# Patient Record
Sex: MALE | Race: WHITE | NOT HISPANIC OR LATINO | Employment: OTHER | URBAN - METROPOLITAN AREA
[De-identification: names, ages, dates, MRNs, and addresses within clinical notes are randomized per-mention and may not be internally consistent; named-entity substitution may affect disease eponyms.]

---

## 2017-01-06 ENCOUNTER — ALLSCRIPTS OFFICE VISIT (OUTPATIENT)
Dept: OTHER | Facility: OTHER | Age: 57
End: 2017-01-06

## 2017-01-06 ENCOUNTER — HOSPITAL ENCOUNTER (EMERGENCY)
Facility: HOSPITAL | Age: 57
Discharge: HOME | End: 2017-01-06
Attending: EMERGENCY MEDICINE | Admitting: EMERGENCY MEDICINE
Payer: COMMERCIAL

## 2017-01-06 VITALS
TEMPERATURE: 98.7 F | WEIGHT: 183 LBS | SYSTOLIC BLOOD PRESSURE: 130 MMHG | RESPIRATION RATE: 18 BRPM | OXYGEN SATURATION: 97 % | DIASTOLIC BLOOD PRESSURE: 76 MMHG | HEART RATE: 105 BPM

## 2017-01-06 DIAGNOSIS — K42.9 UMBILICAL HERNIA: Primary | ICD-10-CM

## 2017-01-06 LAB — HGB BLD-MCNC: 8.8 G/DL

## 2017-01-06 PROCEDURE — 99283 EMERGENCY DEPT VISIT LOW MDM: CPT

## 2017-01-13 ENCOUNTER — APPOINTMENT (OUTPATIENT)
Dept: LAB | Facility: CLINIC | Age: 57
End: 2017-01-13
Payer: COMMERCIAL

## 2017-01-13 DIAGNOSIS — E11.65 TYPE 2 DIABETES MELLITUS WITH HYPERGLYCEMIA (HCC): ICD-10-CM

## 2017-01-13 LAB
ALBUMIN SERPL BCP-MCNC: 3.3 G/DL (ref 3.5–5)
ALP SERPL-CCNC: 63 U/L (ref 46–116)
ALT SERPL W P-5'-P-CCNC: 86 U/L (ref 12–78)
ANION GAP SERPL CALCULATED.3IONS-SCNC: 11 MMOL/L (ref 4–13)
AST SERPL W P-5'-P-CCNC: 78 U/L (ref 5–45)
BASOPHILS # BLD AUTO: 0.05 THOUSANDS/ΜL (ref 0–0.1)
BASOPHILS NFR BLD AUTO: 1 % (ref 0–1)
BILIRUB SERPL-MCNC: 0.7 MG/DL (ref 0.2–1)
BUN SERPL-MCNC: 15 MG/DL (ref 5–25)
CALCIUM SERPL-MCNC: 9.2 MG/DL (ref 8.3–10.1)
CHLORIDE SERPL-SCNC: 96 MMOL/L (ref 100–108)
CHOLEST SERPL-MCNC: 136 MG/DL (ref 50–200)
CO2 SERPL-SCNC: 28 MMOL/L (ref 21–32)
CREAT SERPL-MCNC: 0.76 MG/DL (ref 0.6–1.3)
EOSINOPHIL # BLD AUTO: 0.11 THOUSAND/ΜL (ref 0–0.61)
EOSINOPHIL NFR BLD AUTO: 2 % (ref 0–6)
ERYTHROCYTE [DISTWIDTH] IN BLOOD BY AUTOMATED COUNT: 13.4 % (ref 11.6–15.1)
EST. AVERAGE GLUCOSE BLD GHB EST-MCNC: 206 MG/DL
GFR SERPL CREATININE-BSD FRML MDRD: >60 ML/MIN/1.73SQ M
GLUCOSE SERPL-MCNC: 258 MG/DL (ref 65–140)
HBA1C MFR BLD: 8.8 % (ref 4.2–6.3)
HCT VFR BLD AUTO: 41.2 % (ref 36.5–49.3)
HDLC SERPL-MCNC: 59 MG/DL (ref 40–60)
HGB BLD-MCNC: 13.9 G/DL (ref 12–17)
LDLC SERPL CALC-MCNC: 57 MG/DL (ref 0–100)
LYMPHOCYTES # BLD AUTO: 1.57 THOUSANDS/ΜL (ref 0.6–4.47)
LYMPHOCYTES NFR BLD AUTO: 22 % (ref 14–44)
MCH RBC QN AUTO: 35.2 PG (ref 26.8–34.3)
MCHC RBC AUTO-ENTMCNC: 33.7 G/DL (ref 31.4–37.4)
MCV RBC AUTO: 104 FL (ref 82–98)
MONOCYTES # BLD AUTO: 0.89 THOUSAND/ΜL (ref 0.17–1.22)
MONOCYTES NFR BLD AUTO: 13 % (ref 4–12)
NEUTROPHILS # BLD AUTO: 4.41 THOUSANDS/ΜL (ref 1.85–7.62)
NEUTS SEG NFR BLD AUTO: 62 % (ref 43–75)
NRBC BLD AUTO-RTO: 0 /100 WBCS
PLATELET # BLD AUTO: 256 THOUSANDS/UL (ref 149–390)
PMV BLD AUTO: 10.3 FL (ref 8.9–12.7)
POTASSIUM SERPL-SCNC: 4.7 MMOL/L (ref 3.5–5.3)
PROT SERPL-MCNC: 8 G/DL (ref 6.4–8.2)
RBC # BLD AUTO: 3.95 MILLION/UL (ref 3.88–5.62)
SODIUM SERPL-SCNC: 135 MMOL/L (ref 136–145)
TRIGL SERPL-MCNC: 98 MG/DL
TSH SERPL DL<=0.05 MIU/L-ACNC: 2.67 UIU/ML (ref 0.36–3.74)
WBC # BLD AUTO: 7.05 THOUSAND/UL (ref 4.31–10.16)

## 2017-01-13 PROCEDURE — 85025 COMPLETE CBC W/AUTO DIFF WBC: CPT

## 2017-01-13 PROCEDURE — 84443 ASSAY THYROID STIM HORMONE: CPT

## 2017-01-13 PROCEDURE — 80053 COMPREHEN METABOLIC PANEL: CPT

## 2017-01-13 PROCEDURE — 36415 COLL VENOUS BLD VENIPUNCTURE: CPT

## 2017-01-13 PROCEDURE — 80061 LIPID PANEL: CPT

## 2017-01-13 PROCEDURE — 83036 HEMOGLOBIN GLYCOSYLATED A1C: CPT

## 2017-01-17 ENCOUNTER — TRANSCRIBE ORDERS (OUTPATIENT)
Dept: ADMINISTRATIVE | Facility: HOSPITAL | Age: 57
End: 2017-01-17

## 2017-01-17 DIAGNOSIS — R74.8 ELEVATED LIVER ENZYMES: ICD-10-CM

## 2017-01-17 DIAGNOSIS — R68.81 EARLY SATIETY: ICD-10-CM

## 2017-01-17 DIAGNOSIS — K43.9 VENTRAL HERNIA WITHOUT OBSTRUCTION OR GANGRENE: ICD-10-CM

## 2017-01-17 DIAGNOSIS — K80.20 CALCULUS OF GALLBLADDER WITHOUT CHOLECYSTITIS WITHOUT OBSTRUCTION: ICD-10-CM

## 2017-01-17 DIAGNOSIS — R10.9 ABDOMINAL PAIN: ICD-10-CM

## 2017-01-17 DIAGNOSIS — R10.9 ABDOMINAL PAIN, UNSPECIFIED SITE: Primary | ICD-10-CM

## 2017-01-17 DIAGNOSIS — R74.8 ABNORMAL LEVELS OF OTHER SERUM ENZYMES: ICD-10-CM

## 2017-01-18 ENCOUNTER — APPOINTMENT (EMERGENCY)
Dept: RADIOLOGY | Facility: HOSPITAL | Age: 57
End: 2017-01-18
Payer: COMMERCIAL

## 2017-01-18 ENCOUNTER — HOSPITAL ENCOUNTER (OUTPATIENT)
Facility: HOSPITAL | Age: 57
Setting detail: OBSERVATION
Discharge: HOME | End: 2017-01-19
Attending: EMERGENCY MEDICINE | Admitting: SURGERY
Payer: COMMERCIAL

## 2017-01-18 ENCOUNTER — APPOINTMENT (OUTPATIENT)
Dept: LAB | Facility: CLINIC | Age: 57
End: 2017-01-18
Payer: COMMERCIAL

## 2017-01-18 DIAGNOSIS — K85.90 PANCREATITIS: Primary | ICD-10-CM

## 2017-01-18 DIAGNOSIS — K86.3 PANCREATIC PSEUDOCYST: ICD-10-CM

## 2017-01-18 DIAGNOSIS — R10.9 ABDOMINAL PAIN: ICD-10-CM

## 2017-01-18 LAB
ALBUMIN SERPL BCP-MCNC: 3.1 G/DL (ref 3.5–5)
ALBUMIN SERPL BCP-MCNC: 3.6 G/DL (ref 3.5–5)
ALP SERPL-CCNC: 63 U/L (ref 46–116)
ALP SERPL-CCNC: 70 U/L (ref 46–116)
ALT SERPL W P-5'-P-CCNC: 127 U/L (ref 12–78)
ALT SERPL W P-5'-P-CCNC: 131 U/L (ref 12–78)
AMYLASE SERPL-CCNC: 216 IU/L (ref 25–115)
ANION GAP SERPL CALCULATED.3IONS-SCNC: 8 MMOL/L (ref 4–13)
ANION GAP SERPL CALCULATED.3IONS-SCNC: 8 MMOL/L (ref 4–13)
AST SERPL W P-5'-P-CCNC: 144 U/L (ref 5–45)
AST SERPL W P-5'-P-CCNC: 144 U/L (ref 5–45)
BASOPHILS # BLD AUTO: 0.05 THOUSANDS/ΜL (ref 0–0.1)
BASOPHILS NFR BLD AUTO: 1 % (ref 0–1)
BILIRUB SERPL-MCNC: 0.51 MG/DL (ref 0.2–1)
BILIRUB SERPL-MCNC: 0.76 MG/DL (ref 0.2–1)
BUN SERPL-MCNC: 14 MG/DL (ref 5–25)
BUN SERPL-MCNC: 16 MG/DL (ref 5–25)
CALCIUM SERPL-MCNC: 9 MG/DL (ref 8.3–10.1)
CALCIUM SERPL-MCNC: 9.3 MG/DL (ref 8.3–10.1)
CHLORIDE SERPL-SCNC: 90 MMOL/L (ref 100–108)
CHLORIDE SERPL-SCNC: 94 MMOL/L (ref 100–108)
CO2 SERPL-SCNC: 29 MMOL/L (ref 21–32)
CO2 SERPL-SCNC: 31 MMOL/L (ref 21–32)
CREAT SERPL-MCNC: 0.81 MG/DL (ref 0.6–1.3)
CREAT SERPL-MCNC: 0.88 MG/DL (ref 0.6–1.3)
EOSINOPHIL # BLD AUTO: 0.13 THOUSAND/ΜL (ref 0–0.61)
EOSINOPHIL NFR BLD AUTO: 2 % (ref 0–6)
ERYTHROCYTE [DISTWIDTH] IN BLOOD BY AUTOMATED COUNT: 13.2 % (ref 11.6–15.1)
GFR SERPL CREATININE-BSD FRML MDRD: >60 ML/MIN/1.73SQ M
GFR SERPL CREATININE-BSD FRML MDRD: >60 ML/MIN/1.73SQ M
GLUCOSE SERPL-MCNC: 214 MG/DL (ref 65–140)
GLUCOSE SERPL-MCNC: 255 MG/DL (ref 65–140)
GLUCOSE SERPL-MCNC: 435 MG/DL (ref 65–140)
HCT VFR BLD AUTO: 38.9 % (ref 36.5–49.3)
HGB BLD-MCNC: 13.4 G/DL (ref 12–17)
LACTATE SERPL-SCNC: 1.7 MMOL/L (ref 0.5–2)
LIPASE SERPL-CCNC: 367 U/L (ref 73–393)
LIPASE SERPL-CCNC: 6698 U/L (ref 73–393)
LYMPHOCYTES # BLD AUTO: 1.71 THOUSANDS/ΜL (ref 0.6–4.47)
LYMPHOCYTES NFR BLD AUTO: 25 % (ref 14–44)
MCH RBC QN AUTO: 35.3 PG (ref 26.8–34.3)
MCHC RBC AUTO-ENTMCNC: 34.4 G/DL (ref 31.4–37.4)
MCV RBC AUTO: 102 FL (ref 82–98)
MONOCYTES # BLD AUTO: 0.66 THOUSAND/ΜL (ref 0.17–1.22)
MONOCYTES NFR BLD AUTO: 10 % (ref 4–12)
NEUTROPHILS # BLD AUTO: 4.23 THOUSANDS/ΜL (ref 1.85–7.62)
NEUTS SEG NFR BLD AUTO: 62 % (ref 43–75)
NRBC BLD AUTO-RTO: 0 /100 WBCS
PLATELET # BLD AUTO: 219 THOUSANDS/UL (ref 149–390)
PMV BLD AUTO: 10.1 FL (ref 8.9–12.7)
POTASSIUM SERPL-SCNC: 4.2 MMOL/L (ref 3.5–5.3)
POTASSIUM SERPL-SCNC: 4.9 MMOL/L (ref 3.5–5.3)
PROT SERPL-MCNC: 7.6 G/DL (ref 6.4–8.2)
PROT SERPL-MCNC: 8.5 G/DL (ref 6.4–8.2)
RBC # BLD AUTO: 3.8 MILLION/UL (ref 3.88–5.62)
SODIUM SERPL-SCNC: 129 MMOL/L (ref 136–145)
SODIUM SERPL-SCNC: 131 MMOL/L (ref 136–145)
WBC # BLD AUTO: 6.8 THOUSAND/UL (ref 4.31–10.16)

## 2017-01-18 PROCEDURE — 36415 COLL VENOUS BLD VENIPUNCTURE: CPT

## 2017-01-18 PROCEDURE — 96360 HYDRATION IV INFUSION INIT: CPT

## 2017-01-18 PROCEDURE — 85025 COMPLETE CBC W/AUTO DIFF WBC: CPT | Performed by: EMERGENCY MEDICINE

## 2017-01-18 PROCEDURE — 83690 ASSAY OF LIPASE: CPT | Performed by: EMERGENCY MEDICINE

## 2017-01-18 PROCEDURE — 83690 ASSAY OF LIPASE: CPT

## 2017-01-18 PROCEDURE — 74177 CT ABD & PELVIS W/CONTRAST: CPT

## 2017-01-18 PROCEDURE — 82150 ASSAY OF AMYLASE: CPT

## 2017-01-18 PROCEDURE — 82948 REAGENT STRIP/BLOOD GLUCOSE: CPT

## 2017-01-18 PROCEDURE — 96361 HYDRATE IV INFUSION ADD-ON: CPT

## 2017-01-18 PROCEDURE — 83605 ASSAY OF LACTIC ACID: CPT | Performed by: EMERGENCY MEDICINE

## 2017-01-18 RX ORDER — NICOTINE 21 MG/24HR
14 PATCH, TRANSDERMAL 24 HOURS TRANSDERMAL ONCE
Status: DISCONTINUED | OUTPATIENT
Start: 2017-01-18 | End: 2017-01-19 | Stop reason: HOSPADM

## 2017-01-18 RX ORDER — NICOTINE 21 MG/24HR
1 PATCH, TRANSDERMAL 24 HOURS TRANSDERMAL DAILY
Status: DISCONTINUED | OUTPATIENT
Start: 2017-01-19 | End: 2017-01-19 | Stop reason: HOSPADM

## 2017-01-18 RX ORDER — SODIUM CHLORIDE 9 MG/ML
125 INJECTION, SOLUTION INTRAVENOUS CONTINUOUS
Status: DISCONTINUED | OUTPATIENT
Start: 2017-01-18 | End: 2017-01-19

## 2017-01-18 RX ORDER — AMOXICILLIN 250 MG
1 CAPSULE ORAL
Status: DISCONTINUED | OUTPATIENT
Start: 2017-01-18 | End: 2017-01-19 | Stop reason: HOSPADM

## 2017-01-18 RX ORDER — LORAZEPAM 2 MG/ML
1 INJECTION INTRAMUSCULAR EVERY 2 HOUR PRN
Status: DISCONTINUED | OUTPATIENT
Start: 2017-01-18 | End: 2017-01-19 | Stop reason: HOSPADM

## 2017-01-18 RX ORDER — HEPARIN SODIUM 5000 [USP'U]/ML
5000 INJECTION, SOLUTION INTRAVENOUS; SUBCUTANEOUS EVERY 8 HOURS SCHEDULED
Status: DISCONTINUED | OUTPATIENT
Start: 2017-01-18 | End: 2017-01-19 | Stop reason: HOSPADM

## 2017-01-18 RX ADMIN — SODIUM CHLORIDE 200 ML/HR: 0.9 INJECTION, SOLUTION INTRAVENOUS at 21:37

## 2017-01-18 RX ADMIN — INSULIN LISPRO 4 UNITS: 100 INJECTION, SOLUTION INTRAVENOUS; SUBCUTANEOUS at 23:48

## 2017-01-18 RX ADMIN — SODIUM CHLORIDE 1000 ML: 0.9 INJECTION, SOLUTION INTRAVENOUS at 18:26

## 2017-01-18 RX ADMIN — NICOTINE 14 MG: 14 PATCH TRANSDERMAL at 19:31

## 2017-01-18 RX ADMIN — IOHEXOL 100 ML: 350 INJECTION, SOLUTION INTRAVENOUS at 17:48

## 2017-01-19 VITALS
DIASTOLIC BLOOD PRESSURE: 97 MMHG | OXYGEN SATURATION: 97 % | RESPIRATION RATE: 20 BRPM | HEART RATE: 97 BPM | BODY MASS INDEX: 26.5 KG/M2 | HEIGHT: 70 IN | TEMPERATURE: 98.6 F | SYSTOLIC BLOOD PRESSURE: 165 MMHG | WEIGHT: 185.1 LBS

## 2017-01-19 PROBLEM — K86.0 ALCOHOL-INDUCED CHRONIC PANCREATITIS (HCC): Status: ACTIVE | Noted: 2017-01-19

## 2017-01-19 LAB
ALBUMIN SERPL BCP-MCNC: 3.1 G/DL (ref 3.5–5)
ALP SERPL-CCNC: 57 U/L (ref 46–116)
ALT SERPL W P-5'-P-CCNC: 175 U/L (ref 12–78)
ANION GAP SERPL CALCULATED.3IONS-SCNC: 9 MMOL/L (ref 4–13)
AST SERPL W P-5'-P-CCNC: 261 U/L (ref 5–45)
BASOPHILS # BLD AUTO: 0.03 THOUSANDS/ΜL (ref 0–0.1)
BASOPHILS NFR BLD AUTO: 1 % (ref 0–1)
BILIRUB SERPL-MCNC: 0.59 MG/DL (ref 0.2–1)
BUN SERPL-MCNC: 12 MG/DL (ref 5–25)
CALCIUM SERPL-MCNC: 8.5 MG/DL (ref 8.3–10.1)
CHLORIDE SERPL-SCNC: 101 MMOL/L (ref 100–108)
CO2 SERPL-SCNC: 27 MMOL/L (ref 21–32)
CREAT SERPL-MCNC: 0.62 MG/DL (ref 0.6–1.3)
EOSINOPHIL # BLD AUTO: 0.16 THOUSAND/ΜL (ref 0–0.61)
EOSINOPHIL NFR BLD AUTO: 3 % (ref 0–6)
ERYTHROCYTE [DISTWIDTH] IN BLOOD BY AUTOMATED COUNT: 13.5 % (ref 11.6–15.1)
GFR SERPL CREATININE-BSD FRML MDRD: >60 ML/MIN/1.73SQ M
GLUCOSE SERPL-MCNC: 143 MG/DL (ref 65–140)
GLUCOSE SERPL-MCNC: 152 MG/DL (ref 65–140)
GLUCOSE SERPL-MCNC: 162 MG/DL (ref 65–140)
HCT VFR BLD AUTO: 40.1 % (ref 36.5–49.3)
HGB BLD-MCNC: 13.4 G/DL (ref 12–17)
LIPASE SERPL-CCNC: 208 U/L (ref 73–393)
LYMPHOCYTES # BLD AUTO: 1.53 THOUSANDS/ΜL (ref 0.6–4.47)
LYMPHOCYTES NFR BLD AUTO: 25 % (ref 14–44)
MCH RBC QN AUTO: 34.8 PG (ref 26.8–34.3)
MCHC RBC AUTO-ENTMCNC: 33.4 G/DL (ref 31.4–37.4)
MCV RBC AUTO: 104 FL (ref 82–98)
MONOCYTES # BLD AUTO: 0.59 THOUSAND/ΜL (ref 0.17–1.22)
MONOCYTES NFR BLD AUTO: 10 % (ref 4–12)
NEUTROPHILS # BLD AUTO: 3.82 THOUSANDS/ΜL (ref 1.85–7.62)
NEUTS SEG NFR BLD AUTO: 61 % (ref 43–75)
NRBC BLD AUTO-RTO: 0 /100 WBCS
PLATELET # BLD AUTO: 204 THOUSANDS/UL (ref 149–390)
PMV BLD AUTO: 10.4 FL (ref 8.9–12.7)
POTASSIUM SERPL-SCNC: 3.9 MMOL/L (ref 3.5–5.3)
PROT SERPL-MCNC: 7.3 G/DL (ref 6.4–8.2)
RBC # BLD AUTO: 3.85 MILLION/UL (ref 3.88–5.62)
SODIUM SERPL-SCNC: 137 MMOL/L (ref 136–145)
WBC # BLD AUTO: 6.14 THOUSAND/UL (ref 4.31–10.16)

## 2017-01-19 PROCEDURE — 82948 REAGENT STRIP/BLOOD GLUCOSE: CPT

## 2017-01-19 PROCEDURE — 80053 COMPREHEN METABOLIC PANEL: CPT | Performed by: SURGERY

## 2017-01-19 PROCEDURE — 83690 ASSAY OF LIPASE: CPT | Performed by: SURGERY

## 2017-01-19 PROCEDURE — 85025 COMPLETE CBC W/AUTO DIFF WBC: CPT | Performed by: SURGERY

## 2017-01-19 PROCEDURE — 99285 EMERGENCY DEPT VISIT HI MDM: CPT

## 2017-01-19 RX ORDER — AMOXICILLIN 250 MG
1 CAPSULE ORAL
Qty: 30 TABLET | Refills: 0 | Status: SHIPPED | OUTPATIENT
Start: 2017-01-19 | End: 2017-02-18

## 2017-01-19 RX ORDER — POTASSIUM CHLORIDE 14.9 MG/ML
20 INJECTION INTRAVENOUS ONCE
Status: COMPLETED | OUTPATIENT
Start: 2017-01-19 | End: 2017-01-19

## 2017-01-19 RX ORDER — AMOXICILLIN 250 MG
1 CAPSULE ORAL
Qty: 30 TABLET | Refills: 0 | Status: CANCELLED | OUTPATIENT
Start: 2017-01-19 | End: 2017-02-18

## 2017-01-19 RX ADMIN — SODIUM CHLORIDE 200 ML/HR: 0.9 INJECTION, SOLUTION INTRAVENOUS at 09:26

## 2017-01-19 RX ADMIN — HEPARIN SODIUM 5000 UNITS: 5000 INJECTION, SOLUTION INTRAVENOUS; SUBCUTANEOUS at 05:03

## 2017-01-19 RX ADMIN — SODIUM CHLORIDE 200 ML/HR: 0.9 INJECTION, SOLUTION INTRAVENOUS at 02:44

## 2017-01-19 RX ADMIN — INSULIN LISPRO 2 UNITS: 100 INJECTION, SOLUTION INTRAVENOUS; SUBCUTANEOUS at 12:50

## 2017-01-19 RX ADMIN — NICOTINE 1 PATCH: 14 PATCH, EXTENDED RELEASE TRANSDERMAL at 09:25

## 2017-01-19 RX ADMIN — POTASSIUM CHLORIDE 20 MEQ: 200 INJECTION, SOLUTION INTRAVENOUS at 12:35

## 2017-01-19 RX ADMIN — INSULIN LISPRO 2 UNITS: 100 INJECTION, SOLUTION INTRAVENOUS; SUBCUTANEOUS at 05:51

## 2017-01-19 RX ADMIN — HEPARIN SODIUM 5000 UNITS: 5000 INJECTION, SOLUTION INTRAVENOUS; SUBCUTANEOUS at 15:03

## 2017-01-20 ENCOUNTER — HOSPITAL ENCOUNTER (OUTPATIENT)
Dept: RADIOLOGY | Facility: HOSPITAL | Age: 57
End: 2017-01-20
Payer: COMMERCIAL

## 2017-02-02 ENCOUNTER — GENERIC CONVERSION - ENCOUNTER (OUTPATIENT)
Dept: OTHER | Facility: OTHER | Age: 57
End: 2017-02-02

## 2017-02-16 ENCOUNTER — ALLSCRIPTS OFFICE VISIT (OUTPATIENT)
Dept: OTHER | Facility: OTHER | Age: 57
End: 2017-02-16

## 2017-02-18 ENCOUNTER — TRANSCRIBE ORDERS (OUTPATIENT)
Dept: LAB | Facility: HOSPITAL | Age: 57
End: 2017-02-18

## 2017-02-18 ENCOUNTER — APPOINTMENT (OUTPATIENT)
Dept: LAB | Facility: HOSPITAL | Age: 57
End: 2017-02-18
Attending: SURGERY
Payer: COMMERCIAL

## 2017-02-18 DIAGNOSIS — K80.20 CALCULUS OF GALLBLADDER WITHOUT CHOLECYSTITIS WITHOUT OBSTRUCTION: ICD-10-CM

## 2017-02-18 DIAGNOSIS — K43.9 VENTRAL HERNIA WITHOUT OBSTRUCTION OR GANGRENE: ICD-10-CM

## 2017-02-18 DIAGNOSIS — K80.61 CALCULUS OF GALLBLADDER AND BILE DUCT WITH CHOLECYSTITIS WITH OBSTRUCTION, UNSPECIFIED CHOLECYSTITIS ACUITY: Primary | ICD-10-CM

## 2017-02-18 DIAGNOSIS — K80.61 CALCULUS OF GALLBLADDER AND BILE DUCT WITH CHOLECYSTITIS WITH OBSTRUCTION, UNSPECIFIED CHOLECYSTITIS ACUITY: ICD-10-CM

## 2017-02-18 LAB
ABO GROUP BLD: NORMAL
ALBUMIN SERPL BCP-MCNC: 3.8 G/DL (ref 3.5–5)
ALP SERPL-CCNC: 60 U/L (ref 46–116)
ALT SERPL W P-5'-P-CCNC: 146 U/L (ref 12–78)
AST SERPL W P-5'-P-CCNC: 56 U/L (ref 5–45)
BILIRUB DIRECT SERPL-MCNC: 0.25 MG/DL (ref 0–0.2)
BILIRUB SERPL-MCNC: 0.56 MG/DL (ref 0.2–1)
BLD GP AB SCN SERPL QL: NEGATIVE
PROT SERPL-MCNC: 8.6 G/DL (ref 6.4–8.2)
RH BLD: NEGATIVE

## 2017-02-18 PROCEDURE — 36415 COLL VENOUS BLD VENIPUNCTURE: CPT

## 2017-02-18 PROCEDURE — 80076 HEPATIC FUNCTION PANEL: CPT

## 2017-02-18 PROCEDURE — 86901 BLOOD TYPING SEROLOGIC RH(D): CPT

## 2017-02-18 PROCEDURE — 86850 RBC ANTIBODY SCREEN: CPT

## 2017-02-18 PROCEDURE — 86900 BLOOD TYPING SEROLOGIC ABO: CPT

## 2017-02-20 ENCOUNTER — ANESTHESIA EVENT (OUTPATIENT)
Dept: PERIOP | Facility: HOSPITAL | Age: 57
End: 2017-02-20
Payer: COMMERCIAL

## 2017-02-20 ENCOUNTER — ALLSCRIPTS OFFICE VISIT (OUTPATIENT)
Dept: OTHER | Facility: OTHER | Age: 57
End: 2017-02-20

## 2017-02-20 DIAGNOSIS — Z12.11 ENCOUNTER FOR SCREENING FOR MALIGNANT NEOPLASM OF COLON: ICD-10-CM

## 2017-02-20 DIAGNOSIS — E11.65 TYPE 2 DIABETES MELLITUS WITH HYPERGLYCEMIA (HCC): ICD-10-CM

## 2017-02-20 LAB — HBA1C MFR BLD HPLC: 10.2 %

## 2017-02-20 RX ORDER — BACLOFEN 20 MG/1
20 TABLET ORAL 3 TIMES DAILY
COMMUNITY
End: 2020-12-11

## 2017-02-20 RX ORDER — MULTIVITAMIN
1 TABLET ORAL DAILY
COMMUNITY
End: 2018-05-08 | Stop reason: SDUPTHER

## 2017-02-20 RX ORDER — LISINOPRIL 10 MG/1
10 TABLET ORAL DAILY
Status: ON HOLD | COMMUNITY
End: 2018-04-19 | Stop reason: SDUPTHER

## 2017-02-20 RX ORDER — GLIMEPIRIDE 1 MG/1
1 TABLET ORAL
Status: ON HOLD | COMMUNITY
End: 2018-04-19 | Stop reason: SDUPTHER

## 2017-02-20 RX ORDER — CITALOPRAM 20 MG/1
20 TABLET ORAL DAILY
Status: ON HOLD | COMMUNITY
End: 2018-04-19 | Stop reason: SDUPTHER

## 2017-02-24 ENCOUNTER — ANESTHESIA (OUTPATIENT)
Dept: PERIOP | Facility: HOSPITAL | Age: 57
End: 2017-02-24
Payer: COMMERCIAL

## 2017-02-24 ENCOUNTER — HOSPITAL ENCOUNTER (OUTPATIENT)
Facility: HOSPITAL | Age: 57
Setting detail: OUTPATIENT SURGERY
Discharge: HOME | End: 2017-02-24
Attending: SURGERY | Admitting: SURGERY
Payer: COMMERCIAL

## 2017-02-24 VITALS
RESPIRATION RATE: 16 BRPM | TEMPERATURE: 99.2 F | BODY MASS INDEX: 25.05 KG/M2 | HEIGHT: 70 IN | SYSTOLIC BLOOD PRESSURE: 106 MMHG | OXYGEN SATURATION: 94 % | WEIGHT: 175 LBS | DIASTOLIC BLOOD PRESSURE: 57 MMHG | HEART RATE: 104 BPM

## 2017-02-24 DIAGNOSIS — K85.10 ACUTE BILIARY PANCREATITIS WITHOUT INFECTION OR NECROSIS: ICD-10-CM

## 2017-02-24 DIAGNOSIS — K42.9 UMBILICAL HERNIA WITHOUT OBSTRUCTION OR GANGRENE: ICD-10-CM

## 2017-02-24 LAB
BASOPHILS # BLD AUTO: 0.05 THOUSANDS/ΜL (ref 0–0.1)
BASOPHILS NFR BLD AUTO: 1 % (ref 0–1)
EOSINOPHIL # BLD AUTO: 0.11 THOUSAND/ΜL (ref 0–0.61)
EOSINOPHIL NFR BLD AUTO: 2 % (ref 0–6)
ERYTHROCYTE [DISTWIDTH] IN BLOOD BY AUTOMATED COUNT: 12.5 % (ref 11.6–15.1)
GLUCOSE SERPL-MCNC: 240 MG/DL (ref 65–140)
GLUCOSE SERPL-MCNC: 270 MG/DL (ref 65–140)
GLUCOSE SERPL-MCNC: 278 MG/DL (ref 65–140)
GLUCOSE SERPL-MCNC: 302 MG/DL (ref 65–140)
GLUCOSE SERPL-MCNC: 307 MG/DL (ref 65–140)
HCT VFR BLD AUTO: 40.7 % (ref 36.5–49.3)
HGB BLD-MCNC: 14.1 G/DL (ref 12–17)
LYMPHOCYTES # BLD AUTO: 1.99 THOUSANDS/ΜL (ref 0.6–4.47)
LYMPHOCYTES NFR BLD AUTO: 28 % (ref 14–44)
MCH RBC QN AUTO: 35.3 PG (ref 26.8–34.3)
MCHC RBC AUTO-ENTMCNC: 34.6 G/DL (ref 31.4–37.4)
MCV RBC AUTO: 102 FL (ref 82–98)
MONOCYTES # BLD AUTO: 0.71 THOUSAND/ΜL (ref 0.17–1.22)
MONOCYTES NFR BLD AUTO: 10 % (ref 4–12)
NEUTROPHILS # BLD AUTO: 4.17 THOUSANDS/ΜL (ref 1.85–7.62)
NEUTS SEG NFR BLD AUTO: 59 % (ref 43–75)
NRBC BLD AUTO-RTO: 0 /100 WBCS
PLATELET # BLD AUTO: 191 THOUSANDS/UL (ref 149–390)
PMV BLD AUTO: 10.6 FL (ref 8.9–12.7)
RBC # BLD AUTO: 3.99 MILLION/UL (ref 3.88–5.62)
WBC # BLD AUTO: 7.04 THOUSAND/UL (ref 4.31–10.16)

## 2017-02-24 PROCEDURE — 88304 TISSUE EXAM BY PATHOLOGIST: CPT | Performed by: SURGERY

## 2017-02-24 PROCEDURE — 85025 COMPLETE CBC W/AUTO DIFF WBC: CPT | Performed by: SURGERY

## 2017-02-24 PROCEDURE — 82948 REAGENT STRIP/BLOOD GLUCOSE: CPT

## 2017-02-24 PROCEDURE — 86923 COMPATIBILITY TEST ELECTRIC: CPT

## 2017-02-24 RX ORDER — BUPIVACAINE HYDROCHLORIDE 5 MG/ML
INJECTION, SOLUTION PERINEURAL AS NEEDED
Status: DISCONTINUED | OUTPATIENT
Start: 2017-02-24 | End: 2017-02-24 | Stop reason: HOSPADM

## 2017-02-24 RX ORDER — LIDOCAINE HYDROCHLORIDE 10 MG/ML
INJECTION, SOLUTION INFILTRATION; PERINEURAL AS NEEDED
Status: DISCONTINUED | OUTPATIENT
Start: 2017-02-24 | End: 2017-02-24 | Stop reason: SURG

## 2017-02-24 RX ORDER — ONDANSETRON 2 MG/ML
4 INJECTION INTRAMUSCULAR; INTRAVENOUS ONCE
Status: DISCONTINUED | OUTPATIENT
Start: 2017-02-24 | End: 2017-02-24 | Stop reason: HOSPADM

## 2017-02-24 RX ORDER — PROPOFOL 10 MG/ML
INJECTION, EMULSION INTRAVENOUS CONTINUOUS PRN
Status: DISCONTINUED | OUTPATIENT
Start: 2017-02-24 | End: 2017-02-24 | Stop reason: SURG

## 2017-02-24 RX ORDER — OXYCODONE HYDROCHLORIDE AND ACETAMINOPHEN 5; 325 MG/1; MG/1
1-2 TABLET ORAL EVERY 4 HOURS PRN
Refills: 0
Start: 2017-02-24 | End: 2017-03-06

## 2017-02-24 RX ORDER — GLYCOPYRROLATE 0.2 MG/ML
INJECTION INTRAMUSCULAR; INTRAVENOUS AS NEEDED
Status: DISCONTINUED | OUTPATIENT
Start: 2017-02-24 | End: 2017-02-24 | Stop reason: SURG

## 2017-02-24 RX ORDER — PROPOFOL 10 MG/ML
INJECTION, EMULSION INTRAVENOUS AS NEEDED
Status: DISCONTINUED | OUTPATIENT
Start: 2017-02-24 | End: 2017-02-24 | Stop reason: SURG

## 2017-02-24 RX ORDER — FENTANYL CITRATE 50 UG/ML
INJECTION, SOLUTION INTRAMUSCULAR; INTRAVENOUS AS NEEDED
Status: DISCONTINUED | OUTPATIENT
Start: 2017-02-24 | End: 2017-02-24 | Stop reason: SURG

## 2017-02-24 RX ORDER — ONDANSETRON 2 MG/ML
INJECTION INTRAMUSCULAR; INTRAVENOUS AS NEEDED
Status: DISCONTINUED | OUTPATIENT
Start: 2017-02-24 | End: 2017-02-24 | Stop reason: SURG

## 2017-02-24 RX ORDER — SODIUM CHLORIDE, SODIUM LACTATE, POTASSIUM CHLORIDE, CALCIUM CHLORIDE 600; 310; 30; 20 MG/100ML; MG/100ML; MG/100ML; MG/100ML
20 INJECTION, SOLUTION INTRAVENOUS CONTINUOUS
Status: DISCONTINUED | OUTPATIENT
Start: 2017-02-24 | End: 2017-02-24 | Stop reason: HOSPADM

## 2017-02-24 RX ORDER — ROCURONIUM BROMIDE 10 MG/ML
INJECTION, SOLUTION INTRAVENOUS AS NEEDED
Status: DISCONTINUED | OUTPATIENT
Start: 2017-02-24 | End: 2017-02-24 | Stop reason: SURG

## 2017-02-24 RX ADMIN — INSULIN HUMAN 10 UNITS: 100 INJECTION, SOLUTION PARENTERAL at 13:40

## 2017-02-24 RX ADMIN — PROPOFOL 50 MCG/KG/MIN: 10 INJECTION, EMULSION INTRAVENOUS at 10:38

## 2017-02-24 RX ADMIN — ROCURONIUM BROMIDE 10 MG: 10 INJECTION, SOLUTION INTRAVENOUS at 10:42

## 2017-02-24 RX ADMIN — SODIUM CHLORIDE, SODIUM LACTATE, POTASSIUM CHLORIDE, AND CALCIUM CHLORIDE 20 ML/HR: .6; .31; .03; .02 INJECTION, SOLUTION INTRAVENOUS at 08:41

## 2017-02-24 RX ADMIN — HYDROMORPHONE HYDROCHLORIDE 0.4 MG: 1 INJECTION, SOLUTION INTRAMUSCULAR; INTRAVENOUS; SUBCUTANEOUS at 12:33

## 2017-02-24 RX ADMIN — GLYCOPYRROLATE 0.4 MG: 0.2 INJECTION INTRAMUSCULAR; INTRAVENOUS at 11:55

## 2017-02-24 RX ADMIN — METOPROLOL TARTRATE 2 MG: 5 INJECTION, SOLUTION INTRAVENOUS at 11:00

## 2017-02-24 RX ADMIN — ONDANSETRON 4 MG: 2 INJECTION INTRAMUSCULAR; INTRAVENOUS at 11:00

## 2017-02-24 RX ADMIN — HYDROMORPHONE HYDROCHLORIDE 0.4 MG: 1 INJECTION, SOLUTION INTRAMUSCULAR; INTRAVENOUS; SUBCUTANEOUS at 13:02

## 2017-02-24 RX ADMIN — CEFAZOLIN SODIUM 1000 MG: 1 SOLUTION INTRAVENOUS at 10:40

## 2017-02-24 RX ADMIN — HYDROMORPHONE HYDROCHLORIDE 0.4 MG: 1 INJECTION, SOLUTION INTRAMUSCULAR; INTRAVENOUS; SUBCUTANEOUS at 12:43

## 2017-02-24 RX ADMIN — SODIUM CHLORIDE, SODIUM LACTATE, POTASSIUM CHLORIDE, AND CALCIUM CHLORIDE: .6; .31; .03; .02 INJECTION, SOLUTION INTRAVENOUS at 11:50

## 2017-02-24 RX ADMIN — INSULIN HUMAN 10 UNITS: 100 INJECTION, SOLUTION PARENTERAL at 12:35

## 2017-02-24 RX ADMIN — INSULIN HUMAN 6 UNITS: 100 INJECTION, SOLUTION PARENTERAL at 09:04

## 2017-02-24 RX ADMIN — ROCURONIUM BROMIDE 40 MG: 10 INJECTION, SOLUTION INTRAVENOUS at 10:25

## 2017-02-24 RX ADMIN — PROPOFOL 200 MG: 10 INJECTION, EMULSION INTRAVENOUS at 10:25

## 2017-02-24 RX ADMIN — NEOSTIGMINE METHYLSULFATE 3 MG: 1 INJECTION INTRAMUSCULAR; INTRAVENOUS; SUBCUTANEOUS at 11:55

## 2017-02-24 RX ADMIN — FENTANYL CITRATE 50 MCG: 50 INJECTION, SOLUTION INTRAMUSCULAR; INTRAVENOUS at 10:25

## 2017-02-24 RX ADMIN — LIDOCAINE HYDROCHLORIDE 50 MG: 10 INJECTION, SOLUTION INFILTRATION; PERINEURAL at 10:25

## 2017-02-26 LAB
ABO GROUP BLD BPU: NORMAL
ABO GROUP BLD BPU: NORMAL
BPU ID: NORMAL
BPU ID: NORMAL
UNIT DISPENSE STATUS: NORMAL
UNIT DISPENSE STATUS: NORMAL
UNIT PRODUCT CODE: NORMAL
UNIT PRODUCT CODE: NORMAL
UNIT RH: NORMAL
UNIT RH: NORMAL

## 2017-03-08 ENCOUNTER — ALLSCRIPTS OFFICE VISIT (OUTPATIENT)
Dept: OTHER | Facility: OTHER | Age: 57
End: 2017-03-08

## 2017-05-23 ENCOUNTER — ALLSCRIPTS OFFICE VISIT (OUTPATIENT)
Dept: OTHER | Facility: OTHER | Age: 57
End: 2017-05-23

## 2017-05-23 DIAGNOSIS — R19.7 DIARRHEA: ICD-10-CM

## 2017-05-23 DIAGNOSIS — R74.8 ABNORMAL LEVELS OF OTHER SERUM ENZYMES: ICD-10-CM

## 2017-07-24 ENCOUNTER — GENERIC CONVERSION - ENCOUNTER (OUTPATIENT)
Dept: OTHER | Facility: OTHER | Age: 57
End: 2017-07-24

## 2017-08-09 ENCOUNTER — GENERIC CONVERSION - ENCOUNTER (OUTPATIENT)
Dept: OTHER | Facility: OTHER | Age: 57
End: 2017-08-09

## 2017-08-28 ENCOUNTER — ALLSCRIPTS OFFICE VISIT (OUTPATIENT)
Dept: OTHER | Facility: OTHER | Age: 57
End: 2017-08-28

## 2017-08-28 DIAGNOSIS — S14.109A: ICD-10-CM

## 2017-08-28 DIAGNOSIS — E11.40 TYPE 2 DIABETES MELLITUS WITH DIABETIC NEUROPATHY (HCC): ICD-10-CM

## 2017-08-28 DIAGNOSIS — Z98.1 ARTHRODESIS STATUS: ICD-10-CM

## 2017-08-28 DIAGNOSIS — G89.29 OTHER CHRONIC PAIN: ICD-10-CM

## 2017-08-28 DIAGNOSIS — R26.9 ABNORMALITY OF GAIT AND MOBILITY: ICD-10-CM

## 2017-08-28 DIAGNOSIS — E11.8 TYPE 2 DIABETES MELLITUS WITH COMPLICATIONS (HCC): ICD-10-CM

## 2017-08-28 LAB — HBA1C MFR BLD HPLC: 5.4 %

## 2018-01-11 NOTE — PROGRESS NOTES
Assessment    1  Diabetic neuropathy (250 60,357 2) (E11 40)   2  Uncontrolled diabetes mellitus (250 02) (E11 65)   3  Injury of cervical spinal cord (952 00) (S14 109A)    Plan     Injury of cervical spinal cord, Status post cervical spinal fusion, Uncontrolled diabetes  mellitus    · *1 - [facility]or[facility]e [facility] Physician Referral  Consult  Status: Hold For - Scheduling   Requested for: 16PGR3123  Care Summary provided  : Yes     Injury of cervical spinal cord, Uncontrolled diabetes mellitus    · Home Health Referral Other Physician Referral  Consult  Status: Hold For - Scheduling   Requested for: 21EPW2714  are Referring to a non- Preferred Provider : Provider not listed in [employer]  Care Summary provided  : Yes     Need for prophylactic vaccination and inoculation against influenza    · Stop: Fluzone Quadrivalent Intramuscular Suspension     Uncontrolled diabetes mellitus    · (1) CBC/PLT/DIFF; Status:Active; Requested for:98Vkb0214;    · (1) COMPREHENSIVE METABOLIC PANEL; Status:Active; Requested for:92Pys4242;    · (1) HEMOGLOBIN A1C; Status:Active; Requested for:91Plz1406;    · (1) LIPID PANEL, FASTING; Status:Active; Requested for:46Phq3203;    · (1) MICROALBUMIN CREATININE RATIO, RANDOM URINE; Status:Active; Requested  for:17Wqi6748;    · (1) TSH; Status:Active; Requested for:83Gup6912;     Discussion/Summary    Type 2 DM - we will check a hemoglobin A1c, continue metformin    HTN - continue lisinopril    Cervical Injury - as the patient has limited mobility from his injury, and fatigues easily, he is requesting home health aides which I have ordered  HM - spoke extensively about colorectal cancer screening and prostate screening which the patient declined at this time  He would like to think about it, and discuss at future visits  Impression: Initial Annual Wellness Visit  Patient Discussion: plan discussed with the patient, plan discussed with the patient's family, follow-up visit needed in 3 months  History of Present Illness  HPI: 57-year-old male with past medical history of hypertension, type 2 diabetes, and cervical injury presents for [facility] of care and Medicare annual wellness visit  Patient notes that he has been in good health over the past few months  Patient is without complaints at this time  Due to his cervical injury and residual weakness, he does have ambulatory dysfunction and has had approximately 3 falls in the past year  He notes that he has been to physical therapy numerous times in the past, with little improvement  Welcome to [employer] and Wellness Visits: The patient is being seen for the initial annual wellness visit  Medicare Screening and Risk Factors   Hospitalizations: no previous hospitalizations  Once per lifetime medicare screening tests: ECG has not been done and AAA screening US has not yet been done  Medicare Screening Tests Risk Questions   Abdominal aortic aneurysm risk assessment: tobacco use  Osteoporosis risk assessment: , tobacco use and alcohol use  HIV risk assessment: none indicated  Drug and Alcohol Use: The patient currently smokes 1 PPD packs per day  He is not ready to quit using tobacco  The patient reports frequent alcohol use  He is not ready to quit drinking  He has never used illicit drugs  Diet and Physical Activity: Current diet includes well balanced meals  He exercises infrequently  Mood Disorder and Cognitive Impairment Screening: He denies feeling down, depressed, or hopeless over the past two weeks  He denies feeling little interest or pleasure in doing things over the past two weeks  Cognitive impairment screening: denies difficulty learning/retaining new information, denies difficulty handling complex tasks, denies difficulty with reasoning, denies difficulty with spatial ability and orientation, denies difficulty with language and denies difficulty with behavior     Functional Ability/Level of Safety: Hearing is normal bilaterally and a hearing aid is not used  The patient is currently able to do activities of daily living with limitations  Activities of daily living details: transportation help needed, needs help shopping, meal preparation help needed, needs help doing housework, needs help doing laundry and needs help managing medications, but does not need help using the phone and does not need help managing money  Fall risk factors: The patient fell 5 times in the past 12 months  Home safety risk factors:  uneven floors, no grab bars in the bathroom and no handrails on the stairs, but no unfamiliar surroundings, no loose rugs and no household clutter  Advance Directives: Advance directives: no living will, no durable power of  for health care directives and no advance directives  Co-Managers and Medical Equipment/Suppliers: See Patient Care Team      Patient Care Team    Care Team Member Role Specialty Office Number   [place], [address] [place], [address]        Review of Systems    Constitutional: negative  Head and Face: negative  Eyes: negative  ENT: nasal congestion, nasal discharge, sneezing and Seasonal allergies, but no earache and no hearing loss  Cardiovascular: negative  Respiratory: negative  Gastrointestinal: negative  Musculoskeletal: Chronic muscle contractures from cervical injury  Neurological: negative  Psychiatric: negative  Endocrine: negative  Hematologic and Lymphatic: negative  Over the past 2 weeks, how often have you been bothered by the following problems? 1 ) Little interest or pleasure in doing things? Not at all    2 ) Feeling down, depressed or hopeless? Not at all    3 ) Trouble falling asleep or sleeping too much? Not at all    4 ) Feeling tired or having little energy? Not at all    5 ) Poor appetite or overeating? Not at all    6 ) Feeling bad about yourself, or that you are a failure, or have let yourself or your family down?  Not at all    7 ) Trouble concentrating on things, such as reading a newspaper or watching television? Not at all    8 ) Moving or speaking so slowly that other people could have noticed, or the opposite, moving or speaking faster than usual? Not at all    9 ) Thoughts that you would be better off dead or of hurting yourself in some way? Not at all  Active Problems    1  Abnormal gait (781 2) (R26 9)   2  COPD (chronic obstructive pulmonary disease) (496) (J44 9)   3  Depression (311) (F32 9)   4  Diabetic neuropathy (250 60,357 2) (E11 40)   5  Elevated liver enzymes (790 5) (R74 8)   6  Injury of cervical spinal cord (952 00) (S14 109A)   7  Status post cervical spinal fusion (V45 4) (Z98 1)   8  Uncontrolled diabetes mellitus (250 02) (E11 65)    Surgical History    · History of Cervical Vertebral Fusion    Family History     Father    · Family history of Alzheimer's disease (V17 2) (Z82 0)     Son    · Family history of Healthy adult    Social History    · Alcohol use (V49 89) (Z78 9)   · Current every day smoker (305 1) (F17 200)   · Exercises rarely (V49 89) (Z78 9)   · Lives with significant other   · No drug use   · No living will    Allergies    1  No Known Drug Allergies    Vitals  Signs [Data Includes: Current Encounter]    Heart Rate: 446IBQEZWXIKKJ: 27ZLYUSPIB: 528GLRLZEPCK: 50QDKEMC: 5 ft 7 3 inWeight: 187 lb 2 ozBMI Calculated: 29 05BSA Calculated: 1 97O2 Saturation: 95    Physical Exam    Constitutional   General appearance: No acute distress, well appearing and well nourished  Head and Face   Palpation of the face and sinuses: No sinus tenderness  Eyes   Pupils and irises: Equal, round, reactive to light  Ears, Nose, Mouth, and Throat   External inspection of ears and nose: Normal     Oropharynx: Normal with no erythema, edema, exudate or lesions  Pulmonary   Respiratory effort: No increased work of breathing or signs of respiratory distress  Auscultation of lungs: Clear to auscultation  Cardiovascular   Auscultation of heart: Normal rate and rhythm, normal S1 and S2, no murmurs  Abdomen   Abdomen: Non-tender, no masses  Musculoskeletal   Gait and station: Normal     Range of motion: Normal     Stability: Normal     Skin Dry skin  Neurologic   Cranial nerves: Cranial nerves 2-12 intact      Psychiatric   Judgment and insight: Normal     Orientation to person, place and time: Normal        Signatures   Electronically signed by : JENNIFER José ; May 31 2016  8:41AM EST                       (Author)

## 2018-01-13 VITALS
SYSTOLIC BLOOD PRESSURE: 126 MMHG | WEIGHT: 176.5 LBS | OXYGEN SATURATION: 95 % | DIASTOLIC BLOOD PRESSURE: 81 MMHG | HEART RATE: 96 BPM | BODY MASS INDEX: 27.7 KG/M2 | TEMPERATURE: 97.8 F | HEIGHT: 67 IN

## 2018-01-13 VITALS — HEIGHT: 67 IN | BODY MASS INDEX: 27.31 KG/M2 | WEIGHT: 174 LBS

## 2018-01-13 VITALS
SYSTOLIC BLOOD PRESSURE: 108 MMHG | OXYGEN SATURATION: 97 % | WEIGHT: 174.25 LBS | RESPIRATION RATE: 16 BRPM | HEART RATE: 104 BPM | BODY MASS INDEX: 27.35 KG/M2 | TEMPERATURE: 98.4 F | DIASTOLIC BLOOD PRESSURE: 70 MMHG | HEIGHT: 67 IN

## 2018-01-14 VITALS
RESPIRATION RATE: 20 BRPM | HEART RATE: 91 BPM | WEIGHT: 182 LBS | OXYGEN SATURATION: 97 % | HEIGHT: 67 IN | BODY MASS INDEX: 28.56 KG/M2 | TEMPERATURE: 98.9 F | DIASTOLIC BLOOD PRESSURE: 70 MMHG | SYSTOLIC BLOOD PRESSURE: 132 MMHG

## 2018-01-14 VITALS
WEIGHT: 174.38 LBS | HEART RATE: 76 BPM | BODY MASS INDEX: 27.37 KG/M2 | TEMPERATURE: 96.9 F | DIASTOLIC BLOOD PRESSURE: 62 MMHG | SYSTOLIC BLOOD PRESSURE: 108 MMHG | HEIGHT: 67 IN | RESPIRATION RATE: 16 BRPM

## 2018-01-14 VITALS
WEIGHT: 183.38 LBS | RESPIRATION RATE: 18 BRPM | HEART RATE: 105 BPM | OXYGEN SATURATION: 97 % | HEIGHT: 67 IN | DIASTOLIC BLOOD PRESSURE: 60 MMHG | BODY MASS INDEX: 28.78 KG/M2 | SYSTOLIC BLOOD PRESSURE: 100 MMHG

## 2018-01-15 NOTE — MISCELLANEOUS
Message  GI Reminder Recall Radha Iba:   Date: 08/09/2017   Dear Briana Simpson:     Review of our records shows you are due for the following: Colonoscopy  Our records indicate that you are due at this time to have a follow-up examination for a colonoscopy  As you now, these tests are done to prevent colon cancer, a very common disease in the [place] and responsible for the thousands of patient deaths each year  We at [facility] are concerned for your health, and would very much appreciate you getting in touch with us at your earliest convenience, Again, this examination is vital to your proper health maintenance and for the prevention of cancer  Please call the following office to schedule your appointment:   [address] [facility], [address] [place] [address], [address] [place] [address] [phone]  We look forward to hearing from you!      Sincerely,     [facility]      Signatures   Electronically signed by : Jason Hensley, ; Aug  9 2017  1:43PM EST                       (Author)

## 2018-01-18 NOTE — MISCELLANEOUS
Message  GI Reminder Recall [facility]:   Date: 08/10/2017   Dear Rich Turner:     Review of our records shows you are due for the following: Follow Up Visit  Please call the following office to schedule your appointment:   [address] [facility], [address] [place] [address], [address] [place] [address] [phone]  We look forward to hearing from you!      Sincerely,     [facility]      Signatures   Electronically signed by : Mandeep Wiggins, ; Aug 10 2017  3:08PM EST                       (Author)

## 2018-03-14 ENCOUNTER — APPOINTMENT (OUTPATIENT)
Dept: LAB | Facility: CLINIC | Age: 58
End: 2018-03-14
Payer: COMMERCIAL

## 2018-03-14 DIAGNOSIS — S14.109A: ICD-10-CM

## 2018-03-14 DIAGNOSIS — G89.29 OTHER CHRONIC PAIN: ICD-10-CM

## 2018-03-14 DIAGNOSIS — R26.9 ABNORMALITY OF GAIT AND MOBILITY: ICD-10-CM

## 2018-03-14 DIAGNOSIS — E11.40 TYPE 2 DIABETES MELLITUS WITH DIABETIC NEUROPATHY (HCC): ICD-10-CM

## 2018-03-14 DIAGNOSIS — E11.8 TYPE 2 DIABETES MELLITUS WITH COMPLICATIONS (HCC): ICD-10-CM

## 2018-03-14 DIAGNOSIS — Z98.1 ARTHRODESIS STATUS: ICD-10-CM

## 2018-03-14 LAB
25(OH)D3 SERPL-MCNC: 39.1 NG/ML (ref 30–100)
ALBUMIN SERPL BCP-MCNC: 3.9 G/DL (ref 3.5–5)
ALP SERPL-CCNC: 46 U/L (ref 46–116)
ALT SERPL W P-5'-P-CCNC: 115 U/L (ref 12–78)
ANION GAP SERPL CALCULATED.3IONS-SCNC: 10 MMOL/L (ref 4–13)
AST SERPL W P-5'-P-CCNC: 126 U/L (ref 5–45)
BILIRUB SERPL-MCNC: 1.02 MG/DL (ref 0.2–1)
BUN SERPL-MCNC: 11 MG/DL (ref 5–25)
CALCIUM SERPL-MCNC: 9 MG/DL (ref 8.3–10.1)
CHLORIDE SERPL-SCNC: 91 MMOL/L (ref 100–108)
CHOLEST SERPL-MCNC: 138 MG/DL (ref 50–200)
CO2 SERPL-SCNC: 36 MMOL/L (ref 21–32)
CREAT SERPL-MCNC: 0.79 MG/DL (ref 0.6–1.3)
GFR SERPL CREATININE-BSD FRML MDRD: 98 ML/MIN/1.73SQ M
GLUCOSE P FAST SERPL-MCNC: 136 MG/DL (ref 65–99)
HDLC SERPL-MCNC: 63 MG/DL (ref 40–60)
LDLC SERPL CALC-MCNC: 48 MG/DL (ref 0–100)
POTASSIUM SERPL-SCNC: 3.1 MMOL/L (ref 3.5–5.3)
PROT SERPL-MCNC: 8.1 G/DL (ref 6.4–8.2)
SODIUM SERPL-SCNC: 137 MMOL/L (ref 136–145)
TRIGL SERPL-MCNC: 135 MG/DL
TSH SERPL DL<=0.05 MIU/L-ACNC: 3.14 UIU/ML (ref 0.36–3.74)

## 2018-03-14 PROCEDURE — 80061 LIPID PANEL: CPT

## 2018-03-14 PROCEDURE — 80053 COMPREHEN METABOLIC PANEL: CPT

## 2018-03-14 PROCEDURE — 84443 ASSAY THYROID STIM HORMONE: CPT

## 2018-03-14 PROCEDURE — 36415 COLL VENOUS BLD VENIPUNCTURE: CPT

## 2018-03-14 PROCEDURE — 82306 VITAMIN D 25 HYDROXY: CPT

## 2018-03-15 ENCOUNTER — OFFICE VISIT (OUTPATIENT)
Dept: FAMILY MEDICINE CLINIC | Facility: CLINIC | Age: 58
End: 2018-03-15
Payer: COMMERCIAL

## 2018-03-15 VITALS
SYSTOLIC BLOOD PRESSURE: 140 MMHG | HEIGHT: 68 IN | HEART RATE: 100 BPM | BODY MASS INDEX: 27.28 KG/M2 | RESPIRATION RATE: 16 BRPM | OXYGEN SATURATION: 96 % | WEIGHT: 180 LBS | TEMPERATURE: 98.5 F | DIASTOLIC BLOOD PRESSURE: 82 MMHG

## 2018-03-15 DIAGNOSIS — F17.200 TOBACCO USE DISORDER: ICD-10-CM

## 2018-03-15 DIAGNOSIS — D04.9 BASAL CELL CARCINOMA IN SITU OF SKIN: ICD-10-CM

## 2018-03-15 DIAGNOSIS — E11.8 CONTROLLED TYPE 2 DIABETES MELLITUS WITH COMPLICATION, WITHOUT LONG-TERM CURRENT USE OF INSULIN (HCC): ICD-10-CM

## 2018-03-15 DIAGNOSIS — I10 ESSENTIAL HYPERTENSION: ICD-10-CM

## 2018-03-15 DIAGNOSIS — G95.9 CERVICAL MYELOPATHY (HCC): ICD-10-CM

## 2018-03-15 DIAGNOSIS — H61.22 EXCESSIVE CERUMEN IN LEFT EAR CANAL: ICD-10-CM

## 2018-03-15 DIAGNOSIS — E11.49 OTHER DIABETIC NEUROLOGICAL COMPLICATION ASSOCIATED WITH TYPE 2 DIABETES MELLITUS (HCC): ICD-10-CM

## 2018-03-15 DIAGNOSIS — S14.109S INJURY OF CERVICAL SPINAL CORD, SEQUELA (HCC): ICD-10-CM

## 2018-03-15 DIAGNOSIS — Z12.11 ENCOUNTER FOR SCREENING COLONOSCOPY: Primary | ICD-10-CM

## 2018-03-15 DIAGNOSIS — E87.6 HYPOKALEMIA: ICD-10-CM

## 2018-03-15 PROBLEM — K43.9 HERNIA, EPIGASTRIC: Status: ACTIVE | Noted: 2017-02-16

## 2018-03-15 PROBLEM — G89.29 CHRONIC PAIN: Status: ACTIVE | Noted: 2017-08-28

## 2018-03-15 PROBLEM — R26.9 GAIT DISTURBANCE: Status: ACTIVE | Noted: 2017-08-28

## 2018-03-15 PROCEDURE — 99214 OFFICE O/P EST MOD 30 MIN: CPT | Performed by: NURSE PRACTITIONER

## 2018-03-15 PROCEDURE — 3725F SCREEN DEPRESSION PERFORMED: CPT | Performed by: NURSE PRACTITIONER

## 2018-03-15 PROCEDURE — 69210 REMOVE IMPACTED EAR WAX UNI: CPT | Performed by: NURSE PRACTITIONER

## 2018-03-15 RX ORDER — LANCETS
EACH MISCELLANEOUS
COMMUNITY
Start: 2016-12-10 | End: 2018-04-17

## 2018-03-15 RX ORDER — POTASSIUM CHLORIDE 20 MEQ/1
20 TABLET, EXTENDED RELEASE ORAL DAILY
Qty: 90 TABLET | Refills: 1 | Status: ON HOLD | OUTPATIENT
Start: 2018-03-15 | End: 2018-07-13 | Stop reason: HOSPADM

## 2018-03-15 NOTE — PROGRESS NOTES
Assessment/Plan:  Labs stable except low potassium -order replacement and asked patient to increase potassium rich foods  Patient needs referral to surgical on for her right forearm basal cell carcinoma in-situ  Patient his tolerating diabetic regimen well with a fasting blood sugar 136 last seen the globin A1c was stable at 5 4-denies any hypoglycemic events  Chronic diarrhea -not followed up with Dr Charlotte Kamara work/stool lab check-reprinted referrals and asked him to follow up-needs colonoscopy  RTO after labs in 6 months  Cerumen removed successfully  Diagnoses and all orders for this visit:    Encounter for screening colonoscopy  -     Ambulatory referral to Gastroenterology; Future    Controlled type 2 diabetes mellitus with complication, without long-term current use of insulin (HCC)  -     HEMOGLOBIN A1C W/ EAG ESTIMATION; Future  -     Comprehensive metabolic panel; Future  -     Lipid panel; Future    Other diabetic neurological complication associated with type 2 diabetes mellitus ([facility] [address] )    Essential hypertension  -     Lipid panel; Future    Tobacco use disorder    Basal cell carcinoma in situ of skin  -     Ambulatory referral to Surgical Oncology; Future    Hypokalemia  -     potassium chloride (K-DUR,KLOR-CON) 20 mEq tablet; Take 1 tablet (20 mEq total) by mouth daily    Cervical myelopathy (HCC)    Injury of cervical spinal cord, sequela (HCC)    Excessive cerumen in left ear canal    Other orders  -     glucose blood (ACCU-CHEK DAREN PLUS) test strip; by In Vitro route daily  -     ACCU-CHEK SOFTCLIX LANCETS lancets; by Does not apply route  -     Misc  Devices (BATH BENCH WITH BACK) MISC; by Does not apply route  -     Discontinue: Multiple Vitamins-Minerals (DAILY MULTI PO);  Take by mouth  -     Ear cerumen removal          Subjective: Patient here for a 6 month follow up   Labs done 3/14/18  Patient need a colonoscopy   Patient given the PHQ 9          Patient ID: Venu Kumar is a 59 y o  male  6 month chronic dz follow up w/ labs  Labs reviewed stable except low potassium  Pt is a drinker 4 shots a day/ + chronic diarrhea - had been following w/ DR Daniels Males - no follow through with labs and colonoscopy  Med list verified by pt - syed not need refills  12 point ROS neg except inc spasms in cold weather -[facility]s [facility]/ [facility] neurology s/p cervical neck injury and cervical myopathy - walks w/ cane/ known spasms  HM:   Has known BPH - follows w/ urology  Fluzone - declines    PHQ-9 - noted/ NO SI/HI            The following portions of the patient's history were reviewed and updated as appropriate: allergies, current medications, past surgical history and problem list     Review of Systems   Constitutional: Negative for activity change, appetite change, chills, fatigue and fever  HENT: Negative  Eyes: Negative  Respiratory: Negative  Cardiovascular: Negative  Negative for chest pain, palpitations and leg swelling  Gastrointestinal: Positive for diarrhea  Negative for abdominal distention  Known umbilical hernia   Endocrine: Negative  Negative for cold intolerance  Genitourinary: Negative  Negative for decreased urine volume, difficulty urinating and urgency  Musculoskeletal: Positive for gait problem  Skin: Negative  Basal cell lesion - excised in 2016 - [facility] - coming back -    Allergic/Immunologic: Negative  Neurological: Positive for weakness (chronic)  Hyperspasmotic s/p cervical neck injury   Hematological: Negative  Psychiatric/Behavioral: Positive for decreased concentration (stm issues)  Negative for agitation, confusion, sleep disturbance and suicidal ideas  The patient is not nervous/anxious  All other systems reviewed and are negative          Objective:      /82   Pulse 100   Temp 98 5 °F (36 9 °C)   Resp 16   Ht 5' 8 11" (1 73 m)   Wt 81 6 kg (180 lb)   SpO2 96%   BMI 27 28 kg/m²      LABS:   Vit d 39 1  LIPID: 138/135/63/48  /3 1/81/36  11/0 79 BUN creat  TSH 3 140      aic 5 4 8/28/18    Ear cerumen removal  Date/Time: 3/15/2018 9:30 AM  Performed by: James Caruso  Authorized by: James Crauso     Consent:     Risks discussed:  Bleeding, dizziness, infection, incomplete removal, pain and TM perforation    Alternatives discussed:  Observation and referral  Universal protocol:     Procedure explained and questions answered to patient or proxy's satisfaction: yes      Relevant documents present and verified: no      Test results available and properly labeled: no      Imaging studies available: no      Required blood products, implants, devices and special equipment available: no      Site/side marked: yes      Immediately prior to procedure a time out was called: yes      Patient identity confirmed:  Verbally with patient  Procedure details:     Location:  L ear    Procedure type: curette      Approach:  Natural orifice  Post-procedure details:     Complication:  None    Hearing quality:  Improved    Patient tolerance of procedure: Tolerated well, no immediate complications       Physical Exam   Constitutional: He is oriented to person, place, and time  He appears well-developed and well-nourished  HENT:   Head: Normocephalic  Right Ear: Tympanic membrane and external ear normal  No decreased hearing is noted  Left Ear: Tympanic membrane and external ear normal  No decreased hearing is noted  Mouth/Throat: Oropharynx is clear and moist    Eyes: Conjunctivae are normal  Pupils are equal, round, and reactive to light  Neck: No thyromegaly present  Cardiovascular: Normal rate, regular rhythm, normal heart sounds and intact distal pulses  No murmur heard  Pulmonary/Chest: Effort normal and breath sounds normal  No respiratory distress  He has no wheezes  He has no rales  Abdominal: Soft  Bowel sounds are normal  Mass: small reducible umbilical hernia     Musculoskeletal: Normal range of motion  Lymphadenopathy:     He has no cervical adenopathy  Neurological: He is alert and oriented to person, place, and time  He has normal reflexes  No cranial nerve deficit  He exhibits abnormal muscle tone (spastic extemities)  Coordination abnormal    Skin: Skin is warm and dry  10 mm irregular red-bluish lesion on RFA   Psychiatric: He has a normal mood and affect   His behavior is normal  Judgment and thought content normal

## 2018-04-17 ENCOUNTER — HOSPITAL ENCOUNTER (INPATIENT)
Facility: HOSPITAL | Age: 58
LOS: 3 days | Discharge: NURSING, REHAB OR LONG-TERM CARE | DRG: 552 | End: 2018-04-20
Attending: EMERGENCY MEDICINE | Admitting: INTERNAL MEDICINE
Payer: COMMERCIAL

## 2018-04-17 ENCOUNTER — APPOINTMENT (EMERGENCY)
Dept: RADIOLOGY | Facility: HOSPITAL | Age: 58
DRG: 552 | End: 2018-04-17
Payer: COMMERCIAL

## 2018-04-17 DIAGNOSIS — M79.605 BILATERAL LEG PAIN: ICD-10-CM

## 2018-04-17 DIAGNOSIS — M79.604 BILATERAL LEG PAIN: ICD-10-CM

## 2018-04-17 DIAGNOSIS — E87.6 HYPOKALEMIA: ICD-10-CM

## 2018-04-17 DIAGNOSIS — F32.A DEPRESSION, UNSPECIFIED DEPRESSION TYPE: Primary | ICD-10-CM

## 2018-04-17 DIAGNOSIS — M54.16 LUMBAR RADICULOPATHY: ICD-10-CM

## 2018-04-17 DIAGNOSIS — N17.9 AKI (ACUTE KIDNEY INJURY) (HCC): Primary | ICD-10-CM

## 2018-04-17 PROBLEM — R19.7 DIARRHEA: Status: ACTIVE | Noted: 2018-04-17

## 2018-04-17 PROBLEM — R26.2 AMBULATORY DYSFUNCTION: Status: ACTIVE | Noted: 2018-04-17

## 2018-04-17 PROBLEM — E11.49 TYPE 2 DIABETES MELLITUS WITH NEUROLOGIC COMPLICATION, WITHOUT LONG-TERM CURRENT USE OF INSULIN (HCC): Status: ACTIVE | Noted: 2018-04-17

## 2018-04-17 LAB
ANION GAP SERPL CALCULATED.3IONS-SCNC: 17 MMOL/L (ref 4–13)
BASOPHILS # BLD AUTO: 0.05 THOUSANDS/ΜL (ref 0–0.1)
BASOPHILS NFR BLD AUTO: 1 % (ref 0–1)
BUN SERPL-MCNC: 23 MG/DL (ref 5–25)
CALCIUM SERPL-MCNC: 10.3 MG/DL (ref 8.3–10.1)
CHLORIDE SERPL-SCNC: 92 MMOL/L (ref 100–108)
CK MB SERPL-MCNC: 3.8 % (ref 0–2.5)
CK MB SERPL-MCNC: 7.3 NG/ML (ref 0–5)
CK SERPL-CCNC: 194 U/L (ref 39–308)
CO2 SERPL-SCNC: 23 MMOL/L (ref 21–32)
CREAT SERPL-MCNC: 1.89 MG/DL (ref 0.6–1.3)
EOSINOPHIL # BLD AUTO: 0.08 THOUSAND/ΜL (ref 0–0.61)
EOSINOPHIL NFR BLD AUTO: 1 % (ref 0–6)
ERYTHROCYTE [DISTWIDTH] IN BLOOD BY AUTOMATED COUNT: 12.6 % (ref 11.6–15.1)
GFR SERPL CREATININE-BSD FRML MDRD: 38 ML/MIN/1.73SQ M
GLUCOSE SERPL-MCNC: 116 MG/DL (ref 65–140)
GLUCOSE SERPL-MCNC: 92 MG/DL (ref 65–140)
HCT VFR BLD AUTO: 38.7 % (ref 36.5–49.3)
HGB BLD-MCNC: 13.5 G/DL (ref 12–17)
LYMPHOCYTES # BLD AUTO: 2.67 THOUSANDS/ΜL (ref 0.6–4.47)
LYMPHOCYTES NFR BLD AUTO: 33 % (ref 14–44)
MCH RBC QN AUTO: 35.7 PG (ref 26.8–34.3)
MCHC RBC AUTO-ENTMCNC: 34.9 G/DL (ref 31.4–37.4)
MCV RBC AUTO: 102 FL (ref 82–98)
MONOCYTES # BLD AUTO: 0.58 THOUSAND/ΜL (ref 0.17–1.22)
MONOCYTES NFR BLD AUTO: 7 % (ref 4–12)
NEUTROPHILS # BLD AUTO: 4.66 THOUSANDS/ΜL (ref 1.85–7.62)
NEUTS SEG NFR BLD AUTO: 58 % (ref 43–75)
NRBC BLD AUTO-RTO: 0 /100 WBCS
PLATELET # BLD AUTO: 237 THOUSANDS/UL (ref 149–390)
PMV BLD AUTO: 10.2 FL (ref 8.9–12.7)
POTASSIUM SERPL-SCNC: 3.9 MMOL/L (ref 3.5–5.3)
RBC # BLD AUTO: 3.78 MILLION/UL (ref 3.88–5.62)
SODIUM SERPL-SCNC: 132 MMOL/L (ref 136–145)
WBC # BLD AUTO: 8.07 THOUSAND/UL (ref 4.31–10.16)

## 2018-04-17 PROCEDURE — 96361 HYDRATE IV INFUSION ADD-ON: CPT

## 2018-04-17 PROCEDURE — 72131 CT LUMBAR SPINE W/O DYE: CPT

## 2018-04-17 PROCEDURE — 99223 1ST HOSP IP/OBS HIGH 75: CPT | Performed by: INTERNAL MEDICINE

## 2018-04-17 PROCEDURE — 36415 COLL VENOUS BLD VENIPUNCTURE: CPT | Performed by: EMERGENCY MEDICINE

## 2018-04-17 PROCEDURE — 99285 EMERGENCY DEPT VISIT HI MDM: CPT

## 2018-04-17 PROCEDURE — 96376 TX/PRO/DX INJ SAME DRUG ADON: CPT

## 2018-04-17 PROCEDURE — 82550 ASSAY OF CK (CPK): CPT | Performed by: EMERGENCY MEDICINE

## 2018-04-17 PROCEDURE — 82948 REAGENT STRIP/BLOOD GLUCOSE: CPT

## 2018-04-17 PROCEDURE — 85025 COMPLETE CBC W/AUTO DIFF WBC: CPT | Performed by: EMERGENCY MEDICINE

## 2018-04-17 PROCEDURE — 96374 THER/PROPH/DIAG INJ IV PUSH: CPT

## 2018-04-17 PROCEDURE — 82553 CREATINE MB FRACTION: CPT | Performed by: EMERGENCY MEDICINE

## 2018-04-17 PROCEDURE — 80048 BASIC METABOLIC PNL TOTAL CA: CPT | Performed by: EMERGENCY MEDICINE

## 2018-04-17 RX ORDER — DIAZEPAM 5 MG/ML
5 INJECTION, SOLUTION INTRAMUSCULAR; INTRAVENOUS ONCE
Status: COMPLETED | OUTPATIENT
Start: 2018-04-17 | End: 2018-04-17

## 2018-04-17 RX ORDER — CALCIUM CARBONATE 200(500)MG
1000 TABLET,CHEWABLE ORAL DAILY PRN
Status: DISCONTINUED | OUTPATIENT
Start: 2018-04-17 | End: 2018-04-20 | Stop reason: HOSPADM

## 2018-04-17 RX ORDER — OXYCODONE HYDROCHLORIDE 10 MG/1
10 TABLET ORAL EVERY 4 HOURS PRN
Status: DISCONTINUED | OUTPATIENT
Start: 2018-04-17 | End: 2018-04-20 | Stop reason: HOSPADM

## 2018-04-17 RX ORDER — SODIUM CHLORIDE 9 MG/ML
100 INJECTION, SOLUTION INTRAVENOUS CONTINUOUS
Status: DISCONTINUED | OUTPATIENT
Start: 2018-04-17 | End: 2018-04-20 | Stop reason: HOSPADM

## 2018-04-17 RX ORDER — ONDANSETRON 2 MG/ML
4 INJECTION INTRAMUSCULAR; INTRAVENOUS EVERY 6 HOURS PRN
Status: DISCONTINUED | OUTPATIENT
Start: 2018-04-17 | End: 2018-04-20 | Stop reason: HOSPADM

## 2018-04-17 RX ORDER — HYDRALAZINE HYDROCHLORIDE 20 MG/ML
5 INJECTION INTRAMUSCULAR; INTRAVENOUS EVERY 6 HOURS PRN
Status: DISCONTINUED | OUTPATIENT
Start: 2018-04-17 | End: 2018-04-20 | Stop reason: HOSPADM

## 2018-04-17 RX ORDER — OXYCODONE HYDROCHLORIDE 5 MG/1
5 TABLET ORAL EVERY 4 HOURS PRN
Status: DISCONTINUED | OUTPATIENT
Start: 2018-04-17 | End: 2018-04-20 | Stop reason: HOSPADM

## 2018-04-17 RX ORDER — ACETAMINOPHEN 325 MG/1
975 TABLET ORAL EVERY 8 HOURS SCHEDULED
Status: DISCONTINUED | OUTPATIENT
Start: 2018-04-17 | End: 2018-04-20 | Stop reason: HOSPADM

## 2018-04-17 RX ORDER — NICOTINE 21 MG/24HR
1 PATCH, TRANSDERMAL 24 HOURS TRANSDERMAL DAILY
Status: DISCONTINUED | OUTPATIENT
Start: 2018-04-18 | End: 2018-04-20 | Stop reason: HOSPADM

## 2018-04-17 RX ORDER — AMOXICILLIN 250 MG
1 CAPSULE ORAL
Status: DISCONTINUED | OUTPATIENT
Start: 2018-04-17 | End: 2018-04-20 | Stop reason: HOSPADM

## 2018-04-17 RX ORDER — BACLOFEN 20 MG/1
20 TABLET ORAL 3 TIMES DAILY
Status: DISCONTINUED | OUTPATIENT
Start: 2018-04-17 | End: 2018-04-20 | Stop reason: HOSPADM

## 2018-04-17 RX ORDER — POTASSIUM CHLORIDE 20 MEQ/1
20 TABLET, EXTENDED RELEASE ORAL DAILY
Status: DISCONTINUED | OUTPATIENT
Start: 2018-04-18 | End: 2018-04-20 | Stop reason: HOSPADM

## 2018-04-17 RX ORDER — HEPARIN SODIUM 5000 [USP'U]/ML
5000 INJECTION, SOLUTION INTRAVENOUS; SUBCUTANEOUS EVERY 8 HOURS SCHEDULED
Status: DISCONTINUED | OUTPATIENT
Start: 2018-04-17 | End: 2018-04-20 | Stop reason: HOSPADM

## 2018-04-17 RX ORDER — CITALOPRAM 20 MG/1
20 TABLET ORAL DAILY
Status: DISCONTINUED | OUTPATIENT
Start: 2018-04-18 | End: 2018-04-20 | Stop reason: HOSPADM

## 2018-04-17 RX ADMIN — BACLOFEN 20 MG: 20 TABLET ORAL at 22:55

## 2018-04-17 RX ADMIN — HYDROMORPHONE HYDROCHLORIDE 1 MG: 1 INJECTION, SOLUTION INTRAMUSCULAR; INTRAVENOUS; SUBCUTANEOUS at 19:44

## 2018-04-17 RX ADMIN — DIAZEPAM 5 MG: 5 INJECTION, SOLUTION INTRAMUSCULAR; INTRAVENOUS at 15:58

## 2018-04-17 RX ADMIN — OXYCODONE HYDROCHLORIDE 10 MG: 10 TABLET ORAL at 22:55

## 2018-04-17 RX ADMIN — Medication 1 TABLET: at 22:55

## 2018-04-17 RX ADMIN — SODIUM CHLORIDE 1000 ML: 0.9 INJECTION, SOLUTION INTRAVENOUS at 18:15

## 2018-04-17 RX ADMIN — HEPARIN SODIUM 5000 UNITS: 5000 INJECTION, SOLUTION INTRAVENOUS; SUBCUTANEOUS at 22:56

## 2018-04-17 RX ADMIN — SODIUM CHLORIDE 100 ML/HR: 0.9 INJECTION, SOLUTION INTRAVENOUS at 22:56

## 2018-04-17 RX ADMIN — ACETAMINOPHEN 975 MG: 325 TABLET, FILM COATED ORAL at 22:55

## 2018-04-17 RX ADMIN — DIAZEPAM 5 MG: 5 INJECTION, SOLUTION INTRAMUSCULAR; INTRAVENOUS at 18:15

## 2018-04-17 RX ADMIN — SODIUM CHLORIDE 1000 ML: 0.9 INJECTION, SOLUTION INTRAVENOUS at 16:50

## 2018-04-17 NOTE — ED NOTES
Unable to verify his medications or allergies  A friend will be coming up later       Bran Valderrama RN  04/17/18 5845

## 2018-04-17 NOTE — ASSESSMENT & PLAN NOTE
- creatinine 1 89 on admission  - given hx of BPH will check PVR and renal ultrasound   - IVF hydration

## 2018-04-17 NOTE — ASSESSMENT & PLAN NOTE
- hx of cervical spine fusion at C3-C4 2012 after falling down steps, chronic contracture of b/l hands   - continue baclofen

## 2018-04-17 NOTE — H&P
H&P- Tammy Ramirez 1960, 59 y o  male MRN: [id number]  Unit/Bed#: ED 29 Encounter: [id number] DOS: 4/17/18  Primary Care Provider: CHERELLE Tristan   Date and time admitted to hospital: 4/17/2018  2:37 PM    Lumbar radiculopathy   Assessment & Plan    - POA, b/l anterior and posterior thigh pain x 3 days, LE weakness   - CT lumbar spine- at L3-4, posterior disc bulge and facet arthropathy result in mild central canal stenosis  Narrowing of the subarticular zones and probable impingement of the traversing L4 nerves, not directly visualized and MRI recommended  - obtain MRI  c/s neurosurgery- may benefit from addition of steroids  - pain control         SUNDEEP (acute kidney injury) ([facility] )   Assessment & Plan    - creatinine 1 89 on admission  - given hx of BPH will check PVR and renal ultrasound   - IVF hydration         Ambulatory dysfunction   Assessment & Plan    - PT and OT consults         Essential hypertension   Assessment & Plan    - hold lisinopril given SUNDEEP   - PRN antihypertensives for SBP > 180        Cervical myelopathy (HCC)   Assessment & Plan    - hx of cervical spine fusion at C3-C4 2012 after falling down steps, chronic contracture of b/l hands   - continue baclofen         Depression   Assessment & Plan    - continue SSRI        * Tobacco use disorder   Assessment & Plan    - NRT, recommend cessation        Diarrhea   Assessment & Plan    - per PCP, patient has had chronic loose stools 1-2x a day since cholecystectomy in 2017         Type 2 diabetes mellitus with neurologic complication, without long-term current use of insulin ([facility] )   Assessment & Plan    - last HA1C 5 4% aug 2017 (was 10 2 in feb 2017)   - recheck a1c, hold Amaryl and Metformin   - add SSI and accuchecks for now           VTE Prophylaxis: Heparin  / sequential compression device   Code Status: FULL CODe   POLST: POLST form is not discussed and not completed at this time    Discussion with family: called wife Daniella to update regarding plan of care     Anticipated Length of Stay:  Patient will be admitted on an Inpatient basis with an anticipated length of stay of  Greater than 2 midnights  Justification for Hospital Stay: severe anterior thigh pain, neurosx eval, MRI lumbar spine     Total Time for Visit, including Counseling / Coordination of Care: 30 minutes  Greater than 50% of this total time spent on direct patient counseling and coordination of care  Chief Complaint:   B/l thigh pain x 3 days     History of Present Illness:    Angela Robles is a 59 y o  male past medical history significant for hypertension, tobacco abuse, type 2 diabetes mellitus, cervical myelopathy status post cervical spine fusion with chronic bile bilateral contractures of hands who presents with 3 day history of bilateral thigh pain  Patient states pain was tolerable over the last few days but today pain was more severe associated with bilateral lower extremity weakness  He denies any numbness or tingling bowel or bladder incontinence  States that his legs were so weak today upon standing that he did not make it to the bathroom in time  He states that the back does not bother him but he does have anterior and posterior thigh pain that is intermittent and stabbing in nature  Denies any injury to his back  He states that he fell today but no head injury or loss of consciousness  He does not remember who did his cervical spine fusion in 2012  He is on muscle relaxers for neck pain and upper extremity spasticity  He notes good urine output  Denies prostate problems  No fevers or chills  Had diarrhea today but review of chart patient does have chronic diarrhea  Review of Systems:    Review of Systems   Constitutional: Negative for chills and fever  HENT: Negative for congestion  Cardiovascular: Negative for chest pain, palpitations and leg swelling  Gastrointestinal: Positive for diarrhea   Negative for abdominal pain, nausea and vomiting  Genitourinary: Negative for difficulty urinating  Musculoskeletal: Positive for arthralgias, back pain and gait problem  Neurological: Positive for weakness (b/l LE )  Negative for dizziness, light-headedness and numbness  Psychiatric/Behavioral: Negative for confusion  Past Medical and Surgical History:     Past Medical History:   Diagnosis Date    Chronic obstructive lung disease ([facility] )     RESOLVED: 8/17/17    Concussion     LAST ASSESSED: 8/17/17    COPD (chronic obstructive pulmonary disease) ([place] [address] )     Depression     Diabetes mellitus ([place] [address] )     Diabetic neuropathy ([place] [address] )     Difficulty attaining erection     LAST ASSESSEDl: 8/17/17    Elevated liver enzymes     Gall stone pancreatitis     RESOLVED: 3/8/17    Gall stones     RESOLVED: 3/8/17    History of MRSA infection     Hypertension     LAST ASSESSED: 8/17/17    Spinal cord injury, C1-C7 ([place] [address] )     Traumatic injury to musculoskeletal system     1-5 FLIGHT FALL DOWN THE STEPS - CERVICAL NECK INJURY - JAN 2, 2012; LAST ASSESSED: 4/89/74    Umbilical hernia without obstruction and without gangrene     RESOLVED: 3/8/17    Varicella     LAST ASSESSED: 8/17/17       Past Surgical History:   Procedure Laterality Date    BACK SURGERY      CERVICAL FUSION      VERTEBRAL; C3-C4 FUSION    CHOLECYSTECTOMY LAPAROSCOPIC N/A 2/24/2017    Procedure: CHOLECYSTECTOMY LAPAROSCOPIC;  Surgeon: Shiv Unger MD;  Location: [facility];  Service:     CHOLECYSTECTOMY LAPAROSCOPIC  03/2017    EPIGASTRIC HERNIA REPAIR      UMBILICAL HERNIA REPAIR N/A 2/24/2017    Procedure: REPAIR HERNIA UMBILICAL;  Surgeon: Shiv Unger MD;  Location: [facility];  Service:      Meds/Allergies:    Prior to Admission medications    Medication Sig Start Date End Date Taking?  Authorizing Provider   baclofen 20 mg tablet Take 20 mg by mouth 3 (three) times a day   Yes Historical Provider, MD   citalopram (CeleXA) 20 mg tablet Take 20 mg by mouth daily   Yes Historical Provider, MD   glimepiride (AMARYL) 1 mg tablet Take 1 mg by mouth every morning before breakfast   Yes Historical Provider, MD   lisinopril (ZESTRIL) 10 mg tablet Take 10 mg by mouth daily   Yes Historical Provider, MD   metFORMIN (GLUCOPHAGE) 500 mg tablet Take 1,000 mg by mouth 2 (two) times a day with meals     Yes Historical Provider, MD   potassium chloride (K-DUR,KLOR-CON) 20 mEq tablet Take 1 tablet (20 mEq total) by mouth daily 3/15/18  Yes CHERELLE Shaw   TIZANIDINE HCL PO Take by mouth as needed Wife unsure of dose   Yes Historical Provider, MD   Multiple Vitamin (MULTIVITAMIN) tablet Take 1 tablet by mouth daily    Historical Provider, MD   senna-docusate sodium (SENOKOT S) 8 6-50 mg per tablet Take 1 tablet by mouth daily at bedtime for 30 days 1/19/17 2/18/17  Demetris Lepe,    ACCU-CHEK SOFTCLIX LANCETS lancets by Does not apply route 12/10/16 4/17/18  Historical Provider, MD   glucose blood (ACCU-CHEK DAREN PLUS) test strip by In Vitro route daily 12/10/16 4/17/18  Historical Provider, MD   Misc  Devices ([address]) MISC by Does not apply route 12/13/16 4/17/18  Historical Provider, MD     I have reviewed home medications with patient personally  Allergies:    Allergies   Allergen Reactions    Seasonal Ic  [Cholestatin]        Social History:     Marital Status: /Civil Union   Occupation: unknown   Patient Pre-hospital Living Situation: with wife   Patient Pre-hospital Level of Mobility: limited   Patient Pre-hospital Diet Restrictions: none   Substance Use History:   History   Alcohol Use    Yes     Comment: 3-4 mixed vodka drinks/daily      History   Smoking Status    Current Every Day Smoker   Smokeless Tobacco    Never Used     Comment: 1 ppd      History   Drug Use No     Family History:    Family History   Problem Relation Age of Onset    Hypertension Mother      BENIGN    Alzheimer's disease Father     Heart disease Father      CARDIAC DISORDER Physical Exam:     Vitals:   Blood Pressure: (!) 91/48 (pt sleeping) (04/17/18 1900)  Pulse: (!) 106 (04/17/18 1900)  Temperature: 98 1 °F (36 7 °C) (04/17/18 1442)  Temp Source: Oral (04/17/18 1442)  Respirations: 20 (04/17/18 1900)  SpO2: 91 % (pt sleeping) (04/17/18 1900)    Physical Exam   Constitutional: He is oriented to person, place, and time  He appears well-nourished  He appears distressed (in distress 2/2 leg pain )  HENT:   Head: Normocephalic and atraumatic  Poor dentition, MM dry    Eyes: EOM are normal  No scleral icterus  Neck: Normal range of motion  Neck supple  Cardiovascular: Normal rate, regular rhythm and normal heart sounds  No murmur heard  Pulmonary/Chest: Effort normal and breath sounds normal    Abdominal: Soft  Bowel sounds are normal    Musculoskeletal: Normal range of motion  He exhibits no edema  Moves all extremities x 4    Neurological: He is alert and oriented to person, place, and time  Sensation grossly intact b/l LE, b/l UE contractures, B/L LE weakness 4/5    Skin: Skin is warm and dry  Nursing note and vitals reviewed  Additional Data:     Lab Results: I have personally reviewed pertinent reports  Results from last 7 days  Lab Units 04/17/18  1558   WBC Thousand/uL 8 07   HEMOGLOBIN g/dL 13 5   HEMATOCRIT % 38 7   PLATELETS Thousands/uL 237   NEUTROS PCT % 58   LYMPHS PCT % 33   MONOS PCT % 7   EOS PCT % 1       Results from last 7 days  Lab Units 04/17/18  1558   SODIUM mmol/L 132*   POTASSIUM mmol/L 3 9   CHLORIDE mmol/L 92*   CO2 mmol/L 23   BUN mg/dL 23   CREATININE mg/dL 1 89*   CALCIUM mg/dL 10 3*   GLUCOSE RANDOM mg/dL 116           Imaging: I have personally reviewed pertinent reports  CT lumbar spine without contrast   Final Result by Gladis Sanches MD (04/17 1740)         1  Disc degenerative change in the lower lumbar spine likely resulting in encroachment of the traversing L4 nerves at the L3-4 level    MRI may be helpful for further evaluation  2   No evidence of fracture, osseous lesion or paraspinal mass  Workstation performed: [id number]             EKG, Pathology, and Other Studies Reviewed on Admission:   · EKG: none to review     Allscripts / Epic Records Reviewed: Yes     ** Please Note: This note has been constructed using a voice recognition system   **

## 2018-04-17 NOTE — ASSESSMENT & PLAN NOTE
- POA, b/l anterior and posterior thigh pain x 3 days, LE weakness   - CT lumbar spine- at L3-4, posterior disc bulge and facet arthropathy result in mild central canal stenosis  Narrowing of the subarticular zones and probable impingement of the traversing L4 nerves, not directly visualized and MRI recommended  - obtain MRI   c/s neurosurgery- may benefit from addition of steroids  - pain control

## 2018-04-17 NOTE — ED PROVIDER NOTES
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 who presents to the emergency department with a 3 day history of upper thigh pain  Patient states the pain started on Saturday has been unremitting since that time pain does not radiate down his thighs he denies any numbness or tingling his wife states he has been falling more frequently the past few weeks patient believes this is due to increasing weakness in his lower extremities  Earlier today patient had an episode after using the restroom in which he became very weak in his bilateral lower extremities moved to sit down in a chair and he fell to his knees striking a table on his way down clipping and over he did not hit his head he did not lose consciousness he is not on any blood thinners  Patient can complains of an inability to extend his legs at the knee due to pain in his upper anterior thighs in his quadriceps  Patient denies any injury to the area  Patient denies any use of IV drugs in the past   Patient's remaining ROS is negative          History provided by:  Patient   used: No    Leg Pain   Location:  Leg  Injury: no    Leg location:  L upper leg and R upper leg  Pain details:     Quality:  Sharp    Radiates to:  Does not radiate    Severity:  Moderate    Onset quality:  Sudden    Duration:  3 days    Timing:  Constant    Progression:  Unchanged  Chronicity:  New  Dislocation: no    Foreign body present:  No foreign bodies  Prior injury to area:  No  Relieved by:  None tried  Worsened by:  Extension  Ineffective treatments:  None tried  Associated symptoms: decreased ROM and muscle weakness Associated symptoms: no back pain, no fatigue, no fever and no neck pain        Prior to Admission Medications   Prescriptions Last Dose Informant Patient Reported? Taking?    Multiple Vitamin (MULTIVITAMIN) tablet  Self Yes No   Sig: Take 1 tablet by mouth daily   TIZANIDINE HCL PO 4/17/2018 at Unknown time Self Yes Yes   Sig: Take by mouth as needed Wife unsure of dose   baclofen 20 mg tablet 4/17/2018 at Unknown time Self Yes Yes   Sig: Take 20 mg by mouth 3 (three) times a day   citalopram (CeleXA) 20 mg tablet 4/17/2018 at Unknown time Self Yes Yes   Sig: Take 20 mg by mouth daily   glimepiride (AMARYL) 1 mg tablet 4/17/2018 at Unknown time Self Yes Yes   Sig: Take 1 mg by mouth every morning before breakfast   lisinopril (ZESTRIL) 10 mg tablet 4/17/2018 at Unknown time Self Yes Yes   Sig: Take 10 mg by mouth daily   metFORMIN (GLUCOPHAGE) 500 mg tablet 4/17/2018 at Unknown time Self Yes Yes   Sig: Take 1,000 mg by mouth 2 (two) times a day with meals     potassium chloride (K-DUR,KLOR-CON) 20 mEq tablet 4/17/2018 at Unknown time  No Yes   Sig: Take 1 tablet (20 mEq total) by mouth daily   senna-docusate sodium (SENOKOT S) 8 6-50 mg per tablet   No No   Sig: Take 1 tablet by mouth daily at bedtime for 30 days      Facility-Administered Medications: None       Past Medical History:   Diagnosis Date    Chronic obstructive lung disease ([facility] )     RESOLVED: 8/17/17    Concussion     LAST ASSESSED: 8/17/17    COPD (chronic obstructive pulmonary disease) ([facility]     Depression     Diabetes mellitus ([facility] )     Diabetic neuropathy ([facility] )     Difficulty attaining erection     LAST ASSESSEDl: 8/17/17    Elevated liver enzymes     Gall stone pancreatitis     RESOLVED: 3/8/17    Gall stones     RESOLVED: 3/8/17    History of MRSA infection     Hypertension     LAST ASSESSED: 8/17/17    Spinal cord injury, C1-C7 ([facility] )     Traumatic injury to musculoskeletal system     1-5 FLIGHT FALL DOWN THE STEPS - CERVICAL NECK INJURY - JAN 2, 2012; LAST ASSESSED: 9/38/88    Umbilical hernia without obstruction and without gangrene     RESOLVED: 3/8/17    Varicella     LAST ASSESSED: 8/17/17       Past Surgical History:   Procedure Laterality Date    BACK SURGERY      CERVICAL FUSION      VERTEBRAL; C3-C4 FUSION    CHOLECYSTECTOMY LAPAROSCOPIC N/A 2/24/2017    Procedure: CHOLECYSTECTOMY LAPAROSCOPIC;  Surgeon: Ivonne Wells MD;  [facility]: [facility];  Service:     CHOLECYSTECTOMY LAPAROSCOPIC  03/2017    EPIGASTRIC HERNIA REPAIR      UMBILICAL HERNIA REPAIR N/A 2/24/2017    Procedure: REPAIR HERNIA UMBILICAL;  Surgeon: Ivonne Wells MD;  Location[facility];  Service:        Family History   Problem Relation Age of Onset    Hypertension Mother      BENIGN    Alzheimer's disease Father     Heart disease Father      CARDIAC DISORDER     I have reviewed and agree with the history as documented  Social History   Substance Use Topics    Smoking status: Current Every Day Smoker    Smokeless tobacco: Never Used      Comment: 1 ppd     Alcohol use Yes      Comment: 3-4 mixed vodka drinks/daily         Review of Systems   Constitutional: Negative for chills, fatigue and fever  HENT: Negative for sore throat  Eyes: Negative for visual disturbance  Respiratory: Negative for shortness of breath  Cardiovascular: Negative for chest pain  Gastrointestinal: Negative for abdominal pain, blood in stool, constipation, diarrhea, nausea and vomiting  Genitourinary: Negative for difficulty urinating, dysuria and hematuria  Musculoskeletal: Positive for myalgias  Negative for arthralgias, back pain, joint swelling and neck pain  Skin: Negative for rash  Neurological: Negative for syncope, weakness and headaches  Hematological: Negative for adenopathy  Psychiatric/Behavioral: Negative for agitation and behavioral problems  All other systems reviewed and are negative        Physical Exam  ED Triage Vitals [04/17/18 1442]   Temperature Pulse Respirations Blood Pressure SpO2   98 1 °F (36 7 °C) 99 20 101/51 94 %      Temp Source Heart Rate Source Patient Position - Orthostatic VS BP Location FiO2 (%)   Oral Monitor Lying Left arm --      Pain Score       Worst Possible Pain           Orthostatic Vital Signs  Vitals:    04/17/18 1900 04/17/18 2000 04/17/18 2052 04/17/18 2344   BP: (!) 91/48 155/72 145/73 120/63   Pulse: (!) 106 (!) 106 104 102   Patient Position - Orthostatic VS:   Lying Lying       Physical Exam   Constitutional: He is oriented to person, place, and time  He appears well-developed and well-nourished  HENT:   Head: Normocephalic and atraumatic  Eyes: Conjunctivae and EOM are normal  No scleral icterus  Neck: Normal range of motion  Neck supple  Cardiovascular: Normal rate and regular rhythm  No murmur heard  Pulmonary/Chest: Effort normal and breath sounds normal    Abdominal: Soft  Bowel sounds are normal  There is no tenderness  Musculoskeletal: Normal range of motion  He exhibits tenderness  Left upper leg: He exhibits tenderness  He exhibits no bony tenderness, no edema and no deformity  Legs:  Abrasions to bilateral knees  Neurological: He is alert and oriented to person, place, and time  Skin: Skin is warm and dry  Psychiatric: He has a normal mood and affect  His behavior is normal    Nursing note and vitals reviewed        ED Medications  Medications   baclofen tablet 20 mg (20 mg Oral Given 4/17/18 2255)   citalopram (CeleXA) tablet 20 mg (not administered)   potassium chloride (K-DUR,KLOR-CON) CR tablet 20 mEq (not administered)   senna-docusate sodium (SENOKOT S) 8 6-50 mg per tablet 1 tablet (1 tablet Oral Given 4/17/18 2255)   oxyCODONE (ROXICODONE) IR tablet 5 mg (not administered)   oxyCODONE (ROXICODONE) immediate release tablet 10 mg (10 mg Oral Given 4/18/18 6242)   HYDROmorphone (DILAUDID) injection 0 5 mg (not administered)   sodium chloride 0 9 % infusion (100 mL/hr Intravenous New Bag 4/17/18 2256)   ondansetron (ZOFRAN) injection 4 mg (not administered)   calcium carbonate (TUMS) chewable tablet 1,000 mg (not administered)   nicotine (NICODERM CQ) 21 mg/24 hr TD 24 hr patch 1 patch (not administered)   heparin (porcine) subcutaneous injection 5,000 Units (5,000 Units Subcutaneous Given 4/18/18 0542)   insulin lispro (HumaLOG) 100 units/mL subcutaneous injection 1-5 Units (not administered)   insulin lispro (HumaLOG) 100 units/mL subcutaneous injection 1-5 Units (1 Units Subcutaneous Not Given 4/17/18 2244)   acetaminophen (TYLENOL) tablet 975 mg (975 mg Oral Given 4/18/18 0542)   hydrALAZINE (APRESOLINE) injection 5 mg (not administered)   diazepam (VALIUM) injection 5 mg (5 mg Intravenous Given 4/17/18 1558)   sodium chloride 0 9 % bolus 1,000 mL (0 mL Intravenous Stopped 4/17/18 1745)   sodium chloride 0 9 % bolus 1,000 mL (0 mL Intravenous Stopped 4/17/18 1944)   diazepam (VALIUM) injection 5 mg (5 mg Intravenous Given 4/17/18 1815)   HYDROmorphone (DILAUDID) injection 1 mg (1 mg Intravenous Given 4/17/18 1944)       Diagnostic Studies  Results Reviewed     Procedure Component Value Units Date/Time    CKMB [[id number]]  (Abnormal) Collected:  04/17/18 1558    Lab Status:  Final result Specimen:  Blood from Hand, Right Updated:  04/17/18 1647     CK-MB Index 3 8 (H) %      CK-MB FRACTION 7 3 (H) ng/mL     Basic metabolic panel [[id number]]  (Abnormal) Collected:  04/17/18 1558    Lab Status:  Final result Specimen:  Blood from Hand, Right Updated:  04/17/18 1630     Sodium 132 (L) mmol/L      Potassium 3 9 mmol/L      Chloride 92 (L) mmol/L      CO2 23 mmol/L      Anion Gap 17 (H) mmol/L      BUN 23 mg/dL      Creatinine 1 89 (H) mg/dL      Glucose 116 mg/dL      Calcium 10 3 (H) mg/dL      eGFR 38 ml/min/1 73sq m     Narrative:         National Kidney Disease Education Program recommendations are as follows:  GFR calculation is accurate only with a steady state creatinine  Chronic Kidney disease less than 60 ml/min/1 73 sq  meters  Kidney failure less than 15 ml/min/1 73 sq  meters  CK Total with Reflex CKMB [[id number]]  (Normal) Collected:  04/17/18 1558    Lab Status:  Final result Specimen:  Blood from Hand, Right Updated:  04/17/18 1630     Total  U/L     CBC and differential [[id number]]  (Abnormal) Collected:  04/17/18 1558    Lab Status:  Final result Specimen:  Blood from Hand, Right Updated:  04/17/18 1614     WBC 8 07 Thousand/uL      RBC 3 78 (L) Million/uL      Hemoglobin 13 5 g/dL      Hematocrit 38 7 %       (H) fL      MCH 35 7 (H) pg      MCHC 34 9 g/dL      RDW 12 6 %      MPV 10 2 fL      Platelets 298 Thousands/uL      nRBC 0 /100 WBCs      Neutrophils Relative 58 %      Lymphocytes Relative 33 %      Monocytes Relative 7 %      Eosinophils Relative 1 %      Basophils Relative 1 %      Neutrophils Absolute 4 66 Thousands/µL      Lymphocytes Absolute 2 67 Thousands/µL      Monocytes Absolute 0 58 Thousand/µL      Eosinophils Absolute 0 08 Thousand/µL      Basophils Absolute 0 05 Thousands/µL                  CT lumbar spine without contrast   Final Result by Rupal Cantu MD (04/17 1740)         1  Disc degenerative change in the lower lumbar spine likely resulting in encroachment of the traversing L4 nerves at the L3-4 level  MRI may be helpful for further evaluation  2   No evidence of fracture, osseous lesion or paraspinal mass           Workstation performed: [id number]         US retroperitoneal complete    (Results Pending)   MRI lumbar spine wo contrast    (Results Pending)         Procedures  Procedures      Phone Consults  ED Phone Contact    ED Course  ED Course as of Apr 18 0655 Tue Apr 17, 2018   1632 Total CK: 194   1632 Creatinine: (!) 1 89   1633 Calcium: (!) 10 3   1633 Anion Gap: (!) 17   1633 Sodium: (!) 132   1648 CREATINE KINASE-MB INDEX: (!) 3 8   1648 CREATINE KINASE-MB FRACTION: (!) 7 3 MDM  Number of Diagnoses or Management Options  SUNDEEP (acute kidney injury) [facility]): new and requires workup  Bilateral leg pain: new and requires workup  Diagnosis management comments: 52-year-old male presenting with 3 day history of severe upper thigh pain, will order basic labs, CK, CT lumbar spine to further evaluate  CK was unremarkable, laboratories showed an elevated creatinine of 1 8, baseline 0 7  Will admit for SUNDEEP and CT findings       Amount and/or Complexity of Data Reviewed  Clinical lab tests: ordered and reviewed  Tests in the radiology section of CPT®: ordered and reviewed  Tests in the medicine section of CPT®: ordered and reviewed  Review and summarize past medical records: yes  Independent visualization of images, tracings, or specimens: yes      CritCare Time    Disposition  Final diagnoses:   SUNDEEP (acute kidney injury) ([facility] )   Bilateral leg pain     Time reflects when diagnosis was documented in both MDM as applicable and the Disposition within this note     Time User Action Codes Description Comment    4/17/2018  6:25 PM Alirio COONEY Add [N17 9] SUNDEEP (acute [facility]     4/17/2018  6:25 PM Sharan Mojica Add [C08 906,  M79 605] Bilateral leg pain     4/17/2018  7:52 PM Kevin Saleh Add [M54 16] Lumbar radiculopathy     4/17/2018  7:52 PM Marshall Saleh Modify [M54 16] Lumbar radiculopathy       ED Disposition     ED Disposition Condition Comment    Admit  Case was discussed with Dr Lloyd Yu and the patient's admission status was agreed to be Admission Status: inpatient status to the service of [facility]          Follow-up Information    None       Current Discharge Medication List      CONTINUE these medications which have NOT CHANGED    Details   baclofen 20 mg tablet Take 20 mg by mouth 3 (three) times a day      citalopram (CeleXA) 20 mg tablet Take 20 mg by mouth daily      glimepiride (AMARYL) 1 mg tablet Take 1 mg by mouth every morning before breakfast      lisinopril (ZESTRIL) 10 mg tablet Take 10 mg by mouth daily      metFORMIN (GLUCOPHAGE) 500 mg tablet Take 1,000 mg by mouth 2 (two) times a day with meals        potassium chloride (K-DUR,KLOR-CON) 20 mEq tablet Take 1 tablet (20 mEq total) by mouth daily  Qty: 90 tablet, Refills: 1    Associated Diagnoses: Hypokalemia      TIZANIDINE HCL PO Take by mouth as needed Wife unsure of dose      Multiple Vitamin (MULTIVITAMIN) tablet Take 1 tablet by mouth daily      senna-docusate sodium (SENOKOT S) 8 6-50 mg per tablet Take 1 tablet by mouth daily at bedtime for 30 days  Qty: 30 tablet, Refills: 0           No discharge procedures on file  ED Provider  Attending physically available and evaluated Vilma Andrea KEVIN managed the patient along with the ED Attending      Electronically Signed by         Juan Acevedo MD  04/18/18 6805

## 2018-04-18 ENCOUNTER — APPOINTMENT (INPATIENT)
Dept: RADIOLOGY | Facility: HOSPITAL | Age: 58
DRG: 552 | End: 2018-04-18
Payer: COMMERCIAL

## 2018-04-18 LAB
ANION GAP SERPL CALCULATED.3IONS-SCNC: 9 MMOL/L (ref 4–13)
BUN SERPL-MCNC: 23 MG/DL (ref 5–25)
CALCIUM SERPL-MCNC: 9.6 MG/DL (ref 8.3–10.1)
CHLORIDE SERPL-SCNC: 97 MMOL/L (ref 100–108)
CO2 SERPL-SCNC: 29 MMOL/L (ref 21–32)
CREAT SERPL-MCNC: 1.11 MG/DL (ref 0.6–1.3)
ERYTHROCYTE [DISTWIDTH] IN BLOOD BY AUTOMATED COUNT: 12.3 % (ref 11.6–15.1)
EST. AVERAGE GLUCOSE BLD GHB EST-MCNC: 163 MG/DL
GFR SERPL CREATININE-BSD FRML MDRD: 72 ML/MIN/1.73SQ M
GLUCOSE SERPL-MCNC: 138 MG/DL (ref 65–140)
GLUCOSE SERPL-MCNC: 167 MG/DL (ref 65–140)
GLUCOSE SERPL-MCNC: 233 MG/DL (ref 65–140)
GLUCOSE SERPL-MCNC: 240 MG/DL (ref 65–140)
GLUCOSE SERPL-MCNC: 242 MG/DL (ref 65–140)
GLUCOSE SERPL-MCNC: 249 MG/DL (ref 65–140)
HBA1C MFR BLD: 7.3 % (ref 4.2–6.3)
HCT VFR BLD AUTO: 40.7 % (ref 36.5–49.3)
HGB BLD-MCNC: 13.8 G/DL (ref 12–17)
MCH RBC QN AUTO: 35.5 PG (ref 26.8–34.3)
MCHC RBC AUTO-ENTMCNC: 33.9 G/DL (ref 31.4–37.4)
MCV RBC AUTO: 105 FL (ref 82–98)
PLATELET # BLD AUTO: 213 THOUSANDS/UL (ref 149–390)
PMV BLD AUTO: 10.3 FL (ref 8.9–12.7)
POTASSIUM SERPL-SCNC: 4 MMOL/L (ref 3.5–5.3)
RBC # BLD AUTO: 3.89 MILLION/UL (ref 3.88–5.62)
SODIUM SERPL-SCNC: 135 MMOL/L (ref 136–145)
WBC # BLD AUTO: 7.85 THOUSAND/UL (ref 4.31–10.16)

## 2018-04-18 PROCEDURE — 99223 1ST HOSP IP/OBS HIGH 75: CPT | Performed by: NEUROLOGICAL SURGERY

## 2018-04-18 PROCEDURE — 82948 REAGENT STRIP/BLOOD GLUCOSE: CPT

## 2018-04-18 PROCEDURE — G8987 SELF CARE CURRENT STATUS: HCPCS

## 2018-04-18 PROCEDURE — 85027 COMPLETE CBC AUTOMATED: CPT | Performed by: PHYSICIAN ASSISTANT

## 2018-04-18 PROCEDURE — 97167 OT EVAL HIGH COMPLEX 60 MIN: CPT

## 2018-04-18 PROCEDURE — 76770 US EXAM ABDO BACK WALL COMP: CPT

## 2018-04-18 PROCEDURE — 83036 HEMOGLOBIN GLYCOSYLATED A1C: CPT | Performed by: PHYSICIAN ASSISTANT

## 2018-04-18 PROCEDURE — G8979 MOBILITY GOAL STATUS: HCPCS

## 2018-04-18 PROCEDURE — 99232 SBSQ HOSP IP/OBS MODERATE 35: CPT | Performed by: INTERNAL MEDICINE

## 2018-04-18 PROCEDURE — 97163 PT EVAL HIGH COMPLEX 45 MIN: CPT

## 2018-04-18 PROCEDURE — 72148 MRI LUMBAR SPINE W/O DYE: CPT

## 2018-04-18 PROCEDURE — G8978 MOBILITY CURRENT STATUS: HCPCS

## 2018-04-18 PROCEDURE — G8988 SELF CARE GOAL STATUS: HCPCS

## 2018-04-18 PROCEDURE — 80048 BASIC METABOLIC PNL TOTAL CA: CPT | Performed by: PHYSICIAN ASSISTANT

## 2018-04-18 RX ADMIN — SODIUM CHLORIDE 100 ML/HR: 0.9 INJECTION, SOLUTION INTRAVENOUS at 16:41

## 2018-04-18 RX ADMIN — SODIUM CHLORIDE 100 ML/HR: 0.9 INJECTION, SOLUTION INTRAVENOUS at 08:03

## 2018-04-18 RX ADMIN — INSULIN LISPRO 2 UNITS: 100 INJECTION, SOLUTION INTRAVENOUS; SUBCUTANEOUS at 10:53

## 2018-04-18 RX ADMIN — POTASSIUM CHLORIDE 20 MEQ: 1500 TABLET, EXTENDED RELEASE ORAL at 08:04

## 2018-04-18 RX ADMIN — BACLOFEN 20 MG: 20 TABLET ORAL at 08:04

## 2018-04-18 RX ADMIN — HEPARIN SODIUM 5000 UNITS: 5000 INJECTION, SOLUTION INTRAVENOUS; SUBCUTANEOUS at 14:14

## 2018-04-18 RX ADMIN — ACETAMINOPHEN 975 MG: 325 TABLET, FILM COATED ORAL at 21:52

## 2018-04-18 RX ADMIN — CITALOPRAM HYDROBROMIDE 20 MG: 20 TABLET ORAL at 08:04

## 2018-04-18 RX ADMIN — BACLOFEN 20 MG: 20 TABLET ORAL at 16:41

## 2018-04-18 RX ADMIN — ACETAMINOPHEN 975 MG: 325 TABLET, FILM COATED ORAL at 05:42

## 2018-04-18 RX ADMIN — OXYCODONE HYDROCHLORIDE 10 MG: 10 TABLET ORAL at 18:40

## 2018-04-18 RX ADMIN — HEPARIN SODIUM 5000 UNITS: 5000 INJECTION, SOLUTION INTRAVENOUS; SUBCUTANEOUS at 21:53

## 2018-04-18 RX ADMIN — Medication 1 TABLET: at 21:53

## 2018-04-18 RX ADMIN — ACETAMINOPHEN 975 MG: 325 TABLET, FILM COATED ORAL at 14:14

## 2018-04-18 RX ADMIN — HEPARIN SODIUM 5000 UNITS: 5000 INJECTION, SOLUTION INTRAVENOUS; SUBCUTANEOUS at 05:42

## 2018-04-18 RX ADMIN — BACLOFEN 20 MG: 20 TABLET ORAL at 21:52

## 2018-04-18 RX ADMIN — INSULIN LISPRO 2 UNITS: 100 INJECTION, SOLUTION INTRAVENOUS; SUBCUTANEOUS at 21:54

## 2018-04-18 RX ADMIN — OXYCODONE HYDROCHLORIDE 10 MG: 10 TABLET ORAL at 05:42

## 2018-04-18 RX ADMIN — OXYCODONE HYDROCHLORIDE 5 MG: 5 TABLET ORAL at 08:04

## 2018-04-18 RX ADMIN — INSULIN LISPRO 2 UNITS: 100 INJECTION, SOLUTION INTRAVENOUS; SUBCUTANEOUS at 16:41

## 2018-04-18 RX ADMIN — NICOTINE 1 PATCH: 21 PATCH, EXTENDED RELEASE TRANSDERMAL at 08:04

## 2018-04-18 NOTE — PLAN OF CARE
DISCHARGE PLANNING     Discharge to home or other facility with appropriate resources Progressing        MUSCULOSKELETAL - ADULT     Maintain or return mobility to safest level of function Progressing        PAIN - ADULT     Verbalizes/displays adequate comfort level or baseline comfort level Progressing        Potential for Falls     Patient will remain free of falls Progressing        SKIN/TISSUE INTEGRITY - ADULT     Incision(s), wounds(s) or drain site(s) healing without S/S of infection Progressing

## 2018-04-18 NOTE — SOCIAL WORK
Initial interview and DC Dash:     CM met with the patient to review CM role and discuss possible dc needs  Pt lives with his wife in a 1st floor [address] ANA  Pt ambulates with a RW or Rollator; he has a SPC also  Pt requires assist with bathing/dressing, meals and transportation d/t bilateral hand contractures  Hx of VNA  Hx of IP rehab at [facility] in [place] and [facility]  Pt reported history of ETOH  Pt denied drug or mental health history  Pt stated that he does not have a POA or LW  Prescriptions are filled at [facility] in [facility] and thru [address] [facility] [address] order  Main contact: Wife Ling Jernigan [phone], Dtr Verner Daksha [phone]  Admit Dx Bilateral Thigh Pain, Fall  Hx COPD, DM2 with peripheral neuropathy, Spinal injury to C1-7  MRSA, Depression  Pt expressed some understanding of his diagnoses but stated that his wife helps him and knows more  Pt reported that his wife works part-time  Therapy recommending IP rehab; CM gave patient a SNF list and asked for 2-3 choices  Pt stated he will review the list with his wife and advise of choice by tomorrow am, 4/19  CM reviewed d/c planning process including the following: identifying help at home, patient preference for d/c planning needs, Discharge Lounge, [facility] Meds to Bed program, availability of treatment team to discuss questions or concerns patient and/or family may have regarding understanding medications and recognizing signs and symptoms once discharged  CM also encouraged patient to follow up with all recommended appointments after discharge  Patient advised of importance for patient and family to participate in managing patients medical well being

## 2018-04-18 NOTE — PHYSICAL THERAPY NOTE
PHYSICAL THERAPY EVALUATION        Patient Name: Araseli FRASER Date: 4/18/2018       Patient Active Problem List   Diagnosis    Alcohol-induced chronic pancreatitis ([address] )    Chronic pain    Depression    Diabetic neuropathy (HCC)    Enlarged prostate with lower urinary tract symptoms (LUTS)    Excessive cerumen in left ear canal    Gait disturbance    Hernia, epigastric    Essential hypertension    Injury of cervical spinal cord ([address] )    Tobacco use disorder    Umbilical hernia    Cervical myelopathy (HCC)    Basal cell carcinoma in situ of skin    Hypokalemia    Ambulatory dysfunction    SUNDEEP (acute kidney injury) ([address] )    Lumbar radiculopathy    Diarrhea    Type 2 diabetes mellitus with neurologic complication, without long-term current use of insulin (HCC)       Past Medical History:   Diagnosis Date    Chronic obstructive lung disease ([address] )     RESOLVED: 8/17/17    Concussion     LAST ASSESSED: 8/17/17    COPD (chronic obstructive pulmonary disease) ([address] )     Depression     Diabetes mellitus ([address] )     Diabetic neuropathy ([address] )     Difficulty attaining erection     LAST ASSESSEDl: 8/17/17    Elevated liver enzymes     Gall stone pancreatitis     RESOLVED: 3/8/17    Gall stones     RESOLVED: 3/8/17    History of MRSA infection     Hypertension     LAST ASSESSED: 8/17/17    Spinal cord injury, C1-C7 ([address] )     Traumatic injury to musculoskeletal system     1-5 FLIGHT FALL DOWN THE STEPS - CERVICAL NECK INJURY - JAN 2, 2012; LAST ASSESSED: 6/96/23    Umbilical hernia without obstruction and without gangrene     RESOLVED: 3/8/17    Varicella     LAST ASSESSED: 8/17/17       Past Surgical History:   Procedure Laterality Date    BACK SURGERY      CERVICAL FUSION      VERTEBRAL; C3-C4 FUSION    CHOLECYSTECTOMY LAPAROSCOPIC N/A 2/24/2017    Procedure: CHOLECYSTECTOMY LAPAROSCOPIC;  Surgeon: Jose Carpenter MD;  [facility];  Service:    [facility] CHOLECYSTECTOMY LAPAROSCOPIC  03/2017    EPIGASTRIC HERNIA REPAIR      UMBILICAL HERNIA REPAIR N/A 2/24/2017    Procedure: REPAIR HERNIA UMBILICAL;  Surgeon: Channing Ganser, MD;  [facility];  Service:       04/18/18 7764   Note Type   Note type Eval only   Pain Assessment   Pain Assessment 0-10   Pain Score 6   Pain Type Acute pain   Pain Location Leg  (upper thigh pain)   Pain Orientation Bilateral   Home Living   [facility] to live on main level with bedroom/bathroom;Stairs to enter with rails  (2+1 ANA)   [address] [facility],[address]; [place]   Prior Function   Level of [place] Needs assistance with IADLs  (mod I functional mobility, A for ADLs)   Lives With Spouse   Receives Help From Family   ADL Assistance Needs assistance   IADLs Needs assistance   Falls in the last 6 months 1 to 4   Comments Pt reports he can perform most ADLs independently, but it takes extended amount of time; spouse assists to get ADLs done in more timely manner   Restrictions/Precautions   Weight Bearing Precautions Per Order No   Other Precautions Fall Risk;Pain;Telemetry;Multiple lines   General   Additional Pertinent History h/o C1-7 SCI in 2012 s/p C3-4 fusion  Rehab stay also in 2012     Family/Caregiver Present No   Cognition   Overall Cognitive Status WFL   Arousal/Participation Cooperative   Attention Within functional limits   Orientation Level Oriented X4   Memory Within functional limits   Following Commands Follows multistep commands without difficulty   RUE Assessment   RUE Assessment X   RUE Strength   RUE Overall Strength Deficits  (chronic ROM limitation, finger flexion contractures)   LUE Assessment   LUE Assessment X   LUE Strength   LUE Overall Strength Deficits  (chronic ROM limitation, finger flexion contractures)   RLE Assessment   RLE Assessment X  ([employer] by pain/weakness)   Strength RLE   RLE Overall Strength 3-/5   LLE Assessment   LLE Assessment X  ([employer] by pain/weakness)   Strength LLE   LLE Overall Strength 3-/5   Coordination   Movements are Fluid and Coordinated 0   Coordination and Movement Description chronic coordination deficits since SCI   Light Touch   RLE Light Touch Grossly intact  (pt denies deficits; per EMR pt has diabetic neuropathy)   LLE Light Touch Grossly intact  (pt denies deficits; per EMR pt has diabetic neuropathy)   Bed Mobility   Supine to Sit 3  Moderate assistance   Additional items Assist x 1; Increased time required;Verbal cues;LE management;HOB elevated   Transfers   Sit to Stand 4  Minimal assistance   Additional items Assist x 1; Increased time required;Verbal cues   Stand to Sit 4  Minimal assistance   Additional items Assist x 1; Increased time required;Verbal cues   Ambulation/Elevation   Gait pattern Short stride; Excessively slow; Foward flexed;Decreased foot clearance   Gait Assistance 4  Minimal assist   Additional items Assist x 1; Tactile cues; Verbal cues   Assistive Device Rolling walker   Distance 16 ft x2   Stair Management Assistance Not tested   Balance   Static Sitting Fair -   Dynamic Sitting Poor +   Static Standing Fair -   Dynamic Standing Poor +   Ambulatory Poor +   Endurance Deficit   Endurance Deficit Yes   Activity Tolerance   Activity Tolerance Patient limited by fatigue;Patient limited by pain   Nurse Made Aware RN confirmed pt appropriate for PT eval   Assessment   Prognosis Good   Problem List Decreased strength;Decreased range of motion;Decreased endurance; Impaired balance;Decreased mobility; Decreased coordination;Pain; Impaired tone   Assessment Pt is 59 y o  male seen for PT evaluation s/p admit to [facility] on 4/17/2018 with c/o upper thigh pain, increasing weakness, and recent falls  Pt has history of cervical SCI s/p C3-4 fusion in 2012  PT consulted to assess pt's functional mobility and d/c needs  Order placed for PT eval and tx, w/ up as tolerated order   Comorbidities affecting pt's physical performance at time of assessment include: COPD, h/o SCI, depression, Dm, diabetic neuropathy, HTN, chronic B hand contractures  PTA, pt was independent w/ all functional mobility w/ cane/RW  Pt reports his spouse assisted with ADLs    Personal factors affecting pt at time of IE include: inaccessible home environment, ambulating w/ assistive device, stairs to enter home, inability to ambulate household distances, inability to navigate community distances, inability to navigate level surfaces w/o external assistance, unable to perform dynamic tasks in community and positive fall history  Please find objective findings from PT assessment regarding body systems outlined above with impairments and limitations including weakness, decreased ROM, impaired balance, decreased endurance, impaired coordination, gait deviations, pain, decreased activity tolerance, decreased functional mobility tolerance, fall risk and impaired tone  The following objective measures performed on IE also reveal limitations: Barthel Index: 35/100  Pt's clinical presentation is currently unstable/unpredictable seen in pt's presentation of acute pain, decline from functional baseline, fall risk, pending consults to determine medial management of back pain  Pt currently modA bed mobility, Polo transfers and ambulation of 16 ft with RW  Pt not currently at his functional baseline  Pt to benefit from continued PT tx to address deficits as defined above and maximize level of functional independent mobility and consistency  From PT/mobility standpoint, recommendation at time of d/c would be IP rehab pending progress in order to facilitate return to PLOF  Will continue to assess and update PT recs as appropriate  Barriers to Discharge Inaccessible home environment   Goals   Patient Goals to amb, to have less pain   STG Expiration Date 04/28/18   Short Term Goal #1 In 10 days: Pt will perform sup<>sit mod I  Pt will perform sit<>stand and bed<>chair transfers mod I   Pt will amb 150 ft mod I with RW vs cane  Pt will ascend/descend 2 stairs mod I  Pt will ascend/descend curb step with LRAD mod I  Treatment Day 0   Plan   Treatment/Interventions Functional transfer training;LE strengthening/ROM; Elevations; Therapeutic exercise; Endurance training;Patient/family training;Equipment eval/education; Bed mobility;Gait training   PT Frequency 5x/wk   Recommendation   Recommendation Post acute IP rehab  (PENDING PROGRESS WITH MOBILITY)   Equipment Recommended Walker   Barthel Index   Feeding 5   Bathing 0   Grooming Score 0   Dressing Score 5   Bladder Score 5   Bowels Score 5   Toilet Use Score 5   Transfers (Bed/Chair) Score 10   Mobility (Level Surface) Score 0   Stairs Score 0   Barthel Index Score 35         Drake Seo PT

## 2018-04-18 NOTE — OCCUPATIONAL THERAPY NOTE
[address]  Evaluation     Patient Name: Kaylie Stoner  RTDAA'H Date: 4/18/2018  Problem List  Patient Active Problem List   Diagnosis    Alcohol-induced chronic pancreatitis (HCC)    Chronic pain    Depression    Diabetic neuropathy (HCC)    Enlarged prostate with lower urinary tract symptoms (LUTS)    Excessive cerumen in left ear canal    Gait disturbance    Hernia, epigastric    Essential hypertension    Injury of cervical spinal cord ([address] )    Tobacco use disorder    Umbilical hernia    Cervical myelopathy (HCC)    Basal cell carcinoma in situ of skin    Hypokalemia    Ambulatory dysfunction    SUNDEEP (acute kidney injury) ([address] )    Lumbar radiculopathy    Diarrhea    Type 2 diabetes mellitus with neurologic complication, without long-term current use of insulin (HCC)     Past Medical History  Past Medical History:   Diagnosis Date    Chronic obstructive lung disease ([address] )     RESOLVED: 8/17/17    Concussion     LAST ASSESSED: 8/17/17    COPD (chronic obstructive pulmonary disease) ([address] )     Depression     Diabetes mellitus ([address] )     Diabetic neuropathy ([address] )     Difficulty attaining erection     LAST ASSESSEDl: 8/17/17    Elevated liver enzymes     Gall stone pancreatitis     RESOLVED: 3/8/17    Gall stones     RESOLVED: 3/8/17    History of MRSA infection     Hypertension     LAST ASSESSED: 8/17/17    Spinal cord injury, C1-C7 ([address] )     Traumatic injury to musculoskeletal system     1-5 FLIGHT FALL DOWN THE STEPS - CERVICAL NECK INJURY - JAN 2, 2012; LAST ASSESSED: 2/05/11    Umbilical hernia without obstruction and without gangrene     RESOLVED: 3/8/17    Varicella     LAST ASSESSED: 8/17/17     Past Surgical History  Past Surgical History:   Procedure Laterality Date    BACK SURGERY      CERVICAL FUSION      VERTEBRAL; C3-C4 FUSION    CHOLECYSTECTOMY LAPAROSCOPIC N/A 2/24/2017    Procedure: CHOLECYSTECTOMY LAPAROSCOPIC;  Surgeon: Ludwig De La Rosa MD;  [facility];  Service:     CHOLECYSTECTOMY LAPAROSCOPIC  03/2017    EPIGASTRIC HERNIA REPAIR      UMBILICAL HERNIA REPAIR N/A 2/24/2017    Procedure: REPAIR HERNIA UMBILICAL;  Surgeon: Munira Mora MD;  [facility];  Service:         04/18/18 1233   Note Type   Note type Eval/Treat   Restrictions/Precautions   Weight Bearing Precautions Per Order No   Other Precautions Fall Risk;Telemetry;Multiple lines   Pain Assessment   Pain Assessment 0-10   Pain Score 6   Pain Type Acute pain   Pain Location (leg)   Pain Orientation Bilateral   Pain Onset Ongoing   Clinical Progression Rapidly improving   Hospital Pain Intervention(s) Distraction; Ambulation/increased activity;Repositioned   Diversional Activities (conversation)   Response to Interventions reported great improvement with ambulation   Home Living   Type of [address] [place] [address] Two level; Able to live on main level with bedroom/bathroom;Stairs to enter with rails  (2+1STE)   Bathroom Shower/Tub Tub/shower unit   Bathroom Toilet Standard   Bathroom Equipment Shower chair   Bathroom Accessibility Accessible   Home Equipment Walker;Cane   Prior Function   Level of [place] Needs assistance with IADLs; Needs assistance with ADLs and functional mobility   Lives With Spouse   Receives Help From Family   ADL Assistance Needs assistance   IADLs Needs assistance   Falls in the last 6 months 1 to 4   Vocational Retired   Lifestyle   Autonomy A with ADL/IADL   Reciprocal Relationships supportive wife who A as needed   Service to Others retired   Intrinsic Gratification being with family   Psychosocial   Psychosocial (WDL) WDL   Subjective   Subjective "I have a very supportive/fantastic wife"   ADL   Eating Assistance 5  Supervision/Setup   Eating Deficit Setup; Increased time to complete  (finger feeds fries and chicken tenders, reportRN A as needed)   Grooming Assistance 4  Minimal Assistance   Grooming Deficit Setup; Increased time to complete   UB Dressing Assistance 3  Moderate Assistance   UB Dressing Deficit Setup; Increased time to complete   LB Dressing Assistance 2  Maximal Assistance   LB Dressing Deficit Setup;Don/doff R sock; Don/doff L sock; Increased time to complete   Toileting Assistance  3  Moderate Assistance   Toileting Deficit Use of bedpan/urinal setup  (urinal set-up)   Bed Mobility   Supine to Sit 3  Moderate assistance   Additional items Assist x 1; Increased time required;Verbal cues   Transfers   Sit to Stand 4  Minimal assistance   Additional items Assist x 1; Increased time required;Verbal cues   Stand to Sit 4  Minimal assistance   Additional items Assist x 1; Increased time required;Verbal cues   Functional Mobility   Functional Mobility 4  Minimal assistance   Additional Comments x1 with SBA x1 for safety and line management   Additional items Rolling walker   Balance   Static Sitting Fair   Dynamic Sitting Poor   Static Standing Fair -   Ambulatory Poor +   Activity Tolerance   Activity Tolerance Patient limited by fatigue;Patient limited by pain   Medical Staff Made Aware yes PT Laura Almanza present, s/w Dr Jacky Sanderson, cleared for OOB s/p MRI   Nurse Made Aware yes, RN cleared for session   RUE Assessment   RUE Assessment X  (decreased ROM at all joints, reports since injury)   LUE Assessment   LUE Assessment X  (decreased ROM at all joints, reports since injury)   Hand Function   Gross Motor Coordination Impaired   Fine Motor Coordination Impaired   Cognition   Overall Cognitive Status WFL   Attention Within functional limits   Orientation Level Oriented X4   Memory Within functional limits   Following Commands Follows multistep commands without difficulty   Assessment   Limitation Decreased ADL status; Decreased UE ROM; Decreased endurance;Decreased fine motor control;Decreased self-care trans;Decreased high-level ADLs   Prognosis Good   Assessment Pt  is a 59 y o  male who was [facility] on 4/17/2018 with lumbar radiculopathy   Pt  w/a significant PMH of SUNDEEP, ambulatory dysfunction, HTN, cervical myelopathy, depression, tobacco use disorder, DM, SCI w/ hx  Of cervical fusion  Pt  currently lives in a 2 SH with FF s/u with wife  PTA, pt  Received A in ADLs/IADLs (excluding toileting)  Pt  Ambulated at a mod I level with RW  Currently, pt  requires mod A for bed mobility, min A for functional mobility and transfers, mod A-s/u for UB ADLs and max A-mod A for LB ADLs  A is needed d/t the following impairments: Decreased BUE ROM,  decreased FMC, decreased GMC, decreased functional activity tolerance, generalized weakness, deconditioning, decreased balance  Therefore, pt  will benefit from skilled OT services to maximize functional gains, monitor status and prevent decline  Will continue to follow pt  3-5x/week to meet the goals described below in 10-14 days  Recommend STR once medically stable  Goals   Patient Goals to get OOB   LTG Time Frame 10-14   Plan   Treatment Interventions ADL retraining;Functional transfer training; Endurance training;Patient/family training;Equipment evaluation/education; Fine motor coordination activities; Compensatory technique education;Continued evaluation; Energy conservation;UE strengthening/ROM   Goal Expiration Date 05/02/18   OT Frequency 3-5x/wk   Recommendation   OT Discharge Recommendation Short Term Rehab   OT - OK to Discharge Yes   Barthel Index   Feeding 5   Bathing 5   Grooming Score 0   Dressing Score 5   Bladder Score 5   Bowels Score 5   Toilet Use Score 5   Transfers (Bed/Chair) Score 10   Mobility (Level Surface) Score 0   Stairs Score 0   Barthel Index Score 40   Modified [place] Scale   Modified Reza Scale 4      GOALS    Patient will perform all functional mobility and transfers at a mod I level with use of the least restrictive device  Pt  will perform bed mobility at a mod I level with G balance and activity tolerance  Pt  will complete LB dressing at a mod A level with AE as needed       Pt  will complete UB dressing at a min A level with AE as needed  Pt  will complete all grooming activities at a mod I level  Pt  will complete a toileting tasks at a mod I level  Pt will complete LB/UB bathing at a mod  A level with the use of AE as needed  Patient will participate in 30 minutes of functional activity without any overt signs of fatigue or abnormal vitals to demonstrate G endurance as it is required for ADLs/functional activity  Pt  will utilize ECT without v c  T/o session to demonstrate increased recall and understanding and improve safety during functional activity        DONTRELL Dempsey, OTR/L

## 2018-04-18 NOTE — ASSESSMENT & PLAN NOTE
- last HA1C 5 4% aug 2017 (was 10 2 in feb 2017)   - recheck a1c, hold Amaryl and Metformin   - add SSI and accuchecks for now

## 2018-04-18 NOTE — PLAN OF CARE
Problem: PHYSICAL THERAPY ADULT  Goal: Performs mobility at highest level of function for planned discharge setting  See evaluation for individualized goals  Treatment/Interventions: Functional transfer training, LE strengthening/ROM, Elevations, Therapeutic exercise, Endurance training, Patient/family training, Equipment eval/education, Bed mobility, Gait training  Equipment Recommended: Ascencion Pappas       See flowsheet documentation for full assessment, interventions and recommendations  Prognosis: Good  Problem List: Decreased strength, Decreased range of motion, Decreased endurance, Impaired balance, Decreased mobility, Decreased coordination, Pain, Impaired tone  Assessment: Pt is 59 y o  male seen for PT evaluation s/p admit to [facility] on 4/17/2018 with c/o upper thigh pain, increasing weakness, and recent falls  Pt has history of cervical SCI s/p C3-4 fusion in 2012  PT consulted to assess pt's functional mobility and d/c needs  Order placed for PT eval and tx, w/ up as tolerated order  Comorbidities affecting pt's physical performance at time of assessment include: COPD, h/o SCI, depression, Dm, diabetic neuropathy, HTN, chronic B hand contractures  PTA, pt was independent w/ all functional mobility w/ cane/RW  Pt reports his spouse assisted with ADLs    Personal factors affecting pt at time of IE include: inaccessible home environment, ambulating w/ assistive device, stairs to enter home, inability to ambulate household distances, inability to navigate community distances, inability to navigate level surfaces w/o external assistance, unable to perform dynamic tasks in community and positive fall history   Please find objective findings from PT assessment regarding body systems outlined above with impairments and limitations including weakness, decreased ROM, impaired balance, decreased endurance, impaired coordination, gait deviations, pain, decreased activity tolerance, decreased functional mobility tolerance, fall risk and impaired tone  The following objective measures performed on IE also reveal limitations: Barthel Index: 35/100  Pt's clinical presentation is currently unstable/unpredictable seen in pt's presentation of acute pain, decline from functional baseline, fall risk, pending consults to determine medial management of back pain  Pt currently modA bed mobility, Polo transfers and ambulation of 16 ft with RW  Pt not currently at his functional baseline  Pt to benefit from continued PT tx to address deficits as defined above and maximize level of functional independent mobility and consistency  From PT/mobility standpoint, recommendation at time of d/c would be IP rehab pending progress in order to facilitate return to PLOF  Will continue to assess and update PT recs as appropriate  Barriers to Discharge: Inaccessible home environment     Recommendation: Post acute IP rehab (PENDING PROGRESS WITH MOBILITY)          See flowsheet documentation for full assessment

## 2018-04-18 NOTE — PLAN OF CARE
Problem: OCCUPATIONAL THERAPY ADULT  Goal: Performs self-care activities at highest level of function for planned discharge setting  See evaluation for individualized goals  Treatment Interventions: ADL retraining, Functional transfer training, Endurance training, Patient/family training, Equipment evaluation/education, Fine motor coordination activities, Compensatory technique education, Continued evaluation, Energy conservation, UE strengthening/ROM          See flowsheet documentation for full assessment, interventions and recommendations  Limitation: Decreased ADL status, Decreased UE ROM, Decreased endurance, Decreased fine motor control, Decreased self-care trans, Decreased high-level ADLs  Prognosis: Good  Assessment: Pt  is a 59 y o  male who was admitted to [facility] on 4/17/2018 with lumbar radiculopathy  Pt  w/a significant PMH of SUNDEEP, ambulatory dysfunction, HTN, cervical myelopathy, depression, tobacco use disorder, DM, SCI w/ hx  Of cervical fusion  Pt  currently lives in a 2  with FF s/u with wife  PTA, pt  Received A in ADLs/IADLs (excluding toileting)  Pt  Ambulated at a mod I level with RW  Currently, pt  requires mod A for bed mobility, min A for functional mobility and transfers, mod A-s/u for UB ADLs and max A-mod A for LB ADLs  A is needed d/t the following impairments: Decreased BUE ROM,  decreased FMC, decreased GMC, decreased functional activity tolerance, generalized weakness, deconditioning, decreased balance  Therefore, pt  will benefit from skilled OT services to maximize functional gains, monitor status and prevent decline  Will continue to follow pt  3-5x/week to meet the goals described below in 10-14 days  Recommend STR once medically stable  OT Discharge Recommendation: Short Term Rehab  OT - OK to Discharge:  Yes

## 2018-04-18 NOTE — CASE MANAGEMENT
Initial Clinical Review    Admission: Date/Time/Statement: 4/17/18 @ [address] [facility] This Encounter   Procedures    Inpatient Admission (expected length of stay for this patient is greater than two midnights)     Standing Status:   Standing     Number of Occurrences:   1     Order Specific Question:   Admitting Physician     Answer:   Felix Ascencio     Order Specific Question:   Level of Care     Answer:   Med Surg [16]     Order Specific Question:   Estimated length of stay     Answer:   More than 2 Midnights     Order Specific Question:   Certification     Answer:   I certify that inpatient services are medically necessary for this patient for a duration of greater than two midnights  See H&P and MD Progress Notes for additional information about the patient's course of treatment  ED: Date/Time/Mode of Arrival:   ED Arrival Information     Expected Arrival Acuity Means of Arrival Escorted By Service Admission Type    - 4/17/2018 14:36 Urgent Ambulance [facility] General Medicine Urgent    Arrival Complaint    leg pain          Chief Complaint:   Chief Complaint   Patient presents with    Leg Pain     c/o lower back and leg pain  Has chronic issues and over the past few days increased pain  States he had so much pain in his legs that he fell to the ground  History of Illness  Araseli Conner is a 59 y o  male past medical history significant for hypertension, tobacco abuse, type 2 diabetes mellitus, cervical myelopathy status post cervical spine fusion with chronic bile bilateral contractures of hands who presents with 3 day history of bilateral thigh pain  Patient states pain was tolerable over the last few days but today pain was more severe associated with bilateral lower extremity weakness  States that his legs were so weak today upon standing that he did not make it to the bathroom in time    He does have anterior and posterior thigh pain that is intermittent and stabbing in nature  He states that he fell today but no head injury or loss of consciousness  Cervical spine fusion in 2012  He is on muscle relaxers for neck pain and upper extremity spasticity  Had diarrhea today but review of chart patient does have chronic diarrhea  ED Vital Signs:   ED Triage Vitals [04/17/18 1442]   Temperature Pulse Respirations Blood Pressure SpO2   98 1 °F (36 7 °C) 99 20 101/51 94 %         Pain Score       Worst Possible Pain        Wt Readings from Last 1 Encounters:   04/17/18 81 6 kg (179 lb 14 3 oz)       Vital Signs (abnormal):   04/17/18 2052  98 1 °F (36 7 °C)  104  22  145/73  97 %  None (Room air)  Lying   04/17/18 2000  --   106  20  155/72  93 %  None (Room air)  --   04/17/18 1900  --   106  20   91/48  91 %  --  SLEEPING    04/17/18 1754  --   108  20  152/67  93 %  None (Room air)  Lying       4-17- TO 4-18 0814  BILATERAL LEG PAIN   Pain Score  Worst   Worst   Worst   Worst   Worst   7 7 6 6     HYDROmorphone IJ (mg)    1     OxyCODONE (mg)       10 10 5           Abnormal Labs/Diagnostic Test Results:   He appears distressed (in distress 2/2 leg pain )  Poor dentition, MM dry   b/l UE contractures, B/L LE weakness 4/5      Lab Units 04/17/18  1558   SODIUM mmol/L 132*   CHLORIDE mmol/L 92*   CREATININE mg/dL 1 89*   CALCIUM mg/dL 10 3*     CT lumbar spine without contrast   Final  (04/17 1740)       1  Disc degenerative change in the lower lumbar spine likely resulting in encroachment of the traversing L4 nerves at the L3-4 level  MRI may be helpful for further evaluation  2   No evidence of fracture, osseous lesion or paraspinal mass             ED Treatment:   Medication Administration from 04/17/2018 1436 to 04/17/2018 2048       Date/Time Order Dose Route     04/17/2018 1558 diazepam (VALIUM) injection 5 mg 5 mg Intravenous     04/17/2018 1650 sodium chloride 0 9 % bolus 1,000 mL 1,000 mL Intravenous     04/17/2018 1815 sodium chloride 0 9 % bolus 1,000 mL 1,000 mL Intravenous     04/17/2018 1815 diazepam (VALIUM) injection 5 mg 5 mg Intravenous     04/17/2018 1944 HYDROmorphone (DILAUDID) injection 1 mg 1 mg Intravenous       Past Medical/Surgical History: Active Ambulatory Problems     Diagnosis Date Noted    Alcohol-induced chronic pancreatitis ([facility] ) 01/19/2017    Chronic pain 08/28/2017    Depression 05/25/2016    Diabetic neuropathy ([facility] ) 05/25/2016    Enlarged prostate with lower urinary tract symptoms (LUTS) 07/03/2012    Excessive cerumen in left ear canal 08/28/2017    Gait disturbance 08/28/2017    Hernia, epigastric 02/16/2017    Essential hypertension 05/31/2016    Injury of cervical spinal cord ([facility] ) 05/25/2016    Tobacco use disorder 46/25/1472    Umbilical hernia 56/82/0644    Cervical myelopathy (HCC) 08/24/2016    Basal cell carcinoma in situ of skin 03/15/2018    Hypokalemia 03/15/2018       Past Medical History:    Chronic obstructive lung disease (HCC)    Concussion    COPD (chronic obstructive pulmonary disease) (HCC)    Depression    Diabetes mellitus (HCC)    Diabetic neuropathy (HCC)    Difficulty attaining erection    Elevated liver enzymes    Gall stone pancreatitis    Gall stones    History of MRSA infection    Hypertension    Spinal cord injury, C1-C7 ([facility] )    Traumatic injury to musculoskeletal system    Umbilical hernia without obstruction and without gangrene    Varicella       Admitting Diagnosis: Lumbar radiculopathy [M54 16]  Leg pain [M79 606]  Bilateral leg pain [M79 604, M79 605]  SUNDEEP (acute kidney injury) ([place] [address] ) [N17 9]    Age/Sex: 59 y o  male    Assessment/Plan    Lumbar radiculopathy   Assessment & Plan     - POA, b/l anterior and posterior thigh pain x 3 days, LE weakness   - CT lumbar spine- at L3-4, posterior disc bulge and facet arthropathy result in mild central canal stenosis   Narrowing of the subarticular zones and probable impingement of the traversing L4 nerves, not directly visualized and MRI recommended  - obtain MRI   c/s neurosurgery- may benefit from addition of steroids  - pain control           SUNDEEP (acute kidney injury) ([facility] )   Assessment & Plan     - creatinine 1 89 on admission  - given hx of BPH will check PVR and renal ultrasound   - IVF hydration           Ambulatory dysfunction   Assessment & Plan     - PT and OT consults           Essential hypertension   Assessment & Plan     - hold lisinopril given SUNDEEP   - PRN antihypertensives for SBP > 180          Cervical myelopathy (HCC)   Assessment & Plan     - hx of cervical spine fusion at C3-C4 2012 after falling down steps, chronic contracture of b/l hands   - continue baclofen           Depression   Assessment & Plan     - continue SSRI          * Tobacco use disorder   Assessment & Plan     - NRT, recommend cessation          Diarrhea   Assessment & Plan     - per PCP, patient has had chronic loose stools 1-2x a day since cholecystectomy in 2017           Type 2 diabetes mellitus with neurologic complication, without long-term current use of insulin (HCC)   Assessment & Plan     - last HA1C 5 4% aug 2017 (was 10 2 in feb 2017)   - recheck a1c, hold Amaryl and Metformin   - add SSI and accuchecks for now             Admission Orders:    MRI LUMBAR SPINE  US RETROPERITONEAL   AQUA K PAD  PT AND OT EVAL AND TREAT   CONSULT NEURO SURGERY  MEASURE POST VOID RESIDUAL  CONSISTENT CARBOHYDRATE DIET   SEQUENTIAL COMPRESSION DEVICE    Scheduled Meds:   Current Facility-Administered Medications:  acetaminophen 975 mg Oral Q8H [facility]   baclofen 20 mg Oral TID   calcium carbonate 1,000 mg Oral Daily PRN   citalopram 20 mg Oral Daily   heparin (porcine) 5,000 Units Subcutaneous Q8H [facility]   hydrALAZINE 5 mg Intravenous Q6H PRN   HYDROmorphone 0 5 mg Intravenous Q4H PRN   insulin lispro 1-5 Units Subcutaneous TID AC   insulin lispro 1-5 Units Subcutaneous HS   nicotine 1 patch Transdermal Daily   ondansetron 4 mg Intravenous Q6H PRN   oxyCODONE 10 mg Oral Q4H PRN   oxyCODONE 5 mg Oral Q4H PRN   potassium chloride 20 mEq Oral Daily   senna-docusate sodium 1 tablet Oral HS   sodium chloride 100 mL/hr Intravenous Continuous     Continuous Infusions:   sodium chloride 100 mL/hr     PRN Meds: calcium carbonate    hydrALAZINE    HYDROmorphone    ondansetron    oxyCODONE  10 MG X 1 4-17 2255  10 MG X1 4-18  0542     oxyCODONE IR 5 MG  X1 4-18  0804

## 2018-04-18 NOTE — PROGRESS NOTES
Progress Note - Jorge Pires 1960, 59 y o  male MRN: [id number]    Unit/Bed#: [facility] 735-01 Encounter: [id number]    Primary Care Provider: CHERELLE March   Date and time admitted to hospital: 4/17/2018  2:37 PM        Lumbar radiculopathy   Assessment & Plan    - POA, b/l anterior and posterior thigh pain x 3 days, LE weakness   - CT lumbar spine- at L3-4, posterior disc bulge and facet arthropathy result in mild central canal stenosis  Narrowing of the subarticular zones and probable impingement of the traversing L4 nerves, not directly visualized and MRI recommended  - pain appears to be improved  MRI reviewed  Case discussed with neurosurgery resident  Plan for conservative care  Continue physical therapy and evaluate in a m  regarding potential for discharge  Cervical myelopathy (HCC)   Assessment & Plan    - hx of cervical spine fusion at C3-C4 2012 after falling down steps, chronic contracture of b/l hands   - continue baclofen         Essential hypertension   Assessment & Plan    - hold lisinopril given SUNDEEP   - PRN antihypertensives for SBP > 180        Depression   Assessment & Plan    - continue SSRI        SUNDEEP (acute kidney injury) ([facility] )   Assessment & Plan    - creatinine 1 89 on admission  - given hx of BPH will check PVR and renal ultrasound   - IVF hydration         * Tobacco use disorder   Assessment & Plan    - NRT, recommend cessation        Ambulatory dysfunction   Assessment & Plan    - PT and OT consults             VTE Pharmacologic Prophylaxis:   Pharmacologic: Heparin  Mechanical VTE Prophylaxis in Place: No    Patient Centered Rounds: I have performed bedside rounds with nursing staff today      Discussions with Specialists or Other Care Team Provider:  Neurosurgery    Current Length of Stay: 1 day(s)    Current Patient Status: Inpatient   Certification Statement: The patient will continue to require additional inpatient hospital stay due to Persistent weakness    Code Status: Level 1 - Full Code      Subjective:   Patient feels slightly better today  Continues to have discomfort but much improved  Still weak  Objective:     Vitals:   Temp (24hrs), Av 2 °F (36 8 °C), Min:98 °F (36 7 °C), Max:98 4 °F (36 9 °C)    HR:  [] 97  Resp:  [18-22] 20  BP: ()/(48-75) 156/71  SpO2:  [91 %-97 %] 94 %  Body mass index is 25 81 kg/m²  Input and Output Summary (last 24 hours): Intake/Output Summary (Last 24 hours) at 18 1757  Last data filed at 18 1648   Gross per 24 hour   Intake          3091 67 ml   Output              521 ml   Net          2570 67 ml       Physical Exam:  General Appearance:    Alert, cooperative, no distress, appropriately responsive    Head:    Normocephalic, without obvious abnormality, atraumatic, mucous membranes moist    Eyes:    Conjunctiva/corneas clear, EOM's intact   Neck:   Supple, no JVD or bruits noted   Lungs:     Clear to auscultation bilaterally, respirations unlabored, no crackles or wheeze     Heart:    Regular rate and rhythm, S1 and S2    Abdomen:     Soft, non-tender, bowel sounds active all four quadrants,     no masses, no organomegaly   Extremities:   Extremities normal, atraumatic, no cyanosis or edema  Chronic contractures  Neurologic:  nonfocal, A/O x 3           Additional Data:     Labs:      Results from last 7 days  Lab Units 18  0559 18  1558   WBC Thousand/uL 7 85 8 07   HEMOGLOBIN g/dL 13 8 13 5   HEMATOCRIT % 40 7 38 7   PLATELETS Thousands/uL 213 237   NEUTROS PCT %  --  58   LYMPHS PCT %  --  33   MONOS PCT %  --  7   EOS PCT %  --  1       Results from last 7 days  Lab Units 18  0559   SODIUM mmol/L 135*   POTASSIUM mmol/L 4 0   CHLORIDE mmol/L 97*   CO2 mmol/L 29   BUN mg/dL 23   CREATININE mg/dL 1 11   CALCIUM mg/dL 9 6   GLUCOSE RANDOM mg/dL 167*           * I Have Reviewed All Lab Data Listed Above  * Additional Pertinent Lab Tests Reviewed:  All Labs Within Last 24 Hours Reviewed    Cultures:   Blood Culture: No results found for: BLOODCX  Urine Culture: No results found for: URINECX  Sputum Culture: No components found for: SPUTUMCX  Wound Culture: No results found for: WOUNDCULT    Last 24 Hours Medication List:     Current Facility-Administered Medications:  acetaminophen 975 mg Oral Q8H [address] Gaurav Dahlon, PA-C    baclofen 20 mg Oral TID Gaurav Saleh, PA-C    calcium carbonate 1,000 mg Oral Daily PRN Gaurav Dahlon, PA-C    citalopram 20 mg Oral Daily Gaurav Dahlon, PA-C    heparin (porcine) 5,000 Units Subcutaneous Q8H [address] Gaurav Saleh, PA-C    hydrALAZINE 5 mg Intravenous Q6H PRN Gaurav Saleh, PA-C    HYDROmorphone 0 5 mg Intravenous Q4H PRN Gaurav Saleh, PA-C    insulin lispro 1-5 Units Subcutaneous TID AC Gaurav Saleh, PA-C    insulin lispro 1-5 Units Subcutaneous HS Gaurav Saleh, PA-C    nicotine 1 patch Transdermal Daily Gaurav Saleh, PA-C    ondansetron 4 mg Intravenous Q6H PRN Gaurav Saleh, PA-C    oxyCODONE 10 mg Oral Q4H PRN Gaurav Dahlon, PA-C    oxyCODONE 5 mg Oral Q4H PRN Gaurav Dahlon, PA-C    potassium chloride 20 mEq Oral Daily Gaurav Saleh, PA-C    senna-docusate sodium 1 tablet Oral HS Gaurav aSleh, PA-C    sodium chloride 100 mL/hr Intravenous Continuous Gaurav Saleh, PA-C Last Rate: 100 mL/hr (04/18/18 1641)        Today, Patient Was Seen By: Brittany Feliciano DO    ** Please Note: Dragon 360 Dictation voice to text software may have been used in the creation of this document   **

## 2018-04-18 NOTE — ASSESSMENT & PLAN NOTE
- POA, b/l anterior and posterior thigh pain x 3 days, LE weakness   - CT lumbar spine- at L3-4, posterior disc bulge and facet arthropathy result in mild central canal stenosis  Narrowing of the subarticular zones and probable impingement of the traversing L4 nerves, not directly visualized and MRI recommended  - pain appears to be improved  MRI reviewed  Case discussed with neurosurgery resident  Plan for conservative care  Continue physical therapy and evaluate in a m  regarding potential for discharge

## 2018-04-18 NOTE — CONSULTS
Consultation - Neurosurgery   Tammy Ramirez 59 y o  male MRN: [id number]  Unit/Bed#: [facility] 735-01 Encounter: [id number]      Inpatient consult to Neurosurgery  Consult performed by: Alisa Tate ordered by: Safia Kirk          Assessment/Plan     Assessment:  1  Bilateral lumbar radiculopathy  2  Status post fall  3  Lumbar degenerative disc disease  4  Multilevel lumbar foraminal stenosis  5  Multilevel lumbar facet arthropathy  6  History cervical spinal cord injury 1/2/16 s/p fall downstairs  7  Status post C3/C4 decompression fusion  8  Diabetes with neuropathy    Plan:  · Exam reveals chronic upper extremity weakness especially finger extension contractures of hands secondary to prior spinal cord injury  Mild EHL weakness bilateral lower extremities 4/5  Diminished left L4 pinprick  Increased pinprick sensation around T7 level  · Images reviewed personally by attending  Final results below  · CT lumbar spine without 4/17/18:  Multilevel degenerative changes with disc bulge and facet arthropathy  No evidence of fracture or traumatic malalignment  · MRI lumbar spine without contrast April 17, 2018:  Multilevel degenerative changes along with facet arthropathy  Most significant at L3-4 with left para central and foraminal protrusion  No significant central stenosis  · MRI finding likely consistent with lumbar radiculopathy  · Given no severe stenosis or weakness on exam, recommend conservative management  Patient agree and in stated multiple times he would not like surgery  · Recommend inpatient physical therapy evaluation given falls  · Conservative management discussed with patient to include:  · Physical therapy for back and core muscle strengthening along with possible traction  · Oral medication with consideration of Medrol Dosepak and and addition of gabapentin  Consider decrease of oxycodone to 2 5 mg and 5 mg for moderate severe pain, respectively    · Consider outpatient pain management referral for consideration of epidural steroid injection  · No neurosurgical intervention anticipated at this juncture  Patient may follow up with neurosurgical team with ER is to fail conservative management or has worsening of symptoms and desires surgical intervention  · Will see as needed during remainder of hospitalization  Please call if any questions or concerns  History of Present Illness     HPI: Jorge Pires is a 59 y o  male with PMH including mid cervical spinal cord injury January 2, 2016 status post fall down steps requiring C3-4 decompression fixation and fusion, hypertension, diabetes with neuropathy, COPD and depression who presents with complaint of bilateral leg pain x3 days  Patient with history of high cervical spine injury requiring temporary PEG and trach  He completed prolonged spinal rehab at may be followed by rehab bed rest to care  The patient is ambulatory at baseline with the use of a cane  Admits to voiding without difficulties  Patient states three days ago he developed progressive bilateral lower extremity sharp radiating pain along the posterior lateral thigh and calf  Pain is described as sharp 10/10 exacerbated with any movement  It has since been deviated with current analgesic regimen  Pain is currently rated 2/10  Denies associated back pain  Admits to chronic spasticity of his upper extremities for she was treated with baclofen  Patient states that his legs gave out and he sustained a fall from standing  He has since started using his roller walker over the last 24 hours  Denies any bowel bladder dysfunction  Voiding well  Given severity of pain along fall, patient presented emergency room for evaluation  Lumbar imaging revealed degenerative changes neurosurgical evaluation was requested  Review of Systems   Constitutional: Positive for activity change  Negative for fatigue and fever     HENT: Negative for hearing loss, trouble swallowing and voice change  Eyes: Negative for photophobia and visual disturbance  Respiratory: Negative for cough and shortness of breath  Cardiovascular: Negative for chest pain and leg swelling  Gastrointestinal: Negative for abdominal pain, nausea and vomiting  Genitourinary: Negative for decreased urine volume and difficulty urinating  Musculoskeletal: Positive for back pain and gait problem  Negative for neck pain  Falls   Skin: Positive for wound (Abrasion bilateral knees)  Negative for pallor and rash  Neurological: Positive for weakness  Negative for dizziness, tremors, seizures, speech difficulty, light-headedness, numbness and headaches  Psychiatric/Behavioral: Negative for agitation and confusion         Historical Information   Past Medical History:   Diagnosis Date    Chronic obstructive lung disease ([facility] )     RESOLVED: 8/17/17    Concussion     LAST ASSESSED: 8/17/17    [facility]     Depression     Diabetes mellitus [facility] )     Diabetic neuropathy ([facility] )     Difficulty attaining erection     LAST ASSESSEDl: 8/17/17    Elevated liver enzymes     Gall stone pancreatitis     RESOLVED: 3/8/17    Gall stones     RESOLVED: 3/8/17    History of MRSA infection     Hypertension     LAST ASSESSED: 8/17/17    Spinal cord injury, C1-C7 ([facility] )     Traumatic injury to musculoskeletal system     1-5 FLIGHT FALL DOWN THE STEPS - CERVICAL NECK INJURY - JAN 2, 2012; LAST ASSESSED: 8/54/63    Umbilical hernia without obstruction and without gangrene     RESOLVED: 3/8/17    Varicella     LAST ASSESSED: 8/17/17     Past Surgical History:   Procedure Laterality Date    BACK SURGERY      CERVICAL FUSION      VERTEBRAL; C3-C4 FUSION    CHOLECYSTECTOMY LAPAROSCOPIC N/A 2/24/2017    Procedure: CHOLECYSTECTOMY LAPAROSCOPIC;  Surgeon: Carla Baig MD;  Location: [facility];  Service:    [address] [place] [address]  03/2017    EPIGASTRIC HERNIA REPAIR      UMBILICAL HERNIA REPAIR N/A 2/24/2017    Procedure: REPAIR HERNIA UMBILICAL;  Surgeon: Munira Mora MD;  Location: [facility];  Service:      History   Alcohol Use    Yes     Comment: 3-4 mixed vodka drinks/daily      History   Drug Use No     History   Smoking Status    Current Every Day Smoker    Packs/day: 0 50    Years: 40 00   Smokeless Tobacco    Never Used     Comment: 1 ppd      Family History   Problem Relation Age of Onset    Hypertension Mother      BENIGN    Alzheimer's disease Father     Heart disease Father      CARDIAC DISORDER    Stroke Father     Heart attack Father     No Known Problems Brother        Meds/Allergies   all current active meds have been reviewed, current meds:   Current Facility-Administered Medications   Medication Dose Route Frequency    acetaminophen (TYLENOL) tablet 975 mg  975 mg Oral Q8H [facility]    baclofen tablet 20 mg  20 mg Oral TID    calcium carbonate (TUMS) chewable tablet 1,000 mg  1,000 mg Oral Daily PRN    citalopram (CeleXA) tablet 20 mg  20 mg Oral Daily    heparin (porcine) subcutaneous injection 5,000 Units  5,000 Units Subcutaneous Q8H [facility]    hydrALAZINE (APRESOLINE) injection 5 mg  5 mg Intravenous Q6H PRN    HYDROmorphone (DILAUDID) injection 0 5 mg  0 5 mg Intravenous Q4H PRN    insulin lispro (HumaLOG) 100 units/mL subcutaneous injection 1-5 Units  1-5 Units Subcutaneous TID AC    insulin lispro (HumaLOG) 100 units/mL subcutaneous injection 1-5 Units  1-5 Units Subcutaneous HS    nicotine (NICODERM CQ) 21 mg/24 hr TD 24 hr patch 1 patch  1 patch Transdermal Daily    ondansetron (ZOFRAN) injection 4 mg  4 mg Intravenous Q6H PRN    oxyCODONE (ROXICODONE) immediate release tablet 10 mg  10 mg Oral Q4H PRN    oxyCODONE (ROXICODONE) IR tablet 5 mg  5 mg Oral Q4H PRN    potassium chloride (K-DUR,KLOR-CON) CR tablet 20 mEq  20 mEq Oral Daily    senna-docusate sodium (SENOKOT S) 8 6-50 mg per tablet 1 tablet  1 tablet Oral HS    sodium chloride 0 9 % infusion  100 mL/hr Intravenous Continuous    and PTA meds:   Prior to Admission Medications   Prescriptions Last Dose Informant Patient Reported? Taking? Multiple Vitamin (MULTIVITAMIN) tablet  Self Yes No   Sig: Take 1 tablet by mouth daily   TIZANIDINE HCL PO 4/17/2018 at Unknown time Self Yes Yes   Sig: Take by mouth as needed Wife unsure of dose   baclofen 20 mg tablet 4/17/2018 at Unknown time Self Yes Yes   Sig: Take 20 mg by mouth 3 (three) times a day   citalopram (CeleXA) 20 mg tablet 4/17/2018 at Unknown time Self Yes Yes   Sig: Take 20 mg by mouth daily   glimepiride (AMARYL) 1 mg tablet 4/17/2018 at Unknown time Self Yes Yes   Sig: Take 1 mg by mouth every morning before breakfast   lisinopril (ZESTRIL) 10 mg tablet 4/17/2018 at Unknown time Self Yes Yes   Sig: Take 10 mg by mouth daily   metFORMIN (GLUCOPHAGE) 500 mg tablet 4/17/2018 at Unknown time Self Yes Yes   Sig: Take 1,000 mg by mouth 2 (two) times a day with meals     potassium chloride (K-DUR,KLOR-CON) 20 mEq tablet 4/17/2018 at Unknown time  No Yes   Sig: Take 1 tablet (20 mEq total) by mouth daily   senna-docusate sodium (SENOKOT S) 8 6-50 mg per tablet   No No   Sig: Take 1 tablet by mouth daily at bedtime for 30 days      Facility-Administered Medications: None     Allergies   Allergen Reactions    Seasonal Ic  [Cholestatin]        Objective   I/O       04/16 0701 - 04/17 0700 04/17 0701 - 04/18 0700 04/18 0701 - 04/19 0700    P  O    600    I V  (mL/kg)  291 7 (3 6)     IV Piggyback  2000     Total Intake(mL/kg)  2291 7 (28 1) 600 (7 4)    Urine (mL/kg/hr)  121 400 (0 5)    Total Output   121 400    Net   +2170 7 +200           Unmeasured Urine Occurrence  1 x           Physical Exam   Constitutional: He is oriented to person, place, and time  He appears well-developed and well-nourished  No distress  HENT:   Head: Normocephalic and atraumatic     Eyes: Conjunctivae and EOM are normal  No scleral icterus  Neck: Normal range of motion  Neck supple  Cardiovascular: Normal rate  Pulmonary/Chest: Effort normal  No respiratory distress  Abdominal: Soft  He exhibits no distension  There is no tenderness  Musculoskeletal: He exhibits no tenderness  Neurological: He is oriented to person, place, and time  Reflex Scores:       Patellar reflexes are 1+ on the right side and 0 on the left side  Achilles reflexes are 1+ on the right side and 1+ on the left side  Skin: Skin is warm and dry  Abrasions bilateral knees   Psychiatric: He has a normal mood and affect  His speech is normal and behavior is normal  Judgment and thought content normal      Neurologic Exam     Mental Status   Oriented to person, place, and time  Follows 2 step commands  Attention: normal  Concentration: normal    Speech: speech is normal   Level of consciousness: alert  Knowledge: good  Normal comprehension  Cranial Nerves     CN III, IV, VI   Extraocular motions are normal      CN V   Facial sensation intact  CN VII   Facial expression full, symmetric  CN VIII   Hearing: intact    CN XI   Right trapezius strength: normal  Left trapezius strength: normal    CN XII   Tongue: not atrophic    Motor Exam   Right arm tone: increased  Left arm tone: increased  Right leg tone: normal  Left leg tone: normal    Strength   Strength 5/5 except as noted   Bilateral finger extension 1-2, chronic spasticity bilateral hands/contractures  Bilateral upper extremities 4/5  Bilateral EHL 4/5     Sensory Exam   Right arm light touch: normal  Left arm light touch: normal  Pinprick sensation increased at T7 level and lower extremities  Poor pinprick bilateral feet secondary to diabetic peripheral neuropathy  Diminished left L4 PP     Gait, Coordination, and Reflexes     Tremor   Resting tremor: absent  Intention tremor: absent  Action tremor: absent    Reflexes   Right patellar: 1+  Left patellar: 0  Right achilles: 1+  Left achilles: 1+  Right ankle clonus: absent  Left ankle clonus: absent      Vitals:Blood pressure 156/71, pulse 97, temperature 98 4 °F (36 9 °C), temperature source Oral, resp  rate 20, height 5' 10" (1 778 m), weight 81 6 kg (179 lb 14 3 oz), SpO2 94 %  ,Body mass index is 25 81 kg/m²  Lab Results:     Results from last 7 days  Lab Units 04/18/18  0559 04/17/18  1558   WBC Thousand/uL 7 85 8 07   HEMOGLOBIN g/dL 13 8 13 5   HEMATOCRIT % 40 7 38 7   PLATELETS Thousands/uL 213 237   NEUTROS PCT %  --  58   MONOS PCT %  --  7       Results from last 7 days  Lab Units 04/18/18  0559 04/17/18  1558   SODIUM mmol/L 135* 132*   POTASSIUM mmol/L 4 0 3 9   CHLORIDE mmol/L 97* 92*   CO2 mmol/L 29 23   BUN mg/dL 23 23   CREATININE mg/dL 1 11 1 89*   CALCIUM mg/dL 9 6 10 3*   GLUCOSE RANDOM mg/dL 167* 116                 No results found for: TROPONINT  ABG:No results found for: PHART, INC4HJK, PO2ART, PRX9GKN, T5XHNBEA, BEART, SOURCE    Imaging Studies: I have personally reviewed pertinent reports  and I have personally reviewed pertinent films in PACS  Ct Lumbar Spine Without Contrast    Result Date: 4/17/2018  Narrative: CT LUMBAR SPINE INDICATION:   thigh pain  COMPARISON: 1/18/2017  TECHNIQUE:  Contiguous axial images through the lumbar spine were obtained  Sagittal and coronal reconstructions were performed  Radiation dose length product (DLP) for this visit:  856 89 mGy-cm   This examination, like all CT scans performed in the [facility], was performed utilizing techniques to minimize radiation dose exposure, including the use of iterative  reconstruction and automated exposure control  IMAGE QUALITY:  Diagnostic  FINDINGS: ALIGNMENT:  Normal alignment of the lumbar spine  VERTEBRAL BODIES:  No fracture  No lytic or blastic lesion  DEGENERATIVE CHANGES: Mild posterior disc osteophytes and disc bulge throughout the lumbar spine   At L3-4, posterior disc bulge and facet arthropathy result in mild central canal stenosis  Narrowing of the subarticular zones and probable impingement of the traversing L4 nerves, not directly visualized  PARASPINAL SOFT TISSUES:  No paraspinal mass or collection  Impression: 1  Disc degenerative change in the lower lumbar spine likely resulting in encroachment of the traversing L4 nerves at the L3-4 level  MRI may be helpful for further evaluation  2   No evidence of fracture, osseous lesion or paraspinal mass  Workstation performed: [id number]     Mri Lumbar Spine Wo Contrast    Result Date: 4/18/2018  Narrative: MRI LUMBAR SPINE WITHOUT CONTRAST INDICATION:  Mild central canal stenosis, bilateral thigh pain COMPARISON:  None  TECHNIQUE:  Sagittal T1, sagittal T2, sagittal inversion recovery, axial T1 and axial T2, coronal T2   IMAGE QUALITY:  Diagnostic FINDINGS: ALIGNMENT:  Normal alignment of the lumbar spine  No compression fracture  Mild retrolisthesis of L3 over L4, L2 over L3 seen  MARROW SIGNAL:  Normal marrow signal is identified within the visualized bony structures  No discrete marrow lesion  DISTAL CORD AND CONUS:  Normal size and signal within the distal cord and conus  The conus ends at the T12 level  PARASPINAL SOFT TISSUES:  Paraspinal soft tissues are unremarkable  SACRUM:  Normal signal within the sacrum  No evidence of insufficiency or stress fracture  LOWER THORACIC DISC SPACES:  Normal disc height and signal   No disc herniation, canal stenosis or foraminal narrowing  LUMBAR DISC SPACES: L1-L2:  There is a right paracentral protrusion with annular fissure with mild effacement of the epidural fat and mild indentation thecal sac with no significant central canal narrowing  There is no significant foraminal narrowing  L2-L3:  Demonstrates disc desiccation with the small central protrusion which mildly dissects superiorly in relation to the lower body of the L2 vertebra with mild central canal narrowing    There is mild right and mild left foraminal narrowing L3-L4:  There is mild retrolisthesis of L3 over L4 there is prominent epidural fat, facet proliferative changes with the moderate to severe central canal narrowing  There is severe right foraminal narrowing with impingement of the exiting right L3 nerve  root  There is Central left paracentral, subarticular recess and foraminal and extraforaminal protrusion with the impingement of the traversing left L4 nerve root, narrowing of the left subarticular recess and severe narrowing of the left foraminal with the impingement of the exiting left L3 nerve root  L4-L5:  There is disc desiccation  There are facet proliferative changes  There is no significant central canal narrowing  There is small right foraminal protrusion with the moderate right foraminal narrowing  There is mild abutment of the exiting right L4 nerve root  There is no significant left foraminal narrowing  There are facet proliferative changes L5-S1:  There is disc desiccation with small midline annular fissure with minimal central protrusion with no significant central canal narrowing or foraminal narrowing  There is mild facet joint disease  Impression: Diffuse disc bulge at L3-4 with superimposed left paracentral, subarticular recess and foraminal protrusion with the resultant impingement of the traversing left L4 nerve root and narrowing of the left subarticular recess  Severe left foraminal narrowing with  mild impingement of the left L3 nerve root  Severe right foraminal narrowing due to combination of facet joint disease, disc bulge and osteophytes with mild impingement of the exiting right L3 nerve root Right foraminal protrusion at L4-5 with moderate right foraminal narrowing with mild impingement of the exiting right L4 nerve root   Correlated with the bilateral L3, right L4 radiculopathy Multilevel facet joint disease Small central protrusion at L2-3 level with effacement of the epidural fat and mild central canal narrowing  Small right paracentral protrusion at L2-3 level with annular fissure and minimal indentation thecal sac Annular fissure at L5-S1 level with no significant central canal narrowing of foraminal narrowing Workstation performed: [id number]     EKG, Pathology, and Other Studies: I have personally reviewed pertinent reports  VTE Prophylaxis: Heparin    Code Status: Level 1 - Full Code  Advance Directive and Living Will:      Power of :    POLST:      Counseling / Coordination of Care  I spent 1 hour with the patient

## 2018-04-19 DIAGNOSIS — I10 HYPERTENSION, UNSPECIFIED TYPE: ICD-10-CM

## 2018-04-19 DIAGNOSIS — F32.A DEPRESSION, UNSPECIFIED DEPRESSION TYPE: ICD-10-CM

## 2018-04-19 DIAGNOSIS — G89.29 OTHER CHRONIC PAIN: Primary | ICD-10-CM

## 2018-04-19 DIAGNOSIS — E11.65 UNCONTROLLED TYPE 2 DIABETES MELLITUS WITH COMPLICATION, UNSPECIFIED WHETHER LONG TERM INSULIN USE: ICD-10-CM

## 2018-04-19 DIAGNOSIS — E11.8 UNCONTROLLED TYPE 2 DIABETES MELLITUS WITH COMPLICATION, UNSPECIFIED WHETHER LONG TERM INSULIN USE: ICD-10-CM

## 2018-04-19 LAB
GLUCOSE SERPL-MCNC: 164 MG/DL (ref 65–140)
GLUCOSE SERPL-MCNC: 184 MG/DL (ref 65–140)
GLUCOSE SERPL-MCNC: 185 MG/DL (ref 65–140)
GLUCOSE SERPL-MCNC: 267 MG/DL (ref 65–140)
GLUCOSE SERPL-MCNC: 269 MG/DL (ref 65–140)

## 2018-04-19 PROCEDURE — 99232 SBSQ HOSP IP/OBS MODERATE 35: CPT | Performed by: INTERNAL MEDICINE

## 2018-04-19 PROCEDURE — 82948 REAGENT STRIP/BLOOD GLUCOSE: CPT

## 2018-04-19 PROCEDURE — 4010F ACE/ARB THERAPY RXD/TAKEN: CPT | Performed by: NURSE PRACTITIONER

## 2018-04-19 RX ORDER — GABAPENTIN 100 MG/1
100 CAPSULE ORAL
Status: DISCONTINUED | OUTPATIENT
Start: 2018-04-19 | End: 2018-04-20 | Stop reason: HOSPADM

## 2018-04-19 RX ORDER — METHYLPREDNISOLONE 4 MG/1
8 TABLET ORAL DAILY
Status: DISCONTINUED | OUTPATIENT
Start: 2018-04-23 | End: 2018-04-20 | Stop reason: HOSPADM

## 2018-04-19 RX ORDER — METHYLPREDNISOLONE 4 MG/1
4 TABLET ORAL DAILY
Status: DISCONTINUED | OUTPATIENT
Start: 2018-04-24 | End: 2018-04-20 | Stop reason: HOSPADM

## 2018-04-19 RX ORDER — METHYLPREDNISOLONE 16 MG/1
16 TABLET ORAL DAILY
Status: DISCONTINUED | OUTPATIENT
Start: 2018-04-21 | End: 2018-04-20 | Stop reason: HOSPADM

## 2018-04-19 RX ORDER — LISINOPRIL 10 MG/1
10 TABLET ORAL DAILY
Qty: 90 TABLET | Refills: 0 | Status: SHIPPED | OUTPATIENT
Start: 2018-04-19 | End: 2019-02-14 | Stop reason: HOSPADM

## 2018-04-19 RX ORDER — TIZANIDINE HYDROCHLORIDE 2 MG/1
CAPSULE, GELATIN COATED ORAL
Qty: 90 CAPSULE | Refills: 0 | Status: SHIPPED | OUTPATIENT
Start: 2018-04-19 | End: 2018-05-08 | Stop reason: SDUPTHER

## 2018-04-19 RX ORDER — METHYLPREDNISOLONE 4 MG/1
12 TABLET ORAL DAILY
Status: DISCONTINUED | OUTPATIENT
Start: 2018-04-22 | End: 2018-04-20 | Stop reason: HOSPADM

## 2018-04-19 RX ORDER — GLIMEPIRIDE 1 MG/1
1 TABLET ORAL DAILY
Qty: 90 TABLET | Refills: 0 | Status: SHIPPED | OUTPATIENT
Start: 2018-04-19 | End: 2019-02-14 | Stop reason: HOSPADM

## 2018-04-19 RX ORDER — CITALOPRAM 20 MG/1
20 TABLET ORAL DAILY
Qty: 90 TABLET | Refills: 0 | Status: SHIPPED | OUTPATIENT
Start: 2018-04-19 | End: 2018-08-27 | Stop reason: ALTCHOICE

## 2018-04-19 RX ADMIN — SODIUM CHLORIDE 100 ML/HR: 0.9 INJECTION, SOLUTION INTRAVENOUS at 01:36

## 2018-04-19 RX ADMIN — ACETAMINOPHEN 975 MG: 325 TABLET, FILM COATED ORAL at 22:20

## 2018-04-19 RX ADMIN — POTASSIUM CHLORIDE 20 MEQ: 1500 TABLET, EXTENDED RELEASE ORAL at 09:36

## 2018-04-19 RX ADMIN — OXYCODONE HYDROCHLORIDE 10 MG: 10 TABLET ORAL at 22:19

## 2018-04-19 RX ADMIN — OXYCODONE HYDROCHLORIDE 5 MG: 5 TABLET ORAL at 16:04

## 2018-04-19 RX ADMIN — INSULIN LISPRO 2 UNITS: 100 INJECTION, SOLUTION INTRAVENOUS; SUBCUTANEOUS at 22:21

## 2018-04-19 RX ADMIN — OXYCODONE HYDROCHLORIDE 10 MG: 10 TABLET ORAL at 09:37

## 2018-04-19 RX ADMIN — HYDROMORPHONE HYDROCHLORIDE 0.5 MG: 1 INJECTION, SOLUTION INTRAMUSCULAR; INTRAVENOUS; SUBCUTANEOUS at 02:20

## 2018-04-19 RX ADMIN — INSULIN LISPRO 2 UNITS: 100 INJECTION, SOLUTION INTRAVENOUS; SUBCUTANEOUS at 11:36

## 2018-04-19 RX ADMIN — HEPARIN SODIUM 5000 UNITS: 5000 INJECTION, SOLUTION INTRAVENOUS; SUBCUTANEOUS at 22:20

## 2018-04-19 RX ADMIN — INSULIN LISPRO 1 UNITS: 100 INJECTION, SOLUTION INTRAVENOUS; SUBCUTANEOUS at 09:00

## 2018-04-19 RX ADMIN — METHYLPREDNISOLONE 24 MG: 16 TABLET ORAL at 16:39

## 2018-04-19 RX ADMIN — BACLOFEN 20 MG: 20 TABLET ORAL at 22:19

## 2018-04-19 RX ADMIN — SODIUM CHLORIDE 100 ML/HR: 0.9 INJECTION, SOLUTION INTRAVENOUS at 11:39

## 2018-04-19 RX ADMIN — INSULIN LISPRO 1 UNITS: 100 INJECTION, SOLUTION INTRAVENOUS; SUBCUTANEOUS at 16:11

## 2018-04-19 RX ADMIN — HEPARIN SODIUM 5000 UNITS: 5000 INJECTION, SOLUTION INTRAVENOUS; SUBCUTANEOUS at 04:54

## 2018-04-19 RX ADMIN — CITALOPRAM HYDROBROMIDE 20 MG: 20 TABLET ORAL at 09:36

## 2018-04-19 RX ADMIN — OXYCODONE HYDROCHLORIDE 10 MG: 10 TABLET ORAL at 01:32

## 2018-04-19 RX ADMIN — NICOTINE 1 PATCH: 21 PATCH, EXTENDED RELEASE TRANSDERMAL at 09:39

## 2018-04-19 RX ADMIN — ACETAMINOPHEN 975 MG: 325 TABLET, FILM COATED ORAL at 04:53

## 2018-04-19 RX ADMIN — BACLOFEN 20 MG: 20 TABLET ORAL at 16:04

## 2018-04-19 RX ADMIN — Medication 1 TABLET: at 22:18

## 2018-04-19 RX ADMIN — GABAPENTIN 100 MG: 100 CAPSULE ORAL at 22:19

## 2018-04-19 RX ADMIN — HEPARIN SODIUM 5000 UNITS: 5000 INJECTION, SOLUTION INTRAVENOUS; SUBCUTANEOUS at 16:04

## 2018-04-19 RX ADMIN — BACLOFEN 20 MG: 20 TABLET ORAL at 09:36

## 2018-04-19 NOTE — PLAN OF CARE
DISCHARGE PLANNING     Discharge to home or other facility with appropriate resources Progressing        DISCHARGE PLANNING - CARE MANAGEMENT     Discharge to post-acute care or home with appropriate resources Progressing        MUSCULOSKELETAL - ADULT     Maintain or return mobility to safest level of function Progressing        PAIN - ADULT     Verbalizes/displays adequate comfort level or baseline comfort level Progressing        Potential for Falls     Patient will remain free of falls Progressing        Prexisting or High Potential for Compromised Skin Integrity     Skin integrity is maintained or improved Progressing        SKIN/TISSUE INTEGRITY - ADULT     Incision(s), wounds(s) or drain site(s) healing without S/S of infection Progressing

## 2018-04-19 NOTE — SOCIAL WORK
Met with pt to discuss his discharge plan  Informed pt that [facility], [facility], and [facility] are out of network and he would have to pay 30% a day for the out of network facilities  In Network facility list provided to pt to review  He stated that the would prefer a referral to [facility] referral sent for same

## 2018-04-19 NOTE — SOCIAL WORK
Met with pt to discuss pt's discharge plan  He stated that he discussed inpt rehab and they prefer a referral to [facility], [facility], and [facility]  [facility] referral sent to same

## 2018-04-19 NOTE — RESTORATIVE TECHNICIAN NOTE
Restorative Specialist Mobility Note       Activity: Ambulate in room, Chair, Dangle, Stand at bedside (Educated/encouraged pt to ambulate with assistance 3-4 x's/day  Chair alarm on   Pt callbell, phone/tray within reach )     Assistive Device: Other (Comment) (HHA x2 oob to the chair)       Chris MENJIVAR, Restorative Technician, [employer] [facility]

## 2018-04-19 NOTE — ASSESSMENT & PLAN NOTE
- POA, b/l anterior and posterior thigh pain x 3 days, LE weakness   - CT lumbar spine- at L3-4, posterior disc bulge and facet arthropathy result in mild central canal stenosis  Narrowing of the subarticular zones and probable impingement of the traversing L4 nerves, not directly visualized and MRI recommended  - pain unchanged  MRI reviewed  Case discussed with neurosurgery resident  Plan for conservative care   Add medrol dose hollis and hs neurontin due to persistent pain

## 2018-04-19 NOTE — PROGRESS NOTES
Progress Note - Nano Figures 1960, 59 y o  male MRN: [id number]    Unit/Bed#: [facility] 735-01 Encounter: [id number]    Primary Care Provider: Lugenia Libman, CRNP   Date and time admitted to hospital: 2018  2:37 PM        Lumbar radiculopathy   Assessment & Plan    - POA, b/l anterior and posterior thigh pain x 3 days, LE weakness   - CT lumbar spine- at L3-4, posterior disc bulge and facet arthropathy result in mild central canal stenosis  Narrowing of the subarticular zones and probable impingement of the traversing L4 nerves, not directly visualized and MRI recommended  - pain unchanged  MRI reviewed  Case discussed with neurosurgery resident  Plan for conservative care  Add medrol dose hollis and hs neurontin due to persistent pain  -expect increase in BSs with use of steroids        Cervical myelopathy (HCC)   Assessment & Plan    - hx of cervical spine fusion at C3-C4  after falling down steps, chronic contracture of b/l hands   - continue baclofen         Essential hypertension   Assessment & Plan    - hold lisinopril given SUNDEEP   - PRN antihypertensives for SBP > 180        Depression   Assessment & Plan    - continue SSRI        SUNDEEP (acute kidney injury) ([facility] [address] )   Assessment & Plan    - creatinine 1 89 on admission  - given hx of BPH will check PVR and renal ultrasound   - IVF hydration             VTE Pharmacologic Prophylaxis:   Pharmacologic: Heparin  Mechanical VTE Prophylaxis in Place: Yes    Patient Centered Rounds: I have performed bedside rounds with nursing staff today      Current Length of Stay: 2 day(s)    Current Patient Status: Inpatient   Certification Statement: The patient will continue to require additional inpatient hospital stay due to continued pain    Code Status: Level 1 - Full Code      Subjective:   Pain last night    Objective:     Vitals:   Temp (24hrs), Av 6 °F (37 °C), Min:98 4 °F (36 9 °C), Max:98 8 °F (37 1 °C)    HR:  [] 101  Resp:  [18-20] 18  BP: (147-158)/(71-92) 147/92  SpO2:  [90 %-94 %] 90 %  Body mass index is 25 81 kg/m²  Input and Output Summary (last 24 hours): Intake/Output Summary (Last 24 hours) at 04/19/18 1455  Last data filed at 04/19/18 1350   Gross per 24 hour   Intake             1620 ml   Output             2228 ml   Net             -608 ml       Physical Exam:  General Appearance:    Alert, cooperative, no distress, appropriately responsive    Head:    Normocephalic, without obvious abnormality, atraumatic, mucous membranes moist    Eyes:    Conjunctiva/corneas clear, EOM's intact   Neck:   Supple, no JVD or bruits noted   Lungs:     Clear to auscultation bilaterally, respirations unlabored, no crackles or wheeze     Heart:    Regular rate and rhythm, S1 and S2    Abdomen:     Soft, non-tender, bowel sounds active all four quadrants,     no masses, no organomegaly   Extremities:   Extremities normal, atraumatic, no cyanosis or edema   Neurologic:  nonfocal, A/O x 3           Additional Data:     Labs:      Results from last 7 days  Lab Units 04/18/18  0559 04/17/18  1558   WBC Thousand/uL 7 85 8 07   HEMOGLOBIN g/dL 13 8 13 5   HEMATOCRIT % 40 7 38 7   PLATELETS Thousands/uL 213 237   NEUTROS PCT %  --  58   LYMPHS PCT %  --  33   MONOS PCT %  --  7   EOS PCT %  --  1       Results from last 7 days  Lab Units 04/18/18  0559   SODIUM mmol/L 135*   POTASSIUM mmol/L 4 0   CHLORIDE mmol/L 97*   CO2 mmol/L 29   BUN mg/dL 23   CREATININE mg/dL 1 11   CALCIUM mg/dL 9 6   GLUCOSE RANDOM mg/dL 167*           * I Have Reviewed All Lab Data Listed Above  * Additional Pertinent Lab Tests Reviewed:  All Labs Within Last 24 Hours Reviewed    Cultures:   Blood Culture: No results found for: BLOODCX  Urine Culture: No results found for: URINECX  Sputum Culture: No components found for: SPUTUMCX  Wound Culture: No results found for: WOUNDCULT    Last 24 Hours Medication List:     Current Facility-Administered Medications:  acetaminophen 975 mg Oral Q8H [facility] Gaurav Saleh PA-C    baclofen 20 mg Oral TID Marcela Ck Saleh PA-C    calcium carbonate 1,000 mg Oral Daily PRN Gaurav Saleh PA-C    citalopram 20 mg Oral Daily Gaurav Saleh PA-C    gabapentin 100 mg Oral HS Nirmal Acuña DO    heparin (porcine) 5,000 Units Subcutaneous Q8H [facility] Gaurav Saleh PA-C    hydrALAZINE 5 mg Intravenous Q6H PRN Gaurav Saleh PA-C    HYDROmorphone 0 5 mg Intravenous Q4H PRN Gaurav Saleh PA-C    insulin lispro 1-5 Units Subcutaneous TID AC Gaurav Saleh PA-C    insulin lispro 1-5 Units Subcutaneous HS Guarav Saleh PA-C    methylPREDNISolone 24 mg Oral Daily Rubio Castillo DO    Followed by        Barbara Delaney ON 4/20/2018] methylPREDNISolone 20 mg Oral Daily Nirmal Acuña DO    Followed by        Barbara Delaney ON 4/21/2018] methylPREDNISolone 16 mg Oral Daily Nirmal Acuña DO    Followed by        Barbara Delaney ON 4/22/2018] methylPREDNISolone 12 mg Oral Daily Nirmal Acuña DO    Followed by        Barbara Delaney ON 4/23/2018] methylPREDNISolone 8 mg Oral Daily Nirmal Acuña DO    Followed by        Barbara Delaney ON 4/24/2018] methylPREDNISolone 4 mg Oral Daily Nirmal Acuña DO    nicotine 1 patch Transdermal Daily Gaurav Saleh PA-C    ondansetron 4 mg Intravenous Q6H PRN Dewey Saleh PA-C    oxyCODONE 10 mg Oral Q4H PRN Dewey Saleh PA-C    oxyCODONE 5 mg Oral Q4H PRN Gaurav Saleh PA-C    potassium chloride 20 mEq Oral Daily Gaurav Saleh PA-C    senna-docusate sodium 1 tablet Oral HS Gaurav Salhe PA-C    sodium chloride 100 mL/hr Intravenous Continuous Gaurav Saleh PA-C Last Rate: 100 mL/hr (04/19/18 1139)        Today, Patient Was Seen By: Nirmal Acuña DO    ** Please Note: Dragon 360 Dictation voice to text software may have been used in the creation of this document   **

## 2018-04-20 VITALS
OXYGEN SATURATION: 93 % | SYSTOLIC BLOOD PRESSURE: 150 MMHG | TEMPERATURE: 98.9 F | HEART RATE: 66 BPM | BODY MASS INDEX: 25.75 KG/M2 | DIASTOLIC BLOOD PRESSURE: 95 MMHG | HEIGHT: 70 IN | RESPIRATION RATE: 20 BRPM | WEIGHT: 179.9 LBS

## 2018-04-20 LAB
GLUCOSE SERPL-MCNC: 272 MG/DL (ref 65–140)
GLUCOSE SERPL-MCNC: 294 MG/DL (ref 65–140)
GLUCOSE SERPL-MCNC: 340 MG/DL (ref 65–140)

## 2018-04-20 PROCEDURE — 82948 REAGENT STRIP/BLOOD GLUCOSE: CPT

## 2018-04-20 PROCEDURE — 97530 THERAPEUTIC ACTIVITIES: CPT

## 2018-04-20 PROCEDURE — 97116 GAIT TRAINING THERAPY: CPT

## 2018-04-20 PROCEDURE — 97110 THERAPEUTIC EXERCISES: CPT

## 2018-04-20 PROCEDURE — 97535 SELF CARE MNGMENT TRAINING: CPT

## 2018-04-20 PROCEDURE — 99238 HOSP IP/OBS DSCHRG MGMT 30/<: CPT | Performed by: INTERNAL MEDICINE

## 2018-04-20 RX ORDER — CITALOPRAM 20 MG/1
20 TABLET ORAL DAILY
Qty: 30 TABLET | Refills: 0 | Status: SHIPPED | OUTPATIENT
Start: 2018-04-21 | End: 2018-05-08 | Stop reason: SDUPTHER

## 2018-04-20 RX ORDER — NICOTINE 21 MG/24HR
1 PATCH, TRANSDERMAL 24 HOURS TRANSDERMAL DAILY
Qty: 28 PATCH | Refills: 0 | Status: SHIPPED | OUTPATIENT
Start: 2018-04-21 | End: 2018-05-08 | Stop reason: ALTCHOICE

## 2018-04-20 RX ORDER — GABAPENTIN 100 MG/1
100 CAPSULE ORAL
Qty: 30 CAPSULE | Refills: 0 | Status: SHIPPED | OUTPATIENT
Start: 2018-04-20 | End: 2018-05-08 | Stop reason: SDUPTHER

## 2018-04-20 RX ORDER — METHYLPREDNISOLONE 8 MG/1
8 TABLET ORAL DAILY
Qty: 1 TABLET | Refills: 0 | Status: SHIPPED | OUTPATIENT
Start: 2018-04-23 | End: 2018-04-24

## 2018-04-20 RX ORDER — METHYLPREDNISOLONE 4 MG/1
4 TABLET ORAL DAILY
Qty: 1 TABLET | Refills: 0 | Status: SHIPPED | OUTPATIENT
Start: 2018-04-24 | End: 2018-04-25

## 2018-04-20 RX ORDER — METHYLPREDNISOLONE 16 MG/1
16 TABLET ORAL DAILY
Qty: 1 TABLET | Refills: 0 | Status: SHIPPED | OUTPATIENT
Start: 2018-04-21 | End: 2018-04-22

## 2018-04-20 RX ORDER — METHYLPREDNISOLONE 4 MG/1
12 TABLET ORAL DAILY
Qty: 3 TABLET | Refills: 0 | Status: SHIPPED | OUTPATIENT
Start: 2018-04-22 | End: 2018-04-23

## 2018-04-20 RX ADMIN — INSULIN LISPRO 2 UNITS: 100 INJECTION, SOLUTION INTRAVENOUS; SUBCUTANEOUS at 08:25

## 2018-04-20 RX ADMIN — ACETAMINOPHEN 975 MG: 325 TABLET, FILM COATED ORAL at 05:18

## 2018-04-20 RX ADMIN — BACLOFEN 20 MG: 20 TABLET ORAL at 16:10

## 2018-04-20 RX ADMIN — INSULIN LISPRO 3 UNITS: 100 INJECTION, SOLUTION INTRAVENOUS; SUBCUTANEOUS at 16:10

## 2018-04-20 RX ADMIN — INSULIN LISPRO 2 UNITS: 100 INJECTION, SOLUTION INTRAVENOUS; SUBCUTANEOUS at 12:31

## 2018-04-20 RX ADMIN — OXYCODONE HYDROCHLORIDE 5 MG: 5 TABLET ORAL at 10:54

## 2018-04-20 RX ADMIN — OXYCODONE HYDROCHLORIDE 10 MG: 10 TABLET ORAL at 16:10

## 2018-04-20 RX ADMIN — HEPARIN SODIUM 5000 UNITS: 5000 INJECTION, SOLUTION INTRAVENOUS; SUBCUTANEOUS at 05:18

## 2018-04-20 RX ADMIN — ACETAMINOPHEN 975 MG: 325 TABLET, FILM COATED ORAL at 13:35

## 2018-04-20 RX ADMIN — METHYLPREDNISOLONE 20 MG: 16 TABLET ORAL at 08:18

## 2018-04-20 RX ADMIN — BACLOFEN 20 MG: 20 TABLET ORAL at 08:18

## 2018-04-20 RX ADMIN — OXYCODONE HYDROCHLORIDE 5 MG: 5 TABLET ORAL at 05:18

## 2018-04-20 RX ADMIN — POTASSIUM CHLORIDE 20 MEQ: 1500 TABLET, EXTENDED RELEASE ORAL at 08:18

## 2018-04-20 RX ADMIN — HEPARIN SODIUM 5000 UNITS: 5000 INJECTION, SOLUTION INTRAVENOUS; SUBCUTANEOUS at 13:34

## 2018-04-20 RX ADMIN — NICOTINE 1 PATCH: 21 PATCH, EXTENDED RELEASE TRANSDERMAL at 08:26

## 2018-04-20 RX ADMIN — CITALOPRAM HYDROBROMIDE 20 MG: 20 TABLET ORAL at 08:18

## 2018-04-20 NOTE — PLAN OF CARE
Problem: OCCUPATIONAL THERAPY ADULT  Goal: Performs self-care activities at highest level of function for planned discharge setting  See evaluation for individualized goals  Treatment Interventions: ADL retraining, Functional transfer training, Endurance training, Patient/family training, Equipment evaluation/education, Fine motor coordination activities, Compensatory technique education, Continued evaluation, Energy conservation, UE strengthening/ROM          See flowsheet documentation for full assessment, interventions and recommendations  Outcome: Progressing  Limitation: Decreased ADL status, Decreased UE ROM, Decreased endurance, Decreased fine motor control, Decreased self-care trans, Decreased high-level ADLs  Prognosis: Good  Assessment: Pt participated in occupational therapy with focus on activity tolerance, functional transfers/mob, standing tolerance and balance for LB self-care and UB and LB selfcare  Pt cleared by RN/Mele for pt participation in therapy  Pt received sitting out of bed to bedside chair and agreeable to therapy following pt Identifiers confirmed  Pt reported pt bathroom set up with tub chair for bathing and pt wife assist with tub-shower transfers  Pt reported feeling well and that this was the first day he did not have any pain  Pt requires assist with LB bathing and dressing 2* pt balance deficits  Pt requires UB bathing assist 2* pt decreased b/l UE AROM  Pt requires min A for functional transfers 2* pt decreased overall strength  Pt will benefit from in-pt rehab to continue to address pt above noted deficits which currently impair pt ADL and functional mob  OT Discharge Recommendation: Short Term Rehab  OT - OK to Discharge:  Yes

## 2018-04-20 NOTE — PROGRESS NOTES
Report called to admitting nurse from [facility]  AVS faxed  IV site taken out of the patient  meds given as scheduled   Will monitor

## 2018-04-20 NOTE — OCCUPATIONAL THERAPY NOTE
Occupational Therapy Treatment Note     04/20/18 1038   Restrictions/Precautions   Weight Bearing Precautions Per Order No   Other Precautions Bed Alarm; Fall Risk;Pain;Multiple lines   Lifestyle   Autonomy A with ADL/IADL   Reciprocal Relationships supportive wife who A as needed   Service to Others retired   [address] being with family   Pain Assessment   Pain Assessment 0-10   Pain Type Acute pain   Pain Location Other (Comment)  (R calf)   ADL   Where Assessed Chair   Grooming Assistance 5  Supervision/Setup   UB Bathing Assistance 3  Moderate Assistance   LB Bathing Assistance 3  Moderate Assistance   [address] 3  Moderate Assistance   UB Dressing Deficit Thread RUE; Thread LUE   UB Dressing Comments hospital gown    LB Dressing Assistance 3  Moderate Assistance   LB Dressing Deficit Thread RLE into pants; Thread LLE into pants   Bed Mobility   Sit to Supine 3  Moderate assistance   Transfers   Sit to Stand 4  Minimal assistance   Stand to Sit 4  Minimal assistance   Functional Mobility   Functional Mobility 4  Minimal assistance   Additional items Rolling walker   Cognition   Overall Cognitive Status WFL   Arousal/Participation Alert; Cooperative   Attention Within functional limits   Orientation Level Oriented X4   Memory Within functional limits   Following Commands Follows multistep commands without difficulty   Assessment   Assessment Pt participated in occupational therapy with focus on activity tolerance, functional transfers/mob, standing tolerance and balance for LB self-care and UB and LB selfcare  Pt cleared by RN/Mele for pt participation in therapy  Pt received sitting out of bed to bedside chair and agreeable to therapy following pt Identifiers confirmed  Pt reported pt bathroom set up with tub chair for bathing and pt wife assist with tub-shower transfers  Pt reported feeling well and that this was the first day he did not have any pain    Pt requires assist with LB bathing and dressing 2* pt balance deficits  Pt requires UB bathing assist 2* pt decreased b/l UE AROM  Pt requires min A for functional transfers 2* pt decreased overall strength  Pt will benefit from in-pt rehab to continue to address pt above noted deficits which currently impair pt ADL and functional mob      Plan   Treatment Interventions ADL retraining   Goal Expiration Date 05/02/18   Treatment Day 11   OT Frequency 3-5x/wk   Recommendation   OT Discharge Recommendation Short Term Rehab   Barthel Index   Feeding 5   Bathing 0   Grooming Score 0   Dressing Score 5   Bladder Score 5   Bowels Score 5   Toilet Use Score 5   Transfers (Bed/Chair) Score 10   Mobility (Level Surface) Score 0   Stairs Score 0   Barthel Index Score 35   Modified Woodward Scale   Modified Woodward Scale 4       Ciaran Cos  DURAN/L

## 2018-04-20 NOTE — PLAN OF CARE
Problem: PHYSICAL THERAPY ADULT  Goal: Performs mobility at highest level of function for planned discharge setting  See evaluation for individualized goals  Treatment/Interventions: Functional transfer training, LE strengthening/ROM, Elevations, Therapeutic exercise, Endurance training, Patient/family training, Equipment eval/education, Bed mobility, Gait training  Equipment Recommended: Charmaine Maldonado       See flowsheet documentation for full assessment, interventions and recommendations  Outcome: Progressing  Prognosis: Good  Problem List: Decreased strength, Decreased range of motion, Decreased endurance, Impaired balance, Pain, Impaired tone  Assessment: Pt is making steady progress with use of rolling walker  Improved tolerance to activity today completing two ambulation trials  Cues required for safety with transfers  Pt reports pain improves upon ambulation  Pt would benefit from continued physical therapy to maximize functional mobility and safety  Barriers to Discharge: Inaccessible home environment     Recommendation: Post acute IP rehab          See flowsheet documentation for full assessment

## 2018-04-20 NOTE — SOCIAL WORK
CM was informed by [facility] liaison that per direct auth office pt can be admitted with pending authorization  PASRR completed and sent to [facility] via [facility]  Met with pt to discuss [facility] Mal[facility] transport--pt is agreeable to cost  CM arranged with [facility] BLS for a 5:45pm dc to [facility]  CM notified pt, pts wife Daniella, pts bedside RN Rena Gomez, and [facility] liaison of dc time  Facility transfer form completed  Chart copy requested

## 2018-04-20 NOTE — RESTORATIVE TECHNICIAN NOTE
Restorative Specialist Mobility Note       Activity: Ambulate in room, Ambulate in farr, Chair, Dangle, Stand at bedside (Educated/encouraged pt to ambulate with assistance 3-4 x's/day  Chair alarm on   Pt callbell, phone/tray within reach )     Assistive Device: Front wheel walker       Tricia MENJIVAR, Restorative Technician, [employer]

## 2018-04-20 NOTE — PHYSICAL THERAPY NOTE
Physical Therapy Progress Note     04/20/18 0924   Pain Assessment   Pain Assessment 0-10   Pain Score 6   Pain Type Acute pain   Pain Location Leg   Pain Orientation Right   Restrictions/Precautions   Weight Bearing Precautions Per Order No   Other Precautions Chair Alarm; Fall Risk;Multiple lines;Pain   General   Chart Reviewed Yes   Family/Caregiver Present No   Cognition   Overall Cognitive Status WFL   Arousal/Participation Alert; Cooperative   Subjective   Subjective Pt agrees to participate   Bed Mobility   Supine to Sit 3  Moderate assistance   Additional items Assist x 1;LE management;Verbal cues   Transfers   Sit to Stand 4  Minimal assistance   Additional items Assist x 1;Verbal cues   Stand to Sit 4  Minimal assistance   Additional items Assist x 1;Verbal cues   Stand pivot 4  Minimal assistance   Additional items Assist x 1;Verbal cues   Ambulation/Elevation   Gait pattern Excessively slow; Short stride;Decreased foot clearance; Foward flexed   Gait Assistance 4  Minimal assist   Additional items Assist x 1  (contact guard)   Assistive Device Rolling walker   Distance 100 feet and 150 feet   Stair Management Assistance Not tested   Balance   Static Sitting Fair   Dynamic Sitting Poor +   Static Standing Fair -   Dynamic Standing Poor +   Ambulatory Poor +   Endurance Deficit   Endurance Deficit Yes   Endurance Deficit Description weakness and fatigue   Activity Tolerance   Activity Tolerance Patient limited by fatigue   Nurse [facility] to see per RN Mele   Exercises   TKR Sitting;20 reps;AROM; Bilateral   Assessment   Prognosis Good   Problem List Decreased strength;Decreased range of motion;Decreased endurance; Impaired balance;Pain; Impaired tone   Assessment Pt is making steady progress with use of rolling walker  Improved tolerance to activity today completing two ambulation trials  Cues required for safety with transfers  Pt reports pain improves upon ambulation    Pt would benefit from continued physical therapy to maximize functional mobility and safety  Barriers to Discharge Inaccessible home environment   Goals   Patient Goals None stated   STG Expiration Date 04/28/18   Treatment Day 1   Plan   Treatment/Interventions Functional transfer training;LE strengthening/ROM; Therapeutic exercise; Endurance training;Patient/family training;Bed mobility;Gait training   Progress Progressing toward goals   PT Frequency 5x/wk   Recommendation   Recommendation Post acute IP rehab   Equipment Recommended Ira Albright, PTA

## 2018-04-20 NOTE — CASE MANAGEMENT
Ada Busch RN Registered Nurse Addendum   Case Management Date of Service: 4/18/2018 11:22 AM         []Hide copied text        Initial Clinical Review     Admission: Date/Time/Statement: 4/17/18 @ 1826      Orders Placed This Encounter   Procedures    Inpatient Admission (expected length of stay for this patient is greater than two midnights)       Standing Status:   Standing       Number of Occurrences:   1       Order Specific Question:   Admitting Physician       Answer:   Yamileth GONZALESTLMOBETINA [[id number]]       Order Specific Question:   Level of Care       Answer:   Med Surg [16]       Order Specific Question:   Estimated length of stay       Answer:   More than 2 Midnights       Order Specific Question:   Certification       Answer:   I certify that inpatient services are medically necessary for this patient for a duration of greater than two midnights  See H&P and MD Progress Notes for additional information about the patient's course of treatment             ED: Date/Time/Mode of Arrival:   ED Arrival Information      Expected Arrival Acuity Means of Arrival Escorted By Service Admission Type     - 4/17/2018 14:36 Urgent Ambulance [facility] General Medicine Urgent     Arrival Complaint     leg pain             Chief Complaint:        Chief Complaint   Patient presents with    Leg Pain       c/o lower back and leg pain  Has chronic issues and over the past few days increased pain  States he had so much pain in his legs that he fell to the ground          History of Illness  Joanne Ochoa a 59 y  o  male past medical history significant for hypertension, tobacco abuse, type 2 diabetes mellitus, cervical myelopathy status post cervical spine fusion with chronic bile bilateral contractures of hands who presents with 3 day history of bilateral thigh pain   Patient states pain was tolerable over the last few days but today pain was more severe associated with bilateral lower extremity weakness     States that his legs were so weak today upon standing that he did not make it to the bathroom in time  [facility] does have anterior and posterior thigh pain that is intermittent and stabbing in nature    He states that he fell today but no head injury or loss of consciousness  Cervical spine fusion in 2012  [facility] is on muscle relaxers for neck pain and upper extremity spasticity     Had diarrhea today but review of chart patient does have chronic diarrhea      ED Vital Signs:            ED Triage Vitals [04/17/18 1442]   Temperature Pulse Respirations Blood Pressure SpO2   98 1 °F (36 7 °C) 99 20 101/51 94 %           Pain Score           Worst Possible Pain                Wt Readings from Last 1 Encounters:   04/17/18 81 6 kg (179 lb 14 3 oz)         Vital Signs (abnormal):   04/17/18 2052   98 1 °F (36 7 °C)   104   22   145/73   97 %   None (Room air)   Lying   04/17/18 2000   --    106   20   155/72   93 %   None (Room air)   --   04/17/18 1900   --    106   20    91/48   91 %   --   SLEEPING    04/17/18 1754   --    108   20   152/67   93 %   None (Room air)   Lying         4-17- TO 4-18 0814  BILATERAL LEG PAIN   Pain Score   Worst   Worst   Worst   Worst   Worst   7 7 6 6      HYDROmorphone IJ (mg)       1      OxyCODONE (mg)             10 10 5               Abnormal Labs/Diagnostic Test Results:   He appears distressed (in distress 2/2 leg pain )  Poor dentition, MM dry   b/l UE contractures, B/L LE weakness 4/5       Lab Units 04/17/18  1558   SODIUM mmol/L 132*   CHLORIDE mmol/L 92*   CREATININE mg/dL 1 89*   CALCIUM mg/dL 10 3*      CT lumbar spine without contrast   Final  (04/17 1740)       1   Disc degenerative change in the lower lumbar spine likely resulting in encroachment of the traversing L4 nerves at the L3-4 level   MRI may be helpful for further evaluation     2   No evidence of fracture, osseous lesion or paraspinal mass                ED Treatment:            Medication Administration from 04/17/2018 1436 to 04/17/2018 2048         Date/Time Order Dose Route       04/17/2018 1558 diazepam (VALIUM) injection 5 mg 5 mg Intravenous       04/17/2018 1650 sodium chloride 0 9 % bolus 1,000 mL 1,000 mL Intravenous       04/17/2018 1815 sodium chloride 0 9 % bolus 1,000 mL 1,000 mL Intravenous       04/17/2018 1815 diazepam (VALIUM) injection 5 mg 5 mg Intravenous       04/17/2018 1944 HYDROmorphone (DILAUDID) injection 1 mg 1 mg Intravenous         Past Medical/Surgical History:         Active Ambulatory Problems     Diagnosis Date Noted    Alcohol-induced chronic pancreatitis ([facility] [address] ) 01/19/2017    Chronic pain 08/28/2017    Depression 05/25/2016    Diabetic neuropathy ([facility] [address] ) 05/25/2016    Enlarged prostate with lower urinary tract symptoms (LUTS) 07/03/2012    Excessive cerumen in left ear canal 08/28/2017    Gait disturbance 08/28/2017    Hernia, epigastric 02/16/2017    Essential hypertension 05/31/2016    Injury of cervical spinal cord ([facility] [address] ) 05/25/2016    Tobacco use disorder 65/77/7025    Umbilical hernia 74/86/8982    Cervical myelopathy (HCC) 08/24/2016    Basal cell carcinoma in situ of skin 03/15/2018    Hypokalemia 03/15/2018              Past Medical History:     Chronic obstructive lung disease (HCC)    Concussion    COPD (chronic obstructive pulmonary disease) (HCC)    Depression    Diabetes mellitus (HCC)    Diabetic neuropathy (HCC)    Difficulty attaining erection    Elevated liver enzymes    Gall stone pancreatitis    Gall stones    History of MRSA infection    Hypertension    Spinal cord injury, C1-C7 ([facility] [address] )    Traumatic injury to musculoskeletal system    Umbilical hernia without obstruction and without gangrene    Varicella         Admitting Diagnosis: Lumbar radiculopathy [M54 16]  Leg pain [M79 606]  Bilateral leg pain [M79 604, M79 605]  SUNDEEP (acute kidney injury) ([facility] [address] ) [N17 9]     Age/Sex: 59 y o  male     Assessment/Plan         Lumbar radiculopathy Assessment & Plan     - POA, b/l anterior and posterior thigh pain x 3 days, LE weakness   - CT lumbar spine- at L3-4, posterior disc bulge and facet arthropathy result in mild central canal stenosis  Narrowing of the subarticular zones and probable impingement of the traversing L4 nerves, not directly visualized and MRI recommended  - obtain MRI   c/s neurosurgery- may benefit from addition of steroids  - pain control           SUNDEEP (acute kidney injury) ([facility] [address] )   Assessment & Plan     - creatinine 1 89 on admission  - given hx of BPH will check PVR and renal ultrasound   - IVF hydration           Ambulatory dysfunction   Assessment & Plan     - PT and OT consults           Essential hypertension   Assessment & Plan     - hold lisinopril given SUNDEEP   - PRN antihypertensives for SBP > 180          Cervical myelopathy (HCC)   Assessment & Plan     - hx of cervical spine fusion at C3-C4 2012 after falling down steps, chronic contracture of b/l hands   - continue baclofen           Depression   Assessment & Plan     - continue SSRI          * Tobacco use disorder   Assessment & Plan     - NRT, recommend cessation          Diarrhea   Assessment & Plan     - per PCP, patient has had chronic loose stools 1-2x a day since cholecystectomy in 2017           Type 2 diabetes mellitus with neurologic complication, without long-term current use of insulin (HCC)   Assessment & Plan     - last HA1C 5 4% aug 2017 (was 10 2 in feb 2017)   - recheck a1c, hold Amaryl and Metformin   - add SSI and accuchecks for now                Admission Orders:     MRI LUMBAR SPINE  US RETROPERITONEAL   AQUA K PAD  PT AND OT EVAL AND TREAT   CONSULT NEURO SURGERY  MEASURE POST VOID RESIDUAL  CONSISTENT CARBOHYDRATE DIET   SEQUENTIAL COMPRESSION DEVICE     Scheduled Meds:   Current Facility-Administered Medications:  acetaminophen 975 mg Oral Q8H [address]   baclofen 20 mg Oral TID   calcium carbonate 1,000 mg Oral Daily PRN   citalopram 20 mg Oral Daily heparin (porcine) 5,000 Units Subcutaneous Q8H [address]   hydrALAZINE 5 mg Intravenous Q6H PRN   HYDROmorphone 0 5 mg Intravenous Q4H PRN   insulin lispro 1-5 Units Subcutaneous TID AC   insulin lispro 1-5 Units Subcutaneous HS   nicotine 1 patch Transdermal Daily   ondansetron 4 mg Intravenous Q6H PRN   oxyCODONE 10 mg Oral Q4H PRN   oxyCODONE 5 mg Oral Q4H PRN   potassium chloride 20 mEq Oral Daily   senna-docusate sodium 1 tablet Oral HS   sodium chloride 100 mL/hr Intravenous Continuous      Continuous Infusions:   sodium chloride 100 mL/hr      PRN Meds: calcium carbonate    hydrALAZINE    HYDROmorphone    ondansetron    oxyCODONE  10 MG X 1 4-17 2255  10 MG X1 4-18  0542     oxyCODONE IR 5 MG  X1 4-18  0804          Revision History      Notification of Discharge  This is a Notification of Discharge from our facility [address]  Please be advised that this patient has been discharge from our facility  Below you will find the admission and discharge date and time including the patients disposition  PRESENTATION DATE: 4/17/2018  2:37 PM  IP ADMISSION DATE: 4/17/18 1826  DISCHARGE DATE: No discharge date for patient encounter  DISPOSITION: Home/Self Care    [address] [place] [address] in the [facility] by Yasir Leslie for 2017  Network Utilization Review Department  Phone: [phone]; Fax [phone]  ATTENTION: The Network Utilization Review Department is now centralized for our 7 Facilities  Make a note that we have a new phone and fax numbers for our Department  Please call with any questions or concerns to [phone] and carefully follow the prompts so that you are directed to the right person  All voicemails are confidential  Fax any determinations, approvals, denials, and requests for initial or continue stay review clinical to [phone]   Due to HIGH CALL volume, it would be easier if you could please send faxed requests to expedite your requests and in part, help us provide discharge notifications faster

## 2018-04-21 NOTE — DISCHARGE SUMMARY
Resolved Problems  Date Reviewed: 4/17/2018    None          Consultations During Hospital Stay:  · Neurosurgery      Reason for Admission:  Back and leg pain  Hospital Course:     Ruby Pillai is a 59 y o  male past medical history significant for hypertension, tobacco abuse, type 2 diabetes mellitus, cervical myelopathy status post cervical spine fusion with chronic bile bilateral contractures of hands who presents with 3 day history of bilateral thigh pain  Patient states pain was tolerable over the last few days but today pain was more severe associated with bilateral lower extremity weakness  He denies any numbness or tingling bowel or bladder incontinence  States that his legs were so weak today upon standing that he did not make it to the bathroom in time  He states that the back does not bother him but he does have anterior and posterior thigh pain that is intermittent and stabbing in nature  Denies any injury to his back  He states that he fell today but no head injury or loss of consciousness  He does not remember who did his cervical spine fusion in 2012  He is on muscle relaxers for neck pain and upper extremity spasticity  He notes good urine output  Denies prostate problems  No fevers or chills  Had diarrhea today but review of chart patient does have chronic diarrhea  MRI revealed chronic issues without any new acute changes  Patient continued to have discomfort and leg pain  Plan was for initial physical therapy but patient could not tolerate  Patient was started on Medrol Dosepak as well as Neurontin 100 milligrams q h s  with significant improvement in discomfort  His ambulatory status remained compromised and plan was for discharge to rehab unit with ongoing care  Please see above list of diagnoses and related plan for additional information       Condition at Discharge: fair     Discharge Day Visit / Exam:     Subjective:  Pain improved  Vitals: Blood Pressure: 150/95 (04/20/18 1547)  Pulse: 66 (04/20/18 1547)  Temperature: 98 9 °F (37 2 °C) (04/20/18 1547)  Temp Source: Oral (04/20/18 1547)  Respirations: 20 (04/20/18 1547)  Height: 5' 10" (177 8 cm) (Stated) (04/17/18 2052)  Weight - Scale: 81 6 kg (179 lb 14 3 oz) (04/17/18 2052)  SpO2: 93 % (04/20/18 1547)  Exam:   Physical Exam   Constitutional: He is oriented to person, place, and time  He appears well-developed and well-nourished  HENT:   Head: Normocephalic  Eyes: Pupils are equal, round, and reactive to light  Neck: Normal range of motion  Neck supple  Cardiovascular: Normal rate and regular rhythm  Pulmonary/Chest: Effort normal    Abdominal: Soft  Musculoskeletal:   Chronic bilateral upper extremity contractures  Neurological: He is alert and oriented to person, place, and time  Skin: Skin is warm and dry  Discussion with Family: wife    Discharge instructions/Information to patient and family:   See after visit summary for information provided to patient and family  Provisions for Follow-Up Care:  See after visit summary for information related to follow-up care and any pertinent home health orders  Disposition:     Acute Rehab at outside institution     Discharge Statement:  I spent 25 minutes discharging the patient  This time was spent on the day of discharge  I had direct contact with the patient on the day of discharge  Greater than 50% of the total time was spent examining patient, answering all patient questions, arranging and discussing plan of care with patient as well as directly providing post-discharge instructions  Additional time then spent on discharge activities  Discharge Medications:  See after visit summary for reconciled discharge medications provided to patient and family        ** Please Note: This note has been constructed using a voice recognition system **

## 2018-04-23 ENCOUNTER — TELEPHONE (OUTPATIENT)
Dept: FAMILY MEDICINE CLINIC | Facility: CLINIC | Age: 58
End: 2018-04-23

## 2018-04-23 ENCOUNTER — TRANSITIONAL CARE MANAGEMENT (OUTPATIENT)
Dept: FAMILY MEDICINE CLINIC | Facility: CLINIC | Age: 58
End: 2018-04-23

## 2018-04-26 NOTE — ED ATTENDING ATTESTATION
Patricia Schwartz MD, saw and evaluated the patient  I have discussed the patient with the resident/non-physician practitioner and agree with the resident's/non-physician practitioner's findings, Plan of Care, and MDM as documented in the resident's/non-physician practitioner's note, except where noted  All available labs and Radiology studies were reviewed  At this point I agree with the current assessment done in the Emergency Department  I have conducted an independent evaluation of this patient a history and physical is as follows:    Patient presents with complaints of lower extremity weakness and pain  He states that he has had some difficulty ambulating and fell today striking his knees but not his head  Patient has a history of a cervical spine fusion due to injury and since that time has had chronic contracture of his upper extremities  Patient states that he has difficulty straightening his leg due to pain and spasm  No additional complaints  Exam: AAOx3, moderate distress due to pain, RRR, CTA, S/NT/ND, no motor/sensory deficits, source spasm/contracture of lower extremities most prominent in quad and hamstring, chronic contractures of upper extremities  A/P:  Lower extremity pain and spasm  Will check labs to evaluate for any electrolyte abnormalities and will treat with Valium      Critical Care Time  CritCare Time    Procedures

## 2018-04-27 RX ORDER — BACLOFEN 20 MG/1
TABLET ORAL
Qty: 270 TABLET | Refills: 3 | OUTPATIENT
Start: 2018-04-27

## 2018-04-30 NOTE — TELEPHONE ENCOUNTER
Called and spoke with patient he says he is taking both medications Baclofen and Tizanidine looks like Dr Apodaca Route refilled the Tizanidine to [address] [place] [address] Dr sun,he is asking for the baclofen and another medication  He said his wife will call us back tomorrow with the name of the other medication

## 2018-05-04 ENCOUNTER — APPOINTMENT (OUTPATIENT)
Dept: LAB | Facility: CLINIC | Age: 58
End: 2018-05-04
Payer: COMMERCIAL

## 2018-05-04 ENCOUNTER — TRANSCRIBE ORDERS (OUTPATIENT)
Dept: LAB | Facility: CLINIC | Age: 58
End: 2018-05-04

## 2018-05-04 DIAGNOSIS — N40.1 ENLARGED PROSTATE WITH URINARY OBSTRUCTION: ICD-10-CM

## 2018-05-04 DIAGNOSIS — E11.49 DIABETIC NEUROPATHY WITH NEUROLOGIC COMPLICATION (HCC): ICD-10-CM

## 2018-05-04 DIAGNOSIS — N13.8 ENLARGED PROSTATE WITH URINARY OBSTRUCTION: ICD-10-CM

## 2018-05-04 DIAGNOSIS — I10 HYPERTENSION, ESSENTIAL: Primary | ICD-10-CM

## 2018-05-04 DIAGNOSIS — E11.40 DIABETIC NEUROPATHY WITH NEUROLOGIC COMPLICATION (HCC): ICD-10-CM

## 2018-05-04 DIAGNOSIS — I10 HYPERTENSION, ESSENTIAL: ICD-10-CM

## 2018-05-04 LAB
ALBUMIN SERPL BCP-MCNC: 3.8 G/DL (ref 3.5–5)
ANION GAP SERPL CALCULATED.3IONS-SCNC: 8 MMOL/L (ref 4–13)
BUN SERPL-MCNC: 20 MG/DL (ref 5–25)
CALCIUM ALBUM COR SERPL-MCNC: 10.5 MG/DL (ref 8.3–10.1)
CALCIUM SERPL-MCNC: 10.3 MG/DL (ref 8.3–10.1)
CALCIUM SERPL-MCNC: 10.3 MG/DL (ref 8.3–10.1)
CHLORIDE SERPL-SCNC: 99 MMOL/L (ref 100–108)
CO2 SERPL-SCNC: 26 MMOL/L (ref 21–32)
CREAT SERPL-MCNC: 1.01 MG/DL (ref 0.6–1.3)
FOLATE SERPL-MCNC: >20 NG/ML (ref 3.1–17.5)
GFR SERPL CREATININE-BSD FRML MDRD: 81 ML/MIN/1.73SQ M
GLUCOSE P FAST SERPL-MCNC: 264 MG/DL (ref 65–99)
POTASSIUM SERPL-SCNC: 5.2 MMOL/L (ref 3.5–5.3)
SODIUM SERPL-SCNC: 133 MMOL/L (ref 136–145)
TSH SERPL DL<=0.05 MIU/L-ACNC: 2.51 UIU/ML (ref 0.36–3.74)
VIT B12 SERPL-MCNC: 637 PG/ML (ref 100–900)

## 2018-05-04 PROCEDURE — 84443 ASSAY THYROID STIM HORMONE: CPT

## 2018-05-04 PROCEDURE — 82607 VITAMIN B-12: CPT

## 2018-05-04 PROCEDURE — 82746 ASSAY OF FOLIC ACID SERUM: CPT

## 2018-05-04 PROCEDURE — 80048 BASIC METABOLIC PNL TOTAL CA: CPT

## 2018-05-04 PROCEDURE — 36415 COLL VENOUS BLD VENIPUNCTURE: CPT

## 2018-05-04 PROCEDURE — 82040 ASSAY OF SERUM ALBUMIN: CPT

## 2018-05-07 ENCOUNTER — TRANSITIONAL CARE MANAGEMENT (OUTPATIENT)
Dept: FAMILY MEDICINE CLINIC | Facility: CLINIC | Age: 58
End: 2018-05-07

## 2018-05-08 ENCOUNTER — OFFICE VISIT (OUTPATIENT)
Dept: FAMILY MEDICINE CLINIC | Facility: CLINIC | Age: 58
End: 2018-05-08
Payer: COMMERCIAL

## 2018-05-08 VITALS
SYSTOLIC BLOOD PRESSURE: 100 MMHG | WEIGHT: 171.6 LBS | DIASTOLIC BLOOD PRESSURE: 62 MMHG | HEIGHT: 70 IN | TEMPERATURE: 98.4 F | RESPIRATION RATE: 20 BRPM | OXYGEN SATURATION: 96 % | BODY MASS INDEX: 24.57 KG/M2 | HEART RATE: 95 BPM

## 2018-05-08 DIAGNOSIS — E11.49 OTHER DIABETIC NEUROLOGICAL COMPLICATION ASSOCIATED WITH TYPE 2 DIABETES MELLITUS (HCC): ICD-10-CM

## 2018-05-08 DIAGNOSIS — G89.29 OTHER CHRONIC PAIN: ICD-10-CM

## 2018-05-08 DIAGNOSIS — N17.9 AKI (ACUTE KIDNEY INJURY) (HCC): ICD-10-CM

## 2018-05-08 DIAGNOSIS — Z09 HOSPITAL DISCHARGE FOLLOW-UP: Primary | ICD-10-CM

## 2018-05-08 DIAGNOSIS — E83.52 HYPERCALCEMIA: ICD-10-CM

## 2018-05-08 DIAGNOSIS — D04.9 BASAL CELL CARCINOMA IN SITU OF SKIN: ICD-10-CM

## 2018-05-08 DIAGNOSIS — R93.89 ABNORMAL MRI: ICD-10-CM

## 2018-05-08 DIAGNOSIS — R29.898 WEAKNESS OF RIGHT LEG: ICD-10-CM

## 2018-05-08 DIAGNOSIS — E11.42 TYPE 2 DIABETES MELLITUS WITH DIABETIC POLYNEUROPATHY, WITHOUT LONG-TERM CURRENT USE OF INSULIN (HCC): ICD-10-CM

## 2018-05-08 DIAGNOSIS — R26.2 AMBULATORY DYSFUNCTION: ICD-10-CM

## 2018-05-08 PROBLEM — E87.6 HYPOKALEMIA: Status: RESOLVED | Noted: 2018-03-15 | Resolved: 2018-05-08

## 2018-05-08 PROBLEM — K86.0 ALCOHOL-INDUCED CHRONIC PANCREATITIS (HCC): Status: RESOLVED | Noted: 2017-01-19 | Resolved: 2018-05-08

## 2018-05-08 PROBLEM — R19.7 DIARRHEA: Status: RESOLVED | Noted: 2018-04-17 | Resolved: 2018-05-08

## 2018-05-08 PROBLEM — F10.10 ALCOHOL ABUSE: Status: ACTIVE | Noted: 2018-05-08

## 2018-05-08 PROCEDURE — 99496 TRANSJ CARE MGMT HIGH F2F 7D: CPT | Performed by: NURSE PRACTITIONER

## 2018-05-08 RX ORDER — GABAPENTIN 100 MG/1
100 CAPSULE ORAL
Qty: 90 CAPSULE | Refills: 0 | Status: SHIPPED | OUTPATIENT
Start: 2018-05-08 | End: 2018-10-13 | Stop reason: HOSPADM

## 2018-05-08 RX ORDER — TIZANIDINE HYDROCHLORIDE 2 MG/1
CAPSULE, GELATIN COATED ORAL
Qty: 90 CAPSULE | Refills: 0 | Status: SHIPPED | OUTPATIENT
Start: 2018-05-08 | End: 2019-02-07

## 2018-05-08 RX ORDER — METHYLPREDNISOLONE 4 MG/1
TABLET ORAL
COMMUNITY
Start: 2018-04-20 | End: 2018-05-08

## 2018-05-08 RX ORDER — TIZANIDINE 2 MG/1
TABLET ORAL
COMMUNITY
Start: 2018-03-05 | End: 2018-05-08 | Stop reason: SDUPTHER

## 2018-05-08 NOTE — PROGRESS NOTES
Assessment/Plan:   Hypercalcemia   > Dc calcium in diet   >See Dermatology to remove basal cell cancer from his for arm   >Will check PTH -however likely calcium is elevated due to recurrence of basal cell cancer  Type 2 diabetes:  on long-term metformin and glipizide,last A1c was stable on this regimen however blood sugars were elevated on prednisone course  Asked wife to monitor sugar fasting BS in morning and 2 hours postprandial in the evening  Bring in blood sugar logs in 2 weeks  New weakness greater on the right lower extremity with abnormal MRI/ gait dysfunction; underlying facet joint disease and degenerative disc disease; severe foraminal narrowing L3; may benefit from orthopedic consult and or pain Management for VIDYA  See him in 2 weeks  Asked pt to cont to decrease ALCOHOL  Diagnoses and all orders for this visit:    Hospital discharge follow-up    Hypercalcemia  -     PTH, intact; Future    Type 2 diabetes mellitus with diabetic polyneuropathy, without long-term current use of insulin ([facility] [address] )    Other diabetic neurological complication associated with type 2 diabetes mellitus ([facility] [address] )    Basal cell carcinoma in situ of skin  -     Ambulatory referral to Dermatology; Future    Ambulatory dysfunction    Abnormal MRI    Weakness of right leg    SUNDEEP (acute kidney injury) (HCC)  -     gabapentin (NEURONTIN) 100 mg capsule; Take 1 capsule (100 mg total) by mouth daily at bedtime    Other chronic pain  -     TiZANidine (ZANAFLEX) 2 MG capsule; TAKE 1 TABLET BEFORE BED AS NEEDED ( tablets)         Subjective:     Patient ID: Bere Rowley is a 59 y o  male  HPI   Patient admitted to ER 4/17 with bilateral leg increased weakness and repetitive falls/3 day history of unrelenting right upper thigh pain  Patient has a past medical history significant for C3-C4 injury status post fall down the steps with subsequent cervical fusion    Patient has been on baclofen 3 times a day with tizanidine at night for spasticity status post cervical fusion/ cervical injury  He routinely walks with a cane  CT of his lumbar spine showed L3-L4 posterior disc bulge and facet arthropathy with mild central canal stenosis and impingement on his L4 nerve roots  MRI of his lumbar spine showed diffuse disc bulge L3-L4 with foraminal protrusion with and resultant impingement of the L4 nerve root and narrowing with mild impingement of L3 nerve root  Severe right foraminal narrowing due to combination facet joint disease at the L3 nerve root -multi level facet joint disease and small protrusion noted L2-L3 level  Patient was seen by neuro surgery and put on a prednisone course with improvement of his symptoms and then [place] [facility] [place]  Patient currently completed prednisone course and is receiving PT and OT services in the home  Added at night gabapentin  Patient was diagnosed with acute kidney injury during hospital -creatinine 1 89 and kidney function has resolved and is at baseline -with a creatinine of 1 01 and BUN of 20 on 05/04/2018 labs  Calcium is elevated at 10 5  Normal albumin  TSH 2 51  Folate elevated at greater than 20 and B12 is 637  Patient is a daily alcohol drinker and denies alcohol problem however wife states liquor shots are down to 2-3 a day      Patient has a history of skin cancer right  Forearm with new lesion surfacing  Date and time hospital follow up call was made:  5/7/2018  9:11 AM  Patient was hopsitalized at:  [facility]  Date of admission:  4/17/18  Date of discharge:  4/20/18  Diagnosis:  Bilateral leg pain   Were the patients medicaitons reviewed and updated:  Yes  Current symptoms:  Weakness, Leg pain - right side  Right side leg pain severity:  Mild  Leg pain, right side, onset:  Awakened from sleep (Comment: Leg stiffness)  Should patient be enrolled in anticoag monitoring?:  No  Scheduled for follow up?:  Yes (Comment: Called Left message )  Referrals needed:  Physical Therapy   Did you obtain your prescribed medications:  Yes  Do you need help managing your perscriptions or medications:  No  Is transportation to your appointments needed:  No  I have advised the patient to call PCP with any new or worsening symptoms (please type in name along with any credentials):  Aileen Tamez MA  Living Arrangements:  Spouse or Significiant other  Support System:  Partner  Are you recieving outpatient services:  Yes  What type(s) of services:  Physical therapy   Are you recieving home care services:  No  Are you using any community resources:  No  Current waiver service:  No  Have you fallen in the last 12 months:  Yes  How many times:  4  Interperter language line required?:  No  Counseling:  Family         Review of Systems   Constitutional: Negative for activity change, appetite change, chills, fatigue and fever  HENT: Negative  Negative for congestion  Eyes: Negative  Respiratory: Negative  Cardiovascular: Negative  Negative for chest pain, palpitations and leg swelling  Gastrointestinal: Negative for abdominal distention and diarrhea  Known umbilical hernia   Endocrine: Negative  Negative for cold intolerance  Genitourinary: Negative  Negative for decreased urine volume, difficulty urinating and urgency  Musculoskeletal: Positive for gait problem  Skin: Negative  Basal cell lesion - excised in 2016 - [facility] - coming back -    Allergic/Immunologic: Negative  Neurological: Positive for weakness (chronic)  Negative for dizziness and numbness  Hyperspasmotic s/p cervical neck injury   Hematological: Negative  Psychiatric/Behavioral: Negative for agitation, confusion, decreased concentration (stm issues), sleep disturbance and suicidal ideas  The patient is not nervous/anxious  All other systems reviewed and are negative          Objective:     Physical Exam   Constitutional: He is oriented to person, place, and time  He appears well-developed and well-nourished  No distress  HENT:   Head: Normocephalic  Right Ear: Tympanic membrane and external ear normal  No decreased hearing is noted  Left Ear: Tympanic membrane and external ear normal  No decreased hearing is noted  Mouth/Throat: Oropharynx is clear and moist    Eyes: Conjunctivae are normal  Pupils are equal, round, and reactive to light  Neck: Normal range of motion  Neck supple  No thyromegaly present  Cardiovascular: Normal rate, regular rhythm, normal heart sounds and intact distal pulses  No murmur heard  Pulmonary/Chest: Effort normal and breath sounds normal  No respiratory distress  Abdominal: Soft  Bowel sounds are normal  He exhibits distension  Umbilical hernia   Musculoskeletal: Normal range of motion  Lymphadenopathy:     He has no cervical adenopathy  Neurological: He is alert and oriented to person, place, and time  He has normal reflexes  No cranial nerve deficit  Coordination normal    Skin: Skin is warm and dry  Psychiatric: He has a normal mood and affect  His behavior is normal  Judgment and thought content normal          Vitals:    05/08/18 0805   BP: 100/62   BP Location: Left arm   Patient Position: Sitting   Cuff Size: Adult   Pulse: 95   Resp: 20   Temp: 98 4 °F (36 9 °C)   SpO2: 96%   Weight: 77 8 kg (171 lb 9 6 oz)   Height: 5' 10" (1 778 m)       Transitional Care Management Review:  Kiley Zapata is a 59 y o  male here for TCM follow up       During the TCM phone call patient stated:    Date and time hospital follow up call was made:  5/7/2018  9:11 AM  Patient was hopsitalized at:  [facility]  Date of admission:  4/17/18  Date of discharge:  4/20/18  Diagnosis:  Bilateral leg pain   Were the patients medicaitons reviewed and updated:  Yes  Current symptoms:  Weakness, Leg pain - right side  Right side leg pain severity:  Mild  Leg pain, right side, onset:  Awakened from sleep (Comment: Leg stiffness)  Should patient be enrolled in anticoag monitoring?:  No  Scheduled for follow up?:  Yes (Comment: Called Left message )  Referrals needed:  Physical Therapy   Did you obtain your prescribed medications:  Yes  Do you need help managing your perscriptions or medications:  No  Is transportation to your appointments needed:  No  I have advised the patient to call PCP with any new or worsening symptoms (please type in name along with any credentials):  Lisbet Vernon MA  Living Arrangements:  Spouse or Significiant other  Support System:  Partner  Are you recieving outpatient services:  Yes  What type(s) of services:  Physical therapy   Are you recieving home care services:  No  Are you using any community resources:  No  Current waiver service:  No  Have you fallen in the last 12 months:  Yes  How many times:  4  Interperter language line required?:  No  Counseling:  CHERELLE Lacey

## 2018-06-11 ENCOUNTER — APPOINTMENT (EMERGENCY)
Dept: NON INVASIVE DIAGNOSTICS | Facility: HOSPITAL | Age: 58
End: 2018-06-11
Payer: COMMERCIAL

## 2018-06-11 ENCOUNTER — APPOINTMENT (EMERGENCY)
Dept: RADIOLOGY | Facility: HOSPITAL | Age: 58
End: 2018-06-11
Payer: COMMERCIAL

## 2018-06-11 ENCOUNTER — HOSPITAL ENCOUNTER (OUTPATIENT)
Facility: HOSPITAL | Age: 58
Setting detail: OBSERVATION
Discharge: HOME | End: 2018-06-12
Attending: EMERGENCY MEDICINE | Admitting: INTERNAL MEDICINE
Payer: COMMERCIAL

## 2018-06-11 DIAGNOSIS — L03.115 CELLULITIS OF LEG, RIGHT: ICD-10-CM

## 2018-06-11 DIAGNOSIS — I73.9 PERIPHERAL VASCULAR DISEASE (HCC): Primary | ICD-10-CM

## 2018-06-11 PROBLEM — E13.51 PERIPHERAL VASCULAR DISEASE DUE TO SECONDARY DIABETES MELLITUS (HCC): Status: ACTIVE | Noted: 2018-06-11

## 2018-06-11 LAB
ANION GAP SERPL CALCULATED.3IONS-SCNC: 8 MMOL/L (ref 4–13)
APTT PPP: 33 SECONDS (ref 24–36)
BASOPHILS # BLD AUTO: 0.06 THOUSANDS/ΜL (ref 0–0.1)
BASOPHILS NFR BLD AUTO: 1 % (ref 0–1)
BUN SERPL-MCNC: 20 MG/DL (ref 5–25)
CALCIUM SERPL-MCNC: 8.9 MG/DL (ref 8.3–10.1)
CHLORIDE SERPL-SCNC: 102 MMOL/L (ref 100–108)
CO2 SERPL-SCNC: 27 MMOL/L (ref 21–32)
CREAT SERPL-MCNC: 0.83 MG/DL (ref 0.6–1.3)
EOSINOPHIL # BLD AUTO: 0.13 THOUSAND/ΜL (ref 0–0.61)
EOSINOPHIL NFR BLD AUTO: 2 % (ref 0–6)
ERYTHROCYTE [DISTWIDTH] IN BLOOD BY AUTOMATED COUNT: 12.5 % (ref 11.6–15.1)
GFR SERPL CREATININE-BSD FRML MDRD: 96 ML/MIN/1.73SQ M
GLUCOSE SERPL-MCNC: 169 MG/DL (ref 65–140)
GLUCOSE SERPL-MCNC: 188 MG/DL (ref 65–140)
HCT VFR BLD AUTO: 39.4 % (ref 36.5–49.3)
HGB BLD-MCNC: 13.5 G/DL (ref 12–17)
IMM GRANULOCYTES # BLD AUTO: 0.03 THOUSAND/UL (ref 0–0.2)
IMM GRANULOCYTES NFR BLD AUTO: 0 % (ref 0–2)
INR PPP: 0.97 (ref 0.86–1.17)
LYMPHOCYTES # BLD AUTO: 2.05 THOUSANDS/ΜL (ref 0.6–4.47)
LYMPHOCYTES NFR BLD AUTO: 25 % (ref 14–44)
MCH RBC QN AUTO: 34.7 PG (ref 26.8–34.3)
MCHC RBC AUTO-ENTMCNC: 34.3 G/DL (ref 31.4–37.4)
MCV RBC AUTO: 101 FL (ref 82–98)
MONOCYTES # BLD AUTO: 1 THOUSAND/ΜL (ref 0.17–1.22)
MONOCYTES NFR BLD AUTO: 12 % (ref 4–12)
NEUTROPHILS # BLD AUTO: 4.92 THOUSANDS/ΜL (ref 1.85–7.62)
NEUTS SEG NFR BLD AUTO: 60 % (ref 43–75)
NRBC BLD AUTO-RTO: 0 /100 WBCS
PLATELET # BLD AUTO: 212 THOUSANDS/UL (ref 149–390)
PMV BLD AUTO: 10.5 FL (ref 8.9–12.7)
POTASSIUM SERPL-SCNC: 4.6 MMOL/L (ref 3.5–5.3)
PROTHROMBIN TIME: 13 SECONDS (ref 11.8–14.2)
RBC # BLD AUTO: 3.89 MILLION/UL (ref 3.88–5.62)
SODIUM SERPL-SCNC: 137 MMOL/L (ref 136–145)
WBC # BLD AUTO: 8.19 THOUSAND/UL (ref 4.31–10.16)

## 2018-06-11 PROCEDURE — 99285 EMERGENCY DEPT VISIT HI MDM: CPT

## 2018-06-11 PROCEDURE — 36415 COLL VENOUS BLD VENIPUNCTURE: CPT | Performed by: EMERGENCY MEDICINE

## 2018-06-11 PROCEDURE — 85730 THROMBOPLASTIN TIME PARTIAL: CPT | Performed by: EMERGENCY MEDICINE

## 2018-06-11 PROCEDURE — 85025 COMPLETE CBC W/AUTO DIFF WBC: CPT | Performed by: EMERGENCY MEDICINE

## 2018-06-11 PROCEDURE — 93971 EXTREMITY STUDY: CPT

## 2018-06-11 PROCEDURE — 80048 BASIC METABOLIC PNL TOTAL CA: CPT | Performed by: EMERGENCY MEDICINE

## 2018-06-11 PROCEDURE — 73630 X-RAY EXAM OF FOOT: CPT

## 2018-06-11 PROCEDURE — 85610 PROTHROMBIN TIME: CPT | Performed by: EMERGENCY MEDICINE

## 2018-06-11 PROCEDURE — 93971 EXTREMITY STUDY: CPT | Performed by: SURGERY

## 2018-06-11 PROCEDURE — 82948 REAGENT STRIP/BLOOD GLUCOSE: CPT

## 2018-06-11 RX ORDER — MORPHINE SULFATE 2 MG/ML
2 INJECTION, SOLUTION INTRAMUSCULAR; INTRAVENOUS
Status: DISCONTINUED | OUTPATIENT
Start: 2018-06-11 | End: 2018-06-12 | Stop reason: HOSPADM

## 2018-06-11 RX ORDER — TRAMADOL HYDROCHLORIDE 50 MG/1
50 TABLET ORAL EVERY 6 HOURS PRN
Status: DISCONTINUED | OUTPATIENT
Start: 2018-06-11 | End: 2018-06-12 | Stop reason: HOSPADM

## 2018-06-11 RX ORDER — DOCUSATE SODIUM 100 MG/1
100 CAPSULE, LIQUID FILLED ORAL 2 TIMES DAILY PRN
Status: DISCONTINUED | OUTPATIENT
Start: 2018-06-11 | End: 2018-06-12 | Stop reason: HOSPADM

## 2018-06-11 RX ORDER — ONDANSETRON 2 MG/ML
4 INJECTION INTRAMUSCULAR; INTRAVENOUS EVERY 6 HOURS PRN
Status: DISCONTINUED | OUTPATIENT
Start: 2018-06-11 | End: 2018-06-12 | Stop reason: HOSPADM

## 2018-06-11 RX ORDER — NICOTINE 21 MG/24HR
1 PATCH, TRANSDERMAL 24 HOURS TRANSDERMAL DAILY
Status: DISCONTINUED | OUTPATIENT
Start: 2018-06-12 | End: 2018-06-12 | Stop reason: HOSPADM

## 2018-06-11 RX ORDER — BACLOFEN 10 MG/1
20 TABLET ORAL 3 TIMES DAILY
Status: DISCONTINUED | OUTPATIENT
Start: 2018-06-11 | End: 2018-06-12 | Stop reason: HOSPADM

## 2018-06-11 RX ORDER — GABAPENTIN 100 MG/1
100 CAPSULE ORAL
Status: DISCONTINUED | OUTPATIENT
Start: 2018-06-11 | End: 2018-06-12 | Stop reason: HOSPADM

## 2018-06-11 RX ORDER — GLIMEPIRIDE 1 MG/1
1 TABLET ORAL DAILY
Status: DISCONTINUED | OUTPATIENT
Start: 2018-06-12 | End: 2018-06-12 | Stop reason: HOSPADM

## 2018-06-11 RX ORDER — TIZANIDINE 2 MG/1
2 TABLET ORAL 3 TIMES DAILY PRN
Status: DISCONTINUED | OUTPATIENT
Start: 2018-06-11 | End: 2018-06-12 | Stop reason: HOSPADM

## 2018-06-11 RX ORDER — LISINOPRIL 10 MG/1
10 TABLET ORAL DAILY
Status: DISCONTINUED | OUTPATIENT
Start: 2018-06-12 | End: 2018-06-12 | Stop reason: HOSPADM

## 2018-06-11 RX ORDER — IBUPROFEN 400 MG/1
400 TABLET ORAL ONCE
Status: COMPLETED | OUTPATIENT
Start: 2018-06-11 | End: 2018-06-11

## 2018-06-11 RX ORDER — POTASSIUM CHLORIDE 20 MEQ/1
20 TABLET, EXTENDED RELEASE ORAL DAILY
Status: DISCONTINUED | OUTPATIENT
Start: 2018-06-12 | End: 2018-06-12 | Stop reason: HOSPADM

## 2018-06-11 RX ORDER — CITALOPRAM 20 MG/1
20 TABLET ORAL DAILY
Status: DISCONTINUED | OUTPATIENT
Start: 2018-06-12 | End: 2018-06-12 | Stop reason: HOSPADM

## 2018-06-11 RX ORDER — ACETAMINOPHEN 325 MG/1
650 TABLET ORAL EVERY 6 HOURS PRN
Status: DISCONTINUED | OUTPATIENT
Start: 2018-06-11 | End: 2018-06-12 | Stop reason: HOSPADM

## 2018-06-11 RX ORDER — NICOTINE 21 MG/24HR
14 PATCH, TRANSDERMAL 24 HOURS TRANSDERMAL ONCE
Status: DISCONTINUED | OUTPATIENT
Start: 2018-06-11 | End: 2018-06-12 | Stop reason: HOSPADM

## 2018-06-11 RX ADMIN — BACLOFEN 20 MG: 10 TABLET ORAL at 22:51

## 2018-06-11 RX ADMIN — NICOTINE 14 MG: 14 PATCH, EXTENDED RELEASE TRANSDERMAL at 19:11

## 2018-06-11 RX ADMIN — IBUPROFEN 400 MG: 400 TABLET, FILM COATED ORAL at 18:53

## 2018-06-11 RX ADMIN — GABAPENTIN 100 MG: 100 CAPSULE ORAL at 22:51

## 2018-06-11 RX ADMIN — VANCOMYCIN HYDROCHLORIDE 1250 MG: 10 INJECTION, POWDER, LYOPHILIZED, FOR SOLUTION INTRAVENOUS at 22:53

## 2018-06-11 RX ADMIN — MORPHINE SULFATE 2 MG: 2 INJECTION, SOLUTION INTRAMUSCULAR; INTRAVENOUS at 22:34

## 2018-06-11 NOTE — ED ATTENDING ATTESTATION
Petrona Richardson MD, saw and evaluated the patient  I have discussed the patient with the resident/non-physician practitioner and agree with the resident's/non-physician practitioner's findings, Plan of Care, and MDM as documented in the resident's/non-physician practitioner's note, except where noted  All available labs and Radiology studies were reviewed  At this point I agree with the current assessment done in the Emergency Department  I have conducted an independent evaluation of this patient a history and physical is as follows:    OA: 58 y/o m c/o one week of RLE erythema and swelling  PT states that sxms began ~ 1 week ago, seen at urgent care and started on dual abx therapy  Initially had improvement of sxms however over the past few days, sxms again intensified  + increased pain to the foot  No fevers/chills  No n/v  No falls/trauma  Pt is a diabetic and has baseline neuropathy  + tobacco use  On exam patient is currently in no acute distress  Vital signs are currently stable  HEENT is normocephalic and atraumatic with clear sclera and conjunctiva and moist mucous membranes  Neck is supple  Heart is regular rate  Lungs are clear auscultation bilaterally  Abdomen is soft with positive bowel sounds nontender to palpation no palpable abdominal masses  Patient does have +1 pitting edema to the right lower extremity and trace edema to the left lower extremity  Patient has significant erythema that is worse when legs are held down improves when up  Pulses with Doppler  Patient does have pain with ambulation  Decreased sensation at baseline  Patient does have healing wounds to bilateral lower extremities/feet  Is able to move toes and ankles  Awake alert oriented  Assessment and plan wounds to bilateral lower extremities with failed outpatient antibiotics  Likely secondary to a combination of diabetic neuropathy as well as vascular disease  Will consult to vascular    Will obtain imaging and basic blood work  Will likely require IV antibiotics given failed outpatient treatment  Will likely require admission  Portions of the record may have been created with voice recognition software  Occasional wrong word or sound-a-like" substitutions may have occurred due to the inherent limitations of voice recognition software  Review chart carefully and recognize, using context, where substitutions have occurred      Critical Care Time  CritCare Time    Procedures

## 2018-06-11 NOTE — ED PROVIDER NOTES
History  Chief Complaint   Patient presents with    Cellulitis     Patient reports he was diagnosed with cellultis of right leg and right foot approx 1 week ago at Urgent Care and is currently almost finished with abx  Patient states it seemed to flare up in the past couple of days  Patient denies fevers  HPI   58 yo male presenting to ER for evaluation of right foot swelling/redness  He was diagnosed with cellulitis of right foot and was given Keflex to take, BID for 7 days, has one day left, has been taking it as prescribed  Was getting better until yesterday  Patient states the pain is worse, especially at night when he lays down  Therapist marked the redness of his leg, and it has increased since earlier today  Patient does have neuropathy of right leg, which has been chronic since his prior C4 injury  Patient denies fevers at home, no chest pain, no SOB, no abd pain, no nausea, no vomiting  No dysuria or diarrhea  Patient is tolerating PO, normal appetite  First time having cellulitis  Denies any injury to the leg  Denies chest pain and SOB,  PMH: DM, HTN, COPD  PSH: no prior surgeries to RLE  SH: smokes 1/2-1 ppd, no drug use, EtOH everyday about 3-4 drinks a day        Prior to Admission Medications   Prescriptions Last Dose Informant Patient Reported? Taking?    Incontinence Supply Disposable (ATTENDS UNDERWEAR 7 LARGE) MISC  Self Yes No   TiZANidine (ZANAFLEX) 2 MG capsule 6/10/2018 at Unknown time  No Yes   Sig: TAKE 1 TABLET BEFORE BED AS NEEDED ( tablets)   baclofen 20 mg tablet 6/11/2018 at Unknown time Self Yes Yes   Sig: Take 20 mg by mouth 3 (three) times a day   citalopram (CeleXA) 20 mg tablet 6/11/2018 at Unknown time Self No Yes   Sig: Take 1 tablet (20 mg total) by mouth daily   gabapentin (NEURONTIN) 100 mg capsule 6/10/2018 at Unknown time  No Yes   Sig: Take 1 capsule (100 mg total) by mouth daily at bedtime   glimepiride (AMARYL) 1 mg tablet 6/11/2018 at Unknown time Self No Yes   Sig: Take 1 tablet (1 mg total) by mouth daily   lisinopril (ZESTRIL) 10 mg tablet 6/11/2018 at Unknown time Self No Yes   Sig: Take 1 tablet (10 mg total) by mouth daily   metFORMIN (GLUCOPHAGE) 1000 MG tablet 6/11/2018 at Unknown time Self No Yes   Sig: Take 1 tablet (1,000 mg total) by mouth 2 (two) times a day with meals for 90 days   potassium chloride (K-DUR,KLOR-CON) 20 mEq tablet 6/11/2018 at Unknown time Self No Yes   Sig: Take 1 tablet (20 mEq total) by mouth daily   senna-docusate sodium (SENOKOT S) 8 6-50 mg per tablet   No No   Sig: Take 1 tablet by mouth daily at bedtime for 30 days      Facility-Administered Medications: None       Past Medical History:   Diagnosis Date    Chronic obstructive lung disease ([facility] )     RESOLVED: 8/17/17    Concussion     LAST ASSESSED: 8/17/17    COPD (chronic obstructive pulmonary disease) ([facility]     Depression     Diabetes mellitus ([facility] )     Diabetic neuropathy ([facility] )     Difficulty attaining erection     LAST ASSESSEDl: 8/17/17    Elevated liver enzymes     Gall stone pancreatitis     RESOLVED: 3/8/17    Gall stones     RESOLVED: 3/8/17    History of MRSA infection     Hypertension     LAST ASSESSED: 8/17/17    Spinal cord injury, C1-C7 ([facility] )     Traumatic injury to musculoskeletal system     1-5 FLIGHT FALL DOWN THE STEPS - CERVICAL NECK INJURY - JAN 2, 2012; LAST ASSESSED: 3/72/21    Umbilical hernia without obstruction and without gangrene     RESOLVED: 3/8/17    Varicella     LAST ASSESSED: 8/17/17       Past Surgical History:   Procedure Laterality Date    BACK SURGERY      CERVICAL FUSION      VERTEBRAL; C3-C4 FUSION    CHOLECYSTECTOMY LAPAROSCOPIC N/A 2/24/2017    Procedure: CHOLECYSTECTOMY LAPAROSCOPIC;  Surgeon: Kirsty Johns MD;  [facility];  Service:     CHOLECYSTECTOMY LAPAROSCOPIC  03/2017    EPIGASTRIC HERNIA REPAIR      UMBILICAL HERNIA REPAIR N/A 2/24/2017    Procedure: REPAIR HERNIA UMBILICAL;  Surgeon: Ronald Fox Evan Rodriguez MD;  [facility];  Service:        Family History   Problem Relation Age of Onset    Hypertension Mother      BENIGN    Alzheimer's disease Father     Heart disease Father      CARDIAC DISORDER    Stroke Father     Heart attack Father     No Known Problems Brother      I have reviewed and agree with the history as documented  Social History   Substance Use Topics    Smoking status: Current Every Day Smoker     Packs/day: 0 50     Years: 40 00    Smokeless tobacco: Never Used      Comment: 1 ppd     Alcohol use Yes      Comment: 3-4 mixed vodka drinks/daily         Review of Systems    Constitutional: Negative for appetite change, chills and fever  HENT: Negative for congestion, rhinorrhea and sore throat  Eyes: Negative for photophobia, pain and visual disturbance  Respiratory: Negative for cough, chest tightness and shortness of breath  Cardiovascular: Negative for chest pain, palpitations  Positive for leg swelling  Gastrointestinal: Negative for abdominal pain, diarrhea, nausea and vomiting  Genitourinary: Negative for dysuria, flank pain and hematuria  Musculoskeletal: Negative for back pain, neck pain and neck stiffness  Skin: Negative for color change, rash and wound  Neurological: Negative for dizziness, numbness and headaches  All other systems reviewed and are negative        Physical Exam  ED Triage Vitals [06/11/18 1518]   Temperature Pulse Respirations Blood Pressure SpO2   98 6 °F (37 °C) 95 18 90/54 97 %      Temp Source Heart Rate Source Patient Position - Orthostatic VS BP Location FiO2 (%)   Oral Monitor Sitting Left arm --      Pain Score       5           Orthostatic Vital Signs  Vitals:    06/11/18 2010 06/11/18 2100 06/11/18 2300 06/12/18 0737   BP: 119/66 108/69 112/58 114/68   Pulse: 102 81 95 78   Patient Position - Orthostatic VS: Lying Lying Lying Lying       Physical Exam  /68 (BP Location: Right arm)   Pulse 78   Temp (!) 97 °F (36 1 °C) (Tympanic)   Resp 16   Ht 5' 10" (1 778 m)   Wt 77 9 kg (171 lb 12 8 oz)   SpO2 94%   BMI 24 65 kg/m²     General Appearance:  Alert, cooperative, no distress   Head:  Normocephalic, without obvious abnormality, atraumatic   Eyes:  PERRL, conjunctiva/corneas clear, EOM's intact       Nose: Nares normal, septum midline, mucosa normal, no drainage or sinus tenderness   Throat: Lips, mucosa, and tongue normal; teeth and gums normal   Neck: Supple, symmetrical, trachea midline, no adenopathy   Back:   Symmetric, no curvature, ROM normal, no CVA tenderness   Lungs:   Clear to auscultation bilaterally, respirations unlabored   Chest Wall:  No tenderness or deformity   Heart:  Regular rate and rhythm, S1, S2 normal, no murmur, rub or gallop   Abdomen:   Soft, non-tender, bowel sounds active all four quadrants           Extremities: Right lower extremity is swollen and edematous compared to left lower extremity  Patient does have +1 to +2 pitting edema in the right leg  There is some erythema around the ankle, however it is that spread up leg or the knee  Pulses: Unable to palpate pulses in feet bilaterally  Patient with dopplerable signals for the right and left pedal artery  However, signal is less on the right eye compared to left     Skin: Skin color, texture, turgor normal, no rashes or lesions       Neurologic:      Psychiatric:  Moves all extremities, sensation and strength in tact in all extremities    Normal mood and affect                 ED Medications  Medications   ibuprofen (MOTRIN) tablet 400 mg (400 mg Oral Given 6/11/18 1853)       Diagnostic Studies  Results Reviewed     Procedure Component Value Units Date/Time    Protime-INR [[id number]]  (Normal) Collected:  06/11/18 1809    Lab Status:  Final result Specimen:  Blood from Arm, Right Updated:  06/11/18 1835     Protime 13 0 seconds      INR 0 97    APTT [[id number]]  (Normal) Collected:  06/11/18 1809    Lab Status:  Final result Specimen:  Blood from Arm, Right Updated:  06/11/18 1835     PTT 33 seconds     Basic metabolic panel [[id number]]  (Abnormal) Collected:  06/11/18 1809    Lab Status:  Final result Specimen:  Blood from Arm, Right Updated:  06/11/18 1832     Sodium 137 mmol/L      Potassium 4 6 mmol/L      Chloride 102 mmol/L      CO2 27 mmol/L      Anion Gap 8 mmol/L      BUN 20 mg/dL      Creatinine 0 83 mg/dL      Glucose 188 (H) mg/dL      Calcium 8 9 mg/dL      eGFR 96 ml/min/1 73sq m     Narrative:         National Kidney Disease Education Program recommendations are as follows:  GFR calculation is accurate only with a steady state creatinine  Chronic Kidney disease less than 60 ml/min/1 73 sq  meters  Kidney failure less than 15 ml/min/1 73 sq  meters  CBC and differential [[id number]]  (Abnormal) Collected:  06/11/18 1809    Lab Status:  Final result Specimen:  Blood from Arm, Right Updated:  06/11/18 1818     WBC 8 19 Thousand/uL      RBC 3 89 Million/uL      Hemoglobin 13 5 g/dL      Hematocrit 39 4 %       (H) fL      MCH 34 7 (H) pg      MCHC 34 3 g/dL      RDW 12 5 %      MPV 10 5 fL      Platelets 063 Thousands/uL      nRBC 0 /100 WBCs      Neutrophils Relative 60 %      Immat GRANS % 0 %      Lymphocytes Relative 25 %      Monocytes Relative 12 %      Eosinophils Relative 2 %      Basophils Relative 1 %      Neutrophils Absolute 4 92 Thousands/µL      Immature Grans Absolute 0 03 Thousand/uL      Lymphocytes Absolute 2 05 Thousands/µL      Monocytes Absolute 1 00 Thousand/µL      Eosinophils Absolute 0 13 Thousand/µL      Basophils Absolute 0 06 Thousands/µL                  VAS lower limb venous duplex study, unilateral/limited   Final Result by Miguel A Law MD (06/11 2352)      XR foot 3+ views RIGHT   ED Interpretation by Belle Simth MD (06/11 1844)   No acute osseous abnormalities      Final Result by Natalia Tabares MD (06/11 2048)      No acute osseous abnormality              Workstation performed: [id number]         VAS lower limb arterial duplex, complete bilateral    (Results Pending)         Procedures  Procedures      Phone Consults  ED Phone Contact    ED Course  ED Course as of Jun 12 1800 Mon Jun 11, 2018 2008 Duplex was negative for DVT  [facility]   Patient's right lower extremity does not appear very erythematous  Also, when patient was pain in his legs off of the bed to the ground, there was more redness as opposed to when he kept it up on the bed  Patient says the pain is worse whenever he keeps his legs raised or on the bed  I am more concerned about a DVT in this right lower extremity  Less concerned about cellulitis  Will get x-rays of the foot as well as duplex of the right lower extremity  Will get CBC for  leukocytosis  Reassess  CritCare Time    Disposition  Final diagnoses:   Peripheral vascular disease ([facility] )     Time reflects when diagnosis was documented in both MDM as applicable and the Disposition within this note     Time User Action Codes Description Comment    6/11/2018  7:27 PM Nando Gonzalez Add [I73 9] Peripheral vascular disease ([facility] )     6/12/2018  1:35 PM Diann Saleh Arm ASHLEE Add [L03 115] Cellulitis of leg, right       ED Disposition     ED Disposition Condition Comment    Admit  Case was discussed with JONATHAN and the patient's admission status was agreed to be Admission Status: inpatient status to the service of Dr Chito Gonsalez          Follow-up Information     Follow up With Specialties Details Why [address], [address] [facility], Nurse Practitioner Follow up  [address]  [address]  [phone]      [facility]  Follow up Appt w/ Dr Sabrina Carr: 7/3/18 at 2:30pm [address] [place] [address]  [phone]            Discharge Medication List as of 6/12/2018  2:17 PM      START taking these medications    Details   oxyCODONE-acetaminophen (PERCOCET) 5-325 mg per tablet Take 1 tablet by mouth every 6 (six) hours as needed for moderate pain or severe pain for up to 12 days Earliest Fill Date: 6/12/18 Max Daily Amount: 4 tablets, Starting Tue 6/12/2018, Until Sun 6/24/2018, Print         CONTINUE these medications which have CHANGED    Details   cephalexin (KEFLEX) 500 mg capsule Take 1 capsule (500 mg total) by mouth every 6 (six) hours for 22 doses, Starting Tue 6/12/2018, Until Mon 6/18/2018, Normal         CONTINUE these medications which have NOT CHANGED    Details   baclofen 20 mg tablet Take 20 mg by mouth 3 (three) times a day, Historical Med      citalopram (CeleXA) 20 mg tablet Take 1 tablet (20 mg total) by mouth daily, Starting Thu 4/19/2018, Normal      gabapentin (NEURONTIN) 100 mg capsule Take 1 capsule (100 mg total) by mouth daily at bedtime, Starting Tue 5/8/2018, Normal      glimepiride (AMARYL) 1 mg tablet Take 1 tablet (1 mg total) by mouth daily, Starting Thu 4/19/2018, Normal      Incontinence Supply Disposable (ATTENDS UNDERWEAR 7 LARGE) MISC Starting Wed 4/4/2018, Historical Med      lisinopril (ZESTRIL) 10 mg tablet Take 1 tablet (10 mg total) by mouth daily, Starting Thu 4/19/2018, Normal      metFORMIN (GLUCOPHAGE) 1000 MG tablet Take 1 tablet (1,000 mg total) by mouth 2 (two) times a day with meals for 90 days, Starting Thu 4/19/2018, Until Wed 7/18/2018, Normal      potassium chloride (K-DUR,KLOR-CON) 20 mEq tablet Take 1 tablet (20 mEq total) by mouth daily, Starting Thu 3/15/2018, Normal      senna-docusate sodium (SENOKOT S) 8 6-50 mg per tablet Take 1 tablet by mouth daily at bedtime for 30 days, Starting 1/19/2017, Until Sat 2/18/17, Print      TiZANidine (ZANAFLEX) 2 MG capsule TAKE 1 TABLET BEFORE BED AS NEEDED ( tablets), Normal             Outpatient Discharge Orders  Discharge Diet     Activity as tolerated         ED Provider  Attending physically available and evaluated Nghia Tello  DORITA managed the patient along with the ED Attending      Electronically Signed by         Miguel Stout Marilin Colvin MD  06/12/18 8394

## 2018-06-11 NOTE — CONSULTS
Consultation - Vascular Surgery   Timmy Jones 59 y o  male MRN: [id number]  Unit/Bed#: ED 08 Encounter: [id number]    Assessment/Plan     Assessment:  62yM w/ PAD and cellulitis    Plan:  Antibiotic plan per medicine/ED  Follow up in vascular office for further workup and management of PAD    History of Present Illness     HPI:  Timmy Jones is a 59 y o  male who presents with right foot pain and cellulitis  MHx DM, HTN, COPD, and ambulation dysfunction 2/2 C4 injury (6 years ago)  Started over a week ago and was given keflex but pain and redness increased  We were consulted due to inability to palpate pulses and claudication history  He states he develops shin and calf pain during ambulation that resolves with rest  He has started developing right foot pain at night that improves with dangling the foot  He has abrasions to the top of his foot from hitting it on stairs due to ambulation problems  No toe wounds  Denies fever or chills  Inpatient consult to Vascular Surgery  Consult performed by: Simon Alcantar ordered by: Papi Chapin          Review of Systems   Constitutional: Negative for chills and fever  Respiratory: Negative for chest tightness and shortness of breath  Cardiovascular: Negative for chest pain and palpitations  Gastrointestinal: Negative for nausea and vomiting  Musculoskeletal: Positive for gait problem  Negative for neck pain  Skin: Positive for color change, rash and wound  Neurological: Negative for dizziness and light-headedness         Historical Information   Past Medical History:   Diagnosis Date    Chronic obstructive lung disease ([address] )     RESOLVED: 8/17/17    Concussion     LAST ASSESSED: 8/17/17    COPD (chronic obstructive pulmonary disease) ([address] )     Depression     Diabetes mellitus ([address] )     Diabetic neuropathy ([address] )     Difficulty attaining erection     LAST ASSESSEDl: 8/17/17    Elevated liver enzymes     Gall stone pancreatitis RESOLVED: 3/8/17    Gall stones     RESOLVED: 3/8/17    History of MRSA infection     Hypertension     LAST ASSESSED: 8/17/17    Spinal cord injury, C1-C7 ([address] )     Traumatic injury to musculoskeletal system     1-5 FLIGHT FALL DOWN THE STEPS - CERVICAL NECK INJURY - JAN 2, 2012; LAST ASSESSED: 8/13/71    Umbilical hernia without obstruction and without gangrene     RESOLVED: 3/8/17    Varicella     LAST ASSESSED: 8/17/17     Past Surgical History:   Procedure Laterality Date    BACK SURGERY      CERVICAL FUSION      VERTEBRAL; C3-C4 FUSION    CHOLECYSTECTOMY LAPAROSCOPIC N/A 2/24/2017    Procedure: CHOLECYSTECTOMY LAPAROSCOPIC;  Surgeon: Martina Gill MD;  [facility];  Service:     CHOLECYSTECTOMY LAPAROSCOPIC  03/2017    EPIGASTRIC HERNIA REPAIR      UMBILICAL HERNIA REPAIR N/A 2/24/2017    Procedure: REPAIR HERNIA UMBILICAL;  Surgeon: Martina Gill MD;  [facility];  Service:      Social History   History   Alcohol Use    Yes     Comment: 3-4 mixed vodka drinks/daily      History   Drug Use No     History   Smoking Status    Current Every Day Smoker    Packs/day: 0 50    Years: 40 00   Smokeless Tobacco    Never Used     Comment: 1 ppd      Family History: non-contributory    Meds/Allergies   all current active meds have been reviewed, current meds:   Current Facility-Administered Medications   Medication Dose Route Frequency    nicotine (NICODERM CQ) 14 mg/24hr TD 24 hr patch 14 mg  14 mg Transdermal Once    and PTA meds:   Prior to Admission Medications   Prescriptions Last Dose Informant Patient Reported? Taking?    Incontinence Supply Disposable (ATTENDS UNDERWEAR 7 LARGE) MISC  Self Yes No   TiZANidine (ZANAFLEX) 2 MG capsule 6/10/2018 at Unknown time  No Yes   Sig: TAKE 1 TABLET BEFORE BED AS NEEDED ( tablets)   baclofen 20 mg tablet 6/11/2018 at Unknown time Self Yes Yes   Sig: Take 20 mg by mouth 3 (three) times a day   citalopram (CeleXA) 20 mg tablet 6/11/2018 at Unknown time Self No Yes   Sig: Take 1 tablet (20 mg total) by mouth daily   gabapentin (NEURONTIN) 100 mg capsule 6/10/2018 at Unknown time  No Yes   Sig: Take 1 capsule (100 mg total) by mouth daily at bedtime   glimepiride (AMARYL) 1 mg tablet 6/11/2018 at Unknown time Self No Yes   Sig: Take 1 tablet (1 mg total) by mouth daily   lisinopril (ZESTRIL) 10 mg tablet 6/11/2018 at Unknown time Self No Yes   Sig: Take 1 tablet (10 mg total) by mouth daily   metFORMIN (GLUCOPHAGE) 1000 MG tablet 6/11/2018 at Unknown time Self No Yes   Sig: Take 1 tablet (1,000 mg total) by mouth 2 (two) times a day with meals for 90 days   potassium chloride (K-DUR,KLOR-CON) 20 mEq tablet 6/11/2018 at Unknown time Self No Yes   Sig: Take 1 tablet (20 mEq total) by mouth daily   senna-docusate sodium (SENOKOT S) 8 6-50 mg per tablet   No No   Sig: Take 1 tablet by mouth daily at bedtime for 30 days      Facility-Administered Medications: None     Allergies   Allergen Reactions    Seasonal Ic  [Cholestatin]        Objective   First Vitals:   Blood Pressure: 90/54 (06/11/18 1518)  Pulse: 95 (06/11/18 1518)  Temperature: 98 6 °F (37 °C) (06/11/18 1518)  Temp Source: Oral (06/11/18 1518)  Respirations: 18 (06/11/18 1518)  Weight - Scale: 81 6 kg (180 lb) (06/11/18 1518)  SpO2: 97 % (06/11/18 1518)    Current Vitals:   Blood Pressure: 122/62 (06/11/18 1900)  Pulse: 89 (06/11/18 1900)  Temperature: 98 6 °F (37 °C) (06/11/18 1518)  Temp Source: Oral (06/11/18 1518)  Respirations: 16 (06/11/18 1900)  Weight - Scale: 81 6 kg (180 lb) (06/11/18 1518)  SpO2: 94 % (06/11/18 1900)    No intake or output data in the 24 hours ending 06/11/18 1939    Invasive Devices     Peripheral Intravenous Line            Peripheral IV 06/11/18 Right Wrist less than 1 day                Physical Exam   Constitutional: He is oriented to person, place, and time  No distress  HENT:   Head: Normocephalic and atraumatic     Cardiovascular: Normal rate and regular rhythm  Pulmonary/Chest: Effort normal  No respiratory distress  Abdominal: Soft  There is no tenderness  Musculoskeletal: He exhibits no edema or deformity  Neurological: He is alert and oriented to person, place, and time  Skin: Rash noted  He is not diaphoretic  There is erythema  Psychiatric: He has a normal mood and affect  His behavior is normal  Judgment and thought content normal    Dopplerable DP and PT  Intact motor  Chronically diminished sensation bilaterally  Dependant rubor of right foot  Abrasions to top of right foot  Lab Results:   I have personally reviewed pertinent lab results  , CBC:   Lab Results   Component Value Date    WBC 8 19 06/11/2018    HGB 13 5 06/11/2018    HCT 39 4 06/11/2018     (H) 06/11/2018     06/11/2018    MCH 34 7 (H) 06/11/2018    MCHC 34 3 06/11/2018    RDW 12 5 06/11/2018    MPV 10 5 06/11/2018    NRBC 0 06/11/2018   , CMP:   Lab Results   Component Value Date     06/11/2018    K 4 6 06/11/2018     06/11/2018    CO2 27 06/11/2018    ANIONGAP 8 06/11/2018    BUN 20 06/11/2018    CREATININE 0 83 06/11/2018    GLUCOSE 188 (H) 06/11/2018    CALCIUM 8 9 06/11/2018    EGFR 96 06/11/2018     Imaging: I have personally reviewed pertinent reports   , I have personally reviewed pertinent films in PACS and I have personally reviewed pertinent films in PACS with a Radiologist   EKG, Pathology, and Other Studies: I have personally reviewed pertinent reports  Counseling / Coordination of Care  Total floor / unit time spent today 25 minutes  Greater than 50% of total time was spent with the patient and / or family counseling and / or coordination of care  A description of the counseling / coordination of care: 25

## 2018-06-12 ENCOUNTER — APPOINTMENT (OUTPATIENT)
Dept: NON INVASIVE DIAGNOSTICS | Facility: HOSPITAL | Age: 58
End: 2018-06-12
Payer: COMMERCIAL

## 2018-06-12 ENCOUNTER — TELEPHONE (OUTPATIENT)
Dept: FAMILY MEDICINE CLINIC | Facility: CLINIC | Age: 58
End: 2018-06-12

## 2018-06-12 VITALS
OXYGEN SATURATION: 94 % | SYSTOLIC BLOOD PRESSURE: 114 MMHG | TEMPERATURE: 97 F | RESPIRATION RATE: 16 BRPM | HEART RATE: 78 BPM | DIASTOLIC BLOOD PRESSURE: 68 MMHG | BODY MASS INDEX: 24.6 KG/M2 | HEIGHT: 70 IN | WEIGHT: 171.8 LBS

## 2018-06-12 LAB
ALBUMIN SERPL BCP-MCNC: 3.1 G/DL (ref 3.5–5)
ALP SERPL-CCNC: 48 U/L (ref 46–116)
ALT SERPL W P-5'-P-CCNC: 43 U/L (ref 12–78)
ANION GAP SERPL CALCULATED.3IONS-SCNC: 6 MMOL/L (ref 4–13)
AST SERPL W P-5'-P-CCNC: 21 U/L (ref 5–45)
BILIRUB SERPL-MCNC: 0.45 MG/DL (ref 0.2–1)
BILIRUB UR QL STRIP: NEGATIVE
BUN SERPL-MCNC: 17 MG/DL (ref 5–25)
CALCIUM SERPL-MCNC: 8.5 MG/DL (ref 8.3–10.1)
CHLORIDE SERPL-SCNC: 103 MMOL/L (ref 100–108)
CLARITY UR: CLEAR
CO2 SERPL-SCNC: 27 MMOL/L (ref 21–32)
COLOR UR: YELLOW
CREAT SERPL-MCNC: 0.73 MG/DL (ref 0.6–1.3)
ERYTHROCYTE [DISTWIDTH] IN BLOOD BY AUTOMATED COUNT: 12.2 % (ref 11.6–15.1)
GFR SERPL CREATININE-BSD FRML MDRD: 101 ML/MIN/1.73SQ M
GLUCOSE SERPL-MCNC: 202 MG/DL (ref 65–140)
GLUCOSE SERPL-MCNC: 249 MG/DL (ref 65–140)
GLUCOSE SERPL-MCNC: 273 MG/DL (ref 65–140)
GLUCOSE UR STRIP-MCNC: ABNORMAL MG/DL
HCT VFR BLD AUTO: 36.8 % (ref 36.5–49.3)
HGB BLD-MCNC: 12.3 G/DL (ref 12–17)
HGB UR QL STRIP.AUTO: NEGATIVE
KETONES UR STRIP-MCNC: NEGATIVE MG/DL
LEUKOCYTE ESTERASE UR QL STRIP: NEGATIVE
MAGNESIUM SERPL-MCNC: 1.7 MG/DL (ref 1.6–2.6)
MCH RBC QN AUTO: 34.4 PG (ref 26.8–34.3)
MCHC RBC AUTO-ENTMCNC: 33.4 G/DL (ref 31.4–37.4)
MCV RBC AUTO: 103 FL (ref 82–98)
NITRITE UR QL STRIP: NEGATIVE
PH UR STRIP.AUTO: 5 [PH] (ref 4.5–8)
PHOSPHATE SERPL-MCNC: 3.4 MG/DL (ref 2.3–4.1)
PLATELET # BLD AUTO: 177 THOUSANDS/UL (ref 149–390)
PMV BLD AUTO: 10.8 FL (ref 8.9–12.7)
POTASSIUM SERPL-SCNC: 4 MMOL/L (ref 3.5–5.3)
PROT SERPL-MCNC: 6.8 G/DL (ref 6.4–8.2)
PROT UR STRIP-MCNC: NEGATIVE MG/DL
RBC # BLD AUTO: 3.58 MILLION/UL (ref 3.88–5.62)
SODIUM SERPL-SCNC: 136 MMOL/L (ref 136–145)
SP GR UR STRIP.AUTO: 1.02 (ref 1–1.03)
UROBILINOGEN UR QL STRIP.AUTO: 0.2 E.U./DL
WBC # BLD AUTO: 6.41 THOUSAND/UL (ref 4.31–10.16)

## 2018-06-12 PROCEDURE — 82948 REAGENT STRIP/BLOOD GLUCOSE: CPT

## 2018-06-12 PROCEDURE — 93925 LOWER EXTREMITY STUDY: CPT | Performed by: SURGERY

## 2018-06-12 PROCEDURE — 84100 ASSAY OF PHOSPHORUS: CPT | Performed by: INTERNAL MEDICINE

## 2018-06-12 PROCEDURE — 81003 URINALYSIS AUTO W/O SCOPE: CPT | Performed by: INTERNAL MEDICINE

## 2018-06-12 PROCEDURE — 93925 LOWER EXTREMITY STUDY: CPT

## 2018-06-12 PROCEDURE — 83735 ASSAY OF MAGNESIUM: CPT | Performed by: INTERNAL MEDICINE

## 2018-06-12 PROCEDURE — 99217 PR OBSERVATION CARE DISCHARGE MANAGEMENT: CPT | Performed by: PHYSICIAN ASSISTANT

## 2018-06-12 PROCEDURE — 80053 COMPREHEN METABOLIC PANEL: CPT | Performed by: INTERNAL MEDICINE

## 2018-06-12 PROCEDURE — 99220 PR INITIAL OBSERVATION CARE/DAY 70 MINUTES: CPT | Performed by: INTERNAL MEDICINE

## 2018-06-12 PROCEDURE — 93922 UPR/L XTREMITY ART 2 LEVELS: CPT | Performed by: SURGERY

## 2018-06-12 PROCEDURE — 93923 UPR/LXTR ART STDY 3+ LVLS: CPT

## 2018-06-12 PROCEDURE — 99214 OFFICE O/P EST MOD 30 MIN: CPT | Performed by: SURGERY

## 2018-06-12 PROCEDURE — 85027 COMPLETE CBC AUTOMATED: CPT | Performed by: INTERNAL MEDICINE

## 2018-06-12 RX ORDER — CEPHALEXIN 500 MG/1
500 CAPSULE ORAL EVERY 6 HOURS SCHEDULED
Qty: 22 CAPSULE | Refills: 0 | Status: SHIPPED | OUTPATIENT
Start: 2018-06-12 | End: 2018-06-18

## 2018-06-12 RX ORDER — OXYCODONE HYDROCHLORIDE AND ACETAMINOPHEN 5; 325 MG/1; MG/1
1 TABLET ORAL EVERY 6 HOURS PRN
Qty: 12 TABLET | Refills: 0 | Status: SHIPPED | OUTPATIENT
Start: 2018-06-12 | End: 2018-06-24

## 2018-06-12 RX ORDER — CEPHALEXIN 500 MG/1
500 CAPSULE ORAL EVERY 6 HOURS SCHEDULED
Qty: 22 CAPSULE | Refills: 0 | Status: SHIPPED | OUTPATIENT
Start: 2018-06-12 | End: 2018-06-12

## 2018-06-12 RX ADMIN — CITALOPRAM HYDROBROMIDE 20 MG: 20 TABLET ORAL at 10:48

## 2018-06-12 RX ADMIN — GLIMEPIRIDE 1 MG: 1 TABLET ORAL at 10:47

## 2018-06-12 RX ADMIN — POTASSIUM CHLORIDE 20 MEQ: 1500 TABLET, EXTENDED RELEASE ORAL at 10:48

## 2018-06-12 RX ADMIN — ENOXAPARIN SODIUM 40 MG: 100 INJECTION SUBCUTANEOUS at 10:49

## 2018-06-12 RX ADMIN — NICOTINE 1 PATCH: 14 PATCH, EXTENDED RELEASE TRANSDERMAL at 10:06

## 2018-06-12 RX ADMIN — LISINOPRIL 10 MG: 10 TABLET ORAL at 10:48

## 2018-06-12 RX ADMIN — CEFAZOLIN SODIUM 2000 MG: 10 INJECTION, POWDER, FOR SOLUTION INTRAVENOUS at 00:17

## 2018-06-12 RX ADMIN — VANCOMYCIN HYDROCHLORIDE 1250 MG: 10 INJECTION, POWDER, LYOPHILIZED, FOR SOLUTION INTRAVENOUS at 10:56

## 2018-06-12 RX ADMIN — INSULIN LISPRO 4 UNITS: 100 INJECTION, SOLUTION INTRAVENOUS; SUBCUTANEOUS at 11:02

## 2018-06-12 RX ADMIN — INSULIN LISPRO 3 UNITS: 100 INJECTION, SOLUTION INTRAVENOUS; SUBCUTANEOUS at 06:18

## 2018-06-12 RX ADMIN — CEFAZOLIN SODIUM 2000 MG: 10 INJECTION, POWDER, FOR SOLUTION INTRAVENOUS at 10:01

## 2018-06-12 RX ADMIN — BACLOFEN 20 MG: 10 TABLET ORAL at 10:47

## 2018-06-12 RX ADMIN — METFORMIN HYDROCHLORIDE 1000 MG: 500 TABLET, FILM COATED ORAL at 10:48

## 2018-06-12 RX ADMIN — INSULIN LISPRO 1 UNITS: 100 INJECTION, SOLUTION INTRAVENOUS; SUBCUTANEOUS at 00:15

## 2018-06-12 NOTE — ASSESSMENT & PLAN NOTE
A1c for tomorrow  Will also place patient on insulin sliding scale  Will continue Amaryl and metformin

## 2018-06-12 NOTE — ASSESSMENT & PLAN NOTE
Patient treated with keflex 500mg BID and likely subtherapeutic dosing for SSTI  Patient was given vancomycin in ER and currently it seems that the redness is much improved  Currently on cefazolin  Patient does not have any fever and white count is not significantly elevated  No areas of drainage  Has underlying PAD and reports rest pain  Can continue keflex but increased to QID dosing for 5 more days

## 2018-06-12 NOTE — PLAN OF CARE
Problem: Potential for Falls  Goal: Patient will remain free of falls  INTERVENTIONS:  - Assess patient frequently for physical needs  -  Identify cognitive and physical deficits and behaviors that affect risk of falls    -  [place] fall precautions as indicated by assessment   - Educate patient/family on patient safety including physical limitations  - Instruct patient to call for assistance with activity based on assessment  - Modify environment to reduce risk of injury  - Consider OT/PT consult to assist with strengthening/mobility   Outcome: Progressing

## 2018-06-12 NOTE — PROGRESS NOTES
Progress Note - Vascular Surgery   Tomy Nielsen 59 y o  male MRN: [id number]  Unit/Bed#: CW2 217-01 Encounter: [id number]    Assessment:  62yM w/ PAD and R foot cellulitis    Plan:  Diet as tolerated  IV ancef/vanco  Will obtain ABIs, primary workup as outpt  Primary care per SLIM    Subjective/Objective   Chief Complaint:     Subjective: NAD  Per patient no changes in his feet  Right foot/ankle with mild blanching erythema and old scabbed wound  Objective:     Blood pressure 112/58, pulse 95, temperature 98 2 °F (36 8 °C), temperature source Oral, resp  rate 18, height 5' 10" (1 778 m), weight 77 9 kg (171 lb 12 8 oz), SpO2 93 %  ,Body mass index is 24 65 kg/m²  No intake or output data in the 24 hours ending 06/12/18 0530    Invasive Devices     Peripheral Intravenous Line            Peripheral IV 06/11/18 Right Wrist less than 1 day                Physical Exam: NAD  AAOX3  Normal respiratory effort  Soft, NT, ND  b/l feet with old wound, scabbed over  Right foot with mild blanching erythema  b/l feet motor-sensation intact  Decreased motor on right foot, cannot feel toes b/l    Lab, Imaging and other studies:  I have personally reviewed pertinent lab results    , CBC:   Lab Results   Component Value Date    WBC 8 19 06/11/2018    HGB 13 5 06/11/2018    HCT 39 4 06/11/2018     (H) 06/11/2018     06/11/2018    MCH 34 7 (H) 06/11/2018    MCHC 34 3 06/11/2018    RDW 12 5 06/11/2018    MPV 10 5 06/11/2018    NRBC 0 06/11/2018   , CMP:   Lab Results   Component Value Date     06/11/2018    K 4 6 06/11/2018     06/11/2018    CO2 27 06/11/2018    ANIONGAP 8 06/11/2018    BUN 20 06/11/2018    CREATININE 0 83 06/11/2018    GLUCOSE 188 (H) 06/11/2018    CALCIUM 8 9 06/11/2018    EGFR 96 06/11/2018     VTE Pharmacologic Prophylaxis: Enoxaparin (Lovenox)  VTE Mechanical Prophylaxis: sequential compression device

## 2018-06-12 NOTE — DISCHARGE SUMMARY
Discharge- Shaheed Steinberg 1960, 59 y o  male MRN: [id number]  Unit/Bed#: CW2 217-01 Encounter: [id number]  Primary Care Provider: CHERELLE Sy   Date and time admitted to hospital: 6/11/2018  5:13 PM    * Cellulitis of leg, right   Assessment & Plan    Patient treated with keflex 500mg BID and likely subtherapeutic dosing for SSTI  Patient was given vancomycin in ER and currently it seems that the redness is much improved  Currently on cefazolin  Patient does not have any fever and white count is not significantly elevated  No areas of drainage  Has underlying PAD and reports rest pain  Can continue keflex but increased to QID dosing for 5 more days  Tobacco use disorder   Assessment & Plan    Nicotine replacement therapy, recommend cessation         Type 2 diabetes mellitus with neurologic complication, without long-term current use of insulin (HCC)   Assessment & Plan    continue Amaryl and metformin  Chronic pain   Assessment & Plan    Patient will continue tizanidine  Prn percocet ordered  No Rx on PDMP noted  Peripheral vascular disease due to secondary diabetes mellitus [facility])   Assessment & Plan    Appreciate vascular surgery input, outpatient follow up  Bilateral LEADs today, results pending can f/u in office            Discharging Physician / Practitioner: Altaf Woodruff PA-C  PCP: Carri Gutierrez, [address] [place] [address]  Admission Date:   Admission Orders     Ordered        06/11/18 2022  Place in Observation (expected length of stay for this patient is less than two midnights)  Once             Discharge Date: 06/12/18    Resolved Problems  Date Reviewed: 6/12/2018    None          Consultations During Hospital Stay:  · Vascular surgery     Procedures Performed:   · LEADS    Significant Findings / Test Results:   · Right LE cellulitis   · PAD     Incidental Findings:   · none    Test Results Pending at Discharge (will require follow up):   · LEADs pending      Outpatient Tests Requested:  · Follow up with PCP  · Follow up with vascular surgery     Complications:  none    Reason for Admission: RLE pain and erythema     Hospital Course:     Timmy Jones is a 59 y o  male patient with PMH significant for DM2, spinal cord injury, tobacco abuse, PAD who originally presented to the hospital on 6/11/2018 due to RLE pain and erythema  He was treated with PO keflex 500mg BID x 6 days and noted no improvement  Patient imrpoved in house with IV vanco and cefazolin and can be discharge on PO keflex but increased dose for SSTI  He was eval by vascular surgery and LEADs were done to assess for PAD given only dopplerable pulses to LE  He was recommended to follow up with vascular outpatient for further workup  He is medically stable for d/c home  Patient and wife express understanding and agree with plan  Please see above list of diagnoses and related plan for additional information  Condition at Discharge: stable     Discharge Day Visit / Exam:     Subjective:  Feels better  Leg improved  Notes rest pain especially at night  Vitals: Blood Pressure: 114/68 (06/12/18 0737)  Pulse: 78 (06/12/18 0737)  Temperature: (!) 97 °F (36 1 °C) (06/12/18 0737)  Temp Source: Tympanic (06/12/18 0737)  Respirations: 16 (06/12/18 0737)  Height: 5' 10" (177 8 cm) (06/11/18 2100)  Weight - Scale: 77 9 kg (171 lb 12 8 oz) (06/11/18 2100)  SpO2: 94 % (06/12/18 0737)    Exam:   Physical Exam   Constitutional: He is oriented to person, place, and time  He appears well-developed  Chronically ill appearing    HENT:   Head: Normocephalic and atraumatic  Mouth/Throat: Oropharynx is clear and moist    Eyes: EOM are normal  No scleral icterus  Neck: Normal range of motion  Neck supple  Cardiovascular: Normal rate, regular rhythm and normal heart sounds  No murmur heard  Pulmonary/Chest: Effort normal and breath sounds normal    Abdominal: Soft   Bowel sounds are normal    Musculoskeletal: He exhibits no edema  Contracted    Neurological: He is alert and oriented to person, place, and time  Skin: Skin is warm and dry  There is erythema  Dependant rubor of right foot, Abrasions to top of right foot  Psychiatric: He has a normal mood and affect  Discussion with Family: spoke with wife     Discharge instructions/Information to patient and family:   See after visit summary for information provided to patient and family  Provisions for Follow-Up Care:  See after visit summary for information related to follow-up care and any pertinent home health orders  Disposition:     Home    For Discharges to [facility] SNF:   · Not Applicable to this Patient - Not Applicable to this Patient    Planned Readmission: non     Discharge Statement:  I spent 35 minutes discharging the patient  This time was spent on the day of discharge  I had direct contact with the patient on the day of discharge  Greater than 50% of the total time was spent examining patient, answering all patient questions, arranging and discussing plan of care with patient as well as directly providing post-discharge instructions  Additional time then spent on discharge activities  Discharge Medications:  See after visit summary for reconciled discharge medications provided to patient and family        ** Please Note: This note has been constructed using a voice recognition system **

## 2018-06-12 NOTE — ASSESSMENT & PLAN NOTE
Patient was given vancomycin here in the emergency room and currently it seems that the redness is much improved  Patient does not have any fever and white count is not significantly elevated  For this reason, as the patient's ulcer seem to be dry with no note of any weeping; will change to cephazolin

## 2018-06-12 NOTE — DISCHARGE INSTRUCTIONS
Take keflex 4x a day for 5 more days starting tomorrow (2 more doses today)  This was sent to the [facility] pharmacy in [place]  Follow up with PCP and vascular surgery       Cellulitis, Ambulatory Care   GENERAL INFORMATION:   Cellulitis  is a skin infection caused by bacteria  Common symptoms include the following:   · Fever    · A red, warm, swollen area on your skin    · Pain when the area is touched    · Bumps or blisters (abscess) that may drain pus    · Bumpy, raised skin that feels like an orange peel  Seek immediate care for the following symptoms:   · An increase in pain, redness, warmth, and size    · Red streaks coming from the infected area    · A thin, gray-brown discharge coming from your infected skin area    · A crackling under your skin when you touch it    · Purple dots or bumps on your skin, or bleeding under your skin    · New swelling and pain in your legs    · Sudden trouble breathing or chest pain  Treatment for cellulitis  may include medicines to treat the bacterial infection or decrease pain  The infection may need to be cleaned out  Damaged, dead, or infected tissue may need to be cut away to help your wound heal   Manage your symptoms:   · Elevate your wound above the level of your heart  as often as you can  This will help decrease swelling and pain  Prop your wound on pillows or blankets to keep it elevated comfortably  · Clean your wound as directed  You may need to wash the wound with soap and water  Look for signs of infection  · Wear pressure stockings as directed  The stockings are tight and put pressure on your legs  This improves blood flow and decreases swelling  Prevent cellulitis:   · Wash your hands often  Use soap and water  Wash your hands after you use the bathroom, change a child's diapers, or sneeze  Wash your hands before you prepare or eat food  Use lotion to prevent dry, cracked skin       · Do not share personal items, such as towels, clothing, and razors  · Clean exercise equipment  with germ-killing  before and after you use it  Follow up with your healthcare provider as directed:  Write down your questions so you remember to ask them during your visits  CARE AGREEMENT:   You have the right to help plan your care  Learn about your health condition and how it may be treated  Discuss treatment options with your caregivers to decide what care you want to receive  You always have the right to refuse treatment  The above information is an  only  It is not intended as medical advice for individual conditions or treatments  Talk to your doctor, nurse or pharmacist before following any medical regimen to see if it is safe and effective for you  © 2014 [address] is for End User's use only and may not be sold, redistributed or otherwise used for commercial purposes  All illustrations and images included in CareNotes® are the copyrighted property of [employer]  or Yasir Leslie  Peripheral Artery Disease   WHAT YOU NEED TO KNOW:   Peripheral artery disease (PAD) is narrow, weak, or blocked arteries  It may affect any arteries outside of your heart and brain  PAD is usually the result of a buildup of fat and cholesterol, also called plaque, along your artery walls  Inflammation, a blood clot, or abnormal cell growth could also block your arteries  PAD prevents normal blood flow to your legs and arms  You are at risk of an amputation if poor blood flow keeps wounds from healing or causes gangrene (tissue death)  Without treatment, PAD can also cause a heart attack or stroke  DISCHARGE INSTRUCTIONS:   Call 911 for the following:   · You have any of the following signs of a heart attack:      ? Squeezing, pressure, or pain in your chest that lasts longer than 5 minutes or returns     ? Discomfort or pain in your back, neck, jaw, stomach, or arm      ?  Trouble breathing     ? Nausea or vomiting     ? Lightheadedness or a sudden cold sweat, especially with chest pain or trouble breathing     · You have any of the following signs of a stroke:      ? Numbness or drooping on one side of your face      ? Weakness in an arm or leg     ? Confusion or difficulty speaking     ? Dizziness, a severe headache, or vision loss  Seek care immediately if:   · You have sores or wounds that will not heal       · You notice black or discolored skin on your arm or leg       · Your skin is cool to the touch  Contact your healthcare provider if:   · You have leg pain when you walk 1/8 mile (200 meters) or less, even with treatment       · Your legs are red, dry, or pale, even with treatment       · You have questions or concerns about your condition or care  Manage PAD:   · Walk for 30 to 60 minutes at least 4 times a week  Your healthcare provider may also refer you to an supervised exercise program  The program helps increase how far you can walk without pain  It also helps you stay active in normal daily activities and may prevent disability caused by PAD       · Do not smoke  Nicotine and other chemicals in cigarettes and cigars can worsen PAD  They can also increase your risk for a heart attack or stroke  Ask your healthcare provider for information if you currently smoke and need help to quit  E-cigarettes or smokeless tobacco still contain nicotine  Talk to your healthcare provider before you use these products       · Manage other health conditions  Take your medicines as directed and follow your healthcare provider's instructions if you have high blood pressure or high cholesterol  Perform foot care and check your blood sugar levels as directed if you have diabetes       · Eat heart healthy foods  Eat whole grains, fruits, and vegetables every day  Limit salt and high-fat foods  Ask your healthcare provider for more information on a heart healthy diet  Ask if you need to lose weight   Your healthcare provider can help you create a healthy weight-loss plan  Medicines:   · Antiplatelet medicine, such as aspirin, helps prevent blood clots and reduces the risk of a heart attack or stroke       · Statin medicine helps lower your cholesterol and prevents your PAD from getting worse       · Take your medicine as directed  Contact your healthcare provider if you think your medicine is not helping or if you have side effects  Tell him or her if you are allergic to any medicine  Keep a list of the medicines, vitamins, and herbs you take  Include the amounts, and when and why you take them  Bring the list or the pill bottles to follow-up visits  Carry your medicine list with you in case of an emergency  Follow up with your healthcare provider as directed: Write down your questions so you remember to ask them during your visits  © 2017 [address] Information is for End User's use only and may not be sold, redistributed or otherwise used for commercial purposes  All illustrations and images included in CareNotes® are the copyrighted property of [employer]  or Yasir Leslie  The above information is an  only  It is not intended as medical advice for individual conditions or treatments   Talk to your doctor, nurse or pharmacist before following any medical regimen to see if it is safe and effective for you

## 2018-06-12 NOTE — CASE MANAGEMENT
[facility] in the [place] by Yasir Leslie for 2017  Network Utilization Review Department  Phone: [phone]; Fax [phone]  ATTENTION: The Network Utilization Review Department is now centralized for our 7 Facilities  Please call with any questions or concerns to [phone] and carefully follow the prompts so that you are directed to the right person  All voicemails are confidential  Fax any determinations, approvals, denials, and requests for initial or continue stay review clinical to [phone]  Due to HIGH CALL volume, it would be easier if you could please send faxed requests to expedite your requests and in part, help us provide discharge notifications faster   /////////////////////////////////////////////////////////////////////////////////////////////////////////////////      Initial Clinical Review    ADMIT OBS on 6/11 @ 2023    Orders Placed This Encounter   Procedures    Place in Observation (expected length of stay for this patient is less than two midnights)     Standing Status:   Standing     Number of Occurrences:   1     Order Specific Question:   Admitting Physician     Answer:   Mich Booker [[id number]]     Order Specific Question:   Level of Care     Answer:   Med Surg [16]     Order Specific Question:   Bed Type     Answer:   [facility] [4]         ED: Date/Time/Mode of Arrival:   ED Arrival Information     Expected Arrival Acuity Means of Arrival Escorted By Service Admission Type    - 6/11/2018 15:03 Urgent Wheelchair Family Member General Medicine Urgent    Arrival Complaint    Cellulitis          Chief Complaint:   Chief Complaint   Patient presents with    Cellulitis     Patient reports he was diagnosed with cellultis of right leg and right foot approx 1 week ago at Urgent Care and is currently almost finished with abx  Patient states it seemed to flare up in the past couple of days  Patient denies fevers          History of Illness:   59 y o  male who has a past medical history significant for diabetes mellitus currently on Amaryl and insulin  Also he has a current tobacco use habit as well as peripheral vascular disease  History of hypertension and COPD  Essentially, the patient for the past week has been experiencing increased redness of the lower extremity from the area of the knee below  However it has progressed over the past 2 days especially within the past 24 hours despite Keflex therapy  Keflex was prescribed for approximately 7 days about a week ago with 1 more dose left  That said, the patient went to the emergency room because he was concerned that there was increased redness and pain    ED Vital Signs:   ED Triage Vitals [06/11/18 1518]   Temperature Pulse Respirations Blood Pressure SpO2   98 6 °F (37 °C) 95 18 90/54 97 %      Temp Source Heart Rate Source Patient Position - Orthostatic VS BP Location FiO2 (%)   Oral Monitor Sitting Left arm --      Pain Score       5        Wt Readings from Last 1 Encounters:   06/11/18 77 9 kg (171 lb 12 8 oz)     Abnormal Labs/Diagnostic Test Results:   Wbc  8 19    ED Treatment:   Medication Administration from 06/11/2018 1503 to 06/11/2018 2114       Date/Time Order Dose Route Action Action by Comments     06/11/2018 1853 ibuprofen (MOTRIN) tablet 400 mg 400 mg Oral Given Do Calvert RN      06/11/2018 1911 nicotine (NICODERM CQ) 14 mg/24hr TD 24 hr patch 14 mg 14 mg Transdermal Medication Applied Joyce Gan RN           Past Medical/Surgical History:    Active Ambulatory Problems     Diagnosis Date Noted    Chronic pain 08/28/2017    Depression 05/25/2016    Diabetic neuropathy ([facility] ) 05/25/2016    Enlarged prostate with lower urinary tract symptoms (LUTS) 07/03/2012    Excessive cerumen in left ear canal 08/28/2017    Gait disturbance 08/28/2017    Hernia, epigastric 02/16/2017    Essential hypertension 05/31/2016    Injury of cervical spinal cord ([facility] ) 05/25/2016    Tobacco use disorder 34/33/3195    Umbilical hernia 18/18/1593    Cervical myelopathy (HCC) 08/24/2016    Basal cell carcinoma in situ of skin 03/15/2018    Ambulatory dysfunction 04/17/2018    Lumbar radiculopathy 04/17/2018    Type 2 diabetes mellitus with neurologic complication, without long-term current use of insulin ([facility] ) 04/17/2018    Hypercalcemia 05/08/2018    Quadriplegia and quadriparesis ([place] [address] ) 08/23/2013    Alcohol abuse 05/08/2018     Resolved Ambulatory Problems     Diagnosis Date Noted    Alcohol-induced chronic pancreatitis ([place] [address] ) 01/19/2017    Hypokalemia 03/15/2018    SUNDEEP (acute kidney injury) ([place] [address] ) 04/17/2018    Diarrhea 04/17/2018     Past Medical History:   Diagnosis Date    Chronic obstructive lung disease ([place] [address] )     Concussion     COPD (chronic obstructive pulmonary disease) ([place] [address] )     Depression     Diabetes mellitus (HCC)     Diabetic neuropathy (HCC)     Difficulty attaining erection     Elevated liver enzymes     Gall stone pancreatitis     Gall stones     History of MRSA infection     Hypertension     Spinal cord injury, C1-C7 ([facility] )     Traumatic injury to musculoskeletal system     Umbilical hernia without obstruction and without gangrene     Varicella        Admitting Diagnosis: Cellulitis [L03 90]  Peripheral vascular disease ([place] [address] ) [I73 9]    Assessment/Plan:  * Cellulitis of leg, right   Assessment & Plan     Patient was given vancomycin here in the emergency room and currently it seems that the redness is much improved  Patient does not have any fever and white count is not significantly elevated    For this reason, as the patient's ulcer seem to be dry with no note of any weeping; will change to cephazolin           Peripheral vascular disease due to secondary diabetes mellitus [facility])   Assessment & Plan     Patient to continue seeing vascular surgery           Tobacco use disorder   Assessment & Plan     Nicotine replacement therapy         Chronic pain   Assessment & Plan     Patient will continue tizanidine  However, because of the acute pain; will give very conservative doses of morphine intravenously 2 mg every 3 hours as needed for breakthrough pain  Will also place patient on tramadol           Type 2 diabetes mellitus with neurologic complication, without long-term current use of insulin ([facility])   Assessment & Plan     A1c for tomorrow  Will also place patient on insulin sliding scale  Will continue Amaryl and metformin           Diabetic neuropathy ([address] )   Assessment & Plan     Patient to continue Amaryl and insulin  Will also get hemoglobin A1c                 VTE Prophylaxis: Enoxaparin (Lovenox)  / sequential compression device      Anticipated Length of Stay:  Patient will be admitted on an Observation basis with an anticipated length of stay of  less than 2 midnights     Justification for Hospital Stay: Please see detailed plans noted above      Admission Orders:  Admit med surg  Consult vascular surgery  VAS b/l LE  Arterial duplex  I/O  accucks w/sliding scale insulin  Out of bed as tolerated  Lo carb diet    Scheduled Meds:   Current Facility-Administered Medications:  acetaminophen 650 mg Oral Q6H PRN Ugo Harding MD    baclofen 20 mg Oral TID Ugo Harding MD    cefazolin 2,000 mg Intravenous Q8H Ugo Harding MD Last Rate: 2,000 mg (06/12/18 1001)   citalopram 20 mg Oral Daily Ugo Harding MD    docusate sodium 100 mg Oral BID PRN Ugo Harding MD    enoxaparin 40 mg Subcutaneous Daily Ugo Harding MD    gabapentin 100 mg Oral HS Ugo Harding MD    glimepiride 1 mg Oral Daily Ugo Harding MD    insulin lispro 1-5 Units Subcutaneous HS Ugo Harding MD    insulin lispro 1-6 Units Subcutaneous TID AC Ugo Harding MD    lisinopril 10 mg Oral Daily gUo Harding MD    metFORMIN 1,000 mg Oral BID With Meals Ugo Harding MD    morphine injection 2 mg Intravenous Q3H PRN Ugo Hardign MD nicotine 1 patch Transdermal Daily Sincere Holman MD    nicotine 14 mg Transdermal Once Roman Alvarez MD    ondansetron 4 mg Intravenous Q6H PRN Sincere Holman MD    potassium chloride 20 mEq Oral Daily Sincere Holman MD    tiZANidine 2 mg Oral TID PRN Sincere Holman MD    traMADol 50 mg Oral Q6H PRN Sincere Holman MD    vancomycin 15 mg/kg Intravenous Q12H Roman Alvarez MD Last Rate: 1,250 mg (06/11/18 2253)       6/11/2018  98 2   78   16    114/68           6/11  VASCULAR SURGERY      We were consulted due to inability to palpate pulses and claudication history  He states he develops shin and calf pain during ambulation that resolves with rest  He has started developing right foot pain at night that improves with dangling the foot  Assessment:  58yM w/ PAD and cellulitis   Plan:  Antibiotic plan per medicine/ED  Follow up in vascular office for further workup and management of PAD      6/12  VASCULAR SURGERY  Per patient no changes in his feet   Right foot/ankle with mild blanching erythema and old scabbed wound  Diet as tolerated  IV ancef/vanco  Will obtain ABIs, primary workup as outpt    DC WRITTEN

## 2018-06-12 NOTE — TELEPHONE ENCOUNTER
Leida Deal called to inform us that pt had cellulitis last week and was given antibiotics on Tuesday June 5th and was directed to take twice a day  She is calling now to inform us that cellulitis on right leg has increased/red/hot/painful pain 9/10    As of today pt was admitted yesterday

## 2018-06-12 NOTE — H&P
H&P- Guerrero Oro 1960, 59 y o  male MRN: [id number]    Unit/Bed#: CW2 217-01 Encounter: [id number]    Primary Care Provider: Jeannene Holter, CRNP   Date and time admitted to hospital: 6/11/2018  5:13 PM        * Cellulitis of leg, right   Assessment & Plan    Patient was given vancomycin here in the emergency room and currently it seems that the redness is much improved  Patient does not have any fever and white count is not significantly elevated  For this reason, as the patient's ulcer seem to be dry with no note of any weeping; will change to cephazolin  Peripheral vascular disease due to secondary diabetes mellitus [facility])   Assessment & Plan    Patient to continue seeing vascular surgery  Tobacco use disorder   Assessment & Plan    Nicotine replacement therapy  Chronic pain   Assessment & Plan    Patient will continue tizanidine  However, because of the acute pain; will give very conservative doses of morphine intravenously 2 mg every 3 hours as needed for breakthrough pain  Will also place patient on tramadol  Type 2 diabetes mellitus with neurologic complication, without long-term current use of insulin ([facility])   Assessment & Plan    A1c for tomorrow  Will also place patient on insulin sliding scale  Will continue Amaryl and metformin  Diabetic neuropathy [facility])   Assessment & Plan    Patient to continue Amaryl and insulin  Will also get hemoglobin A1c  VTE Prophylaxis: Enoxaparin (Lovenox)  / sequential compression device   Code Status: Level 1 - Full Code as discussed with patient in presence of wife in the room  POLST: There is no POLST form on file for this patient (pre-hospital)    Anticipated Length of Stay:  Patient will be admitted on an Observation basis with an anticipated length of stay of  less than 2 midnights  Justification for Hospital Stay: Please see detailed plans noted above      Chief Complaint:     Increasing right lower extremity redness and increased pain  History of Present Illness:  Jorge Matias is a 59 y o  male who has a past medical history significant for diabetes mellitus currently on Amaryl and insulin  Also he has a current tobacco use habit as well as peripheral vascular disease  History of hypertension and COPD  Essentially, the patient for the past week has been experiencing increased redness of the lower extremity from the area of the knee below  However it has progressed over the past 2 days especially within the past 24 hours despite Keflex therapy  Keflex was prescribed for approximately 7 days about a week ago with 1 more dose left  That said, the patient went to the emergency room because he was concerned that there was increased redness and pain  But the patient denies any fever  No one sick in the house  He also denies any cough or cold or dysuria  Review of Systems:    Constitutional:  Denies fever or chills   Eyes:  Denies change in visual acuity   HENT:  Denies nasal congestion or sore throat   Respiratory:  Denies cough or shortness of breath   Cardiovascular:  Denies chest pain or edema   GI:  Denies abdominal pain, nausea, vomiting, bloody stools or diarrhea   :  Denies dysuria   Musculoskeletal:  Right lower extremity pain especially from the area below knee going on word  Patient has chronic ulcers of that foot however they are currently dry  Claims to have erythema that is increasing      Integument:  Denies rash   Neurologic:  Denies headache, focal weakness or sensory changes   Endocrine:  Denies polyuria or polydipsia   Lymphatic:  Denies swollen glands   Psychiatric:  Denies depression or anxiety     Past Medical and Surgical History:   Past Medical History:   Diagnosis Date    Chronic obstructive lung disease ([facility] )     RESOLVED: 8/17/17    Concussion     LAST ASSESSED: 8/17/17    COPD (chronic obstructive pulmonary disease) ([facility] )     Depression     Diabetes mellitus ([facility] )  [facility]     Difficulty attaining erection     LAST ASSESSEDl: 8/17/17    Elevated liver enzymes     Gall stone pancreatitis     RESOLVED: 3/8/17    Gall stones     RESOLVED: 3/8/17    History of MRSA infection     Hypertension     LAST ASSESSED: 8/17/17    Spinal cord injury, C1-C7 ([facility] )     Traumatic injury to musculoskeletal system     1-5 FLIGHT FALL DOWN THE STEPS - CERVICAL NECK INJURY - JAN 2, 2012; LAST ASSESSED: 8/53/84    Umbilical hernia without obstruction and without gangrene     RESOLVED: 3/8/17    Varicella     LAST ASSESSED: 8/17/17     Past Surgical History:   Procedure Laterality Date    BACK SURGERY      CERVICAL FUSION      VERTEBRAL; C3-C4 FUSION    CHOLECYSTECTOMY LAPAROSCOPIC N/A 2/24/2017    Procedure: CHOLECYSTECTOMY LAPAROSCOPIC;  Surgeon: Ever Haas MD;  [facility]: [facility];  Service:     CHOLECYSTECTOMY LAPAROSCOPIC  03/2017    EPIGASTRIC HERNIA REPAIR      UMBILICAL HERNIA REPAIR N/A 2/24/2017    Procedure: REPAIR HERNIA UMBILICAL;  Surgeon: Ever Haas MD;  [facility];  Service:        Meds/Allergies:  Prescriptions Prior to Admission   Medication    baclofen 20 mg tablet    citalopram (CeleXA) 20 mg tablet    gabapentin (NEURONTIN) 100 mg capsule    glimepiride (AMARYL) 1 mg tablet    lisinopril (ZESTRIL) 10 mg tablet    metFORMIN (GLUCOPHAGE) 1000 MG tablet    potassium chloride (K-DUR,KLOR-CON) 20 mEq tablet    TiZANidine (ZANAFLEX) 2 MG capsule    Incontinence Supply Disposable (ATTENDS UNDERWEAR 7 LARGE) MISC    senna-docusate sodium (SENOKOT S) 8 6-50 mg per tablet       Allergies: Allergies   Allergen Reactions    Seasonal Ic  [Cholestatin]      History:  Marital Status: /Civil Union   Occupation:  He is currently disabled but used to work in multiple jobs    Patient Pre-hospital Living Situation:  Lives at home  Patient Pre-hospital Level of Mobility:  Ambulatory  Patient Pre-hospital Diet Restrictions: Cardiac and diabetic  Substance Use History:   History   Alcohol Use    Yes     Comment: 3-4 mixed vodka drinks/daily      History   Smoking Status    Current Every Day Smoker    Packs/day: 0 50    Years: 40 00   Smokeless Tobacco    Never Used     Comment: 1 ppd      History   Drug Use No       Family History:  Family History   Problem Relation Age of Onset    Hypertension Mother      BENIGN    Alzheimer's disease Father     Heart disease Father      CARDIAC DISORDER    Stroke Father     Heart attack Father     No Known Problems Brother        Physical Exam:     Vitals:   Blood Pressure: 112/58 (06/11/18 2300)  Pulse: 95 (06/11/18 2300)  Temperature: 98 2 °F (36 8 °C) (06/11/18 2300)  Temp Source: Oral (06/11/18 2300)  Respirations: 18 (06/11/18 2300)  Height: 5' 10" (177 8 cm) (06/11/18 2100)  Weight - Scale: 77 9 kg (171 lb 12 8 oz) (06/11/18 2100)  SpO2: 93 % (06/11/18 2300)    Constitutional:  Well developed, well nourished, no acute distress, non-toxic appearance but overly anxious and currently complaining of right lower extremity pain  Eyes:  PERRL, conjunctiva normal   HENT:  Atraumatic, external ears normal, nose normal, oropharynx moist, no pharyngeal exudates  Neck- normal range of motion, no tenderness, supple   Respiratory:  No respiratory distress, normal breath sounds, no rales, no wheezing   Cardiovascular:  Normal rate, normal rhythm, no murmurs, no gallops, no rubs   GI:  Soft, nondistended, normal bowel sounds, nontender, no organomegaly, no mass, no rebound, no guarding   :  No costovertebral angle tenderness   Musculoskeletal:  No edema, no deformities noted with noted redness of the right lower extremity especially approximately below midline of the leg going forward  Patient also has ulcerations however they are dry  Non foul smelling   Back- no tenderness  Integument:  Well hydrated, no rash   Lymphatic:  No lymphadenopathy noted   Neurologic:  Alert &awake, communicative, CN 2-12 normal, normal motor function, normal sensory function, no focal deficits noted   Psychiatric:  Speech and behavior appropriate but with anxiety      Lab Results: I have personally reviewed pertinent reports  Results from last 7 days  Lab Units 06/11/18  1809   WBC Thousand/uL 8 19   HEMOGLOBIN g/dL 13 5   HEMATOCRIT % 39 4   PLATELETS Thousands/uL 212   NEUTROS PCT % 60   LYMPHS PCT % 25   MONOS PCT % 12   EOS PCT % 2       Results from last 7 days  Lab Units 06/11/18  1809   SODIUM mmol/L 137   POTASSIUM mmol/L 4 6   CHLORIDE mmol/L 102   CO2 mmol/L 27   BUN mg/dL 20   CREATININE mg/dL 0 83   CALCIUM mg/dL 8 9   GLUCOSE RANDOM mg/dL 188*       Results from last 7 days  Lab Units 06/11/18  1809   INR  0 97         Imaging: I have personally reviewed pertinent reports  Xr Foot 3+ Views Right    Result Date: 6/11/2018  Narrative: RIGHT FOOT INDICATION:   pain, redness, swelling  COMPARISON:  None VIEWS:  XR FOOT 3+ VW RIGHT FINDINGS: There is no acute fracture or dislocation  Mild degenerative changes  No lytic or blastic lesions seen  Soft tissues are unremarkable  Vascular calcifications  Impression: No acute osseous abnormality  Workstation performed: [id number]     Vas Lower Limb Venous Duplex Study, Unilateral/limited    Result Date: 6/11/2018  Narrative:  [facility] REPORT CLINICAL: Indications: Patient presents with right lower extremity edema and pain x 2 weeks  Operative History: back surgery for spinal cord injury The patient has history of smoking (current) 1 ppd  Clinical: Right Lower Limb There is complaint of pain, edema and pigmentation  FINDINGS:  Segment  Right            Left              Impression       Impression       CFV      Normal (Patent)  Normal (Patent)     CONCLUSION: Impression: RIGHT LOWER LIMB No evidence of acute or chronic deep vein thrombosis   No evidence of superficial thrombophlebitis noted   Doppler evaluation shows a normal response to augmentation maneuvers  Popliteal, posterior tibial and anterior tibial arterial Doppler waveforms are monophasic  LEFT LOWER LIMB LIMITED Evaluation shows no evidence of thrombus in the common femoral vein  Doppler evaluation shows a normal response to augmentation maneuvers  Note: Difficult exam due to patient's inability to stop moving leg due to pain and half of the exam was performed with his legs dangling off the bed to relieve some pain  Technical findings were given to Dr Damaris Bridges at 8:07 PM during the time of the exam   SIGNATURE: Electronically Signed by: Luis Felipe Castrejon MD, [address] on 2018-06-11 11:52:14 PM        ** Please Note: Dragon 360 Dictation voice to text software was used in the creation of this document   **

## 2018-06-12 NOTE — ASSESSMENT & PLAN NOTE
Patient will continue tizanidine  However, because of the acute pain; will give very conservative doses of morphine intravenously 2 mg every 3 hours as needed for breakthrough pain  Will also place patient on tramadol

## 2018-06-19 ENCOUNTER — TELEPHONE (OUTPATIENT)
Dept: ADMINISTRATIVE | Facility: HOSPITAL | Age: 58
End: 2018-06-19

## 2018-06-21 ENCOUNTER — TELEPHONE (OUTPATIENT)
Dept: FAMILY MEDICINE CLINIC | Facility: CLINIC | Age: 58
End: 2018-06-21

## 2018-06-21 NOTE — TELEPHONE ENCOUNTER
Call pt - ask him if he has any fever/ chills/ redness/ warmth in his leg - if not  - okay to wait until he is seen by vascular next week

## 2018-06-25 RX ORDER — BLOOD SUGAR DIAGNOSTIC
STRIP MISCELLANEOUS
Refills: 11 | OUTPATIENT
Start: 2018-06-25

## 2018-06-26 ENCOUNTER — TELEPHONE (OUTPATIENT)
Dept: FAMILY MEDICINE CLINIC | Facility: CLINIC | Age: 58
End: 2018-06-26

## 2018-06-26 DIAGNOSIS — M62.838 MUSCLE SPASTICITY: Primary | ICD-10-CM

## 2018-06-26 RX ORDER — TIZANIDINE 2 MG/1
TABLET ORAL
Qty: 90 TABLET | Refills: 0 | Status: SHIPPED | OUTPATIENT
Start: 2018-06-26 | End: 2018-08-16 | Stop reason: SDUPTHER

## 2018-06-26 NOTE — TELEPHONE ENCOUNTER
Received a message from [address] from [facility] they received a order for a nurse and she went out yesterday and to see pt about concern of some area on his right lower leg that were scabbed over, some redness, pt just finished antibiotics on Tuesday due to cellulitis of same leg  Pt has 3 areas in his right knee has some redness to surrounding skin, not sure if you wanted to see him, pt has an appt with vascular surgeon this Thursday, pt is cleansing area with soap and water, using neosporin  Areas are not draining mostly dry and scabbed over but does not have very good circulation and concerned about cellulitis    A nurse will be out again on Monday to assess leg  Please let her if you would want to see him or order something else done      [phone] Contact number for Moises Gutiérrez

## 2018-06-28 ENCOUNTER — DOCUMENTATION (OUTPATIENT)
Dept: VASCULAR SURGERY | Facility: CLINIC | Age: 58
End: 2018-06-28

## 2018-06-28 ENCOUNTER — OFFICE VISIT (OUTPATIENT)
Dept: VASCULAR SURGERY | Facility: CLINIC | Age: 58
End: 2018-06-28
Payer: COMMERCIAL

## 2018-06-28 VITALS
RESPIRATION RATE: 20 BRPM | TEMPERATURE: 97.6 F | HEIGHT: 70 IN | SYSTOLIC BLOOD PRESSURE: 116 MMHG | HEART RATE: 74 BPM | DIASTOLIC BLOOD PRESSURE: 68 MMHG

## 2018-06-28 DIAGNOSIS — E13.51 PERIPHERAL VASCULAR DISEASE DUE TO SECONDARY DIABETES MELLITUS (HCC): Primary | ICD-10-CM

## 2018-06-28 PROCEDURE — 99205 OFFICE O/P NEW HI 60 MIN: CPT | Performed by: PHYSICIAN ASSISTANT

## 2018-06-28 PROCEDURE — 1111F DSCHRG MED/CURRENT MED MERGE: CPT | Performed by: PHYSICIAN ASSISTANT

## 2018-06-28 RX ORDER — PRAVASTATIN SODIUM 20 MG
20 TABLET ORAL DAILY
Qty: 30 TABLET | Refills: 2 | Status: SHIPPED | OUTPATIENT
Start: 2018-06-28 | End: 2018-10-13 | Stop reason: HOSPADM

## 2018-06-28 RX ORDER — ASPIRIN 81 MG/1
81 TABLET ORAL DAILY
Qty: 30 TABLET | Refills: 2 | Status: SHIPPED | OUTPATIENT
Start: 2018-06-28 | End: 2018-08-27 | Stop reason: ALTCHOICE

## 2018-06-28 NOTE — ASSESSMENT & PLAN NOTE
Peripheral arterial disease with wounds    Diabetic, PAD with RIGHT leg rest pain in patient with non-healing wounds  There is bilateral SFA and tibioperoneal disease  NATHAN below healing on the right side  Exam R/L 1+ Fem; no distal pulses; 3+  Edema; multiple excoriations R> L (R knee, R leg; , redness ; L 2+ edema  LEADS 06/12/18:    R > 75% distal CFA  High grade v occlusion v SFA occlusion with evidence of tibioperoneal disease  NATHAN 0 38/116/38   L High grade stenosis v occlusion of the SFA  Evidence of tibioperoneal disease  NATHAN 0 65/103/81     We had a detailed discussion regarding his complicated history, PAD and wounds  I am not sure how much of his symptoms are related to PAD and how much might be related to neuropathy and other problems  However, given wounds, worsened/rest pain and low NATHAN, I discussed A-gram of the RIGHT Leg with eye toward intervention with them  We discussed the indications, risks and benefits and he agrees to proceed  We discussed smoking cessation, as well as medical therapy for PAD  He will need anesthesia due to chronic pain and spasms  Renal function is ok, but as a diabetic, I have asked him to hydrate  1 day before and 2 days after procedure  Must stop smoking; He should be on ASA and statin  Recommendations:     -     A-gram: Will set up for lower extremity angiogram with eye toward RIGHT leg intervention as soon as it can be scheduled  We discussed the indications, risks and benefits of angiogram and patient agrees to proceed  Keep well-hydrated for day prior and 2 days after procedure  -      Recommend anesthesia for procedure  - 06/12/18 BUN 17/0 73; eGFR 101  - > 1 ppd; Must stop smoking; going to PCP for Chantex  - He is not on aspirin or statin therapy  Add asa 81 and pravastatin 20 (LFTS ok;  Tg 138 H63 L 48)  - Talk to prescriber of pain Rx to optimize pain meds; AVOID ibuprofen       -     Decrease ETOH

## 2018-06-28 NOTE — LETTER
June 28, 2018     Mira Christy, [address]   [address] [facility]  [place] [address]    Patient: Bree Rowley   YOB: 1960   Date of Visit: 6/28/2018     Dear Dr Rosa Lopez      Thank you for referring Nikolay Jennings to me for evaluation  Below are the relevant portions of my assessment and plan of care  If you have questions, please do not hesitate to call me  I look forward to following [facility] along with you  Sincerely,        Doc Stark PA-C        CC: No Recipients    Progress Notes:    Diabetic, PAD with RIGHT leg rest pain in patient with non-healing wounds  There is bilateral SFA and tibioperoneal disease  NATHAN below healing on the right side                   Exam R/L 1+ Fem; no distal pulses; 3+  Edema; multiple excoriations R> L (R knee, R leg; , redness ; L 2+ edema  LEADS 06/12/18:               R > 75% distal CFA  High grade v occlusion v SFA occlusion with evidence of tibioperoneal disease  NATHAN 0 38/116/38              L High grade stenosis v occlusion of the SFA  Evidence of tibioperoneal disease  NATHAN 0 65/103/81                 We had a detailed discussion regarding his complicated history, PAD and wounds  I am not sure how much of his symptoms are related to PAD and how much might be related to neuropathy and other problems  However, given wounds, worsened/rest pain and low NATHAN, I discussed A-gram of the RIGHT Leg with eye toward intervention with them  We discussed the indications, risks and benefits and he agrees to proceed  We discussed smoking cessation, as well as medical therapy for PAD  He will need anesthesia due to chronic pain and spasms  Renal function is ok, but as a diabetic, I have asked him to hydrate  1 day before and 2 days after procedure                   Must stop smoking;                He should be on ASA and statin       Recommendations:      -     A-gram: Will set up for lower extremity angiogram with eye toward RIGHT leg intervention as soon as it can be scheduled  We discussed the indications, risks and benefits of angiogram and patient agrees to proceed  Keep well-hydrated for day prior and 2 days after procedure       -      Recommend anesthesia for procedure  · 06/12/18 BUN 17/0 73; eGFR 101  · > 1 ppd; Must stop smoking; going to PCP for Chantex  · He is not on aspirin or statin therapy  Add asa 81 and pravastatin 20 (LFTS ok;  Tg 138 H63 L 48)  · Talk to prescriber of pain Rx to optimize pain meds; AVOID ibuprofen                 -     Decrease ETOH

## 2018-06-28 NOTE — PATIENT INSTRUCTIONS
Peripheral arterial disease with wounds  Diabetic, PAD with R LEG rest pain, bilateral SFA and tibioperoneal disease with NATHAN below healing on the right side  No popliteal or distal pulses  LE  Wounds    -     Will set up for lower extremity angiogram with eye toward RIGHT leg intervention as soon as it can be scheduled  We discussed the indications, risks and benefits of angiogram and patient agrees to proceed  Keep well-hydrated for day prior and 2 days after procedure  -     Recommend anesthesia for procedure  - On 06/12/18 BUN 17/0 73; eGFR 101  - > 1 ppd; Must stop smoking  Plans to get Chantex or aid    - Medical therapy includes Lisinopril 10    - Total cholesterol 138 Triglycerides 138 HDL 63 LDL 48  - He is not on aspirin or statin therapy  Add asa 81 and pravastatin 20   - Try Tylenol for pain over pain  Peripheral Artery Disease   AMBULATORY CARE:   Peripheral artery disease (PAD)  is narrow, weak, or blocked arteries  It may affect any arteries outside of your heart and brain  PAD is usually the result of a buildup of fat and cholesterol, also called plaque, along your artery walls  Inflammation, a blood clot, or abnormal cell growth could also block your arteries  PAD prevents normal blood flow to your legs and arms  You are at risk of an amputation if poor blood flow keeps wounds from healing or causes gangrene (tissue death)  Without treatment, PAD can also cause a heart attack or stroke  Common symptoms include:  Mild PAD usually does not cause symptoms   As the disease worsens over time, you may have the following:  · Pain or cramps in your leg or hip while you walk     · A numb, weak, or heavy feeling in your legs     · Dry, scaly, red, or pale skin on your legs     · Thick or brittle nails, or hair loss on your arms and legs     · Foot sores that will not heal     · Burning or aching in your feet and toes while resting (this may be worse when you lie down)  Call 911 for the following:   · You have any of the following signs of a heart attack:      ¨ Squeezing, pressure, or pain in your chest that lasts longer than 5 minutes or returns    ¨ Discomfort or pain in your back, neck, jaw, stomach, or arm     ¨ Trouble breathing    ¨ Nausea or vomiting    ¨ Lightheadedness or a sudden cold sweat, especially with chest pain or trouble breathing    · You have any of the following signs of a stroke:      ¨ Numbness or drooping on one side of your face     ¨ Weakness in an arm or leg    ¨ Confusion or difficulty speaking    ¨ Dizziness, a severe headache, or vision loss  Seek care immediately if:   · You have sores or wounds that will not heal      · You notice black or discolored skin on your arm or leg  · Your skin is cool to the touch  Contact your healthcare provider if:   · You have leg pain when you walk 1/8 mile (200 meters) or less, even with treatment  · Your legs are red, dry, or pale, even with treatment  · You have questions or concerns about your condition or care  Treatment for PAD  can help reduce your risk of a heart attack, stroke, or amputation  You may need more than one of the following:  · Medicines  may be given to prevent blood clots and reduce the risk of a heart attack or stroke  You may be given medicine to help prevent your PAD from getting worse  · A supervised exercise program  helps you stay active in normal daily activities and may prevent disability  Healthcare providers will help you safely walk or do strength training exercises 3 times a week for 30 to 60 minutes  You will do this for several months, then transition to walking on your own  · Angioplasty  is a procedure to open your artery so blood can flow through normally  A thin tube called a catheter is used to insert a small balloon into your artery  The balloon is inflated to open your blocked artery, and then removed  A tube called a stent may be placed in your artery to hold it open  · Bypass surgery  is used to make a new connection to your artery with a vein from another part of your body, or an artificial graft  The vein or graft is attached to your artery above and below your blockage  This allows blood to flow around the blocked portion of your artery  Manage and prevent PAD:   · Walk for 30 to 60 minutes at least 4 times a week  Your healthcare provider may also refer you to an supervised exercise program  The program helps increase how far you can walk without pain  It also helps you stay active in normal daily activities and may prevent disability caused by PAD  · Do not smoke  Nicotine and other chemicals in cigarettes and cigars can worsen PAD  They can also increase your risk for a heart attack or stroke  Ask your healthcare provider for information if you currently smoke and need help to quit  E-cigarettes or smokeless tobacco still contain nicotine  Talk to your healthcare provider before you use these products  · Manage other health conditions  Take your medicines as directed and follow your healthcare provider's instructions if you have high blood pressure or high cholesterol  Perform foot care and check your blood sugar levels as directed if you have diabetes  · Eat heart healthy foods  Eat whole grains, fruits, and vegetables every day  Limit salt and high-fat foods  Ask your healthcare provider for more information on a heart healthy diet  Ask if you need to lose weight  Your healthcare provider can help you create a healthy weight-loss plan  Follow up with your healthcare provider as directed:  Write down your questions so you remember to ask them during your visits  © 2017 [address] Information is for End User's use only and may not be sold, redistributed or otherwise used for commercial purposes  All illustrations and images included in CareNotes® are the copyrighted property of [employer]  or Yasir Leslie    The above information is an  only  It is not intended as medical advice for individual conditions or treatments  Talk to your doctor, nurse or pharmacist before following any medical regimen to see if it is safe and effective for you

## 2018-06-28 NOTE — PROGRESS NOTES
Assessment/Plan:    Peripheral vascular disease due to secondary diabetes mellitus ([address] )   Peripheral arterial disease with wounds    Diabetic, PAD with RIGHT leg rest pain in patient with non-healing wounds  There is bilateral SFA and tibioperoneal disease  NATHAN below healing on the right side  Exam R/L 1+ Fem; no distal pulses; 3+  Edema; multiple excoriations R> L (R knee, R leg; , redness ; L 2+ edema  LEADS 06/12/18:    R > 75% distal CFA  High grade v occlusion v SFA occlusion with evidence of tibioperoneal disease  NATHAN 0 38/116/38   L High grade stenosis v occlusion of the SFA  Evidence of tibioperoneal disease  NATHAN 0 65/103/81     We had a detailed discussion regarding his complicated history, PAD and wounds  I am not sure how much of his symptoms are related to PAD and how much might be related to neuropathy and other problems  However, given wounds, worsened/rest pain and low NATHAN, I discussed A-gram of the RIGHT Leg with eye toward intervention with them  We discussed the indications, risks and benefits and he agrees to proceed  We discussed smoking cessation, as well as medical therapy for PAD  He will need anesthesia due to chronic pain and spasms  Renal function is ok, but as a diabetic, I have asked him to hydrate  1 day before and 2 days after procedure  Must stop smoking; He should be on ASA and statin  Recommendations:     -     A-gram: Will set up for lower extremity angiogram with eye toward RIGHT leg intervention as soon as it can be scheduled  We discussed the indications, risks and benefits of angiogram and patient agrees to proceed  Keep well-hydrated for day prior and 2 days after procedure  -      Recommend anesthesia for procedure  - 06/12/18 BUN 17/0 73; eGFR 101  - > 1 ppd; Must stop smoking; going to PCP for Chantex  - He is not on aspirin or statin therapy   Add asa 81 and pravastatin 20 (LFTS ok;  Tg 138 H63 L 48)  - Talk to prescriber of pain Rx to optimize pain meds; AVOID ibuprofen  -     Decrease ETOH         RE:  Non-healing wounds; peripheral arterial disease  "I want to walk "     Patient ID: Ruby Pillai is a 59 y o  male  HPI     Mr Alexus Marcum PMH s/f DM requiring insulin, neuropathy, HTN, COPD/ tobacco addiction and chronic pain (tizanidine, tramadol, morphine prn) with Hx spine surgery and limited mobiity who presents as new patient for evaluation of peripheral arterial disease  He was in the ED twice this month for R Leg cellulitis with dry ulcers sustained after multiple falls  WBC 6 41  He received vancomycin in ED and changed to cephazolin  He under went vascular studies which reveal bilateral SFA and tibioperoneal disease with NATHAN below healing on the right side  Odin Chavez is accompanied by his wife today  He has significant baseline ambulatory dysfx and falls frequently  He was ambulating with a walking describing claudication (RIGHT calf pain) up to a couple of months ago  In the past 2 months, he developed rest pain with c/o R heel and back of calf pain  During the night he has "thumping and sharp pain" in the R leg  He hangs legs over bed with minimal improvement  In the past month, he sustained multiple wounds bilaterally R> L, developed R> L edema and cellulitis with wounds/exoriations that are healing  The wounds were open and weeping but no longer after treatment with ATB, though his legs remain red and edematous with dry wounds  He has been taking IB 4 x per day for this pain, in addition to his other meds  No fevers, chills  Baseline diabetic neuropathy  Pain is all in R leg  There is no L leg pain and that leg looks like it is healing better       The following portions of the patient's history were reviewed and updated as appropriate: allergies, current medications, past family history, past medical history, past social history, past surgical history and problem list     PMH:  C4 injury 6 years ago with ambulatory dyfx  Worsening ambulatory dyfx - weakness  Skin CA - lesion on R arm to be removed  No CP, SOB  No problems with bleeding  No blood cts, transfusion  He does drink a fair amount of alcohol  Review of Systems   HENT: Negative  Eyes: Negative  Respiratory: Negative  Cardiovascular: Positive for leg swelling  Gastrointestinal: Negative  Endocrine: Negative  Genitourinary: Negative  Musculoskeletal: Positive for gait problem  Leg pain, leg cramping with walking  Skin: Positive for wound  Allergic/Immunologic: Negative  Neurological: Positive for weakness and numbness  Hematological: Negative  Psychiatric/Behavioral:        Depression        Objective:    /68 (BP Location: Left arm, Patient Position: Sitting, Cuff Size: Standard)   Pulse 74   Temp 97 6 °F (36 4 °C)   Resp 20   Ht 5' 10" (1 778 m)      Physical Exam   Constitutional: He is oriented to person, place, and time  He appears well-developed and well-nourished  He is cooperative  HENT:   Head: Normocephalic and atraumatic  Eyes: EOM are normal  Pupils are equal, round, and reactive to light  Neck: Trachea normal  Neck supple  No JVD present  No thyromegaly present  Cardiovascular: Normal rate, regular rhythm, S1 normal, S2 normal and normal heart sounds  Exam reveals no gallop and no friction rub  No murmur heard  Pulses:       Carotid pulses are 2+ on the right side, and 2+ on the left side  Radial pulses are 2+ on the right side, and 2+ on the left side  Femoral pulses are 1+ on the right side, and 1+ on the left side  Popliteal pulses are 0 on the right side, and 0 on the left side  Dorsalis pedis pulses are 0 on the right side, and 0 on the left side  Posterior tibial pulses are 0 on the right side, and 0 on the left side  R/L 1+ Fem pulses (difficult to assess in chair); there are no distal pulses  R 3+  Edema  L 2+ Edema       Multiple excoriations over upper and lower extremities  R> L legs, excoriations and dry wounds  The RIGHT leg is erythematous to the knee  Pulmonary/Chest: Effort normal  No accessory muscle usage  No respiratory distress  He has no wheezes  He has no rales  Decreased BS; no active w/r/r   Abdominal: Soft  Bowel sounds are normal  He exhibits no distension  There is no hepatosplenomegaly  There is no tenderness  Musculoskeletal: Normal range of motion  He exhibits edema  He exhibits no deformity  Examined in wheel chair  Hands are deformed with limited ROM due to spine disease   Neurological: He is alert and oriented to person, place, and time  Grossly normal    Skin: Skin is warm and dry  No lesion noted  There is erythema  No cyanosis  Nails show no clubbing  Psychiatric: He has a normal mood and affect  Nursing note and vitals reviewed          Vitals:    06/28/18 1012   BP: 116/68   BP Location: Left arm   Patient Position: Sitting   Cuff Size: Standard   Pulse: 74   Resp: 20   Temp: 97 6 °F (36 4 °C)   Height: 5' 10" (1 778 m)       Patient Active Problem List   Diagnosis    Chronic pain    Depression    Diabetic neuropathy (HCC)    Enlarged prostate with lower urinary tract symptoms (LUTS)    Excessive cerumen in left ear canal    Gait disturbance    Hernia, epigastric    Essential hypertension    Injury of cervical spinal cord (HCC)    Tobacco use disorder    Umbilical hernia    Cervical myelopathy (HCC)    Basal cell carcinoma in situ of skin    Ambulatory dysfunction    Lumbar radiculopathy    Type 2 diabetes mellitus with neurologic complication, without long-term current use of insulin (HCC)    Hypercalcemia    Quadriplegia and quadriparesis (HCC)    Alcohol abuse    Cellulitis of leg, right    Peripheral vascular disease due to secondary diabetes mellitus ([address] )       Past Surgical History:   Procedure Laterality Date    BACK SURGERY      CERVICAL FUSION VERTEBRAL; C3-C4 FUSION    CHOLECYSTECTOMY LAPAROSCOPIC N/A 2/24/2017    Procedure: Vernida Blare;  Surgeon: Westley Garsia MD;  Location: [facility];  Service:     CHOLECYSTECTOMY LAPAROSCOPIC  03/2017    EPIGASTRIC HERNIA REPAIR      UMBILICAL HERNIA REPAIR N/A 2/24/2017    Procedure: REPAIR HERNIA UMBILICAL;  Surgeon: Westley Garsia MD;  Location: [facility];  Service:        Family History   Problem Relation Age of Onset    Hypertension Mother         BENIGN    Alzheimer's disease Father     Heart disease Father         CARDIAC DISORDER    Stroke Father     Heart attack Father     No Known Problems Brother        Social History     Social History    Marital status: /Civil Union     Spouse name: N/A    Number of children: N/A    Years of education: N/A     Occupational History    RETIRED      Social History Main Topics    Smoking status: Current Every Day Smoker     Packs/day: 0 50     Years: 40 00    Smokeless tobacco: Never Used      Comment: 1 ppd     Alcohol use Yes      Comment: 3-4 mixed vodka drinks/daily     Drug use: No    Sexual activity: Not on file     Other Topics Concern    Not on file     Social History Narrative    DAILY  CAFFEINE CONSUMPTION    EXERCISES RARELY    LIVES WITH SIGNIFICANT OTHER    LIVES WITH WIFE    NO LIVING WILL           Allergies   Allergen Reactions    Seasonal Ic  [Cholestatin]          Current Outpatient Prescriptions:     baclofen 20 mg tablet, Take 20 mg by mouth 3 (three) times a day, Disp: , Rfl:     citalopram (CeleXA) 20 mg tablet, Take 1 tablet (20 mg total) by mouth daily, Disp: 90 tablet, Rfl: 0    gabapentin (NEURONTIN) 100 mg capsule, Take 1 capsule (100 mg total) by mouth daily at bedtime, Disp: 90 capsule, Rfl: 0    glimepiride (AMARYL) 1 mg tablet, Take 1 tablet (1 mg total) by mouth daily, Disp: 90 tablet, Rfl: 0    Incontinence Supply Disposable (ATTENDS UNDERWEAR 7 LARGE) MISC, , Disp: , Rfl:     lisinopril (ZESTRIL) 10 mg tablet, Take 1 tablet (10 mg total) by mouth daily, Disp: 90 tablet, Rfl: 0    metFORMIN (GLUCOPHAGE) 1000 MG tablet, Take 1 tablet (1,000 mg total) by mouth 2 (two) times a day with meals for 90 days, Disp: 180 tablet, Rfl: 0    potassium chloride (K-DUR,KLOR-CON) 20 mEq tablet, Take 1 tablet (20 mEq total) by mouth daily, Disp: 90 tablet, Rfl: 1    TiZANidine (ZANAFLEX) 2 MG capsule, TAKE 1 TABLET BEFORE BED AS NEEDED ( tablets), Disp: 90 capsule, Rfl: 0    tiZANidine (ZANAFLEX) 2 mg tablet, TAKE 1 TABLET AT BEDTIME AS NEEDED FOR SPASMS, Disp: 90 tablet, Rfl: 0    aspirin (ECOTRIN LOW STRENGTH) 81 mg EC tablet, Take 1 tablet (81 mg total) by mouth daily, Disp: 30 tablet, Rfl: 2    pravastatin (PRAVACHOL) 20 mg tablet, Take 1 tablet (20 mg total) by mouth daily, Disp: 30 tablet, Rfl: 2    senna-docusate sodium (SENOKOT S) 8 6-50 mg per tablet, Take 1 tablet by mouth daily at bedtime for 30 days, Disp: 30 tablet, Rfl: 0

## 2018-06-28 NOTE — PROGRESS NOTES
Jyotsna Vieyra wants the patient to be scheduled with IR for a RLE angiogram + intervention  I spoke with Violeta Adorno to schedule the patient in the [place] location  Due to Bharti wanting anesthesia present for the patient Violeta Adorno informed me she has to call anesthesia and then call the patient to get him scheduled

## 2018-07-03 ENCOUNTER — TELEPHONE (OUTPATIENT)
Dept: FAMILY MEDICINE CLINIC | Facility: CLINIC | Age: 58
End: 2018-07-03

## 2018-07-05 ENCOUNTER — TELEPHONE (OUTPATIENT)
Dept: VASCULAR SURGERY | Facility: CLINIC | Age: 58
End: 2018-07-05

## 2018-07-05 NOTE — TELEPHONE ENCOUNTER
Received call from pt's wife because she is concerned about pt's upcoming a-gram scheduled for 7/13  He is increasingly weak/tired and has had falls  Per last OV note from Dmitri Love this has been an on-going problem  Pt's wife is questioning whether he can go through with the procedure  Emailed Bharti regarding this to see if we need to bring pt back for OV to discuss this

## 2018-07-06 ENCOUNTER — TELEPHONE (OUTPATIENT)
Dept: VASCULAR SURGERY | Facility: CLINIC | Age: 58
End: 2018-07-06

## 2018-07-06 NOTE — TELEPHONE ENCOUNTER
Patient called for pain med, I explained Roby VICTORIA, is off and I could send a message to her but she will not get until 7/9  I asked if he could call PCP , but he said they dont return call  He will stay on Tylenol, until he hears from us

## 2018-07-09 ENCOUNTER — TELEPHONE (OUTPATIENT)
Dept: RADIOLOGY | Facility: HOSPITAL | Age: 58
End: 2018-07-09

## 2018-07-09 RX ORDER — SODIUM CHLORIDE 9 MG/ML
75 INJECTION, SOLUTION INTRAVENOUS CONTINUOUS
Status: CANCELLED | OUTPATIENT
Start: 2018-07-09 | End: 2019-07-09

## 2018-07-09 RX ORDER — TIZANIDINE 2 MG/1
TABLET ORAL
Qty: 90 TABLET | Refills: 1 | OUTPATIENT
Start: 2018-07-09

## 2018-07-10 DIAGNOSIS — E11.8 CONTROLLED DIABETES MELLITUS TYPE 2 WITH COMPLICATIONS, UNSPECIFIED WHETHER LONG TERM INSULIN USE (HCC): Primary | ICD-10-CM

## 2018-07-13 ENCOUNTER — ANESTHESIA (OUTPATIENT)
Dept: SURGERY | Facility: HOSPITAL | Age: 58
End: 2018-07-13
Payer: COMMERCIAL

## 2018-07-13 ENCOUNTER — HOSPITAL ENCOUNTER (OUTPATIENT)
Dept: RADIOLOGY | Facility: HOSPITAL | Age: 58
Discharge: HOME | End: 2018-07-13
Attending: RADIOLOGY | Admitting: RADIOLOGY
Payer: COMMERCIAL

## 2018-07-13 ENCOUNTER — ANESTHESIA EVENT (OUTPATIENT)
Dept: SURGERY | Facility: HOSPITAL | Age: 58
End: 2018-07-13
Payer: COMMERCIAL

## 2018-07-13 VITALS
DIASTOLIC BLOOD PRESSURE: 65 MMHG | HEIGHT: 70 IN | WEIGHT: 175 LBS | OXYGEN SATURATION: 92 % | HEART RATE: 107 BPM | TEMPERATURE: 98.7 F | BODY MASS INDEX: 25.05 KG/M2 | RESPIRATION RATE: 18 BRPM | SYSTOLIC BLOOD PRESSURE: 143 MMHG

## 2018-07-13 DIAGNOSIS — E13.51 PERIPHERAL VASCULAR DISEASE DUE TO SECONDARY DIABETES MELLITUS (HCC): ICD-10-CM

## 2018-07-13 LAB — GLUCOSE SERPL-MCNC: 254 MG/DL (ref 65–140)

## 2018-07-13 PROCEDURE — 36246 INS CATH ABD/L-EXT ART 2ND: CPT

## 2018-07-13 PROCEDURE — C1760 CLOSURE DEV, VASC: HCPCS

## 2018-07-13 PROCEDURE — 75710 ARTERY X-RAYS ARM/LEG: CPT | Performed by: RADIOLOGY

## 2018-07-13 PROCEDURE — 75630 X-RAY AORTA LEG ARTERIES: CPT

## 2018-07-13 PROCEDURE — C1894 INTRO/SHEATH, NON-LASER: HCPCS

## 2018-07-13 PROCEDURE — 76937 US GUIDE VASCULAR ACCESS: CPT

## 2018-07-13 PROCEDURE — C1769 GUIDE WIRE: HCPCS

## 2018-07-13 PROCEDURE — 75625 CONTRAST EXAM ABDOMINL AORTA: CPT | Performed by: RADIOLOGY

## 2018-07-13 PROCEDURE — 36246 INS CATH ABD/L-EXT ART 2ND: CPT | Performed by: RADIOLOGY

## 2018-07-13 PROCEDURE — 82948 REAGENT STRIP/BLOOD GLUCOSE: CPT

## 2018-07-13 RX ORDER — SODIUM CHLORIDE, SODIUM LACTATE, POTASSIUM CHLORIDE, CALCIUM CHLORIDE 600; 310; 30; 20 MG/100ML; MG/100ML; MG/100ML; MG/100ML
50 INJECTION, SOLUTION INTRAVENOUS CONTINUOUS
Status: DISCONTINUED | OUTPATIENT
Start: 2018-07-13 | End: 2018-07-13 | Stop reason: HOSPADM

## 2018-07-13 RX ORDER — EPHEDRINE SULFATE 50 MG/ML
INJECTION, SOLUTION INTRAVENOUS AS NEEDED
Status: DISCONTINUED | OUTPATIENT
Start: 2018-07-13 | End: 2018-07-13 | Stop reason: SURG

## 2018-07-13 RX ORDER — SODIUM CHLORIDE 9 MG/ML
75 INJECTION, SOLUTION INTRAVENOUS CONTINUOUS
Status: DISCONTINUED | OUTPATIENT
Start: 2018-07-13 | End: 2018-07-13 | Stop reason: HOSPADM

## 2018-07-13 RX ORDER — FENTANYL CITRATE/PF 50 MCG/ML
50 SYRINGE (ML) INJECTION
Status: DISCONTINUED | OUTPATIENT
Start: 2018-07-13 | End: 2018-07-13 | Stop reason: HOSPADM

## 2018-07-13 RX ORDER — ONDANSETRON 2 MG/ML
INJECTION INTRAMUSCULAR; INTRAVENOUS AS NEEDED
Status: DISCONTINUED | OUTPATIENT
Start: 2018-07-13 | End: 2018-07-13 | Stop reason: SURG

## 2018-07-13 RX ORDER — METOCLOPRAMIDE HYDROCHLORIDE 5 MG/ML
10 INJECTION INTRAMUSCULAR; INTRAVENOUS ONCE AS NEEDED
Status: DISCONTINUED | OUTPATIENT
Start: 2018-07-13 | End: 2018-07-13 | Stop reason: HOSPADM

## 2018-07-13 RX ORDER — ONDANSETRON 2 MG/ML
4 INJECTION INTRAMUSCULAR; INTRAVENOUS ONCE AS NEEDED
Status: DISCONTINUED | OUTPATIENT
Start: 2018-07-13 | End: 2018-07-13 | Stop reason: HOSPADM

## 2018-07-13 RX ORDER — FENTANYL CITRATE 50 UG/ML
INJECTION, SOLUTION INTRAMUSCULAR; INTRAVENOUS AS NEEDED
Status: DISCONTINUED | OUTPATIENT
Start: 2018-07-13 | End: 2018-07-13 | Stop reason: SURG

## 2018-07-13 RX ORDER — MEPERIDINE HYDROCHLORIDE 25 MG/ML
12.5 INJECTION INTRAMUSCULAR; INTRAVENOUS; SUBCUTANEOUS ONCE AS NEEDED
Status: DISCONTINUED | OUTPATIENT
Start: 2018-07-13 | End: 2018-07-13 | Stop reason: HOSPADM

## 2018-07-13 RX ORDER — LIDOCAINE HYDROCHLORIDE 10 MG/ML
INJECTION, SOLUTION INFILTRATION; PERINEURAL AS NEEDED
Status: DISCONTINUED | OUTPATIENT
Start: 2018-07-13 | End: 2018-07-13 | Stop reason: SURG

## 2018-07-13 RX ORDER — METOCLOPRAMIDE HYDROCHLORIDE 5 MG/ML
INJECTION INTRAMUSCULAR; INTRAVENOUS AS NEEDED
Status: DISCONTINUED | OUTPATIENT
Start: 2018-07-13 | End: 2018-07-13 | Stop reason: SURG

## 2018-07-13 RX ORDER — KETAMINE HYDROCHLORIDE 50 MG/ML
INJECTION, SOLUTION, CONCENTRATE INTRAMUSCULAR; INTRAVENOUS AS NEEDED
Status: DISCONTINUED | OUTPATIENT
Start: 2018-07-13 | End: 2018-07-13 | Stop reason: SURG

## 2018-07-13 RX ORDER — PROPOFOL 10 MG/ML
INJECTION, EMULSION INTRAVENOUS AS NEEDED
Status: DISCONTINUED | OUTPATIENT
Start: 2018-07-13 | End: 2018-07-13 | Stop reason: SURG

## 2018-07-13 RX ADMIN — EPHEDRINE SULFATE 10 MG: 50 INJECTION, SOLUTION INTRAMUSCULAR; INTRAVENOUS; SUBCUTANEOUS at 10:26

## 2018-07-13 RX ADMIN — FENTANYL CITRATE 100 MCG: 50 INJECTION, SOLUTION INTRAMUSCULAR; INTRAVENOUS at 10:09

## 2018-07-13 RX ADMIN — SODIUM CHLORIDE: 0.9 INJECTION, SOLUTION INTRAVENOUS at 10:09

## 2018-07-13 RX ADMIN — KETAMINE HYDROCHLORIDE 50 MG: 50 INJECTION, SOLUTION INTRAMUSCULAR; INTRAVENOUS at 10:22

## 2018-07-13 RX ADMIN — INSULIN HUMAN 5 UNITS: 100 INJECTION, SOLUTION PARENTERAL at 10:32

## 2018-07-13 RX ADMIN — IODIXANOL 87 ML: 320 INJECTION, SOLUTION INTRAVASCULAR at 11:20

## 2018-07-13 RX ADMIN — METOCLOPRAMIDE 10 MG: 5 INJECTION, SOLUTION INTRAMUSCULAR; INTRAVENOUS at 11:15

## 2018-07-13 RX ADMIN — SODIUM CHLORIDE 75 ML/HR: 0.9 INJECTION, SOLUTION INTRAVENOUS at 09:24

## 2018-07-13 RX ADMIN — HYDROMORPHONE HYDROCHLORIDE 0.2 MG: 1 INJECTION, SOLUTION INTRAMUSCULAR; INTRAVENOUS; SUBCUTANEOUS at 12:00

## 2018-07-13 RX ADMIN — INSULIN HUMAN 10 UNITS: 100 INJECTION, SOLUTION PARENTERAL at 12:17

## 2018-07-13 RX ADMIN — LIDOCAINE HYDROCHLORIDE 50 MG: 10 INJECTION, SOLUTION INFILTRATION; PERINEURAL at 10:15

## 2018-07-13 RX ADMIN — ONDANSETRON 4 MG: 2 INJECTION INTRAMUSCULAR; INTRAVENOUS at 11:15

## 2018-07-13 RX ADMIN — HYDROMORPHONE HYDROCHLORIDE 0.2 MG: 1 INJECTION, SOLUTION INTRAMUSCULAR; INTRAVENOUS; SUBCUTANEOUS at 12:18

## 2018-07-13 RX ADMIN — EPHEDRINE SULFATE 10 MG: 50 INJECTION, SOLUTION INTRAMUSCULAR; INTRAVENOUS; SUBCUTANEOUS at 10:35

## 2018-07-13 RX ADMIN — FENTANYL CITRATE 50 MCG: 50 INJECTION, SOLUTION INTRAMUSCULAR; INTRAVENOUS at 11:54

## 2018-07-13 RX ADMIN — FENTANYL CITRATE 50 MCG: 50 INJECTION, SOLUTION INTRAMUSCULAR; INTRAVENOUS at 11:47

## 2018-07-13 RX ADMIN — PROPOFOL 200 MG: 10 INJECTION, EMULSION INTRAVENOUS at 10:15

## 2018-07-13 NOTE — PROGRESS NOTES
59 M, B/L severe PAD, right worse than left  H&P reviewed, labs checked   Stable for abdominal aortogram and right lower extremity arteriogram

## 2018-07-13 NOTE — BRIEF OP NOTE (RAD/CATH)
IR ABDOMINAL ANGIOGRAPHY  Procedure Note    PATIENT NAME: Bere Rowley  : 1960  MRN: [id number]     Pre-op Diagnosis:   1  Peripheral vascular disease due to secondary diabetes mellitus (HCC)      Post-op Diagnosis:   1  Peripheral vascular disease due to secondary diabetes mellitus [facility])        Surgeon:   Lisa Merrill MD  Assistants:     No qualified resident was available  Estimated Blood Loss: None  Findings: High grade right distal CFA stenosis  Chronic occlusion right SFA  Extensive bulky calcifications  Unable to recanalize  3 vessel runoff  Specimens: None  Complications:  None      Anesthesia: Local and Karyn Young MD     Date: 2018  Time: 6:34 PM

## 2018-07-13 NOTE — PERIOPERATIVE NURSING NOTE
VSS, pt denies nausea, reports improvement in pain, assessment unchanged, report called, no questions, pt transferred to [facility]

## 2018-07-13 NOTE — ANESTHESIA PREPROCEDURE EVALUATION
Review of Systems/Medical History  Patient summary reviewed  Chart reviewed  No history of anesthetic complications     Cardiovascular  Hypertension ,   Comment: PAD/PVD--Currently with right leg rest pain, Nonhealing wounds, Bilat SFA and Tibioperoneal disease,  Pulmonary  Smoker (1 ppd, Smoked this am) cigarette smoker  , COPD ,        GI/Hepatic      Comment: Hx gallstone pancreatitis  ETOH--3-4 drinks daily    Occasional heartburn--Rolaids prn     Negative  ROS        Endo/Other  Diabetes (with neuropathy) type 2 Oral agent,   Comment: Hx MRSA    GYN  Negative gynecology ROS          Hematology  Negative hematology ROS      Musculoskeletal    Comment: Hx C4 injury due to fall--S/P C3-4 fusion 2012      Neurology      Comment: Frequent falls/Ambulatory dysfunction Psychology   Depression ,   Chronic pain,            Physical Exam    Airway    Mallampati score: II  TM Distance: >3 FB       Dental   Comment: Very poor dentition, missing and broken teeth,     Cardiovascular  Rhythm: regular,     Pulmonary  Breath sounds clear to auscultation,     Other Findings        Anesthesia Plan  ASA Score- 4     Anesthesia Type- general with ASA Monitors  Additional Monitors:   Airway Plan: LMA  Plan Factors-  Patient smoked on day of surgery  Induction- intravenous  Postoperative Plan-     Informed Consent- Anesthetic plan and risks discussed with patient  I personally reviewed this patient with the CRNA  Discussed and agreed on the Anesthesia Plan with the CRNA  Rocky Braun

## 2018-07-13 NOTE — DISCHARGE INSTRUCTIONS
ARTERIOGRAM    WHAT YOU SHOULD KNOW:   An angiogram is a procedure to look at arteries in your body  Arteries are the blood vessels that carry blood from your heart to your body  AFTER YOU LEAVE:     Self-care:   · Limit activity: Rest for the remainder of the day of your procedure  Have some one with you until the next morning  Keep your arm or leg straight as much as possible  Rest as much as possible, sitting lying or reclining  Walk only to go to the bathroom, to bed or to eat  If the angiogram catheter was put in your leg, use the stairs as little as possible  No driving  · Keep your wound clean and dry  You may shower 24 hours after your procedure  The bandage you have on should fall off in 2-3 days  If there is any drainage from the puncture site, you should put on a clean bandage  · Watch for bleeding and bruising: It is normal to have a bruise and soreness where the angiogram catheter went in  · Diet:   · You may resume your regular diet, Sips of flat soda will help with mild nausea  · Drink more liquids than usual for the next 24 hours      · IMMEDIATELY Contact Interventional Radiology at [phone] Tom PATIENTS: Contact Interventional Radiology at [phone]) Seema Brandt PATIENTS: Contact Interventional Radiology at [phone]) if any of the following occur:  · If your bruise gets larger or if you notice any active bleeding  APPLY DIRECT PRESSURE TO THE BLEEDING SITE  · If you notice increased swelling or have increased pain at the puncture site   · If you have any numbness or pain in the extremity of the puncture site   · If that extremity seems cold or pale      · You have fever greater than 101  · Persistent nausea or vomitting    Follow up with your primary healthcare provider  as directed: Write down your questions so you remember to ask them during your visits

## 2018-07-13 NOTE — SEDATION DOCUMENTATION
Diagnostic arteriogram completed as per Dr Christine Mathis  Mynx closure device to Left CFA puncture

## 2018-07-13 NOTE — ANESTHESIA POSTPROCEDURE EVALUATION
Post-Op Assessment Note      CV Status:  Stable    Mental Status:  Alert and awake    Hydration Status:  Euvolemic    PONV Controlled:  Controlled    Airway Patency:  Patent    Post Op Vitals Reviewed: Yes          Staff: CRNA       Comments: chronic pain patient, difficult to control  report to RN    VSS          BP      Temp      Pulse     Resp      SpO2

## 2018-07-17 ENCOUNTER — OFFICE VISIT (OUTPATIENT)
Dept: VASCULAR SURGERY | Facility: CLINIC | Age: 58
End: 2018-07-17
Payer: COMMERCIAL

## 2018-07-17 ENCOUNTER — TELEPHONE (OUTPATIENT)
Dept: ADMINISTRATIVE | Facility: HOSPITAL | Age: 58
End: 2018-07-17

## 2018-07-17 VITALS
DIASTOLIC BLOOD PRESSURE: 70 MMHG | BODY MASS INDEX: 25.05 KG/M2 | RESPIRATION RATE: 14 BRPM | HEIGHT: 70 IN | WEIGHT: 175 LBS | SYSTOLIC BLOOD PRESSURE: 114 MMHG | TEMPERATURE: 98.4 F | HEART RATE: 92 BPM

## 2018-07-17 DIAGNOSIS — M79.673 ISCHEMIC PAIN OF FOOT AT REST: Primary | ICD-10-CM

## 2018-07-17 DIAGNOSIS — I99.8 ISCHEMIC FOOT PAIN AT REST: ICD-10-CM

## 2018-07-17 DIAGNOSIS — I99.8 ISCHEMIC PAIN OF FOOT AT REST: Primary | ICD-10-CM

## 2018-07-17 DIAGNOSIS — M79.673 ISCHEMIC FOOT PAIN AT REST: ICD-10-CM

## 2018-07-17 PROCEDURE — 99213 OFFICE O/P EST LOW 20 MIN: CPT | Performed by: SURGERY

## 2018-07-17 RX ORDER — OXYCODONE HYDROCHLORIDE AND ACETAMINOPHEN 5; 325 MG/1; MG/1
1 TABLET ORAL EVERY 4 HOURS PRN
Qty: 28 TABLET | Refills: 0 | Status: SHIPPED | OUTPATIENT
Start: 2018-07-17 | End: 2018-08-05 | Stop reason: HOSPADM

## 2018-07-17 NOTE — TELEPHONE ENCOUNTER
Patient was seen today in Vascular office by Dr Mag Eckert as per MD Re: Joselin Tinajero 1 right with bovine patch angioplasty, possible right femoral to popliteal artery bypass (Right Leg Lower)  Dr Mag Eckert recommended patient to obtain Cardiac CLR patient does not have a Cardiologist Dr Mag Eckert recommend he sees Nell Samano Cardiologist as soon as possible  Called Cardiologist office spoke with Henrietta/ Marvin as per [facility] appt  First available appt she had was with Dr Do Hand on 08/09/2018 @ 9:40 AM over at the [facility] location patient confirmed appt date and time bossman Shrestha provided me with a fax # [phone] to [facility] office Cardiac CLR form faxed today to office

## 2018-07-17 NOTE — PROGRESS NOTES
Assessment/Plan:    Ischemic foot pain at rest [facility])  Ischemic rest pain right foot history of fall and multi level neuro injuries  Arteriogram shows distal common femoral artery occlusion with SFA occlusion reconstitution 3 vessel runoff  Planning left femoral endarterectomy with patch angioplasty sometime in the near future Iesha [address] 78 after cardiac risk assessment has been obtained  Diagnoses and all orders for this visit:    Ischemic pain of foot at rest [facility])  -     Case request operating room: ENDARTERECTOMY ARTERIAL FEMORAL right with bovine patch angioplasty, possible right femoral to popliteal artery bypass; Standing  -     Basic metabolic panel; Future  -     CBC and differential; Future  -     Ambulatory referral to Cardiology; Future  -     Case request operating room: ENDARTERECTOMY ARTERIAL FEMORAL right with bovine patch angioplasty, possible right femoral to popliteal artery bypass    Ischemic foot pain at rest [facility])    Other orders  -     Diet NPO; Sips with meds; Standing  -     Void on call to OR; Standing  -     Insert peripheral IV; Standing  -     Place sequential compression device; Standing  -     ceFAZolin (ANCEF) IVPB (premix) 1,000 mg; Infuse 50 mL (1,000 mg total) into a venous catheter once         Subjective:      Patient ID: Matteo Bernardo is a 59 y o  male  HPI bilateral leg weakness, severe pain right foot especially while reclining  Multiple neuropathic ulcerations throughout bilateral lower extremities    The following portions of the patient's history were reviewed and updated as appropriate: allergies, current medications, past family history, past medical history, past social history, past surgical history and problem list     Review of Systems  bilateral leg weakness and pain with multiple neurogenic ulcerations, all other systems negative    Objective:      /70 (BP Location: Left arm, Patient Position: Sitting, Cuff Size: Adult)   Pulse 92 Temp 98 4 °F (36 9 °C) (Tympanic)   Resp 14   Ht 5' 10" (1 778 m)   Wt 79 4 kg (175 lb)   BMI 25 11 kg/m²          Physical Exam     Physical Exam      Oriented x3 no evidence of clinical depression  Neck is supple carotid pulses equal bilaterally no bruits heard    Chest lungs clear, heart regular rhythm  Abdomen soft nontender no evidence of pulsatile masses  Pulses are palpable bilateral femoral no pulses below the inguinal ligaments  Bilateral lower neurogenic ulcerations heavy rubor right foot consistent with ischemic rest pain         Vitals:    07/17/18 1003   BP: 114/70   BP Location: Left arm   Patient Position: Sitting   Cuff Size: Adult   Pulse: 92   Resp: 14   Temp: 98 4 °F (36 9 °C)   TempSrc: Tympanic   Weight: 79 4 kg (175 lb)   Height: 5' 10" (1 778 m)       Patient Active Problem List   Diagnosis    Chronic pain    Depression    Diabetic neuropathy (HCC)    Enlarged prostate with lower urinary tract symptoms (LUTS)    Excessive cerumen in left ear canal    Gait disturbance    Hernia, epigastric    Essential hypertension    Injury of cervical spinal cord (HCC)    Tobacco use disorder    Umbilical hernia    Cervical myelopathy (HCC)    Basal cell carcinoma in situ of skin    Ambulatory dysfunction    Lumbar radiculopathy    Type 2 diabetes mellitus with neurologic complication, without long-term current use of insulin (HCC)    Hypercalcemia    Quadriplegia and quadriparesis (HCC)    Alcohol abuse    Cellulitis of leg, right    Peripheral vascular disease due to secondary diabetes mellitus (HCC)    Ischemic foot pain at rest [facility])       Past Surgical History:   Procedure Laterality Date    BACK SURGERY      CERVICAL FUSION      VERTEBRAL; C3-C4 FUSION    CHOLECYSTECTOMY LAPAROSCOPIC N/A 2/24/2017    Procedure: CHOLECYSTECTOMY LAPAROSCOPIC;  Surgeon: Trisha Gutierrez MD;  [facility];  Service:     CHOLECYSTECTOMY LAPAROSCOPIC  03/2017    EPIGASTRIC HERNIA REPAIR      UMBILICAL HERNIA REPAIR N/A 2/24/2017    Procedure: REPAIR HERNIA UMBILICAL;  Surgeon: Kirsty Johns MD;  [facility];  Service:        Family History   Problem Relation Age of Onset    Hypertension Mother         BENIGN    Alzheimer's disease Father     Heart disease Father         CARDIAC DISORDER    Stroke Father     Heart attack Father     No Known Problems Brother        Social History     Social History    Marital status: /Civil Union     Spouse name: N/A    Number of children: N/A    Years of education: N/A     Occupational History    RETIRED      Social History Main Topics    Smoking status: Current Every Day Smoker     Packs/day: 0 50     Years: 40 00    Smokeless tobacco: Never Used      Comment: 1 ppd     Alcohol use Yes      Comment: 3-4 mixed vodka drinks/daily     Drug use: No    Sexual activity: No     Other Topics Concern    Not on file     Social History Narrative    DAILY  CAFFEINE CONSUMPTION    EXERCISES RARELY    LIVES WITH SIGNIFICANT OTHER    LIVES WITH WIFE    NO LIVING WILL           Allergies   Allergen Reactions    Seasonal Ic  [Cholestatin]          Current Outpatient Prescriptions:     aspirin (ECOTRIN LOW STRENGTH) 81 mg EC tablet, Take 1 tablet (81 mg total) by mouth daily, Disp: 30 tablet, Rfl: 2    baclofen 20 mg tablet, Take 20 mg by mouth 3 (three) times a day, Disp: , Rfl:     citalopram (CeleXA) 20 mg tablet, Take 1 tablet (20 mg total) by mouth daily, Disp: 90 tablet, Rfl: 0    gabapentin (NEURONTIN) 100 mg capsule, Take 1 capsule (100 mg total) by mouth daily at bedtime, Disp: 90 capsule, Rfl: 0    glimepiride (AMARYL) 1 mg tablet, Take 1 tablet (1 mg total) by mouth daily, Disp: 90 tablet, Rfl: 0    glucose blood (ACCU-CHEK DAREN PLUS) test strip, Check 2-3 times a day, Disp: 100 each, Rfl: 3    Incontinence Supply Disposable (ATTENDS UNDERWEAR 7 LARGE) MISC, , Disp: , Rfl:     lisinopril (ZESTRIL) 10 mg tablet, Take 1 tablet (10 mg total) by mouth daily, Disp: 90 tablet, Rfl: 0    metFORMIN (GLUCOPHAGE) 1000 MG tablet, Take 1 tablet (1,000 mg total) by mouth 2 (two) times a day with meals for 90 days, Disp: 180 tablet, Rfl: 0    pravastatin (PRAVACHOL) 20 mg tablet, Take 1 tablet (20 mg total) by mouth daily, Disp: 30 tablet, Rfl: 2    TiZANidine (ZANAFLEX) 2 MG capsule, TAKE 1 TABLET BEFORE BED AS NEEDED ( tablets), Disp: 90 capsule, Rfl: 0    tiZANidine (ZANAFLEX) 2 mg tablet, TAKE 1 TABLET AT BEDTIME AS NEEDED FOR SPASMS, Disp: 90 tablet, Rfl: 0    senna-docusate sodium (SENOKOT S) 8 6-50 mg per tablet, Take 1 tablet by mouth daily at bedtime for 30 days, Disp: 30 tablet, Rfl: 0

## 2018-07-17 NOTE — PATIENT INSTRUCTIONS
Ischemic rest pain right foot history of fall and multi level neuro injuries  Arteriogram shows distal common femoral artery occlusion with SFA occlusion reconstitution 3 vessel runoff  Planning left femoral endarterectomy with patch angioplasty sometime in the near future [address]  after cardiac risk assessment has been obtained

## 2018-07-25 ENCOUNTER — HOSPITAL ENCOUNTER (INPATIENT)
Facility: HOSPITAL | Age: 58
LOS: 11 days | Discharge: NURSING, REHAB OR LONG-TERM CARE | DRG: 254 | End: 2018-08-05
Attending: EMERGENCY MEDICINE | Admitting: SURGERY
Payer: COMMERCIAL

## 2018-07-25 DIAGNOSIS — F17.200 TOBACCO USE DISORDER: ICD-10-CM

## 2018-07-25 DIAGNOSIS — M79.673 ISCHEMIC FOOT PAIN AT REST: ICD-10-CM

## 2018-07-25 DIAGNOSIS — I99.8 ISCHEMIC FOOT PAIN AT REST: ICD-10-CM

## 2018-07-25 DIAGNOSIS — R33.9 URINARY RETENTION: ICD-10-CM

## 2018-07-25 DIAGNOSIS — I73.9 PERIPHERAL ARTERIAL DISEASE (HCC): ICD-10-CM

## 2018-07-25 DIAGNOSIS — E13.51 PERIPHERAL VASCULAR DISEASE DUE TO SECONDARY DIABETES MELLITUS (HCC): Primary | ICD-10-CM

## 2018-07-25 DIAGNOSIS — I10 ESSENTIAL HYPERTENSION: ICD-10-CM

## 2018-07-25 DIAGNOSIS — E11.42 TYPE 2 DIABETES MELLITUS WITH DIABETIC POLYNEUROPATHY, WITHOUT LONG-TERM CURRENT USE OF INSULIN (HCC): ICD-10-CM

## 2018-07-25 LAB
ANION GAP SERPL CALCULATED.3IONS-SCNC: 12 MMOL/L (ref 4–13)
APTT PPP: 32 SECONDS (ref 24–36)
BASOPHILS # BLD AUTO: 0.05 THOUSANDS/ΜL (ref 0–0.1)
BASOPHILS NFR BLD AUTO: 1 % (ref 0–1)
BUN SERPL-MCNC: 18 MG/DL (ref 5–25)
CALCIUM SERPL-MCNC: 9.8 MG/DL (ref 8.3–10.1)
CHLORIDE SERPL-SCNC: 100 MMOL/L (ref 100–108)
CO2 SERPL-SCNC: 25 MMOL/L (ref 21–32)
CREAT SERPL-MCNC: 1.13 MG/DL (ref 0.6–1.3)
EOSINOPHIL # BLD AUTO: 0.14 THOUSAND/ΜL (ref 0–0.61)
EOSINOPHIL NFR BLD AUTO: 1 % (ref 0–6)
ERYTHROCYTE [DISTWIDTH] IN BLOOD BY AUTOMATED COUNT: 13.1 % (ref 11.6–15.1)
GFR SERPL CREATININE-BSD FRML MDRD: 70 ML/MIN/1.73SQ M
GLUCOSE SERPL-MCNC: 248 MG/DL (ref 65–140)
GLUCOSE SERPL-MCNC: 263 MG/DL (ref 65–140)
HCT VFR BLD AUTO: 38.5 % (ref 36.5–49.3)
HGB BLD-MCNC: 12.7 G/DL (ref 12–17)
IMM GRANULOCYTES # BLD AUTO: 0.06 THOUSAND/UL (ref 0–0.2)
IMM GRANULOCYTES NFR BLD AUTO: 1 % (ref 0–2)
INR PPP: 0.94 (ref 0.86–1.17)
LYMPHOCYTES # BLD AUTO: 2.11 THOUSANDS/ΜL (ref 0.6–4.47)
LYMPHOCYTES NFR BLD AUTO: 19 % (ref 14–44)
MCH RBC QN AUTO: 34 PG (ref 26.8–34.3)
MCHC RBC AUTO-ENTMCNC: 33 G/DL (ref 31.4–37.4)
MCV RBC AUTO: 103 FL (ref 82–98)
MONOCYTES # BLD AUTO: 1.02 THOUSAND/ΜL (ref 0.17–1.22)
MONOCYTES NFR BLD AUTO: 9 % (ref 4–12)
NEUTROPHILS # BLD AUTO: 7.47 THOUSANDS/ΜL (ref 1.85–7.62)
NEUTS SEG NFR BLD AUTO: 69 % (ref 43–75)
NRBC BLD AUTO-RTO: 0 /100 WBCS
PLATELET # BLD AUTO: 272 THOUSANDS/UL (ref 149–390)
PMV BLD AUTO: 10.5 FL (ref 8.9–12.7)
POTASSIUM SERPL-SCNC: 4.8 MMOL/L (ref 3.5–5.3)
PROTHROMBIN TIME: 12.7 SECONDS (ref 11.8–14.2)
RBC # BLD AUTO: 3.74 MILLION/UL (ref 3.88–5.62)
SODIUM SERPL-SCNC: 137 MMOL/L (ref 136–145)
WBC # BLD AUTO: 10.85 THOUSAND/UL (ref 4.31–10.16)

## 2018-07-25 PROCEDURE — 82948 REAGENT STRIP/BLOOD GLUCOSE: CPT

## 2018-07-25 PROCEDURE — 85025 COMPLETE CBC W/AUTO DIFF WBC: CPT | Performed by: EMERGENCY MEDICINE

## 2018-07-25 PROCEDURE — 85610 PROTHROMBIN TIME: CPT | Performed by: EMERGENCY MEDICINE

## 2018-07-25 PROCEDURE — 99284 EMERGENCY DEPT VISIT MOD MDM: CPT

## 2018-07-25 PROCEDURE — 36415 COLL VENOUS BLD VENIPUNCTURE: CPT | Performed by: EMERGENCY MEDICINE

## 2018-07-25 PROCEDURE — 99223 1ST HOSP IP/OBS HIGH 75: CPT | Performed by: SURGERY

## 2018-07-25 PROCEDURE — 96374 THER/PROPH/DIAG INJ IV PUSH: CPT

## 2018-07-25 PROCEDURE — 80048 BASIC METABOLIC PNL TOTAL CA: CPT | Performed by: EMERGENCY MEDICINE

## 2018-07-25 PROCEDURE — 85730 THROMBOPLASTIN TIME PARTIAL: CPT | Performed by: EMERGENCY MEDICINE

## 2018-07-25 RX ORDER — CITALOPRAM 20 MG/1
20 TABLET ORAL DAILY
Status: DISCONTINUED | OUTPATIENT
Start: 2018-07-26 | End: 2018-08-05 | Stop reason: HOSPADM

## 2018-07-25 RX ORDER — ONDANSETRON 2 MG/ML
4 INJECTION INTRAMUSCULAR; INTRAVENOUS EVERY 4 HOURS PRN
Status: DISCONTINUED | OUTPATIENT
Start: 2018-07-25 | End: 2018-08-05 | Stop reason: HOSPADM

## 2018-07-25 RX ORDER — HEPARIN SODIUM 10000 [USP'U]/100ML
3-30 INJECTION, SOLUTION INTRAVENOUS
Status: DISCONTINUED | OUTPATIENT
Start: 2018-07-25 | End: 2018-07-27

## 2018-07-25 RX ORDER — GABAPENTIN 100 MG/1
100 CAPSULE ORAL
Status: DISCONTINUED | OUTPATIENT
Start: 2018-07-25 | End: 2018-07-26

## 2018-07-25 RX ORDER — LISINOPRIL 10 MG/1
10 TABLET ORAL DAILY
Status: DISCONTINUED | OUTPATIENT
Start: 2018-07-26 | End: 2018-07-26

## 2018-07-25 RX ORDER — PRAVASTATIN SODIUM 20 MG
20 TABLET ORAL DAILY
Status: DISCONTINUED | OUTPATIENT
Start: 2018-07-26 | End: 2018-08-05 | Stop reason: HOSPADM

## 2018-07-25 RX ORDER — MORPHINE SULFATE 10 MG/ML
6 INJECTION, SOLUTION INTRAMUSCULAR; INTRAVENOUS ONCE
Status: COMPLETED | OUTPATIENT
Start: 2018-07-25 | End: 2018-07-25

## 2018-07-25 RX ORDER — ASPIRIN 81 MG/1
81 TABLET ORAL DAILY
Status: DISCONTINUED | OUTPATIENT
Start: 2018-07-26 | End: 2018-08-05 | Stop reason: HOSPADM

## 2018-07-25 RX ORDER — NICOTINE 21 MG/24HR
21 PATCH, TRANSDERMAL 24 HOURS TRANSDERMAL DAILY
Status: DISCONTINUED | OUTPATIENT
Start: 2018-07-25 | End: 2018-08-05 | Stop reason: HOSPADM

## 2018-07-25 RX ORDER — DOCUSATE SODIUM 100 MG/1
100 CAPSULE, LIQUID FILLED ORAL 2 TIMES DAILY PRN
Status: DISCONTINUED | OUTPATIENT
Start: 2018-07-25 | End: 2018-07-31 | Stop reason: SDUPTHER

## 2018-07-25 RX ORDER — AMOXICILLIN 250 MG
1 CAPSULE ORAL
Status: DISCONTINUED | OUTPATIENT
Start: 2018-07-25 | End: 2018-08-05 | Stop reason: HOSPADM

## 2018-07-25 RX ORDER — SODIUM CHLORIDE 9 MG/ML
125 INJECTION, SOLUTION INTRAVENOUS CONTINUOUS
Status: DISCONTINUED | OUTPATIENT
Start: 2018-07-25 | End: 2018-07-27

## 2018-07-25 RX ORDER — ACETAMINOPHEN 325 MG/1
650 TABLET ORAL EVERY 6 HOURS PRN
Status: DISCONTINUED | OUTPATIENT
Start: 2018-07-25 | End: 2018-07-26

## 2018-07-25 RX ORDER — BACLOFEN 10 MG/1
20 TABLET ORAL 3 TIMES DAILY
Status: DISCONTINUED | OUTPATIENT
Start: 2018-07-25 | End: 2018-07-26

## 2018-07-25 RX ORDER — INSULIN GLARGINE 100 [IU]/ML
20 INJECTION, SOLUTION SUBCUTANEOUS
Status: DISCONTINUED | OUTPATIENT
Start: 2018-07-25 | End: 2018-08-04

## 2018-07-25 RX ORDER — OXYCODONE HYDROCHLORIDE AND ACETAMINOPHEN 5; 325 MG/1; MG/1
1 TABLET ORAL EVERY 4 HOURS PRN
Status: DISCONTINUED | OUTPATIENT
Start: 2018-07-25 | End: 2018-07-26

## 2018-07-25 RX ADMIN — VANCOMYCIN HYDROCHLORIDE 1250 MG: 10 INJECTION, POWDER, LYOPHILIZED, FOR SOLUTION INTRAVENOUS at 22:29

## 2018-07-25 RX ADMIN — INSULIN GLARGINE 10 UNITS: 100 INJECTION, SOLUTION SUBCUTANEOUS at 22:37

## 2018-07-25 RX ADMIN — HEPARIN SODIUM 18 UNITS/KG/HR: 10000 INJECTION, SOLUTION INTRAVENOUS at 21:24

## 2018-07-25 RX ADMIN — SODIUM CHLORIDE 125 ML/HR: 0.9 INJECTION, SOLUTION INTRAVENOUS at 21:17

## 2018-07-25 RX ADMIN — MORPHINE SULFATE 6 MG: 10 INJECTION INTRAVENOUS at 19:18

## 2018-07-25 RX ADMIN — OXYCODONE HYDROCHLORIDE AND ACETAMINOPHEN 1 TABLET: 5; 325 TABLET ORAL at 22:37

## 2018-07-25 RX ADMIN — NICOTINE 21 MG: 21 PATCH, EXTENDED RELEASE TRANSDERMAL at 22:37

## 2018-07-25 RX ADMIN — GABAPENTIN 100 MG: 100 CAPSULE ORAL at 21:27

## 2018-07-25 RX ADMIN — BACLOFEN 20 MG: 10 TABLET ORAL at 21:27

## 2018-07-26 ENCOUNTER — APPOINTMENT (INPATIENT)
Dept: NON INVASIVE DIAGNOSTICS | Facility: HOSPITAL | Age: 58
DRG: 254 | End: 2018-07-26
Payer: COMMERCIAL

## 2018-07-26 PROBLEM — R26.9 GAIT DISTURBANCE: Status: RESOLVED | Noted: 2017-08-28 | Resolved: 2018-07-26

## 2018-07-26 PROBLEM — H61.22 EXCESSIVE CERUMEN IN LEFT EAR CANAL: Status: RESOLVED | Noted: 2017-08-28 | Resolved: 2018-07-26

## 2018-07-26 PROBLEM — I10 ESSENTIAL HYPERTENSION: Status: ACTIVE | Noted: 2018-07-26

## 2018-07-26 PROBLEM — Z79.4 TYPE 2 DIABETES MELLITUS WITH DIABETIC POLYNEUROPATHY, WITH LONG-TERM CURRENT USE OF INSULIN (HCC): Status: ACTIVE | Noted: 2018-04-17

## 2018-07-26 PROBLEM — E11.42 TYPE 2 DIABETES MELLITUS WITH DIABETIC POLYNEUROPATHY, WITHOUT LONG-TERM CURRENT USE OF INSULIN (HCC): Status: ACTIVE | Noted: 2018-04-17

## 2018-07-26 LAB
ANION GAP SERPL CALCULATED.3IONS-SCNC: 5 MMOL/L (ref 4–13)
APTT PPP: 138 SECONDS (ref 24–36)
APTT PPP: 80 SECONDS (ref 24–36)
APTT PPP: 85 SECONDS (ref 24–36)
ATRIAL RATE: 80 BPM
ATRIAL RATE: 81 BPM
ATRIAL RATE: 81 BPM
ATRIAL RATE: 85 BPM
BASOPHILS # BLD AUTO: 0.06 THOUSANDS/ΜL (ref 0–0.1)
BASOPHILS NFR BLD AUTO: 1 % (ref 0–1)
BUN SERPL-MCNC: 16 MG/DL (ref 5–25)
CALCIUM SERPL-MCNC: 8.4 MG/DL (ref 8.3–10.1)
CHLORIDE SERPL-SCNC: 104 MMOL/L (ref 100–108)
CO2 SERPL-SCNC: 27 MMOL/L (ref 21–32)
CREAT SERPL-MCNC: 0.72 MG/DL (ref 0.6–1.3)
EOSINOPHIL # BLD AUTO: 0.18 THOUSAND/ΜL (ref 0–0.61)
EOSINOPHIL NFR BLD AUTO: 2 % (ref 0–6)
ERYTHROCYTE [DISTWIDTH] IN BLOOD BY AUTOMATED COUNT: 13.1 % (ref 11.6–15.1)
GFR SERPL CREATININE-BSD FRML MDRD: 101 ML/MIN/1.73SQ M
GLUCOSE SERPL-MCNC: 120 MG/DL (ref 65–140)
GLUCOSE SERPL-MCNC: 129 MG/DL (ref 65–140)
GLUCOSE SERPL-MCNC: 138 MG/DL (ref 65–140)
GLUCOSE SERPL-MCNC: 149 MG/DL (ref 65–140)
GLUCOSE SERPL-MCNC: 193 MG/DL (ref 65–140)
HCT VFR BLD AUTO: 35.8 % (ref 36.5–49.3)
HGB BLD-MCNC: 11.9 G/DL (ref 12–17)
IMM GRANULOCYTES # BLD AUTO: 0.05 THOUSAND/UL (ref 0–0.2)
IMM GRANULOCYTES NFR BLD AUTO: 1 % (ref 0–2)
LYMPHOCYTES # BLD AUTO: 2.58 THOUSANDS/ΜL (ref 0.6–4.47)
LYMPHOCYTES NFR BLD AUTO: 31 % (ref 14–44)
MAGNESIUM SERPL-MCNC: 1.5 MG/DL (ref 1.6–2.6)
MCH RBC QN AUTO: 35 PG (ref 26.8–34.3)
MCHC RBC AUTO-ENTMCNC: 33.2 G/DL (ref 31.4–37.4)
MCV RBC AUTO: 105 FL (ref 82–98)
MONOCYTES # BLD AUTO: 0.92 THOUSAND/ΜL (ref 0.17–1.22)
MONOCYTES NFR BLD AUTO: 11 % (ref 4–12)
NEUTROPHILS # BLD AUTO: 4.61 THOUSANDS/ΜL (ref 1.85–7.62)
NEUTS SEG NFR BLD AUTO: 54 % (ref 43–75)
NRBC BLD AUTO-RTO: 0 /100 WBCS
P AXIS: -6 DEGREES
P AXIS: 36 DEGREES
PLATELET # BLD AUTO: 225 THOUSANDS/UL (ref 149–390)
PMV BLD AUTO: 10.3 FL (ref 8.9–12.7)
POTASSIUM SERPL-SCNC: 4.2 MMOL/L (ref 3.5–5.3)
PR INTERVAL: 128 MS
PR INTERVAL: 140 MS
PR INTERVAL: 144 MS
PR INTERVAL: 150 MS
QRS AXIS: -47 DEGREES
QRS AXIS: -56 DEGREES
QRS AXIS: -58 DEGREES
QRS AXIS: -63 DEGREES
QRSD INTERVAL: 106 MS
QRSD INTERVAL: 110 MS
QRSD INTERVAL: 114 MS
QRSD INTERVAL: 116 MS
QT INTERVAL: 416 MS
QT INTERVAL: 416 MS
QT INTERVAL: 426 MS
QT INTERVAL: 432 MS
QTC INTERVAL: 483 MS
QTC INTERVAL: 491 MS
QTC INTERVAL: 495 MS
QTC INTERVAL: 501 MS
RBC # BLD AUTO: 3.4 MILLION/UL (ref 3.88–5.62)
SODIUM SERPL-SCNC: 136 MMOL/L (ref 136–145)
T WAVE AXIS: -9 DEGREES
T WAVE AXIS: 24 DEGREES
T WAVE AXIS: 31 DEGREES
T WAVE AXIS: 6 DEGREES
VANCOMYCIN TROUGH SERPL-MCNC: 9.6 UG/ML (ref 10–20)
VENTRICULAR RATE: 80 BPM
VENTRICULAR RATE: 81 BPM
VENTRICULAR RATE: 81 BPM
VENTRICULAR RATE: 85 BPM
WBC # BLD AUTO: 8.4 THOUSAND/UL (ref 4.31–10.16)

## 2018-07-26 PROCEDURE — 85025 COMPLETE CBC W/AUTO DIFF WBC: CPT | Performed by: SURGERY

## 2018-07-26 PROCEDURE — 80202 ASSAY OF VANCOMYCIN: CPT | Performed by: INTERNAL MEDICINE

## 2018-07-26 PROCEDURE — 93005 ELECTROCARDIOGRAM TRACING: CPT

## 2018-07-26 PROCEDURE — G8978 MOBILITY CURRENT STATUS: HCPCS | Performed by: PHYSICAL THERAPIST

## 2018-07-26 PROCEDURE — 85730 THROMBOPLASTIN TIME PARTIAL: CPT | Performed by: SURGERY

## 2018-07-26 PROCEDURE — 93306 TTE W/DOPPLER COMPLETE: CPT

## 2018-07-26 PROCEDURE — 97163 PT EVAL HIGH COMPLEX 45 MIN: CPT | Performed by: PHYSICAL THERAPIST

## 2018-07-26 PROCEDURE — 93306 TTE W/DOPPLER COMPLETE: CPT | Performed by: INTERNAL MEDICINE

## 2018-07-26 PROCEDURE — G8979 MOBILITY GOAL STATUS: HCPCS | Performed by: PHYSICAL THERAPIST

## 2018-07-26 PROCEDURE — 82948 REAGENT STRIP/BLOOD GLUCOSE: CPT

## 2018-07-26 PROCEDURE — 93971 EXTREMITY STUDY: CPT | Performed by: SURGERY

## 2018-07-26 PROCEDURE — 99232 SBSQ HOSP IP/OBS MODERATE 35: CPT | Performed by: PHYSICIAN ASSISTANT

## 2018-07-26 PROCEDURE — 80048 BASIC METABOLIC PNL TOTAL CA: CPT | Performed by: SURGERY

## 2018-07-26 PROCEDURE — 99222 1ST HOSP IP/OBS MODERATE 55: CPT | Performed by: INTERNAL MEDICINE

## 2018-07-26 PROCEDURE — 93971 EXTREMITY STUDY: CPT

## 2018-07-26 PROCEDURE — 93010 ELECTROCARDIOGRAM REPORT: CPT | Performed by: INTERNAL MEDICINE

## 2018-07-26 PROCEDURE — 83735 ASSAY OF MAGNESIUM: CPT | Performed by: SURGERY

## 2018-07-26 PROCEDURE — 99221 1ST HOSP IP/OBS SF/LOW 40: CPT | Performed by: INTERNAL MEDICINE

## 2018-07-26 RX ORDER — ACETAMINOPHEN 325 MG/1
975 TABLET ORAL EVERY 8 HOURS SCHEDULED
Status: DISCONTINUED | OUTPATIENT
Start: 2018-07-26 | End: 2018-08-02

## 2018-07-26 RX ORDER — OXYCODONE HYDROCHLORIDE 10 MG/1
10 TABLET ORAL EVERY 4 HOURS PRN
Status: DISCONTINUED | OUTPATIENT
Start: 2018-07-26 | End: 2018-07-31

## 2018-07-26 RX ORDER — BACLOFEN 10 MG/1
20 TABLET ORAL 3 TIMES DAILY
Status: DISCONTINUED | OUTPATIENT
Start: 2018-07-26 | End: 2018-08-05 | Stop reason: HOSPADM

## 2018-07-26 RX ORDER — MAGNESIUM SULFATE HEPTAHYDRATE 40 MG/ML
2 INJECTION, SOLUTION INTRAVENOUS ONCE
Status: COMPLETED | OUTPATIENT
Start: 2018-07-26 | End: 2018-07-26

## 2018-07-26 RX ORDER — OXYCODONE HYDROCHLORIDE 5 MG/1
5 TABLET ORAL EVERY 4 HOURS PRN
Status: DISCONTINUED | OUTPATIENT
Start: 2018-07-26 | End: 2018-07-31

## 2018-07-26 RX ORDER — GABAPENTIN 300 MG/1
300 CAPSULE ORAL
Status: DISCONTINUED | OUTPATIENT
Start: 2018-07-26 | End: 2018-07-30

## 2018-07-26 RX ADMIN — PRAVASTATIN SODIUM 20 MG: 20 TABLET ORAL at 08:36

## 2018-07-26 RX ADMIN — INSULIN LISPRO 7 UNITS: 100 INJECTION, SOLUTION INTRAVENOUS; SUBCUTANEOUS at 11:52

## 2018-07-26 RX ADMIN — NICOTINE 21 MG: 21 PATCH, EXTENDED RELEASE TRANSDERMAL at 08:37

## 2018-07-26 RX ADMIN — BACLOFEN 20 MG: 10 TABLET ORAL at 15:31

## 2018-07-26 RX ADMIN — CITALOPRAM HYDROBROMIDE 20 MG: 20 TABLET ORAL at 08:36

## 2018-07-26 RX ADMIN — HEPARIN SODIUM 15 UNITS/KG/HR: 10000 INJECTION, SOLUTION INTRAVENOUS at 12:50

## 2018-07-26 RX ADMIN — VANCOMYCIN HYDROCHLORIDE 1250 MG: 10 INJECTION, POWDER, LYOPHILIZED, FOR SOLUTION INTRAVENOUS at 09:50

## 2018-07-26 RX ADMIN — OXYCODONE HYDROCHLORIDE 10 MG: 10 TABLET ORAL at 11:14

## 2018-07-26 RX ADMIN — MAGNESIUM SULFATE HEPTAHYDRATE 2 G: 40 INJECTION, SOLUTION INTRAVENOUS at 15:31

## 2018-07-26 RX ADMIN — BACLOFEN 20 MG: 10 TABLET ORAL at 21:45

## 2018-07-26 RX ADMIN — ACETAMINOPHEN 975 MG: 325 TABLET, FILM COATED ORAL at 21:45

## 2018-07-26 RX ADMIN — OXYCODONE HYDROCHLORIDE AND ACETAMINOPHEN 1 TABLET: 5; 325 TABLET ORAL at 10:06

## 2018-07-26 RX ADMIN — GABAPENTIN 300 MG: 300 CAPSULE ORAL at 21:45

## 2018-07-26 RX ADMIN — INSULIN GLARGINE 20 UNITS: 100 INJECTION, SOLUTION SUBCUTANEOUS at 21:44

## 2018-07-26 RX ADMIN — SODIUM CHLORIDE 125 ML/HR: 0.9 INJECTION, SOLUTION INTRAVENOUS at 04:46

## 2018-07-26 RX ADMIN — ACETAMINOPHEN 975 MG: 325 TABLET, FILM COATED ORAL at 15:30

## 2018-07-26 RX ADMIN — BACLOFEN 20 MG: 10 TABLET ORAL at 08:36

## 2018-07-26 RX ADMIN — OXYCODONE HYDROCHLORIDE AND ACETAMINOPHEN 1 TABLET: 5; 325 TABLET ORAL at 05:19

## 2018-07-26 RX ADMIN — ASPIRIN 81 MG: 81 TABLET, COATED ORAL at 08:36

## 2018-07-26 RX ADMIN — SENNOSIDES AND DOCUSATE SODIUM 1 TABLET: 8.6; 5 TABLET ORAL at 21:45

## 2018-07-26 RX ADMIN — VANCOMYCIN HYDROCHLORIDE 1500 MG: 10 INJECTION, POWDER, LYOPHILIZED, FOR SOLUTION INTRAVENOUS at 23:33

## 2018-07-26 RX ADMIN — LISINOPRIL 10 MG: 10 TABLET ORAL at 08:36

## 2018-07-26 RX ADMIN — OXYCODONE HYDROCHLORIDE 10 MG: 10 TABLET ORAL at 21:45

## 2018-07-26 RX ADMIN — HYDROMORPHONE HYDROCHLORIDE 0.2 MG: 1 INJECTION, SOLUTION INTRAMUSCULAR; INTRAVENOUS; SUBCUTANEOUS at 11:14

## 2018-07-26 RX ADMIN — SODIUM CHLORIDE 125 ML/HR: 0.9 INJECTION, SOLUTION INTRAVENOUS at 19:39

## 2018-07-26 RX ADMIN — OXYCODONE HYDROCHLORIDE 10 MG: 10 TABLET ORAL at 16:49

## 2018-07-26 RX ADMIN — INSULIN LISPRO 7 UNITS: 100 INJECTION, SOLUTION INTRAVENOUS; SUBCUTANEOUS at 17:28

## 2018-07-26 NOTE — TELEPHONE ENCOUNTER
Pt currently admitted to b, cardiology saw this am and ordered echo, may need stress, for pre op clearance  Surgery may be done during this admission

## 2018-07-27 LAB
ANION GAP SERPL CALCULATED.3IONS-SCNC: 5 MMOL/L (ref 4–13)
ANION GAP SERPL CALCULATED.3IONS-SCNC: 7 MMOL/L (ref 4–13)
APTT PPP: 78 SECONDS (ref 24–36)
BASOPHILS # BLD AUTO: 0.05 THOUSANDS/ΜL (ref 0–0.1)
BASOPHILS NFR BLD AUTO: 1 % (ref 0–1)
BUN SERPL-MCNC: 10 MG/DL (ref 5–25)
BUN SERPL-MCNC: 8 MG/DL (ref 5–25)
CALCIUM SERPL-MCNC: 8.2 MG/DL (ref 8.3–10.1)
CALCIUM SERPL-MCNC: 8.5 MG/DL (ref 8.3–10.1)
CHLORIDE SERPL-SCNC: 104 MMOL/L (ref 100–108)
CHLORIDE SERPL-SCNC: 106 MMOL/L (ref 100–108)
CO2 SERPL-SCNC: 26 MMOL/L (ref 21–32)
CO2 SERPL-SCNC: 26 MMOL/L (ref 21–32)
CREAT SERPL-MCNC: 0.68 MG/DL (ref 0.6–1.3)
CREAT SERPL-MCNC: 0.79 MG/DL (ref 0.6–1.3)
EOSINOPHIL # BLD AUTO: 0.12 THOUSAND/ΜL (ref 0–0.61)
EOSINOPHIL NFR BLD AUTO: 2 % (ref 0–6)
ERYTHROCYTE [DISTWIDTH] IN BLOOD BY AUTOMATED COUNT: 12.8 % (ref 11.6–15.1)
GFR SERPL CREATININE-BSD FRML MDRD: 104 ML/MIN/1.73SQ M
GFR SERPL CREATININE-BSD FRML MDRD: 98 ML/MIN/1.73SQ M
GLUCOSE SERPL-MCNC: 123 MG/DL (ref 65–140)
GLUCOSE SERPL-MCNC: 124 MG/DL (ref 65–140)
GLUCOSE SERPL-MCNC: 214 MG/DL (ref 65–140)
GLUCOSE SERPL-MCNC: 217 MG/DL (ref 65–140)
GLUCOSE SERPL-MCNC: 230 MG/DL (ref 65–140)
GLUCOSE SERPL-MCNC: 87 MG/DL (ref 65–140)
HCT VFR BLD AUTO: 35.1 % (ref 36.5–49.3)
HGB BLD-MCNC: 11.7 G/DL (ref 12–17)
IMM GRANULOCYTES # BLD AUTO: 0.02 THOUSAND/UL (ref 0–0.2)
IMM GRANULOCYTES NFR BLD AUTO: 0 % (ref 0–2)
LYMPHOCYTES # BLD AUTO: 1.9 THOUSANDS/ΜL (ref 0.6–4.47)
LYMPHOCYTES NFR BLD AUTO: 28 % (ref 14–44)
MAGNESIUM SERPL-MCNC: 1.6 MG/DL (ref 1.6–2.6)
MCH RBC QN AUTO: 34 PG (ref 26.8–34.3)
MCHC RBC AUTO-ENTMCNC: 33.3 G/DL (ref 31.4–37.4)
MCV RBC AUTO: 102 FL (ref 82–98)
MONOCYTES # BLD AUTO: 0.63 THOUSAND/ΜL (ref 0.17–1.22)
MONOCYTES NFR BLD AUTO: 9 % (ref 4–12)
NEUTROPHILS # BLD AUTO: 4 THOUSANDS/ΜL (ref 1.85–7.62)
NEUTS SEG NFR BLD AUTO: 60 % (ref 43–75)
NRBC BLD AUTO-RTO: 0 /100 WBCS
PHOSPHATE SERPL-MCNC: 3.8 MG/DL (ref 2.3–4.1)
PLATELET # BLD AUTO: 204 THOUSANDS/UL (ref 149–390)
PMV BLD AUTO: 10.7 FL (ref 8.9–12.7)
POTASSIUM SERPL-SCNC: 4 MMOL/L (ref 3.5–5.3)
POTASSIUM SERPL-SCNC: 4.3 MMOL/L (ref 3.5–5.3)
RBC # BLD AUTO: 3.44 MILLION/UL (ref 3.88–5.62)
SODIUM SERPL-SCNC: 137 MMOL/L (ref 136–145)
SODIUM SERPL-SCNC: 137 MMOL/L (ref 136–145)
WBC # BLD AUTO: 6.72 THOUSAND/UL (ref 4.31–10.16)

## 2018-07-27 PROCEDURE — 85730 THROMBOPLASTIN TIME PARTIAL: CPT | Performed by: SURGERY

## 2018-07-27 PROCEDURE — 80048 BASIC METABOLIC PNL TOTAL CA: CPT | Performed by: INTERNAL MEDICINE

## 2018-07-27 PROCEDURE — 84100 ASSAY OF PHOSPHORUS: CPT | Performed by: INTERNAL MEDICINE

## 2018-07-27 PROCEDURE — 82948 REAGENT STRIP/BLOOD GLUCOSE: CPT

## 2018-07-27 PROCEDURE — 97112 NEUROMUSCULAR REEDUCATION: CPT

## 2018-07-27 PROCEDURE — 99232 SBSQ HOSP IP/OBS MODERATE 35: CPT | Performed by: INTERNAL MEDICINE

## 2018-07-27 PROCEDURE — 85025 COMPLETE CBC W/AUTO DIFF WBC: CPT | Performed by: INTERNAL MEDICINE

## 2018-07-27 PROCEDURE — 97116 GAIT TRAINING THERAPY: CPT

## 2018-07-27 PROCEDURE — 83735 ASSAY OF MAGNESIUM: CPT | Performed by: INTERNAL MEDICINE

## 2018-07-27 PROCEDURE — 99232 SBSQ HOSP IP/OBS MODERATE 35: CPT | Performed by: PHYSICIAN ASSISTANT

## 2018-07-27 RX ADMIN — OXYCODONE HYDROCHLORIDE 10 MG: 10 TABLET ORAL at 05:17

## 2018-07-27 RX ADMIN — BACLOFEN 20 MG: 10 TABLET ORAL at 16:37

## 2018-07-27 RX ADMIN — GABAPENTIN 300 MG: 300 CAPSULE ORAL at 21:02

## 2018-07-27 RX ADMIN — HEPARIN SODIUM 15 UNITS/KG/HR: 10000 INJECTION, SOLUTION INTRAVENOUS at 07:19

## 2018-07-27 RX ADMIN — NICOTINE 21 MG: 21 PATCH, EXTENDED RELEASE TRANSDERMAL at 08:36

## 2018-07-27 RX ADMIN — INSULIN LISPRO 7 UNITS: 100 INJECTION, SOLUTION INTRAVENOUS; SUBCUTANEOUS at 16:57

## 2018-07-27 RX ADMIN — ACETAMINOPHEN 975 MG: 325 TABLET, FILM COATED ORAL at 13:54

## 2018-07-27 RX ADMIN — PRAVASTATIN SODIUM 20 MG: 20 TABLET ORAL at 08:36

## 2018-07-27 RX ADMIN — CITALOPRAM HYDROBROMIDE 20 MG: 20 TABLET ORAL at 08:36

## 2018-07-27 RX ADMIN — INSULIN LISPRO 7 UNITS: 100 INJECTION, SOLUTION INTRAVENOUS; SUBCUTANEOUS at 07:31

## 2018-07-27 RX ADMIN — OXYCODONE HYDROCHLORIDE 5 MG: 5 TABLET ORAL at 13:54

## 2018-07-27 RX ADMIN — VANCOMYCIN HYDROCHLORIDE 1500 MG: 10 INJECTION, POWDER, LYOPHILIZED, FOR SOLUTION INTRAVENOUS at 11:51

## 2018-07-27 RX ADMIN — HYDROMORPHONE HYDROCHLORIDE 0.2 MG: 1 INJECTION, SOLUTION INTRAMUSCULAR; INTRAVENOUS; SUBCUTANEOUS at 22:46

## 2018-07-27 RX ADMIN — INSULIN LISPRO 2 UNITS: 100 INJECTION, SOLUTION INTRAVENOUS; SUBCUTANEOUS at 16:57

## 2018-07-27 RX ADMIN — BACLOFEN 20 MG: 10 TABLET ORAL at 08:36

## 2018-07-27 RX ADMIN — ASPIRIN 81 MG: 81 TABLET, COATED ORAL at 08:36

## 2018-07-27 RX ADMIN — SODIUM CHLORIDE 125 ML/HR: 0.9 INJECTION, SOLUTION INTRAVENOUS at 17:59

## 2018-07-27 RX ADMIN — SODIUM CHLORIDE 125 ML/HR: 0.9 INJECTION, SOLUTION INTRAVENOUS at 01:36

## 2018-07-27 RX ADMIN — ACETAMINOPHEN 975 MG: 325 TABLET, FILM COATED ORAL at 21:02

## 2018-07-27 RX ADMIN — INSULIN GLARGINE 20 UNITS: 100 INJECTION, SOLUTION SUBCUTANEOUS at 21:02

## 2018-07-27 RX ADMIN — OXYCODONE HYDROCHLORIDE 10 MG: 10 TABLET ORAL at 16:56

## 2018-07-27 RX ADMIN — SENNOSIDES AND DOCUSATE SODIUM 1 TABLET: 8.6; 5 TABLET ORAL at 21:02

## 2018-07-27 RX ADMIN — ACETAMINOPHEN 975 MG: 325 TABLET, FILM COATED ORAL at 05:17

## 2018-07-27 RX ADMIN — OXYCODONE HYDROCHLORIDE 10 MG: 10 TABLET ORAL at 10:15

## 2018-07-27 RX ADMIN — OXYCODONE HYDROCHLORIDE 10 MG: 10 TABLET ORAL at 20:50

## 2018-07-27 RX ADMIN — BACLOFEN 20 MG: 10 TABLET ORAL at 20:51

## 2018-07-27 RX ADMIN — HYDROMORPHONE HYDROCHLORIDE 0.2 MG: 1 INJECTION, SOLUTION INTRAMUSCULAR; INTRAVENOUS; SUBCUTANEOUS at 01:21

## 2018-07-27 RX ADMIN — SODIUM CHLORIDE 125 ML/HR: 0.9 INJECTION, SOLUTION INTRAVENOUS at 09:59

## 2018-07-27 NOTE — TELEPHONE ENCOUNTER
Per note from Dr Cassie Wells from today, "Moderate risk for surgery; there is no cardiac contraindication to proceeding with surgery    I do not think a stress test will be of much benefit prior to the surgery, therefore would not recommend performing this at this time "

## 2018-07-28 ENCOUNTER — APPOINTMENT (INPATIENT)
Dept: RADIOLOGY | Facility: HOSPITAL | Age: 58
DRG: 254 | End: 2018-07-28
Payer: COMMERCIAL

## 2018-07-28 LAB
APTT PPP: 35 SECONDS (ref 24–36)
BASOPHILS # BLD AUTO: 0.05 THOUSANDS/ΜL (ref 0–0.1)
BASOPHILS NFR BLD AUTO: 1 % (ref 0–1)
EOSINOPHIL # BLD AUTO: 0.14 THOUSAND/ΜL (ref 0–0.61)
EOSINOPHIL NFR BLD AUTO: 2 % (ref 0–6)
ERYTHROCYTE [DISTWIDTH] IN BLOOD BY AUTOMATED COUNT: 12.7 % (ref 11.6–15.1)
GLUCOSE SERPL-MCNC: 146 MG/DL (ref 65–140)
GLUCOSE SERPL-MCNC: 165 MG/DL (ref 65–140)
GLUCOSE SERPL-MCNC: 172 MG/DL (ref 65–140)
GLUCOSE SERPL-MCNC: 94 MG/DL (ref 65–140)
HCT VFR BLD AUTO: 34.1 % (ref 36.5–49.3)
HGB BLD-MCNC: 11.3 G/DL (ref 12–17)
IMM GRANULOCYTES # BLD AUTO: 0.04 THOUSAND/UL (ref 0–0.2)
IMM GRANULOCYTES NFR BLD AUTO: 1 % (ref 0–2)
LYMPHOCYTES # BLD AUTO: 1.53 THOUSANDS/ΜL (ref 0.6–4.47)
LYMPHOCYTES NFR BLD AUTO: 23 % (ref 14–44)
MAGNESIUM SERPL-MCNC: 1.6 MG/DL (ref 1.6–2.6)
MCH RBC QN AUTO: 33.8 PG (ref 26.8–34.3)
MCHC RBC AUTO-ENTMCNC: 33.1 G/DL (ref 31.4–37.4)
MCV RBC AUTO: 102 FL (ref 82–98)
MONOCYTES # BLD AUTO: 0.75 THOUSAND/ΜL (ref 0.17–1.22)
MONOCYTES NFR BLD AUTO: 11 % (ref 4–12)
NEUTROPHILS # BLD AUTO: 4.1 THOUSANDS/ΜL (ref 1.85–7.62)
NEUTS SEG NFR BLD AUTO: 62 % (ref 43–75)
NRBC BLD AUTO-RTO: 0 /100 WBCS
PHOSPHATE SERPL-MCNC: 3.7 MG/DL (ref 2.3–4.1)
PLATELET # BLD AUTO: 194 THOUSANDS/UL (ref 149–390)
PMV BLD AUTO: 10.6 FL (ref 8.9–12.7)
RBC # BLD AUTO: 3.34 MILLION/UL (ref 3.88–5.62)
WBC # BLD AUTO: 6.61 THOUSAND/UL (ref 4.31–10.16)

## 2018-07-28 PROCEDURE — 82948 REAGENT STRIP/BLOOD GLUCOSE: CPT

## 2018-07-28 PROCEDURE — 99232 SBSQ HOSP IP/OBS MODERATE 35: CPT | Performed by: SURGERY

## 2018-07-28 PROCEDURE — 84100 ASSAY OF PHOSPHORUS: CPT | Performed by: INTERNAL MEDICINE

## 2018-07-28 PROCEDURE — 73630 X-RAY EXAM OF FOOT: CPT

## 2018-07-28 PROCEDURE — 85730 THROMBOPLASTIN TIME PARTIAL: CPT | Performed by: SURGERY

## 2018-07-28 PROCEDURE — 85025 COMPLETE CBC W/AUTO DIFF WBC: CPT | Performed by: INTERNAL MEDICINE

## 2018-07-28 PROCEDURE — 99231 SBSQ HOSP IP/OBS SF/LOW 25: CPT | Performed by: INTERNAL MEDICINE

## 2018-07-28 PROCEDURE — 83735 ASSAY OF MAGNESIUM: CPT | Performed by: INTERNAL MEDICINE

## 2018-07-28 RX ORDER — MAGNESIUM SULFATE HEPTAHYDRATE 40 MG/ML
2 INJECTION, SOLUTION INTRAVENOUS ONCE
Status: COMPLETED | OUTPATIENT
Start: 2018-07-28 | End: 2018-07-28

## 2018-07-28 RX ORDER — HEPARIN SODIUM 5000 [USP'U]/ML
5000 INJECTION, SOLUTION INTRAVENOUS; SUBCUTANEOUS EVERY 8 HOURS SCHEDULED
Status: DISCONTINUED | OUTPATIENT
Start: 2018-07-28 | End: 2018-08-05 | Stop reason: HOSPADM

## 2018-07-28 RX ADMIN — ACETAMINOPHEN 975 MG: 325 TABLET, FILM COATED ORAL at 13:13

## 2018-07-28 RX ADMIN — MAGNESIUM SULFATE HEPTAHYDRATE 2 G: 40 INJECTION, SOLUTION INTRAVENOUS at 09:32

## 2018-07-28 RX ADMIN — HEPARIN SODIUM 5000 UNITS: 5000 INJECTION, SOLUTION INTRAVENOUS; SUBCUTANEOUS at 13:13

## 2018-07-28 RX ADMIN — OXYCODONE HYDROCHLORIDE 10 MG: 10 TABLET ORAL at 13:13

## 2018-07-28 RX ADMIN — ACETAMINOPHEN 975 MG: 325 TABLET, FILM COATED ORAL at 21:59

## 2018-07-28 RX ADMIN — HEPARIN SODIUM 5000 UNITS: 5000 INJECTION, SOLUTION INTRAVENOUS; SUBCUTANEOUS at 21:59

## 2018-07-28 RX ADMIN — INSULIN LISPRO 1 UNITS: 100 INJECTION, SOLUTION INTRAVENOUS; SUBCUTANEOUS at 16:51

## 2018-07-28 RX ADMIN — INSULIN LISPRO 7 UNITS: 100 INJECTION, SOLUTION INTRAVENOUS; SUBCUTANEOUS at 07:17

## 2018-07-28 RX ADMIN — OXYCODONE HYDROCHLORIDE 10 MG: 10 TABLET ORAL at 21:59

## 2018-07-28 RX ADMIN — ACETAMINOPHEN 975 MG: 325 TABLET, FILM COATED ORAL at 06:19

## 2018-07-28 RX ADMIN — ASPIRIN 81 MG: 81 TABLET, COATED ORAL at 09:33

## 2018-07-28 RX ADMIN — BACLOFEN 20 MG: 10 TABLET ORAL at 21:59

## 2018-07-28 RX ADMIN — OXYCODONE HYDROCHLORIDE 10 MG: 10 TABLET ORAL at 17:13

## 2018-07-28 RX ADMIN — OXYCODONE HYDROCHLORIDE 10 MG: 10 TABLET ORAL at 06:19

## 2018-07-28 RX ADMIN — HYDROMORPHONE HYDROCHLORIDE 0.2 MG: 1 INJECTION, SOLUTION INTRAMUSCULAR; INTRAVENOUS; SUBCUTANEOUS at 20:13

## 2018-07-28 RX ADMIN — CITALOPRAM HYDROBROMIDE 20 MG: 20 TABLET ORAL at 09:33

## 2018-07-28 RX ADMIN — PRAVASTATIN SODIUM 20 MG: 20 TABLET ORAL at 09:33

## 2018-07-28 RX ADMIN — HEPARIN SODIUM 5000 UNITS: 5000 INJECTION, SOLUTION INTRAVENOUS; SUBCUTANEOUS at 06:19

## 2018-07-28 RX ADMIN — HYDROMORPHONE HYDROCHLORIDE 0.2 MG: 1 INJECTION, SOLUTION INTRAMUSCULAR; INTRAVENOUS; SUBCUTANEOUS at 04:40

## 2018-07-28 RX ADMIN — BACLOFEN 20 MG: 10 TABLET ORAL at 16:50

## 2018-07-28 RX ADMIN — HYDROMORPHONE HYDROCHLORIDE 0.2 MG: 1 INJECTION, SOLUTION INTRAMUSCULAR; INTRAVENOUS; SUBCUTANEOUS at 09:42

## 2018-07-28 RX ADMIN — BACLOFEN 20 MG: 10 TABLET ORAL at 09:33

## 2018-07-28 RX ADMIN — SENNOSIDES AND DOCUSATE SODIUM 1 TABLET: 8.6; 5 TABLET ORAL at 21:59

## 2018-07-28 RX ADMIN — GABAPENTIN 300 MG: 300 CAPSULE ORAL at 21:59

## 2018-07-28 RX ADMIN — NICOTINE 21 MG: 21 PATCH, EXTENDED RELEASE TRANSDERMAL at 09:34

## 2018-07-28 RX ADMIN — INSULIN LISPRO 7 UNITS: 100 INJECTION, SOLUTION INTRAVENOUS; SUBCUTANEOUS at 16:51

## 2018-07-28 RX ADMIN — INSULIN GLARGINE 20 UNITS: 100 INJECTION, SOLUTION SUBCUTANEOUS at 21:59

## 2018-07-29 LAB
ANION GAP SERPL CALCULATED.3IONS-SCNC: 6 MMOL/L (ref 4–13)
BASOPHILS # BLD AUTO: 0.04 THOUSANDS/ΜL (ref 0–0.1)
BASOPHILS NFR BLD AUTO: 1 % (ref 0–1)
BUN SERPL-MCNC: 8 MG/DL (ref 5–25)
CALCIUM SERPL-MCNC: 9 MG/DL (ref 8.3–10.1)
CHLORIDE SERPL-SCNC: 103 MMOL/L (ref 100–108)
CO2 SERPL-SCNC: 32 MMOL/L (ref 21–32)
CREAT SERPL-MCNC: 0.74 MG/DL (ref 0.6–1.3)
EOSINOPHIL # BLD AUTO: 0.14 THOUSAND/ΜL (ref 0–0.61)
EOSINOPHIL NFR BLD AUTO: 2 % (ref 0–6)
ERYTHROCYTE [DISTWIDTH] IN BLOOD BY AUTOMATED COUNT: 12.9 % (ref 11.6–15.1)
GFR SERPL CREATININE-BSD FRML MDRD: 100 ML/MIN/1.73SQ M
GLUCOSE SERPL-MCNC: 116 MG/DL (ref 65–140)
GLUCOSE SERPL-MCNC: 128 MG/DL (ref 65–140)
GLUCOSE SERPL-MCNC: 212 MG/DL (ref 65–140)
GLUCOSE SERPL-MCNC: 218 MG/DL (ref 65–140)
GLUCOSE SERPL-MCNC: 94 MG/DL (ref 65–140)
HCT VFR BLD AUTO: 38 % (ref 36.5–49.3)
HGB BLD-MCNC: 12.8 G/DL (ref 12–17)
IMM GRANULOCYTES # BLD AUTO: 0.03 THOUSAND/UL (ref 0–0.2)
IMM GRANULOCYTES NFR BLD AUTO: 0 % (ref 0–2)
LYMPHOCYTES # BLD AUTO: 1.78 THOUSANDS/ΜL (ref 0.6–4.47)
LYMPHOCYTES NFR BLD AUTO: 26 % (ref 14–44)
MCH RBC QN AUTO: 34.2 PG (ref 26.8–34.3)
MCHC RBC AUTO-ENTMCNC: 33.7 G/DL (ref 31.4–37.4)
MCV RBC AUTO: 102 FL (ref 82–98)
MONOCYTES # BLD AUTO: 0.82 THOUSAND/ΜL (ref 0.17–1.22)
MONOCYTES NFR BLD AUTO: 12 % (ref 4–12)
NEUTROPHILS # BLD AUTO: 3.98 THOUSANDS/ΜL (ref 1.85–7.62)
NEUTS SEG NFR BLD AUTO: 59 % (ref 43–75)
NRBC BLD AUTO-RTO: 0 /100 WBCS
PLATELET # BLD AUTO: 201 THOUSANDS/UL (ref 149–390)
PMV BLD AUTO: 10.4 FL (ref 8.9–12.7)
POTASSIUM SERPL-SCNC: 4.6 MMOL/L (ref 3.5–5.3)
RBC # BLD AUTO: 3.74 MILLION/UL (ref 3.88–5.62)
SODIUM SERPL-SCNC: 141 MMOL/L (ref 136–145)
WBC # BLD AUTO: 6.79 THOUSAND/UL (ref 4.31–10.16)

## 2018-07-29 PROCEDURE — 99232 SBSQ HOSP IP/OBS MODERATE 35: CPT | Performed by: INTERNAL MEDICINE

## 2018-07-29 PROCEDURE — 99232 SBSQ HOSP IP/OBS MODERATE 35: CPT | Performed by: SURGERY

## 2018-07-29 PROCEDURE — 85025 COMPLETE CBC W/AUTO DIFF WBC: CPT | Performed by: STUDENT IN AN ORGANIZED HEALTH CARE EDUCATION/TRAINING PROGRAM

## 2018-07-29 PROCEDURE — 80048 BASIC METABOLIC PNL TOTAL CA: CPT | Performed by: STUDENT IN AN ORGANIZED HEALTH CARE EDUCATION/TRAINING PROGRAM

## 2018-07-29 PROCEDURE — 82948 REAGENT STRIP/BLOOD GLUCOSE: CPT

## 2018-07-29 RX ORDER — AMLODIPINE BESYLATE 5 MG/1
5 TABLET ORAL DAILY
Status: DISCONTINUED | OUTPATIENT
Start: 2018-07-29 | End: 2018-08-01

## 2018-07-29 RX ADMIN — INSULIN LISPRO 2 UNITS: 100 INJECTION, SOLUTION INTRAVENOUS; SUBCUTANEOUS at 11:34

## 2018-07-29 RX ADMIN — BACLOFEN 20 MG: 10 TABLET ORAL at 08:21

## 2018-07-29 RX ADMIN — HEPARIN SODIUM 5000 UNITS: 5000 INJECTION, SOLUTION INTRAVENOUS; SUBCUTANEOUS at 06:03

## 2018-07-29 RX ADMIN — INSULIN GLARGINE 20 UNITS: 100 INJECTION, SOLUTION SUBCUTANEOUS at 21:33

## 2018-07-29 RX ADMIN — BACLOFEN 20 MG: 10 TABLET ORAL at 21:31

## 2018-07-29 RX ADMIN — ASPIRIN 81 MG: 81 TABLET, COATED ORAL at 08:21

## 2018-07-29 RX ADMIN — SENNOSIDES AND DOCUSATE SODIUM 1 TABLET: 8.6; 5 TABLET ORAL at 21:32

## 2018-07-29 RX ADMIN — INSULIN LISPRO 7 UNITS: 100 INJECTION, SOLUTION INTRAVENOUS; SUBCUTANEOUS at 11:34

## 2018-07-29 RX ADMIN — HYDROMORPHONE HYDROCHLORIDE 0.2 MG: 1 INJECTION, SOLUTION INTRAMUSCULAR; INTRAVENOUS; SUBCUTANEOUS at 14:10

## 2018-07-29 RX ADMIN — ACETAMINOPHEN 975 MG: 325 TABLET, FILM COATED ORAL at 21:31

## 2018-07-29 RX ADMIN — ACETAMINOPHEN 975 MG: 325 TABLET, FILM COATED ORAL at 06:03

## 2018-07-29 RX ADMIN — HYDROMORPHONE HYDROCHLORIDE 0.2 MG: 1 INJECTION, SOLUTION INTRAMUSCULAR; INTRAVENOUS; SUBCUTANEOUS at 06:02

## 2018-07-29 RX ADMIN — AMLODIPINE BESYLATE 5 MG: 5 TABLET ORAL at 16:34

## 2018-07-29 RX ADMIN — HYDROMORPHONE HYDROCHLORIDE 0.2 MG: 1 INJECTION, SOLUTION INTRAMUSCULAR; INTRAVENOUS; SUBCUTANEOUS at 10:23

## 2018-07-29 RX ADMIN — OXYCODONE HYDROCHLORIDE 10 MG: 10 TABLET ORAL at 02:48

## 2018-07-29 RX ADMIN — HYDROMORPHONE HYDROCHLORIDE 0.2 MG: 1 INJECTION, SOLUTION INTRAMUSCULAR; INTRAVENOUS; SUBCUTANEOUS at 00:08

## 2018-07-29 RX ADMIN — GABAPENTIN 300 MG: 300 CAPSULE ORAL at 21:32

## 2018-07-29 RX ADMIN — HYDROMORPHONE HYDROCHLORIDE 0.2 MG: 1 INJECTION, SOLUTION INTRAMUSCULAR; INTRAVENOUS; SUBCUTANEOUS at 23:57

## 2018-07-29 RX ADMIN — PRAVASTATIN SODIUM 20 MG: 20 TABLET ORAL at 08:21

## 2018-07-29 RX ADMIN — CITALOPRAM HYDROBROMIDE 20 MG: 20 TABLET ORAL at 08:21

## 2018-07-29 RX ADMIN — INSULIN LISPRO 7 UNITS: 100 INJECTION, SOLUTION INTRAVENOUS; SUBCUTANEOUS at 16:35

## 2018-07-29 RX ADMIN — INSULIN LISPRO 7 UNITS: 100 INJECTION, SOLUTION INTRAVENOUS; SUBCUTANEOUS at 08:23

## 2018-07-29 RX ADMIN — OXYCODONE HYDROCHLORIDE 10 MG: 10 TABLET ORAL at 17:30

## 2018-07-29 RX ADMIN — HEPARIN SODIUM 5000 UNITS: 5000 INJECTION, SOLUTION INTRAVENOUS; SUBCUTANEOUS at 21:32

## 2018-07-29 RX ADMIN — OXYCODONE HYDROCHLORIDE 10 MG: 10 TABLET ORAL at 08:21

## 2018-07-29 RX ADMIN — ACETAMINOPHEN 975 MG: 325 TABLET, FILM COATED ORAL at 14:08

## 2018-07-29 RX ADMIN — BACLOFEN 20 MG: 10 TABLET ORAL at 16:34

## 2018-07-29 RX ADMIN — OXYCODONE HYDROCHLORIDE 10 MG: 10 TABLET ORAL at 12:45

## 2018-07-29 RX ADMIN — NICOTINE 21 MG: 21 PATCH, EXTENDED RELEASE TRANSDERMAL at 08:22

## 2018-07-29 RX ADMIN — OXYCODONE HYDROCHLORIDE 10 MG: 10 TABLET ORAL at 21:32

## 2018-07-29 RX ADMIN — HEPARIN SODIUM 5000 UNITS: 5000 INJECTION, SOLUTION INTRAVENOUS; SUBCUTANEOUS at 14:08

## 2018-07-30 ENCOUNTER — ANESTHESIA EVENT (INPATIENT)
Dept: PERIOP | Facility: HOSPITAL | Age: 58
DRG: 254 | End: 2018-07-30
Payer: COMMERCIAL

## 2018-07-30 LAB
ABO GROUP BLD: NORMAL
ANION GAP SERPL CALCULATED.3IONS-SCNC: 5 MMOL/L (ref 4–13)
BASOPHILS # BLD AUTO: 0.04 THOUSANDS/ΜL (ref 0–0.1)
BASOPHILS NFR BLD AUTO: 1 % (ref 0–1)
BLD GP AB SCN SERPL QL: NEGATIVE
BUN SERPL-MCNC: 9 MG/DL (ref 5–25)
CALCIUM SERPL-MCNC: 9.2 MG/DL (ref 8.3–10.1)
CHLORIDE SERPL-SCNC: 101 MMOL/L (ref 100–108)
CO2 SERPL-SCNC: 31 MMOL/L (ref 21–32)
CREAT SERPL-MCNC: 0.72 MG/DL (ref 0.6–1.3)
EOSINOPHIL # BLD AUTO: 0.16 THOUSAND/ΜL (ref 0–0.61)
EOSINOPHIL NFR BLD AUTO: 2 % (ref 0–6)
ERYTHROCYTE [DISTWIDTH] IN BLOOD BY AUTOMATED COUNT: 12.7 % (ref 11.6–15.1)
GFR SERPL CREATININE-BSD FRML MDRD: 101 ML/MIN/1.73SQ M
GLUCOSE SERPL-MCNC: 127 MG/DL (ref 65–140)
GLUCOSE SERPL-MCNC: 130 MG/DL (ref 65–140)
GLUCOSE SERPL-MCNC: 144 MG/DL (ref 65–140)
GLUCOSE SERPL-MCNC: 230 MG/DL (ref 65–140)
GLUCOSE SERPL-MCNC: 241 MG/DL (ref 65–140)
HCT VFR BLD AUTO: 39 % (ref 36.5–49.3)
HGB BLD-MCNC: 12.8 G/DL (ref 12–17)
IMM GRANULOCYTES # BLD AUTO: 0.03 THOUSAND/UL (ref 0–0.2)
IMM GRANULOCYTES NFR BLD AUTO: 0 % (ref 0–2)
LYMPHOCYTES # BLD AUTO: 1.85 THOUSANDS/ΜL (ref 0.6–4.47)
LYMPHOCYTES NFR BLD AUTO: 26 % (ref 14–44)
MCH RBC QN AUTO: 33.5 PG (ref 26.8–34.3)
MCHC RBC AUTO-ENTMCNC: 32.8 G/DL (ref 31.4–37.4)
MCV RBC AUTO: 102 FL (ref 82–98)
MONOCYTES # BLD AUTO: 0.97 THOUSAND/ΜL (ref 0.17–1.22)
MONOCYTES NFR BLD AUTO: 14 % (ref 4–12)
NEUTROPHILS # BLD AUTO: 4.07 THOUSANDS/ΜL (ref 1.85–7.62)
NEUTS SEG NFR BLD AUTO: 57 % (ref 43–75)
NRBC BLD AUTO-RTO: 0 /100 WBCS
PLATELET # BLD AUTO: 218 THOUSANDS/UL (ref 149–390)
PMV BLD AUTO: 10.8 FL (ref 8.9–12.7)
POTASSIUM SERPL-SCNC: 4.2 MMOL/L (ref 3.5–5.3)
RBC # BLD AUTO: 3.82 MILLION/UL (ref 3.88–5.62)
RH BLD: NEGATIVE
SODIUM SERPL-SCNC: 137 MMOL/L (ref 136–145)
SPECIMEN EXPIRATION DATE: NORMAL
WBC # BLD AUTO: 7.12 THOUSAND/UL (ref 4.31–10.16)

## 2018-07-30 PROCEDURE — 86901 BLOOD TYPING SEROLOGIC RH(D): CPT | Performed by: PHYSICIAN ASSISTANT

## 2018-07-30 PROCEDURE — 99232 SBSQ HOSP IP/OBS MODERATE 35: CPT | Performed by: PHYSICIAN ASSISTANT

## 2018-07-30 PROCEDURE — 86900 BLOOD TYPING SEROLOGIC ABO: CPT | Performed by: PHYSICIAN ASSISTANT

## 2018-07-30 PROCEDURE — 86923 COMPATIBILITY TEST ELECTRIC: CPT

## 2018-07-30 PROCEDURE — 97530 THERAPEUTIC ACTIVITIES: CPT

## 2018-07-30 PROCEDURE — G8987 SELF CARE CURRENT STATUS: HCPCS

## 2018-07-30 PROCEDURE — G8988 SELF CARE GOAL STATUS: HCPCS

## 2018-07-30 PROCEDURE — 80048 BASIC METABOLIC PNL TOTAL CA: CPT | Performed by: STUDENT IN AN ORGANIZED HEALTH CARE EDUCATION/TRAINING PROGRAM

## 2018-07-30 PROCEDURE — 97110 THERAPEUTIC EXERCISES: CPT

## 2018-07-30 PROCEDURE — 85025 COMPLETE CBC W/AUTO DIFF WBC: CPT | Performed by: STUDENT IN AN ORGANIZED HEALTH CARE EDUCATION/TRAINING PROGRAM

## 2018-07-30 PROCEDURE — 99231 SBSQ HOSP IP/OBS SF/LOW 25: CPT | Performed by: INTERNAL MEDICINE

## 2018-07-30 PROCEDURE — 86850 RBC ANTIBODY SCREEN: CPT | Performed by: PHYSICIAN ASSISTANT

## 2018-07-30 PROCEDURE — 82948 REAGENT STRIP/BLOOD GLUCOSE: CPT

## 2018-07-30 PROCEDURE — 97167 OT EVAL HIGH COMPLEX 60 MIN: CPT

## 2018-07-30 RX ORDER — CHLORHEXIDINE GLUCONATE 0.12 MG/ML
15 RINSE ORAL EVERY 12 HOURS SCHEDULED
Status: DISCONTINUED | OUTPATIENT
Start: 2018-07-30 | End: 2018-08-02

## 2018-07-30 RX ORDER — GABAPENTIN 300 MG/1
300 CAPSULE ORAL 2 TIMES DAILY
Status: DISCONTINUED | OUTPATIENT
Start: 2018-07-30 | End: 2018-08-03

## 2018-07-30 RX ORDER — MUSCLE RUB CREAM 100; 150 MG/G; MG/G
CREAM TOPICAL 4 TIMES DAILY PRN
Status: DISCONTINUED | OUTPATIENT
Start: 2018-07-30 | End: 2018-08-05 | Stop reason: HOSPADM

## 2018-07-30 RX ORDER — SODIUM CHLORIDE 9 MG/ML
100 INJECTION, SOLUTION INTRAVENOUS CONTINUOUS
Status: DISCONTINUED | OUTPATIENT
Start: 2018-07-31 | End: 2018-07-31

## 2018-07-30 RX ADMIN — HEPARIN SODIUM 5000 UNITS: 5000 INJECTION, SOLUTION INTRAVENOUS; SUBCUTANEOUS at 21:49

## 2018-07-30 RX ADMIN — SENNOSIDES AND DOCUSATE SODIUM 1 TABLET: 8.6; 5 TABLET ORAL at 21:48

## 2018-07-30 RX ADMIN — HYDROMORPHONE HYDROCHLORIDE 0.2 MG: 1 INJECTION, SOLUTION INTRAMUSCULAR; INTRAVENOUS; SUBCUTANEOUS at 23:35

## 2018-07-30 RX ADMIN — HYDROMORPHONE HYDROCHLORIDE 0.2 MG: 1 INJECTION, SOLUTION INTRAMUSCULAR; INTRAVENOUS; SUBCUTANEOUS at 13:59

## 2018-07-30 RX ADMIN — PRAVASTATIN SODIUM 20 MG: 20 TABLET ORAL at 08:33

## 2018-07-30 RX ADMIN — OXYCODONE HYDROCHLORIDE 10 MG: 10 TABLET ORAL at 20:51

## 2018-07-30 RX ADMIN — MENTHOL, METHYL SALICYLATE: 10; 15 CREAM TOPICAL at 05:57

## 2018-07-30 RX ADMIN — AMLODIPINE BESYLATE 5 MG: 5 TABLET ORAL at 08:33

## 2018-07-30 RX ADMIN — HYDROMORPHONE HYDROCHLORIDE 0.2 MG: 1 INJECTION, SOLUTION INTRAMUSCULAR; INTRAVENOUS; SUBCUTANEOUS at 18:41

## 2018-07-30 RX ADMIN — OXYCODONE HYDROCHLORIDE 10 MG: 10 TABLET ORAL at 02:25

## 2018-07-30 RX ADMIN — GABAPENTIN 300 MG: 300 CAPSULE ORAL at 17:10

## 2018-07-30 RX ADMIN — CHLORHEXIDINE GLUCONATE 15 ML: 1.2 RINSE ORAL at 21:49

## 2018-07-30 RX ADMIN — HEPARIN SODIUM 5000 UNITS: 5000 INJECTION, SOLUTION INTRAVENOUS; SUBCUTANEOUS at 13:53

## 2018-07-30 RX ADMIN — BACLOFEN 20 MG: 10 TABLET ORAL at 21:48

## 2018-07-30 RX ADMIN — OXYCODONE HYDROCHLORIDE 10 MG: 10 TABLET ORAL at 12:36

## 2018-07-30 RX ADMIN — OXYCODONE HYDROCHLORIDE 10 MG: 10 TABLET ORAL at 08:34

## 2018-07-30 RX ADMIN — ACETAMINOPHEN 975 MG: 325 TABLET, FILM COATED ORAL at 13:53

## 2018-07-30 RX ADMIN — BACLOFEN 20 MG: 10 TABLET ORAL at 08:33

## 2018-07-30 RX ADMIN — INSULIN GLARGINE 20 UNITS: 100 INJECTION, SOLUTION SUBCUTANEOUS at 21:49

## 2018-07-30 RX ADMIN — ACETAMINOPHEN 975 MG: 325 TABLET, FILM COATED ORAL at 05:52

## 2018-07-30 RX ADMIN — MENTHOL, METHYL SALICYLATE: 10; 15 CREAM TOPICAL at 16:34

## 2018-07-30 RX ADMIN — INSULIN LISPRO 7 UNITS: 100 INJECTION, SOLUTION INTRAVENOUS; SUBCUTANEOUS at 11:28

## 2018-07-30 RX ADMIN — ACETAMINOPHEN 975 MG: 325 TABLET, FILM COATED ORAL at 21:48

## 2018-07-30 RX ADMIN — SODIUM CHLORIDE 100 ML/HR: 0.9 INJECTION, SOLUTION INTRAVENOUS at 23:35

## 2018-07-30 RX ADMIN — HYDROMORPHONE HYDROCHLORIDE 0.2 MG: 1 INJECTION, SOLUTION INTRAMUSCULAR; INTRAVENOUS; SUBCUTANEOUS at 05:52

## 2018-07-30 RX ADMIN — ASPIRIN 81 MG: 81 TABLET, COATED ORAL at 08:33

## 2018-07-30 RX ADMIN — INSULIN LISPRO 7 UNITS: 100 INJECTION, SOLUTION INTRAVENOUS; SUBCUTANEOUS at 16:29

## 2018-07-30 RX ADMIN — INSULIN LISPRO 2 UNITS: 100 INJECTION, SOLUTION INTRAVENOUS; SUBCUTANEOUS at 11:28

## 2018-07-30 RX ADMIN — CITALOPRAM HYDROBROMIDE 20 MG: 20 TABLET ORAL at 08:33

## 2018-07-30 RX ADMIN — BACLOFEN 20 MG: 10 TABLET ORAL at 16:29

## 2018-07-30 RX ADMIN — HEPARIN SODIUM 5000 UNITS: 5000 INJECTION, SOLUTION INTRAVENOUS; SUBCUTANEOUS at 05:52

## 2018-07-30 RX ADMIN — NICOTINE 21 MG: 21 PATCH, EXTENDED RELEASE TRANSDERMAL at 08:37

## 2018-07-30 RX ADMIN — INSULIN LISPRO 7 UNITS: 100 INJECTION, SOLUTION INTRAVENOUS; SUBCUTANEOUS at 08:33

## 2018-07-30 RX ADMIN — OXYCODONE HYDROCHLORIDE 10 MG: 10 TABLET ORAL at 16:28

## 2018-07-31 ENCOUNTER — APPOINTMENT (INPATIENT)
Dept: RADIOLOGY | Facility: HOSPITAL | Age: 58
DRG: 254 | End: 2018-07-31
Payer: COMMERCIAL

## 2018-07-31 ENCOUNTER — ANESTHESIA (INPATIENT)
Dept: PERIOP | Facility: HOSPITAL | Age: 58
DRG: 254 | End: 2018-07-31
Payer: COMMERCIAL

## 2018-07-31 LAB
ALBUMIN SERPL BCP-MCNC: 2.9 G/DL (ref 3.5–5)
ALP SERPL-CCNC: 46 U/L (ref 46–116)
ALT SERPL W P-5'-P-CCNC: 48 U/L (ref 12–78)
ANION GAP SERPL CALCULATED.3IONS-SCNC: 6 MMOL/L (ref 4–13)
ANION GAP SERPL CALCULATED.3IONS-SCNC: 9 MMOL/L (ref 4–13)
AST SERPL W P-5'-P-CCNC: 33 U/L (ref 5–45)
BASE EXCESS BLDA CALC-SCNC: -1.7 MMOL/L
BILIRUB SERPL-MCNC: 0.86 MG/DL (ref 0.2–1)
BUN SERPL-MCNC: 10 MG/DL (ref 5–25)
BUN SERPL-MCNC: 9 MG/DL (ref 5–25)
CA-I BLD-SCNC: 1.03 MMOL/L (ref 1.12–1.32)
CALCIUM SERPL-MCNC: 7.8 MG/DL (ref 8.3–10.1)
CALCIUM SERPL-MCNC: 8 MG/DL (ref 8.3–10.1)
CHLORIDE SERPL-SCNC: 107 MMOL/L (ref 100–108)
CHLORIDE SERPL-SCNC: 108 MMOL/L (ref 100–108)
CO2 SERPL-SCNC: 24 MMOL/L (ref 21–32)
CO2 SERPL-SCNC: 25 MMOL/L (ref 21–32)
CREAT SERPL-MCNC: 0.67 MG/DL (ref 0.6–1.3)
CREAT SERPL-MCNC: 0.74 MG/DL (ref 0.6–1.3)
ERYTHROCYTE [DISTWIDTH] IN BLOOD BY AUTOMATED COUNT: 15.3 % (ref 11.6–15.1)
GFR SERPL CREATININE-BSD FRML MDRD: 100 ML/MIN/1.73SQ M
GFR SERPL CREATININE-BSD FRML MDRD: 104 ML/MIN/1.73SQ M
GLUCOSE SERPL-MCNC: 175 MG/DL (ref 65–140)
GLUCOSE SERPL-MCNC: 184 MG/DL (ref 65–140)
GLUCOSE SERPL-MCNC: 185 MG/DL (ref 65–140)
GLUCOSE SERPL-MCNC: 194 MG/DL (ref 65–140)
GLUCOSE SERPL-MCNC: 205 MG/DL (ref 65–140)
HCO3 BLDA-SCNC: 23.4 MMOL/L (ref 22–28)
HCT VFR BLD AUTO: 33 % (ref 36.5–49.3)
HCT VFR BLD AUTO: 35.7 % (ref 36.5–49.3)
HGB BLD-MCNC: 10.9 G/DL (ref 12–17)
HGB BLD-MCNC: 11.8 G/DL (ref 12–17)
MCH RBC QN AUTO: 32.3 PG (ref 26.8–34.3)
MCHC RBC AUTO-ENTMCNC: 33.1 G/DL (ref 31.4–37.4)
MCV RBC AUTO: 98 FL (ref 82–98)
NASAL CANNULA: 3
O2 CT BLDA-SCNC: 16.7 ML/DL (ref 16–23)
OXYHGB MFR BLDA: 96.4 % (ref 94–97)
PCO2 BLDA: 41 MM HG (ref 36–44)
PH BLDA: 7.37 [PH] (ref 7.35–7.45)
PLATELET # BLD AUTO: 177 THOUSANDS/UL (ref 149–390)
PLATELET # BLD AUTO: 188 THOUSANDS/UL (ref 149–390)
PMV BLD AUTO: 10.1 FL (ref 8.9–12.7)
PMV BLD AUTO: 10.3 FL (ref 8.9–12.7)
PO2 BLDA: 97.3 MM HG (ref 75–129)
POTASSIUM SERPL-SCNC: 4 MMOL/L (ref 3.5–5.3)
POTASSIUM SERPL-SCNC: 4.4 MMOL/L (ref 3.5–5.3)
PROT SERPL-MCNC: 5.8 G/DL (ref 6.4–8.2)
RBC # BLD AUTO: 3.65 MILLION/UL (ref 3.88–5.62)
SODIUM SERPL-SCNC: 139 MMOL/L (ref 136–145)
SODIUM SERPL-SCNC: 140 MMOL/L (ref 136–145)
SPECIMEN SOURCE: NORMAL
WBC # BLD AUTO: 11.91 THOUSAND/UL (ref 4.31–10.16)

## 2018-07-31 PROCEDURE — 04CK0ZZ EXTIRPATION OF MATTER FROM RIGHT FEMORAL ARTERY, OPEN APPROACH: ICD-10-PCS | Performed by: SURGERY

## 2018-07-31 PROCEDURE — 84132 ASSAY OF SERUM POTASSIUM: CPT

## 2018-07-31 PROCEDURE — 82330 ASSAY OF CALCIUM: CPT

## 2018-07-31 PROCEDURE — 35583 VEIN BYP GRFT FEM-POPLITEAL: CPT | Performed by: SURGERY

## 2018-07-31 PROCEDURE — B41FZZZ FLUOROSCOPY OF RIGHT LOWER EXTREMITY ARTERIES: ICD-10-PCS | Performed by: SURGERY

## 2018-07-31 PROCEDURE — 85018 HEMOGLOBIN: CPT | Performed by: SURGERY

## 2018-07-31 PROCEDURE — 041K09L BYPASS RIGHT FEMORAL ARTERY TO POPLITEAL ARTERY WITH AUTOLOGOUS VENOUS TISSUE, OPEN APPROACH: ICD-10-PCS | Performed by: SURGERY

## 2018-07-31 PROCEDURE — 80053 COMPREHEN METABOLIC PANEL: CPT | Performed by: SURGERY

## 2018-07-31 PROCEDURE — 75710 ARTERY X-RAYS ARM/LEG: CPT

## 2018-07-31 PROCEDURE — 30233N1 TRANSFUSION OF NONAUTOLOGOUS RED BLOOD CELLS INTO PERIPHERAL VEIN, PERCUTANEOUS APPROACH: ICD-10-PCS | Performed by: ANESTHESIOLOGY

## 2018-07-31 PROCEDURE — 85014 HEMATOCRIT: CPT | Performed by: SURGERY

## 2018-07-31 PROCEDURE — 04JY0ZZ INSPECTION OF LOWER ARTERY, OPEN APPROACH: ICD-10-PCS | Performed by: SURGERY

## 2018-07-31 PROCEDURE — C1781 MESH (IMPLANTABLE): HCPCS | Performed by: SURGERY

## 2018-07-31 PROCEDURE — P9021 RED BLOOD CELLS UNIT: HCPCS

## 2018-07-31 PROCEDURE — 82948 REAGENT STRIP/BLOOD GLUCOSE: CPT

## 2018-07-31 PROCEDURE — 82803 BLOOD GASES ANY COMBINATION: CPT

## 2018-07-31 PROCEDURE — 06BP0ZZ EXCISION OF RIGHT SAPHENOUS VEIN, OPEN APPROACH: ICD-10-PCS | Performed by: SURGERY

## 2018-07-31 PROCEDURE — C1894 INTRO/SHEATH, NON-LASER: HCPCS | Performed by: SURGERY

## 2018-07-31 PROCEDURE — 84295 ASSAY OF SERUM SODIUM: CPT

## 2018-07-31 PROCEDURE — 82330 ASSAY OF CALCIUM: CPT | Performed by: SURGERY

## 2018-07-31 PROCEDURE — 80048 BASIC METABOLIC PNL TOTAL CA: CPT | Performed by: SURGERY

## 2018-07-31 PROCEDURE — 0L8N0ZZ DIVISION OF RIGHT LOWER LEG TENDON, OPEN APPROACH: ICD-10-PCS | Performed by: SURGERY

## 2018-07-31 PROCEDURE — 04UK0KZ SUPPLEMENT RIGHT FEMORAL ARTERY WITH NONAUTOLOGOUS TISSUE SUBSTITUTE, OPEN APPROACH: ICD-10-PCS | Performed by: SURGERY

## 2018-07-31 PROCEDURE — 85014 HEMATOCRIT: CPT

## 2018-07-31 PROCEDURE — 85049 AUTOMATED PLATELET COUNT: CPT | Performed by: SURGERY

## 2018-07-31 PROCEDURE — P9016 RBC LEUKOCYTES REDUCED: HCPCS

## 2018-07-31 PROCEDURE — 85347 COAGULATION TIME ACTIVATED: CPT

## 2018-07-31 PROCEDURE — 82805 BLOOD GASES W/O2 SATURATION: CPT | Performed by: SURGERY

## 2018-07-31 PROCEDURE — 82947 ASSAY GLUCOSE BLOOD QUANT: CPT

## 2018-07-31 PROCEDURE — 85027 COMPLETE CBC AUTOMATED: CPT | Performed by: SURGERY

## 2018-07-31 DEVICE — XENOSURE BIOLOGIC PATCH, 0.8CM X 8CM, EIFU
Type: IMPLANTABLE DEVICE | Site: ARTERIAL | Status: FUNCTIONAL
Brand: XENOSURE BIOLOGIC PATCH

## 2018-07-31 RX ORDER — FENTANYL CITRATE/PF 50 MCG/ML
25 SYRINGE (ML) INJECTION
Status: DISCONTINUED | OUTPATIENT
Start: 2018-07-31 | End: 2018-07-31 | Stop reason: HOSPADM

## 2018-07-31 RX ORDER — GLYCOPYRROLATE 0.2 MG/ML
INJECTION INTRAMUSCULAR; INTRAVENOUS AS NEEDED
Status: DISCONTINUED | OUTPATIENT
Start: 2018-07-31 | End: 2018-07-31 | Stop reason: SURG

## 2018-07-31 RX ORDER — OXYCODONE HYDROCHLORIDE 5 MG/1
5 TABLET ORAL EVERY 4 HOURS PRN
Status: DISCONTINUED | OUTPATIENT
Start: 2018-07-31 | End: 2018-08-02

## 2018-07-31 RX ORDER — FENTANYL CITRATE 50 UG/ML
INJECTION, SOLUTION INTRAMUSCULAR; INTRAVENOUS AS NEEDED
Status: DISCONTINUED | OUTPATIENT
Start: 2018-07-31 | End: 2018-07-31 | Stop reason: SURG

## 2018-07-31 RX ORDER — ONDANSETRON 2 MG/ML
4 INJECTION INTRAMUSCULAR; INTRAVENOUS EVERY 6 HOURS PRN
Status: DISCONTINUED | OUTPATIENT
Start: 2018-07-31 | End: 2018-07-31 | Stop reason: SDUPTHER

## 2018-07-31 RX ORDER — SODIUM CHLORIDE 9 MG/ML
INJECTION, SOLUTION INTRAVENOUS CONTINUOUS PRN
Status: DISCONTINUED | OUTPATIENT
Start: 2018-07-31 | End: 2018-07-31 | Stop reason: SURG

## 2018-07-31 RX ORDER — PROTAMINE SULFATE 10 MG/ML
INJECTION, SOLUTION INTRAVENOUS AS NEEDED
Status: DISCONTINUED | OUTPATIENT
Start: 2018-07-31 | End: 2018-07-31 | Stop reason: SURG

## 2018-07-31 RX ORDER — HYDROMORPHONE HYDROCHLORIDE 2 MG/ML
INJECTION, SOLUTION INTRAMUSCULAR; INTRAVENOUS; SUBCUTANEOUS AS NEEDED
Status: DISCONTINUED | OUTPATIENT
Start: 2018-07-31 | End: 2018-07-31 | Stop reason: SURG

## 2018-07-31 RX ORDER — SODIUM CHLORIDE, SODIUM LACTATE, POTASSIUM CHLORIDE, CALCIUM CHLORIDE 600; 310; 30; 20 MG/100ML; MG/100ML; MG/100ML; MG/100ML
INJECTION, SOLUTION INTRAVENOUS CONTINUOUS PRN
Status: DISCONTINUED | OUTPATIENT
Start: 2018-07-31 | End: 2018-07-31 | Stop reason: SURG

## 2018-07-31 RX ORDER — PROPOFOL 10 MG/ML
INJECTION, EMULSION INTRAVENOUS AS NEEDED
Status: DISCONTINUED | OUTPATIENT
Start: 2018-07-31 | End: 2018-07-31 | Stop reason: SURG

## 2018-07-31 RX ORDER — HEPARIN SODIUM 1000 [USP'U]/ML
INJECTION, SOLUTION INTRAVENOUS; SUBCUTANEOUS AS NEEDED
Status: DISCONTINUED | OUTPATIENT
Start: 2018-07-31 | End: 2018-07-31 | Stop reason: SURG

## 2018-07-31 RX ORDER — CLOPIDOGREL BISULFATE 75 MG/1
75 TABLET ORAL DAILY
Status: DISCONTINUED | OUTPATIENT
Start: 2018-08-01 | End: 2018-08-05 | Stop reason: HOSPADM

## 2018-07-31 RX ORDER — ALBUMIN, HUMAN INJ 5% 5 %
SOLUTION INTRAVENOUS CONTINUOUS PRN
Status: DISCONTINUED | OUTPATIENT
Start: 2018-07-31 | End: 2018-07-31 | Stop reason: SURG

## 2018-07-31 RX ORDER — EPHEDRINE SULFATE 50 MG/ML
INJECTION, SOLUTION INTRAVENOUS AS NEEDED
Status: DISCONTINUED | OUTPATIENT
Start: 2018-07-31 | End: 2018-07-31 | Stop reason: SURG

## 2018-07-31 RX ORDER — LIDOCAINE HYDROCHLORIDE 10 MG/ML
INJECTION, SOLUTION INFILTRATION; PERINEURAL AS NEEDED
Status: DISCONTINUED | OUTPATIENT
Start: 2018-07-31 | End: 2018-07-31 | Stop reason: SURG

## 2018-07-31 RX ORDER — ROCURONIUM BROMIDE 10 MG/ML
INJECTION, SOLUTION INTRAVENOUS AS NEEDED
Status: DISCONTINUED | OUTPATIENT
Start: 2018-07-31 | End: 2018-07-31 | Stop reason: SURG

## 2018-07-31 RX ORDER — HEPARIN SODIUM 5000 [USP'U]/ML
5000 INJECTION, SOLUTION INTRAVENOUS; SUBCUTANEOUS EVERY 8 HOURS SCHEDULED
Status: DISCONTINUED | OUTPATIENT
Start: 2018-07-31 | End: 2018-07-31 | Stop reason: SDUPTHER

## 2018-07-31 RX ORDER — SODIUM CHLORIDE, SODIUM LACTATE, POTASSIUM CHLORIDE, CALCIUM CHLORIDE 600; 310; 30; 20 MG/100ML; MG/100ML; MG/100ML; MG/100ML
125 INJECTION, SOLUTION INTRAVENOUS CONTINUOUS
Status: DISCONTINUED | OUTPATIENT
Start: 2018-07-31 | End: 2018-08-01

## 2018-07-31 RX ORDER — MIDAZOLAM HYDROCHLORIDE 1 MG/ML
INJECTION INTRAMUSCULAR; INTRAVENOUS AS NEEDED
Status: DISCONTINUED | OUTPATIENT
Start: 2018-07-31 | End: 2018-07-31 | Stop reason: SURG

## 2018-07-31 RX ORDER — ONDANSETRON 2 MG/ML
INJECTION INTRAMUSCULAR; INTRAVENOUS AS NEEDED
Status: DISCONTINUED | OUTPATIENT
Start: 2018-07-31 | End: 2018-07-31 | Stop reason: SURG

## 2018-07-31 RX ORDER — DOCUSATE SODIUM 100 MG/1
100 CAPSULE, LIQUID FILLED ORAL 2 TIMES DAILY
Status: DISCONTINUED | OUTPATIENT
Start: 2018-07-31 | End: 2018-08-05 | Stop reason: HOSPADM

## 2018-07-31 RX ORDER — OXYCODONE HYDROCHLORIDE 10 MG/1
10 TABLET ORAL EVERY 4 HOURS PRN
Status: DISCONTINUED | OUTPATIENT
Start: 2018-07-31 | End: 2018-08-02

## 2018-07-31 RX ORDER — PAPAVERINE HYDROCHLORIDE 30 MG/ML
INJECTION INTRAMUSCULAR; INTRAVENOUS AS NEEDED
Status: DISCONTINUED | OUTPATIENT
Start: 2018-07-31 | End: 2018-07-31 | Stop reason: HOSPADM

## 2018-07-31 RX ADMIN — ONDANSETRON 4 MG: 2 INJECTION INTRAMUSCULAR; INTRAVENOUS at 15:55

## 2018-07-31 RX ADMIN — FENTANYL CITRATE 25 MCG: 50 INJECTION, SOLUTION INTRAMUSCULAR; INTRAVENOUS at 17:06

## 2018-07-31 RX ADMIN — EPHEDRINE SULFATE 10 MG: 50 INJECTION, SOLUTION INTRAMUSCULAR; INTRAVENOUS; SUBCUTANEOUS at 08:55

## 2018-07-31 RX ADMIN — HYDROMORPHONE HYDROCHLORIDE 1 MG: 1 INJECTION, SOLUTION INTRAMUSCULAR; INTRAVENOUS; SUBCUTANEOUS at 20:03

## 2018-07-31 RX ADMIN — ACETAMINOPHEN 975 MG: 325 TABLET, FILM COATED ORAL at 21:09

## 2018-07-31 RX ADMIN — FENTANYL CITRATE 25 MCG: 50 INJECTION, SOLUTION INTRAMUSCULAR; INTRAVENOUS at 17:32

## 2018-07-31 RX ADMIN — FENTANYL CITRATE 50 MCG: 50 INJECTION, SOLUTION INTRAMUSCULAR; INTRAVENOUS at 08:33

## 2018-07-31 RX ADMIN — HYDROMORPHONE HYDROCHLORIDE 0.2 MG: 2 INJECTION, SOLUTION INTRAMUSCULAR; INTRAVENOUS; SUBCUTANEOUS at 10:05

## 2018-07-31 RX ADMIN — INSULIN HUMAN 2 UNITS: 100 INJECTION, SOLUTION PARENTERAL at 10:44

## 2018-07-31 RX ADMIN — GLYCOPYRROLATE 0.4 MG: 0.2 INJECTION, SOLUTION INTRAMUSCULAR; INTRAVENOUS at 15:48

## 2018-07-31 RX ADMIN — PHENYLEPHRINE HYDROCHLORIDE 100 MCG/MIN: 10 INJECTION INTRAVENOUS at 08:38

## 2018-07-31 RX ADMIN — EPHEDRINE SULFATE 5 MG: 50 INJECTION, SOLUTION INTRAMUSCULAR; INTRAVENOUS; SUBCUTANEOUS at 08:59

## 2018-07-31 RX ADMIN — HYDROMORPHONE HYDROCHLORIDE 0.2 MG: 2 INJECTION, SOLUTION INTRAMUSCULAR; INTRAVENOUS; SUBCUTANEOUS at 15:20

## 2018-07-31 RX ADMIN — ROCURONIUM BROMIDE 20 MG: 10 INJECTION INTRAVENOUS at 09:26

## 2018-07-31 RX ADMIN — SODIUM CHLORIDE, SODIUM LACTATE, POTASSIUM CHLORIDE, AND CALCIUM CHLORIDE 125 ML/HR: .6; .31; .03; .02 INJECTION, SOLUTION INTRAVENOUS at 17:38

## 2018-07-31 RX ADMIN — GLYCOPYRROLATE 0.2 MG: 0.2 INJECTION, SOLUTION INTRAMUSCULAR; INTRAVENOUS at 09:04

## 2018-07-31 RX ADMIN — HEPARIN SODIUM 1000 UNITS: 1000 INJECTION INTRAVENOUS; SUBCUTANEOUS at 12:00

## 2018-07-31 RX ADMIN — FENTANYL CITRATE 50 MCG: 50 INJECTION, SOLUTION INTRAMUSCULAR; INTRAVENOUS at 09:23

## 2018-07-31 RX ADMIN — HYDROMORPHONE HYDROCHLORIDE 0.5 MG: 1 INJECTION, SOLUTION INTRAMUSCULAR; INTRAVENOUS; SUBCUTANEOUS at 17:41

## 2018-07-31 RX ADMIN — CHLORHEXIDINE GLUCONATE 15 ML: 1.2 RINSE ORAL at 07:03

## 2018-07-31 RX ADMIN — HEPARIN SODIUM 1000 UNITS: 1000 INJECTION INTRAVENOUS; SUBCUTANEOUS at 11:36

## 2018-07-31 RX ADMIN — HEPARIN SODIUM 1000 UNITS: 1000 INJECTION INTRAVENOUS; SUBCUTANEOUS at 12:43

## 2018-07-31 RX ADMIN — PROPOFOL 150 MG: 10 INJECTION, EMULSION INTRAVENOUS at 08:33

## 2018-07-31 RX ADMIN — HYDROMORPHONE HYDROCHLORIDE 0.2 MG: 1 INJECTION, SOLUTION INTRAMUSCULAR; INTRAVENOUS; SUBCUTANEOUS at 04:38

## 2018-07-31 RX ADMIN — ROCURONIUM BROMIDE 50 MG: 10 INJECTION INTRAVENOUS at 08:33

## 2018-07-31 RX ADMIN — HEPARIN SODIUM 2000 UNITS: 1000 INJECTION INTRAVENOUS; SUBCUTANEOUS at 14:30

## 2018-07-31 RX ADMIN — HEPARIN SODIUM 1000 UNITS: 1000 INJECTION INTRAVENOUS; SUBCUTANEOUS at 12:30

## 2018-07-31 RX ADMIN — HYDROMORPHONE HYDROCHLORIDE 1 MG: 1 INJECTION, SOLUTION INTRAMUSCULAR; INTRAVENOUS; SUBCUTANEOUS at 23:05

## 2018-07-31 RX ADMIN — ONDANSETRON 4 MG: 2 INJECTION, SOLUTION INTRAMUSCULAR; INTRAVENOUS at 23:08

## 2018-07-31 RX ADMIN — EPHEDRINE SULFATE 5 MG: 50 INJECTION, SOLUTION INTRAMUSCULAR; INTRAVENOUS; SUBCUTANEOUS at 09:04

## 2018-07-31 RX ADMIN — HEPARIN SODIUM 7000 UNITS: 1000 INJECTION INTRAVENOUS; SUBCUTANEOUS at 10:42

## 2018-07-31 RX ADMIN — HYDROMORPHONE HYDROCHLORIDE 0.4 MG: 2 INJECTION, SOLUTION INTRAMUSCULAR; INTRAVENOUS; SUBCUTANEOUS at 09:35

## 2018-07-31 RX ADMIN — HEPARIN SODIUM 2000 UNITS: 1000 INJECTION INTRAVENOUS; SUBCUTANEOUS at 13:12

## 2018-07-31 RX ADMIN — OXYCODONE HYDROCHLORIDE 10 MG: 10 TABLET ORAL at 01:46

## 2018-07-31 RX ADMIN — SODIUM CHLORIDE, SODIUM LACTATE, POTASSIUM CHLORIDE, AND CALCIUM CHLORIDE: .6; .31; .03; .02 INJECTION, SOLUTION INTRAVENOUS at 08:37

## 2018-07-31 RX ADMIN — INSULIN GLARGINE 20 UNITS: 100 INJECTION, SOLUTION SUBCUTANEOUS at 21:10

## 2018-07-31 RX ADMIN — SENNOSIDES AND DOCUSATE SODIUM 1 TABLET: 8.6; 5 TABLET ORAL at 21:09

## 2018-07-31 RX ADMIN — OXYCODONE HYDROCHLORIDE 10 MG: 10 TABLET ORAL at 23:50

## 2018-07-31 RX ADMIN — INSULIN HUMAN 4 UNITS: 100 INJECTION, SOLUTION PARENTERAL at 12:07

## 2018-07-31 RX ADMIN — OXYCODONE HYDROCHLORIDE 10 MG: 10 TABLET ORAL at 06:52

## 2018-07-31 RX ADMIN — ROCURONIUM BROMIDE 10 MG: 10 INJECTION INTRAVENOUS at 12:57

## 2018-07-31 RX ADMIN — VANCOMYCIN HYDROCHLORIDE 1250 MG: 10 INJECTION, POWDER, LYOPHILIZED, FOR SOLUTION INTRAVENOUS at 08:20

## 2018-07-31 RX ADMIN — MIDAZOLAM 2 MG: 1 INJECTION INTRAMUSCULAR; INTRAVENOUS at 08:26

## 2018-07-31 RX ADMIN — DEXAMETHASONE SODIUM PHOSPHATE 5 MG: 10 INJECTION INTRAMUSCULAR; INTRAVENOUS at 09:29

## 2018-07-31 RX ADMIN — FENTANYL CITRATE 25 MCG: 50 INJECTION, SOLUTION INTRAMUSCULAR; INTRAVENOUS at 16:38

## 2018-07-31 RX ADMIN — SODIUM CHLORIDE 1000 ML: 0.9 INJECTION, SOLUTION INTRAVENOUS at 20:14

## 2018-07-31 RX ADMIN — LIDOCAINE HYDROCHLORIDE 50 MG: 10 INJECTION, SOLUTION INFILTRATION; PERINEURAL at 08:33

## 2018-07-31 RX ADMIN — DOCUSATE SODIUM 100 MG: 100 CAPSULE, LIQUID FILLED ORAL at 21:09

## 2018-07-31 RX ADMIN — HYDROMORPHONE HYDROCHLORIDE: 10 INJECTION, SOLUTION INTRAMUSCULAR; INTRAVENOUS; SUBCUTANEOUS at 17:00

## 2018-07-31 RX ADMIN — PROTAMINE SULFATE 20 MG: 10 INJECTION, SOLUTION INTRAVENOUS at 15:17

## 2018-07-31 RX ADMIN — CHLORHEXIDINE GLUCONATE 15 ML: 1.2 RINSE ORAL at 21:09

## 2018-07-31 RX ADMIN — HYDROMORPHONE HYDROCHLORIDE 0.5 MG: 2 INJECTION, SOLUTION INTRAMUSCULAR; INTRAVENOUS; SUBCUTANEOUS at 16:10

## 2018-07-31 RX ADMIN — SODIUM CHLORIDE 1000 ML: 0.9 INJECTION, SOLUTION INTRAVENOUS at 19:28

## 2018-07-31 RX ADMIN — BACLOFEN 20 MG: 10 TABLET ORAL at 21:09

## 2018-07-31 RX ADMIN — ALBUMIN HUMAN: 0.05 INJECTION, SOLUTION INTRAVENOUS at 14:38

## 2018-07-31 RX ADMIN — NEOSTIGMINE METHYLSULFATE 3 MG: 1 INJECTION, SOLUTION INTRAMUSCULAR; INTRAVENOUS; SUBCUTANEOUS at 15:48

## 2018-07-31 RX ADMIN — HYDROMORPHONE HYDROCHLORIDE 0.2 MG: 2 INJECTION, SOLUTION INTRAMUSCULAR; INTRAVENOUS; SUBCUTANEOUS at 16:05

## 2018-07-31 RX ADMIN — ROCURONIUM BROMIDE 10 MG: 10 INJECTION INTRAVENOUS at 10:55

## 2018-07-31 RX ADMIN — ALBUMIN HUMAN: 0.05 INJECTION, SOLUTION INTRAVENOUS at 15:10

## 2018-07-31 RX ADMIN — SODIUM CHLORIDE: 0.9 INJECTION, SOLUTION INTRAVENOUS at 08:34

## 2018-07-31 RX ADMIN — FENTANYL CITRATE 25 MCG: 50 INJECTION, SOLUTION INTRAMUSCULAR; INTRAVENOUS at 16:30

## 2018-07-31 RX ADMIN — HEPARIN SODIUM 5000 UNITS: 5000 INJECTION, SOLUTION INTRAVENOUS; SUBCUTANEOUS at 21:09

## 2018-07-31 NOTE — ANESTHESIA PREPROCEDURE EVALUATION
Review of Systems/Medical History  Patient summary reviewed  Chart reviewed  No history of anesthetic complications     Cardiovascular  EKG reviewed, Hypertension controlled,   Comment: PAD/PVD--Currently with right leg rest pain, Nonhealing wounds, Bilat SFA and Tibioperoneal disease    ECHO 7/2018: LEFT VENTRICLE:  Size was at the upper limits of normal   Systolic function was normal  Ejection fraction was estimated to be 55 %  There were no regional wall motion abnormalities  Wall thickness was mildly increased  There was mild concentric hypertrophy      MITRAL VALVE:  There was moderate annular calcification  There was trace regurgitation      TRICUSPID VALVE:  There was trace regurgitation      AORTA:  The root exhibited upper limit of normal size ,  Pulmonary  Smoker (1 ppd, Smoked this am) cigarette smoker  , COPD ,        GI/Hepatic      Comment: Hx gallstone pancreatitis  ETOH--3-4 drinks daily    Occasional heartburn--Rolaids prn     Negative  ROS        Endo/Other  Diabetes (with neuropathy ,HBA1C > 7) poorly controlled type 2 Oral agent,   Comment: Hx MRSA    GYN  Negative gynecology ROS          Hematology  Negative hematology ROS      Musculoskeletal    Comment: Hx C4 injury due to fall--S/P C3-4 fusion 2012      Neurology      Comment: Frequent falls/Ambulatory dysfunction Psychology   Depression ,   Chronic pain,            Physical Exam    Airway    Mallampati score: III  TM Distance: >3 FB  Neck ROM: full     Dental   No notable dental hx     Cardiovascular      Pulmonary      Other Findings        Anesthesia Plan  ASA Score- 4     Anesthesia Type- general with ASA Monitors  Additional Monitors: arterial line  Airway Plan: ETT  Comment: GA with ETT, IV, andrae, antiemetics, T/C blood  Plan Factors-    Induction- intravenous  Postoperative Plan- Plan for postoperative opioid use   Planned trial extubation    Informed Consent- Anesthetic plan and risks discussed with patient  I personally reviewed this patient with the CRNA  Discussed and agreed on the Anesthesia Plan with the CRNA  Dariana Frias

## 2018-07-31 NOTE — ANESTHESIA POSTPROCEDURE EVALUATION
Post-Op Assessment Note      CV Status:  Stable    Mental Status:  Alert and awake    Hydration Status:  Stable    PONV Controlled:  None    Airway Patency:  Patent and adequate  Airway: intubated    Post Op Vitals Reviewed: Yes          Staff: CRNA           BP (P) 140/68 (07/31/18 1612)    Temp (P) 99 5 °F (37 5 °C) (07/31/18 1612)    Pulse (P) 102 (07/31/18 1612)   Resp (P) 20 (07/31/18 1612)    SpO2 (P) 98 % (07/31/18 1612)

## 2018-07-31 NOTE — ANESTHESIA PROCEDURE NOTES
Arterial Line Insertion  Date/Time: 7/31/2018 8:33 AM  Performed by: Angy Torres by: JONNA Maier   Consent: Verbal consent obtained  Written consent obtained  Risks and benefits: risks, benefits and alternatives were discussed  Consent given by: patient and spouse  Patient understanding: patient states understanding of the procedure being performed  Patient consent: the patient's understanding of the procedure matches consent given  Procedure consent: procedure consent matches procedure scheduled  Relevant documents: relevant documents present and verified  Test results: test results available and properly labeled  Site marked: the operative site was marked  Required items: required blood products, implants, devices, and special equipment available  Patient identity confirmed: arm band  Time out: Immediately prior to procedure a "time out" was called to verify the correct patient, procedure, equipment, support staff and site/side marked as required  Preparation: Patient was prepped and draped in the usual sterile fashion    Indications: multiple ABGs and hemodynamic monitoring  Orientation:  Left  Location: radial artery  Sedation:  Patient sedated: yes (andrae place after anesthesia induction and pt intubated)    Procedure Details:  Chester's test normal: yes  Needle gauge: 20  Number of attempts: 1    Post-procedure:  Post-procedure: dressing applied  Waveform: good waveform and waveform confirmed  Post-procedure CNS: normal  Patient tolerance: Patient tolerated the procedure well with no immediate complications

## 2018-07-31 NOTE — TELEPHONE ENCOUNTER
Pt scheduled for Right femoral endarterectomy, possible right femoral to popliteal artery bypass surgery today with Dr Edmond Leavitt

## 2018-08-01 ENCOUNTER — APPOINTMENT (INPATIENT)
Dept: RADIOLOGY | Facility: HOSPITAL | Age: 58
DRG: 254 | End: 2018-08-01
Payer: COMMERCIAL

## 2018-08-01 PROBLEM — I95.9 HYPOTENSION: Status: ACTIVE | Noted: 2018-08-01

## 2018-08-01 LAB
ABO GROUP BLD BPU: NORMAL
ABO GROUP BLD BPU: NORMAL
ANION GAP SERPL CALCULATED.3IONS-SCNC: 7 MMOL/L (ref 4–13)
BASE EXCESS BLDA CALC-SCNC: 0 MMOL/L (ref -2–3)
BASE EXCESS BLDA CALC-SCNC: 1 MMOL/L (ref -2–3)
BASE EXCESS BLDA CALC-SCNC: 2 MMOL/L (ref -2–3)
BASE EXCESS BLDA CALC-SCNC: 2 MMOL/L (ref -2–3)
BPU ID: NORMAL
BPU ID: NORMAL
BUN SERPL-MCNC: 9 MG/DL (ref 5–25)
CA-I BLD-SCNC: 1.13 MMOL/L (ref 1.12–1.32)
CA-I BLD-SCNC: 1.14 MMOL/L (ref 1.12–1.32)
CA-I BLD-SCNC: 1.16 MMOL/L (ref 1.12–1.32)
CA-I BLD-SCNC: 1.17 MMOL/L (ref 1.12–1.32)
CALCIUM SERPL-MCNC: 7.8 MG/DL (ref 8.3–10.1)
CHLORIDE SERPL-SCNC: 107 MMOL/L (ref 100–108)
CO2 SERPL-SCNC: 26 MMOL/L (ref 21–32)
CREAT SERPL-MCNC: 0.6 MG/DL (ref 0.6–1.3)
ERYTHROCYTE [DISTWIDTH] IN BLOOD BY AUTOMATED COUNT: 16.3 % (ref 11.6–15.1)
GFR SERPL CREATININE-BSD FRML MDRD: 109 ML/MIN/1.73SQ M
GLUCOSE SERPL-MCNC: 101 MG/DL (ref 65–140)
GLUCOSE SERPL-MCNC: 149 MG/DL (ref 65–140)
GLUCOSE SERPL-MCNC: 154 MG/DL (ref 65–140)
GLUCOSE SERPL-MCNC: 170 MG/DL (ref 65–140)
GLUCOSE SERPL-MCNC: 179 MG/DL (ref 65–140)
GLUCOSE SERPL-MCNC: 188 MG/DL (ref 65–140)
GLUCOSE SERPL-MCNC: 201 MG/DL (ref 65–140)
GLUCOSE SERPL-MCNC: 208 MG/DL (ref 65–140)
GLUCOSE SERPL-MCNC: 95 MG/DL (ref 65–140)
HCO3 BLDA-SCNC: 25.8 MMOL/L (ref 22–28)
HCO3 BLDA-SCNC: 26.2 MMOL/L (ref 22–28)
HCO3 BLDA-SCNC: 26.7 MMOL/L (ref 22–28)
HCO3 BLDA-SCNC: 27 MMOL/L (ref 22–28)
HCT VFR BLD AUTO: 31.3 % (ref 36.5–49.3)
HCT VFR BLD CALC: 34 % (ref 36.5–49.3)
HCT VFR BLD CALC: 35 % (ref 36.5–49.3)
HCT VFR BLD CALC: 36 % (ref 36.5–49.3)
HCT VFR BLD CALC: 37 % (ref 36.5–49.3)
HGB BLD-MCNC: 10.2 G/DL (ref 12–17)
HGB BLDA-MCNC: 11.6 G/DL (ref 12–17)
HGB BLDA-MCNC: 11.9 G/DL (ref 12–17)
HGB BLDA-MCNC: 12.2 G/DL (ref 12–17)
HGB BLDA-MCNC: 12.6 G/DL (ref 12–17)
KCT BLD-ACNC: 138 SEC (ref 89–137)
KCT BLD-ACNC: 195 SEC (ref 89–137)
KCT BLD-ACNC: 195 SEC (ref 89–137)
KCT BLD-ACNC: 200 SEC (ref 89–137)
KCT BLD-ACNC: 206 SEC (ref 89–137)
KCT BLD-ACNC: 218 SEC (ref 89–137)
KCT BLD-ACNC: 266 SEC (ref 89–137)
MCH RBC QN AUTO: 32.1 PG (ref 26.8–34.3)
MCHC RBC AUTO-ENTMCNC: 32.6 G/DL (ref 31.4–37.4)
MCV RBC AUTO: 98 FL (ref 82–98)
PCO2 BLD: 27 MMOL/L (ref 21–32)
PCO2 BLD: 28 MMOL/L (ref 21–32)
PCO2 BLD: 42.2 MM HG (ref 36–44)
PCO2 BLD: 42.3 MM HG (ref 36–44)
PCO2 BLD: 43.2 MM HG (ref 36–44)
PCO2 BLD: 44.4 MM HG (ref 36–44)
PH BLD: 7.38 [PH] (ref 7.35–7.45)
PH BLD: 7.38 [PH] (ref 7.35–7.45)
PH BLD: 7.41 [PH] (ref 7.35–7.45)
PH BLD: 7.41 [PH] (ref 7.35–7.45)
PLATELET # BLD AUTO: 164 THOUSANDS/UL (ref 149–390)
PMV BLD AUTO: 10.6 FL (ref 8.9–12.7)
PO2 BLD: 106 MM HG (ref 75–129)
PO2 BLD: 107 MM HG (ref 75–129)
PO2 BLD: 84 MM HG (ref 75–129)
PO2 BLD: 99 MM HG (ref 75–129)
POTASSIUM BLD-SCNC: 3.9 MMOL/L (ref 3.5–5.3)
POTASSIUM BLD-SCNC: 4.2 MMOL/L (ref 3.5–5.3)
POTASSIUM BLD-SCNC: 4.3 MMOL/L (ref 3.5–5.3)
POTASSIUM BLD-SCNC: 4.4 MMOL/L (ref 3.5–5.3)
POTASSIUM SERPL-SCNC: 3.9 MMOL/L (ref 3.5–5.3)
RBC # BLD AUTO: 3.18 MILLION/UL (ref 3.88–5.62)
SAO2 % BLD FROM PO2: 96 % (ref 95–98)
SAO2 % BLD FROM PO2: 98 % (ref 95–98)
SODIUM BLD-SCNC: 140 MMOL/L (ref 136–145)
SODIUM SERPL-SCNC: 140 MMOL/L (ref 136–145)
SPECIMEN SOURCE: ABNORMAL
UNIT DISPENSE STATUS: NORMAL
UNIT DISPENSE STATUS: NORMAL
UNIT PRODUCT CODE: NORMAL
UNIT PRODUCT CODE: NORMAL
UNIT RH: NORMAL
UNIT RH: NORMAL
WBC # BLD AUTO: 11.69 THOUSAND/UL (ref 4.31–10.16)

## 2018-08-01 PROCEDURE — 87040 BLOOD CULTURE FOR BACTERIA: CPT | Performed by: INTERNAL MEDICINE

## 2018-08-01 PROCEDURE — 80048 BASIC METABOLIC PNL TOTAL CA: CPT | Performed by: PHYSICIAN ASSISTANT

## 2018-08-01 PROCEDURE — 97530 THERAPEUTIC ACTIVITIES: CPT

## 2018-08-01 PROCEDURE — G8988 SELF CARE GOAL STATUS: HCPCS

## 2018-08-01 PROCEDURE — G8987 SELF CARE CURRENT STATUS: HCPCS

## 2018-08-01 PROCEDURE — 99024 POSTOP FOLLOW-UP VISIT: CPT | Performed by: PHYSICIAN ASSISTANT

## 2018-08-01 PROCEDURE — 85027 COMPLETE CBC AUTOMATED: CPT | Performed by: PHYSICIAN ASSISTANT

## 2018-08-01 PROCEDURE — 99232 SBSQ HOSP IP/OBS MODERATE 35: CPT | Performed by: INTERNAL MEDICINE

## 2018-08-01 PROCEDURE — 82948 REAGENT STRIP/BLOOD GLUCOSE: CPT

## 2018-08-01 PROCEDURE — 97168 OT RE-EVAL EST PLAN CARE: CPT

## 2018-08-01 PROCEDURE — 71045 X-RAY EXAM CHEST 1 VIEW: CPT

## 2018-08-01 RX ORDER — AMLODIPINE BESYLATE 5 MG/1
5 TABLET ORAL DAILY
Status: DISCONTINUED | OUTPATIENT
Start: 2018-08-02 | End: 2018-08-05 | Stop reason: HOSPADM

## 2018-08-01 RX ORDER — SODIUM CHLORIDE 9 MG/ML
100 INJECTION, SOLUTION INTRAVENOUS CONTINUOUS
Status: DISCONTINUED | OUTPATIENT
Start: 2018-08-01 | End: 2018-08-01

## 2018-08-01 RX ORDER — HALOPERIDOL 5 MG/ML
2 INJECTION INTRAMUSCULAR
Status: DISCONTINUED | OUTPATIENT
Start: 2018-08-01 | End: 2018-08-02

## 2018-08-01 RX ORDER — LORAZEPAM 2 MG/ML
0.5 INJECTION INTRAMUSCULAR
Status: DISCONTINUED | OUTPATIENT
Start: 2018-08-01 | End: 2018-08-02

## 2018-08-01 RX ORDER — GLYCOPYRROLATE 0.2 MG/ML
0.2 INJECTION INTRAMUSCULAR; INTRAVENOUS EVERY 4 HOURS PRN
Status: DISCONTINUED | OUTPATIENT
Start: 2018-08-01 | End: 2018-08-02

## 2018-08-01 RX ADMIN — KETAMINE HYDROCHLORIDE 0.1 MG/KG/HR: 50 INJECTION, SOLUTION INTRAMUSCULAR; INTRAVENOUS at 12:05

## 2018-08-01 RX ADMIN — OXYCODONE HYDROCHLORIDE 10 MG: 10 TABLET ORAL at 05:05

## 2018-08-01 RX ADMIN — CITALOPRAM HYDROBROMIDE 20 MG: 20 TABLET ORAL at 09:06

## 2018-08-01 RX ADMIN — SENNOSIDES AND DOCUSATE SODIUM 1 TABLET: 8.6; 5 TABLET ORAL at 21:20

## 2018-08-01 RX ADMIN — PRAVASTATIN SODIUM 20 MG: 20 TABLET ORAL at 09:05

## 2018-08-01 RX ADMIN — ONDANSETRON 4 MG: 2 INJECTION, SOLUTION INTRAMUSCULAR; INTRAVENOUS at 04:16

## 2018-08-01 RX ADMIN — GABAPENTIN 300 MG: 300 CAPSULE ORAL at 17:45

## 2018-08-01 RX ADMIN — OXYCODONE HYDROCHLORIDE 10 MG: 10 TABLET ORAL at 23:07

## 2018-08-01 RX ADMIN — HEPARIN SODIUM 5000 UNITS: 5000 INJECTION, SOLUTION INTRAVENOUS; SUBCUTANEOUS at 14:07

## 2018-08-01 RX ADMIN — OXYCODONE HYDROCHLORIDE 10 MG: 10 TABLET ORAL at 09:16

## 2018-08-01 RX ADMIN — HYDROMORPHONE HYDROCHLORIDE 1 MG: 1 INJECTION, SOLUTION INTRAMUSCULAR; INTRAVENOUS; SUBCUTANEOUS at 11:22

## 2018-08-01 RX ADMIN — DOCUSATE SODIUM 100 MG: 100 CAPSULE, LIQUID FILLED ORAL at 09:06

## 2018-08-01 RX ADMIN — HYDROMORPHONE HYDROCHLORIDE 0.5 MG: 1 INJECTION, SOLUTION INTRAMUSCULAR; INTRAVENOUS; SUBCUTANEOUS at 21:27

## 2018-08-01 RX ADMIN — INSULIN LISPRO 1 UNITS: 100 INJECTION, SOLUTION INTRAVENOUS; SUBCUTANEOUS at 11:30

## 2018-08-01 RX ADMIN — INSULIN GLARGINE 20 UNITS: 100 INJECTION, SOLUTION SUBCUTANEOUS at 21:19

## 2018-08-01 RX ADMIN — INSULIN LISPRO 7 UNITS: 100 INJECTION, SOLUTION INTRAVENOUS; SUBCUTANEOUS at 11:29

## 2018-08-01 RX ADMIN — ASPIRIN 81 MG: 81 TABLET, COATED ORAL at 09:06

## 2018-08-01 RX ADMIN — OXYCODONE HYDROCHLORIDE 10 MG: 10 TABLET ORAL at 17:45

## 2018-08-01 RX ADMIN — CHLORHEXIDINE GLUCONATE 15 ML: 1.2 RINSE ORAL at 21:19

## 2018-08-01 RX ADMIN — NICOTINE 21 MG: 21 PATCH, EXTENDED RELEASE TRANSDERMAL at 09:06

## 2018-08-01 RX ADMIN — SODIUM CHLORIDE, SODIUM LACTATE, POTASSIUM CHLORIDE, AND CALCIUM CHLORIDE 125 ML/HR: .6; .31; .03; .02 INJECTION, SOLUTION INTRAVENOUS at 01:48

## 2018-08-01 RX ADMIN — OXYCODONE HYDROCHLORIDE 10 MG: 10 TABLET ORAL at 14:07

## 2018-08-01 RX ADMIN — CHLORHEXIDINE GLUCONATE 15 ML: 1.2 RINSE ORAL at 09:06

## 2018-08-01 RX ADMIN — HYDROMORPHONE HYDROCHLORIDE 0.5 MG: 1 INJECTION, SOLUTION INTRAMUSCULAR; INTRAVENOUS; SUBCUTANEOUS at 16:15

## 2018-08-01 RX ADMIN — DOCUSATE SODIUM 100 MG: 100 CAPSULE, LIQUID FILLED ORAL at 17:45

## 2018-08-01 RX ADMIN — BACLOFEN 20 MG: 10 TABLET ORAL at 09:05

## 2018-08-01 RX ADMIN — ACETAMINOPHEN 975 MG: 325 TABLET, FILM COATED ORAL at 14:07

## 2018-08-01 RX ADMIN — INSULIN LISPRO 7 UNITS: 100 INJECTION, SOLUTION INTRAVENOUS; SUBCUTANEOUS at 16:28

## 2018-08-01 RX ADMIN — BACLOFEN 20 MG: 10 TABLET ORAL at 16:21

## 2018-08-01 RX ADMIN — HEPARIN SODIUM 5000 UNITS: 5000 INJECTION, SOLUTION INTRAVENOUS; SUBCUTANEOUS at 21:18

## 2018-08-01 RX ADMIN — HYDROMORPHONE HYDROCHLORIDE 1 MG: 1 INJECTION, SOLUTION INTRAMUSCULAR; INTRAVENOUS; SUBCUTANEOUS at 02:17

## 2018-08-01 RX ADMIN — BACLOFEN 20 MG: 10 TABLET ORAL at 21:18

## 2018-08-01 RX ADMIN — PHENYLEPHRINE HYDROCHLORIDE 25 MCG/MIN: 10 INJECTION INTRAVENOUS at 01:50

## 2018-08-01 RX ADMIN — ACETAMINOPHEN 975 MG: 325 TABLET, FILM COATED ORAL at 21:18

## 2018-08-01 RX ADMIN — HEPARIN SODIUM 5000 UNITS: 5000 INJECTION, SOLUTION INTRAVENOUS; SUBCUTANEOUS at 05:05

## 2018-08-01 RX ADMIN — ACETAMINOPHEN 975 MG: 325 TABLET, FILM COATED ORAL at 05:05

## 2018-08-01 RX ADMIN — INSULIN LISPRO 7 UNITS: 100 INJECTION, SOLUTION INTRAVENOUS; SUBCUTANEOUS at 09:11

## 2018-08-01 RX ADMIN — GABAPENTIN 300 MG: 300 CAPSULE ORAL at 09:06

## 2018-08-01 RX ADMIN — CLOPIDOGREL 75 MG: 75 TABLET, FILM COATED ORAL at 09:05

## 2018-08-01 RX ADMIN — HYDROMORPHONE HYDROCHLORIDE 1 MG: 1 INJECTION, SOLUTION INTRAMUSCULAR; INTRAVENOUS; SUBCUTANEOUS at 07:57

## 2018-08-02 PROBLEM — D62 ACUTE BLOOD LOSS ANEMIA: Status: ACTIVE | Noted: 2018-08-02

## 2018-08-02 PROBLEM — I95.9 HYPOTENSION: Status: RESOLVED | Noted: 2018-08-01 | Resolved: 2018-08-02

## 2018-08-02 PROBLEM — R33.9 URINARY RETENTION: Status: ACTIVE | Noted: 2018-08-02

## 2018-08-02 PROBLEM — K59.00 CONSTIPATION: Status: ACTIVE | Noted: 2018-08-02

## 2018-08-02 LAB
ANION GAP SERPL CALCULATED.3IONS-SCNC: 5 MMOL/L (ref 4–13)
BACTERIA UR QL AUTO: ABNORMAL /HPF
BILIRUB UR QL STRIP: NEGATIVE
BUN SERPL-MCNC: 8 MG/DL (ref 5–25)
CALCIUM SERPL-MCNC: 8.1 MG/DL (ref 8.3–10.1)
CHLORIDE SERPL-SCNC: 104 MMOL/L (ref 100–108)
CLARITY UR: CLEAR
CO2 SERPL-SCNC: 30 MMOL/L (ref 21–32)
COLOR UR: YELLOW
CREAT SERPL-MCNC: 0.71 MG/DL (ref 0.6–1.3)
ERYTHROCYTE [DISTWIDTH] IN BLOOD BY AUTOMATED COUNT: 15.7 % (ref 11.6–15.1)
GFR SERPL CREATININE-BSD FRML MDRD: 102 ML/MIN/1.73SQ M
GLUCOSE SERPL-MCNC: 157 MG/DL (ref 65–140)
GLUCOSE SERPL-MCNC: 164 MG/DL (ref 65–140)
GLUCOSE SERPL-MCNC: 322 MG/DL (ref 65–140)
GLUCOSE SERPL-MCNC: 78 MG/DL (ref 65–140)
GLUCOSE SERPL-MCNC: 80 MG/DL (ref 65–140)
GLUCOSE UR STRIP-MCNC: NEGATIVE MG/DL
HCT VFR BLD AUTO: 31.6 % (ref 36.5–49.3)
HGB BLD-MCNC: 10.1 G/DL (ref 12–17)
HGB UR QL STRIP.AUTO: ABNORMAL
HYALINE CASTS #/AREA URNS LPF: ABNORMAL /LPF
KETONES UR STRIP-MCNC: NEGATIVE MG/DL
LEUKOCYTE ESTERASE UR QL STRIP: NEGATIVE
MCH RBC QN AUTO: 32.5 PG (ref 26.8–34.3)
MCHC RBC AUTO-ENTMCNC: 32 G/DL (ref 31.4–37.4)
MCV RBC AUTO: 102 FL (ref 82–98)
NITRITE UR QL STRIP: NEGATIVE
NON-SQ EPI CELLS URNS QL MICRO: ABNORMAL /HPF
PH UR STRIP.AUTO: 5.5 [PH] (ref 4.5–8)
PLATELET # BLD AUTO: 152 THOUSANDS/UL (ref 149–390)
PMV BLD AUTO: 10.8 FL (ref 8.9–12.7)
POTASSIUM SERPL-SCNC: 3.7 MMOL/L (ref 3.5–5.3)
PROT UR STRIP-MCNC: NEGATIVE MG/DL
RBC # BLD AUTO: 3.11 MILLION/UL (ref 3.88–5.62)
RBC #/AREA URNS AUTO: ABNORMAL /HPF
SODIUM SERPL-SCNC: 139 MMOL/L (ref 136–145)
SP GR UR STRIP.AUTO: 1.01 (ref 1–1.03)
UROBILINOGEN UR QL STRIP.AUTO: 1 E.U./DL
WBC # BLD AUTO: 11.18 THOUSAND/UL (ref 4.31–10.16)
WBC #/AREA URNS AUTO: ABNORMAL /HPF

## 2018-08-02 PROCEDURE — G8979 MOBILITY GOAL STATUS: HCPCS | Performed by: PHYSICAL THERAPIST

## 2018-08-02 PROCEDURE — 97535 SELF CARE MNGMENT TRAINING: CPT | Performed by: STUDENT IN AN ORGANIZED HEALTH CARE EDUCATION/TRAINING PROGRAM

## 2018-08-02 PROCEDURE — G8978 MOBILITY CURRENT STATUS: HCPCS | Performed by: PHYSICAL THERAPIST

## 2018-08-02 PROCEDURE — 82948 REAGENT STRIP/BLOOD GLUCOSE: CPT

## 2018-08-02 PROCEDURE — 97530 THERAPEUTIC ACTIVITIES: CPT | Performed by: STUDENT IN AN ORGANIZED HEALTH CARE EDUCATION/TRAINING PROGRAM

## 2018-08-02 PROCEDURE — 99024 POSTOP FOLLOW-UP VISIT: CPT | Performed by: SURGERY

## 2018-08-02 PROCEDURE — 85027 COMPLETE CBC AUTOMATED: CPT | Performed by: PHYSICIAN ASSISTANT

## 2018-08-02 PROCEDURE — 99232 SBSQ HOSP IP/OBS MODERATE 35: CPT | Performed by: INTERNAL MEDICINE

## 2018-08-02 PROCEDURE — 81001 URINALYSIS AUTO W/SCOPE: CPT | Performed by: INTERNAL MEDICINE

## 2018-08-02 PROCEDURE — 97110 THERAPEUTIC EXERCISES: CPT | Performed by: PHYSICAL THERAPIST

## 2018-08-02 PROCEDURE — 0T9B70Z DRAINAGE OF BLADDER WITH DRAINAGE DEVICE, VIA NATURAL OR ARTIFICIAL OPENING: ICD-10-PCS | Performed by: SURGERY

## 2018-08-02 PROCEDURE — 97164 PT RE-EVAL EST PLAN CARE: CPT | Performed by: PHYSICAL THERAPIST

## 2018-08-02 PROCEDURE — 80048 BASIC METABOLIC PNL TOTAL CA: CPT | Performed by: PHYSICIAN ASSISTANT

## 2018-08-02 RX ORDER — TAMSULOSIN HYDROCHLORIDE 0.4 MG/1
0.4 CAPSULE ORAL
Status: DISCONTINUED | OUTPATIENT
Start: 2018-08-02 | End: 2018-08-05 | Stop reason: HOSPADM

## 2018-08-02 RX ORDER — HYDROMORPHONE HYDROCHLORIDE 2 MG/1
4 TABLET ORAL EVERY 4 HOURS PRN
Status: DISCONTINUED | OUTPATIENT
Start: 2018-08-02 | End: 2018-08-03

## 2018-08-02 RX ORDER — POLYETHYLENE GLYCOL 3350 17 G/17G
17 POWDER, FOR SOLUTION ORAL 2 TIMES DAILY
Status: DISCONTINUED | OUTPATIENT
Start: 2018-08-02 | End: 2018-08-05 | Stop reason: HOSPADM

## 2018-08-02 RX ORDER — ACETAMINOPHEN 325 MG/1
975 TABLET ORAL EVERY 8 HOURS PRN
Status: DISCONTINUED | OUTPATIENT
Start: 2018-08-02 | End: 2018-08-05 | Stop reason: HOSPADM

## 2018-08-02 RX ORDER — BISACODYL 10 MG
10 SUPPOSITORY, RECTAL RECTAL DAILY
Status: DISCONTINUED | OUTPATIENT
Start: 2018-08-03 | End: 2018-08-05 | Stop reason: HOSPADM

## 2018-08-02 RX ORDER — HYDROMORPHONE HYDROCHLORIDE 2 MG/1
2 TABLET ORAL EVERY 4 HOURS PRN
Status: DISCONTINUED | OUTPATIENT
Start: 2018-08-02 | End: 2018-08-03

## 2018-08-02 RX ADMIN — GABAPENTIN 300 MG: 300 CAPSULE ORAL at 17:06

## 2018-08-02 RX ADMIN — INSULIN LISPRO 7 UNITS: 100 INJECTION, SOLUTION INTRAVENOUS; SUBCUTANEOUS at 17:06

## 2018-08-02 RX ADMIN — BACLOFEN 20 MG: 10 TABLET ORAL at 17:06

## 2018-08-02 RX ADMIN — INSULIN LISPRO 7 UNITS: 100 INJECTION, SOLUTION INTRAVENOUS; SUBCUTANEOUS at 07:05

## 2018-08-02 RX ADMIN — ACETAMINOPHEN 975 MG: 325 TABLET, FILM COATED ORAL at 19:18

## 2018-08-02 RX ADMIN — PRAVASTATIN SODIUM 20 MG: 20 TABLET ORAL at 08:39

## 2018-08-02 RX ADMIN — DOCUSATE SODIUM 100 MG: 100 CAPSULE, LIQUID FILLED ORAL at 08:39

## 2018-08-02 RX ADMIN — HYDROMORPHONE HYDROCHLORIDE 4 MG: 2 TABLET ORAL at 14:40

## 2018-08-02 RX ADMIN — HEPARIN SODIUM 5000 UNITS: 5000 INJECTION, SOLUTION INTRAVENOUS; SUBCUTANEOUS at 14:39

## 2018-08-02 RX ADMIN — HEPARIN SODIUM 5000 UNITS: 5000 INJECTION, SOLUTION INTRAVENOUS; SUBCUTANEOUS at 21:20

## 2018-08-02 RX ADMIN — CLOPIDOGREL 75 MG: 75 TABLET, FILM COATED ORAL at 08:39

## 2018-08-02 RX ADMIN — HYDROMORPHONE HYDROCHLORIDE 0.5 MG: 1 INJECTION, SOLUTION INTRAMUSCULAR; INTRAVENOUS; SUBCUTANEOUS at 02:19

## 2018-08-02 RX ADMIN — CITALOPRAM HYDROBROMIDE 20 MG: 20 TABLET ORAL at 08:39

## 2018-08-02 RX ADMIN — GABAPENTIN 300 MG: 300 CAPSULE ORAL at 08:39

## 2018-08-02 RX ADMIN — INSULIN LISPRO 7 UNITS: 100 INJECTION, SOLUTION INTRAVENOUS; SUBCUTANEOUS at 11:56

## 2018-08-02 RX ADMIN — DOCUSATE SODIUM 100 MG: 100 CAPSULE, LIQUID FILLED ORAL at 17:06

## 2018-08-02 RX ADMIN — HYDROMORPHONE HYDROCHLORIDE 4 MG: 2 TABLET ORAL at 18:56

## 2018-08-02 RX ADMIN — CHLORHEXIDINE GLUCONATE 15 ML: 1.2 RINSE ORAL at 08:39

## 2018-08-02 RX ADMIN — BACLOFEN 20 MG: 10 TABLET ORAL at 21:20

## 2018-08-02 RX ADMIN — BACLOFEN 20 MG: 10 TABLET ORAL at 08:39

## 2018-08-02 RX ADMIN — HEPARIN SODIUM 5000 UNITS: 5000 INJECTION, SOLUTION INTRAVENOUS; SUBCUTANEOUS at 06:20

## 2018-08-02 RX ADMIN — ASPIRIN 81 MG: 81 TABLET, COATED ORAL at 08:39

## 2018-08-02 RX ADMIN — INSULIN GLARGINE 20 UNITS: 100 INJECTION, SOLUTION SUBCUTANEOUS at 21:20

## 2018-08-02 RX ADMIN — ACETAMINOPHEN 975 MG: 325 TABLET, FILM COATED ORAL at 06:20

## 2018-08-02 RX ADMIN — TAMSULOSIN HYDROCHLORIDE 0.4 MG: 0.4 CAPSULE ORAL at 17:06

## 2018-08-02 RX ADMIN — SENNOSIDES AND DOCUSATE SODIUM 1 TABLET: 8.6; 5 TABLET ORAL at 21:20

## 2018-08-02 RX ADMIN — HYDROMORPHONE HYDROCHLORIDE 4 MG: 2 TABLET ORAL at 23:05

## 2018-08-02 RX ADMIN — AMLODIPINE BESYLATE 5 MG: 5 TABLET ORAL at 08:39

## 2018-08-02 RX ADMIN — NICOTINE 21 MG: 21 PATCH, EXTENDED RELEASE TRANSDERMAL at 08:41

## 2018-08-02 RX ADMIN — POLYETHYLENE GLYCOL 3350 17 G: 17 POWDER, FOR SOLUTION ORAL at 19:01

## 2018-08-02 RX ADMIN — INSULIN LISPRO 1 UNITS: 100 INJECTION, SOLUTION INTRAVENOUS; SUBCUTANEOUS at 17:06

## 2018-08-02 RX ADMIN — OXYCODONE HYDROCHLORIDE 5 MG: 5 TABLET ORAL at 07:04

## 2018-08-02 RX ADMIN — INSULIN LISPRO 1 UNITS: 100 INJECTION, SOLUTION INTRAVENOUS; SUBCUTANEOUS at 11:57

## 2018-08-03 LAB
ABO GROUP BLD BPU: NORMAL
ABO GROUP BLD BPU: NORMAL
ANION GAP SERPL CALCULATED.3IONS-SCNC: 5 MMOL/L (ref 4–13)
BASOPHILS # BLD AUTO: 0.04 THOUSANDS/ΜL (ref 0–0.1)
BASOPHILS NFR BLD AUTO: 1 % (ref 0–1)
BPU ID: NORMAL
BPU ID: NORMAL
BUN SERPL-MCNC: 6 MG/DL (ref 5–25)
CALCIUM SERPL-MCNC: 8.6 MG/DL (ref 8.3–10.1)
CHLORIDE SERPL-SCNC: 100 MMOL/L (ref 100–108)
CO2 SERPL-SCNC: 30 MMOL/L (ref 21–32)
CREAT SERPL-MCNC: 0.69 MG/DL (ref 0.6–1.3)
EOSINOPHIL # BLD AUTO: 0.14 THOUSAND/ΜL (ref 0–0.61)
EOSINOPHIL NFR BLD AUTO: 2 % (ref 0–6)
ERYTHROCYTE [DISTWIDTH] IN BLOOD BY AUTOMATED COUNT: 14.6 % (ref 11.6–15.1)
GFR SERPL CREATININE-BSD FRML MDRD: 103 ML/MIN/1.73SQ M
GLUCOSE SERPL-MCNC: 210 MG/DL (ref 65–140)
GLUCOSE SERPL-MCNC: 220 MG/DL (ref 65–140)
GLUCOSE SERPL-MCNC: 239 MG/DL (ref 65–140)
GLUCOSE SERPL-MCNC: 294 MG/DL (ref 65–140)
GLUCOSE SERPL-MCNC: 343 MG/DL (ref 65–140)
HCT VFR BLD AUTO: 33.5 % (ref 36.5–49.3)
HGB BLD-MCNC: 11 G/DL (ref 12–17)
IMM GRANULOCYTES # BLD AUTO: 0.04 THOUSAND/UL (ref 0–0.2)
IMM GRANULOCYTES NFR BLD AUTO: 1 % (ref 0–2)
LYMPHOCYTES # BLD AUTO: 1.01 THOUSANDS/ΜL (ref 0.6–4.47)
LYMPHOCYTES NFR BLD AUTO: 12 % (ref 14–44)
MCH RBC QN AUTO: 32.8 PG (ref 26.8–34.3)
MCHC RBC AUTO-ENTMCNC: 32.8 G/DL (ref 31.4–37.4)
MCV RBC AUTO: 100 FL (ref 82–98)
MONOCYTES # BLD AUTO: 1.5 THOUSAND/ΜL (ref 0.17–1.22)
MONOCYTES NFR BLD AUTO: 18 % (ref 4–12)
NEUTROPHILS # BLD AUTO: 5.8 THOUSANDS/ΜL (ref 1.85–7.62)
NEUTS SEG NFR BLD AUTO: 66 % (ref 43–75)
NRBC BLD AUTO-RTO: 0 /100 WBCS
PLATELET # BLD AUTO: 171 THOUSANDS/UL (ref 149–390)
PMV BLD AUTO: 10.9 FL (ref 8.9–12.7)
POTASSIUM SERPL-SCNC: 3.7 MMOL/L (ref 3.5–5.3)
RBC # BLD AUTO: 3.35 MILLION/UL (ref 3.88–5.62)
SODIUM SERPL-SCNC: 135 MMOL/L (ref 136–145)
UNIT DISPENSE STATUS: NORMAL
UNIT DISPENSE STATUS: NORMAL
UNIT PRODUCT CODE: NORMAL
UNIT PRODUCT CODE: NORMAL
UNIT RH: NORMAL
UNIT RH: NORMAL
WBC # BLD AUTO: 8.53 THOUSAND/UL (ref 4.31–10.16)

## 2018-08-03 PROCEDURE — 97110 THERAPEUTIC EXERCISES: CPT

## 2018-08-03 PROCEDURE — 85025 COMPLETE CBC W/AUTO DIFF WBC: CPT | Performed by: SURGERY

## 2018-08-03 PROCEDURE — 99024 POSTOP FOLLOW-UP VISIT: CPT | Performed by: PHYSICIAN ASSISTANT

## 2018-08-03 PROCEDURE — 97535 SELF CARE MNGMENT TRAINING: CPT | Performed by: STUDENT IN AN ORGANIZED HEALTH CARE EDUCATION/TRAINING PROGRAM

## 2018-08-03 PROCEDURE — 99232 SBSQ HOSP IP/OBS MODERATE 35: CPT | Performed by: INTERNAL MEDICINE

## 2018-08-03 PROCEDURE — 80048 BASIC METABOLIC PNL TOTAL CA: CPT | Performed by: SURGERY

## 2018-08-03 PROCEDURE — 97530 THERAPEUTIC ACTIVITIES: CPT

## 2018-08-03 PROCEDURE — 82948 REAGENT STRIP/BLOOD GLUCOSE: CPT

## 2018-08-03 RX ORDER — GABAPENTIN 400 MG/1
400 CAPSULE ORAL 2 TIMES DAILY
Status: DISCONTINUED | OUTPATIENT
Start: 2018-08-03 | End: 2018-08-05 | Stop reason: HOSPADM

## 2018-08-03 RX ORDER — OXYCODONE HYDROCHLORIDE 5 MG/1
5 TABLET ORAL EVERY 4 HOURS PRN
Status: DISCONTINUED | OUTPATIENT
Start: 2018-08-03 | End: 2018-08-05 | Stop reason: HOSPADM

## 2018-08-03 RX ORDER — OXYCODONE HYDROCHLORIDE 10 MG/1
10 TABLET ORAL EVERY 4 HOURS PRN
Status: DISCONTINUED | OUTPATIENT
Start: 2018-08-03 | End: 2018-08-05 | Stop reason: HOSPADM

## 2018-08-03 RX ADMIN — HYDROMORPHONE HYDROCHLORIDE 4 MG: 2 TABLET ORAL at 08:31

## 2018-08-03 RX ADMIN — INSULIN LISPRO 7 UNITS: 100 INJECTION, SOLUTION INTRAVENOUS; SUBCUTANEOUS at 07:45

## 2018-08-03 RX ADMIN — DOCUSATE SODIUM 100 MG: 100 CAPSULE, LIQUID FILLED ORAL at 08:27

## 2018-08-03 RX ADMIN — HYDROMORPHONE HYDROCHLORIDE 1 MG: 1 INJECTION, SOLUTION INTRAMUSCULAR; INTRAVENOUS; SUBCUTANEOUS at 10:50

## 2018-08-03 RX ADMIN — CITALOPRAM HYDROBROMIDE 20 MG: 20 TABLET ORAL at 08:27

## 2018-08-03 RX ADMIN — INSULIN LISPRO 2 UNITS: 100 INJECTION, SOLUTION INTRAVENOUS; SUBCUTANEOUS at 17:34

## 2018-08-03 RX ADMIN — GABAPENTIN 300 MG: 300 CAPSULE ORAL at 08:27

## 2018-08-03 RX ADMIN — INSULIN LISPRO 7 UNITS: 100 INJECTION, SOLUTION INTRAVENOUS; SUBCUTANEOUS at 17:35

## 2018-08-03 RX ADMIN — SENNOSIDES AND DOCUSATE SODIUM 1 TABLET: 8.6; 5 TABLET ORAL at 21:26

## 2018-08-03 RX ADMIN — HEPARIN SODIUM 5000 UNITS: 5000 INJECTION, SOLUTION INTRAVENOUS; SUBCUTANEOUS at 05:41

## 2018-08-03 RX ADMIN — OXYCODONE HYDROCHLORIDE 5 MG: 5 TABLET ORAL at 21:27

## 2018-08-03 RX ADMIN — BACLOFEN 20 MG: 10 TABLET ORAL at 21:26

## 2018-08-03 RX ADMIN — BACLOFEN 20 MG: 10 TABLET ORAL at 07:45

## 2018-08-03 RX ADMIN — POLYETHYLENE GLYCOL 3350 17 G: 17 POWDER, FOR SOLUTION ORAL at 08:27

## 2018-08-03 RX ADMIN — TAMSULOSIN HYDROCHLORIDE 0.4 MG: 0.4 CAPSULE ORAL at 17:20

## 2018-08-03 RX ADMIN — HYDROMORPHONE HYDROCHLORIDE 4 MG: 2 TABLET ORAL at 04:15

## 2018-08-03 RX ADMIN — POLYETHYLENE GLYCOL 3350 17 G: 17 POWDER, FOR SOLUTION ORAL at 17:53

## 2018-08-03 RX ADMIN — OXYCODONE HYDROCHLORIDE 10 MG: 10 TABLET ORAL at 12:59

## 2018-08-03 RX ADMIN — DOCUSATE SODIUM 100 MG: 100 CAPSULE, LIQUID FILLED ORAL at 17:20

## 2018-08-03 RX ADMIN — ASPIRIN 81 MG: 81 TABLET, COATED ORAL at 08:27

## 2018-08-03 RX ADMIN — INSULIN GLARGINE 20 UNITS: 100 INJECTION, SOLUTION SUBCUTANEOUS at 21:26

## 2018-08-03 RX ADMIN — INSULIN LISPRO 7 UNITS: 100 INJECTION, SOLUTION INTRAVENOUS; SUBCUTANEOUS at 11:47

## 2018-08-03 RX ADMIN — NICOTINE 21 MG: 21 PATCH, EXTENDED RELEASE TRANSDERMAL at 08:27

## 2018-08-03 RX ADMIN — PRAVASTATIN SODIUM 20 MG: 20 TABLET ORAL at 08:27

## 2018-08-03 RX ADMIN — OXYCODONE HYDROCHLORIDE 10 MG: 10 TABLET ORAL at 17:19

## 2018-08-03 RX ADMIN — BACLOFEN 20 MG: 10 TABLET ORAL at 17:20

## 2018-08-03 RX ADMIN — HEPARIN SODIUM 5000 UNITS: 5000 INJECTION, SOLUTION INTRAVENOUS; SUBCUTANEOUS at 21:26

## 2018-08-03 RX ADMIN — HEPARIN SODIUM 5000 UNITS: 5000 INJECTION, SOLUTION INTRAVENOUS; SUBCUTANEOUS at 14:11

## 2018-08-03 RX ADMIN — CLOPIDOGREL 75 MG: 75 TABLET, FILM COATED ORAL at 08:27

## 2018-08-03 RX ADMIN — INSULIN LISPRO 3 UNITS: 100 INJECTION, SOLUTION INTRAVENOUS; SUBCUTANEOUS at 07:45

## 2018-08-03 RX ADMIN — AMLODIPINE BESYLATE 5 MG: 5 TABLET ORAL at 08:27

## 2018-08-03 RX ADMIN — GABAPENTIN 400 MG: 400 CAPSULE ORAL at 17:20

## 2018-08-03 RX ADMIN — INSULIN LISPRO 4 UNITS: 100 INJECTION, SOLUTION INTRAVENOUS; SUBCUTANEOUS at 11:47

## 2018-08-04 LAB
GLUCOSE SERPL-MCNC: 255 MG/DL (ref 65–140)
GLUCOSE SERPL-MCNC: 257 MG/DL (ref 65–140)
GLUCOSE SERPL-MCNC: 274 MG/DL (ref 65–140)
GLUCOSE SERPL-MCNC: 341 MG/DL (ref 65–140)

## 2018-08-04 PROCEDURE — 99024 POSTOP FOLLOW-UP VISIT: CPT | Performed by: SURGERY

## 2018-08-04 PROCEDURE — 82948 REAGENT STRIP/BLOOD GLUCOSE: CPT

## 2018-08-04 RX ORDER — NYSTATIN 100000 [USP'U]/G
POWDER TOPICAL 2 TIMES DAILY
Status: DISCONTINUED | OUTPATIENT
Start: 2018-08-04 | End: 2018-08-05 | Stop reason: HOSPADM

## 2018-08-04 RX ORDER — INSULIN GLARGINE 100 [IU]/ML
25 INJECTION, SOLUTION SUBCUTANEOUS
Status: DISCONTINUED | OUTPATIENT
Start: 2018-08-04 | End: 2018-08-05 | Stop reason: HOSPADM

## 2018-08-04 RX ADMIN — NYSTATIN: 100000 POWDER TOPICAL at 17:04

## 2018-08-04 RX ADMIN — ASPIRIN 81 MG: 81 TABLET, COATED ORAL at 08:39

## 2018-08-04 RX ADMIN — BACLOFEN 20 MG: 10 TABLET ORAL at 17:02

## 2018-08-04 RX ADMIN — CITALOPRAM HYDROBROMIDE 20 MG: 20 TABLET ORAL at 08:39

## 2018-08-04 RX ADMIN — OXYCODONE HYDROCHLORIDE 5 MG: 5 TABLET ORAL at 05:27

## 2018-08-04 RX ADMIN — TAMSULOSIN HYDROCHLORIDE 0.4 MG: 0.4 CAPSULE ORAL at 17:02

## 2018-08-04 RX ADMIN — INSULIN LISPRO 3 UNITS: 100 INJECTION, SOLUTION INTRAVENOUS; SUBCUTANEOUS at 11:41

## 2018-08-04 RX ADMIN — INSULIN GLARGINE 25 UNITS: 100 INJECTION, SOLUTION SUBCUTANEOUS at 21:27

## 2018-08-04 RX ADMIN — INSULIN LISPRO 7 UNITS: 100 INJECTION, SOLUTION INTRAVENOUS; SUBCUTANEOUS at 07:39

## 2018-08-04 RX ADMIN — OXYCODONE HYDROCHLORIDE 10 MG: 10 TABLET ORAL at 14:03

## 2018-08-04 RX ADMIN — BACLOFEN 20 MG: 10 TABLET ORAL at 21:27

## 2018-08-04 RX ADMIN — GABAPENTIN 400 MG: 400 CAPSULE ORAL at 17:02

## 2018-08-04 RX ADMIN — HEPARIN SODIUM 5000 UNITS: 5000 INJECTION, SOLUTION INTRAVENOUS; SUBCUTANEOUS at 05:27

## 2018-08-04 RX ADMIN — CLOPIDOGREL 75 MG: 75 TABLET, FILM COATED ORAL at 08:37

## 2018-08-04 RX ADMIN — DOCUSATE SODIUM 100 MG: 100 CAPSULE, LIQUID FILLED ORAL at 08:37

## 2018-08-04 RX ADMIN — OXYCODONE HYDROCHLORIDE 5 MG: 5 TABLET ORAL at 22:55

## 2018-08-04 RX ADMIN — BACLOFEN 20 MG: 10 TABLET ORAL at 08:39

## 2018-08-04 RX ADMIN — INSULIN LISPRO 3 UNITS: 100 INJECTION, SOLUTION INTRAVENOUS; SUBCUTANEOUS at 07:39

## 2018-08-04 RX ADMIN — NYSTATIN: 100000 POWDER TOPICAL at 11:07

## 2018-08-04 RX ADMIN — HEPARIN SODIUM 5000 UNITS: 5000 INJECTION, SOLUTION INTRAVENOUS; SUBCUTANEOUS at 21:27

## 2018-08-04 RX ADMIN — AMLODIPINE BESYLATE 5 MG: 5 TABLET ORAL at 08:37

## 2018-08-04 RX ADMIN — SENNOSIDES AND DOCUSATE SODIUM 1 TABLET: 8.6; 5 TABLET ORAL at 21:27

## 2018-08-04 RX ADMIN — OXYCODONE HYDROCHLORIDE 10 MG: 10 TABLET ORAL at 01:00

## 2018-08-04 RX ADMIN — NICOTINE 21 MG: 21 PATCH, EXTENDED RELEASE TRANSDERMAL at 08:39

## 2018-08-04 RX ADMIN — HEPARIN SODIUM 5000 UNITS: 5000 INJECTION, SOLUTION INTRAVENOUS; SUBCUTANEOUS at 14:01

## 2018-08-04 RX ADMIN — PRAVASTATIN SODIUM 20 MG: 20 TABLET ORAL at 08:37

## 2018-08-04 RX ADMIN — INSULIN LISPRO 4 UNITS: 100 INJECTION, SOLUTION INTRAVENOUS; SUBCUTANEOUS at 17:02

## 2018-08-04 RX ADMIN — INSULIN LISPRO 7 UNITS: 100 INJECTION, SOLUTION INTRAVENOUS; SUBCUTANEOUS at 17:02

## 2018-08-04 RX ADMIN — GABAPENTIN 400 MG: 400 CAPSULE ORAL at 08:37

## 2018-08-04 RX ADMIN — OXYCODONE HYDROCHLORIDE 10 MG: 10 TABLET ORAL at 10:07

## 2018-08-04 RX ADMIN — OXYCODONE HYDROCHLORIDE 10 MG: 10 TABLET ORAL at 18:04

## 2018-08-05 VITALS
OXYGEN SATURATION: 97 % | DIASTOLIC BLOOD PRESSURE: 63 MMHG | HEIGHT: 70 IN | WEIGHT: 171.96 LBS | HEART RATE: 90 BPM | TEMPERATURE: 98.5 F | SYSTOLIC BLOOD PRESSURE: 125 MMHG | BODY MASS INDEX: 24.62 KG/M2 | RESPIRATION RATE: 20 BRPM

## 2018-08-05 LAB
ANION GAP SERPL CALCULATED.3IONS-SCNC: 5 MMOL/L (ref 4–13)
BASOPHILS # BLD AUTO: 0.04 THOUSANDS/ΜL (ref 0–0.1)
BASOPHILS NFR BLD AUTO: 1 % (ref 0–1)
BUN SERPL-MCNC: 11 MG/DL (ref 5–25)
CALCIUM SERPL-MCNC: 8.4 MG/DL (ref 8.3–10.1)
CHLORIDE SERPL-SCNC: 98 MMOL/L (ref 100–108)
CO2 SERPL-SCNC: 30 MMOL/L (ref 21–32)
CREAT SERPL-MCNC: 0.68 MG/DL (ref 0.6–1.3)
EOSINOPHIL # BLD AUTO: 0.23 THOUSAND/ΜL (ref 0–0.61)
EOSINOPHIL NFR BLD AUTO: 4 % (ref 0–6)
ERYTHROCYTE [DISTWIDTH] IN BLOOD BY AUTOMATED COUNT: 13.6 % (ref 11.6–15.1)
GFR SERPL CREATININE-BSD FRML MDRD: 104 ML/MIN/1.73SQ M
GLUCOSE SERPL-MCNC: 226 MG/DL (ref 65–140)
GLUCOSE SERPL-MCNC: 252 MG/DL (ref 65–140)
GLUCOSE SERPL-MCNC: 261 MG/DL (ref 65–140)
GLUCOSE SERPL-MCNC: 389 MG/DL (ref 65–140)
HCT VFR BLD AUTO: 31.9 % (ref 36.5–49.3)
HGB BLD-MCNC: 10.6 G/DL (ref 12–17)
IMM GRANULOCYTES # BLD AUTO: 0.03 THOUSAND/UL (ref 0–0.2)
IMM GRANULOCYTES NFR BLD AUTO: 1 % (ref 0–2)
LYMPHOCYTES # BLD AUTO: 1.39 THOUSANDS/ΜL (ref 0.6–4.47)
LYMPHOCYTES NFR BLD AUTO: 23 % (ref 14–44)
MCH RBC QN AUTO: 32.2 PG (ref 26.8–34.3)
MCHC RBC AUTO-ENTMCNC: 33.2 G/DL (ref 31.4–37.4)
MCV RBC AUTO: 97 FL (ref 82–98)
MONOCYTES # BLD AUTO: 0.95 THOUSAND/ΜL (ref 0.17–1.22)
MONOCYTES NFR BLD AUTO: 16 % (ref 4–12)
NEUTROPHILS # BLD AUTO: 3.43 THOUSANDS/ΜL (ref 1.85–7.62)
NEUTS SEG NFR BLD AUTO: 55 % (ref 43–75)
NRBC BLD AUTO-RTO: 0 /100 WBCS
PLATELET # BLD AUTO: 203 THOUSANDS/UL (ref 149–390)
PMV BLD AUTO: 10.6 FL (ref 8.9–12.7)
POTASSIUM SERPL-SCNC: 3.8 MMOL/L (ref 3.5–5.3)
RBC # BLD AUTO: 3.29 MILLION/UL (ref 3.88–5.62)
SODIUM SERPL-SCNC: 133 MMOL/L (ref 136–145)
WBC # BLD AUTO: 6.07 THOUSAND/UL (ref 4.31–10.16)

## 2018-08-05 PROCEDURE — 99232 SBSQ HOSP IP/OBS MODERATE 35: CPT | Performed by: INTERNAL MEDICINE

## 2018-08-05 PROCEDURE — 82948 REAGENT STRIP/BLOOD GLUCOSE: CPT

## 2018-08-05 PROCEDURE — 80048 BASIC METABOLIC PNL TOTAL CA: CPT | Performed by: INTERNAL MEDICINE

## 2018-08-05 PROCEDURE — 99024 POSTOP FOLLOW-UP VISIT: CPT | Performed by: SURGERY

## 2018-08-05 PROCEDURE — 85025 COMPLETE CBC W/AUTO DIFF WBC: CPT | Performed by: INTERNAL MEDICINE

## 2018-08-05 RX ORDER — AMLODIPINE BESYLATE 5 MG/1
5 TABLET ORAL DAILY
Qty: 30 TABLET | Refills: 1 | Status: SHIPPED | OUTPATIENT
Start: 2018-08-06 | End: 2018-08-27 | Stop reason: ALTCHOICE

## 2018-08-05 RX ORDER — NICOTINE 21 MG/24HR
1 PATCH, TRANSDERMAL 24 HOURS TRANSDERMAL DAILY
Qty: 28 PATCH | Refills: 0 | Status: SHIPPED | OUTPATIENT
Start: 2018-08-06 | End: 2019-02-07

## 2018-08-05 RX ORDER — TAMSULOSIN HYDROCHLORIDE 0.4 MG/1
0.4 CAPSULE ORAL
Qty: 30 CAPSULE | Refills: 0 | Status: SHIPPED | OUTPATIENT
Start: 2018-08-05 | End: 2020-07-29 | Stop reason: SDUPTHER

## 2018-08-05 RX ORDER — OXYCODONE HYDROCHLORIDE 5 MG/1
5 TABLET ORAL EVERY 6 HOURS PRN
Qty: 30 TABLET | Refills: 0
Start: 2018-08-05 | End: 2018-08-15

## 2018-08-05 RX ORDER — CLOPIDOGREL BISULFATE 75 MG/1
75 TABLET ORAL DAILY
Qty: 30 TABLET | Refills: 2 | Status: SHIPPED | OUTPATIENT
Start: 2018-08-06 | End: 2018-08-27 | Stop reason: ALTCHOICE

## 2018-08-05 RX ORDER — AMOXICILLIN 250 MG
1 CAPSULE ORAL
Qty: 30 TABLET | Refills: 0 | Status: SHIPPED | OUTPATIENT
Start: 2018-08-05 | End: 2019-02-07

## 2018-08-05 RX ADMIN — HEPARIN SODIUM 5000 UNITS: 5000 INJECTION, SOLUTION INTRAVENOUS; SUBCUTANEOUS at 05:35

## 2018-08-05 RX ADMIN — PRAVASTATIN SODIUM 20 MG: 20 TABLET ORAL at 09:00

## 2018-08-05 RX ADMIN — GABAPENTIN 400 MG: 400 CAPSULE ORAL at 17:25

## 2018-08-05 RX ADMIN — INSULIN LISPRO 6 UNITS: 100 INJECTION, SOLUTION INTRAVENOUS; SUBCUTANEOUS at 07:46

## 2018-08-05 RX ADMIN — ASPIRIN 81 MG: 81 TABLET, COATED ORAL at 08:57

## 2018-08-05 RX ADMIN — INSULIN LISPRO 7 UNITS: 100 INJECTION, SOLUTION INTRAVENOUS; SUBCUTANEOUS at 17:27

## 2018-08-05 RX ADMIN — OXYCODONE HYDROCHLORIDE 10 MG: 10 TABLET ORAL at 16:33

## 2018-08-05 RX ADMIN — NYSTATIN: 100000 POWDER TOPICAL at 09:05

## 2018-08-05 RX ADMIN — BACLOFEN 20 MG: 10 TABLET ORAL at 08:57

## 2018-08-05 RX ADMIN — DOCUSATE SODIUM 100 MG: 100 CAPSULE, LIQUID FILLED ORAL at 09:00

## 2018-08-05 RX ADMIN — CITALOPRAM HYDROBROMIDE 20 MG: 20 TABLET ORAL at 09:00

## 2018-08-05 RX ADMIN — OXYCODONE HYDROCHLORIDE 10 MG: 10 TABLET ORAL at 02:46

## 2018-08-05 RX ADMIN — GABAPENTIN 400 MG: 400 CAPSULE ORAL at 08:57

## 2018-08-05 RX ADMIN — HEPARIN SODIUM 5000 UNITS: 5000 INJECTION, SOLUTION INTRAVENOUS; SUBCUTANEOUS at 14:48

## 2018-08-05 RX ADMIN — OXYCODONE HYDROCHLORIDE 10 MG: 10 TABLET ORAL at 07:46

## 2018-08-05 RX ADMIN — BACLOFEN 20 MG: 10 TABLET ORAL at 16:33

## 2018-08-05 RX ADMIN — INSULIN LISPRO 2 UNITS: 100 INJECTION, SOLUTION INTRAVENOUS; SUBCUTANEOUS at 12:05

## 2018-08-05 RX ADMIN — DOCUSATE SODIUM 100 MG: 100 CAPSULE, LIQUID FILLED ORAL at 17:25

## 2018-08-05 RX ADMIN — NICOTINE 21 MG: 21 PATCH, EXTENDED RELEASE TRANSDERMAL at 09:00

## 2018-08-05 RX ADMIN — NYSTATIN: 100000 POWDER TOPICAL at 17:25

## 2018-08-05 RX ADMIN — CLOPIDOGREL 75 MG: 75 TABLET, FILM COATED ORAL at 08:58

## 2018-08-05 RX ADMIN — INSULIN LISPRO 3 UNITS: 100 INJECTION, SOLUTION INTRAVENOUS; SUBCUTANEOUS at 17:27

## 2018-08-05 RX ADMIN — TAMSULOSIN HYDROCHLORIDE 0.4 MG: 0.4 CAPSULE ORAL at 16:33

## 2018-08-05 RX ADMIN — OXYCODONE HYDROCHLORIDE 10 MG: 10 TABLET ORAL at 12:06

## 2018-08-06 ENCOUNTER — TRANSITIONAL CARE MANAGEMENT (OUTPATIENT)
Dept: FAMILY MEDICINE CLINIC | Facility: CLINIC | Age: 58
End: 2018-08-06

## 2018-08-06 LAB
BACTERIA BLD CULT: NORMAL
BACTERIA BLD CULT: NORMAL

## 2018-08-07 ENCOUNTER — TELEPHONE (OUTPATIENT)
Dept: VASCULAR SURGERY | Facility: CLINIC | Age: 58
End: 2018-08-07

## 2018-08-07 NOTE — TELEPHONE ENCOUNTER
Procedure: ENDARTERECTOMY ARTERIAL FEMORAL R femoral endarterectomy w/ patch angioplasty  (Right Leg) BYPASS FEMORAL-POPLITEAL R femoral- BK popliteal bypass (Right Leg)     ARTERIOGRAM Completion Agram (Right Leg)    Date of Procedure: 7/31/2018                               Surgeon: Dr Jaz Mascorro    Discharge Date: 8/5/2018    Leg weakness?: no  Leg swelling?: no    Leg numbness?: no    Chest pain?: no    Shortness of breath?:no    Orthopnea?:no    Bowel/Bladder function stable?:yes    ASA or Plavix?: yes    Bleeding?: slight drainage     Pain Controlled?: doctor at [facility] has patient on long acting morphine and anti spasmodic medication    Incision stable?:yes    Fever/chills?: no      NEXT OFFICE VISIT SCHEDULED: 8/16/18       Any further questions/concerns?  Spoke with [facility]

## 2018-08-10 ENCOUNTER — TELEPHONE (OUTPATIENT)
Dept: FAMILY MEDICINE CLINIC | Facility: CLINIC | Age: 58
End: 2018-08-10

## 2018-08-16 ENCOUNTER — OFFICE VISIT (OUTPATIENT)
Dept: VASCULAR SURGERY | Facility: CLINIC | Age: 58
End: 2018-08-16

## 2018-08-16 VITALS — TEMPERATURE: 96.2 F | DIASTOLIC BLOOD PRESSURE: 66 MMHG | SYSTOLIC BLOOD PRESSURE: 122 MMHG

## 2018-08-16 DIAGNOSIS — T81.31XA WOUND DEHISCENCE, SURGICAL, INITIAL ENCOUNTER: ICD-10-CM

## 2018-08-16 DIAGNOSIS — I99.8 ISCHEMIC FOOT PAIN AT REST: ICD-10-CM

## 2018-08-16 DIAGNOSIS — I73.9 PERIPHERAL ARTERIAL DISEASE (HCC): Primary | ICD-10-CM

## 2018-08-16 DIAGNOSIS — M79.673 ISCHEMIC FOOT PAIN AT REST: ICD-10-CM

## 2018-08-16 PROCEDURE — 99024 POSTOP FOLLOW-UP VISIT: CPT | Performed by: SURGERY

## 2018-08-16 NOTE — PROGRESS NOTES
Assessment/Plan:    Wound dehiscence, surgical, initial encounter   Superficial wound dehiscence of the right groin incision  Debridement performed in the office today of some fibrinous tissue  Would recommend local wound care twice daily with dry gauze  Do not use xeroform  Apply Betadine paint on the right thigh incision daily  I will see him back in 1 week for wound check  If wound is worsening he might need debridement and VAC placement in the operating room  Ischemic foot pain at rest [facility])    Status post right femoral endarterectomy and right femoral to below-knee popliteal artery bypass using greater saphenous vein  Excellent pulse over the bypass and triphasic posterior tibial signals on the right leg  His main pain is now related to severe spasms and diabetic neuropathy  Rest pain has resolved  Will need duplex to evaluate the bypass in 3 months  Continue daily aspirin and Plavix  Diagnoses and all orders for this visit:    Peripheral arterial disease ([address] )    Wound dehiscence, surgical, initial encounter    Ischemic foot pain at rest [facility])               Patient ID: Keagan Hart is a 59 y o  male  Chief complaint: 2W S/P R Femoral Endarterectomy W/ Patch Angioplasty YESIKA SLB 7/31  Pt states he does not do much walking  Pt states he has had some drainage to right leg and they are apply a dressing over top area daily  Pt os pm asa and plavix        The following portions of the patient's history were reviewed and updated as appropriate: allergies, current medications, past family history, past medical history, past social history, past surgical history and problem list     Review of Systems   Constitutional: Negative  HENT: Negative  Eyes: Negative  Respiratory: Negative  Cardiovascular: Negative  Gastrointestinal: Negative  Endocrine: Negative  Genitourinary: Negative  Musculoskeletal: Negative  Skin: Negative  Allergic/Immunologic: Negative  Neurological: Positive for numbness  Hematological: Negative  Psychiatric/Behavioral: Negative  Objective:      /66 (BP Location: Left arm, Patient Position: Sitting, Cuff Size: Adult)   Temp (!) 96 2 °F (35 7 °C) (Tympanic)          Physical Exam      Superficial wound dehiscence with fibrinous exudates on the right groin incision  Limited debridement performed in the office today  All staples removed  The incision along the medial thigh and upper knee is healed well  All staples removed today    Palpable bypass plus and triphasic right posterior tibial artery signal

## 2018-08-16 NOTE — ASSESSMENT & PLAN NOTE
Status post right femoral endarterectomy and right femoral to below-knee popliteal artery bypass using greater saphenous vein  Excellent pulse over the bypass and triphasic posterior tibial signals on the right leg  His main pain is now related to severe spasms and diabetic neuropathy  Rest pain has resolved  Will need duplex to evaluate the bypass in 3 months  Continue daily aspirin and Plavix

## 2018-08-16 NOTE — ASSESSMENT & PLAN NOTE
Superficial wound dehiscence of the right groin incision  Debridement performed in the office today of some fibrinous tissue  Would recommend local wound care twice daily with dry gauze  Do not use xeroform  Apply Betadine paint on the right thigh incision daily  I will see him back in 1 week for wound check  If wound is worsening he might need debridement and VAC placement in the operating room

## 2018-08-16 NOTE — LETTER
August 16, 2018     Jhonny Macdonald, [address] [place] [address] [address]   [id number] [facility] [address]  [address] [address]    Patient: Yuriy Montgomery   YOB: 1960   Date of Visit: 8/16/2018       Dear Dr Desi Coulter: Thank you for referring Sundar Harley to me for evaluation  Below are my notes for this consultation  If you have questions, please do not hesitate to call me  I look forward to following your patient along with you  Sincerely,        Tanya Bowen MD        CC: No Recipients  Tanya Bowen MD  8/16/2018 10:58 AM  Sign at close encounter  Assessment/Plan:    Wound dehiscence, surgical, initial encounter   Superficial wound dehiscence of the right groin incision  Debridement performed in the office today of some fibrinous tissue  Would recommend local wound care twice daily with dry gauze  Do not use xeroform  Apply Betadine paint on the right thigh incision daily  I will see him back in 1 week for wound check  If wound is worsening he might need debridement and VAC placement in the operating room  Ischemic foot pain at rest [facility])    Status post right femoral endarterectomy and right femoral to below-knee popliteal artery bypass using greater saphenous vein  Excellent pulse over the bypass and triphasic posterior tibial signals on the right leg  His main pain is now related to severe spasms and diabetic neuropathy  Rest pain has resolved  Will need duplex to evaluate the bypass in 3 months  Continue daily aspirin and Plavix  Diagnoses and all orders for this visit:    Peripheral arterial disease ([address] )    Wound dehiscence, surgical, initial encounter    Ischemic foot pain at rest [facility])               Patient ID: Yuriy Montgomery is a 59 y o  male  Chief complaint: 2W S/P R Femoral Endarterectomy W/ Patch Angioplasty YESIKA SLB 7/31  Pt states he does not do much walking  Pt states he has had some drainage to right leg and they are apply a dressing over top area daily   Pt os pm asa and plavix        The following portions of the patient's history were reviewed and updated as appropriate: allergies, current medications, past family history, past medical history, past social history, past surgical history and problem list     Review of Systems   Constitutional: Negative  HENT: Negative  Eyes: Negative  Respiratory: Negative  Cardiovascular: Negative  Gastrointestinal: Negative  Endocrine: Negative  Genitourinary: Negative  Musculoskeletal: Negative  Skin: Negative  Allergic/Immunologic: Negative  Neurological: Positive for numbness  Hematological: Negative  Psychiatric/Behavioral: Negative  Objective:      /66 (BP Location: Left arm, Patient Position: Sitting, Cuff Size: Adult)   Temp (!) 96 2 °F (35 7 °C) (Tympanic)          Physical Exam      Superficial wound dehiscence with fibrinous exudates on the right groin incision  Limited debridement performed in the office today  All staples removed  The incision along the medial thigh and upper knee is healed well  All staples removed today    Palpable bypass plus and triphasic right posterior tibial artery signal

## 2018-08-22 ENCOUNTER — TELEPHONE (OUTPATIENT)
Dept: VASCULAR SURGERY | Facility: CLINIC | Age: 58
End: 2018-08-22

## 2018-08-22 ENCOUNTER — OFFICE VISIT (OUTPATIENT)
Dept: VASCULAR SURGERY | Facility: CLINIC | Age: 58
End: 2018-08-22

## 2018-08-22 VITALS — SYSTOLIC BLOOD PRESSURE: 110 MMHG | DIASTOLIC BLOOD PRESSURE: 62 MMHG

## 2018-08-22 DIAGNOSIS — E13.51 PERIPHERAL VASCULAR DISEASE DUE TO SECONDARY DIABETES MELLITUS (HCC): ICD-10-CM

## 2018-08-22 DIAGNOSIS — I99.8 ISCHEMIC FOOT PAIN AT REST: Primary | ICD-10-CM

## 2018-08-22 DIAGNOSIS — T81.89XD NON-HEALING SURGICAL WOUND OF RIGHT GROIN, SUBSEQUENT ENCOUNTER: ICD-10-CM

## 2018-08-22 DIAGNOSIS — M79.673 ISCHEMIC FOOT PAIN AT REST: Primary | ICD-10-CM

## 2018-08-22 PROBLEM — T81.89XA NON-HEALING SURGICAL WOUND OF RIGHT GROIN: Status: ACTIVE | Noted: 2018-08-22

## 2018-08-22 PROCEDURE — 99024 POSTOP FOLLOW-UP VISIT: CPT | Performed by: SURGERY

## 2018-08-22 NOTE — PROGRESS NOTES
Assessment/Plan:    Non-healing surgical wound of right groin  S/p right femoral endartectomy and right fem pop bypass  He has a non healing wound, superficial wound dehiscence  He will benefit from debridment of the right groin wound and right thigh wound and VAC placement  There is no evidence of any infection so I do not see the need to admit him post procedure unless the wound tracks very deep on intraoperative exam   Procedure consent obtained  Diagnoses and all orders for this visit:    Ischemic foot pain at rest [facility])    Peripheral vascular disease due to secondary diabetes mellitus ([facility] )               Patient ID: Tammy Ramirez is a 59 y o  male  Here for 1 week wound check  Some old blood draining from thigh incision  No fevers, chills  Progressing with therapy  The following portions of the patient's history were reviewed and updated as appropriate: allergies, current medications, past family history, past medical history, past social history, past surgical history and problem list     Review of Systems      Objective:      /62 (BP Location: Right arm, Patient Position: Sitting, Cuff Size: Adult)          Physical Exam   Constitutional: He is oriented to person, place, and time  He appears well-developed and well-nourished  Cardiovascular: Normal rate, regular rhythm and normal heart sounds  Pulmonary/Chest: Effort normal and breath sounds normal    Neurological: He is alert and oriented to person, place, and time  Upper extremity contractures   Skin: Skin is warm and dry  Nursing note and vitals reviewed  right groin non healing wound

## 2018-08-22 NOTE — TELEPHONE ENCOUNTER
I spoke with Adrien Dent at [facility], where pt currently resides, and confirmed that we are scheduling pt for the OR on 8/24/18 with Dr Roxana Morgan at [facility]  No hold on medications other than AM of surgery to hold Diabetes meds  NPO after MN  I am faxing over instructions to Adrien Dent at [phone], and they will arrange transportation

## 2018-08-22 NOTE — ASSESSMENT & PLAN NOTE
S/p right femoral endartectomy and right fem pop bypass  He has a non healing wound, superficial wound dehiscence  He will benefit from debridment of the right groin wound and right thigh wound and VAC placement  There is no evidence of any infection so I do not see the need to admit him post procedure unless the wound tracks very deep on intraoperative exam   Procedure consent obtained

## 2018-08-23 DIAGNOSIS — T81.89XD NON-HEALING SURGICAL WOUND OF RIGHT GROIN, SUBSEQUENT ENCOUNTER: Primary | ICD-10-CM

## 2018-08-23 NOTE — PROGRESS NOTES
Spoke to Dr Cony Manuel and pt to be addmited through ED tonight for surgery scheduled for 8/24  Spoke with Anders at [facility] and she asked that we check we Dr Cony Manuel and find out if surgery can be pushed out a few days due to insurance not pre-auth for 72 hours as well as transportation and patient not wanting to stay over night

## 2018-08-23 NOTE — PROGRESS NOTES
Per Dr Maggie Tabares this procedure can be done next week after receiving approval from the insurance company

## 2018-08-23 NOTE — TELEPHONE ENCOUNTER
Pts surgery date has been changed to 8/28/18 for Dr Nimisha Zaldivar to do [place] has notified Daija Smith and they will be sending the [facility] with pt to hospital on day of surgery  OR has also been notified of changes

## 2018-08-27 RX ORDER — OXYCODONE HYDROCHLORIDE 5 MG/1
5 TABLET ORAL EVERY 4 HOURS PRN
COMMUNITY
End: 2019-02-07

## 2018-08-27 RX ORDER — IBUPROFEN 200 MG
TABLET ORAL 4 TIMES DAILY
COMMUNITY
End: 2019-02-07

## 2018-08-27 RX ORDER — INSULIN GLARGINE 100 [IU]/ML
INJECTION, SOLUTION SUBCUTANEOUS
COMMUNITY
End: 2019-02-07

## 2018-08-27 NOTE — PRE-PROCEDURE INSTRUCTIONS
Pre-Surgery Instructions:   Medication Instructions    gabapentin (NEURONTIN) 100 mg capsule Instructed patient per Anesthesia Guidelines   glimepiride (AMARYL) 1 mg tablet Instructed patient per Anesthesia Guidelines   glucose blood (ACCU-CHEK DAREN PLUS) test strip Instructed patient per Anesthesia Guidelines   Incontinence Supply Disposable (ATTENDS UNDERWEAR 7 LARGE) MISC Instructed patient per Anesthesia Guidelines   insulin glargine (LANTUS) 100 units/mL subcutaneous injection Instructed patient per Anesthesia Guidelines   insulin lispro (HumaLOG) 100 units/mL injection Instructed patient per Anesthesia Guidelines   lisinopril (ZESTRIL) 10 mg tablet Instructed patient per Anesthesia Guidelines   metFORMIN (GLUCOPHAGE) 1000 MG tablet Instructed patient per Anesthesia Guidelines   neomycin-bacitracin-polymyxin (NEOSPORIN) 5-400-5,000 ointment Instructed patient per Anesthesia Guidelines   nicotine (NICODERM CQ) 21 mg/24 hr TD 24 hr patch Instructed patient per Anesthesia Guidelines   oxyCODONE (ROXICODONE) 5 mg immediate release tablet Instructed patient per Anesthesia Guidelines   pravastatin (PRAVACHOL) 20 mg tablet Instructed patient per Anesthesia Guidelines   senna-docusate sodium (SENOKOT S) 8 6-50 mg per tablet Instructed patient per Anesthesia Guidelines   tamsulosin (FLOMAX) 0 4 mg Instructed patient per Anesthesia Guidelines   TiZANidine (ZANAFLEX) 2 MG capsule Instructed patient per Anesthesia Guidelines   Wound Dressings (MAXORB DRESSING EX) Instructed patient per Anesthesia Guidelines  ACE/ARB Med Class     Do not take this medication the day before and the morning of the day of surgery/procedure  Alpha-1 adrenergic blocker Med Class     Continue to take this medication on your normal schedule  If this is an oral medication and you take it in the morning, then you may take this medicine with a sip of water    Alpha-2 adrenergic agonist Med Class Continue to take this medication on your normal schedule  If this is an oral medication and you take it in the morning, then you may take this medicine with a sip of water  Antiepileptic Med Class     Continue to take this medication on your normal schedule  If this is an oral medication and you take it in the morning, then you may take this medicine with a sip of water  Insulin Med Class     Pre-Surgery/Procedure Instructions for Adult Patients who Take Medicine for Diabetes or to Control their Blood Sugar     Day Before Surgery/Procedure  Use the directions based on the type of medicine you take for your diabetes  1  If you are having a procedure that does not require a bowel prep:  ? Pre-Mixed Insulin (Intermediate Acting: Humalog 75/25, Humulin 70/30  Novolog 70/30, Regular Insulin)  § Take ½ your regular dose the evening before your procedure  ? Rapid/Fast Acting Insulin/Long Acting Insulin (Humalog U200, NovoLog, Apidra, Lantus, Levemir, Tamiko Parsley, [place])  § Take your FULL regular dose the day before procedure  ? Oral Diabetic Medicines including Glipizide/Glimepiride/Glucotrol (sulfonylurea)  § Take your regular dose with dinner the evening before your procedure  2  If you are having a procedure (e g  Colonoscopy) that requires a bowel prep and you are allowed to have at least a clear liquid diet:  ? Pre-Mixed Insulin (Intermediate Acting: Humalog 75/25, Humulin 70/30, Novolog 70/30, Regular Insulin)  § Take ½ your regular dose the evening before your procedure  ? Rapid/Fast Acting Insulin (Humalog U200, NovoLog, Apidra, Fiasp)  § Take ½ your regular dose the evening before your procedure  ? Long Acting Insulin (Lantus, Levemir, Tamiko Parsley)  § Take your FULL regular dose the day before procedure  ? Oral Glipizide/Glimepiride/Glucotrol (sulfonylurea)  § Take ½ your regular dose the evening before your procedure    ? Oral Diabetic Medicines that are NOT Glipizide/Glimepiride/Glucotrol  § Take your regular dose with dinner in the evening before your procedure      Day of Surgery/Procedure  · Long Acting Insulin (Lantus, Levemir, Ofeliasafia Hollandegel)  ? If you usually take your Long-Acting Insulin in the morning, take the full dose as scheduled  · With the exception of the morning Long-Acting Insulin noted above, DO NOT take ANY diabetic medicine on the day of your procedure unless you were instructed by the doctor who manages your diabetic medicines  · Continue to check your blood sugars  · If you have an insulin pump then consult with your Endocrinologist for instructions  · If you cannot see your Endocrinologist, on the day of the procedure set your insulin pump to your basal rate only  Please bring your insulin pump supplies to the hospital      This Educational material has been approved by the Patient Education Advisory Committee  Date prepared: 1/17/2018          Expiration date: 1/17/2019        Approval Number:                     Opioid Med Class     Continue to take this medication on your normal schedule  If this is an oral medication and you take it in the morning, then you may take this medicine with a sip of water  Statin Med Class     Continue to take this medication on your normal schedule  If this is an oral medication and you take it in the morning, then you may take this medicine with a sip of water  MELANY RN spoke to ASHLEE Ordonez nurse at [facility]  Reviewed medication & showering instructions  Call then transferred to [facility] to find time of surgery tomorrow  [facility] Nurse verbalized understanding

## 2018-08-28 ENCOUNTER — HOSPITAL ENCOUNTER (OUTPATIENT)
Facility: HOSPITAL | Age: 58
Setting detail: OUTPATIENT SURGERY
Discharge: NURSING, REHAB OR LONG-TERM CARE | End: 2018-08-28
Attending: SURGERY | Admitting: SURGERY
Payer: COMMERCIAL

## 2018-08-28 ENCOUNTER — ANESTHESIA (OUTPATIENT)
Dept: PERIOP | Facility: HOSPITAL | Age: 58
End: 2018-08-28
Payer: COMMERCIAL

## 2018-08-28 ENCOUNTER — ANESTHESIA EVENT (OUTPATIENT)
Dept: PERIOP | Facility: HOSPITAL | Age: 58
End: 2018-08-28
Payer: COMMERCIAL

## 2018-08-28 VITALS
TEMPERATURE: 99.4 F | RESPIRATION RATE: 18 BRPM | WEIGHT: 178 LBS | SYSTOLIC BLOOD PRESSURE: 112 MMHG | HEART RATE: 100 BPM | HEIGHT: 70 IN | DIASTOLIC BLOOD PRESSURE: 59 MMHG | OXYGEN SATURATION: 99 % | BODY MASS INDEX: 25.48 KG/M2

## 2018-08-28 DIAGNOSIS — T81.89XD NON-HEALING SURGICAL WOUND OF RIGHT GROIN, SUBSEQUENT ENCOUNTER: ICD-10-CM

## 2018-08-28 DIAGNOSIS — E13.51 PERIPHERAL VASCULAR DISEASE DUE TO SECONDARY DIABETES MELLITUS (HCC): ICD-10-CM

## 2018-08-28 LAB
GLUCOSE SERPL-MCNC: 85 MG/DL (ref 65–140)
GLUCOSE SERPL-MCNC: 94 MG/DL (ref 65–140)

## 2018-08-28 PROCEDURE — 87075 CULTR BACTERIA EXCEPT BLOOD: CPT | Performed by: SURGERY

## 2018-08-28 PROCEDURE — 82948 REAGENT STRIP/BLOOD GLUCOSE: CPT

## 2018-08-28 PROCEDURE — 87070 CULTURE OTHR SPECIMN AEROBIC: CPT | Performed by: SURGERY

## 2018-08-28 PROCEDURE — 87205 SMEAR GRAM STAIN: CPT | Performed by: SURGERY

## 2018-08-28 PROCEDURE — 11042 DBRDMT SUBQ TIS 1ST 20SQCM/<: CPT | Performed by: SURGERY

## 2018-08-28 PROCEDURE — 97605 NEG PRS WND THER DME<=50SQCM: CPT | Performed by: SURGERY

## 2018-08-28 PROCEDURE — 87176 TISSUE HOMOGENIZATION CULTR: CPT | Performed by: SURGERY

## 2018-08-28 PROCEDURE — 11045 DBRDMT SUBQ TISS EACH ADDL: CPT | Performed by: SURGERY

## 2018-08-28 RX ORDER — GABAPENTIN 400 MG/1
300 CAPSULE ORAL 2 TIMES DAILY
COMMUNITY
End: 2019-08-15 | Stop reason: SDUPTHER

## 2018-08-28 RX ORDER — PROMETHAZINE HYDROCHLORIDE 25 MG/ML
6.25 INJECTION, SOLUTION INTRAMUSCULAR; INTRAVENOUS ONCE AS NEEDED
Status: DISCONTINUED | OUTPATIENT
Start: 2018-08-28 | End: 2018-08-28 | Stop reason: HOSPADM

## 2018-08-28 RX ORDER — MORPHINE SULFATE 15 MG/1
15 TABLET, FILM COATED, EXTENDED RELEASE ORAL 2 TIMES DAILY
COMMUNITY
End: 2019-02-07

## 2018-08-28 RX ORDER — EPHEDRINE SULFATE 50 MG/ML
INJECTION, SOLUTION INTRAVENOUS AS NEEDED
Status: DISCONTINUED | OUTPATIENT
Start: 2018-08-28 | End: 2018-08-28 | Stop reason: SURG

## 2018-08-28 RX ORDER — CITALOPRAM 20 MG/1
10 TABLET ORAL DAILY
COMMUNITY
End: 2019-02-14 | Stop reason: HOSPADM

## 2018-08-28 RX ORDER — CEFAZOLIN SODIUM 1 G/3ML
INJECTION, POWDER, FOR SOLUTION INTRAMUSCULAR; INTRAVENOUS AS NEEDED
Status: DISCONTINUED | OUTPATIENT
Start: 2018-08-28 | End: 2018-08-28 | Stop reason: SURG

## 2018-08-28 RX ORDER — ONDANSETRON 2 MG/ML
4 INJECTION INTRAMUSCULAR; INTRAVENOUS ONCE AS NEEDED
Status: DISCONTINUED | OUTPATIENT
Start: 2018-08-28 | End: 2018-08-28 | Stop reason: HOSPADM

## 2018-08-28 RX ORDER — ONDANSETRON 2 MG/ML
INJECTION INTRAMUSCULAR; INTRAVENOUS AS NEEDED
Status: DISCONTINUED | OUTPATIENT
Start: 2018-08-28 | End: 2018-08-28 | Stop reason: SURG

## 2018-08-28 RX ORDER — FENTANYL CITRATE 50 UG/ML
INJECTION, SOLUTION INTRAMUSCULAR; INTRAVENOUS AS NEEDED
Status: DISCONTINUED | OUTPATIENT
Start: 2018-08-28 | End: 2018-08-28 | Stop reason: SURG

## 2018-08-28 RX ORDER — AMLODIPINE BESYLATE 5 MG/1
5 TABLET ORAL DAILY
COMMUNITY
End: 2019-02-07

## 2018-08-28 RX ORDER — ASPIRIN 81 MG/1
81 TABLET ORAL DAILY
COMMUNITY
End: 2019-02-14 | Stop reason: HOSPADM

## 2018-08-28 RX ORDER — LIDOCAINE HYDROCHLORIDE 10 MG/ML
INJECTION, SOLUTION INFILTRATION; PERINEURAL AS NEEDED
Status: DISCONTINUED | OUTPATIENT
Start: 2018-08-28 | End: 2018-08-28 | Stop reason: SURG

## 2018-08-28 RX ORDER — CLOPIDOGREL BISULFATE 75 MG/1
75 TABLET ORAL DAILY
COMMUNITY
End: 2018-10-08 | Stop reason: SDUPTHER

## 2018-08-28 RX ORDER — PROPOFOL 10 MG/ML
INJECTION, EMULSION INTRAVENOUS AS NEEDED
Status: DISCONTINUED | OUTPATIENT
Start: 2018-08-28 | End: 2018-08-28 | Stop reason: SURG

## 2018-08-28 RX ORDER — FENTANYL CITRATE/PF 50 MCG/ML
25 SYRINGE (ML) INJECTION
Status: DISCONTINUED | OUTPATIENT
Start: 2018-08-28 | End: 2018-08-28 | Stop reason: HOSPADM

## 2018-08-28 RX ORDER — SODIUM CHLORIDE, SODIUM LACTATE, POTASSIUM CHLORIDE, CALCIUM CHLORIDE 600; 310; 30; 20 MG/100ML; MG/100ML; MG/100ML; MG/100ML
20 INJECTION, SOLUTION INTRAVENOUS CONTINUOUS
Status: DISCONTINUED | OUTPATIENT
Start: 2018-08-28 | End: 2018-08-28 | Stop reason: HOSPADM

## 2018-08-28 RX ADMIN — ONDANSETRON 4 MG: 2 INJECTION INTRAMUSCULAR; INTRAVENOUS at 16:04

## 2018-08-28 RX ADMIN — EPHEDRINE SULFATE 10 MG: 50 INJECTION, SOLUTION INTRAMUSCULAR; INTRAVENOUS; SUBCUTANEOUS at 15:40

## 2018-08-28 RX ADMIN — PROPOFOL 100 MG: 10 INJECTION, EMULSION INTRAVENOUS at 15:27

## 2018-08-28 RX ADMIN — LIDOCAINE HYDROCHLORIDE 50 MG: 10 INJECTION, SOLUTION INFILTRATION; PERINEURAL at 15:27

## 2018-08-28 RX ADMIN — CEFAZOLIN 1000 MG: 1 INJECTION, POWDER, FOR SOLUTION INTRAVENOUS at 15:40

## 2018-08-28 RX ADMIN — FENTANYL CITRATE 100 MCG: 50 INJECTION, SOLUTION INTRAMUSCULAR; INTRAVENOUS at 15:26

## 2018-08-28 RX ADMIN — SODIUM CHLORIDE, SODIUM LACTATE, POTASSIUM CHLORIDE, AND CALCIUM CHLORIDE 20 ML/HR: .6; .31; .03; .02 INJECTION, SOLUTION INTRAVENOUS at 13:52

## 2018-08-28 RX ADMIN — EPHEDRINE SULFATE 10 MG: 50 INJECTION, SOLUTION INTRAMUSCULAR; INTRAVENOUS; SUBCUTANEOUS at 15:35

## 2018-08-28 RX ADMIN — CEFAZOLIN SODIUM 1000 MG: 1 SOLUTION INTRAVENOUS at 15:30

## 2018-08-28 RX ADMIN — DEXAMETHASONE SODIUM PHOSPHATE 10 MG: 10 INJECTION INTRAMUSCULAR; INTRAVENOUS at 15:49

## 2018-08-28 NOTE — PERIOPERATIVE NURSING NOTE
VSS, pt denies pain or nausea, assessment unchanged, report called, no questions, pt transferred to [facility]

## 2018-08-28 NOTE — ANESTHESIA PREPROCEDURE EVALUATION
Review of Systems/Medical History  Patient summary reviewed  Chart reviewed  No history of anesthetic complications     Cardiovascular  Hypertension ,    Pulmonary  Smoker cigarette smoker  , COPD ,        GI/Hepatic    GERD ,   Comment: H/o gallstone pancreatitis, h/o EtOH abuse (drinks 3-4 drinks daily)          Endo/Other  Diabetes type 2 Insulin,      GYN       Hematology  Anemia ,     Musculoskeletal       Neurology    Motor deficit , bilateral hand weakness and bilateral lower extremity weakness, Diabetic neuropathy,   Comment: Hx C4 injury due to fall--S/P C3-4 fusion 2012  Psychology           Physical Exam    Airway    Mallampati score: IV  TM Distance: >3 FB  Neck ROM: limited     Dental   Comment: Multiple chipped teeth, multiple missiing teeth, none loose,     Cardiovascular  Rhythm: regular, Rate: normal, Cardiovascular exam normal    Pulmonary  Pulmonary exam normal Breath sounds clear to auscultation,     Other Findings  Contractures of b/l upper extremities      Anesthesia Plan  ASA Score- 4     Anesthesia Type- general and IV sedation with anesthesia with ASA Monitors  Additional Monitors:   Airway Plan: LMA  Plan Factors-    Induction- intravenous  Postoperative Plan- Plan for postoperative opioid use  Planned trial extubation    Informed Consent- Anesthetic plan and risks discussed with patient

## 2018-08-28 NOTE — OP NOTE
OPERATIVE REPORT  PATIENT NAME: Nghia Tello    :  1960  MRN: [id number]  Pt Location: BE OR ROOM 07    SURGERY DATE: 2018    Surgeon(s) and Role:     Wanda Hudson MD - Primary    Preop Diagnosis:  Peripheral vascular disease due to secondary diabetes mellitus ([address] ) [E13 51]  Non-healing surgical wound of right groin, subsequent encounter [T81 89XD]    Post-Op Diagnosis Codes:     * Peripheral vascular disease due to secondary diabetes mellitus ([address] ) [E13 51]     * Non-healing surgical wound of right groin, subsequent encounter [T81 89XD]    Procedure(s) (LRB):  RIGHT GROIN AND THIGH DEBRIDEMENT ([address]) AND VAC PLACEMENT (Right)    Specimen(s):  ID Type Source Tests Collected by Time Destination   A : right groin wound Tissue Other ANAEROBIC CULTURE AND GRAM STAIN, CULTURE, TISSUE AND Franko German Hudson MD 2018 1528        Estimated Blood Loss:   40cc    Drains:  Negative Pressure Wound Therapy (V A C ) Groin Right (Active)   Unit Type freedom vac 2018  3:56 PM   Black foam- # applied 1 2018  4:06 PM   Cycle Continuous 2018  4:06 PM   Target Pressure (mmHg) 125 2018  4:06 PM   Canister Changed Yes 2018  4:06 PM   Dressing Status Clean;Dry; Intact 2018  3:56 PM   Number of days: 0       Anesthesia Type:   General LMA    Operative Indications:  Pt is a 60 yo M w/ PAD and rest pain with R foot tissue loss, now s/p R femoral endarterectomy and fem-BK pop bypass w/ GSV  R groin wound with superficial dehiscence  Presents for washout and debridement from [facility]    Operative Findings:  Mixed granular and fibrinous tissue at base of right groin wound without evidence of purulence or gross infection  Right lower leg wound with small amount of serous drainage and small amount of old hematoma expressed  Several areas of dry scab were removed      Complications:   None    Procedure and Technique:  After informed consent was obtained, the patient was brought to the operating room and placed in the supine position  Cultures were taken of the right groin prior to prepping  Perioperative IV antibiotics were given  He was given anesthesia and an LMA was placed  He was then prepped and draped in the usual sterile fashion exposing the right groin  A timeout was performed  Excisional debridement of all unhealthy tissue was performed sharply with knife and scissors until healthy bleeding tissue was visualized at the base of the wound  The artery and patch were not visualized  The wound was then irrigated with 1L pulsevac irrigation with ancef  Cautery was used to9 aid in hemostasis  A single black VAC sponge was placed in the wound  The wound dimensions were 7cm L x 4cm W x 3cm D  The VAC was placed to 125mmHg, low continuous suction  [facility] was brought [facility] from [facility] with the patient and will return with him to [facility]  The patient was allowed to awaken and was extubated  He was then transferred to the PACU for postoperative care       I was present for the entire procedure    Patient Disposition:  PACU     SIGNATURE: Valentine Hudson MD  DATE: August 28, 2018  TIME: 4:22 PM

## 2018-08-28 NOTE — H&P (VIEW-ONLY)
Assessment/Plan:    Non-healing surgical wound of right groin  S/p right femoral endartectomy and right fem pop bypass  He has a non healing wound, superficial wound dehiscence  He will benefit from debridment of the right groin wound and right thigh wound and VAC placement  There is no evidence of any infection so I do not see the need to admit him post procedure unless the wound tracks very deep on intraoperative exam   Procedure consent obtained  Diagnoses and all orders for this visit:    Ischemic foot pain at rest [facility])    Peripheral vascular disease due to secondary diabetes mellitus ([facility] )               Patient ID: Kaley Penaloza is a 59 y o  male  Here for 1 week wound check  Some old blood draining from thigh incision  No fevers, chills  Progressing with therapy  The following portions of the patient's history were reviewed and updated as appropriate: allergies, current medications, past family history, past medical history, past social history, past surgical history and problem list     Review of Systems      Objective:      /62 (BP Location: Right arm, Patient Position: Sitting, Cuff Size: Adult)          Physical Exam   Constitutional: He is oriented to person, place, and time  He appears well-developed and well-nourished  Cardiovascular: Normal rate, regular rhythm and normal heart sounds  Pulmonary/Chest: Effort normal and breath sounds normal    Neurological: He is alert and oriented to person, place, and time  Upper extremity contractures   Skin: Skin is warm and dry  Nursing note and vitals reviewed  right groin non healing wound

## 2018-08-28 NOTE — ANESTHESIA POSTPROCEDURE EVALUATION
Post-Op Assessment Note      CV Status:  Stable    Mental Status:  Alert and awake    Hydration Status:  Euvolemic    PONV Controlled:  Controlled    Airway Patency:  Patent    Post Op Vitals Reviewed: Yes          Staff: Anesthesiologist           BP 92/62 (08/28/18 1621)    Temp 97 6 °F (36 4 °C) (08/28/18 1621)    Pulse 105 (08/28/18 1621)   Resp (!) 10 (08/28/18 1621)    SpO2 100 % (08/28/18 1621)

## 2018-08-28 NOTE — DISCHARGE INSTRUCTIONS
1) You had a VAC placed in the right groin for your wound  -this should be changed 3x/wk and be kept at 125mmHg, low, continuous suction  -Please followup with Dr Chelsea Garnica as scheduled    Negative Pressure Wound Therapy   WHAT YOU NEED TO KNOW:   NPWT uses a machine called a wound vac, wound vacuum, or pump to help with wound healing  Suction from the machine removes excess drainage from your wound and pulls wound edges closer together  NPWT promotes healthy tissue growth by increasing blood flow to your wound  NPWT also reduces bacteria that causes infections  You and your healthcare providers will be taught about your specific NPWT machine, alarms, and dressing changes  DISCHARGE INSTRUCTIONS:   Seek care immediately if:   · You have a sudden increase or large amount of blood from your wound  · You have a fever of 102°F or more  Contact your healthcare provider if:   · You have a fever or nausea, or you vomit  · Your wound or skin around your wound is red, swollen, and feels warm  · You see pus coming from your wound or it smells bad  · You are dizzy or confused  · You have questions or concerns about the machine, your condition, or care  Follow up with your healthcare provider as directed: Your healthcare provider will need to monitor your wound  Write down your questions so you remember to ask them during your visits  How NPWT works:  Moist foam packing or gauze is placed in your wound  Suction tubing may already be implanted within the foam  If not, tubing will be placed in the middle of the foam or gauze  Then your wound and part of the tubing will be covered completely by a clear dressing  The tubing is attached to a collection canister on the machine  Your healthcare provider may set the machine for continuous or periodic suction  Shower with the dressing in place:  Do not remove your dressing unless directed  Ask your healthcare provider for more information   Do not take the machine into the bathtub or shower with you  NPWT is used 24 hours a day:  If the NPWT machine is turned off for 2 hours or more, the dressing needs to be replaced  Ask your healthcare provider what type of dressing to use  © 2017 [address] Information is for End User's use only and may not be sold, redistributed or otherwise used for commercial purposes  All illustrations and images included in CareNotes® are the copyrighted property of [employer] , [employer]  or Yasir Leslie  The above information is an  only  It is not intended as medical advice for individual conditions or treatments  Talk to your doctor, nurse or pharmacist before following any medical regimen to see if it is safe and effective for you

## 2018-08-30 ENCOUNTER — TELEPHONE (OUTPATIENT)
Dept: VASCULAR SURGERY | Facility: CLINIC | Age: 58
End: 2018-08-30

## 2018-08-30 LAB
BACTERIA SPEC ANAEROBE CULT: NORMAL
BACTERIA TISS AEROBE CULT: NORMAL
GRAM STN SPEC: NORMAL
GRAM STN SPEC: NORMAL

## 2018-08-30 NOTE — TELEPHONE ENCOUNTER
Procedure:RIGHT GROIN AND THIGH DEBRIDEMENT ([address]) AND VAC PLACEMENT (Right Leg Lower)    Date of Procedure:   08/28/2018                             Surgeon:Dr Tyree Tatum    Discharge Date:8/28/18      Leg weakness?: No    Leg swelling?: No  Leg numbness?: No    Chest pain?: No    Shortness of breath?: No    Orthopnea?: No  Bowel/Bladder function stable?:No    ASA or Plavix?: ASA 81mg/ Plavix 75mg    Bleeding?: No    Pain Controlled?: yes with Morphine 15mg bid and oxycodone 5mg q 4 hrs prn    Incision stable?: Yes    Fever/chills?: No      NEXT OFFICE VISIT [id number]:04/66/28 @ 11am      Any further questions/concerns?  No

## 2018-09-04 NOTE — TELEPHONE ENCOUNTER
Post op call completed   He is in [facility] and I s/w his nurse Luis Carlos Astudillo [phone]

## 2018-09-07 ENCOUNTER — OFFICE VISIT (OUTPATIENT)
Dept: VASCULAR SURGERY | Facility: CLINIC | Age: 58
End: 2018-09-07

## 2018-09-07 VITALS — SYSTOLIC BLOOD PRESSURE: 126 MMHG | DIASTOLIC BLOOD PRESSURE: 70 MMHG | TEMPERATURE: 96.4 F

## 2018-09-07 DIAGNOSIS — Z79.4 TYPE 2 DIABETES MELLITUS WITH DIABETIC POLYNEUROPATHY, WITH LONG-TERM CURRENT USE OF INSULIN (HCC): ICD-10-CM

## 2018-09-07 DIAGNOSIS — E13.51 PERIPHERAL VASCULAR DISEASE DUE TO SECONDARY DIABETES MELLITUS (HCC): ICD-10-CM

## 2018-09-07 DIAGNOSIS — E11.42 TYPE 2 DIABETES MELLITUS WITH DIABETIC POLYNEUROPATHY, WITH LONG-TERM CURRENT USE OF INSULIN (HCC): ICD-10-CM

## 2018-09-07 DIAGNOSIS — T81.89XD NON-HEALING SURGICAL WOUND OF RIGHT GROIN, SUBSEQUENT ENCOUNTER: Primary | ICD-10-CM

## 2018-09-07 PROCEDURE — 99024 POSTOP FOLLOW-UP VISIT: CPT | Performed by: NURSE PRACTITIONER

## 2018-09-07 NOTE — PROGRESS NOTES
Assessment/Plan:  25-year-old male with DM, PAD w/ ischemic rest pain ad tissue loss s/p R CFA endarterectomy and R fem- BK pop bypass w/ SVG (YESKIA) complicated by non healing right groin incision s/p debridement and VAC placement 8/28 (LMD)  He presents from [facility] for routine postop follow-up  VAC sponge dressing intact without VAC attached  -Right groin dressing removed  Wound visualized(photographed)  There is healthy granular and hyper granular tissues without necrosis or signs of infection  Continues with moderate depth  Will reapply wound VAC once returns to facility  He needs skin prep prior to applying wound VAC as ana wound is breaking down  -Excellent Doppler signal at bypass graft and AT  -Follow up in the office in 1 week for reassessment of right groin   -Lower extremity arterial duplex in 3 months for postoperative surveillance       Problem List Items Addressed This Visit        Endocrine    Type 2 diabetes mellitus with diabetic polyneuropathy, with long-term current use of insulin (HCC)    Peripheral vascular disease due to secondary diabetes mellitus (HCC)    Relevant Orders    VAS lower limb arterial duplex, complete bilateral       Other    Non-healing surgical wound of right groin - Primary                 Patient ID: Guerrero Oro is a 59 y o  male  Chief complaint: Pt is PO R groin and thigh wound debridement and VAC placement 8/28  Pt is on ASA and plavix  HPI  25-year-old male with DM, PAD w/ ischemic rest pain ad tissue loss s/p R CFA endarterectomy and R fem- BK pop bypass w/ SVG (YESIKA) complicated by non healing right groin incision s/p debridement and VAC placement 8/28 (LMD)  He presents from [facility] for routine postop follow-up  VAC sponge dressing intact without VAC attached  There is minimal sanguinous who is from right groin  No known tissue necrosis  No cellulitis  Ana wound is irritated secondary to adhesive  He denies fevers or chills  He reports right foot wound is healing and is only a scabbed   He denies ischemic rest pain  The following portions of the patient's history were reviewed and updated as appropriate: allergies, current medications, past family history, past medical history, past social history, past surgical history and problem list     Review of Systems   Constitutional: Negative  HENT: Negative  Eyes: Negative  Respiratory: Negative  Cardiovascular: Negative  Gastrointestinal: Negative  Endocrine: Negative  Genitourinary: Negative  Musculoskeletal: Negative  Leg pain     Skin: Negative  Allergic/Immunologic: Negative  Neurological: Negative  Hematological: Negative  Psychiatric/Behavioral: Negative            Objective:    Vitals:    09/07/18 1049   BP: 126/70   BP Location: Right arm   Patient Position: Sitting   Cuff Size: Adult   Temp: (!) 96 4 °F (35 8 °C)   TempSrc: Tympanic       Patient Active Problem List   Diagnosis    Chronic pain    Depression    Diabetic neuropathy (HCC)    Enlarged prostate with lower urinary tract symptoms (LUTS)    Hernia, epigastric    Injury of cervical spinal cord (HCC)    Tobacco use disorder    Umbilical hernia    Cervical myelopathy (HCC)    Basal cell carcinoma in situ of skin    Ambulatory dysfunction    Lumbar radiculopathy    Type 2 diabetes mellitus with diabetic polyneuropathy, with long-term current use of insulin (HCC)    Hypercalcemia    Quadriplegia and quadriparesis (HCC)    Alcohol abuse    Cellulitis of leg, right    Peripheral vascular disease due to secondary diabetes mellitus ([facility])    Ischemic foot pain at rest [facility])    Peripheral arterial disease ([facility] [address] )    Essential hypertension    Urinary retention    Acute blood loss anemia    Constipation    Wound dehiscence, surgical, initial encounter    Non-healing surgical wound of right groin       Past Surgical History:   Procedure Laterality Date    ARTERIOGRAM Right 7/31/2018    Procedure: ARTERIOGRAM Completion Agram;  Surgeon: Ronak Haney MD;  [facility];  Service: Vascular    BACK SURGERY      BYPASS FEMORAL-POPLITEAL Right 7/31/2018    Procedure: BYPASS FEMORAL-POPLITEAL R femoral- BK popliteal bypass;  Surgeon: Ronak Haney MD;  [facility];  Service: Vascular    CERVICAL FUSION      VERTEBRAL; C3-C4 FUSION    CHOLECYSTECTOMY LAPAROSCOPIC N/A 2/24/2017    Procedure: CHOLECYSTECTOMY LAPAROSCOPIC;  Surgeon: Claude Flores MD;  [facility];  Service:     CHOLECYSTECTOMY LAPAROSCOPIC  03/2017    EPIGASTRIC HERNIA REPAIR      [place] DEBRIDEMENT, SKIN, SUB-Q TISSUE,=<20 SQ CM Right 8/28/2018    Procedure: RIGHT GROIN AND THIGH DEBRIDEMENT ([address]) AND VAC PLACEMENT;  Surgeon: Chasity Hudson MD;  [facility];  Service: Vascular    NC THROMBOENDARTECTMY FEMORAL COMMON Right 7/31/2018    Procedure: ENDARTERECTOMY ARTERIAL FEMORAL R femoral endarterectomy w/ patch angioplasty ;  Surgeon: Ronak Haney MD;  [facility];  Service: Vascular    UMBILICAL HERNIA REPAIR N/A 2/24/2017    Procedure: REPAIR HERNIA UMBILICAL;  Surgeon: Claude Flores MD;  [facility];  Service:        Family History   Problem Relation Age of Onset    Hypertension Mother         BENIGN    Alzheimer's disease Father     Heart disease Father         CARDIAC DISORDER    Stroke Father     Heart attack Father     No Known Problems Brother        Social History     Social History    Marital status: /Civil Union     Spouse name: N/A    Number of children: N/A    Years of education: N/A     Occupational History    RETIRED      Social History Main Topics    Smoking status: Current Every Day Smoker     Packs/day: 1 00     Years: 40 00    Smokeless tobacco: Never Used      Comment: 1 ppd     Alcohol use Yes      Comment: 3-4 mixed vodka drinks/daily     Drug use: No    Sexual activity: No     Other Topics Concern  Not on file     Social History Narrative    DAILY  CAFFEINE CONSUMPTION    EXERCISES RARELY    LIVES WITH SIGNIFICANT OTHER    LIVES WITH WIFE    NO LIVING WILL           Allergies   Allergen Reactions    Seasonal Ic  [Cholestatin]          Current Outpatient Prescriptions:     amLODIPine (NORVASC) 5 mg tablet, Take 5 mg by mouth daily, Disp: , Rfl:     aspirin (ECOTRIN LOW STRENGTH) 81 mg EC tablet, Take 81 mg by mouth daily, Disp: , Rfl:     baclofen 20 mg tablet, Take 20 mg by mouth 3 (three) times a day, Disp: , Rfl:     citalopram (CeleXA) 20 mg tablet, Take 10 mg by mouth daily, Disp: , Rfl:     clopidogrel (PLAVIX) 75 mg tablet, Take 75 mg by mouth daily, Disp: , Rfl:     gabapentin (NEURONTIN) 100 mg capsule, Take 1 capsule (100 mg total) by mouth daily at bedtime, Disp: 90 capsule, Rfl: 0    gabapentin (NEURONTIN) 400 mg capsule, Take 400 mg by mouth daily at bedtime, Disp: , Rfl:     glimepiride (AMARYL) 1 mg tablet, Take 1 tablet (1 mg total) by mouth daily, Disp: 90 tablet, Rfl: 0    glucose blood (ACCU-CHEK DAREN PLUS) test strip, Check 2-3 times a day, Disp: 100 each, Rfl: 3    Incontinence Supply Disposable (ATTENDS UNDERWEAR 7 LARGE) MISC, , Disp: , Rfl:     insulin glargine (LANTUS) 100 units/mL subcutaneous injection, Inject under the skin daily at bedtime, Disp: , Rfl:     insulin lispro (HumaLOG) 100 units/mL injection, Inject under the skin 3 (three) times a day before meals, Disp: , Rfl:     lisinopril (ZESTRIL) 10 mg tablet, Take 1 tablet (10 mg total) by mouth daily, Disp: 90 tablet, Rfl: 0    morphine (MS CONTIN) 15 mg 12 hr tablet, Take 15 mg by mouth 2 (two) times a day, Disp: , Rfl:     neomycin-bacitracin-polymyxin (NEOSPORIN) 5-400-5,000 ointment, Apply topically 4 (four) times a day, Disp: , Rfl:     nicotine (NICODERM CQ) 21 mg/24 hr TD 24 hr patch, Place 1 patch on the skin daily, Disp: 28 patch, Rfl: 0    oxyCODONE (ROXICODONE) 5 mg immediate release tablet, Take 5 mg by mouth every 4 (four) hours as needed for moderate pain, Disp: , Rfl:     pravastatin (PRAVACHOL) 20 mg tablet, Take 1 tablet (20 mg total) by mouth daily, Disp: 30 tablet, Rfl: 2    senna-docusate sodium (SENOKOT S) 8 6-50 mg per tablet, Take 1 tablet by mouth daily at bedtime Please take while taking narcotic pain medication  , Disp: 30 tablet, Rfl: 0    tamsulosin (FLOMAX) 0 4 mg, Take 1 capsule (0 4 mg total) by mouth daily with dinner, Disp: 30 capsule, Rfl: 0    TiZANidine (ZANAFLEX) 2 MG capsule, TAKE 1 TABLET BEFORE BED AS NEEDED ( tablets), Disp: 90 capsule, Rfl: 0    Wound Dressings (MAXORB DRESSING EX), Apply topically, Disp: , Rfl:     metFORMIN (GLUCOPHAGE) 1000 MG tablet, Take 1 tablet (1,000 mg total) by mouth 2 (two) times a day with meals for 90 days, Disp: 180 tablet, Rfl: 0           Physical Exam

## 2018-09-14 ENCOUNTER — OFFICE VISIT (OUTPATIENT)
Dept: VASCULAR SURGERY | Facility: CLINIC | Age: 58
End: 2018-09-14

## 2018-09-14 VITALS
DIASTOLIC BLOOD PRESSURE: 70 MMHG | RESPIRATION RATE: 18 BRPM | SYSTOLIC BLOOD PRESSURE: 120 MMHG | HEART RATE: 70 BPM | TEMPERATURE: 97 F

## 2018-09-14 DIAGNOSIS — Z98.890 POSTOPERATIVE STATE: Primary | ICD-10-CM

## 2018-09-14 PROCEDURE — 99024 POSTOP FOLLOW-UP VISIT: CPT | Performed by: NURSE PRACTITIONER

## 2018-09-14 RX ORDER — CALCIUM CARBONATE 200(500)MG
1 TABLET,CHEWABLE ORAL DAILY
COMMUNITY
End: 2019-02-07

## 2018-09-14 RX ORDER — NYSTATIN 100000 [USP'U]/G
POWDER TOPICAL 2 TIMES DAILY
Qty: 15 G | Refills: 0 | Status: SHIPPED | OUTPATIENT
Start: 2018-09-14 | End: 2019-02-07

## 2018-09-14 RX ORDER — AMMONIUM LACTATE 12 G/100G
CREAM TOPICAL AS NEEDED
COMMUNITY
End: 2019-02-07

## 2018-09-14 NOTE — PATIENT INSTRUCTIONS
26-year-old male with DM, quadraplegia, PAD w/ ischemic rest pain ad tissue loss s/p R CFA endarterectomy and R fem- BK pop bypass w/ SVG (YESIKA) complicated by non healing right groin incision s/p debridement and VAC placement 8/28 (LMD)  He presents from [facility] for 1 week incision recheck  - Doppler signal bypass and PT  -  He has severe fungal dermatitis of bilateral groin from lack of daily cleansing  -  Right groin wound is superficial and it will be difficult to reapply VAC secondary to surrounding dermatitis  I have discontinued wound VAC   Instructions given to manage care for proper cleansing     - Right groin dressing:   Cleanse right groin daily with normal saline  Apply silver alginate dressing covered by gauze and Mepilex  Change dressing daily  - Cleanse bilateral groin daily with soap and water  Pat dry  Apply nystatin powder to bilateral groins  Avoid outer to right groin wound  Take special care of right groin to avoid contamination   - Betadine paint daily to right lateral foot wound  - Follow-up in 1 week for recheck

## 2018-09-14 NOTE — PROGRESS NOTES
Assessment/Plan:  20-year-old male quadriplegic with DM, PAD w/ ischemic rest pain ad tissue loss s/p R CFA endarterectomy and R fem- BK pop bypass w/ SVG (YESIKA) complicated by non healing right groin incision s/p debridement and VAC placement 8/28 (LMD)  He presents from [facility] for 1 week incision recheck  - Doppler signal bypass and PT  -  He has severe fungal dermatitis of bilateral groin from lack of daily cleansing  -  Right groin wound is superficial and it will be difficult to reapply VAC secondary to surrounding dermatitis  I have discontinued wound VAC   Instructions given to manage care for proper cleansing     - Right groin dressing:   Cleanse right groin daily with normal saline  Apply silver alginate dressing covered by gauze and Mepilex  Change dressing daily  - Cleanse bilateral groin daily with soap and water  Pat dry  Apply nystatin powder to bilateral groins  Avoid outer to right groin wound  Take special care of right groin to avoid contamination   - Betadine paint daily to right lateral foot wound  - Follow-up in 1 week for recheck  Problem List Items Addressed This Visit        Other    Postoperative state - Primary    Relevant Medications    nystatin (MYCOSTATIN) powder            Subjective:      Patient ID: Tammy Ramirez is a 59 y o  male  Patient presents today for post-op visit for R CFA endarterectomy and R fem-BK Pop bypass with SVG by Dr Mauricio Najjar on 7/31/18 and s/p debridement and VAC placement to right groin 8/28/18 by Dr Antwan Venturaire  Patient denies fever and chills  Nurses are applying dressings to right heal wound  He denies right leg pain  HPI  20-year-old male quadriplegic with DM, PAD w/ ischemic rest pain ad tissue loss s/p R CFA endarterectomy and R fem- BK pop bypass w/ SVG (YESIKA) complicated by non healing right groin incision s/p debridement and VAC placement 8/28 (LMD)  He presents from [facility] for 1 week incision recheck     he presents to the office with wound VAC in place to right groin  Wound VAC removed and right groin wound is   Shallow  He has a significant fungal dermatitis of bilateral groin and scrotal area  His right groin is worse than the left  This was photographed  Right groin wound VAC is being applied though surrounding area of the right groin is not being cleansed regularly at [address] [place] [address]  He is eager for discharge  He admits to long standing issues with dermatitis of the groin  He has complete relief of his ischemic rest pain  Right lateral heel dry stable eschar  The following portions of the patient's history were reviewed and updated as appropriate: allergies, current medications, past family history, past medical history, past social history, past surgical history and problem list     Review of Systems   Constitutional: Positive for activity change  Negative for chills and fever  HENT: Negative  Eyes: Negative  Respiratory: Negative  Cardiovascular: Negative  Gastrointestinal: Negative  Endocrine: Negative  Genitourinary: Negative  Musculoskeletal: Positive for gait problem  Skin: Positive for wound  Allergic/Immunologic: Negative  Neurological: Positive for weakness  Negative for dizziness and headaches  Hematological: Negative  Psychiatric/Behavioral: Negative            Objective:    Vitals:    09/14/18 1122   BP: 120/70   BP Location: Left arm   Patient Position: Sitting   Cuff Size: Adult   Pulse: 70   Resp: 18   Temp: (!) 97 °F (36 1 °C)   TempSrc: Tympanic Core       Patient Active Problem List   Diagnosis    Chronic pain    Depression    Diabetic neuropathy (HCC)    Enlarged prostate with lower urinary tract symptoms (LUTS)    Hernia, epigastric    Injury of cervical spinal cord (HCC)    Tobacco use disorder    Umbilical hernia    Cervical myelopathy (HCC)    Basal cell carcinoma in situ of skin    Ambulatory dysfunction    Lumbar radiculopathy    Type 2 diabetes mellitus with diabetic polyneuropathy, with long-term current use of insulin (HCC)    Hypercalcemia    Quadriplegia and quadriparesis (HCC)    Alcohol abuse    Cellulitis of leg, right    Peripheral vascular disease due to secondary diabetes mellitus ([facility] [address] )    Ischemic foot pain at rest [facility])    Peripheral arterial disease ([facility] [address] )    Essential hypertension    Urinary retention    Acute blood loss anemia    Constipation    Wound dehiscence, surgical, initial encounter    Non-healing surgical wound of right groin       Past Surgical History:   Procedure Laterality Date    ARTERIOGRAM Right 7/31/2018    Procedure: ARTERIOGRAM Completion Agram;  Surgeon: Azeb Bridges MD;  [facility];  Service: Vascular    BACK SURGERY      BYPASS FEMORAL-POPLITEAL Right 7/31/2018    Procedure: BYPASS FEMORAL-POPLITEAL R femoral- BK popliteal bypass;  Surgeon: Azeb Bridges MD;  [facility];  Service: Vascular    CERVICAL FUSION      VERTEBRAL; C3-C4 FUSION    CHOLECYSTECTOMY LAPAROSCOPIC N/A 2/24/2017    Procedure: CHOLECYSTECTOMY LAPAROSCOPIC;  Surgeon: Carla Baig MD;  [facility];  Service:     CHOLECYSTECTOMY LAPAROSCOPIC  03/2017    EPIGASTRIC HERNIA REPAIR      [place] DEBRIDEMENT, SKIN, SUB-Q TISSUE,=<20 SQ CM Right 8/28/2018    Procedure: RIGHT GROIN AND THIGH DEBRIDEMENT ([address]) [address];  Surgeon: Irene Hudson MD;  [facility];  Service: Vascular    [place] THROMBOENDARTECTMY FEMORAL COMMON Right 7/31/2018    Procedure: ENDARTERECTOMY ARTERIAL FEMORAL R femoral endarterectomy w/ patch angioplasty ;  Surgeon: zAeb Bridges MD;  [facility];  Service: Vascular    UMBILICAL HERNIA REPAIR N/A 2/24/2017    Procedure: REPAIR HERNIA UMBILICAL;  Surgeon: Carla Baig MD;  [facility];  Service:        Family History   Problem Relation Age of Onset    Hypertension Mother         BENIGN    Alzheimer's disease Father     Heart disease Father CARDIAC DISORDER    Stroke Father     Heart attack Father     No Known Problems Brother        Social History     Social History    Marital status: /Civil Union     Spouse name: N/A    Number of children: N/A    Years of education: N/A     Occupational History    RETIRED      Social History Main Topics    Smoking status: Current Every Day Smoker     Packs/day: 1 00     Years: 40 00    Smokeless tobacco: Never Used      Comment: 1 ppd     Alcohol use Yes      Comment: 3-4 mixed vodka drinks/daily     Drug use: No    Sexual activity: No     Other Topics Concern    Not on file     Social History Narrative    DAILY  CAFFEINE CONSUMPTION    EXERCISES RARELY    LIVES WITH SIGNIFICANT OTHER    LIVES WITH WIFE    NO LIVING WILL           Allergies   Allergen Reactions    Seasonal Ic  [Cholestatin]          Current Outpatient Prescriptions:     amLODIPine (NORVASC) 5 mg tablet, Take 5 mg by mouth daily, Disp: , Rfl:     ammonium lactate (LAC-HYDRIN) 12 % cream, Apply topically as needed for dry skin, Disp: , Rfl:     aspirin (ECOTRIN LOW STRENGTH) 81 mg EC tablet, Take 81 mg by mouth daily, Disp: , Rfl:     baclofen 20 mg tablet, Take 20 mg by mouth 3 (three) times a day, Disp: , Rfl:     calcium carbonate (TUMS) 500 mg chewable tablet, Chew 1 tablet daily, Disp: , Rfl:     citalopram (CeleXA) 20 mg tablet, Take 10 mg by mouth daily, Disp: , Rfl:     clopidogrel (PLAVIX) 75 mg tablet, Take 75 mg by mouth daily, Disp: , Rfl:     gabapentin (NEURONTIN) 100 mg capsule, Take 1 capsule (100 mg total) by mouth daily at bedtime, Disp: 90 capsule, Rfl: 0    gabapentin (NEURONTIN) 400 mg capsule, Take 400 mg by mouth daily at bedtime, Disp: , Rfl:     glimepiride (AMARYL) 1 mg tablet, Take 1 tablet (1 mg total) by mouth daily, Disp: 90 tablet, Rfl: 0    glucose blood (ACCU-CHEK DAREN PLUS) test strip, Check 2-3 times a day, Disp: 100 each, Rfl: 3    Incontinence Supply Disposable (ATTENDS UNDERWEAR 7 LARGE) MISC, , Disp: , Rfl:     insulin glargine (LANTUS) 100 units/mL subcutaneous injection, Inject under the skin daily at bedtime, Disp: , Rfl:     insulin lispro (HumaLOG) 100 units/mL injection, Inject under the skin 3 (three) times a day before meals, Disp: , Rfl:     lisinopril (ZESTRIL) 10 mg tablet, Take 1 tablet (10 mg total) by mouth daily, Disp: 90 tablet, Rfl: 0    morphine (MS CONTIN) 15 mg 12 hr tablet, Take 15 mg by mouth 2 (two) times a day, Disp: , Rfl:     neomycin-bacitracin-polymyxin (NEOSPORIN) 5-400-5,000 ointment, Apply topically 4 (four) times a day, Disp: , Rfl:     nicotine (NICODERM CQ) 21 mg/24 hr TD 24 hr patch, Place 1 patch on the skin daily, Disp: 28 patch, Rfl: 0    oxyCODONE (ROXICODONE) 5 mg immediate release tablet, Take 5 mg by mouth every 4 (four) hours as needed for moderate pain, Disp: , Rfl:     pravastatin (PRAVACHOL) 20 mg tablet, Take 1 tablet (20 mg total) by mouth daily, Disp: 30 tablet, Rfl: 2    senna-docusate sodium (SENOKOT S) 8 6-50 mg per tablet, Take 1 tablet by mouth daily at bedtime Please take while taking narcotic pain medication  , Disp: 30 tablet, Rfl: 0    tamsulosin (FLOMAX) 0 4 mg, Take 1 capsule (0 4 mg total) by mouth daily with dinner, Disp: 30 capsule, Rfl: 0    TiZANidine (ZANAFLEX) 2 MG capsule, TAKE 1 TABLET BEFORE BED AS NEEDED ( tablets), Disp: 90 capsule, Rfl: 0    Wound Dressings (MAXORB DRESSING EX), Apply topically, Disp: , Rfl:     metFORMIN (GLUCOPHAGE) 1000 MG tablet, Take 1 tablet (1,000 mg total) by mouth 2 (two) times a day with meals for 90 days, Disp: 180 tablet, Rfl: 0           Physical Exam      Right groin wound approximately  5 cm in length and approximately 0 5 cm in depth  Wound with 100% granular tissue  No necrosis, purulence or cellulitis  There is a significant ana wound fungal dermatitis  Bilateral inner thigh and scrotal area with fungal dermatitis  +odor   Doppler signal at medial calf bypass graft  Doppler PT     right lateral heel dry eschar

## 2018-10-08 DIAGNOSIS — Z79.01 LONG TERM (CURRENT) USE OF ANTICOAGULANTS: Primary | ICD-10-CM

## 2018-10-08 RX ORDER — CLOPIDOGREL BISULFATE 75 MG/1
75 TABLET ORAL DAILY
Qty: 30 TABLET | Refills: 0 | Status: SHIPPED | OUTPATIENT
Start: 2018-10-08 | End: 2020-09-02 | Stop reason: SDUPTHER

## 2018-10-09 ENCOUNTER — APPOINTMENT (EMERGENCY)
Dept: RADIOLOGY | Facility: HOSPITAL | Age: 58
DRG: 871 | End: 2018-10-09
Payer: COMMERCIAL

## 2018-10-09 ENCOUNTER — HOSPITAL ENCOUNTER (INPATIENT)
Facility: HOSPITAL | Age: 58
LOS: 3 days | Discharge: HOME HEALTH | DRG: 871 | End: 2018-10-13
Attending: EMERGENCY MEDICINE | Admitting: HOSPITALIST
Payer: COMMERCIAL

## 2018-10-09 DIAGNOSIS — E13.51 PERIPHERAL VASCULAR DISEASE DUE TO SECONDARY DIABETES MELLITUS (HCC): ICD-10-CM

## 2018-10-09 DIAGNOSIS — J69.0 ASPIRATION PNEUMONITIS (HCC): ICD-10-CM

## 2018-10-09 DIAGNOSIS — T81.89XD NON-HEALING SURGICAL WOUND OF RIGHT GROIN, SUBSEQUENT ENCOUNTER: ICD-10-CM

## 2018-10-09 DIAGNOSIS — R13.10 DYSPHAGIA: ICD-10-CM

## 2018-10-09 DIAGNOSIS — E83.42 HYPOMAGNESEMIA: ICD-10-CM

## 2018-10-09 DIAGNOSIS — L89.152 SACRAL DECUBITUS ULCER, STAGE II (HCC): ICD-10-CM

## 2018-10-09 DIAGNOSIS — K14.8 TONGUE MASS: ICD-10-CM

## 2018-10-09 DIAGNOSIS — K22.89 ESOPHAGEAL THICKENING: ICD-10-CM

## 2018-10-09 DIAGNOSIS — A41.9 SEPTIC SHOCK (HCC): Primary | ICD-10-CM

## 2018-10-09 DIAGNOSIS — R65.21 SEPTIC SHOCK (HCC): Primary | ICD-10-CM

## 2018-10-09 DIAGNOSIS — S31.000A SACRAL WOUND, INITIAL ENCOUNTER: ICD-10-CM

## 2018-10-09 LAB
ALBUMIN SERPL BCP-MCNC: 4.1 G/DL (ref 3.5–5)
ALP SERPL-CCNC: 76 U/L (ref 46–116)
ALT SERPL W P-5'-P-CCNC: 28 U/L (ref 12–78)
ANION GAP SERPL CALCULATED.3IONS-SCNC: 16 MMOL/L (ref 4–13)
AST SERPL W P-5'-P-CCNC: 22 U/L (ref 5–45)
BASOPHILS # BLD AUTO: 0.06 THOUSANDS/ΜL (ref 0–0.1)
BASOPHILS NFR BLD AUTO: 1 % (ref 0–1)
BILIRUB SERPL-MCNC: 0.21 MG/DL (ref 0.2–1)
BUN SERPL-MCNC: 21 MG/DL (ref 5–25)
CALCIUM SERPL-MCNC: 9.4 MG/DL (ref 8.3–10.1)
CHLORIDE SERPL-SCNC: 101 MMOL/L (ref 100–108)
CO2 SERPL-SCNC: 18 MMOL/L (ref 21–32)
CREAT SERPL-MCNC: 2.34 MG/DL (ref 0.6–1.3)
EOSINOPHIL # BLD AUTO: 0.05 THOUSAND/ΜL (ref 0–0.61)
EOSINOPHIL NFR BLD AUTO: 0 % (ref 0–6)
ERYTHROCYTE [DISTWIDTH] IN BLOOD BY AUTOMATED COUNT: 15.7 % (ref 11.6–15.1)
GFR SERPL CREATININE-BSD FRML MDRD: 29 ML/MIN/1.73SQ M
GLUCOSE SERPL-MCNC: 149 MG/DL (ref 65–140)
HCT VFR BLD AUTO: 34.7 % (ref 36.5–49.3)
HGB BLD-MCNC: 11.1 G/DL (ref 12–17)
IMM GRANULOCYTES # BLD AUTO: 0.03 THOUSAND/UL (ref 0–0.2)
IMM GRANULOCYTES NFR BLD AUTO: 0 % (ref 0–2)
LACTATE SERPL-SCNC: 11.8 MMOL/L (ref 0.5–2)
LYMPHOCYTES # BLD AUTO: 1.5 THOUSANDS/ΜL (ref 0.6–4.47)
LYMPHOCYTES NFR BLD AUTO: 13 % (ref 14–44)
MCH RBC QN AUTO: 32.6 PG (ref 26.8–34.3)
MCHC RBC AUTO-ENTMCNC: 32 G/DL (ref 31.4–37.4)
MCV RBC AUTO: 102 FL (ref 82–98)
MONOCYTES # BLD AUTO: 1.01 THOUSAND/ΜL (ref 0.17–1.22)
MONOCYTES NFR BLD AUTO: 9 % (ref 4–12)
NEUTROPHILS # BLD AUTO: 8.84 THOUSANDS/ΜL (ref 1.85–7.62)
NEUTS SEG NFR BLD AUTO: 77 % (ref 43–75)
NRBC BLD AUTO-RTO: 0 /100 WBCS
PLATELET # BLD AUTO: 275 THOUSANDS/UL (ref 149–390)
PMV BLD AUTO: 9.8 FL (ref 8.9–12.7)
POTASSIUM SERPL-SCNC: 5.3 MMOL/L (ref 3.5–5.3)
PROCALCITONIN SERPL-MCNC: 0.11 NG/ML
PROT SERPL-MCNC: 7.9 G/DL (ref 6.4–8.2)
RBC # BLD AUTO: 3.41 MILLION/UL (ref 3.88–5.62)
SODIUM SERPL-SCNC: 135 MMOL/L (ref 136–145)
WBC # BLD AUTO: 11.49 THOUSAND/UL (ref 4.31–10.16)

## 2018-10-09 PROCEDURE — 93005 ELECTROCARDIOGRAM TRACING: CPT

## 2018-10-09 PROCEDURE — 96360 HYDRATION IV INFUSION INIT: CPT

## 2018-10-09 PROCEDURE — 83605 ASSAY OF LACTIC ACID: CPT | Performed by: EMERGENCY MEDICINE

## 2018-10-09 PROCEDURE — 96375 TX/PRO/DX INJ NEW DRUG ADDON: CPT

## 2018-10-09 PROCEDURE — 71045 X-RAY EXAM CHEST 1 VIEW: CPT

## 2018-10-09 PROCEDURE — 80053 COMPREHEN METABOLIC PANEL: CPT | Performed by: EMERGENCY MEDICINE

## 2018-10-09 PROCEDURE — 36415 COLL VENOUS BLD VENIPUNCTURE: CPT | Performed by: EMERGENCY MEDICINE

## 2018-10-09 PROCEDURE — 73630 X-RAY EXAM OF FOOT: CPT

## 2018-10-09 PROCEDURE — 87040 BLOOD CULTURE FOR BACTERIA: CPT | Performed by: EMERGENCY MEDICINE

## 2018-10-09 PROCEDURE — 96365 THER/PROPH/DIAG IV INF INIT: CPT

## 2018-10-09 PROCEDURE — 84145 PROCALCITONIN (PCT): CPT | Performed by: EMERGENCY MEDICINE

## 2018-10-09 PROCEDURE — 83690 ASSAY OF LIPASE: CPT | Performed by: EMERGENCY MEDICINE

## 2018-10-09 PROCEDURE — 85025 COMPLETE CBC W/AUTO DIFF WBC: CPT | Performed by: EMERGENCY MEDICINE

## 2018-10-09 PROCEDURE — 99285 EMERGENCY DEPT VISIT HI MDM: CPT

## 2018-10-09 RX ORDER — HYDROMORPHONE HCL/PF 1 MG/ML
1 SYRINGE (ML) INJECTION ONCE
Status: DISCONTINUED | OUTPATIENT
Start: 2018-10-09 | End: 2018-10-09

## 2018-10-09 RX ORDER — 0.9 % SODIUM CHLORIDE 0.9 %
3 VIAL (ML) INJECTION AS NEEDED
Status: DISCONTINUED | OUTPATIENT
Start: 2018-10-09 | End: 2018-10-09

## 2018-10-09 RX ORDER — HYDROMORPHONE HCL/PF 1 MG/ML
0.5 SYRINGE (ML) INJECTION ONCE
Status: COMPLETED | OUTPATIENT
Start: 2018-10-09 | End: 2018-10-09

## 2018-10-09 RX ORDER — ONDANSETRON 2 MG/ML
4 INJECTION INTRAMUSCULAR; INTRAVENOUS ONCE
Status: COMPLETED | OUTPATIENT
Start: 2018-10-09 | End: 2018-10-09

## 2018-10-09 RX ORDER — ONDANSETRON 2 MG/ML
INJECTION INTRAMUSCULAR; INTRAVENOUS
Status: COMPLETED
Start: 2018-10-09 | End: 2018-10-09

## 2018-10-09 RX ADMIN — HYDROMORPHONE HYDROCHLORIDE 0.5 MG: 1 INJECTION, SOLUTION INTRAMUSCULAR; INTRAVENOUS; SUBCUTANEOUS at 23:11

## 2018-10-09 RX ADMIN — SODIUM CHLORIDE 1000 ML: 0.9 INJECTION, SOLUTION INTRAVENOUS at 22:32

## 2018-10-09 RX ADMIN — CEFEPIME HYDROCHLORIDE 2000 MG: 2 INJECTION, POWDER, FOR SOLUTION INTRAVENOUS at 23:09

## 2018-10-09 RX ADMIN — ONDANSETRON 4 MG: 2 INJECTION INTRAMUSCULAR; INTRAVENOUS at 23:13

## 2018-10-09 RX ADMIN — SODIUM CHLORIDE 1000 ML: 0.9 INJECTION, SOLUTION INTRAVENOUS at 23:00

## 2018-10-09 RX ADMIN — AZITHROMYCIN 500 MG: 500 INJECTION, POWDER, LYOPHILIZED, FOR SOLUTION INTRAVENOUS at 23:35

## 2018-10-10 ENCOUNTER — APPOINTMENT (EMERGENCY)
Dept: RADIOLOGY | Facility: HOSPITAL | Age: 58
DRG: 871 | End: 2018-10-10
Payer: COMMERCIAL

## 2018-10-10 ENCOUNTER — APPOINTMENT (INPATIENT)
Dept: RADIOLOGY | Facility: HOSPITAL | Age: 58
DRG: 871 | End: 2018-10-10
Attending: GENERAL PRACTICE
Payer: COMMERCIAL

## 2018-10-10 PROBLEM — A41.9 SEPTIC SHOCK (HCC): Status: ACTIVE | Noted: 2018-10-10

## 2018-10-10 PROBLEM — S31.000A SACRAL WOUND, INITIAL ENCOUNTER: Status: ACTIVE | Noted: 2018-10-10

## 2018-10-10 PROBLEM — R65.21 SEPTIC SHOCK (HCC): Status: ACTIVE | Noted: 2018-10-10

## 2018-10-10 LAB
ANION GAP SERPL CALCULATED.3IONS-SCNC: 9 MMOL/L (ref 4–13)
ATRIAL RATE: 109 BPM
ATRIAL RATE: 220 BPM
BACTERIA UR QL AUTO: ABNORMAL /HPF
BASE EXCESS BLDA CALC-SCNC: -6 MMOL/L (ref -2–3)
BASOPHILS # BLD AUTO: 0.04 THOUSANDS/ΜL (ref 0–0.1)
BASOPHILS NFR BLD AUTO: 0 % (ref 0–1)
BILIRUB UR QL STRIP: NEGATIVE
BUN SERPL-MCNC: 26 MG/DL (ref 5–25)
CA-I BLD-SCNC: 1.14 MMOL/L (ref 1.12–1.32)
CALCIUM SERPL-MCNC: 8.2 MG/DL (ref 8.3–10.1)
CHLORIDE SERPL-SCNC: 108 MMOL/L (ref 100–108)
CK MB SERPL-MCNC: 1.5 % (ref 0–2.5)
CK MB SERPL-MCNC: 8.2 NG/ML (ref 0–5)
CK SERPL-CCNC: 543 U/L (ref 39–308)
CLARITY UR: CLEAR
CO2 SERPL-SCNC: 22 MMOL/L (ref 21–32)
COARSE GRAN CASTS URNS QL MICRO: ABNORMAL /LPF
COLOR UR: YELLOW
CREAT SERPL-MCNC: 1.97 MG/DL (ref 0.6–1.3)
EOSINOPHIL # BLD AUTO: 0.05 THOUSAND/ΜL (ref 0–0.61)
EOSINOPHIL NFR BLD AUTO: 0 % (ref 0–6)
ERYTHROCYTE [DISTWIDTH] IN BLOOD BY AUTOMATED COUNT: 15.7 % (ref 11.6–15.1)
EST. AVERAGE GLUCOSE BLD GHB EST-MCNC: 143 MG/DL
FINE GRAN CASTS URNS QL MICRO: ABNORMAL /LPF
GFR SERPL CREATININE-BSD FRML MDRD: 36 ML/MIN/1.73SQ M
GLUCOSE SERPL-MCNC: 106 MG/DL (ref 65–140)
GLUCOSE SERPL-MCNC: 117 MG/DL (ref 65–140)
GLUCOSE SERPL-MCNC: 136 MG/DL (ref 65–140)
GLUCOSE SERPL-MCNC: 143 MG/DL (ref 65–140)
GLUCOSE SERPL-MCNC: 145 MG/DL (ref 65–140)
GLUCOSE SERPL-MCNC: 154 MG/DL (ref 65–140)
GLUCOSE UR STRIP-MCNC: NEGATIVE MG/DL
HBA1C MFR BLD: 6.6 % (ref 4.2–6.3)
HCO3 BLDA-SCNC: 20 MMOL/L (ref 24–30)
HCT VFR BLD AUTO: 31.7 % (ref 36.5–49.3)
HCT VFR BLD CALC: 31 % (ref 36.5–49.3)
HGB BLD-MCNC: 10.1 G/DL (ref 12–17)
HGB BLDA-MCNC: 10.5 G/DL (ref 12–17)
HGB UR QL STRIP.AUTO: ABNORMAL
HOLD SPECIMEN: NORMAL
HYALINE CASTS #/AREA URNS LPF: ABNORMAL /LPF
IMM GRANULOCYTES # BLD AUTO: 0.09 THOUSAND/UL (ref 0–0.2)
IMM GRANULOCYTES NFR BLD AUTO: 1 % (ref 0–2)
KETONES UR STRIP-MCNC: NEGATIVE MG/DL
L PNEUMO1 AG UR QL IA.RAPID: NEGATIVE
LACTATE SERPL-SCNC: 1.4 MMOL/L (ref 0.5–2)
LACTATE SERPL-SCNC: 2.6 MMOL/L (ref 0.5–2)
LACTATE SERPL-SCNC: 7 MMOL/L (ref 0.5–2)
LEUKOCYTE ESTERASE UR QL STRIP: NEGATIVE
LIPASE SERPL-CCNC: 93 U/L (ref 73–393)
LYMPHOCYTES # BLD AUTO: 1.57 THOUSANDS/ΜL (ref 0.6–4.47)
LYMPHOCYTES NFR BLD AUTO: 13 % (ref 14–44)
MAGNESIUM SERPL-MCNC: 1.6 MG/DL (ref 1.6–2.6)
MCH RBC QN AUTO: 32.4 PG (ref 26.8–34.3)
MCHC RBC AUTO-ENTMCNC: 31.9 G/DL (ref 31.4–37.4)
MCV RBC AUTO: 102 FL (ref 82–98)
MONOCYTES # BLD AUTO: 0.82 THOUSAND/ΜL (ref 0.17–1.22)
MONOCYTES NFR BLD AUTO: 7 % (ref 4–12)
NEUTROPHILS # BLD AUTO: 9.62 THOUSANDS/ΜL (ref 1.85–7.62)
NEUTS SEG NFR BLD AUTO: 79 % (ref 43–75)
NITRITE UR QL STRIP: NEGATIVE
NON-SQ EPI CELLS URNS QL MICRO: ABNORMAL /HPF
NRBC BLD AUTO-RTO: 0 /100 WBCS
P AXIS: 74 DEGREES
PCO2 BLD: 21 MMOL/L (ref 21–32)
PCO2 BLD: 38.7 MM HG (ref 42–50)
PH BLD: 7.32 [PH] (ref 7.3–7.4)
PH UR STRIP.AUTO: 5.5 [PH] (ref 4.5–8)
PLATELET # BLD AUTO: 228 THOUSANDS/UL (ref 149–390)
PMV BLD AUTO: 10 FL (ref 8.9–12.7)
PO2 BLD: 106 MM HG (ref 35–45)
POTASSIUM BLD-SCNC: 4.4 MMOL/L (ref 3.5–5.3)
POTASSIUM SERPL-SCNC: 4.9 MMOL/L (ref 3.5–5.3)
PR INTERVAL: 152 MS
PROT UR STRIP-MCNC: ABNORMAL MG/DL
QRS AXIS: -50 DEGREES
QRS AXIS: -74 DEGREES
QRSD INTERVAL: 118 MS
QRSD INTERVAL: 138 MS
QT INTERVAL: 362 MS
QT INTERVAL: 400 MS
QTC INTERVAL: 487 MS
QTC INTERVAL: 516 MS
RBC # BLD AUTO: 3.12 MILLION/UL (ref 3.88–5.62)
RBC #/AREA URNS AUTO: ABNORMAL /HPF
S PNEUM AG UR QL: NEGATIVE
SAO2 % BLD FROM PO2: 98 % (ref 95–98)
SODIUM BLD-SCNC: 136 MMOL/L (ref 136–145)
SODIUM SERPL-SCNC: 139 MMOL/L (ref 136–145)
SP GR UR STRIP.AUTO: 1.02 (ref 1–1.03)
SPECIMEN SOURCE: ABNORMAL
T WAVE AXIS: 43 DEGREES
T WAVE AXIS: 45 DEGREES
TROPONIN I SERPL-MCNC: 0.03 NG/ML
UROBILINOGEN UR QL STRIP.AUTO: 0.2 E.U./DL
VENTRICULAR RATE: 100 BPM
VENTRICULAR RATE: 109 BPM
WBC # BLD AUTO: 12.19 THOUSAND/UL (ref 4.31–10.16)
WBC #/AREA URNS AUTO: ABNORMAL /HPF

## 2018-10-10 PROCEDURE — 82550 ASSAY OF CK (CPK): CPT | Performed by: PHYSICIAN ASSISTANT

## 2018-10-10 PROCEDURE — 99024 POSTOP FOLLOW-UP VISIT: CPT | Performed by: SURGERY

## 2018-10-10 PROCEDURE — 93005 ELECTROCARDIOGRAM TRACING: CPT

## 2018-10-10 PROCEDURE — 94760 N-INVAS EAR/PLS OXIMETRY 1: CPT

## 2018-10-10 PROCEDURE — 82947 ASSAY GLUCOSE BLOOD QUANT: CPT

## 2018-10-10 PROCEDURE — 87449 NOS EACH ORGANISM AG IA: CPT | Performed by: GENERAL PRACTICE

## 2018-10-10 PROCEDURE — 82948 REAGENT STRIP/BLOOD GLUCOSE: CPT

## 2018-10-10 PROCEDURE — 93010 ELECTROCARDIOGRAM REPORT: CPT | Performed by: INTERNAL MEDICINE

## 2018-10-10 PROCEDURE — 84132 ASSAY OF SERUM POTASSIUM: CPT

## 2018-10-10 PROCEDURE — 83605 ASSAY OF LACTIC ACID: CPT | Performed by: EMERGENCY MEDICINE

## 2018-10-10 PROCEDURE — 84484 ASSAY OF TROPONIN QUANT: CPT | Performed by: GENERAL PRACTICE

## 2018-10-10 PROCEDURE — 82553 CREATINE MB FRACTION: CPT | Performed by: PHYSICIAN ASSISTANT

## 2018-10-10 PROCEDURE — 85025 COMPLETE CBC W/AUTO DIFF WBC: CPT | Performed by: HOSPITALIST

## 2018-10-10 PROCEDURE — 99223 1ST HOSP IP/OBS HIGH 75: CPT | Performed by: EMERGENCY MEDICINE

## 2018-10-10 PROCEDURE — 85014 HEMATOCRIT: CPT

## 2018-10-10 PROCEDURE — 96366 THER/PROPH/DIAG IV INF ADDON: CPT

## 2018-10-10 PROCEDURE — G8997 SWALLOW GOAL STATUS: HCPCS

## 2018-10-10 PROCEDURE — 74176 CT ABD & PELVIS W/O CONTRAST: CPT

## 2018-10-10 PROCEDURE — 92610 EVALUATE SWALLOWING FUNCTION: CPT

## 2018-10-10 PROCEDURE — 36415 COLL VENOUS BLD VENIPUNCTURE: CPT | Performed by: EMERGENCY MEDICINE

## 2018-10-10 PROCEDURE — 84295 ASSAY OF SERUM SODIUM: CPT

## 2018-10-10 PROCEDURE — 82803 BLOOD GASES ANY COMBINATION: CPT

## 2018-10-10 PROCEDURE — G8996 SWALLOW CURRENT STATUS: HCPCS

## 2018-10-10 PROCEDURE — 87081 CULTURE SCREEN ONLY: CPT | Performed by: GENERAL PRACTICE

## 2018-10-10 PROCEDURE — 87631 RESP VIRUS 3-5 TARGETS: CPT | Performed by: INTERNAL MEDICINE

## 2018-10-10 PROCEDURE — 83605 ASSAY OF LACTIC ACID: CPT | Performed by: GENERAL PRACTICE

## 2018-10-10 PROCEDURE — 80048 BASIC METABOLIC PNL TOTAL CA: CPT | Performed by: HOSPITALIST

## 2018-10-10 PROCEDURE — 83036 HEMOGLOBIN GLYCOSYLATED A1C: CPT | Performed by: HOSPITALIST

## 2018-10-10 PROCEDURE — 71045 X-RAY EXAM CHEST 1 VIEW: CPT

## 2018-10-10 PROCEDURE — 83735 ASSAY OF MAGNESIUM: CPT | Performed by: HOSPITALIST

## 2018-10-10 PROCEDURE — 94660 CPAP INITIATION&MGMT: CPT

## 2018-10-10 PROCEDURE — 82330 ASSAY OF CALCIUM: CPT

## 2018-10-10 PROCEDURE — 96367 TX/PROPH/DG ADDL SEQ IV INF: CPT

## 2018-10-10 PROCEDURE — 71250 CT THORAX DX C-: CPT

## 2018-10-10 PROCEDURE — 99222 1ST HOSP IP/OBS MODERATE 55: CPT | Performed by: INTERNAL MEDICINE

## 2018-10-10 PROCEDURE — 81001 URINALYSIS AUTO W/SCOPE: CPT

## 2018-10-10 PROCEDURE — 99223 1ST HOSP IP/OBS HIGH 75: CPT | Performed by: HOSPITALIST

## 2018-10-10 RX ORDER — CITALOPRAM 10 MG/1
10 TABLET ORAL DAILY
Status: DISCONTINUED | OUTPATIENT
Start: 2018-10-10 | End: 2018-10-13 | Stop reason: HOSPADM

## 2018-10-10 RX ORDER — AMMONIUM LACTATE 12 G/100G
CREAM TOPICAL 2 TIMES DAILY
Status: DISCONTINUED | OUTPATIENT
Start: 2018-10-10 | End: 2018-10-13 | Stop reason: HOSPADM

## 2018-10-10 RX ORDER — PRAVASTATIN SODIUM 20 MG
20 TABLET ORAL DAILY
Status: DISCONTINUED | OUTPATIENT
Start: 2018-10-10 | End: 2018-10-11

## 2018-10-10 RX ORDER — BACLOFEN 20 MG/1
20 TABLET ORAL 3 TIMES DAILY
Status: DISCONTINUED | OUTPATIENT
Start: 2018-10-10 | End: 2018-10-13 | Stop reason: HOSPADM

## 2018-10-10 RX ORDER — SODIUM CHLORIDE, SODIUM GLUCONATE, SODIUM ACETATE, POTASSIUM CHLORIDE, MAGNESIUM CHLORIDE, SODIUM PHOSPHATE, DIBASIC, AND POTASSIUM PHOSPHATE .53; .5; .37; .037; .03; .012; .00082 G/100ML; G/100ML; G/100ML; G/100ML; G/100ML; G/100ML; G/100ML
125 INJECTION, SOLUTION INTRAVENOUS CONTINUOUS
Status: DISCONTINUED | OUTPATIENT
Start: 2018-10-10 | End: 2018-10-10

## 2018-10-10 RX ORDER — HEPARIN SODIUM 5000 [USP'U]/ML
5000 INJECTION, SOLUTION INTRAVENOUS; SUBCUTANEOUS EVERY 8 HOURS SCHEDULED
Status: DISCONTINUED | OUTPATIENT
Start: 2018-10-10 | End: 2018-10-13 | Stop reason: HOSPADM

## 2018-10-10 RX ORDER — TAMSULOSIN HYDROCHLORIDE 0.4 MG/1
0.4 CAPSULE ORAL
Status: DISCONTINUED | OUTPATIENT
Start: 2018-10-10 | End: 2018-10-13 | Stop reason: HOSPADM

## 2018-10-10 RX ORDER — CLOPIDOGREL BISULFATE 75 MG/1
75 TABLET ORAL DAILY
Status: DISCONTINUED | OUTPATIENT
Start: 2018-10-10 | End: 2018-10-13 | Stop reason: HOSPADM

## 2018-10-10 RX ORDER — SODIUM CHLORIDE, SODIUM GLUCONATE, SODIUM ACETATE, POTASSIUM CHLORIDE, MAGNESIUM CHLORIDE, SODIUM PHOSPHATE, DIBASIC, AND POTASSIUM PHOSPHATE .53; .5; .37; .037; .03; .012; .00082 G/100ML; G/100ML; G/100ML; G/100ML; G/100ML; G/100ML; G/100ML
1000 INJECTION, SOLUTION INTRAVENOUS ONCE
Status: COMPLETED | OUTPATIENT
Start: 2018-10-10 | End: 2018-10-10

## 2018-10-10 RX ORDER — NICOTINE 21 MG/24HR
1 PATCH, TRANSDERMAL 24 HOURS TRANSDERMAL DAILY
Status: DISCONTINUED | OUTPATIENT
Start: 2018-10-10 | End: 2018-10-13 | Stop reason: HOSPADM

## 2018-10-10 RX ORDER — NYSTATIN 100000 [USP'U]/G
POWDER TOPICAL 2 TIMES DAILY
Status: DISCONTINUED | OUTPATIENT
Start: 2018-10-10 | End: 2018-10-13 | Stop reason: HOSPADM

## 2018-10-10 RX ORDER — SODIUM CHLORIDE 9 MG/ML
125 INJECTION, SOLUTION INTRAVENOUS CONTINUOUS
Status: DISCONTINUED | OUTPATIENT
Start: 2018-10-10 | End: 2018-10-10

## 2018-10-10 RX ORDER — ASPIRIN 81 MG/1
81 TABLET ORAL DAILY
Status: DISCONTINUED | OUTPATIENT
Start: 2018-10-10 | End: 2018-10-13 | Stop reason: HOSPADM

## 2018-10-10 RX ADMIN — NYSTATIN: 100000 POWDER TOPICAL at 10:33

## 2018-10-10 RX ADMIN — Medication 1000 MG: at 21:35

## 2018-10-10 RX ADMIN — BACLOFEN 20 MG: 20 TABLET ORAL at 20:35

## 2018-10-10 RX ADMIN — SODIUM CHLORIDE, SODIUM GLUCONATE, SODIUM ACETATE, POTASSIUM CHLORIDE, MAGNESIUM CHLORIDE, SODIUM PHOSPHATE, DIBASIC, AND POTASSIUM PHOSPHATE 1000 ML: .53; .5; .37; .037; .03; .012; .00082 INJECTION, SOLUTION INTRAVENOUS at 03:03

## 2018-10-10 RX ADMIN — Medication 1000 MG: at 13:36

## 2018-10-10 RX ADMIN — NICOTINE 1 PATCH: 14 PATCH, EXTENDED RELEASE TRANSDERMAL at 10:16

## 2018-10-10 RX ADMIN — VANCOMYCIN HYDROCHLORIDE 1250 MG: 5 INJECTION, POWDER, LYOPHILIZED, FOR SOLUTION INTRAVENOUS at 01:26

## 2018-10-10 RX ADMIN — HEPARIN SODIUM 5000 UNITS: 5000 INJECTION, SOLUTION INTRAVENOUS; SUBCUTANEOUS at 10:33

## 2018-10-10 RX ADMIN — INSULIN LISPRO 1 UNITS: 100 INJECTION, SOLUTION INTRAVENOUS; SUBCUTANEOUS at 17:47

## 2018-10-10 RX ADMIN — Medication: at 10:33

## 2018-10-10 RX ADMIN — VANCOMYCIN HYDROCHLORIDE 750 MG: 750 INJECTION, SOLUTION INTRAVENOUS at 15:27

## 2018-10-10 RX ADMIN — SODIUM CHLORIDE 310 ML: 9 INJECTION, SOLUTION INTRAVENOUS at 00:56

## 2018-10-10 RX ADMIN — SODIUM CHLORIDE, SODIUM GLUCONATE, SODIUM ACETATE, POTASSIUM CHLORIDE, MAGNESIUM CHLORIDE, SODIUM PHOSPHATE, DIBASIC, AND POTASSIUM PHOSPHATE 125 ML/HR: .53; .5; .37; .037; .03; .012; .00082 INJECTION, SOLUTION INTRAVENOUS at 04:56

## 2018-10-10 RX ADMIN — HEPARIN SODIUM 5000 UNITS: 5000 INJECTION, SOLUTION INTRAVENOUS; SUBCUTANEOUS at 21:35

## 2018-10-10 RX ADMIN — Medication: at 17:18

## 2018-10-10 RX ADMIN — NYSTATIN: 100000 POWDER TOPICAL at 17:17

## 2018-10-10 NOTE — DISCHARGE INSTR - OTHER ORDERS
Wound and skin care Plan:   2  Sacral foam dressing on sacrum for prevention  3  Elevate heels  4  Turn and reposition in bed q 2 hours and prn  5  Moisturize skin q day  6  Hydraguard to heels BID  7  Soft chair cushion when out of bed in chair  Frequent repositioning when in chair  Currently no wounds   Filemon Felton RN, CWON

## 2018-10-10 NOTE — PROGRESS NOTES
Post-admit check  Rapid response called as pt lethargic and hypoxic  Also diaphoretic  Lungs: rales  Neuro: lethargic  Alert to voice  A/P:   Shock:  Likely septic (possibly related to groin infection), but CT CAP unremarkable  Also, WBC only minimally elevated  However, will check EKG and trop to check for cardiac cause  EKG in ER poor quality  Also hypovolemia in Ddx  Check repeat LA  Acute hypoxic resp fail:  ABG shows no hypercapnia  Bipap  Stop IVF  IF LA WNL, would strongly consider Lasix  Encephalopathy: likely related to shock  Bipap  Monitor VS   Called wife    Pt Level 1 SD

## 2018-10-10 NOTE — RAPID RESPONSE
Progress Note - Rapid Response   Abel García 59 y o  male MRN: [id number]    Time Called ( Time): 3065  Date Called: 10/10/18  Level of Care: SD2  Room#: 770  [id number] Time ( Time): 2081  Event End Time ( Time): 8414  Primary reason for call: Acute change in SPO2  Interventions:  Airway/Breathing:  NPPV  Circulation: EKG  Other Treatments: N/A       Assessment:   1  Acute respiratory insufficiency secondary to volume overload  2  Acute encephalopathy     Plan:   · BiPap  · D/C IVF  · 12-lead EKG  · ABG  · Concern for intraabdominal process given lactic acidosis on admission of 11 with normal procal, no fever  Now with base deficit -6  CT demonstrated thickening of bowel wall  Abdominal exam unreliable secondary to quadriplegia  Discussed with primary team possibility of CTA vs discussing with surgery  Upgraded to SD1       HPI/Chief Complaint (Background/Situation):   Abel García is a 59y o  year old male who presented to B with muscle spasms and decreased urination  He was found to be hypotensive, tachycardic and elevated lactic  He had concern for sepsis with unclear source and was given nearly 4L IVF and placed on SD2  Today a rapid response was called secondary to hypoxia and lethargy  He was found to have the NC in his mouth and was SpO2 85%  He rapidly responded to NRB @ 15LPM and was 100%  He was transitioned to BiPAP secondary to rales        Historical Information   Past Medical History:   Diagnosis Date    Chronic obstructive lung disease ([facility] )     RESOLVED: 8/17/17    Concussion     LAST ASSESSED: 8/17/17    COPD (chronic obstructive pulmonary disease) ([facility] [address] [facility]     Depression     Diabetes mellitus ([facility] [address] [facility]     Diabetic neuropathy ([facility] [address] )     Difficulty attaining erection     LAST ASSESSEDl: 8/17/17    Elevated liver enzymes     Gall stone pancreatitis     RESOLVED: 3/8/17    Gall stones     RESOLVED: 3/8/17    History of MRSA infection     Hypertension     LAST ASSESSED: 8/17/17    Spinal cord injury, C1-C7 ([facility] [address] [facility]     Traumatic injury to musculoskeletal system     1-5 FLIGHT FALL DOWN THE STEPS - CERVICAL NECK INJURY - JAN 2, 2012; LAST ASSESSED: 3/07/01    Umbilical hernia without obstruction and without gangrene     RESOLVED: 3/8/17    Varicella     LAST ASSESSED: 8/17/17     Past Surgical History:   Procedure Laterality Date    ARTERIOGRAM Right 7/31/2018    Procedure: ARTERIOGRAM Completion Agram;  Surgeon: Rola Hendrix MD;  [facility];  Service: Vascular    BACK SURGERY      BYPASS FEMORAL-POPLITEAL Right 7/31/2018    Procedure: BYPASS FEMORAL-POPLITEAL R femoral- BK popliteal bypass;  Surgeon: Rola Hendrix MD;  [facility];  Service: Vascular    CERVICAL FUSION      VERTEBRAL; C3-C4 FUSION    CHOLECYSTECTOMY LAPAROSCOPIC N/A 2/24/2017    Procedure: CHOLECYSTECTOMY LAPAROSCOPIC;  Surgeon: Griffin Robles MD;  [facility];  Service:     CHOLECYSTECTOMY LAPAROSCOPIC  03/2017    EPIGASTRIC HERNIA REPAIR      [place] DEBRIDEMENT, SKIN, SUB-Q TISSUE,=<20 SQ CM Right 8/28/2018    Procedure: RIGHT GROIN AND THIGH DEBRIDEMENT ([address]) [place];  Surgeon: Westley Aldana MD;  [facility];  Service: Vascular    NH THROMBOENDARTECTMY FEMORAL COMMON Right 7/31/2018    Procedure: ENDARTERECTOMY ARTERIAL FEMORAL R femoral endarterectomy w/ patch angioplasty ;  Surgeon: Rola Hednrix MD;  [facility];  Service: Vascular    UMBILICAL HERNIA REPAIR N/A 2/24/2017    Procedure: REPAIR HERNIA UMBILICAL;  Surgeon: Griffin Robles MD;  [facility];  Service:      Social History   History   Alcohol Use    Yes     Comment: 3-4 mixed vodka drinks/daily      History   Drug Use No     History   Smoking Status    Current Every Day Smoker    Packs/day: 0 25    Years: 40 00   Smokeless Tobacco    Never Used     Comment: 1 ppd      Family History: non-contributory    Meds/Allergies     Current Facility-Administered Medications:  acetaminophen 325 mg Rectal Once Brenden Cheng MD    ammonium lactate  Topical BID Severino Oro MD    aspirin 81 mg Oral Daily Severino Oro MD    baclofen 20 mg Oral TID Severino Oro MD    cefepime 1,000 mg Intravenous Q12H Severino Oro MD Last Rate: Stopped (10/10/18 0254)   citalopram 10 mg Oral Daily Severino Oro MD    clopidogrel 75 mg Oral Daily Severino Oro MD    heparin (porcine) 5,000 Units Subcutaneous [facility] Severino Oro MD    insulin lispro 1-5 Units Subcutaneous Q6H [address] Severino Oro MD    nicotine 1 patch Transdermal Daily Severino Oro MD    nystatin  Topical BID Severino Oro MD    pravastatin 20 mg Oral Daily Severino Oro MD    tamsulosin 0 4 mg Oral Daily With Payal Sharp MD    vancomycin 10 mg/kg Intravenous Q12H Severino Oro MD             Allergies   Allergen Reactions    Seasonal Ic  [Cholestatin]        ROS: Unable to complete secondary to encephalopathy    Vitals:   Vitals:    10/10/18 0945   BP: 120/59   Pulse: 110   Resp: 28   Temp: 98 5   SpO2: 97%         Physical Exam:  Gen: NAD  HEENT:Normocephalic, atraumatic  Neck:+ JVD, trachea midline  Chest:Tachycardic rate, regular rhythm, rales bilaterally throughout   Abd:Soft non-distended, nontender to palpation though as patient is a quadriparetic likely poorly reliable exam  Neuro:Alert and oriented to person and place  He refuses to answer the year/president  He will not answer further questions    He follows 1 step commands  Skin:warm and dry      Intake/Output Summary (Last 24 hours) at 10/10/18 0953  Last data filed at 10/10/18 0466   Gross per 24 hour   Intake             2310 ml   Output             1800 ml   Net              510 ml       Respiratory    Lab Data (Last 4 hours)    None         O2/Vent Data (Last 4 hours)    None              Invasive Devices     Peripheral Intravenous Line            Peripheral IV 10/09/18 Left Arm less than 1 day    Peripheral IV 10/09/18 Left Forearm less than 1 day          Drain            Negative Pressure Wound Therapy (V A C ) Groin Right 42 days    Urethral Catheter Latex 16 Fr  less than 1 day                DIAGNOSTIC DATA:    Lab: I have personally reviewed pertinent lab results  CBC:     Results from last 7 days  Lab Units 10/10/18  0603   WBC Thousand/uL 12 19*   HEMOGLOBIN g/dL 10 1*   HEMATOCRIT % 31 7*   PLATELETS Thousands/uL 228     CMP:     Results from last 7 days  Lab Units 10/10/18  0603 10/09/18  2207   SODIUM mmol/L 139 135*   POTASSIUM mmol/L 4 9 5 3   CHLORIDE mmol/L 108 101   CO2 mmol/L 22 18*   BUN mg/dL 26* 21   CREATININE mg/dL 1 97* 2 34*   CALCIUM mg/dL 8 2* 9 4   ALK PHOS U/L  --  76   ALT U/L  --  28   AST U/L  --  22     PT/INR:   No results found for: PT, INR,   Magnesium: No results found for: MAG,   Phosphorous: No results found for: PHOS    Microbiology:  Lab Results   Component Value Date    BLOODCX No Growth After 5 Days  08/01/2018    BLOODCX No Growth After 5 Days  08/01/2018         OUTCOME:   Stayed in room   Family notified of transfer: yes  Family member contacted: Wife by [place]  Code Status: Level 1 - Full Code  Critical Care Time: Total Critical Care time spent 15 minutes excluding procedures, teaching and family updates

## 2018-10-10 NOTE — PROGRESS NOTES
Vancomycin Assessment    Genny Ennis is a 59 y o  male who is currently receiving vancomycin 750mg iv q12h for other sepsis of unknown origin   Relevant clinical data and objective history reviewed:  Creatinine   Date Value Ref Range Status   10/10/2018 1 97 (H) 0 60 - 1 30 mg/dL Final     Comment:     Standardized to IDMS reference method   10/09/2018 2 34 (H) 0 60 - 1 30 mg/dL Final     Comment:     Standardized to IDMS reference method   08/05/2018 0 68 0 60 - 1 30 mg/dL Final     Comment:     Standardized to IDMS reference method     BP 90/52 (BP Location: Right arm) Comment: Map 68  Pulse (!) 106   Temp 99 5 °F (37 5 °C) (Axillary)   Resp 20   Ht 5' 10" (1 778 m)   Wt 83 1 kg (183 lb 3 2 oz)   SpO2 97%   BMI 26 29 kg/m²   I/O last 3 completed shifts: In: 2310 [IV Piggyback:2310]  Out: 1100 [Urine:1100]  Lab Results   Component Value Date/Time    BUN 26 (H) 10/10/2018 06:03 AM    WBC 12 19 (H) 10/10/2018 06:03 AM    HGB 10 5 (L) 10/10/2018 09:40 AM    HGB 10 1 (L) 10/10/2018 06:03 AM    HGB 8 8 01/06/2017 08:16 AM    HCT 31 (L) 10/10/2018 09:40 AM    HCT 31 7 (L) 10/10/2018 06:03 AM     (H) 10/10/2018 06:03 AM     10/10/2018 06:03 AM     Temp Readings from Last 3 Encounters:   10/10/18 99 5 °F (37 5 °C) (Axillary)   09/14/18 (!) 97 °F (36 1 °C) (Tympanic Core)   09/07/18 (!) 96 4 °F (35 8 °C) (Tympanic)     Vancomycin Days of Therapy: 1    Assessment/Plan  The patient is currently on vancomycin utilizing scheduled dosing based on adjusted body weight (due to obesity)  Baseline risks associated with therapy include: pre-existing renal impairment  The patient is currently receiving 750mg iv q12h and is clinically appropriate and dose will be continued  Pharmacy will also follow closely for s/sx of nephrotoxicity, infusion reactions and appropriateness of therapy  BMP and CBC will be ordered per protocol    Plan for trough as patient approaches steady state, prior to the 4th  dose at approximately 1230 on 10/11/18  Due to infection severity, will target a trough of 15-20 (appropriate for most indications)   Pharmacy will continue to follow the patients culture results and clinical progress daily      Myah Root, Pharmacist

## 2018-10-10 NOTE — RESPIRATORY THERAPY NOTE
RT Protocol Note  Diamond Reece 59 y o  male MRN: [id number]  Unit/Bed#: [facility] 425-01 Encounter: [id number]    Assessment    Principal Problem:    Septic shock ([facility] [address] )  Active Problems:    Chronic pain    Tobacco use disorder    Type 2 diabetes mellitus with diabetic polyneuropathy, with long-term current use of insulin (HCC)    Quadriplegia and quadriparesis (HCC)    Urinary retention    Non-healing surgical wound of right groin    Sacral wound, initial encounter      Home Pulmonary Medications:  none       Past Medical History:   Diagnosis Date    Chronic obstructive lung disease ([facility] [address] )     RESOLVED: 8/17/17    Concussion     LAST ASSESSED: 8/17/17    COPD (chronic obstructive pulmonary disease) ([facility] [address] )     Depression     Diabetes mellitus ([facility] [address] [facility]     Diabetic neuropathy ([facility] [address] )     Difficulty attaining erection     LAST ASSESSEDl: 8/17/17    Elevated liver enzymes     Gall stone pancreatitis     RESOLVED: 3/8/17    Gall stones     RESOLVED: 3/8/17    History of MRSA infection     Hypertension     LAST ASSESSED: 8/17/17    Spinal cord injury, C1-C7 ([facility] [address] )     Traumatic injury to musculoskeletal system     1-5 FLIGHT FALL DOWN THE STEPS - CERVICAL NECK INJURY - JAN 2, 2012; LAST ASSESSED: 8/39/54    Umbilical hernia without obstruction and without gangrene     RESOLVED: 3/8/17    Varicella     LAST ASSESSED: 8/17/17     Social History     Social History    Marital status: /Civil Union     Spouse name: N/A    Number of children: N/A    Years of education: N/A     Occupational History    RETIRED      Social History Main Topics    Smoking status: Current Every Day Smoker     Packs/day: 0 25     Years: 40 00    Smokeless tobacco: Never Used      Comment: 1 ppd     Alcohol use Yes      Comment: 3-4 mixed vodka drinks/daily     Drug use: No    Sexual activity: No     Other Topics Concern    None     Social History Narrative    DAILY  [employer]    EXERCISES RARELY    LIVES WITH SIGNIFICANT OTHER    LIVES WITH WIFE    NO LIVING WILL           Subjective         Objective    Physical Exam:   Assessment Type: (P) Assess only  General Appearance: (P) Lethargic  Respiratory Pattern: (P) Labored, Accessory muscle use  Chest Assessment: (P) Chest expansion symmetrical  Bilateral Breath Sounds: (P) Crackles    Vitals:  Blood pressure 118/64, pulse (!) 113, temperature 98 5 °F (36 9 °C), resp  rate 20, height 5' 10" (1 778 m), weight 83 1 kg (183 lb 3 2 oz), SpO2 99 %  Imaging and other studies: I have personally reviewed pertinent reports  Plan    Respiratory Plan: (P) Vent/NIV/HFNC        Resp Comments: (P) pt was a rapid response for WOB and hypoxia, pt placed on NRB Spo2 icreased to 99%  pt then placed on BiPAP for WOB pt has crackles throughout lung fields will continue to monitor pt no other respiratory intervention needed at this time

## 2018-10-10 NOTE — PLAN OF CARE
Problem: DISCHARGE PLANNING - CARE MANAGEMENT  Goal: Discharge to post-acute care or home with appropriate resources  INTERVENTIONS:  - Conduct assessment to determine patient/family and health care team treatment goals, and need for post-acute services based on payer coverage, community resources, and patient preferences, and barriers to discharge  - Address psychosocial, clinical, and financial barriers to discharge as identified in assessment in conjunction with the patient/family and health care team  - Arrange appropriate level of post-acute services according to patient's   needs and preference and payer coverage in collaboration with the physician and health care team  - Communicate with and update the patient/family, physician, and health care team regarding progress on the discharge plan  - Arrange appropriate transportation to post-acute venues  - Return home and resume services w/ [facility]  Outcome: Progressing

## 2018-10-10 NOTE — SPEECH THERAPY NOTE
Speech-Language Pathology Bedside Swallow Evaluation      Patient Name: Hannah RUSHING Date: 10/10/2018     Problem List  Patient Active Problem List   Diagnosis    Chronic pain    Depression    Diabetic neuropathy ([address] )    Enlarged prostate with lower urinary tract symptoms (LUTS)    Hernia, epigastric    Injury of cervical spinal cord (HCC)    Tobacco use disorder    Umbilical hernia    Cervical myelopathy (HCC)    Basal cell carcinoma in situ of skin    Ambulatory dysfunction    Lumbar radiculopathy    Type 2 diabetes mellitus with diabetic polyneuropathy, with long-term current use of insulin (HCC)    Hypercalcemia    Quadriplegia and quadriparesis (HCC)    Alcohol abuse    Cellulitis of leg, right    Peripheral vascular disease due to secondary diabetes mellitus ([facility])    Ischemic foot pain at rest [facility])    Peripheral arterial disease ([address]    Essential hypertension    Urinary retention    Acute blood loss anemia    Constipation    Wound dehiscence, surgical, initial encounter    Non-healing surgical wound of right groin    Postoperative state    Septic shock ([address] )    Sacral wound, initial encounter       Past Medical History  Past Medical History:   Diagnosis Date    Chronic obstructive lung disease ([address] )     RESOLVED: 8/17/17    Concussion     LAST ASSESSED: 8/17/17    COPD (chronic obstructive pulmonary disease) ([address] )     Depression     Diabetes mellitus ([address] )     Diabetic neuropathy ([address] )     Difficulty attaining erection     LAST ASSESSEDl: 8/17/17    Elevated liver enzymes     Gall stone pancreatitis     RESOLVED: 3/8/17    Gall stones     RESOLVED: 3/8/17    History of MRSA infection     Hypertension     LAST ASSESSED: 8/17/17    Spinal cord injury, C1-C7 ([address] )     Traumatic injury to musculoskeletal system     1-5 FLIGHT FALL DOWN THE STEPS - CERVICAL NECK INJURY - JAN 2, 2012; LAST ASSESSED: 6/97/80    Umbilical hernia without obstruction and without gangrene     RESOLVED: 3/8/17    Varicella     LAST ASSESSED: 8/17/17       Past Surgical History  Past Surgical History:   Procedure Laterality Date    ARTERIOGRAM Right 7/31/2018    Procedure: ARTERIOGRAM Completion Agram;  Surgeon: Karely Rodriguez MD;  [facility];  Service: Vascular    BACK SURGERY      BYPASS FEMORAL-POPLITEAL Right 7/31/2018    Procedure: BYPASS FEMORAL-POPLITEAL R femoral- BK popliteal bypass;  Surgeon: Karely Rodriguez MD;  [facility];  Service: Vascular    CERVICAL FUSION      VERTEBRAL; C3-C4 FUSION    CHOLECYSTECTOMY LAPAROSCOPIC N/A 2/24/2017    Procedure: CHOLECYSTECTOMY LAPAROSCOPIC;  Surgeon: Samantha Villeda MD;  [facility];  Service:     CHOLECYSTECTOMY LAPAROSCOPIC  03/2017    EPIGASTRIC HERNIA REPAIR      [place] DEBRIDEMENT, SKIN, SUB-Q TISSUE,=<20 SQ CM Right 8/28/2018    Procedure: RIGHT GROIN AND THIGH DEBRIDEMENT ([address]) [place];  Surgeon: Aleyda Hudson MD;  [facility];  Service: Vascular    [place] THROMBOENDARTECTMY FEMORAL COMMON Right 7/31/2018    Procedure: ENDARTERECTOMY ARTERIAL FEMORAL R femoral endarterectomy w/ patch angioplasty ;  Surgeon: Karely Rodriguez MD;  [facility];  Service: Vascular    UMBILICAL HERNIA REPAIR N/A 2/24/2017    Procedure: REPAIR HERNIA UMBILICAL;  Surgeon: Samantha Villeda MD;  [facility];  Service:        Summary   Pt presented with s/s suggestive of mild oral and suspected at least mild-moderate pharyngeal dysphagia  Symptoms or concerns included suspected decreased control of thin/thickened liquids and poorly coordinated swallow of thin liquid, suspected pharyngeal swallow delay, suspected decreased hyolaryngeal elevation upon palpation and audible swallows  Wet vocal quality and intermittent cough present throughout trials of puree, thin, and HTL       Risk for Aspiration: mod    Recommendations: NPO, Continue frequent oral care and ST will reassess in the AM/determine need for VBS     Recommended Form of Meds: non-oral if possible, meds only may be given if essential    Aspiration precautions and compensatory swallowing strategies: General aspiration precautions, NPO and ST to reevaluate tomorrow morning  Current Medical Status per Dr Kwabena Hooper 10/10/20  Iris Thomas is a 59 y o  male with multiple medical problems including partial C4 quadriplegia, diabetes mellitus, peripheral vascular disease, essential hypertension, chronic pain syndrome, BPH presents to the emergency department for evaluation of increasing muscle spasms  In the ED patient was found to be hypotensive with systolic pressures in the 80s and tachycardic with heart rates in the 120s  Labs revealed leukocytosis and a lactic acid of 11  He was given 30 cc/kilogram IV fluid bolus, follow-up systolic pressures in the low 90s  He is currently receiving his 3rd L of IV fluids  Per ED staff, critical Care aware of patient, feels he will be appropriate for step-down  UA, chest x-ray without infectious source  CT of the abdomen pelvis with findings of enteritis  He was given a dose of cefepime and vancomycin in the ED  Critical Care note 10/10/18 per ESTHER Dutta is a 59y o  year old male who [facility] to [facility] with muscle spasms and decreased urination  He was found to be hypotensive, tachycardic and elevated lactic  He had concern for sepsis with unclear source and was given nearly 4L IVF and placed on SD2  Today a rapid response was called secondary to hypoxia and lethargy  He was found to have the NC in his mouth and was SpO2 85%  He rapidly responded to NRB @ 15LPM and was 100%  He was transitioned to BiPAP secondary to rales  Past medical history:  Please see H&P for details    Special Studies:  CXR 10/10/2018 IMPRESSION:  Upper and right lower lobe consolidation suspicious for pneumonia  The study was marked in [facility] for immediate notification    CT of chest 10/10/18 IMPRESSION:  1  Diffuse esophageal wall thickening throughout the entire esophagus and involving the gastroesophageal junction  Possible inflammatory or infectious esophagitis versus malignancy  No evidence of gastritis  2   Mildly distended thickened loops of small bowel suggesting enteritis  No evidence of colitis or bowel obstruction  3   No evidence of acute cardiopulmonary process  Social/Education/Vocational Hx:  Pt lives with family      Swallow Information   Current Risks for Dysphagia & Aspiration: AMS and decreased alertness     Current Symptoms/Concerns: wet vocal quality and change in respiratory status    Current Diet: NPO      Baseline Diet: Pt reports regular diet and thin liquids      Baseline Assessment   Behavior/Cognition: alert    Speech/Language Status: able to participate in conversation and able to follow commands inconsistently    Patient Positioning: upright in bed and decreased positioning/leaning over time  Pain Status/Interventions/Response to Interventions:  After a few minutes of sitting upright pt requested HOB to be lowered due to discomfort       Swallow Mechanism Exam   Facial: symmetrical  Labial: decreased strength  Lingual: decreased coordination  Velum: unable to visualize  Mandible: adequate ROM  Dentition: edentulous  Vocal quality:gurgly   Volitional Cough: weak     Consistencies Assessed and Performance   Consistencies Administered: thin liquids, honey thick and puree  Specific materials administered included thin and HTL, pudding    Oral Stage: mild  Bolus acceptance appropriate, transfer of puree WFL  With thin liquid, Pt appeared to have decreased bolus control and suspect premature spillage  Offered soft solid PO trial (toast) however pt stated "Let's just stick with the pudding "    Pharyngeal Stage: mild-moderate  Swallow initiation appeared mildly delayed with puree   Swallow of thin liquid appeared poorly coordinated, and upon palpation hyolaryngeal elevation felt to be reduced/incomplete  Wet, gurgly vocal quality noted after trials of both puree and thin liquid  Cough response noted after swallow of HTL  Pt was cued to cough/clear his throat, however this was not consistently successful in clearing vocal wetness  Esophageal Concerns: none reported , however reports indicate epigastric hernia and CT report showed esophageal wall thickening throughout the entire esophagus and involving the gastroesophageal junction      Summary and Recommendations (see above)      Results Reviewed with: patient and RN     Consider referral to: May need to consider VBS    Treatment Recommended: Skilled ST for reevaluation of PO tolerance, determine potential need for VBS  Frequency of treatment: 3-5x/week    Dysphagia Goals per SLP: pt will tolerate the least restrictive diet with the least restrictive liquid consistency without s/s of aspiration x72hrs    Pt Education: initiated  Pt and caregivers would benefit from continued education

## 2018-10-10 NOTE — CONSULTS
Consult Note - Wound   Mehran Counts 59 y o  male MRN: [id number]  Unit/Bed#: [facility] 425-01 Encounter: [id number]      Assessment:   1  Quadraplegic  Turns with 1 person assist  Appetite good  2  Healing, full thickness surgical wound right groin  Vascular surgery is covering wound and has ordered cleansing site and applying dry gauze  3  Buttocks area with blanchable redness  4  Heels unremarkable except for dry skin  Plan:   1  P-500 low air loss air mattress due to paraplegia  2  Sacral foam dressing on sacrum for prevention  3  Elevate heels  4  Turn and reposition in bed q 2 hours and prn  5  Moisturize skin q day  6  Hydraguard to heels BID  7  Soft chair cushion when out of bed in chair  Frequent repositioning when in chair  8  Wound care follow up prn  Currently no wounds  Discussed care with Nery Warren RN and with pt  Reiterated to pt  Need to turn and reposition and to elevate heels  Pt  Verb understanding  Vitals: Blood pressure 113/57, pulse 94, temperature 98 6 °F (37 °C), temperature source Oral, resp  rate 20, height 5' 10" (1 778 m), weight 83 1 kg (183 lb 3 2 oz), SpO2 97 %  ,Body mass index is 26 29 kg/m²  Negative Pressure Wound Therapy (V A C ) Groin Right (Active)       Wound 04/17/18 Abrasion(s) Knee Right abrasion with scab (Active)       Wound 08/01/18 Abrasion(s) Leg Anterior;Left;Upper open s/p scrape PH (Active)       Wound 08/01/18 Other (Comment) Leg Lower; Anterior open s/p scrape PH (Active)       Wound 08/04/18 Moisture associated skin damage Groin Left;Right MAD (Active)   Wound Description Pink;Clean;Dry 10/10/2018  8:00 AM   Lucy-wound Assessment Clean;Dry 10/10/2018  8:00 AM   Treatments Other (Comment) 10/10/2018  8:00 AM   Dressing Open to air 10/10/2018  8:00 AM       Wound 08/04/18 Other (Comment) Heel Right fibrous ulcer; managed by podiatry (Active)   Wound Description [facility] 10/10/2018  8:00 AM   Lucy-wound Assessment Clean;Dry 10/10/2018  8:00 AM Drainage Amount None 10/10/2018  8:00 AM   Dressing Other (Comment) 10/10/2018  8:00 AM       Incision 07/13/18 Groin Left (Active)       Incision 07/31/18 Leg Right (Active)       Incision 08/28/18 Groin Right (Active)   Incision Description Other (Comment) 10/10/2018  1:00 PM   Lucy-wound Assessment Clean; Intact 10/10/2018  8:00 AM   Wound Length (cm) 5 cm 10/10/2018  1:00 PM   Wound Width (cm) 0 4 cm 10/10/2018  1:00 PM   Tunneling 0 cm 10/10/2018  1:00 PM   Tunneling in depth located at 0 10/10/2018  1:00 PM   Undermining 0 10/10/2018  1:00 PM   Drainage Amount None 10/10/2018  1:00 PM   Drainage Description Serosanguineous 10/10/2018  8:00 AM   Dressing Open to air 10/10/2018  8:00 AM       Other Ulcers 08/02/18 Sacrum Mid Skin intact, blanchable red-- Preventative allevyn (Active)

## 2018-10-10 NOTE — UTILIZATION REVIEW
Thank you,  [address]  Utilization Review Department  Phone: [phone]; Fax [phone]  ATTENTION: Please call with any questions or concerns to [phone]  and carefully follow the prompts so that you are directed to the right person  Send all requests for admission clinical reviews, approved or denied determinations and any other requests to fax [phone]  All voicemails are confidential      ==================================================================    Initial Clinical Review    Admission: Date/Time/Statement: 10/10/18 @ 0249  Orders Placed This Encounter   Procedures    Inpatient Admission     Standing Status:   Standing     Number of Occurrences:   1     Order Specific Question:   Admitting Physician     Answer:   Cait Khan [[id number]]     Order Specific Question:   Level of Care     Answer:   Level 2 Stepdown / HOT [14]     Order Specific Question:   Estimated length of stay     Answer:   More than 2 Midnights     Order Specific Question:   Certification     Answer:   I certify that inpatient services are medically necessary for this patient for a duration of greater than two midnights  See H&P and MD Progress Notes for additional information about the patient's course of treatment  ED: Date/Time/Mode of Arrival:   ED Arrival Information     Expected Arrival Acuity Means of Arrival Escorted By Service Admission Type    10/9/2018 21:30 10/9/2018 21:45 Urgent Ambulance [facility] Hospitalist Urgent    Arrival Complaint    weakness          Chief Complaint:   Chief Complaint   Patient presents with    Spasms     Per EMS pt is coming from home with a hx of muscle spasms since his C4 fx over 6 years ago   Pt states "I always get spasms, but this is much worse "        History of Illness:   Ahmet Tabares is a 59 y o  male with multiple medical problems including partial C4 quadriplegia, diabetes mellitus, peripheral vascular disease, essential hypertension, chronic pain syndrome, BPH presents to the emergency department for evaluation of increasing muscle spasms  ED Vital Signs:   ED Triage Vitals   Temperature Pulse Respirations Blood Pressure SpO2   10/09/18 2154 10/09/18 2151 10/09/18 2151 10/09/18 2151 10/09/18 2151   98 6 °F (37 °C) 98 22 (!) 82/52 94 %      Temp Source Heart Rate Source Patient Position - Orthostatic VS BP Location FiO2 (%)   10/09/18 2154 10/10/18 0039 10/09/18 2250 10/09/18 2151 --   Oral Monitor Lying Left arm       Pain Score       10/09/18 2151       Worst Possible Pain        Wt Readings from Last 1 Encounters:   10/10/18 83 1 kg (183 lb 3 2 oz)       Abnormal Labs/Diagnostic Test Results:   WBC Thousand/uL 11 49*   HEMOGLOBIN g/dL 11 1*   HEMATOCRIT % 34 7*   PLATELETS Thousands/uL 275     SODIUM mmol/L 135*   POTASSIUM mmol/L 5 3   CHLORIDE mmol/L 101   CO2 mmol/L 18*   BUN mg/dL 21   CREATININE mg/dL 2 34*   CALCIUM mg/dL 9 4   ALK PHOS U/L 76   ALT U/L 28   AST U/L 22     CT chest/abd:  1  Diffuse esophageal wall thickening throughout the entire esophagus and involving the gastroesophageal junction  Possible inflammatory or infectious esophagitis versus malignancy  No evidence of gastritis  2   Mildly distended thickened loops of small bowel suggesting enteritis  No evidence of colitis or bowel obstruction  3   No evidence of acute cardiopulmonary process      Lab Units 10/10/18  0144 10/09/18  2247   LACTIC ACID mmol/L 7 0* 11 8*         ED Treatment:   Medication Administration from 10/09/2018 2145 to 10/10/2018 7505       Date/Time Order Dose Route Action Action by Comments     10/09/2018 2356 sodium chloride 0 9 % bolus 1,000 mL 0 mL Intravenous Stopped Beverley Spangler RN      10/09/2018 2232 sodium chloride 0 9 % bolus 1,000 mL 1,000 mL Intravenous New Bag Maggie Jonas RN      10/10/2018 0404 vancomycin (VANCOCIN) 1,250 mg in sodium chloride 0 9 % 250 mL IVPB 0 mg/kg Intravenous Stopped Noel Sample 10/10/2018 0126 vancomycin (VANCOCIN) 1,250 mg in sodium chloride 0 9 % 250 mL IVPB 1,250 mg Intravenous New Bag Jose Reilly, RN      10/09/2018 2323 cefepime (MAXIPIME) 2 g/50 mL dextrose IVPB 0 mg Intravenous Stopped Jose Reilly RN      10/09/2018 2309 cefepime (MAXIPIME) 2 g/50 mL dextrose IVPB 2,000 mg Intravenous New Bag Maggie BRIDGER Sumi, RN      10/10/2018 0132 azithromycin (ZITHROMAX) 500 mg in sodium chloride 0 9% 250mL IVPB 500 mg 0 mg Intravenous Stopped Jose Reilly, [place]      10/09/2018 2335 azithromycin (ZITHROMAX) 500 mg in sodium chloride 0 9% 250mL IVPB 500 mg 500 mg Intravenous [address] Jose Reilly RN      10/09/2018 2311 HYDROmorphone (DILAUDID) injection 0 5 mg 0 5 mg Intravenous Given Maggie A Sumi, [place]      10/09/2018 2313 ondansetron (ZOFRAN) injection 4 mg 4 mg Intravenous Given Maggie A Sumi, RN      10/10/2018 0100 sodium chloride 0 9 % bolus 310 mL 0 mL Intravenous Stopped Jose Reilly RN      10/10/2018 0056 sodium chloride 0 9 % bolus 310 mL 310 mL Intravenous [address] [facility]      10/09/2018 2358 sodium chloride 0 9 % bolus 1,000 mL 0 mL Intravenous Stopped Jose Reilly RN      10/09/2018 2300 sodium chloride 0 9 % bolus 1,000 mL 1,000 mL Intravenous [address] Jose Reilly RN      10/10/2018 0131 acetaminophen (TYLENOL) rectal suppository 325 mg 325 mg Rectal Not Given Jose Reilly RN      10/10/2018 0254 cefepime (MAXIPIME) 1,000 mg in dextrose 5 % 50 mL IVPB 0 mg Intravenous Stopped Jose Reilly RN      10/10/2018 0404 multi-electrolyte (ISOLYTE-S PH 7 4) bolus 1,000 mL 0 mL Intravenous Stopped Darlyn Lizet      10/10/2018 0303 multi-electrolyte (ISOLYTE-S PH 7 4) bolus 1,000 mL 1,000 mL Intravenous New Bag Jose Reilly RN           Past Medical/Surgical History:    Active Ambulatory Problems     Diagnosis Date Noted    Chronic pain 08/28/2017    Depression 05/25/2016    Diabetic neuropathy ([facility] ) 05/25/2016    Enlarged prostate with lower urinary tract symptoms (LUTS) 07/03/2012    Hernia, epigastric 02/16/2017    Injury of cervical spinal cord ([address] ) 05/25/2016    Tobacco use disorder 49/99/5057    Umbilical hernia 73/86/9164    Cervical myelopathy (HCC) 08/24/2016    Basal cell carcinoma in situ of skin 03/15/2018    Ambulatory dysfunction 04/17/2018    Lumbar radiculopathy 04/17/2018    Type 2 diabetes mellitus with diabetic polyneuropathy, with long-term current use of insulin ([address] ) 04/17/2018    Hypercalcemia 05/08/2018    Quadriplegia and quadriparesis ([address] ) 08/23/2013    Alcohol abuse 05/08/2018    Cellulitis of leg, right 06/11/2018    Peripheral vascular disease due to secondary diabetes mellitus ([address] ) 06/11/2018    Ischemic foot pain at rest [facility]) 07/17/2018    Peripheral arterial disease ([address] ) 07/25/2018    Essential hypertension 07/26/2018    Urinary retention 08/02/2018    Acute blood loss anemia 08/02/2018    Constipation 08/02/2018    Wound dehiscence, surgical, initial encounter 08/16/2018    Non-healing surgical wound of right groin 08/22/2018    Postoperative state 09/14/2018     Resolved Ambulatory Problems     Diagnosis Date Noted    Alcohol-induced chronic pancreatitis ([address] ) 01/19/2017    Excessive cerumen in left ear canal 08/28/2017    Gait disturbance 08/28/2017    Essential hypertension 05/31/2016    Hypokalemia 03/15/2018    SUNDEEP (acute kidney injury) ([address] ) 04/17/2018    Diarrhea 04/17/2018    Hypotension 08/01/2018     Past Medical History:   Diagnosis Date    Chronic obstructive lung disease ([address] )     Concussion     COPD (chronic obstructive pulmonary disease) ([address] )     Depression     Diabetes mellitus ([address] )     Diabetic neuropathy ([address] )     Difficulty attaining erection     Elevated liver enzymes     Gall stone pancreatitis     Gall stones     History of MRSA infection     Hypertension     Spinal cord injury, C1-C7 ([address] )     Traumatic injury to musculoskeletal system     Umbilical hernia without obstruction and without gangrene     Varicella        Admitting Diagnosis: Septic shock (HCC) [A41 9, R65 21]  Weakness [R53 1]  Peripheral vascular disease due to secondary diabetes mellitus ([facility] [address] ) [E13 51]  Non-healing surgical wound of right groin, subsequent encounter [T81 89XD]  Sacral decubitus ulcer, stage II [L89 152]    Age/Sex: 59 y o  male    Assessment/Plan:   Plan for the Primary Problem(s):  · Septic shock  ? Admit to hospital for further treatment, aggressive fluid resuscitation, repeat lactic acid level in 2 hours, check procalcitonin level, await blood culture results, monitor hemodynamics closely, Infectious Disease consult, ? Source right groin, +/- viral illness     Plan for Additional Problems:   · Nonhealing right groin wound:  Severe fungal dermatitis, ?superimposed infection, topical antifungal treatment, started on vancomycin and cefepime in the emergency department, will continue pending culture results  · Acute kidney injury:  Multifactorial, prerenal azotemia, ATN from septic shock, I suspect component of urinary retention:  Continue aggressive fluids, avoid nephrotoxin occasions, renal dose medications, repeat BMP in the a m   · Multiple pressure ulcers:  Wound Care will be consulted, wounds do not appear infected  · Urinary retention:  Continue Flomax, continue Vasques catheter  · Chronic pain:  On multiple medications at home, given IV Dilaudid in the emergency department, now lethargic, will hold further narcotics and monitor  · Type 2 diabetes mellitus:  Accu-Cheks, correctional insulin, will make NPO secondary to mental status     VTE Prophylaxis: Heparin  / sequential compression device   Anticipated Length of Stay:  Patient will be admitted on an Inpatient basis with an anticipated length of stay of  Greater than 2 midnights     Justification for Hospital Stay: IV abx, IV fluids, tx septic shock    Admission Orders:  Scheduled Meds:   Current Facility-Administered Medications:  acetaminophen 325 mg Rectal Once Margaretann Ganser, MD    ammonium lactate  Topical BID Talisha Elmore MD    aspirin 81 mg Oral Daily Talisha Elmore MD    baclofen 20 mg Oral TID Talisha Elmore MD    cefepime 1,000 mg Intravenous Q8H Jerome Nieto MD Last Rate: 1,000 mg (10/10/18 1336)   citalopram 10 mg Oral Daily Talisha Elmore MD    clopidogrel 75 mg Oral Daily Talisha Elmore MD    heparin (porcine) 5,000 Units Subcutaneous [facility] Talisha Elmore MD    insulin lispro 1-5 Units Subcutaneous Q6H [address] Talisha Elmore MD    nicotine 1 patch Transdermal Daily Talisha Elmore MD    nystatin  Topical BID Talisha Elmore MD    pravastatin 20 mg Oral Daily Talisha Elmore MD    tamsulosin 0 4 mg Oral Daily With Sedrick Aguilar MD    vancomycin 10 mg/kg Intravenous Q12H Talisha Elmore MD Last Rate: 750 mg (10/10/18 1527)       ------------------------------------------------------------------------------------------------------------------------------    Consult Critical Care  60 y/o male with probable gastroenteritis and severe dehydration with elevated lactic acid, hypotension and tachycardia     Neuro:   History of C4 fracture, quadriparesis with increased spasticity over the last 24 hours-  due to injury in 2012  Spasticity likely related to dehydration  Continue IVF hydration and home baclofen  PT/OT while in the [facility] as able  Non-ambulatory at baseline  Will check CPK due to spasticity in setting of renal failure       CV:   Hypotension- related to dehydration and possible sepsis  Fluid responsive  Continue to target MAP >/= 65 mmHg  Continue IVF hydration with an additional liter of isolyte now and isolyte to run at 125 ml/hr     Sinus tachycardia- improved with volume  Secondary to dehydration   ECHO in July 2018 shows EF 55% and trace MR/TR       Lung:   Acute hypoxic respiratory insufficiency- due to atelectasis/poor effort  Currently on 4 liters NC  Waveform improved with repositioning sat prob and saturations 95%  Encourage IS/Pulm toilette/OOB  CT chest negative for infiltrate  No other pulmonary symptoms  Wean oxygen for saturation > 92%  History of COPD- no home medications listed  No evidence of AE at this time       GI:   Recent history of diarrhea, possible enteritis-  Continue to monitor I/O's  Likely viral in nature  No fever or leukocytosis  Pro calcitonin 0 11     Nausea and vomiting- one episode in ED  CT A/P negative for significant gastric distention  Findings consistent with possible enteritis of the small bowel  Continue to monitor symptoms and continue IVF hydration while NPO  Start diabetic diet today as long as no further emesis       F/E/N:   Continue isolyte at 125 ml/hr  Monitor potassium with SUNDEEP, recheck BMP in AM  Last K+ 5 3  NPO/IVFs  Start diet when able       :   SUNDEEP- Baseline Cr 0 6-0 7, currently 2 34  Suspect this has improved with IVF hydration  This is likely ATN related due to hypotension from dehydration  Continue IVFs and recheck in AM  Vasques catheter in place for strict I/O's  Will check CPK to evaluate for Rhabdomyolysis as a source given increased spasticity in the last day       ID:   No clear etiology for sepsis at this time  Continue BS antibiotics pending blood cultures  Though suspicion for infection is low at this time given lack of fever, leukocytosis and procalcitonin of 0 11 in setting of negative CT scan and UA  Will f/u BCx and d/c antibiotics if able in the next 24-48 hours       Heme:   Anemia of chronic disease- mild and at baseline of 11       Endo:   History of DM II- continue SSI algorithm 1 and hold home medication and LA insulin at this time  Goal BS <180     Msk/Skin:   R groin wound from prior vascular surgery (fem-pop bypass in 7/2018)- f/u vascular sx consult  Wound care consult pending as well  No evidence of wound infection at this time  Routine skin care, frequent repositioning and offloading       Proph:  SCDs, SQH     Disposition:   Admit to step down on medicine service  Will follow along as CCRS     VTE Pharmacologic Prophylaxis: Heparin  VTE Mechanical Prophylaxis: sequential compression device  Given critical illness, patient length of stay will require greater than two midnights

## 2018-10-10 NOTE — H&P
History and Physical - [facility] Internal Medicine    Patient Information: Iris Thomas 59 y o  male MRN: [id number]  Unit/Bed#: ED 17 Encounter: [id number]  Admitting Physician: Ashok Kc MD  PCP: Sammy Plasencia  Date of Admission:  10/10/18    Assessment/Plan:    Hospital Problem List:     Principal Problem:    Septic shock ([address] )  Active Problems:    Chronic pain    Tobacco use disorder    Type 2 diabetes mellitus with diabetic polyneuropathy, with long-term current use of insulin (HCC)    Quadriplegia and quadriparesis ([address] )    Urinary retention    Non-healing surgical wound of right groin    Sacral wound, initial encounter    Present on Admission:   Urinary retention   Tobacco use disorder   Quadriplegia and quadriparesis [facility] )   Non-healing surgical wound of right groin   Chronic pain   Septic shock ([facility] )   Sacral wound, initial encounter      Plan for the Primary Problem(s):  · Septic shock  · Admit to hospital for further treatment, aggressive fluid resuscitation, repeat lactic acid level in 2 hours, check procalcitonin level, await blood culture results, monitor hemodynamics closely, Infectious Disease consult, ?  Source right groin, +/- viral illness    Plan for Additional Problems:   · Nonhealing right groin wound:  Severe fungal dermatitis, ?superimposed infection, topical antifungal treatment, started on vancomycin and cefepime in the emergency department, will continue pending culture results  · Acute kidney injury:  Multifactorial, prerenal azotemia, ATN from septic shock, I suspect component of urinary retention:  Continue aggressive fluids, avoid nephrotoxin occasions, renal dose medications, repeat BMP in the a m   · Multiple pressure ulcers:  Wound Care will be consulted, wounds do not appear infected  · Urinary retention:  Continue Flomax, continue Vasques catheter  · Chronic pain:  On multiple medications at home, given IV Dilaudid in the emergency department, now lethargic, will hold further narcotics and monitor  · Type 2 diabetes mellitus:  Accu-Cheks, correctional insulin, will make NPO secondary to mental status    VTE Prophylaxis: Heparin  / sequential compression device   Code Status: full  POLST: There is no POLST form on file for this patient (pre-hospital)    Anticipated Length of Stay:  Patient will be admitted on an Inpatient basis with an anticipated length of stay of  Greater than 2 midnights  Justification for Hospital Stay: IV abx, IV fluids, tx septic shock    Total Time for Visit, including Counseling / Coordination of Care: 1 hour  Greater than 50% of this total time spent on direct patient counseling and coordination of care  Chief Complaint:   Increased spasms    History of Present Illness:    Adrien Neil is a 59 y o  male with multiple medical problems including partial C4 quadriplegia, diabetes mellitus, peripheral vascular disease, essential hypertension, chronic pain syndrome, BPH presents to the emergency department for evaluation of increasing muscle spasms  In the ED patient was found to be hypotensive with systolic pressures in the 80s and tachycardic with heart rates in the 120s  Labs revealed leukocytosis and a lactic acid of 11  He was given 30 cc/kilogram IV fluid bolus, follow-up systolic pressures in the low 90s  He is currently receiving his 3rd L of IV fluids  Per ED staff, critical Care aware of patient, feels he will be appropriate for step-down  UA, chest x-ray without infectious source  CT of the abdomen pelvis with findings of enteritis  He was given a dose of cefepime and vancomycin in the ED  Review of Systems:  Unable to obtain secondary to mental status change, patient administered IV Dilaudid    All systems are reviewed  Positive as per history of presenting illness  Patient answered no to all other questions         Past Medical and Surgical History:     Past Medical History:   Diagnosis Date    [facility] disease ([facility] )     RESOLVED: 8/17/17    Concussion     LAST ASSESSED: 8/17/17    COPD (chronic obstructive pulmonary disease) (HCC)     Depression     Diabetes mellitus ([facility] )     Diabetic neuropathy ([facility] )     Difficulty attaining erection     LAST ASSESSEDl: 8/17/17    Elevated liver enzymes     Gall stone pancreatitis     RESOLVED: 3/8/17    Gall stones     RESOLVED: 3/8/17    History of MRSA infection     Hypertension     LAST ASSESSED: 8/17/17    Spinal cord injury, C1-C7 ([facility] )     Traumatic injury to musculoskeletal system     1-5 FLIGHT FALL DOWN THE STEPS - CERVICAL NECK INJURY - JAN 2, 2012; LAST ASSESSED: 7/93/35    Umbilical hernia without obstruction and without gangrene     RESOLVED: 3/8/17    Varicella     LAST ASSESSED: 8/17/17       Past Surgical History:   Procedure Laterality Date    ARTERIOGRAM Right 7/31/2018    Procedure: ARTERIOGRAM Completion Agram;  Surgeon: Chris Day MD;  [facility];  Service: Vascular    BACK SURGERY      BYPASS FEMORAL-POPLITEAL Right 7/31/2018    Procedure: BYPASS FEMORAL-POPLITEAL R femoral- BK popliteal bypass;  Surgeon: Chris Day MD;  [facility];  Service: Vascular    CERVICAL FUSION      VERTEBRAL; C3-C4 FUSION    CHOLECYSTECTOMY LAPAROSCOPIC N/A 2/24/2017    Procedure: CHOLECYSTECTOMY LAPAROSCOPIC;  Surgeon: Kirsten Elizabeth MD;  [facility];  Service:     CHOLECYSTECTOMY LAPAROSCOPIC  03/2017    EPIGASTRIC HERNIA REPAIR      [place] DEBRIDEMENT, SKIN, SUB-Q TISSUE,=<20 SQ CM Right 8/28/2018    Procedure: RIGHT GROIN AND THIGH DEBRIDEMENT ([address]) [address];  Surgeon: Hector Magdaleno MD;  [facility];  Service: Vascular    NC THROMBOENDARTECTMY FEMORAL COMMON Right 7/31/2018    Procedure: ENDARTERECTOMY ARTERIAL FEMORAL R femoral endarterectomy w/ patch angioplasty ;  Surgeon: Chris Day MD;  [facility];  Service: Vascular    UMBILICAL HERNIA REPAIR N/A 2/24/2017    Procedure: REPAIR HERNIA UMBILICAL;  Surgeon: Shari Rodriguez MD;  [facility];  Service:        Meds/Allergies:    Prior to Admission medications    Medication Sig Start Date End Date Taking?  Authorizing Provider   amLODIPine (NORVASC) 5 mg tablet Take 5 mg by mouth daily   Yes Historical Provider, MD   ammonium lactate (LAC-HYDRIN) 12 % cream Apply topically as needed for dry skin   Yes Historical Provider, MD   aspirin (ECOTRIN LOW STRENGTH) 81 mg EC tablet Take 81 mg by mouth daily   Yes Historical Provider, MD   baclofen 20 mg tablet Take 20 mg by mouth 3 (three) times a day   Yes Historical Provider, MD   calcium carbonate (TUMS) 500 mg chewable tablet Chew 1 tablet daily   Yes Historical Provider, MD   citalopram (CeleXA) 20 mg tablet Take 10 mg by mouth daily   Yes Historical Provider, MD   clopidogrel (PLAVIX) 75 mg tablet Take 1 tablet (75 mg total) by mouth daily 10/8/18  Yes CHERELLE Zheng   gabapentin (NEURONTIN) 100 mg capsule Take 1 capsule (100 mg total) by mouth daily at bedtime 5/8/18  Yes CHERELLE Saavedra   gabapentin (NEURONTIN) 400 mg capsule Take 400 mg by mouth daily at bedtime   Yes Historical Provider, MD   glimepiride (AMARYL) 1 mg tablet Take 1 tablet (1 mg total) by mouth daily 4/19/18  Yes Lan Chao MD   glucose blood (ACCU-CHEK DAREN PLUS) test strip Check 2-3 times a day 7/10/18  Yes CHERELLE Baker   Incontinence Supply Disposable (ATTENDS UNDERWEAR 7 LARGE) MISC  4/4/18  Yes Historical Provider, MD   insulin glargine (LANTUS) 100 units/mL subcutaneous injection Inject under the skin daily at bedtime   Yes Historical Provider, MD   insulin lispro (HumaLOG) 100 units/mL injection Inject under the skin 3 (three) times a day before meals   Yes Historical Provider, MD   lisinopril (ZESTRIL) 10 mg tablet Take 1 tablet (10 mg total) by mouth daily 4/19/18  Yes Lan Chao MD   metFORMIN (GLUCOPHAGE) 1000 MG tablet Take 1 tablet (1,000 mg total) by mouth 2 (two) times a day with meals for 90 days 4/19/18 10/9/18 Yes Amada Hernandez MD   morphine (MS CONTIN) 15 mg 12 hr tablet Take 15 mg by mouth 2 (two) times a day   Yes Historical Provider, MD   neomycin-bacitracin-polymyxin (NEOSPORIN) 5-400-5,000 ointment Apply topically 4 (four) times a day   Yes Historical Provider, MD   nicotine (NICODERM CQ) 21 mg/24 hr TD 24 hr patch Place 1 patch on the skin daily 8/6/18  Yes Jessica Reed MD   nystatin (MYCOSTATIN) powder Apply topically 2 (two) times a day Applied to bilateral groin after cleansing skin  Avoid contact with right groin wound 9/14/18  Yes CHERELLE Bland   oxyCODONE (ROXICODONE) 5 mg immediate release tablet Take 5 mg by mouth every 4 (four) hours as needed for moderate pain   Yes Historical Provider, MD   pravastatin (PRAVACHOL) 20 mg tablet Take 1 tablet (20 mg total) by mouth daily 6/28/18  Yes Sid Briones PA-C   senna-docusate sodium (SENOKOT S) 8 6-50 mg per tablet Take 1 tablet by mouth daily at bedtime Please take while taking narcotic pain medication  8/5/18  Yes Jessica Reed MD   tamsulosin (FLOMAX) 0 4 mg Take 1 capsule (0 4 mg total) by mouth daily with dinner 8/5/18  Yes Jessica Reed MD   TiZANidine (ZANAFLEX) 2 MG capsule TAKE 1 TABLET BEFORE BED AS NEEDED ( tablets) 5/8/18  Yes CHERELLE Dupree   Wound Dressings (MAXORB DRESSING EX) Apply topically   Yes Historical Provider, MD     I have reviewed home medications using allscripts  Allergies:    Allergies   Allergen Reactions    Seasonal Ic  [Cholestatin]        Social History:     Marital Status: /Civil Union   Substance Use History:   History   Alcohol Use    Yes     Comment: 3-4 mixed vodka drinks/daily      History   Smoking Status    Current Every Day Smoker    Packs/day: 0 25    Years: 40 00   Smokeless Tobacco    Never Used     Comment: 1 ppd      History   Drug Use No       Family History:    non-contributory      Physical Exam:  Vitals:   Blood Pressure: 93/56 (10/10/18 0200)  Pulse: 98 (10/10/18 0200)  Temperature: 98 2 °F (36 8 °C) (10/10/18 0200)  Temp Source: Oral (10/09/18 2154)  Respirations: (!) 28 (10/10/18 0200)  Height: 5' 10" (177 8 cm) (10/09/18 2151)  Weight - Scale: 77 1 kg (170 lb) (10/09/18 2151)  SpO2: 96 % (10/10/18 0200)    Vital signs are reviewed as above  Lying on stretcher, sleeping, lethargic  Dry mucous membranes  Tachycardic, regular rhythm  Scattered rhonchi, no wheezes  Soft, positive bowel sounds  Right groin with evidence of fungal infection, drainage  Severe dermatitis  Sacral wound  Bilateral heel pressure ulcers does not appear infected          Additional Data:     Lab Results: I have personally reviewed pertinent reports  Results from last 7 days  Lab Units 10/09/18  2207   WBC Thousand/uL 11 49*   HEMOGLOBIN g/dL 11 1*   HEMATOCRIT % 34 7*   PLATELETS Thousands/uL 275   NEUTROS PCT % 77*   LYMPHS PCT % 13*   MONOS PCT % 9   EOS PCT % 0       Results from last 7 days  Lab Units 10/09/18  2207   SODIUM mmol/L 135*   POTASSIUM mmol/L 5 3   CHLORIDE mmol/L 101   CO2 mmol/L 18*   BUN mg/dL 21   CREATININE mg/dL 2 34*   CALCIUM mg/dL 9 4   ALK PHOS U/L 76   ALT U/L 28   AST U/L 22           Imaging: I have personally reviewed pertinent reports  Ct Chest Abdomen Pelvis Wo Contrast    Result Date: 10/10/2018  Narrative: CT CHEST, ABDOMEN AND PELVIS WITHOUT IV CONTRAST INDICATION:   Sepsis  COMPARISON:  1/18/2017  TECHNIQUE: CT examination of the chest, abdomen and pelvis was performed without intravenous contrast   Axial, sagittal, and coronal 2D reformatted images were created from the source data and submitted for interpretation  Radiation dose length product (DLP) for this visit:  846 02 mGy-cm     This examination, like all CT scans performed in the [facility], was performed utilizing techniques to minimize radiation dose exposure, including the use of iterative  reconstruction and automated exposure control  Enteric contrast was administered  FINDINGS: CHEST LUNGS:  Lungs are clear  There is no tracheal or endobronchial lesion  PLEURA:  Unremarkable  HEART/GREAT VESSELS:  Stable mild cardiomegaly  No thoracic aortic aneurysm MEDIASTINUM AND CHAS:  Diffuse esophageal wall thickening extending to the gastroesophageal junction  No mediastinal lymphadenopathy  CHEST WALL AND LOWER NECK:   Unremarkable  ABDOMEN LIVER/BILIARY TREE:  Stable 2 cm low-attenuation lesion in the dome of the liver, likely to represent a cyst or hemangioma  No biliary obstruction  GALLBLADDER:  Cholecystectomy  SPLEEN:  Unremarkable  PANCREAS:  Unremarkable  ADRENAL GLANDS:  Unremarkable  KIDNEYS/URETERS:  No nephrolithiasis or obstructive uropathy  STOMACH AND BOWEL:  Small hiatal hernia versus gastroesophageal thickening  Mild wall thickening throughout small bowel loops, some with mild distention  No evidence of colitis, diverticulitis or bowel obstruction  Fat-containing 4 cm right periumbilical hernia    APPENDIX:  A normal appendix was visualized  ABDOMINOPELVIC CAVITY:  No ascites or free intraperitoneal air  No lymphadenopathy  VESSELS:  Diffuse vascular calcifications  PELVIS REPRODUCTIVE ORGANS:  Mild prostate enlargement  URINARY BLADDER:  Unremarkable  ABDOMINAL WALL/INGUINAL REGIONS:  Unremarkable  OSSEOUS STRUCTURES:  No acute fracture or destructive osseous lesion  Impression: 1  Diffuse esophageal wall thickening throughout the entire esophagus and involving the gastroesophageal junction  Possible inflammatory or infectious esophagitis versus malignancy  No evidence of gastritis  2   Mildly distended thickened loops of small bowel suggesting enteritis  No evidence of colitis or bowel obstruction  3   No evidence of acute cardiopulmonary process   Workstation performed: [id number]       Allscripts Records Reviewed: Yes     ** Please Note: Dragon 360 Dictation voice to text software may have been used in the creation of this document   **

## 2018-10-10 NOTE — CONSULTS
Consultation - Infectious Disease   Jo-Ann Vital 59 y o  male MRN: [id number]  Unit/Bed#: [facility] 425-01 Encounter: [id number]      IMPRESSION & RECOMMENDATIONS:   1  Sepsis, POA:  Patient remains afebrile at this time with mild elevation in his white blood cell count  Lactic acid elevation has resolved and his procalcitonin was negative on admission  There are no obvious sources on exam for this patient's presentation of sepsis  He was noted to have urinary retention the UA at that time was unremarkable  Imaging noted some inflammation in the GI tract but no other obvious findings  Viral etiology may also be possibility  -will follow up pending blood cultures  -will send rapid flu and RSV  (Place on droplet till ruled out)  -at this time will continue the patient on vancomycin and cefepime  -will repeat CBC tomorrow along with procalcitonin  -will place  pharmacy consult for vancomycin monitoring   -if his workup remains negative will stop further antibiotics  -will continue to monitor clinically otherwise  -if the patient spikes another fever please repeat blood cultures  2   Elevated creatinine:  Is slowly improving from admission  Likely related to 1 or 3  -please repeat chemistry tomorrow  -will continue to monitor and re-dose antibiotics as needed  -pharmacy consult for vancomycin monitoring  3   Urinary retention:  Unclear how long this was going and if this was potentially contributing to the patient's presentation  Large prostate noted on CT no overt collections mention  And UA negative   -will continue to monitor clinically now and once off antibiotics  -Vasques care as per nursing  4   Diabetes:  Care as per primary service  HISTORY OF PRESENT ILLNESS:  Reason for Consult:  Sepsis    HPI: Jo-Ann Vital is a 59y o  year old male with multiple medical problems including quadriplegia, diabetes, peripheral vascular disease, hypertension, BPH    He presented to the emergency department with muscle spasm  In the emergency department he was found to be hypotensive, tachycardic and noted to have leukocytosis and elevation and lactic acid  Patient was given fluids  Critical care evaluated the patient  Patient was given a dose of cefepime and vancomycin  UA was unremarkable  White blood cell count 11 5  He remains afebrile since admission  Blood cell count today is 12 1  Patient had blood cultures collected which are pending  X-ray of his feet were done without any acute finding  Chest x-ray was unremarkable  CT chest and pelvis was noted with diffuse esophageal wall thickening  Was also noted to have thickened loops of small bowel suggesting enteritis no evidence of colitis  His blood pressures are noted to be improving he has intermittent tachycardia  Only concerning finding on exam was nonhealing right groin wound  Patient was noted to have episode of hypoxia overnight requiring non-rebreather  Patient was also seen by vascular, he had recent right femoral endarterectomy and right femoral popliteal bypass  He had wound dehiscence after his initial surgery requiring debridement, washout and VAC placement  He was seen in the office on 09/14 for his VAC was removed  Surgical service did not feel the site was acutely infected at this time  He is also noted to have a negative procalcitonin  No other significant microbiology data in our system  We are consulted at this time to assist in management of this patient's picture of sepsis without source  On evaluation, at bedside the patient continues to intermittently falls asleep on exam   He confirms that he is here because of the spasms  He also noted having that episode of urinary retention prior to arrival   At home he does not have a Vasques catheter and does not straight cath  He did notice any fevers at home  Denied having any diarrhea, hemoptysis or hematemesis  He denies having any abdominal pain or chest pain    He denied having any ongoing pain in the right groin  He can't think of any particular inciting event that the spasms that he was having that worsened  At this point he denies any ongoing pain is overall feels tired  REVIEW OF SYSTEMS:  A complete 12 point system-based review of systems is negative other than that noted in the HPI      PAST MEDICAL HISTORY:  Past Medical History:   Diagnosis Date    Chronic obstructive lung disease ([facility] )     RESOLVED: 8/17/17    Concussion     LAST ASSESSED: 8/17/17    COPD (chronic obstructive pulmonary disease) ([facility]     Depression     Diabetes mellitus ([facility]     [facility]     Difficulty attaining erection     LAST ASSESSEDl: 8/17/17    Elevated liver enzymes     Gall stone pancreatitis     RESOLVED: 3/8/17    Gall stones     RESOLVED: 3/8/17    History of MRSA infection     Hypertension     LAST ASSESSED: 8/17/17    Spinal [facility] injury, C1-C7 [facility]     Traumatic injury to musculoskeletal system     1-5 FLIGHT FALL DOWN THE STEPS - CERVICAL NECK INJURY - JAN 2, 2012; LAST ASSESSED: 8/03/58    Umbilical hernia without obstruction and without gangrene     RESOLVED: 3/8/17    Varicella     LAST ASSESSED: 8/17/17     Past Surgical History:   Procedure Laterality Date    ARTERIOGRAM Right 7/31/2018    Procedure: ARTERIOGRAM Completion Agram;  Surgeon: Osiris Smyth MD;  [facility];  Service: Vascular    BACK SURGERY      BYPASS FEMORAL-POPLITEAL Right 7/31/2018    Procedure: BYPASS FEMORAL-POPLITEAL R femoral- BK popliteal bypass;  Surgeon: Osiris Smyth MD;  [facility];  Service: Vascular    CERVICAL FUSION      VERTEBRAL; C3-C4 FUSION    CHOLECYSTECTOMY LAPAROSCOPIC N/A 2/24/2017    Procedure: CHOLECYSTECTOMY LAPAROSCOPIC;  Surgeon: Cong Diez MD;  [facility];  Service:     CHOLECYSTECTOMY LAPAROSCOPIC  03/2017    EPIGASTRIC HERNIA REPAIR      [place] DEBRIDEMENT, SKIN, SUB-Q TISSUE,=<20 SQ CM Right 8/28/2018 Procedure: RIGHT GROIN AND THIGH DEBRIDEMENT ([address]) AND VAC PLACEMENT;  Surgeon: Westley Aldana MD;  [facility];  Service: Vascular    CT THROMBOENDARTECTMY FEMORAL COMMON Right 2018    Procedure: ENDARTERECTOMY ARTERIAL FEMORAL R femoral endarterectomy w/ patch angioplasty ;  Surgeon: Rola Hendrix MD;  [facility];  Service: Vascular    UMBILICAL HERNIA REPAIR N/A 2017    Procedure: REPAIR HERNIA UMBILICAL;  Surgeon: Griffin Robles MD;  [facility];  Service:        FAMILY HISTORY:  Non-contributory    SOCIAL HISTORY:  Social History   History   Alcohol Use    Yes     Comment: 3-4 mixed vodka drinks/daily      History   Drug Use No     History   Smoking Status    Current Every Day Smoker    Packs/day: 0 25    Years: 40 00   Smokeless Tobacco    Never Used     Comment: 1 ppd        ALLERGIES:  Allergies   Allergen Reactions    Seasonal Ic  [Cholestatin]        MEDICATIONS:  All current active medications have been reviewed  As above    PHYSICAL EXAM:  Temp:  [97 2 °F (36 2 °C)-99 5 °F (37 5 °C)] 99 5 °F (37 5 °C)  HR:  [] 106  Resp:  [20-28] 20  BP: ()/(48-64) 90/52  SpO2:  [84 %-99 %] 97 %  Temp (24hrs), Av 2 °F (36 8 °C), Min:97 2 °F (36 2 °C), Max:99 5 °F (37 5 °C)  Current: Temperature: 99 5 °F (37 5 °C)    Intake/Output Summary (Last 24 hours) at 10/10/18 1148  Last data filed at 10/10/18 1000   Gross per 24 hour   Intake             2310 ml   Output             2100 ml   Net              210 ml       General Appearance:  Continues to intermittently falls asleep on exam, nontoxic, and in no distress   Head:  Normocephalic, without obvious abnormality, atraumatic   Eyes:  Conjunctiva pink and sclera anicteric, both eyes   Nose: Nares normal, mucosa normal, no drainage   Throat: Oropharynx dry without lesions; poor dentition noted and a foul smell from the mouth     Neck: Supple, symmetrical, no adenopathy, no tenderness/mass/nodules   Back: Symmetric, no curvature, no CVA tenderness, no sacral wounds noted  Lungs:   Upper airway rhonchi appreciated bilaterally  No overt wheezes rales  Chest Wall:  No tenderness or deformity   Heart:  RRR; no murmur, rub or gallop   Abdomen:   Soft, non-tender, non-distended, positive bowel sounds    Extremities: No cyanosis, clubbing or edema   Skin: Patient has a lot of dry scabbed over lesion on his upper extremities and dry skin noted on his lower extremities  No open wounds appreciated  He has closure of his groin wound noted  There is no overt erythema or induration present  Lymph nodes: Cervical, supraclavicular nodes normal   Neurologic: Alert and oriented times 2, patient is contracted in both his upper and lower extremities  He has intact sensation in his extremities  No obvious cranial nerve deficits noted on exam        LABS, IMAGING, & OTHER STUDIES:  Lab Results:  I have personally reviewed pertinent labs  Results from last 7 days  Lab Units 10/10/18  0940 10/10/18  0603 10/09/18  2207   WBC Thousand/uL  --  12 19* 11 49*   HEMOGLOBIN g/dL  --  10 1* 11 1*   I STAT HEMOGLOBIN g/dl 10 5*  --   --    PLATELETS Thousands/uL  --  228 275       Results from last 7 days  Lab Units 10/10/18  0940 10/10/18  0603 10/09/18  2207   SODIUM mmol/L  --  139 135*   POTASSIUM mmol/L  --  4 9 5 3   CHLORIDE mmol/L  --  108 101   CO2 mmol/L  --  22 18*   BUN mg/dL  --  26* 21   CREATININE mg/dL  --  1 97* 2 34*   EGFR ml/min/1 73sq m  --  36 29   GLUCOSE, ISTAT mg/dl 145*  --   --    CALCIUM mg/dL  --  8 2* 9 4   AST U/L  --   --  22   ALT U/L  --   --  28   ALK PHOS U/L  --   --  76           Imaging Studies:   I have personally reviewed pertinent imaging study reports and images in PACS  Other Studies:   I have personally reviewed pertinent reports

## 2018-10-10 NOTE — CODE DOCUMENTATION
Patient in bed eyes open nonrebreather on, bipap ordered, ivf infusing, patient was coughing and sat was 84% at beginning of rapid response, patient now coughing

## 2018-10-10 NOTE — SEPSIS NOTE
Sepsis Note   Aurelia Ortiz 59 y o  male MRN: [id number]  Unit/Bed#: [facility] 425-01 Encounter: [id number]            Initial Sepsis Screening     Row Name 10/09/18 1124                Is the patient's history suggestive of a new or worsening infection? (!)  Yes (Proceed)  -YADIRA        Suspected source of infection suspect infection, source unknown  -YADIRA        Are two or more of the following signs & symptoms of infection both present and new to the patient?         Indicate SIRS criteria Tachycardia > 90 bpm;Tachypnea > 20 resp per min  -YADIRA        If the answer is yes to both questions, suspicion of sepsis is present          If severe sepsis is present AND tissue hypoperfusion perists in the hour after fluid resuscitation or lactate > 4, the patient meets criteria for SEPTIC SHOCK          Are any of the following organ dysfunction criteria present within 6 hours of suspected infection and SIRS criteria that are NOT considered to be chronic conditions? (!)  Yes  -YADIRA        Organ dysfunction Creatinine > 0 5 mg/dl ABOVE BASELINE;Creatinine > 2 0 mg/dL; Lactate > 2 0 mmol/L  -YADIRA        Date of presentation of severe sepsis          Time of presentation of severe sepsis          Tissue hypoperfusion persists in the hour after crystalloid fluid administration, evidenced, by either: * OR * Lactate level is greater to or equal 4 mmol/dL ( ___ mmol/dL in comment field)  -YADIRA        Was hypotension present within one hour of the conclusion of crystalloid fluid administration?  Yes  -YADIRA        Date of presentation of septic shock          Time of presentation of septic shock            User Key  (r) = Recorded By, (t) = Taken By, (c) = Cosigned By    [address] Name Provider Type    Sulaiman Lau MD Resident               Default Flowsheet Data (last 720 hours)      Sepsis Reassess     Row Name 10/10/18 0200                   Repeat Volume Status and Tissue Perfusion Assessment Performed    Repeat Volume Status and Tissue Perfusion Assessment Performed Yes  -YADIRA           Volume Status and Tissue Perfusion Post Fluid Resuscitation- Must Document 5 of the Following 7:    Vital Signs Reviewed (HR, RR, BP, T) Yes  -YADIRA        Arterial Oxygen Saturation Reviewed (POx, SaO2 or SpO2) Yes (comment %)  -YADIRA        Cardio (!)  Tachycardia  -YADIRA        Pulmonary Clear to auscultation  -YADIRA        Capillary Refill Brisk  -YADIRA        Peripheral Pulses Radial  -YADIRA        Peripheral Pulse +2  -YADIRA        Skin Warm  -YADIRA        Urine output assessed Other (comment)  -YADIRA           *OR*   Intensive Monitoring- Must Document One of the Following Four *:    Vital Signs Reviewed Yes  -YADIRA        * Central Venous Pressure (CVP or RAP)          * Central Venous Oxygen (SVO2, ScvO2 or Oxygen saturation via central catheter)          * Bedside Cardiovascular US in IVC diameter and % collapse          * Passive Leg Raise OR Crystalloid Challenge            User Key  (r) = Recorded By, (t) = Taken By, (c) = Cosigned By    Initials Name Provider Type    Connie Resendiz MD Resident

## 2018-10-10 NOTE — PLAN OF CARE
Problem: SLP ADULT - SWALLOWING, IMPAIRED  Goal: Initial SLP swallow eval performed  Outcome: Completed Date Met: 10/10/18

## 2018-10-10 NOTE — ED NOTES
Dr Melchor Gaytan verbal order for a 2nd L NS at this time  2nd bolus initiated        Juliette Hancock RN  10/09/18 9300

## 2018-10-10 NOTE — ED PROVIDER NOTES
History  Chief Complaint   Patient presents with    Spasms     Per EMS pt is coming from home with a hx of muscle spasms since his C4 fx over 6 years ago  Pt states "I always get spasms, but this is much worse "      57yo quadriplegic with incomplete c4 6 year prior after falling down some steps    At [facility] today started tensing up arms back and legs with contracture beyond his baseline  Patient also reports decreased urination today, having only gone once in the AM  He is unable to give a full history 2/2 acuity of his discomfort, but he reports no fevers at home  No chills  His only complaint is the pain associated with the spasms in his arms/legs and back  His baseline functional level is that he can ambulate with a rollator, that he has sensation throughout  Per EMS, en route his pressures were in the 90s  Upon chart review his baseline systolic is in 693C  Vitals in the ED reveal systolic of 80 with tachy to 110  Rectal temp 98 2  Patient has a new pustular rash across his chest which he says is news to him  He has ulcers in his inguinal creases, sacral decub, and bilateral heels which he says are longstanding and not worsening  On straight cath patient produced 800cc and a lemus was placed    Patient reports diarrheal bowel movements yesterday with new onset nausea vomiting in the room productive of brown liquid emesis  Prior to Admission Medications   Prescriptions Last Dose Informant Patient Reported? Taking?    Incontinence Supply Disposable (ATTENDS UNDERWEAR 7 LARGE) [facility]  Outside Facility (Specify) Yes Yes   TiZANidine (ZANAFLEX) 2 MG capsule  Outside Facility (Specify) No Yes   Sig: TAKE 1 TABLET BEFORE BED AS NEEDED ( tablets)   Wound Dressings (MAXORB DRESSING EX)  Outside Facility (Specify) Yes Yes   Sig: Apply topically   amLODIPine (NORVASC) 5 mg tablet  Outside Facility (Specify) Yes Yes   Sig: Take 5 mg by mouth daily   ammonium lactate (LAC-HYDRIN) 12 % cream  Outside Facility (Specify) Yes Yes   Sig: Apply topically as needed for dry skin   aspirin (ECOTRIN LOW STRENGTH) 81 mg EC tablet  Outside Facility (Specify) Yes Yes   Sig: Take 81 mg by mouth daily   baclofen 20 mg tablet  Outside Facility (Specify) Yes Yes   Sig: Take 20 mg by mouth 3 (three) times a day   calcium carbonate (TUMS) 500 mg chewable tablet  Outside Facility (Specify) Yes Yes   Sig: Chew 1 tablet daily   citalopram (CeleXA) 20 mg tablet  Outside Facility (Specify) Yes Yes   Sig: Take 10 mg by mouth daily   clopidogrel (PLAVIX) 75 mg tablet   No Yes   Sig: Take 1 tablet (75 mg total) by mouth daily   gabapentin (NEURONTIN) 100 mg capsule  Outside Facility (Specify) No Yes   Sig: Take 1 capsule (100 mg total) by mouth daily at bedtime   gabapentin (NEURONTIN) 400 mg capsule  Outside Facility (Specify) Yes Yes   Sig: Take 400 mg by mouth daily at bedtime   glimepiride (AMARYL) 1 mg tablet  Outside Facility (Specify) No Yes   Sig: Take 1 tablet (1 mg total) by mouth daily   glucose blood (ACCU-CHEK DAREN PLUS) test strip  Outside Facility (Specify) No Yes   Sig: Check 2-3 times a day   insulin glargine (LANTUS) 100 units/mL subcutaneous injection  Outside Facility (Specify) Yes Yes   Sig: Inject under the skin daily at bedtime   insulin lispro (HumaLOG) 100 units/mL injection  Outside Facility (Specify) Yes Yes   Sig: Inject under the skin 3 (three) times a day before meals   lisinopril (ZESTRIL) 10 mg tablet  Outside Facility (Specify) No Yes   Sig: Take 1 tablet (10 mg total) by mouth daily   metFORMIN (GLUCOPHAGE) 1000 MG tablet  Self No Yes   Sig: Take 1 tablet (1,000 mg total) by mouth 2 (two) times a day with meals for 90 days   morphine (MS CONTIN) 15 mg 12 hr tablet  Outside Facility (Specify) Yes Yes   Sig: Take 15 mg by mouth 2 (two) times a day   neomycin-bacitracin-polymyxin (NEOSPORIN) 5-400-5,000 ointment  Outside Facility (Specify) Yes Yes   Sig: Apply topically 4 (four) times a day   nicotine (NICODERM CQ) 21 mg/24 hr TD 24 hr patch  Outside Facility (Specify) No Yes   Sig: Place 1 patch on the skin daily   nystatin (MYCOSTATIN) powder   No Yes   Sig: Apply topically 2 (two) times a day Applied to bilateral groin after cleansing skin  Avoid contact with right groin wound   oxyCODONE (ROXICODONE) 5 mg immediate release tablet  Outside Facility (Specify) Yes Yes   Sig: Take 5 mg by mouth every 4 (four) hours as needed for moderate pain   pravastatin (PRAVACHOL) 20 mg tablet  Outside Facility (Specify) No Yes   Sig: Take 1 tablet (20 mg total) by mouth daily   senna-docusate sodium (SENOKOT S) 8 6-50 mg per tablet  Outside Facility (Specify) No Yes   Sig: Take 1 tablet by mouth daily at bedtime Please take while taking narcotic pain medication     tamsulosin (FLOMAX) 0 4 mg  Outside Facility (Specify) No Yes   Sig: Take 1 capsule (0 4 mg total) by mouth daily with dinner      Facility-Administered Medications: None       Past Medical History:   Diagnosis Date    Chronic obstructive lung disease ([address] )     RESOLVED: 8/17/17    Concussion     LAST ASSESSED: 8/17/17    COPD (chronic obstructive pulmonary disease) ([address] )     Depression     Diabetes mellitus ([address] )     Diabetic neuropathy ([address] )     Difficulty attaining erection     LAST ASSESSEDl: 8/17/17    Elevated liver enzymes     Gall stone pancreatitis     RESOLVED: 3/8/17    Gall stones     RESOLVED: 3/8/17    History of MRSA infection     Hypertension     LAST ASSESSED: 8/17/17    Spinal cord injury, C1-C7 [facility]     Traumatic injury to musculoskeletal system     1-5 FLIGHT FALL DOWN THE STEPS - CERVICAL NECK INJURY - JAN 2, 2012; LAST ASSESSED: 1/83/78    Umbilical hernia without obstruction and without gangrene     RESOLVED: 3/8/17    Varicella     LAST ASSESSED: 8/17/17       Past Surgical History:   Procedure Laterality Date    ARTERIOGRAM Right 7/31/2018    Procedure: ARTERIOGRAM Completion Agram;  Surgeon: Yayo Hollis MD;  [facility];  Service: Vascular    BACK SURGERY      BYPASS FEMORAL-POPLITEAL Right 7/31/2018    Procedure: BYPASS FEMORAL-POPLITEAL R femoral- BK popliteal bypass;  Surgeon: Yayo Hollis MD;  Location[facility];  Service: Vascular    CERVICAL FUSION      VERTEBRAL; C3-C4 FUSION    CHOLECYSTECTOMY LAPAROSCOPIC N/A 2/24/2017    Procedure: CHOLECYSTECTOMY LAPAROSCOPIC;  Surgeon: Dedra Rebolledo MD;  Location: [facility];  Service:     CHOLECYSTECTOMY LAPAROSCOPIC  03/2017    EPIGASTRIC HERNIA REPAIR      NV DEBRIDEMENT, SKIN, SUB-Q TISSUE,=<20 SQ CM Right 8/28/2018    Procedure: RIGHT GROIN AND THIGH DEBRIDEMENT ([address]) AND VAC PLACEMENT;  Surgeon: Alonso Hudson MD;  Location: [facility];  Service: Vascular    NV THROMBOENDARTECTMY FEMORAL COMMON Right 7/31/2018    Procedure: ENDARTERECTOMY ARTERIAL FEMORAL R femoral endarterectomy w/ patch angioplasty ;  Surgeon: Yayo Hollis MD;  [facility]: [facility];  Service: Vascular    UMBILICAL HERNIA REPAIR N/A 2/24/2017    Procedure: REPAIR HERNIA UMBILICAL;  Surgeon: Dedra Rebolledo MD;  [facility]: [facility];  Service:        Family History   Problem Relation Age of Onset    Hypertension Mother         BENIGN    Alzheimer's disease Father     Heart disease Father         CARDIAC DISORDER    Stroke Father     Heart attack Father     No Known Problems Brother      I have reviewed and agree with the history as documented  Social History   Substance Use Topics    Smoking status: Current Every Day Smoker     Packs/day: 0 25     Years: 40 00    Smokeless tobacco: Never Used      Comment: 1 ppd     Alcohol use Yes      Comment: 3-4 mixed vodka drinks/daily         Review of Systems   Constitutional: Positive for diaphoresis  Negative for activity change, appetite change, chills, fatigue and fever  HENT: Positive for dental problem  Negative for congestion, postnasal drip, rhinorrhea, sneezing, sore throat and trouble swallowing      Eyes: Negative for photophobia and visual disturbance  Respiratory: Negative for cough, chest tightness, shortness of breath and wheezing  Cardiovascular: Negative for chest pain and leg swelling  Gastrointestinal: Positive for diarrhea and vomiting  Negative for abdominal distention, abdominal pain, blood in stool, constipation and nausea  Genitourinary: Negative for decreased urine volume, difficulty urinating, dysuria, flank pain, frequency, hematuria and urgency  Musculoskeletal: Positive for myalgias  Skin: Positive for rash  Negative for color change  Neurological: Negative for syncope, weakness, light-headedness and headaches  Psychiatric/Behavioral: Negative for confusion and sleep disturbance  All other systems reviewed and are negative  Physical Exam  ED Triage Vitals   Temperature Pulse Respirations Blood Pressure SpO2   10/09/18 2154 10/09/18 2151 10/09/18 2151 10/09/18 2151 10/09/18 2151   98 6 °F (37 °C) 98 22 (!) 82/52 94 %      Temp Source Heart Rate Source Patient Position - Orthostatic VS BP Location FiO2 (%)   10/09/18 2154 10/10/18 0039 10/09/18 2250 10/09/18 2151 --   Oral Monitor Lying Left arm       Pain Score       10/09/18 2151       Worst Possible Pain           Orthostatic Vital Signs  Vitals:    10/10/18 0130 10/10/18 0145 10/10/18 0200 10/10/18 0300   BP: 103/56  93/56 116/55   Pulse: (!) 116 98 98 102   Patient Position - Orthostatic VS: Lying   Lying       Physical Exam   Constitutional: He is oriented to person, place, and time  He appears distressed  HENT:   Head: Normocephalic and atraumatic  Nose: Nose normal    Eyes: Pupils are equal, round, and reactive to light  Conjunctivae and EOM are normal  No scleral icterus  Neck: Normal range of motion  Neck supple  No JVD present  No tracheal deviation present  Cardiovascular: Regular rhythm, normal heart sounds and intact distal pulses  No murmur heard    tachycardic   Pulmonary/Chest: Effort normal and breath sounds normal  No stridor  No respiratory distress  He has no wheezes  Abdominal: Soft  Bowel sounds are normal  He exhibits no distension  There is no tenderness  There is no guarding  Stomach tense 2/2 increased muscle tone   Genitourinary: Rectum normal and penis normal    Genitourinary Comments: No perirectal abcess    Right inguinal fold with pressure ulcer  Bilateral inguinal folds with erythema    Sacral decub ulcer, shallow erythematous   Musculoskeletal: He exhibits deformity  He exhibits no edema or tenderness  Bilateral upper and lower extremity contracture  Increase tonicity throughout  No midline spinal tenderness/step off  Increase tone in paraspinal muscles  Lymphadenopathy:     He has no cervical adenopathy  Neurological: He is alert and oriented to person, place, and time  No cranial nerve deficit or sensory deficit  He exhibits normal muscle tone  Skin: Skin is warm  Capillary refill takes less than 2 seconds  He is diaphoretic  Beyond ulcers described in genital    Bilateral heal ulcers, right with scab and unknown depth   Psychiatric: He has a normal mood and affect  His behavior is normal  Judgment and thought content normal    Nursing note and vitals reviewed        ED Medications  Medications   acetaminophen (TYLENOL) rectal suppository 325 mg (325 mg Rectal Not Given 10/10/18 0131)   ammonium lactate (LAC-HYDRIN) 12 % cream (not administered)   aspirin (ECOTRIN LOW STRENGTH) EC tablet 81 mg (not administered)   baclofen tablet 20 mg (not administered)   citalopram (CeleXA) tablet 10 mg (not administered)   clopidogrel (PLAVIX) tablet 75 mg (not administered)   nystatin (MYCOSTATIN) powder (not administered)   pravastatin (PRAVACHOL) tablet 20 mg (not administered)   tamsulosin (FLOMAX) capsule 0 4 mg (not administered)   nicotine (NICODERM CQ) 14 mg/24hr TD 24 hr patch 1 patch (not administered)   heparin (porcine) subcutaneous injection 5,000 Units (not administered)   cefepime (MAXIPIME) 1,000 mg in dextrose 5 % 50 mL IVPB (0 mg Intravenous Stopped 10/10/18 0254)   vancomycin (VANCOCIN) IVPB (premix) 750 mg (not administered)   multi-electrolyte (ISOLYTE-S PH 7 4 equivalent) IV solution (not administered)   insulin lispro (HumaLOG) 100 units/mL subcutaneous injection 1-5 Units (not administered)   sodium chloride 0 9 % bolus 1,000 mL (0 mL Intravenous Stopped 10/9/18 2356)   vancomycin (VANCOCIN) 1,250 mg in sodium chloride 0 9 % 250 mL IVPB (0 mg/kg × 77 1 kg Intravenous Stopped 10/10/18 0404)   cefepime (MAXIPIME) 2 g/50 mL dextrose IVPB (0 mg Intravenous Stopped 10/9/18 2323)   azithromycin (ZITHROMAX) 500 mg in sodium chloride 0 9% 250mL IVPB 500 mg (0 mg Intravenous Stopped 10/10/18 0132)   HYDROmorphone (DILAUDID) injection 0 5 mg (0 5 mg Intravenous Given 10/9/18 2311)   ondansetron (ZOFRAN) injection 4 mg (4 mg Intravenous Given 10/9/18 2313)   sodium chloride 0 9 % bolus 310 mL (0 mL Intravenous Stopped 10/10/18 0100)   sodium chloride 0 9 % bolus 1,000 mL (0 mL Intravenous Stopped 10/9/18 2358)   multi-electrolyte (ISOLYTE-S PH 7 4) bolus 1,000 mL (0 mL Intravenous Stopped 10/10/18 0404)       Diagnostic Studies  Results Reviewed     Procedure Component Value Units Date/Time    Lactic acid x2 Q2H [[id number]] Collected:  10/10/18 0410    Lab Status:   In process Specimen:  Blood from Arm, Left Updated:  10/10/18 2425    Basic metabolic panel [[id number]]     Lab Status:  No result Specimen:  Blood     Magnesium [[id number]]     Lab Status:  No result Specimen:  Blood     CBC and differential [[id number]]     Lab Status:  No result Specimen:  Blood     CK (with reflex to MB) [[id number]]     Lab Status:  No result Specimen:  Blood     Hemoglobin A1c w/EAG Estimation (Orders if not completed within the last 90 days) [[id number]]     Lab Status:  No result Specimen:  Blood     Platelet count [[id number]]     Lab Status:  No result Specimen:  Blood     Lactic acid x2 Q2H [[id number]]  (Abnormal) Collected:  10/10/18 0144    Lab Status:  Final result Specimen:  Blood from Arm, Left Updated:  10/10/18 0240     LACTIC ACID 7 0 (HH) mmol/L     Narrative:         Result may be elevated if tourniquet was used during collection  Lipase [[id number]] Collected:  10/09/18 2207    Lab Status: In process Specimen:  Blood from Arm, Left Updated:  10/10/18 0202    Urine Microscopic [[id number]]  (Abnormal) Collected:  10/10/18 0011    Lab Status:  Final result Specimen:  Urine from Urine, Other Updated:  10/10/18 0122     RBC, UA 2-4 (A) /hpf      WBC, UA None Seen /hpf      Epithelial Cells None Seen /hpf      Bacteria, UA None Seen /hpf      Hyaline Casts, UA 5-10 (A) /lpf      Fine granular casts 3-5 /lpf      COARSE GRANULAR CASTS 3-5 /lpf     ED Urine Macroscopic [[id number]]  (Abnormal) Collected:  10/10/18 0011    Lab Status:  Final result Specimen:  Urine Updated:  10/10/18 0010     Color, UA Yellow     Clarity, UA Clear     pH, UA 5 5     Leukocytes, UA Negative     Nitrite, UA Negative     Protein, UA 30 (1+) (A) mg/dl      Glucose, UA Negative mg/dl      Ketones, UA Negative mg/dl      Urobilinogen, UA 0 2 E U /dl      Bilirubin, UA Negative     Blood, UA Trace (A)     Specific [place], UA 1 020    Narrative:       CLINITEK RESULT    Lactic Acid x2 [[id number]]  (Abnormal) Collected:  10/09/18 2247    Lab Status:  Final result Specimen:  Blood from Hand, Left Updated:  10/09/18 2356     LACTIC ACID 11 8 (HH) mmol/L     Narrative:         Result may be elevated if tourniquet was used during collection      Procalcitonin [[id number]]  (Normal) Collected:  10/09/18 2246    Lab Status:  Final result Specimen:  Blood from Arm, Left Updated:  10/09/18 2343     Procalcitonin 0 11 ng/ml     Comprehensive metabolic panel [[id number]]  (Abnormal) Collected:  10/09/18 2207    Lab Status:  Final result Specimen:  Blood from Arm, Left Updated:  10/09/18 2256     Sodium 135 (L) mmol/L      Potassium 5 3 mmol/L      Chloride 101 mmol/L      CO2 18 (L) mmol/L      ANION GAP 16 (H) mmol/L      BUN 21 mg/dL      Creatinine 2 34 (H) mg/dL      Glucose 149 (H) mg/dL      Calcium 9 4 mg/dL      AST 22 U/L      ALT 28 U/L      Alkaline Phosphatase 76 U/L      Total Protein 7 9 g/dL      Albumin 4 1 g/dL      Total Bilirubin 0 21 mg/dL      eGFR 29 ml/min/1 73sq m     Narrative:         National Kidney Disease Education Program recommendations are as follows:  GFR calculation is accurate only with a steady state creatinine  Chronic Kidney disease less than 60 ml/min/1 73 sq  meters  Kidney failure less than 15 ml/min/1 73 sq  meters  Blood culture #1 [[id number]] Collected:  10/09/18 2246    Lab Status: In process Specimen:  Blood from Arm, Left Updated:  10/09/18 2255    Blood culture #2 [[id number]] Collected:  10/09/18 2247    Lab Status: In process Specimen:  Blood from Hand, Left Updated:  10/09/18 2255    CBC and differential [[id number]]  (Abnormal) Collected:  10/09/18 2207    Lab Status:  Final result Specimen:  Blood from Arm, Left Updated:  10/09/18 2216     WBC 11 49 (H) Thousand/uL      RBC 3 41 (L) Million/uL      Hemoglobin 11 1 (L) g/dL      Hematocrit 34 7 (L) %       (H) fL      MCH 32 6 pg      MCHC 32 0 g/dL      RDW 15 7 (H) %      MPV 9 8 fL      Platelets 090 Thousands/uL      nRBC 0 /100 WBCs      Neutrophils Relative 77 (H) %      Immat GRANS % 0 %      Lymphocytes Relative 13 (L) %      Monocytes Relative 9 %      Eosinophils Relative 0 %      Basophils Relative 1 %      Neutrophils Absolute 8 84 (H) Thousands/µL      Immature Grans Absolute 0 03 Thousand/uL      Lymphocytes Absolute 1 50 Thousands/µL      Monocytes Absolute 1 01 Thousand/µL      Eosinophils Absolute 0 05 Thousand/µL      Basophils Absolute 0 06 Thousands/µL                  CT chest abdomen pelvis wo contrast   Final Result by Omar Miller MD (10/10 3994)         1    Diffuse esophageal wall thickening throughout the entire esophagus and involving the gastroesophageal junction  Possible inflammatory or infectious esophagitis versus malignancy  No evidence of gastritis  2   Mildly distended thickened loops of small bowel suggesting enteritis  No evidence of colitis or bowel obstruction  3   No evidence of acute cardiopulmonary process  Workstation performed: [id number]         XR chest 1 view portable    (Results Pending)   XR foot 3+ views RIGHT    (Results Pending)   XR foot 3+ views LEFT    (Results Pending)         Procedures  Procedures      Phone Consults  ED Phone Contact    ED Course                         Initial Sepsis Screening     [address] Name 10/09/18 4482                Is the patient's history suggestive of a new or worsening infection? (!)  Yes (Proceed)  -YADIRA        Suspected source of infection suspect infection, source unknown  -YADIRA        Are two or more of the following signs & symptoms of infection both present and new to the patient?         Indicate SIRS criteria Tachycardia > 90 bpm;Tachypnea > 20 resp per min  -YADIRA        If the answer is yes to both questions, suspicion of sepsis is present          If severe sepsis is present AND tissue hypoperfusion perists in the hour after fluid resuscitation or lactate > 4, the patient meets criteria for SEPTIC SHOCK          Are any of the following organ dysfunction criteria present within 6 hours of suspected infection and SIRS criteria that are NOT considered to be chronic conditions? (!)  Yes  -YADIRA        Organ dysfunction Creatinine > 0 5 mg/dl ABOVE BASELINE;Creatinine > 2 0 mg/dL; Lactate > 2 0 mmol/L  -YADIRA        Date of presentation of severe sepsis          Time of presentation of severe sepsis          Tissue hypoperfusion persists in the hour after crystalloid fluid administration, evidenced, by either: * OR * Lactate level is greater to or equal 4 mmol/dL ( ___ mmol/dL in comment field)  -YADIRA        Was hypotension present within one hour of the conclusion of crystalloid fluid administration?  Yes  -YADIRA        Date of presentation of septic shock          Time of presentation of septic shock            User Key  (r) = Recorded By, (t) = Taken By, (c) = Cosigned By    Initials Name Provider Type    Bethel Cuenca MD Resident           Default Flowsheet Data (last 720 hours)      Sepsis Reassess     Row Name 10/10/18 0200                   Repeat Volume Status and Tissue Perfusion Assessment Performed    Repeat Volume Status and Tissue Perfusion Assessment Performed Yes  -YADIRA           Volume Status and Tissue Perfusion Post Fluid Resuscitation- Must Document 5 of the Following 7:    Vital Signs Reviewed (HR, RR, BP, T) Yes  -YADIRA        Arterial Oxygen Saturation Reviewed (POx, SaO2 or SpO2) Yes (comment %)  -YADIRA        Cardio (!)  Tachycardia  -YADIRA        Pulmonary Clear to auscultation  -YADIRA        Capillary Refill Brisk  -YADIRA        Peripheral Pulses Radial  -YADIRA        Peripheral Pulse +2  -YADIRA        Skin Warm  -YADIRA        Urine output assessed Other (comment)  -YADIRA           *OR*   Intensive Monitoring- Must Document One of the Following Four *:    Vital Signs Reviewed Yes  -YADIRA        * Central Venous Pressure (CVP or RAP)          * Central Venous Oxygen (SVO2, ScvO2 or Oxygen saturation via central catheter)          * Bedside Cardiovascular US in IVC diameter and % collapse          * Passive Leg Raise OR Crystalloid Challenge            User Key  (r) = Recorded By, (t) = Taken By, (c) = Cosigned By    Initials Name Provider Type    Bethel Cuenca MD Resident                MDM  Number of Diagnoses or Management Options  Diagnosis management comments: Lactic acid 12 2nd lactic acid after 30 cc per kg bolus measured at 7  patient started on broad-spectrum antibiotics vanc cefepime and azithromycin  Blood cultures x2 drawn  CT chest abdomen pelvis shows esophagitis, some enteritis  EKG stable from prior  Urine without evidence of infection  ICU consulted patient admitted to [facility] step-down 2      CritCare Time    Disposition  Final diagnoses:   Septic shock ([facility] )   Peripheral vascular disease due to secondary diabetes mellitus ([facility] )   Non-healing surgical wound of right groin, subsequent encounter   Sacral decubitus ulcer, stage II     Time reflects when diagnosis was documented in both MDM as applicable and the Disposition within this note     Time User Action Codes Description Comment    10/10/2018  2:39 AM Lattie Lucks Add [A41 9,  R65 21] Septic shock ([facility] )     10/10/2018  2:41 AM Lattie Lucks Add [E13 51] Peripheral vascular disease due to secondary diabetes mellitus ([facility] )     10/10/2018  2:41 AM Lattie Lucks Modify [E13 51] Peripheral vascular disease due to secondary diabetes mellitus ([facility] [facility] )     10/10/2018  2:41 AM Lattie Lucks Add [T81 89XD] Non-healing surgical wound of right groin, subsequent encounter     10/10/2018  2:41 AM Lattie Lucks Modify [T81 89XD] Non-healing surgical wound of right groin, subsequent encounter     10/10/2018  2:42 AM Lattie Lucks Add [L89 152] Sacral decubitus ulcer, stage II       ED Disposition     None      Follow-up Information    None         Current Discharge Medication List      CONTINUE these medications which have NOT CHANGED    Details   amLODIPine (NORVASC) 5 mg tablet Take 5 mg by mouth daily      ammonium lactate (LAC-HYDRIN) 12 % cream Apply topically as needed for dry skin      aspirin (ECOTRIN LOW STRENGTH) 81 mg EC tablet Take 81 mg by mouth daily      baclofen 20 mg tablet Take 20 mg by mouth 3 (three) times a day      calcium carbonate (TUMS) 500 mg chewable tablet Chew 1 tablet daily      citalopram (CeleXA) 20 mg tablet Take 10 mg by mouth daily      clopidogrel (PLAVIX) 75 mg tablet Take 1 tablet (75 mg total) by mouth daily  Qty: 30 tablet, Refills: 0    Associated Diagnoses: Long term (current) use of anticoagulants      !! gabapentin (NEURONTIN) 100 mg capsule Take 1 capsule (100 mg total) by mouth daily at bedtime  Qty: 90 capsule, Refills: 0    Associated Diagnoses: SUNDEEP (acute kidney injury) ([facility] )      ! ! gabapentin (NEURONTIN) 400 mg capsule Take 400 mg by mouth daily at bedtime      glimepiride (AMARYL) 1 mg tablet Take 1 tablet (1 mg total) by mouth daily  Qty: 90 tablet, Refills: 0    Associated Diagnoses: Uncontrolled type 2 diabetes mellitus with complication, unspecified whether long term insulin use      glucose blood (ACCU-CHEK DAREN PLUS) test strip Check 2-3 times a day  Qty: 100 each, Refills: 3    Associated Diagnoses: Controlled diabetes mellitus type 2 with complications, unspecified whether long term insulin use ([facility])      Incontinence Supply Disposable (ATTENDS UNDERWEAR 7 LARGE) MISC       insulin glargine (LANTUS) 100 units/mL subcutaneous injection Inject under the skin daily at bedtime      insulin lispro (HumaLOG) 100 units/mL injection Inject under the skin 3 (three) times a day before meals      lisinopril (ZESTRIL) 10 mg tablet Take 1 tablet (10 mg total) by mouth daily  Qty: 90 tablet, Refills: 0    Associated Diagnoses: Hypertension, unspecified type      metFORMIN (GLUCOPHAGE) 1000 MG tablet Take 1 tablet (1,000 mg total) by mouth 2 (two) times a day with meals for 90 days  Qty: 180 tablet, Refills: 0    Associated Diagnoses: Uncontrolled type 2 diabetes mellitus with complication, unspecified whether long term insulin use      morphine (MS CONTIN) 15 mg 12 hr tablet Take 15 mg by mouth 2 (two) times a day      neomycin-bacitracin-polymyxin (NEOSPORIN) 5-400-5,000 ointment Apply topically 4 (four) times a day      nicotine (NICODERM CQ) 21 mg/24 hr TD 24 hr patch Place 1 patch on the skin daily  Qty: 28 patch, Refills: 0    Associated Diagnoses: Tobacco use disorder      nystatin (MYCOSTATIN) powder Apply topically 2 (two) times a day Applied to bilateral groin after cleansing skin    Avoid contact with right groin wound  Qty: 15 g, Refills: 0 Associated Diagnoses: Postoperative state      oxyCODONE (ROXICODONE) 5 mg immediate release tablet Take 5 mg by mouth every 4 (four) hours as needed for moderate pain      pravastatin (PRAVACHOL) 20 mg tablet Take 1 tablet (20 mg total) by mouth daily  Qty: 30 tablet, Refills: 2    Associated Diagnoses: Peripheral vascular disease due to secondary diabetes mellitus (HCC)      senna-docusate sodium (SENOKOT S) 8 6-50 mg per tablet Take 1 tablet by mouth daily at bedtime Please take while taking narcotic pain medication  Qty: 30 tablet, Refills: 0    Associated Diagnoses: Peripheral vascular disease due to secondary diabetes mellitus (HCC)      tamsulosin (FLOMAX) 0 4 mg Take 1 capsule (0 4 mg total) by mouth daily with dinner  Qty: 30 capsule, Refills: 0    Associated Diagnoses: Urinary retention      TiZANidine (ZANAFLEX) 2 MG capsule TAKE 1 TABLET BEFORE BED AS NEEDED ( tablets)  Qty: 90 capsule, Refills: 0    Associated Diagnoses: Other chronic pain      Wound Dressings (MAXORB DRESSING EX) Apply topically       !! - Potential duplicate medications found  Please discuss with provider  No discharge procedures on file  ED Provider  Attending physically available and evaluated Faith Martinezmarilynn KEVIN managed the patient along with the ED Attending      Electronically Signed by         Kenia Vega MD  10/10/18 5332

## 2018-10-10 NOTE — CONSULTS
Consult - [address] 59 y o  male MRN: [id number]  Unit/Bed#: ED 17 Encounter: [id number]    Physician Requesting Consult: No att  providers found    Reason for Consultation / Chief Complaint:   Hypotension, tachcyardia    History of Present Illness:  Sarath Santillan is a 59 y o  male who presents with hypotension, tachycardia and elevated lactic acid  He has a history of a C4 fracture in 2012 and has been quadriparetic since that time  He lives at home with his wife  He reports diarrhea yesterday and reportedly had vomiting here in the ED today  He has had worsening spasticity over the last day as well  He has a right groin ulceration that appears non-infected and his work up at this time has been negative for infectious sources otherwise  He has had a CT chest/abdomen/pelvis revealing distal esophageal wall thickening throughout the esophagus and the GE junction and distended thickened loops of small bowel which was suggestive of enteritis  He has received over 2 liters of fluid at this time and his lactic acid has improved from 11 to 7 along with improvements in his HR and BP  He has been afebrile and reports being afebrile at home as well  History obtained from chart review and the patient      Past Medical History:  Past Medical History:   Diagnosis Date    Chronic obstructive lung disease ([address] )     RESOLVED: 8/17/17    Concussion     LAST ASSESSED: 8/17/17    COPD (chronic obstructive pulmonary disease) ([address] )     Depression     Diabetes mellitus ([address] )     Diabetic neuropathy ([address] )     Difficulty attaining erection     LAST ASSESSEDl: 8/17/17    Elevated liver enzymes     Gall stone pancreatitis     RESOLVED: 3/8/17    Gall stones     RESOLVED: 3/8/17    History of MRSA infection     Hypertension     LAST ASSESSED: 8/17/17    Spinal cord injury, C1-C7 ([address] )     Traumatic injury to musculoskeletal system     1-5 FLIGHT FALL DOWN THE STEPS - CERVICAL NECK INJURY - JAN 2, 2012; LAST ASSESSED: 0/15/55    Umbilical hernia without obstruction and without gangrene     RESOLVED: 3/8/17    Varicella     LAST ASSESSED: 8/17/17       Past Surgical History:  Past Surgical History:   Procedure Laterality Date    ARTERIOGRAM Right 7/31/2018    Procedure: ARTERIOGRAM Completion Agram;  Surgeon: Corbin Brand MD;  [facility];  Service: Vascular    BACK SURGERY      BYPASS FEMORAL-POPLITEAL Right 7/31/2018    Procedure: BYPASS FEMORAL-POPLITEAL R femoral- BK popliteal bypass;  Surgeon: Corbin Brand MD;  [facility];  Service: Vascular    CERVICAL FUSION      VERTEBRAL; C3-C4 FUSION    CHOLECYSTECTOMY LAPAROSCOPIC N/A 2/24/2017    Procedure: CHOLECYSTECTOMY LAPAROSCOPIC;  Surgeon: Jenna Hilton MD;  [facility];  Service:     CHOLECYSTECTOMY LAPAROSCOPIC  03/2017    EPIGASTRIC HERNIA REPAIR      MT DEBRIDEMENT, SKIN, SUB-Q TISSUE,=<20 SQ CM Right 8/28/2018    Procedure: RIGHT GROIN AND THIGH DEBRIDEMENT ([address]) Paul Chanel;  Surgeon: Lucy Hudson MD;  [facility];  Service: Vascular    MT THROMBOENDARTECTMY FEMORAL COMMON Right 7/31/2018    Procedure: ENDARTERECTOMY ARTERIAL FEMORAL R femoral endarterectomy w/ patch angioplasty ;  Surgeon: Corbin Brand MD;  [facility];  Service: Vascular    UMBILICAL HERNIA REPAIR N/A 2/24/2017    Procedure: REPAIR HERNIA UMBILICAL;  Surgeon: Jenna Hilton MD;  [facility];  Service:        Past Family History:  Family History   Problem Relation Age of Onset    Hypertension Mother         BENIGN    Alzheimer's disease Father     Heart disease Father         CARDIAC DISORDER    Stroke Father     Heart attack Father     No Known Problems Brother        Social History:  History   Smoking Status    Current Every Day Smoker    Packs/day: 0 25    Years: 40 00   Smokeless Tobacco    Never Used     Comment: 1 ppd      History   Alcohol Use    Yes     Comment: 3-4 mixed vodka drinks/daily      History   Drug Use No     Marital Status: /Civil Union  Home Medications:   Prior to Admission medications    Medication Sig Start Date End Date Taking?  Authorizing Provider   amLODIPine (NORVASC) 5 mg tablet Take 5 mg by mouth daily   Yes Historical Provider, MD   ammonium lactate (LAC-HYDRIN) 12 % cream Apply topically as needed for dry skin   Yes Historical Provider, MD   aspirin (ECOTRIN LOW STRENGTH) 81 mg EC tablet Take 81 mg by mouth daily   Yes Historical Provider, MD   baclofen 20 mg tablet Take 20 mg by mouth 3 (three) times a day   Yes Historical Provider, MD   calcium carbonate (TUMS) 500 mg chewable tablet Chew 1 tablet daily   Yes Historical Provider, MD   citalopram (CeleXA) 20 mg tablet Take 10 mg by mouth daily   Yes Historical Provider, MD   clopidogrel (PLAVIX) 75 mg tablet Take 1 tablet (75 mg total) by mouth daily 10/8/18  Yes CHERELLE Zheng   gabapentin (NEURONTIN) 100 mg capsule Take 1 capsule (100 mg total) by mouth daily at bedtime 5/8/18  Yes CHERELLE Saavedra   gabapentin (NEURONTIN) 400 mg capsule Take 400 mg by mouth daily at bedtime   Yes Historical Provider, MD   glimepiride (AMARYL) 1 mg tablet Take 1 tablet (1 mg total) by mouth daily 4/19/18  Yes Kyung Mcwilliams MD   glucose blood (ACCU-CHEK DAREN PLUS) test strip Check 2-3 times a day 7/10/18  Yes CHERELLE Ramsay   Incontinence Supply Disposable (ATTENDS UNDERWEAR 7 LARGE) MISC  4/4/18  Yes Historical Provider, MD   insulin glargine (LANTUS) 100 units/mL subcutaneous injection Inject under the skin daily at bedtime   Yes Historical Provider, MD   insulin lispro (HumaLOG) 100 units/mL injection Inject under the skin 3 (three) times a day before meals   Yes Historical Provider, MD   lisinopril (ZESTRIL) 10 mg tablet Take 1 tablet (10 mg total) by mouth daily 4/19/18  Yes Kyung Mcwilliams MD   metFORMIN (GLUCOPHAGE) 1000 MG tablet Take 1 tablet (1,000 mg total) by mouth 2 (two) times a day with meals for 90 days 4/19/18 10/9/18 Yes Amada Hernandez MD   morphine (MS CONTIN) 15 mg 12 hr tablet Take 15 mg by mouth 2 (two) times a day   Yes Historical Provider, MD   neomycin-bacitracin-polymyxin (NEOSPORIN) 5-400-5,000 ointment Apply topically 4 (four) times a day   Yes Historical Provider, MD   nicotine (NICODERM CQ) 21 mg/24 hr TD 24 hr patch Place 1 patch on the skin daily 8/6/18  Yes Jessica Reed MD   nystatin (MYCOSTATIN) powder Apply topically 2 (two) times a day Applied to bilateral groin after cleansing skin  Avoid contact with right groin wound 9/14/18  Yes CHERELLE Bland   oxyCODONE (ROXICODONE) 5 mg immediate release tablet Take 5 mg by mouth every 4 (four) hours as needed for moderate pain   Yes Historical Provider, MD   pravastatin (PRAVACHOL) 20 mg tablet Take 1 tablet (20 mg total) by mouth daily 6/28/18  Yes Sid Briones PA-C   senna-docusate sodium (SENOKOT S) 8 6-50 mg per tablet Take 1 tablet by mouth daily at bedtime Please take while taking narcotic pain medication   8/5/18  Yes Jessica Reed MD   tamsulosin (FLOMAX) 0 4 mg Take 1 capsule (0 4 mg total) by mouth daily with dinner 8/5/18  Yes Jessica Reed MD   TiZANidine (ZANAFLEX) 2 MG capsule TAKE 1 TABLET BEFORE BED AS NEEDED ( tablets) 5/8/18  Yes CHERELLE Dupree   Wound Dressings (MAXORB DRESSING EX) Apply topically   Yes Historical Provider, MD       Inpatient Medications:  Scheduled Meds:  Current Facility-Administered Medications:  acetaminophen 325 mg Rectal Once Terri Johnson MD    ammonium lactate  Topical BID Gisela Thornton MD    aspirin 81 mg Oral Daily Gisela Thornton MD    baclofen 20 mg Oral TID Gisela Thornton MD    cefepime 1,000 mg Intravenous Q12H Gisela Thornton MD Last Rate: Stopped (10/10/18 0254)   citalopram 10 mg Oral Daily Gisela Thornton MD    clopidogrel 75 mg Oral Daily Gisela Thornton MD    heparin (porcine) 5,000 Units Subcutaneous [facility] Gisela Thornton MD    multi-electrolyte 125 mL/hr Intravenous Continuous Zeny Styles PA-C    multi-electrolyte 1,000 mL Intravenous Once Zeny Styles PA-C    nicotine 1 patch Transdermal Daily Rakel Colvin MD    nystatin  Topical BID Rakel Colvin MD    pravastatin 20 mg Oral Daily Rakel Colvin MD    tamsulosin 0 4 mg Oral Daily With Miguel Ángel Bailey MD    vancomycin 10 mg/kg Intravenous Q12H Rakel Colvin MD      Continuous Infusions:  multi-electrolyte 125 mL/hr     PRN Meds:       Allergies: Allergies   Allergen Reactions    Seasonal Ic  [Cholestatin]        ROS:   Review of Systems   Constitutional: Negative  Negative for chills, fatigue and fever  HENT: Negative  Eyes: Negative  Respiratory: Negative  Negative for shortness of breath  Cardiovascular: Negative  Negative for chest pain  Gastrointestinal: Positive for diarrhea, nausea and vomiting  Endocrine: Negative  Genitourinary: Negative  Musculoskeletal:        + reports increased spasticity over the last day     Skin: Negative  Allergic/Immunologic: Negative  Neurological: Negative  Hematological: Negative  Psychiatric/Behavioral: Negative  Vitals:  Vitals:    10/10/18 0126 10/10/18 0130 10/10/18 0145 10/10/18 0200   BP: 103/56 103/56  93/56   BP Location:  Right arm  Right arm   Pulse: (!) 117 (!) 116 98 98   Resp: 20 (!) 24 22 (!) 28   Temp:  97 9 °F (36 6 °C) 97 9 °F (36 6 °C) 98 2 °F (36 8 °C)   TempSrc:       SpO2: 94% 94% 97% 96%   Weight:       Height:         Temperature:   Temp (24hrs), Av °F (36 7 °C), Min:97 2 °F (36 2 °C), Max:98 6 °F (37 °C)    Current Temperature: 98 2 °F (36 8 °C)    Weights:   IBW: 73 kg  Body mass index is 24 39 kg/m²      Hemodynamic Monitoring:  N/A     Non-Invasive/Invasive Ventilation Settings:  Respiratory    Lab Data (Last 4 hours)    None         O2/Vent Data (Last 4 hours)    None              No results found for: PHART, ELY3PVB, PO2ART, SAF0GTN, V0NXKKLX, BEART, SOURCE  SpO2: SpO2: 96 %, SpO2 Device: O2 Device: None (Room air)     Physical Exam:  Physical Exam   Constitutional: He is oriented to person, place, and time  He appears well-developed  HENT:   Head: Normocephalic  Eyes: Pupils are equal, round, and reactive to light  Neck: Normal range of motion  Neck supple  No JVD present  Cardiovascular: Normal rate and normal heart sounds  Exam reveals no gallop and no friction rub  No murmur heard  Pulmonary/Chest: Effort normal and breath sounds normal  No respiratory distress  He has no wheezes  He has no rales  Abdominal: Soft  Bowel sounds are normal  He exhibits distension  There is no tenderness  There is no rebound and no guarding  Genitourinary: Penis normal    Genitourinary Comments: + Vasques catheter in place   Musculoskeletal: Normal range of motion  He exhibits no edema, tenderness or deformity  Neurological: He is alert and oriented to person, place, and time  + contracted bilateral upper extremities  + quadriparesis, at his baseline   Skin: Skin is warm and dry  No rash noted  No erythema  Psychiatric: He has a normal mood and affect  Labs:    Results from last 7 days  Lab Units 10/09/18  2207   WBC Thousand/uL 11 49*   HEMOGLOBIN g/dL 11 1*   HEMATOCRIT % 34 7*   PLATELETS Thousands/uL 275   NEUTROS PCT % 77*   MONOS PCT % 9      Results from last 7 days  Lab Units 10/09/18  2207   SODIUM mmol/L 135*   POTASSIUM mmol/L 5 3   CHLORIDE mmol/L 101   CO2 mmol/L 18*   BUN mg/dL 21   CREATININE mg/dL 2 34*   CALCIUM mg/dL 9 4   ALK PHOS U/L 76   ALT U/L 28   AST U/L 22                    Results from last 7 days  Lab Units 10/10/18  0144 10/09/18  2247   LACTIC ACID mmol/L 7 0* 11 8*     No results found for: TROPONINI    Imaging:   Ct Chest Abdomen Pelvis Wo Contrast  Result Date: 10/10/2018  Impression: 1    Diffuse esophageal wall thickening throughout the entire esophagus and involving the gastroesophageal junction  Possible inflammatory or infectious esophagitis versus malignancy  No evidence of gastritis  2   Mildly distended thickened loops of small bowel suggesting enteritis  No evidence of colitis or bowel obstruction  3   No evidence of acute cardiopulmonary process  Workstation performed: [id number]    I have personally reviewed pertinent reports  EKG: no new This was personally reviewed by myself  Micro:  Lab Results   Component Value Date    BLOODCX No Growth After 5 Days  08/01/2018    BLOODCX No Growth After 5 Days  08/01/2018       ______________________________________________________________________    Assessment and Plan:     60 y/o male with probable gastroenteritis and severe dehydration with elevated lactic acid, hypotension and tachycardia    Neuro:   History of C4 fracture, quadriparesis with increased spasticity over the last 24 hours-  due to injury in 2012  Spasticity likely related to dehydration  Continue IVF hydration and home baclofen  PT/OT while in the [facility] as able  Non-ambulatory at baseline  Will check CPK due to spasticity in setting of renal failure  CV:   Hypotension- related to dehydration and possible sepsis  Fluid responsive  Continue to target MAP >/= 65 mmHg  Continue IVF hydration with an additional liter of isolyte now and isolyte to run at 125 ml/hr    Sinus tachycardia- improved with volume  Secondary to dehydration  ECHO in July 2018 shows EF 55% and trace MR/TR  Lung:   Acute hypoxic respiratory insufficiency- due to atelectasis/poor effort  Currently on 4 liters NC  Waveform improved with repositioning sat prob and saturations 95%  Encourage IS/Pulm toilette/OOB  CT chest negative for infiltrate  No other pulmonary symptoms  Wean oxygen for saturation > 92%  History of COPD- no home medications listed  No evidence of AE at this time  GI:   Recent history of diarrhea, possible enteritis-  Continue to monitor I/O's   Likely viral in nature  No fever or leukocytosis  Pro calcitonin 0 11    Nausea and vomiting- one episode in ED  CT A/P negative for significant gastric distention  Findings consistent with possible enteritis of the small bowel  Continue to monitor symptoms and continue IVF hydration while NPO  Start diabetic diet today as long as no further emesis  F/E/N:   Continue isolyte at 125 ml/hr  Monitor potassium with SUNDEEP, recheck BMP in AM  Last K+ 5 3  NPO/IVFs  Start diet when able  :   SUNDEEP- Baseline Cr 0 6-0 7, currently 2 34  Suspect this has improved with IVF hydration  This is likely ATN related due to hypotension from dehydration  Continue IVFs and recheck in AM  Vasques catheter in place for strict I/O's  Will check CPK to evaluate for Rhabdomyolysis as a source given increased spasticity in the last day  ID:   No clear etiology for sepsis at this time  Continue BS antibiotics pending blood cultures  Though suspicion for infection is low at this time given lack of fever, leukocytosis and procalcitonin of 0 11 in setting of negative CT scan and UA  Will f/u BCx and d/c antibiotics if able in the next 24-48 hours  Heme:   Anemia of chronic disease- mild and at baseline of 11  Endo:   History of DM II- continue SSI algorithm 1 and hold home medication and LA insulin at this time  Goal BS <180    Msk/Skin:   R groin wound from prior vascular surgery (fem-pop bypass in 7/2018)- f/u vascular sx consult  Wound care consult pending as well  No evidence of wound infection at this time  Routine skin care, frequent repositioning and offloading  Proph:  SCDs, SQH    Disposition:   Admit to step down on medicine service  Will follow along as CCRS  Counseling / Coordination of Care  Total time spent today 45 minutes  Greater than 50% of total time was spent with the patient and / or family counseling and / or coordination of care   A description of the counseling / coordination of care: d/w nurse and patient as well as ER resident plan of care  ______________________________________________________________________    VTE Pharmacologic Prophylaxis: Heparin  VTE Mechanical Prophylaxis: sequential compression device    Invasive lines and devices: Invasive Devices     Peripheral Intravenous Line            Peripheral IV 10/09/18 Left Arm less than 1 day    Peripheral IV 10/09/18 Left Forearm less than 1 day          Drain            Negative Pressure Wound Therapy (V A C ) Groin Right 42 days    Urethral Catheter Latex 16 Fr  less than 1 day                Code Status: Level 1 - Full Code  POA:    POLST:      Given critical illness, patient length of stay will require greater than two midnights  Portions of the record may have been created with voice recognition software  Occasional wrong word or "sound a like" substitutions may have occurred due to the inherent limitations of voice recognition software  Read the chart carefully and recognize, using context, where substitutions have occurred        Tae Molina PA-C    Consults

## 2018-10-10 NOTE — CONSULTS
Consultation - Vascular Surgery   Genny Ennis [address] y o  male MRN: [id number]  Unit/Bed#: [facility] 425-01 Encounter: [id number]    Assessment/Plan     Assessment:  62yM w/ healing groin wound s/p R fem endartarectomy and R fem-pop bypass    - Groin wound is healing well (Picture below in physical exam)  Non-infected in appearance    - RLE: Palpable graft, doppl DP/PT    Plan:  Continue w/ local wound care - frequent cleaning of groin  Gauze dressing to R groin  Rest of care per primary    History of Present Illness     HPI:  Genny Ennis is a [address] y o  male who presented to ED with increasing muscle spasm  He was found to be hypotensive and tachycardic  He was a rapid response this morning for hypoxia and lethargy and placed on BiPAP  We were consulted regarding his right groin wound  He recently had a right femoral endartarectomy and a right fem-BK pop bypass (8/19) complicated by groin wound dehiscence  This was followed by excisional debridement, washout, and VAC placement (8/28)  He was recently seen in the office 9/14 and VAC was discontinued as wound was superficial  He had fungal groin dermatitis for which he was given nystatin powder to b/l groins            Inpatient consult to Vascular Surgery     Date/Time 10/10/2018 11:00 AM     Performed by  Camille Ferreira by Mabelene Alpers              Review of Systems   Unable to perform ROS: Mental status change       Historical Information   Past Medical History:   Diagnosis Date    Chronic obstructive lung disease ([address] )     RESOLVED: 8/17/17    Concussion     LAST ASSESSED: 8/17/17    COPD (chronic obstructive [facility] disease) [facility]     Depression     Diabetes mellitus ([address] )     Diabetic neuropathy ([facility] )     Difficulty attaining erection     LAST ASSESSEDl: 8/17/17    Elevated liver enzymes     Gall stone pancreatitis     RESOLVED: 3/8/17    Gall stones     RESOLVED: 3/8/17    History of MRSA infection     Hypertension     LAST ASSESSED: 8/17/17    Spinal cord injury, C1-C7 ([facility] )     Traumatic injury to musculoskeletal system     1-5 FLIGHT FALL DOWN THE STEPS - CERVICAL NECK INJURY - JAN 2, 2012; LAST ASSESSED: 6/87/87    Umbilical hernia without obstruction and without gangrene     RESOLVED: 3/8/17    Varicella     LAST ASSESSED: 8/17/17     Past Surgical History:   Procedure Laterality Date    ARTERIOGRAM Right 7/31/2018    Procedure: ARTERIOGRAM Completion Agram;  Surgeon: Debra Umaña MD;  [facility];  Service: Vascular    BACK SURGERY      BYPASS FEMORAL-POPLITEAL Right 7/31/2018    Procedure: BYPASS FEMORAL-POPLITEAL R femoral- BK popliteal bypass;  Surgeon: Debra Umaña MD;  [facility];  Service: Vascular    CERVICAL FUSION      VERTEBRAL; C3-C4 FUSION    CHOLECYSTECTOMY LAPAROSCOPIC N/A 2/24/2017    Procedure: CHOLECYSTECTOMY LAPAROSCOPIC;  Surgeon: Elizabeth Arechiga MD;  [facility];  Service:     CHOLECYSTECTOMY LAPAROSCOPIC  03/2017    EPIGASTRIC HERNIA REPAIR      OR DEBRIDEMENT, SKIN, SUB-Q TISSUE,=<20 SQ CM Right 8/28/2018    Procedure: RIGHT GROIN AND THIGH DEBRIDEMENT ([address]) [place];  Surgeon: Jennifer Bryant MD;  [facility];  Service: Vascular    OR THROMBOENDARTECTMY FEMORAL COMMON Right 7/31/2018    Procedure: ENDARTERECTOMY ARTERIAL FEMORAL R femoral endarterectomy w/ patch angioplasty ;  Surgeon: Debra Umaña MD;  [facility];  Service: Vascular    UMBILICAL HERNIA REPAIR N/A 2/24/2017    Procedure: REPAIR HERNIA UMBILICAL;  Surgeon: Elizabeth Arechiga MD;  [facility];  Service:      Social History   History   Alcohol Use    Yes     Comment: 3-4 mixed vodka drinks/daily      History   Drug Use No     History   Smoking Status    Current Every Day Smoker    Packs/day: 0 25    Years: 40 00   Smokeless Tobacco    Never Used     Comment: 1 ppd      Family History: non-contributory    Meds/Allergies   all current active meds have been reviewed, current meds:   Current Facility-Administered Medications   Medication Dose Route Frequency    acetaminophen (TYLENOL) rectal suppository 325 mg  325 mg Rectal Once    ammonium lactate (LAC-HYDRIN) 12 % cream   Topical BID    aspirin (ECOTRIN LOW STRENGTH) EC tablet 81 mg  81 mg Oral Daily    baclofen tablet 20 mg  20 mg Oral TID    cefepime (MAXIPIME) 1,000 mg in dextrose 5 % 50 mL IVPB  1,000 mg Intravenous Q12H    citalopram (CeleXA) tablet 10 mg  10 mg Oral Daily    clopidogrel (PLAVIX) tablet 75 mg  75 mg Oral Daily    heparin (porcine) subcutaneous injection 5,000 Units  5,000 Units Subcutaneous Q8H [facility]    insulin lispro (HumaLOG) 100 units/mL subcutaneous injection 1-5 Units  1-5 Units Subcutaneous Q6H [facility]    nicotine (NICODERM CQ) 14 mg/24hr TD 24 hr patch 1 patch  1 patch Transdermal Daily    nystatin (MYCOSTATIN) powder   Topical BID    pravastatin (PRAVACHOL) tablet 20 mg  20 mg Oral Daily    tamsulosin (FLOMAX) capsule 0 4 mg  0 4 mg Oral Daily With Dinner    vancomycin (VANCOCIN) IVPB (premix) 750 mg  10 mg/kg Intravenous Q12H    and PTA meds:   Prior to Admission Medications   Prescriptions Last Dose Informant Patient Reported? Taking?    Incontinence Supply Disposable (ATTENDS UNDERWEAR 7 LARGE) MISC  Outside Facility (Specify) Yes Yes   TiZANidine (ZANAFLEX) 2 MG capsule  Outside Facility (Specify) No Yes   Sig: TAKE 1 TABLET BEFORE BED AS NEEDED ( tablets)   Wound Dressings (MAXORB DRESSING EX)  Outside Facility (Specify) Yes Yes   Sig: Apply topically   amLODIPine (NORVASC) 5 mg tablet  Outside Facility (Specify) Yes Yes   Sig: Take 5 mg by mouth daily   ammonium lactate (LAC-HYDRIN) 12 % cream  Outside Facility (Specify) Yes Yes   Sig: Apply topically as needed for dry skin   aspirin (ECOTRIN LOW STRENGTH) 81 mg EC tablet  Outside Facility (Specify) Yes Yes   Sig: Take 81 mg by mouth daily   baclofen 20 mg tablet  Outside Facility (Specify) Yes Yes   Sig: Take 20 mg by mouth 3 (three) times a day   calcium carbonate (TUMS) 500 mg chewable tablet  Outside Facility (Specify) Yes Yes   Sig: Chew 1 tablet daily   citalopram (CeleXA) 20 mg tablet  Outside Facility (Specify) Yes Yes   Sig: Take 10 mg by mouth daily   clopidogrel (PLAVIX) 75 mg tablet   No Yes   Sig: Take 1 tablet (75 mg total) by mouth daily   gabapentin (NEURONTIN) 100 mg capsule  Outside Facility (Specify) No Yes   Sig: Take 1 capsule (100 mg total) by mouth daily at bedtime   gabapentin (NEURONTIN) 400 mg capsule  Outside Facility (Specify) Yes Yes   Sig: Take 400 mg by mouth daily at bedtime   glimepiride (AMARYL) 1 mg tablet  Outside Facility (Specify) No Yes   Sig: Take 1 tablet (1 mg total) by mouth daily   glucose blood (ACCU-CHEK DAREN PLUS) test strip  Outside Facility (Specify) No Yes   Sig: Check 2-3 times a day   insulin glargine (LANTUS) 100 units/mL subcutaneous injection  Outside Facility (Specify) Yes Yes   Sig: Inject under the skin daily at bedtime   insulin lispro (HumaLOG) 100 units/mL injection  Outside Facility (Specify) Yes Yes   Sig: Inject under the skin 3 (three) times a day before meals   lisinopril (ZESTRIL) 10 mg tablet  Outside Facility (Specify) No Yes   Sig: Take 1 tablet (10 mg total) by mouth daily   metFORMIN (GLUCOPHAGE) 1000 MG tablet  Self No Yes   Sig: Take 1 tablet (1,000 mg total) by mouth 2 (two) times a day with meals for 90 days   morphine (MS CONTIN) 15 mg 12 hr tablet  Outside Facility (Specify) Yes Yes   Sig: Take 15 mg by mouth 2 (two) times a day   neomycin-bacitracin-polymyxin (NEOSPORIN) 5-400-5,000 ointment  Outside Facility (Specify) Yes Yes   Sig: Apply topically 4 (four) times a day   nicotine (NICODERM CQ) 21 mg/24 hr TD 24 hr patch  Outside Facility (Specify) No Yes   Sig: Place 1 patch on the skin daily   nystatin (MYCOSTATIN) powder   No Yes   Sig: Apply topically 2 (two) times a day Applied to bilateral groin after cleansing skin    Avoid contact with right groin wound   oxyCODONE (ROXICODONE) 5 mg immediate release tablet  Outside Facility (Specify) Yes Yes   Sig: Take 5 mg by mouth every 4 (four) hours as needed for moderate pain   pravastatin (PRAVACHOL) 20 mg tablet  Outside Facility (Specify) No Yes   Sig: Take 1 tablet (20 mg total) by mouth daily   senna-docusate sodium (SENOKOT S) 8 6-50 mg per tablet  Outside Facility (Specify) No Yes   Sig: Take 1 tablet by mouth daily at bedtime Please take while taking narcotic pain medication     tamsulosin (FLOMAX) 0 4 mg  Outside Facility (Specify) No Yes   Sig: Take 1 capsule (0 4 mg total) by mouth daily with dinner      Facility-Administered Medications: None     Allergies   Allergen Reactions    Seasonal Ic  [Cholestatin]        Objective   First Vitals:   Blood Pressure: (!) 82/52 (10/09/18 2151)  Pulse: 98 (10/09/18 2151)  Temperature: 98 6 °F (37 °C) (10/09/18 2154)  Temp Source: Oral (10/09/18 2154)  Respirations: 22 (10/09/18 2151)  Height: 5' 10" (177 8 cm) (10/09/18 2151)  Weight - Scale: 77 1 kg (170 lb) (10/09/18 2151)  SpO2: 94 % (10/09/18 2151)    Current Vitals:   Blood Pressure: 118/64 (10/10/18 0936)  Pulse: (!) 113 (10/10/18 0936)  Temperature: 98 5 °F (36 9 °C) (10/10/18 0750)  Temp Source: Oral (10/09/18 2154)  Respirations: 20 (10/10/18 0750)  Height: 5' 10" (177 8 cm) (10/09/18 2151)  Weight - Scale: 83 1 kg (183 lb 3 2 oz) (10/10/18 0600)  SpO2: 99 % (10/10/18 1004)      Intake/Output Summary (Last 24 hours) at 10/10/18 1100  Last data filed at 10/10/18 1000   Gross per 24 hour   Intake             2310 ml   Output             2100 ml   Net              210 ml       Invasive Devices     Peripheral Intravenous Line            Peripheral IV 10/09/18 Left Arm less than 1 day    Peripheral IV 10/09/18 Left Forearm less than 1 day          Drain            Negative Pressure Wound Therapy (V A C ) Groin Right 42 days    Urethral Catheter Latex 16 Fr  less than 1 day                Physical Exam Constitutional: No distress  HENT:   Head: Normocephalic and atraumatic  Cardiovascular: Normal rate and intact distal pulses  Pulmonary/Chest: Effort normal    On BiPAP  Saturations ~98% now   Abdominal: Soft  He exhibits no distension  There is no tenderness  Musculoskeletal:   Paralysis  States intact sensation to legs  No changes in sensation, no increased pain  Some lower extremity edema L>R   Neurological:   Lethargic but responsive  Follows commands  Oriented  Skin: Skin is warm  He is not diaphoretic  R doppl DP/PT  Palpable bypass graft  L doppl DP/PT  Feet perfused  Groin wound healing well  Lab Results:   I have personally reviewed pertinent lab results  , CBC:   Lab Results   Component Value Date    WBC 12 19 (H) 10/10/2018    HGB 10 5 (L) 10/10/2018    HCT 31 (L) 10/10/2018     (H) 10/10/2018     10/10/2018    MCH 32 4 10/10/2018    MCHC 31 9 10/10/2018    RDW 15 7 (H) 10/10/2018    MPV 10 0 10/10/2018    NRBC 0 10/10/2018   , CMP:   Lab Results   Component Value Date     10/10/2018    K 4 9 10/10/2018     10/10/2018    CO2 22 10/10/2018    BUN 26 (H) 10/10/2018    CREATININE 1 97 (H) 10/10/2018    GLUCOSE 145 (H) 10/10/2018    CALCIUM 8 2 (L) 10/10/2018    AST 22 10/09/2018    ALT 28 10/09/2018    ALKPHOS 76 10/09/2018    EGFR 36 10/10/2018   , Coagulation: No results found for: PT, INR, APTT  Imaging: I have personally reviewed pertinent reports  EKG, Pathology, and Other Studies: I have personally reviewed pertinent reports  Counseling / Coordination of Care  Total floor / unit time spent today 25 minutes  Greater than 50% of total time was spent with the patient and / or family counseling and / or coordination of care  A description of the counseling / coordination of care: 25

## 2018-10-11 ENCOUNTER — APPOINTMENT (INPATIENT)
Dept: RADIOLOGY | Facility: HOSPITAL | Age: 58
DRG: 871 | End: 2018-10-11
Payer: COMMERCIAL

## 2018-10-11 ENCOUNTER — APPOINTMENT (OUTPATIENT)
Dept: RADIOLOGY | Facility: HOSPITAL | Age: 58
DRG: 871 | End: 2018-10-11
Attending: GENERAL PRACTICE
Payer: COMMERCIAL

## 2018-10-11 PROBLEM — R57.9 SHOCK (HCC): Status: ACTIVE | Noted: 2018-10-10

## 2018-10-11 PROBLEM — J96.01 ACUTE RESPIRATORY FAILURE WITH HYPOXIA (HCC): Status: RESOLVED | Noted: 2018-10-11 | Resolved: 2018-10-11

## 2018-10-11 PROBLEM — M62.82 RHABDOMYOLYSIS: Status: ACTIVE | Noted: 2018-10-11

## 2018-10-11 PROBLEM — R57.9 SHOCK (HCC): Status: RESOLVED | Noted: 2018-10-10 | Resolved: 2018-10-11

## 2018-10-11 PROBLEM — J69.0 ASPIRATION PNEUMONITIS (HCC): Status: ACTIVE | Noted: 2018-10-11

## 2018-10-11 PROBLEM — G93.40 ENCEPHALOPATHY: Status: ACTIVE | Noted: 2018-10-11

## 2018-10-11 PROBLEM — G93.40 ENCEPHALOPATHY: Status: RESOLVED | Noted: 2018-10-11 | Resolved: 2018-10-11

## 2018-10-11 PROBLEM — J96.01 ACUTE RESPIRATORY FAILURE WITH HYPOXIA (HCC): Status: ACTIVE | Noted: 2018-10-11

## 2018-10-11 LAB
ANION GAP SERPL CALCULATED.3IONS-SCNC: 9 MMOL/L (ref 4–13)
BUN SERPL-MCNC: 13 MG/DL (ref 5–25)
CALCIUM SERPL-MCNC: 8.5 MG/DL (ref 8.3–10.1)
CHLORIDE SERPL-SCNC: 104 MMOL/L (ref 100–108)
CK MB SERPL-MCNC: 9.2 NG/ML (ref 0–5)
CK MB SERPL-MCNC: <1 % (ref 0–2.5)
CK SERPL-CCNC: 1598 U/L (ref 39–308)
CO2 SERPL-SCNC: 24 MMOL/L (ref 21–32)
CREAT SERPL-MCNC: 0.82 MG/DL (ref 0.6–1.3)
ERYTHROCYTE [DISTWIDTH] IN BLOOD BY AUTOMATED COUNT: 15.5 % (ref 11.6–15.1)
FLUAV AG SPEC QL: NORMAL
FLUBV AG SPEC QL: NORMAL
GFR SERPL CREATININE-BSD FRML MDRD: 96 ML/MIN/1.73SQ M
GLUCOSE SERPL-MCNC: 104 MG/DL (ref 65–140)
GLUCOSE SERPL-MCNC: 121 MG/DL (ref 65–140)
GLUCOSE SERPL-MCNC: 137 MG/DL (ref 65–140)
GLUCOSE SERPL-MCNC: 181 MG/DL (ref 65–140)
GLUCOSE SERPL-MCNC: 183 MG/DL (ref 65–140)
GLUCOSE SERPL-MCNC: 256 MG/DL (ref 65–140)
HCT VFR BLD AUTO: 30.6 % (ref 36.5–49.3)
HGB BLD-MCNC: 10.1 G/DL (ref 12–17)
MAGNESIUM SERPL-MCNC: 1.5 MG/DL (ref 1.6–2.6)
MCH RBC QN AUTO: 32.7 PG (ref 26.8–34.3)
MCHC RBC AUTO-ENTMCNC: 33 G/DL (ref 31.4–37.4)
MCV RBC AUTO: 99 FL (ref 82–98)
MRSA NOSE QL CULT: NORMAL
PHOSPHATE SERPL-MCNC: 3.4 MG/DL (ref 2.3–4.1)
PLATELET # BLD AUTO: 197 THOUSANDS/UL (ref 149–390)
PMV BLD AUTO: 9.8 FL (ref 8.9–12.7)
POTASSIUM SERPL-SCNC: 3.9 MMOL/L (ref 3.5–5.3)
PROCALCITONIN SERPL-MCNC: 0.14 NG/ML
RBC # BLD AUTO: 3.09 MILLION/UL (ref 3.88–5.62)
RSV B RNA SPEC QL NAA+PROBE: NORMAL
SODIUM SERPL-SCNC: 137 MMOL/L (ref 136–145)
WBC # BLD AUTO: 9.78 THOUSAND/UL (ref 4.31–10.16)

## 2018-10-11 PROCEDURE — 87205 SMEAR GRAM STAIN: CPT | Performed by: GENERAL PRACTICE

## 2018-10-11 PROCEDURE — G8998 SWALLOW D/C STATUS: HCPCS

## 2018-10-11 PROCEDURE — 99233 SBSQ HOSP IP/OBS HIGH 50: CPT | Performed by: GENERAL PRACTICE

## 2018-10-11 PROCEDURE — 84100 ASSAY OF PHOSPHORUS: CPT | Performed by: GENERAL PRACTICE

## 2018-10-11 PROCEDURE — 82550 ASSAY OF CK (CPK): CPT | Performed by: GENERAL PRACTICE

## 2018-10-11 PROCEDURE — 80048 BASIC METABOLIC PNL TOTAL CA: CPT | Performed by: GENERAL PRACTICE

## 2018-10-11 PROCEDURE — 85027 COMPLETE CBC AUTOMATED: CPT | Performed by: GENERAL PRACTICE

## 2018-10-11 PROCEDURE — 74230 X-RAY XM SWLNG FUNCJ C+: CPT

## 2018-10-11 PROCEDURE — 99232 SBSQ HOSP IP/OBS MODERATE 35: CPT | Performed by: STUDENT IN AN ORGANIZED HEALTH CARE EDUCATION/TRAINING PROGRAM

## 2018-10-11 PROCEDURE — 83735 ASSAY OF MAGNESIUM: CPT | Performed by: GENERAL PRACTICE

## 2018-10-11 PROCEDURE — 82948 REAGENT STRIP/BLOOD GLUCOSE: CPT

## 2018-10-11 PROCEDURE — 84145 PROCALCITONIN (PCT): CPT | Performed by: GENERAL PRACTICE

## 2018-10-11 PROCEDURE — 92611 MOTION FLUOROSCOPY/SWALLOW: CPT

## 2018-10-11 PROCEDURE — G8997 SWALLOW GOAL STATUS: HCPCS

## 2018-10-11 PROCEDURE — 87070 CULTURE OTHR SPECIMN AEROBIC: CPT | Performed by: GENERAL PRACTICE

## 2018-10-11 PROCEDURE — 70491 CT SOFT TISSUE NECK W/DYE: CPT

## 2018-10-11 PROCEDURE — G8996 SWALLOW CURRENT STATUS: HCPCS

## 2018-10-11 PROCEDURE — 82553 CREATINE MB FRACTION: CPT | Performed by: GENERAL PRACTICE

## 2018-10-11 RX ORDER — MAGNESIUM SULFATE HEPTAHYDRATE 40 MG/ML
2 INJECTION, SOLUTION INTRAVENOUS ONCE
Status: COMPLETED | OUTPATIENT
Start: 2018-10-11 | End: 2018-10-11

## 2018-10-11 RX ORDER — MAGNESIUM HYDROXIDE/ALUMINUM HYDROXICE/SIMETHICONE 120; 1200; 1200 MG/30ML; MG/30ML; MG/30ML
30 SUSPENSION ORAL EVERY 4 HOURS PRN
Status: DISCONTINUED | OUTPATIENT
Start: 2018-10-11 | End: 2018-10-13 | Stop reason: HOSPADM

## 2018-10-11 RX ORDER — FAMOTIDINE 20 MG/1
20 TABLET, FILM COATED ORAL 2 TIMES DAILY
Status: DISCONTINUED | OUTPATIENT
Start: 2018-10-11 | End: 2018-10-13 | Stop reason: HOSPADM

## 2018-10-11 RX ORDER — ACETAMINOPHEN 325 MG/1
650 TABLET ORAL EVERY 6 HOURS PRN
Status: DISCONTINUED | OUTPATIENT
Start: 2018-10-11 | End: 2018-10-13 | Stop reason: HOSPADM

## 2018-10-11 RX ORDER — MORPHINE SULFATE 15 MG/1
15 TABLET, FILM COATED, EXTENDED RELEASE ORAL 2 TIMES DAILY
Status: DISCONTINUED | OUTPATIENT
Start: 2018-10-11 | End: 2018-10-12

## 2018-10-11 RX ORDER — OXYCODONE HYDROCHLORIDE 5 MG/1
5 TABLET ORAL EVERY 4 HOURS PRN
Status: DISCONTINUED | OUTPATIENT
Start: 2018-10-11 | End: 2018-10-13 | Stop reason: HOSPADM

## 2018-10-11 RX ORDER — SODIUM CHLORIDE 9 MG/ML
150 INJECTION, SOLUTION INTRAVENOUS CONTINUOUS
Status: DISCONTINUED | OUTPATIENT
Start: 2018-10-11 | End: 2018-10-12

## 2018-10-11 RX ORDER — GABAPENTIN 100 MG/1
100 CAPSULE ORAL
Status: COMPLETED | OUTPATIENT
Start: 2018-10-11 | End: 2018-10-11

## 2018-10-11 RX ADMIN — OXYCODONE HYDROCHLORIDE 5 MG: 5 TABLET ORAL at 18:16

## 2018-10-11 RX ADMIN — Medication: at 08:52

## 2018-10-11 RX ADMIN — Medication 1 APPLICATION: at 17:48

## 2018-10-11 RX ADMIN — NYSTATIN: 100000 POWDER TOPICAL at 08:52

## 2018-10-11 RX ADMIN — OXYCODONE HYDROCHLORIDE 5 MG: 5 TABLET ORAL at 22:46

## 2018-10-11 RX ADMIN — HEPARIN SODIUM 5000 UNITS: 5000 INJECTION, SOLUTION INTRAVENOUS; SUBCUTANEOUS at 05:02

## 2018-10-11 RX ADMIN — FAMOTIDINE 20 MG: 20 TABLET ORAL at 19:51

## 2018-10-11 RX ADMIN — BACLOFEN 20 MG: 20 TABLET ORAL at 21:35

## 2018-10-11 RX ADMIN — CITALOPRAM HYDROBROMIDE 10 MG: 10 TABLET ORAL at 14:32

## 2018-10-11 RX ADMIN — HEPARIN SODIUM 5000 UNITS: 5000 INJECTION, SOLUTION INTRAVENOUS; SUBCUTANEOUS at 21:35

## 2018-10-11 RX ADMIN — CLOPIDOGREL BISULFATE 75 MG: 75 TABLET ORAL at 14:32

## 2018-10-11 RX ADMIN — BACLOFEN 20 MG: 20 TABLET ORAL at 17:47

## 2018-10-11 RX ADMIN — NYSTATIN 1 APPLICATION: 100000 POWDER TOPICAL at 17:48

## 2018-10-11 RX ADMIN — HEPARIN SODIUM 5000 UNITS: 5000 INJECTION, SOLUTION INTRAVENOUS; SUBCUTANEOUS at 14:32

## 2018-10-11 RX ADMIN — ALUMINUM HYDROXIDE, MAGNESIUM HYDROXIDE, AND SIMETHICONE 30 ML: 200; 200; 20 SUSPENSION ORAL at 18:16

## 2018-10-11 RX ADMIN — Medication 1000 MG: at 05:02

## 2018-10-11 RX ADMIN — INSULIN LISPRO 1 UNITS: 100 INJECTION, SOLUTION INTRAVENOUS; SUBCUTANEOUS at 14:32

## 2018-10-11 RX ADMIN — TAMSULOSIN HYDROCHLORIDE 0.4 MG: 0.4 CAPSULE ORAL at 17:47

## 2018-10-11 RX ADMIN — NICOTINE 1 PATCH: 14 PATCH, EXTENDED RELEASE TRANSDERMAL at 08:52

## 2018-10-11 RX ADMIN — SODIUM CHLORIDE 150 ML/HR: 0.9 INJECTION, SOLUTION INTRAVENOUS at 14:31

## 2018-10-11 RX ADMIN — ASPIRIN 81 MG: 81 TABLET, COATED ORAL at 14:32

## 2018-10-11 RX ADMIN — MAGNESIUM SULFATE HEPTAHYDRATE 2 G: 40 INJECTION, SOLUTION INTRAVENOUS at 14:31

## 2018-10-11 RX ADMIN — VANCOMYCIN HYDROCHLORIDE 750 MG: 750 INJECTION, SOLUTION INTRAVENOUS at 00:39

## 2018-10-11 RX ADMIN — IOHEXOL 85 ML: 350 INJECTION, SOLUTION INTRAVENOUS at 20:52

## 2018-10-11 RX ADMIN — GABAPENTIN 100 MG: 100 CAPSULE ORAL at 01:12

## 2018-10-11 NOTE — CONSULTS
The patient's vancomycin therapy has been discontinued  Thank you for this consult  Pharmacy will sign off now       Paco Penaloza PharmD, BCPS - Infectious Diseases Pharmacist

## 2018-10-11 NOTE — ASSESSMENT & PLAN NOTE
Speech appreciated - VBS done - pt cleared for level 2 w/ thins  ENt consult as VBS revealed possible posterior tongue mass

## 2018-10-11 NOTE — PROGRESS NOTES
Discussed case with Dr Anibal Contreras and CXR noted with negative procal  Agree with stopping antibiotics  ID consult service will sign off at this time  If there are any additional questions or concerns please contact us or re-consult

## 2018-10-11 NOTE — ASSESSMENT & PLAN NOTE
Lab Results   Component Value Date    HGBA1C 6 6 (H) 10/10/2018       Recent Labs      10/10/18   1744  10/11/18   0004  10/11/18   0551  10/11/18   1218   POCGLU  154*  104  137  183*       Blood Sugar Average: Last 72 hrs:  (P) 139 [id number]     ISS

## 2018-10-11 NOTE — ASSESSMENT & PLAN NOTE
IV [email or web address]  Monitor CK  Hold statin and check FLP in AM to decide if pt needs Pravachol or if dose could be reduced

## 2018-10-11 NOTE — PROCEDURES
Speech-Language Pathology Videofluoroscopic Swallow Study      Patient Name: Louie Mack    [id number]'[id number] Date: 10/11/2018     Problem List  Patient Active Problem List   Diagnosis    Chronic pain    Depression    Diabetic neuropathy ([address] )    Enlarged prostate with lower urinary tract symptoms (LUTS)    Hernia, epigastric    Injury of cervical spinal cord (HCC)    Tobacco use disorder    Umbilical hernia    Cervical myelopathy (HCC)    Basal cell carcinoma in situ of skin    Ambulatory dysfunction    Lumbar radiculopathy    Type 2 diabetes mellitus with diabetic polyneuropathy, with long-term current use of insulin (HCC)    Hypercalcemia    Quadriplegia and quadriparesis (HCC)    Alcohol abuse    Cellulitis of leg, right    Peripheral vascular disease due to secondary diabetes mellitus ([facility]    Ischemic foot pain at rest [facility])    Peripheral arterial disease ([address] )    Essential hypertension    Urinary retention    Acute blood loss anemia    Constipation    Wound dehiscence, surgical, initial encounter    Non-healing surgical wound of right groin    Postoperative state    Septic shock ([address] )    Sacral wound, initial encounter       Past Medical History  Past Medical History:   Diagnosis Date    Chronic obstructive lung disease ([address] )     RESOLVED: 8/17/17    Concussion     LAST ASSESSED: 8/17/17    COPD (chronic obstructive pulmonary disease) ([address] )     Depression     Diabetes mellitus ([address] )     Diabetic neuropathy ([address] )     Difficulty attaining erection     LAST ASSESSEDl: 8/17/17    Elevated liver enzymes     Gall stone pancreatitis     RESOLVED: 3/8/17    Gall stones     RESOLVED: 3/8/17    History of MRSA infection     Hypertension     LAST ASSESSED: 8/17/17    Spinal cord injury, C1-C7 ([address] )     Traumatic injury to musculoskeletal system     1-5 FLIGHT FALL DOWN THE STEPS - CERVICAL NECK INJURY - JAN 2, 2012; LAST ASSESSED: 0/49/56    Umbilical hernia without obstruction and without gangrene     RESOLVED: 3/8/17    Varicella     LAST ASSESSED: 8/17/17       Past Surgical History  Past Surgical History:   Procedure Laterality Date    ARTERIOGRAM Right 7/31/2018    Procedure: ARTERIOGRAM Completion Agram;  Surgeon: Mariana Neil MD;  [facility];  Service: Vascular    BACK SURGERY      BYPASS FEMORAL-POPLITEAL Right 7/31/2018    Procedure: BYPASS FEMORAL-POPLITEAL R femoral- BK popliteal bypass;  Surgeon: Mariana Neil MD;  [facility];  Service: Vascular    CERVICAL FUSION      VERTEBRAL; C3-C4 FUSION    CHOLECYSTECTOMY LAPAROSCOPIC N/A 2/24/2017    Procedure: CHOLECYSTECTOMY LAPAROSCOPIC;  Surgeon: Monica Lau MD;  [facility];  Service:     CHOLECYSTECTOMY LAPAROSCOPIC  03/2017    EPIGASTRIC HERNIA REPAIR      VT DEBRIDEMENT, SKIN, SUB-Q TISSUE,=<20 SQ CM Right 8/28/2018    Procedure: RIGHT GROIN AND THIGH DEBRIDEMENT ([address]) AND VAC PLACEMENT;  Surgeon: Carmella Hudson MD;  [facility];  Service: Vascular    VT THROMBOENDARTECTMY FEMORAL COMMON Right 7/31/2018    Procedure: ENDARTERECTOMY ARTERIAL FEMORAL R femoral endarterectomy w/ patch angioplasty ;  Surgeon: Mariana Neil MD;  [facility];  Service: Vascular    UMBILICAL HERNIA REPAIR N/A 2/24/2017    Procedure: REPAIR HERNIA UMBILICAL;  Surgeon: Monica Lau MD;  [facility];  Service:          General Information;  Pt is a 59 y o  male with a PMH remarkable for quadriplegia, PVD, umbilical and epigastric hernia, diabetes and HTN, among others  Current concerns for dysphagia include wet vocal quality during bedside swallow eval, CRX indicating R lower lobe pneumonia, and CT showing esophageal wall thickening throughout the esophagus involving the gastroesophageal junction with possible inflammatory or infectious esophagitis versus malignancy  Yesterday morning pt required BiPap briefly after a rapid response was called due to pt lethargy and hypoxia   Pt was seen for bedside swallow eval yesterday afternoon and VBS was recommended to further assess oropharyngeal stage swallowing skills  Pt was viewed in lateral position and was given trials of pureed, soft moist (applesauce, banana) and soft solid food (nutrigrain bar) as well as thin liquid  A13mm pill with thin liquid was also administered  Oral stage; Pt presented with [facility] oral stage swallowing skills with the materials administered today  Mastication was timely and grossly effective with materials administered today, which did not exceed soft solid in texture  Bolus formation and transfer were functional   Oral control appeared adequate with no gross premature spillage over the base of tongue  Pharyngeal stage; Pt presented with mild pharyngeal dysphagia  Swallowing initiation was prompt  Hyolaryngeal rise and anterior displacement were adequate  AIrway closure/protection appeared complete  Tongue base retraction appeared to be of adequate strength  Pharyngeal constriction also appeared adequate  Pt presents with abnormal vallecular space  There appears to be an abnormal mass or tissue presence at the base of the tongue which fills the vallecula and suspect is adding weight to the epiglottis  Epiglottis sits nearly horizontal and abuts the posterior pharyngeal wall  Epiglottic inversion is noted with bolus passage, however returns to low-sitting horizontal position  Management of food/liquid follows: All soft food and liquid passed through the pharynx with ease and safety with no penetration, aspiration or significant pharyngeal residue noted with materials administered today  Hard solids were not attempted due to pt preference  Aspiration Response and Efficacy: No aspiration seen on this study  Esophageal stage;  Esophageal screening was completed and felt to be unremarkable  Slight bony growth noted at [facility] which does not interfere with bolus passage      Assessment Summary; Pt presents with swallowing skills WFL for thin liquids and soft solids  Question abnormality in the vallecular space which appears to be pressing down on the epiglottis (possible mass vs  other tissue/swelling, etc ) Recommend consult to ENT for further evaluation  Recommendations: mechanically altered/level 2 diet and thin liquids     Recommended Form of Meds: whole with liquid     Aspiration precautions and compensatory swallowing strategies: upright posture, only feed when fully alert, slow rate of feeding, small bites/sips and alternating bites and sips, avoid dense, dry foods    Results Reviewed with: patient, RN and MD     Consider referral to: ENT    Treatment Recommended: ST to follow for analysis of diet tolerance, reinforcement of safe PO intake strategies      Frequency of treatment: 3-5x

## 2018-10-11 NOTE — ASSESSMENT & PLAN NOTE
Vasques placed in ER    Removed today  Urine retention protocol - if fails, consult urology  [place]

## 2018-10-11 NOTE — CONSULTS
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  He presented the hospital and had severe dyspnea  Concern for aspiration pneumonia was raised and speech language pathology was consulted for swallow study  Video barium swallow demonstrated possible tongue base mass causing effect on the epiglottis with partial inversion  No recent neck imaging  Patient denies any neck masses or weight loss  No tongue weakness  He smokes approximately 6-8 cigarettes per day and has 2-4 drinks per day  No previous head or neck cancer  No otalgia  Review of systems:  10 Point ROS was performed and negative except as above or otherwise noted in the medical record      Historical Information   Past Medical History:   Diagnosis Date    Chronic obstructive lung disease ([address] )     RESOLVED: 8/17/17    Concussion     LAST ASSESSED: 8/17/17    COPD (chronic obstructive pulmonary disease) ([address] )     Depression     Diabetes mellitus ([address] )     Diabetic neuropathy ([address] )     Difficulty attaining erection     LAST ASSESSEDl: 8/17/17    Elevated liver enzymes     Gall stone pancreatitis     RESOLVED: 3/8/17    Gall stones     RESOLVED: 3/8/17    History of MRSA infection     Hypertension     LAST ASSESSED: 8/17/17    Spinal cord injury, C1-C7 ([address] )     Traumatic injury to musculoskeletal system     1-5 FLIGHT FALL DOWN THE STEPS - CERVICAL NECK INJURY - DOV 2, 2012; LAST ASSESSED: 7/88/91    Umbilical hernia without obstruction and without gangrene     RESOLVED: 3/8/17    Varicella     LAST ASSESSED: 8/17/17     Past Surgical History:   Procedure Laterality Date    ARTERIOGRAM Right 7/31/2018    Procedure: ARTERIOGRAM Completion Agram;  Surgeon: Belinda Tobias MD;  [facility];  Service: Vascular    BACK SURGERY      BYPASS FEMORAL-POPLITEAL Right 7/31/2018    Procedure: BYPASS FEMORAL-POPLITEAL R femoral- BK popliteal bypass;  Surgeon: Belinda Tobias MD;  [facility];  Service: Vascular    CERVICAL FUSION      VERTEBRAL; C3-C4 FUSION    CHOLECYSTECTOMY LAPAROSCOPIC N/A 2/24/2017    Procedure: CHOLECYSTECTOMY LAPAROSCOPIC;  Surgeon: Binta Figueroa MD;  [facility];  Service:     CHOLECYSTECTOMY LAPAROSCOPIC  03/2017    EPIGASTRIC HERNIA REPAIR      WY DEBRIDEMENT, SKIN, SUB-Q TISSUE,=<20 SQ CM Right 8/28/2018    Procedure: RIGHT GROIN AND THIGH DEBRIDEMENT ([address]) [place];  Surgeon: Rachel Hudson MD;  [facility];  Service: Vascular    WY THROMBOENDARTECTMY FEMORAL COMMON Right 7/31/2018    Procedure: ENDARTERECTOMY ARTERIAL FEMORAL R femoral endarterectomy w/ patch angioplasty ;  Surgeon: Belinda Tobias MD;  [facility];  Service: Vascular    UMBILICAL HERNIA REPAIR N/A 2/24/2017    Procedure: REPAIR HERNIA UMBILICAL;  Surgeon: Binta Figueroa MD;  [facility];  Service:      Social History   History   Alcohol Use    Yes     Comment: 3-4 mixed vodka drinks/daily      History   Drug Use No     History   Smoking Status    Current Every Day Smoker    Packs/day: 0 25    Years: 40 00   Smokeless Tobacco    Never Used     Comment: 1 ppd      Family History:   Family History   Problem Relation Age of Onset    Hypertension Mother         BENIGN    Alzheimer's disease Father     Heart disease Father         CARDIAC DISORDER    Stroke Father     Heart attack Father     No Known Problems Brother        Meds/Allergies   all current active meds have been reviewed, current meds:   Current Facility-Administered Medications   Medication Dose Route Frequency    acetaminophen (TYLENOL) rectal suppository 325 mg  325 mg Rectal Once    acetaminophen (TYLENOL) tablet 650 mg  650 mg Oral Q6H PRN    aluminum-magnesium hydroxide-simethicone (MYLANTA) 200-200-20 mg/5 mL oral suspension 30 mL  30 mL Oral Q4H PRN    ammonium lactate (LAC-HYDRIN) 12 % cream   Topical BID    aspirin (ECOTRIN LOW STRENGTH) EC tablet 81 mg  81 mg Oral Daily    baclofen tablet 20 mg  20 mg Oral TID    citalopram (CeleXA) tablet 10 mg  10 mg Oral Daily    clopidogrel (PLAVIX) tablet 75 mg  75 mg Oral Daily    famotidine (PEPCID) tablet 20 mg  20 mg Oral BID    heparin (porcine) subcutaneous injection 5,000 Units  5,000 Units Subcutaneous Q8H [address]    insulin lispro (HumaLOG) 100 units/mL subcutaneous injection 1-5 Units  1-5 Units Subcutaneous 4x Daily (with meals and at bedtime)    morphine (MS CONTIN) ER tablet 15 mg  15 mg Oral BID    nicotine (NICODERM CQ) 14 mg/24hr TD 24 hr patch 1 patch  1 patch Transdermal Daily    nystatin (MYCOSTATIN) powder   Topical BID    oxyCODONE (ROXICODONE) IR tablet 5 mg  5 mg Oral Q4H PRN    sodium chloride 0 9 % infusion  150 mL/hr Intravenous Continuous    tamsulosin (FLOMAX) capsule 0 4 mg  0 4 mg Oral Daily With Dinner    and PTA meds:   Prior to Admission Medications   Prescriptions Last Dose Informant Patient Reported? Taking?    Incontinence Supply Disposable (ATTENDS UNDERWEAR 7 LARGE) MISC  Outside Facility (Specify) Yes Yes   TiZANidine (ZANAFLEX) 2 MG capsule  Outside Facility (Specify) No Yes   Sig: TAKE 1 TABLET BEFORE BED AS NEEDED ( tablets)   Wound Dressings (MAXORB DRESSING EX)  Outside Facility (Specify) Yes Yes   Sig: Apply topically   amLODIPine (NORVASC) 5 mg tablet  Outside Facility (Specify) Yes Yes   Sig: Take 5 mg by mouth daily   ammonium lactate (LAC-HYDRIN) 12 % cream  Outside Facility (Specify) Yes Yes   Sig: Apply topically as needed for dry skin   aspirin (ECOTRIN LOW STRENGTH) 81 mg EC tablet  Outside Facility (Specify) Yes Yes   Sig: Take 81 mg by mouth daily   baclofen 20 mg tablet  Outside Facility (Specify) Yes Yes   Sig: Take 20 mg by mouth 3 (three) times a day   calcium carbonate (TUMS) 500 mg chewable tablet  Outside Facility (Specify) Yes Yes   Sig: Chew 1 tablet daily   citalopram (CeleXA) 20 mg tablet  Outside Facility (Specify) Yes Yes   Sig: Take 10 mg by mouth daily   clopidogrel (PLAVIX) 75 mg tablet   No Yes   Sig: Take 1 tablet (75 mg total) by mouth daily   gabapentin (NEURONTIN) 100 mg capsule  Outside Facility (Specify) No Yes   Sig: Take 1 capsule (100 mg total) by mouth daily at bedtime   gabapentin (NEURONTIN) 400 mg capsule  Outside Facility (Specify) Yes Yes   Sig: Take 400 mg by mouth daily at bedtime   glimepiride (AMARYL) 1 mg tablet  Outside Facility (Specify) No Yes   Sig: Take 1 tablet (1 mg total) by mouth daily   glucose blood (ACCU-CHEK DAREN PLUS) test strip  Outside Facility (Specify) No Yes   Sig: Check 2-3 times a day   insulin glargine (LANTUS) 100 units/mL subcutaneous injection  Outside Facility (Specify) Yes Yes   Sig: Inject under the skin daily at bedtime   insulin lispro (HumaLOG) 100 units/mL injection  Outside Facility (Specify) Yes Yes   Sig: Inject under the skin 3 (three) times a day before meals   lisinopril (ZESTRIL) 10 mg tablet  Outside Facility (Specify) No Yes   Sig: Take 1 tablet (10 mg total) by mouth daily   metFORMIN (GLUCOPHAGE) 1000 MG tablet  Self No Yes   Sig: Take 1 tablet (1,000 mg total) by mouth 2 (two) times a day with meals for 90 days   morphine (MS CONTIN) 15 mg 12 hr tablet  Outside Facility (Specify) Yes Yes   Sig: Take 15 mg by mouth 2 (two) times a day   neomycin-bacitracin-polymyxin (NEOSPORIN) 5-400-5,000 ointment  Outside Facility (Specify) Yes Yes   Sig: Apply topically 4 (four) times a day   nicotine (NICODERM CQ) 21 mg/24 hr TD 24 hr patch  Outside Facility (Specify) No Yes   Sig: Place 1 patch on the skin daily   nystatin (MYCOSTATIN) powder   No Yes   Sig: Apply topically 2 (two) times a day Applied to bilateral groin after cleansing skin  Avoid contact with right groin wound   oxyCODONE (ROXICODONE) 5 mg immediate release tablet  Outside Facility (Specify) Yes Yes   Sig: Take 5 mg by mouth every 4 (four) hours as needed for moderate pain   pravastatin (PRAVACHOL) 20 mg tablet  Outside Facility (Specify) No Yes   Sig: Take 1 tablet (20 mg total) by mouth daily   senna-docusate sodium (SENOKOT S) 8 6-50 mg per tablet  Outside Facility (Specify) No Yes   Sig: Take 1 tablet by mouth daily at bedtime Please take while taking narcotic pain medication  tamsulosin (FLOMAX) 0 4 mg  Outside Facility (Specify) No Yes   Sig: Take 1 capsule (0 4 mg total) by mouth daily with dinner      Facility-Administered Medications: None       Allergies   Allergen Reactions    Seasonal Ic  [Cholestatin]        Objective     Vitals:    10/11/18 1602   BP: 129/75   Pulse: 99   Resp: 18   Temp: 97 6 °F (36 4 °C)   SpO2: 95%         Physical Exam   Constitutional: Oriented to person, place, and time  Well-developed and well-nourished, no apparent distress, non-toxic appearance  Cooperative, able to hear and answer questions without difficulty  Voice: Normal voice quality  Head: Normocephalic, atraumatic  No scars, masses or lesions  Face: Symmetric, no edema, no sinus tenderness  Eyes: Vision grossly intact, extra-ocular movement intact  Ears: External ears normal   No post-auricular erythema or tenderness  Nose: Septum intact, nares clear  Mucosa moist, turbinates well appearing  No crusting, polyps or discharge evident  Oral cavity: Dentition poor with multiple dental carries  Mucosa moist, lips without lesions or masses  Tongue mobile, floor of mouth soft and flat  Hard palate intact    No masses or lesions  Oropharynx: Uvula is midline, soft palate intact without lesion or mass  Oropharyngeal inlet without obstruction  Tonsils unremarkable  Posterior pharyngeal wall clear  No masses or lesions  No palpable tongue base masses  Salivary glands:  Parotid glands and submandibular glands symmetric, no enlargement or tenderness  Neck: Normal laryngeal elevation with swallow  Trachea midline  No masses or lesions  No palpable adenopathy  Thyroid: Without tenderness or palpable nodules  Pulmonary/Chest: Normal effort and rate  No respiratory distress  No stertor or stridor  Musculoskeletal: Decreased ROM and contractures bilaterally   Neurological: Cranial nerves 2-12 intact  Skin: Skin is warm and dry  Psychiatric: Normal mood and affect  Intake/Output Summary (Last 24 hours) at 10/11/18 1849  Last data filed at 10/11/18 1746   Gross per 24 hour   Intake              300 ml   Output             3490 ml   Net            -3190 ml       Invasive Devices     Peripheral Intravenous Line            Peripheral IV 10/09/18 Left Arm 1 day    Peripheral IV 10/09/18 Left Forearm 1 day          Drain            Negative Pressure Wound Therapy (V A C ) Groin Right 44 days                Lab Results:   I have personally reviewed pertinent lab results  , CBC:   Lab Results   Component Value Date    WBC 9 78 10/11/2018    HGB 10 1 (L) 10/11/2018    HCT 30 6 (L) 10/11/2018    MCV 99 (H) 10/11/2018     10/11/2018    MCH 32 7 10/11/2018    MCHC 33 0 10/11/2018    RDW 15 5 (H) 10/11/2018    MPV 9 8 10/11/2018   , CMP:   Lab Results   Component Value Date     10/11/2018    K 3 9 10/11/2018     10/11/2018    CO2 24 10/11/2018    BUN 13 10/11/2018    CREATININE 0 82 10/11/2018    CALCIUM 8 5 10/11/2018    EGFR 96 10/11/2018   , Coags: No results found for: PT, PTT, INR  Imaging Studies: I have personally reviewed pertinent reports     and I have personally reviewed pertinent films in PACS  EKG, Pathology, and Other Studies: I have personally reviewed pertinent reports  Code Status: Level 1 - Full Code  Advance Directive and Living Will:      Power of :    POLST:      Counseling/Coordination of Care: Total floor / unit time spent today 50 minutes  Greater than 50% of total time was spent with the patient and / or family counseling and / or coordination of care  A description of the counseling / coordination of care: Discussed with the patient and primary team need for imaging to evaluate the tongue base and outpatient follow up for direct evaluation of the tongue base  F/U 1-2 weeks in our office

## 2018-10-11 NOTE — ASSESSMENT & PLAN NOTE
Lab Results   Component Value Date    HGBA1C 6 6 (H) 10/10/2018       Recent Labs      10/10/18   1744  10/11/18   0004  10/11/18   0551  10/11/18   1218   POCGLU  154*  104  137  183*       Blood Sugar Average: Last 72 hrs:  (P) 139 [id number]     s/p R fem endartarectomy and R fem-pop bypass  DAPT  Check FLP to see if pt needs statin - goal LDL < 70

## 2018-10-11 NOTE — PROGRESS NOTES
Progress Note - Kerri Shah 1960, 59 y o  male MRN: [id number]    Unit/Bed#: [facility] 425-01 Encounter: [id number]    Primary Care Provider: Candance Boast, [address]   Date and time admitted to hospital: 10/9/2018  9:46 PM        * Shock (HCC)resolved as of 10/11/2018   Assessment & Plan    PT thought to be in septic shock, but no source found and negative procal  Abx stopped  Likely hypovolemic and related to aspiration PNA  Resolved with IVF     Rhabdomyolysis   Assessment & Plan    IV [email or web address]  Monitor CK  Hold statin and check FLP in AM to decide if pt needs Pravachol or if dose could be reduced     Aspiration pneumonitis [facility])   Assessment & Plan    Speech appreciated - VBS done - pt cleared for level 2 w/ thins  ENt consult as VBS revealed possible posterior tongue mass     Sacral wound, initial encounter   Assessment & Plan    Wound care appreciated     Non-healing surgical wound of right groin   Assessment & Plan    Vascular appreciated - not infected     Urinary retention   Assessment & Plan    Vasques placed in ER    Removed today  Urine retention protocol - if fails, consult urology  Flomax     Peripheral vascular disease due to secondary diabetes mellitus [facility])   Assessment & Plan    Lab Results   Component Value Date    HGBA1C 6 6 (H) 10/10/2018       Recent Labs      10/10/18   1744  10/11/18   0004  10/11/18   0551  10/11/18   1218   POCGLU  154*  104  137  183*       Blood Sugar Average: Last 72 hrs:  (P) 139 [id number]     s/p R fem endartarectomy and R fem-pop bypass  DAPT  Check FLP to see if pt needs statin - goal LDL < 70     Quadriplegia and quadriparesis ([facility] [address] )   Assessment & Plan    PT/OT consults     Type 2 diabetes mellitus with diabetic polyneuropathy, with long-term current use of insulin [facility])   Assessment & Plan    Lab Results   Component Value Date    HGBA1C 6 6 (H) 10/10/2018       Recent Labs      10/10/18   1744  10/11/18   0004  10/11/18   0551  10/11/18   1218   POCGLU  154* 104  137  183*       Blood Sugar Average: Last 72 hrs:  (P) 139 [id number]     ISS     Encephalopathyresolved as of 10/11/2018   Assessment & Plan    Resolved - 2/2 shock     Acute respiratory failure with hypoxia (HCC)resolved as of 10/11/2018   Assessment & Plan    Resolved - now on RA  Likely 2/2 aspiration pneumonitis       VTE Pharmacologic Prophylaxis:   Pharmacologic: Heparin  Mechanical VTE Prophylaxis in Place: Yes    Patient Centered Rounds: I have performed bedside rounds with nursing staff today  Discussions with Specialists or Other Care Team Provider: ICU and ID    Education and Discussions with Family / Patient: pt and wife    Time Spent for Care: 30 minutes  More than 50% of total time spent on counseling and coordination of care as described above  Current Length of Stay: 1 day(s)    Current Patient Status: Inpatient   Certification Statement: The patient will continue to require additional inpatient hospital stay due to need for ENT and PT/OT consults    Discharge Plan: pending ENT and PT/OT and improvement of rhabdo    Code Status: Level 1 - Full Code      Subjective:   No acute complaints    Objective:     Vitals:   Temp (24hrs), Av 8 °F (37 1 °C), Min:97 6 °F (36 4 °C), Max:100 °F (37 8 °C)    Temp:  [97 6 °F (36 4 °C)-100 °F (37 8 °C)] 97 6 °F (36 4 °C)  HR:  [] 99  Resp:  [18-20] 18  BP: ()/(54-75) 129/75  SpO2:  [95 %-98 %] 95 %  Body mass index is 26 29 kg/m²  Input and Output Summary (last 24 hours): Intake/Output Summary (Last 24 hours) at 10/11/18 1745  Last data filed at 10/11/18 1704   Gross per 24 hour   Intake              150 ml   Output             3490 ml   Net            -3340 ml       Physical Exam:     Physical Exam   Constitutional: He is oriented to person, place, and time  No distress  HENT:   Head: Normocephalic and atraumatic  Eyes: Conjunctivae and EOM are normal    Neck: Normal range of motion  Neck supple     Cardiovascular: Normal rate and regular rhythm  Pulmonary/Chest: Effort normal and breath sounds normal  He has no wheezes  He has no rales  Abdominal: Soft  Bowel sounds are normal  He exhibits no distension  There is no tenderness  Musculoskeletal: Normal range of motion  He exhibits no edema  Neurological: He is alert and oriented to person, place, and time  Skin: Skin is warm and dry  He is not diaphoretic  Additional Data:     Labs:      Results from last 7 days  Lab Units 10/11/18  0535  10/10/18  0603   WBC Thousand/uL 9 78  --  12 19*   HEMOGLOBIN g/dL 10 1*  --  10 1*   I STAT HEMOGLOBIN   --   < >  --    HEMATOCRIT % 30 6*  < > 31 7*   PLATELETS Thousands/uL 197  --  228   NEUTROS PCT %  --   --  79*   LYMPHS PCT %  --   --  13*   MONOS PCT %  --   --  7   EOS PCT %  --   --  0   < > = values in this interval not displayed  Results from last 7 days  Lab Units 10/11/18  0535 10/10/18  0940  10/09/18  2207   SODIUM mmol/L 137  --   < > 135*   POTASSIUM mmol/L 3 9  --   < > 5 3   CHLORIDE mmol/L 104  --   < > 101   CO2 mmol/L 24  --   < > 18*   BUN mg/dL 13  --   < > 21   CREATININE mg/dL 0 82  --   < > 2 34*   CALCIUM mg/dL 8 5  --   < > 9 4   ALK PHOS U/L  --   --   --  76   ALT U/L  --   --   --  28   AST U/L  --   --   --  22   GLUCOSE, ISTAT mg/dl  --  145*  --   --    < > = values in this interval not displayed  * I Have Reviewed All Lab Data Listed Above  * Additional Pertinent Lab Tests Reviewed: Bre Manzo Admission Reviewed        Recent Cultures (last 7 days):       Results from last 7 days  Lab Units 10/11/18  0032 10/10/18  1630 10/10/18  1345 10/09/18  2247 10/09/18  2246   BLOOD CULTURE   --   --   --  No Growth at 24 hrs  No Growth at 24 hrs     GRAM STAIN RESULT  No Epithelial cells seen  3+ Polys  1+ Gram negative rods  --   --   --   --    INFLUENZA A PCR   --   --  None Detected  --   --    INFLUENZA B PCR   --   --  None Detected  --   --    RSV PCR   -- --  None Detected  --   --    LEGIONELLA URINARY ANTIGEN   --  Negative  --   --   --        Last 24 Hours Medication List:     Current Facility-Administered Medications:  acetaminophen 325 mg Rectal Once Regi Snyder MD    ammonium lactate  Topical BID Summer Manning MD    aspirin 81 mg Oral Daily Summer Manning MD    baclofen 20 mg Oral TID Summer Manning MD    citalopram 10 mg Oral Daily Summer Manning, MD    clopidogrel 75 mg Oral Daily Summer Manning MD    heparin (porcine) 5,000 Units Subcutaneous [facility] Summer Manning MD    insulin lispro 1-5 Units Subcutaneous 4x Daily (with meals and at bedtime) Sofi Wood DO    nicotine 1 patch Transdermal Daily Summer Manning MD    nystatin  Topical BID Summer Manning MD    sodium chloride 150 mL/hr Intravenous Continuous Sofi Wood DO Last Rate: 150 mL/hr (10/11/18 1431)   tamsulosin 0 4 mg Oral Daily With Roberto Gama MD         Today, Patient Was Seen By: Sofi Wood DO    ** Please Note: Dictation voice to text software may have been used in the creation of this document   **

## 2018-10-11 NOTE — PROGRESS NOTES
Progress Note - Critical Care   Adrien Neil 59 y o  male MRN: [id number]  Unit/Bed#: [facility] 425-01 Encounter: [id number]    Assessment:   Acute encephalopathy  Acute respiratory insufficiency-improved  Elevated CK  DM 2  Non-healing surgical wound of R groin  Quadriplegia     Plan:   -acute encephalopathy resolved  -Delirium precautions: avoid benzos/opiates, regulate sleep/wake cycle,  -Consider d/c pravastatin in setting of elevated CK, may consider maintaince IVF for hydration   -This does not appear to be infectious in nature as no fever, leukocytosis, neg procal, BCx NGTD and otherwise hemodynamically stable  Consider d/c ABX  -respiratory insufficency greatly improved, wean NC as able, encourage IS  -PT/OT  -Discussed case with Dr Clay, patient appropriate for downgrade to med/surg, critical care to sign off      ______________________________________________________________________    HPI/24hr events: This morning patient is bright and a good historian  He reports on Tuesday morning he was working with his physical therapist, he is able to walk short distances with a walker, and felt good  Sometime later that afternoon he felt very weak and from that point on he states he just felt confused  He reports complaints of his chronic pain flaring, he feels this is secondary to the rainy weather outside  He denies any nausea/vomiting, abdomina pain, chest pain, shortness of breath, dizziness, headaches, or further confusion  ______________________________________________________________________  Physical Exam:     Physical Exam   Constitutional: He is oriented to person, place, and time  No distress  HENT:   Head: Normocephalic and atraumatic  Eyes: Pupils are equal, round, and reactive to light  Neck: No JVD present  No tracheal deviation present  Cardiovascular: Normal rate, regular rhythm and intact distal pulses      Pulmonary/Chest: Effort normal and breath sounds normal  No respiratory distress  He has no wheezes  He has no rales  He exhibits no tenderness  Abdominal: Soft  Bowel sounds are normal  He exhibits no distension  There is no tenderness  Neurological: He is alert and oriented to person, place, and time  Reports difficulty assessing if he feels weak while in bed  He would like to get up and moving  He is able to move his feet up and down bilaterally and lift both legs anitgravity  He does not have fine motor movements in upper extremities bilaterally at baseline, but is able to have antigravity motor movements  Skin: Skin is warm and dry      ______________________________________________________________________  Temperature:   Temp (24hrs), Av 1 °F (37 3 °C), Min:98 1 °F (36 7 °C), Max:100 °F (37 8 °C)    Current Temperature: 98 6 °F (37 °C)    Vitals:    10/11/18 0219 10/11/18 0232 10/11/18 0324 10/11/18 0807   BP:  102/55  116/60   BP Location:  Right arm  Right arm   Pulse:  98  98   Resp:  18  18   Temp: 100 °F (37 8 °C)  98 9 °F (37 2 °C) 98 6 °F (37 °C)   TempSrc:    Oral   SpO2:  96%  97%   Weight:       Height:                 Weights:   IBW: 73 kg    Body mass index is 26 29 kg/m²  Weight (last 2 days)     Date/Time   Weight    10/10/18 0600  83 1 (183 2)    10/09/18 2151  77 1 (170)            Height: 5' 10" (177 8 cm)    Intake and Outputs:  I/O       10/09 0701 - 10/10 0700 10/10 0701 - 10/11 0700 10/11 0701 - 10/12 0700    P  O   0     I V  (mL/kg)  614 6 (7 4)     IV Piggyback 2310 50     Total Intake(mL/kg) 2310 (27 8) 664 6 (8)     Urine (mL/kg/hr) 1100 4000 (2) 400 (1 7)    Total Output 1100 4000 400    Net +1210 -3335 4 -400               UOP: 2cc/kg/hour     Nutrition:        Diet Orders            Start     Ordered    10/10/18 0250  Diet NPO; Sips with meds  Diet effective now     Question Answer Comment   Diet Type NPO    NPO Except: Sips with meds    RD to adjust diet per protocol?  Yes        10/10/18 6606          Labs:     Results from last 7 days  Lab Units 10/11/18  0535 10/10/18  0940 10/10/18  0603 10/09/18  2207   WBC Thousand/uL 9 78  --  12 19* 11 49*   HEMOGLOBIN g/dL 10 1*  --  10 1* 11 1*   I STAT HEMOGLOBIN g/dl  --  10 5*  --   --    HEMATOCRIT % 30 6* 31* 31 7* 34 7*   PLATELETS Thousands/uL 197  --  228 275   NEUTROS PCT %  --   --  79* 77*   MONOS PCT %  --   --  7 9       Results from last 7 days  Lab Units 10/11/18  0535 10/10/18  0940 10/10/18  0603 10/09/18  2207   SODIUM mmol/L 137  --  139 135*   POTASSIUM mmol/L 3 9  --  4 9 5 3   CHLORIDE mmol/L 104  --  108 101   CO2 mmol/L 24  --  22 18*   BUN mg/dL 13  --  26* 21   CREATININE mg/dL 0 82  --  1 97* 2 34*   CALCIUM mg/dL 8 5  --  8 2* 9 4   ALBUMIN g/dL  --   --   --  4 1   ALK PHOS U/L  --   --   --  76   ALT U/L  --   --   --  28   AST U/L  --   --   --  22   GLUCOSE, ISTAT mg/dl  --  145*  --   --        Results from last 7 days  Lab Units 10/11/18  0535 10/10/18  0603   MAGNESIUM mg/dL 1 5* 1 6   PHOSPHORUS mg/dL 3 4  --             Results from last 7 days  Lab Units 10/10/18  1008   TROPONIN I ng/mL 0 03       Results from last 7 days  Lab Units 10/10/18  1008 10/10/18  0410 10/10/18  0144 10/09/18  2247   LACTIC ACID mmol/L 1 4 2 6* 7 0* 11 8*     ABG:  Lab Results   Component Value Date    PHART 7 374 07/31/2018    IIC8DGB 41 0 07/31/2018    PO2ART 97 3 07/31/2018    CFW2MLW 23 4 07/31/2018    BEART -1 7 07/31/2018    SOURCE Line, Arterial 07/31/2018     VBG:      Micro:  Blood Culture:   Lab Results   Component Value Date    BLOODCX No Growth at 24 hrs  10/09/2018    BLOODCX No Growth at 24 hrs  10/09/2018    BLOODCX No Growth After 5 Days  08/01/2018    BLOODCX No Growth After 5 Days  08/01/2018     Urine Culture: No results found for: URINECX  Sputum Culture: No components found for: SPUTUMCX  Wound Culure: No results found for: WOUNDCULT    Lab Results   Component Value Date    BLOODCX No Growth at 24 hrs  10/09/2018    BLOODCX No Growth at 24 hrs   10/09/2018 BLOODCX No Growth After 5 Days  08/01/2018    BLOODCX No Growth After 5 Days  08/01/2018       Allergies: Allergies   Allergen Reactions    Seasonal Ic  [Cholestatin]      Medications:   Scheduled Meds:  Current Facility-Administered Medications:  acetaminophen 325 mg Rectal Once Estefany Harris MD    ammonium lactate  Topical BID Sarita Bentley MD    aspirin 81 mg Oral Daily Sarita Bentley MD    baclofen 20 mg Oral TID Sarita Bentley MD    cefepime 1,000 mg Intravenous Q8H Molly Bourgeois MD Last Rate: 1,000 mg (10/11/18 0502)   citalopram 10 mg Oral Daily Sarita Bentley MD    clopidogrel 75 mg Oral Daily Sarita Bentley MD    heparin (porcine) 5,000 Units Subcutaneous [facility] Sarita Bentley MD    insulin lispro 1-5 Units Subcutaneous Q6H [address] Sarita Bentley MD    nicotine 1 patch Transdermal Daily Sarita Bentley MD    nystatin  Topical BID Sarita Bentley MD    pravastatin 20 mg Oral Daily Sarita Bentley MD    tamsulosin 0 4 mg Oral Daily With hSira Merritt MD    vancomycin 10 mg/kg Intravenous Q12H Sarita Bentley MD Last Rate: 750 mg (10/11/18 0039)     Continuous Infusions:   PRN Meds:     VTE Pharmacologic Prophylaxis: Heparin  VTE Mechanical Prophylaxis: sequential compression device  Invasive lines and devices: Invasive Devices     Peripheral Intravenous Line            Peripheral IV 10/09/18 Left Arm 1 day    Peripheral IV 10/09/18 Left Forearm 1 day          Drain            Negative Pressure Wound Therapy (V A C ) Groin Right 43 days    Urethral Catheter Straight-tip 18 Fr  less than 1 day                   Code Status: Level 1 - Full Code    Portions of the record may have been created with voice recognition software  Occasional wrong word or "sound a like" substitutions may have occurred due to the inherent limitations of voice recognition software    Read the chart carefully and recognize, using context, where substitutions have occurred      Yinka ESTHER Begum

## 2018-10-12 PROBLEM — K22.89 ESOPHAGEAL THICKENING: Status: ACTIVE | Noted: 2018-10-12

## 2018-10-12 PROBLEM — R33.9 URINARY RETENTION: Status: RESOLVED | Noted: 2018-08-02 | Resolved: 2018-10-12

## 2018-10-12 LAB
ANION GAP SERPL CALCULATED.3IONS-SCNC: 6 MMOL/L (ref 4–13)
BASOPHILS # BLD AUTO: 0.04 THOUSANDS/ΜL (ref 0–0.1)
BASOPHILS NFR BLD AUTO: 1 % (ref 0–1)
BUN SERPL-MCNC: 9 MG/DL (ref 5–25)
CALCIUM SERPL-MCNC: 8.1 MG/DL (ref 8.3–10.1)
CHLORIDE SERPL-SCNC: 99 MMOL/L (ref 100–108)
CHOLEST SERPL-MCNC: 86 MG/DL (ref 50–200)
CK MB SERPL-MCNC: 2.2 NG/ML (ref 0–5)
CK MB SERPL-MCNC: <1 % (ref 0–2.5)
CK SERPL-CCNC: 649 U/L (ref 39–308)
CO2 SERPL-SCNC: 27 MMOL/L (ref 21–32)
CREAT SERPL-MCNC: 0.64 MG/DL (ref 0.6–1.3)
EOSINOPHIL # BLD AUTO: 0.17 THOUSAND/ΜL (ref 0–0.61)
EOSINOPHIL NFR BLD AUTO: 2 % (ref 0–6)
ERYTHROCYTE [DISTWIDTH] IN BLOOD BY AUTOMATED COUNT: 14.9 % (ref 11.6–15.1)
GFR SERPL CREATININE-BSD FRML MDRD: 106 ML/MIN/1.73SQ M
GLUCOSE SERPL-MCNC: 131 MG/DL (ref 65–140)
GLUCOSE SERPL-MCNC: 136 MG/DL (ref 65–140)
HCT VFR BLD AUTO: 29.8 % (ref 36.5–49.3)
HDLC SERPL-MCNC: 45 MG/DL (ref 40–60)
HGB BLD-MCNC: 9.8 G/DL (ref 12–17)
IMM GRANULOCYTES # BLD AUTO: 0.03 THOUSAND/UL (ref 0–0.2)
IMM GRANULOCYTES NFR BLD AUTO: 0 % (ref 0–2)
LDLC SERPL CALC-MCNC: 22 MG/DL (ref 0–100)
LYMPHOCYTES # BLD AUTO: 1.63 THOUSANDS/ΜL (ref 0.6–4.47)
LYMPHOCYTES NFR BLD AUTO: 20 % (ref 14–44)
MAGNESIUM SERPL-MCNC: 1.6 MG/DL (ref 1.6–2.6)
MCH RBC QN AUTO: 32 PG (ref 26.8–34.3)
MCHC RBC AUTO-ENTMCNC: 32.9 G/DL (ref 31.4–37.4)
MCV RBC AUTO: 97 FL (ref 82–98)
MONOCYTES # BLD AUTO: 1.02 THOUSAND/ΜL (ref 0.17–1.22)
MONOCYTES NFR BLD AUTO: 13 % (ref 4–12)
NEUTROPHILS # BLD AUTO: 5.15 THOUSANDS/ΜL (ref 1.85–7.62)
NEUTS SEG NFR BLD AUTO: 64 % (ref 43–75)
NRBC BLD AUTO-RTO: 0 /100 WBCS
OSMOLALITY UR: 454 MMOL/KG
PLATELET # BLD AUTO: 203 THOUSANDS/UL (ref 149–390)
PMV BLD AUTO: 10 FL (ref 8.9–12.7)
POTASSIUM SERPL-SCNC: 3.7 MMOL/L (ref 3.5–5.3)
PROCALCITONIN SERPL-MCNC: 0.09 NG/ML
RBC # BLD AUTO: 3.06 MILLION/UL (ref 3.88–5.62)
SODIUM 24H UR-SCNC: 132 MOL/L
SODIUM SERPL-SCNC: 132 MMOL/L (ref 136–145)
TRIGL SERPL-MCNC: 97 MG/DL
WBC # BLD AUTO: 8.04 THOUSAND/UL (ref 4.31–10.16)

## 2018-10-12 PROCEDURE — 82948 REAGENT STRIP/BLOOD GLUCOSE: CPT

## 2018-10-12 PROCEDURE — 83935 ASSAY OF URINE OSMOLALITY: CPT | Performed by: GENERAL PRACTICE

## 2018-10-12 PROCEDURE — 85025 COMPLETE CBC W/AUTO DIFF WBC: CPT | Performed by: NURSE PRACTITIONER

## 2018-10-12 PROCEDURE — 84145 PROCALCITONIN (PCT): CPT | Performed by: NURSE PRACTITIONER

## 2018-10-12 PROCEDURE — 80061 LIPID PANEL: CPT | Performed by: GENERAL PRACTICE

## 2018-10-12 PROCEDURE — 92526 ORAL FUNCTION THERAPY: CPT

## 2018-10-12 PROCEDURE — 80048 BASIC METABOLIC PNL TOTAL CA: CPT | Performed by: GENERAL PRACTICE

## 2018-10-12 PROCEDURE — 84300 ASSAY OF URINE SODIUM: CPT | Performed by: GENERAL PRACTICE

## 2018-10-12 PROCEDURE — 82550 ASSAY OF CK (CPK): CPT | Performed by: GENERAL PRACTICE

## 2018-10-12 PROCEDURE — 82553 CREATINE MB FRACTION: CPT | Performed by: GENERAL PRACTICE

## 2018-10-12 PROCEDURE — 83735 ASSAY OF MAGNESIUM: CPT | Performed by: GENERAL PRACTICE

## 2018-10-12 PROCEDURE — 99232 SBSQ HOSP IP/OBS MODERATE 35: CPT | Performed by: GENERAL PRACTICE

## 2018-10-12 RX ORDER — MORPHINE SULFATE 15 MG/1
15 TABLET, FILM COATED, EXTENDED RELEASE ORAL 2 TIMES DAILY
Status: DISCONTINUED | OUTPATIENT
Start: 2018-10-12 | End: 2018-10-13 | Stop reason: HOSPADM

## 2018-10-12 RX ADMIN — NYSTATIN: 100000 POWDER TOPICAL at 09:01

## 2018-10-12 RX ADMIN — INSULIN LISPRO 3 UNITS: 100 INJECTION, SOLUTION INTRAVENOUS; SUBCUTANEOUS at 12:14

## 2018-10-12 RX ADMIN — OXYCODONE HYDROCHLORIDE 5 MG: 5 TABLET ORAL at 12:14

## 2018-10-12 RX ADMIN — NICOTINE 1 PATCH: 14 PATCH, EXTENDED RELEASE TRANSDERMAL at 08:54

## 2018-10-12 RX ADMIN — BACLOFEN 20 MG: 20 TABLET ORAL at 08:54

## 2018-10-12 RX ADMIN — CLOPIDOGREL BISULFATE 75 MG: 75 TABLET ORAL at 08:54

## 2018-10-12 RX ADMIN — HEPARIN SODIUM 5000 UNITS: 5000 INJECTION, SOLUTION INTRAVENOUS; SUBCUTANEOUS at 05:47

## 2018-10-12 RX ADMIN — MORPHINE SULFATE 15 MG: 15 TABLET, EXTENDED RELEASE ORAL at 15:22

## 2018-10-12 RX ADMIN — OXYCODONE HYDROCHLORIDE 5 MG: 5 TABLET ORAL at 19:48

## 2018-10-12 RX ADMIN — HEPARIN SODIUM 5000 UNITS: 5000 INJECTION, SOLUTION INTRAVENOUS; SUBCUTANEOUS at 21:58

## 2018-10-12 RX ADMIN — CITALOPRAM HYDROBROMIDE 10 MG: 10 TABLET ORAL at 08:54

## 2018-10-12 RX ADMIN — BACLOFEN 20 MG: 20 TABLET ORAL at 21:58

## 2018-10-12 RX ADMIN — NYSTATIN: 100000 POWDER TOPICAL at 16:54

## 2018-10-12 RX ADMIN — INSULIN LISPRO 3 UNITS: 100 INJECTION, SOLUTION INTRAVENOUS; SUBCUTANEOUS at 16:52

## 2018-10-12 RX ADMIN — BACLOFEN 20 MG: 20 TABLET ORAL at 16:50

## 2018-10-12 RX ADMIN — SODIUM CHLORIDE 150 ML/HR: 0.9 INJECTION, SOLUTION INTRAVENOUS at 00:12

## 2018-10-12 RX ADMIN — OXYCODONE HYDROCHLORIDE 5 MG: 5 TABLET ORAL at 03:31

## 2018-10-12 RX ADMIN — ASPIRIN 81 MG: 81 TABLET, COATED ORAL at 08:54

## 2018-10-12 RX ADMIN — Medication: at 09:01

## 2018-10-12 RX ADMIN — SODIUM CHLORIDE 150 ML/HR: 0.9 INJECTION, SOLUTION INTRAVENOUS at 06:49

## 2018-10-12 RX ADMIN — FAMOTIDINE 20 MG: 20 TABLET ORAL at 16:51

## 2018-10-12 RX ADMIN — TAMSULOSIN HYDROCHLORIDE 0.4 MG: 0.4 CAPSULE ORAL at 16:50

## 2018-10-12 RX ADMIN — FAMOTIDINE 20 MG: 20 TABLET ORAL at 08:54

## 2018-10-12 RX ADMIN — HEPARIN SODIUM 5000 UNITS: 5000 INJECTION, SOLUTION INTRAVENOUS; SUBCUTANEOUS at 12:16

## 2018-10-12 RX ADMIN — Medication: at 16:53

## 2018-10-12 NOTE — PROGRESS NOTES
Progress Note - Bethel Meza 1960, 59 y o  male MRN: [id number]    Unit/Bed#: [facility] 425-01 Encounter: [id number]    Primary Care Provider: CHERELLE Alfaro   Date and time admitted to hospital: 10/9/2018  9:46 PM        * Shock (HCC)resolved as of 10/11/2018   Assessment & Plan    PT thought to be in septic shock, but no source found and negative procal  Abx stopped  Possibly viral +/- aspiration PNA  Resolved with IVF     Esophageal thickening   Assessment & Plan    Likely related to viral gastroenteritis  Outpt GI follow up     Rhabdomyolysis   Assessment & Plan    IV [email or web address] com given and stopped today as CK < 1000  Stop statin for now     Aspiration pneumonitis [facility])   Assessment & Plan    Speech appreciated - VBS done - pt cleared for level 2 w/ thins  ENT consult appreciated - CT neck showed prominent lingula tonsils - will need outpt ENT f/u in 1-2 weeks     Sacral wound, initial encounter   Assessment & Plan    Wound care appreciated     Non-healing surgical wound of right groin   Assessment & Plan    Vascular appreciated - not infected     Peripheral vascular disease due to secondary diabetes mellitus [facility])   Assessment & Plan    Lab Results   Component Value Date    HGBA1C 6 6 (H) 10/10/2018       Recent Labs      10/11/18   1218  10/11/18   1749  10/11/18   2112  10/12/18   0605   POCGLU  183*  256*  181*  131       Blood Sugar Average: Last 72 hrs:  (P) 154 2     s/p R fem endartarectomy and R fem-pop bypass  DAPT  LDL 22 - stop statin   Check FLP in 6 weeks    If LDL > 70, would restart Pravachol at 10 mg daily     Quadriplegia and quadriparesis ([facility] [address] )   Assessment & Plan    PT/OT consults     Type 2 diabetes mellitus with diabetic polyneuropathy, with long-term current use of insulin [facility])   Assessment & Plan    Lab Results   Component Value Date    HGBA1C 6 6 (H) 10/10/2018       Recent Labs      10/11/18   1218  10/11/18   1749  10/11/18   2112  10/12/18   0605   POCGLU  183*  256*  181*  131 Blood Sugar Average: Last 72 hrs:  (P) 154 2     ISS 10/12: restart novolog AC     Tobacco use disorder   Assessment & Plan    Nicotine TD     Chronic pain   Assessment & Plan    C/w home regimen     Encephalopathyresolved as of 10/11/2018   Assessment & Plan    Resolved - 2/2 shock     Acute respiratory failure with hypoxia (HCC)resolved as of 10/11/2018   Assessment & Plan    Resolved - now on RA  Likely 2/2 aspiration pneumonitis     Urinary retentionresolved as of 10/12/2018   Assessment & Plan    Vasquse placed in ER  Removed 10/11  Flomax       VTE Pharmacologic Prophylaxis:   Pharmacologic: Heparin  Mechanical VTE Prophylaxis in Place: Yes    Patient Centered Rounds: I have performed bedside rounds with nursing staff today  Discussions with Specialists or Other Care Team Provider: no    Education and Discussions with Family / Patient: pt and wife    Time Spent for Care: 30 minutes  More than 50% of total time spent on counseling and coordination of care as described above  Current Length of Stay: 2 day(s)    Current Patient Status: Inpatient   Certification Statement: The patient will continue to require additional inpatient hospital stay due to need to monitor symptoms    Discharge Plan: tomorrow if stable    Code Status: Level 1 - Full Code      Subjective:   Feels much better  Good UOP    Objective:     Vitals:   Temp (24hrs), Av 5 °F (36 9 °C), Min:97 6 °F (36 4 °C), Max:99 1 °F (37 3 °C)    Temp:  [97 6 °F (36 4 °C)-99 1 °F (37 3 °C)] 98 8 °F (37 1 °C)  HR:  [80-99] 86  Resp:  [18] 18  BP: (120-129)/(60-75) 127/69  SpO2:  [94 %-97 %] 94 %  Body mass index is 26 29 kg/m²  Input and Output Summary (last 24 hours):        Intake/Output Summary (Last 24 hours) at 10/12/18 1509  Last data filed at 10/12/18 1502   Gross per 24 hour   Intake             4470 ml   Output             1586 ml   Net             2884 ml       Physical Exam:     Physical Exam   Constitutional: He is oriented to person, place, and time  No distress  HENT:   Head: Normocephalic and atraumatic  Eyes: Conjunctivae and EOM are normal    Neck: Normal range of motion  Neck supple  Cardiovascular: Normal rate and regular rhythm  Pulmonary/Chest: Effort normal and breath sounds normal  He has no wheezes  He has no rales  Abdominal: Soft  Bowel sounds are normal  He exhibits no distension  There is no tenderness  Musculoskeletal: Normal range of motion  He exhibits no edema  Neurological: He is alert and oriented to person, place, and time  Skin: Skin is warm and dry  He is not diaphoretic  Additional Data:     Labs:      Results from last 7 days  Lab Units 10/12/18  0455   WBC Thousand/uL 8 04   HEMOGLOBIN g/dL 9 8*   HEMATOCRIT % 29 8*   PLATELETS Thousands/uL 203   NEUTROS PCT % 64   LYMPHS PCT % 20   MONOS PCT % 13*   EOS PCT % 2       Results from last 7 days  Lab Units 10/12/18  0455  10/10/18  0940  10/09/18  2207   SODIUM mmol/L 132*  < >  --   < > 135*   POTASSIUM mmol/L 3 7  < >  --   < > 5 3   CHLORIDE mmol/L 99*  < >  --   < > 101   CO2 mmol/L 27  < >  --   < > 18*   BUN mg/dL 9  < >  --   < > 21   CREATININE mg/dL 0 64  < >  --   < > 2 34*   CALCIUM mg/dL 8 1*  < >  --   < > 9 4   ALK PHOS U/L  --   --   --   --  76   ALT U/L  --   --   --   --  28   AST U/L  --   --   --   --  22   GLUCOSE, ISTAT mg/dl  --   --  145*  --   --    < > = values in this interval not displayed  * I Have Reviewed All Lab Data Listed Above  * Additional Pertinent Lab Tests Reviewed: Bre 66 Admission Reviewed        Recent Cultures (last 7 days):       Results from last 7 days  Lab Units 10/11/18  0032 10/10/18  1630 10/10/18  1345 10/09/18  2247 10/09/18  2246   BLOOD CULTURE   --   --   --  No Growth at 48 hrs  No Growth at 48 hrs     SPUTUM CULTURE  1+ Growth of Candida sp  presumptively albicans*  1+ Growth of   --   --   --   --    GRAM STAIN RESULT  No Epithelial cells seen  3+ Polys 1+ Gram negative rods  --   --   --   --    INFLUENZA A PCR   --   --  None Detected  --   --    INFLUENZA B PCR   --   --  None Detected  --   --    RSV PCR   --   --  None Detected  --   --    LEGIONELLA URINARY ANTIGEN   --  Negative  --   --   --        Last 24 Hours Medication List:     Current Facility-Administered Medications:  acetaminophen 650 mg Oral Q6H PRN Raleigh Qing, DO   aluminum-magnesium hydroxide-simethicone 30 mL Oral Q4H PRN Raleigh Foyay, DO   ammonium lactate  Topical BID Sherryle Sanger, MD   aspirin 81 mg Oral Daily Sherryle Sanger, MD   baclofen 20 mg Oral TID Sherryle Sanger, MD   citalopram 10 mg Oral Daily Sherryle Sanger, MD   clopidogrel 75 mg Oral Daily Sherryle Sanger, MD   famotidine 20 mg Oral BID Raleigh Qing, DO   heparin (porcine) 5,000 Units Subcutaneous Q8H [address] Sherryle Sanger, MD   insulin lispro 1-5 Units Subcutaneous 4x Daily (with meals and at bedtime) Raleigh Longo,    insulin lispro 3 Units Subcutaneous TID With Meals Raleigh Longo, DO   morphine 15 mg Oral BID Raleigh Qing, DO   nicotine 1 patch Transdermal Daily Sherryle Sanger, MD   nystatin  Topical BID Sherryle Sanger, MD   oxyCODONE 5 mg Oral Q4H PRN Raleigh Qing, DO   tamsulosin 0 4 mg Oral Daily With Max Fuller MD        Today, Patient Was Seen By: Raleigh Longo DO    ** Please Note: Dictation voice to text software may have been used in the creation of this document   **

## 2018-10-12 NOTE — ASSESSMENT & PLAN NOTE
PT thought to be in septic shock, but no source found and negative procal  Abx stopped  Possibly viral +/- aspiration PNA  Resolved with IVF

## 2018-10-12 NOTE — ASSESSMENT & PLAN NOTE
Lab Results   Component Value Date    HGBA1C 6 6 (H) 10/10/2018       Recent Labs      10/11/18   1218  10/11/18   1749  10/11/18   2112  10/12/18   0605   POCGLU  183*  256*  181*  131       Blood Sugar Average: Last 72 hrs:  (P) 154 2     s/p R fem endartarectomy and R fem-pop bypass  DAPT  LDL 22 - stop statin   Check FLP in 6 weeks    If LDL > 70, would restart Pravachol at 10 mg daily
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
## 2018-10-12 NOTE — ED ATTENDING ATTESTATION
Villa Reynaga MD, saw and evaluated the patient  I have discussed the patient with the resident/non-physician practitioner and agree with the resident's/non-physician practitioner's findings, Plan of Care, and MDM as documented in the resident's/non-physician practitioner's note, except where noted  All available labs and Radiology studies were reviewed  At this point I agree with the current assessment done in the Emergency Department  I have conducted an independent evaluation of this patient a history and physical is as follows: This is a 3-year-old male who presents to the emergency department with increasing spasticity  The patient is a C5 partial quadriplegic from an accident about 5 years ago when he fell down the stairs  The patient states that over the last 24 hours, he has had increasing spasm and all-over body pain  The patient has not noted fevers or vomiting  He denies shortness of breath, but does complain of significant nausea  Patient is on CPAP at night  He denies increasing cough or sputum production  The patient states he just does not feel well  The patient has not noted abdominal pain, but complains of decreased urine output  The patient's review of systems is largely negative, but limited by his significant neurological deficits  The patient lives at home with his wife, and has caretakers to come in and help him with his ADLs  The patient has limited motor ability and has significant contractures, but is not completely quadriparetic  On exam the patient is awake, alert, and interactive  He is tachypneic with an oxygen saturation of 91%  He is tachycardic with a heart rate of 120  The patient has no signs of trauma to the head or neck  He does have a nonspecific maculopapular blanching erythematous rash over his shoulders, which he states he has not noted before  The major patient's mucous membranes are very dry  His neck is supple without JVD or adenopathy  His heart is regular without murmurs, rubs, or gallops  His lungs are essentially clear with poor effort  His abdomen is spastic and firm, with no tenderness  I cannot appreciate a bladder fundus, but he is difficult to palpate secondary to spasticity  The patient's extremities are contractured  He has significant breakdown on his bilateral heels which does not appear to be acutely infected  He has a healing wound in his right groin which has been noted on prior evaluations which does not appear to be infected  The patient was completely exposed  He is afebrile on rectal temperature, and has no perirectal abscesses or cellulitis  He has an unremarkable back exam   Neurologically, the patient can move all 4 to some degree, but has significant contractures  Impression:  Increased spasticity, concern for intra-abdominal process, sepsis, autonomic dysfunction  All the emergency department, the patient became markedly hypotensive  Patient given 30 cc/kilos bolus, given that he is tachycardic patient treated per sepsis protocol  Patient's initial lactate greater than 11  Broad-spectrum antibiotics initiated  Patient with unremarkable white blood cell count  Labs remarkable for new kidney failure  With resuscitation, patient with declining oxygen saturation, never requiring oxygen  Still with intermittently low blood pressures, ongoing tachycardia  Discussed with ICU who will evaluate patient  Patient's CT with no evidence of infection  Temp Vasques placed  Lactate improving, but still very high  Patient will be admitted to level 1 step-down with lactic acidosis, acute renal failure, septic shock  Patient required 65 minutes of bedside critical care time outside of separately billable procedures    Critical Care Time  CritCare Time    Procedures

## 2018-10-12 NOTE — NUTRITION
10/12/18 1553   Recommendations/Interventions   Interventions Obtain current weight;Diet: continued as ordered; Supplement initiate   Nutrition Recommendations No new recommendations

## 2018-10-12 NOTE — ASSESSMENT & PLAN NOTE
Lab Results   Component Value Date    HGBA1C 6 6 (H) 10/10/2018       Recent Labs      10/11/18   1218  10/11/18   1749  10/11/18   2112  10/12/18   0605   POCGLU  183*  256*  181*  131       Blood Sugar Average: Last 72 hrs:  (P) 154 2     ISS 10/12: restart novolog AC

## 2018-10-12 NOTE — RESPIRATORY THERAPY NOTE
RT Protocol Note  Lore Scott 59 y o  male MRN: [id number]  Unit/Bed#: [facility] 425-01 Encounter: [id number]    Assessment    Active Problems:    Chronic pain    Tobacco use disorder    Type 2 diabetes mellitus with diabetic polyneuropathy, with long-term current use of insulin (HCC)    Quadriplegia and quadriparesis (HCC)    Peripheral vascular disease due to secondary diabetes mellitus ([address] )    Non-healing surgical wound of right groin    Sacral wound, initial encounter    Aspiration pneumonitis (HCC)    Rhabdomyolysis    Esophageal thickening      Home Pulmonary Medications:  reviewed       Past Medical History:   Diagnosis Date    Chronic obstructive lung disease ([address] )     RESOLVED: 8/17/17    Concussion     LAST ASSESSED: 8/17/17    COPD (chronic obstructive pulmonary disease) ([address] )     Depression     Diabetes mellitus ([address] )     Diabetic neuropathy ([facility] )     Difficulty attaining erection     LAST ASSESSEDl: 8/17/17    Elevated liver enzymes     Gall stone pancreatitis     RESOLVED: 3/8/17    Gall stones     RESOLVED: 3/8/17    History of MRSA infection     Hypertension     LAST ASSESSED: 8/17/17    Spinal cord injury, C1-C7 ([address] )     Traumatic injury to musculoskeletal system     1-5 FLIGHT FALL DOWN THE STEPS - CERVICAL NECK INJURY - JAN 2, 2012; LAST ASSESSED: 0/59/61    Umbilical hernia without obstruction and without gangrene     RESOLVED: 3/8/17    Varicella     LAST ASSESSED: 8/17/17     Social History     Social History    Marital status: /Civil Union     Spouse name: N/A    Number of children: N/A    Years of education: N/A     Occupational History    RETIRED      Social History Main Topics    Smoking status: Current Every Day Smoker     Packs/day: 0 25     Years: 40 00    Smokeless tobacco: Never Used      Comment: 1 ppd     Alcohol use Yes      Comment: 3-4 mixed vodka drinks/daily     Drug use: No    Sexual activity: No     Other Topics Concern    None     Social History Narrative    DAILY  [employer]    EXERCISES RARELY    LIVES WITH SIGNIFICANT OTHER    LIVES WITH WIFE    NO LIVING WILL           Subjective         Objective    Physical Exam:   Assessment Type: Assess only  General Appearance: Alert, Awake  Respiratory Pattern: Normal  Chest Assessment: Chest expansion symmetrical  Bilateral Breath Sounds: Clear  R Breath Sounds: Clear  L Breath Sounds: Clear  Cough: None    Vitals:  Blood pressure 138/65, pulse 78, temperature 98 °F (36 7 °C), temperature source Oral, resp  rate 18, height 5' 10" (1 778 m), weight 83 1 kg (183 lb 3 2 oz), SpO2 96 %  Imaging and other studies: I have personally reviewed pertinent reports  Plan    Respiratory Plan: Vent/NIV/HFNC        Resp Comments: Pt currently not in acute resp distress, pattern regular/unlabored and denies dyspnea  Spo2 94-96% on room air  Hasnt utilized bipap since 10/10  Based on the physical assessment prn bipap not indicated at this time   Bipap and respiratory protocol discontinued

## 2018-10-12 NOTE — ASSESSMENT & PLAN NOTE
Speech appreciated - VBS done - pt cleared for level 2 w/ thins  ENT consult appreciated - CT neck showed prominent lingula tonsils - will need outpt ENT f/u in 1-2 weeks

## 2018-10-13 VITALS
TEMPERATURE: 97.9 F | BODY MASS INDEX: 26.23 KG/M2 | HEART RATE: 76 BPM | OXYGEN SATURATION: 90 % | HEIGHT: 70 IN | DIASTOLIC BLOOD PRESSURE: 63 MMHG | WEIGHT: 183.2 LBS | SYSTOLIC BLOOD PRESSURE: 118 MMHG | RESPIRATION RATE: 18 BRPM

## 2018-10-13 PROBLEM — M62.82 RHABDOMYOLYSIS: Status: RESOLVED | Noted: 2018-10-11 | Resolved: 2018-10-13

## 2018-10-13 PROBLEM — J69.0 ASPIRATION PNEUMONITIS (HCC): Status: RESOLVED | Noted: 2018-10-11 | Resolved: 2018-10-13

## 2018-10-13 LAB
ANION GAP SERPL CALCULATED.3IONS-SCNC: 8 MMOL/L (ref 4–13)
BACTERIA SPT RESP CULT: ABNORMAL
BACTERIA SPT RESP CULT: ABNORMAL
BUN SERPL-MCNC: 8 MG/DL (ref 5–25)
CALCIUM SERPL-MCNC: 8.6 MG/DL (ref 8.3–10.1)
CHLORIDE SERPL-SCNC: 101 MMOL/L (ref 100–108)
CO2 SERPL-SCNC: 27 MMOL/L (ref 21–32)
CREAT SERPL-MCNC: 0.6 MG/DL (ref 0.6–1.3)
GFR SERPL CREATININE-BSD FRML MDRD: 109 ML/MIN/1.73SQ M
GLUCOSE SERPL-MCNC: 111 MG/DL (ref 65–140)
GLUCOSE SERPL-MCNC: 132 MG/DL (ref 65–140)
GLUCOSE SERPL-MCNC: 139 MG/DL (ref 65–140)
GLUCOSE SERPL-MCNC: 191 MG/DL (ref 65–140)
GLUCOSE SERPL-MCNC: 220 MG/DL (ref 65–140)
GLUCOSE SERPL-MCNC: 231 MG/DL (ref 65–140)
GRAM STN SPEC: ABNORMAL
MAGNESIUM SERPL-MCNC: 1.4 MG/DL (ref 1.6–2.6)
OSMOLALITY UR/SERPL-RTO: 281 MMOL/KG (ref 282–298)
POTASSIUM SERPL-SCNC: 3.8 MMOL/L (ref 3.5–5.3)
SODIUM SERPL-SCNC: 136 MMOL/L (ref 136–145)

## 2018-10-13 PROCEDURE — 0CJS8ZZ INSPECTION OF LARYNX, VIA NATURAL OR ARTIFICIAL OPENING ENDOSCOPIC: ICD-10-PCS | Performed by: OTOLARYNGOLOGY

## 2018-10-13 PROCEDURE — 83735 ASSAY OF MAGNESIUM: CPT | Performed by: GENERAL PRACTICE

## 2018-10-13 PROCEDURE — G8979 MOBILITY GOAL STATUS: HCPCS

## 2018-10-13 PROCEDURE — 80048 BASIC METABOLIC PNL TOTAL CA: CPT | Performed by: GENERAL PRACTICE

## 2018-10-13 PROCEDURE — 83930 ASSAY OF BLOOD OSMOLALITY: CPT | Performed by: GENERAL PRACTICE

## 2018-10-13 PROCEDURE — G8978 MOBILITY CURRENT STATUS: HCPCS

## 2018-10-13 PROCEDURE — 97163 PT EVAL HIGH COMPLEX 45 MIN: CPT

## 2018-10-13 PROCEDURE — 82948 REAGENT STRIP/BLOOD GLUCOSE: CPT

## 2018-10-13 PROCEDURE — 99239 HOSP IP/OBS DSCHRG MGMT >30: CPT | Performed by: GENERAL PRACTICE

## 2018-10-13 RX ORDER — FAMOTIDINE 20 MG/1
20 TABLET, FILM COATED ORAL 2 TIMES DAILY
Refills: 0
Start: 2018-10-13 | End: 2019-02-07

## 2018-10-13 RX ADMIN — INSULIN LISPRO 3 UNITS: 100 INJECTION, SOLUTION INTRAVENOUS; SUBCUTANEOUS at 07:57

## 2018-10-13 RX ADMIN — OXYCODONE HYDROCHLORIDE 5 MG: 5 TABLET ORAL at 14:00

## 2018-10-13 RX ADMIN — MORPHINE SULFATE 15 MG: 15 TABLET, EXTENDED RELEASE ORAL at 08:23

## 2018-10-13 RX ADMIN — CITALOPRAM HYDROBROMIDE 10 MG: 10 TABLET ORAL at 08:26

## 2018-10-13 RX ADMIN — HEPARIN SODIUM 5000 UNITS: 5000 INJECTION, SOLUTION INTRAVENOUS; SUBCUTANEOUS at 13:59

## 2018-10-13 RX ADMIN — INSULIN LISPRO 3 UNITS: 100 INJECTION, SOLUTION INTRAVENOUS; SUBCUTANEOUS at 12:11

## 2018-10-13 RX ADMIN — NYSTATIN: 100000 POWDER TOPICAL at 08:33

## 2018-10-13 RX ADMIN — CLOPIDOGREL BISULFATE 75 MG: 75 TABLET ORAL at 08:26

## 2018-10-13 RX ADMIN — MORPHINE SULFATE 15 MG: 15 TABLET, EXTENDED RELEASE ORAL at 00:01

## 2018-10-13 RX ADMIN — Medication: at 08:28

## 2018-10-13 RX ADMIN — FAMOTIDINE 20 MG: 20 TABLET ORAL at 08:26

## 2018-10-13 RX ADMIN — MAGNESIUM OXIDE TAB 400 MG (241.3 MG ELEMENTAL MG) 400 MG: 400 (241.3 MG) TAB at 13:59

## 2018-10-13 RX ADMIN — HEPARIN SODIUM 5000 UNITS: 5000 INJECTION, SOLUTION INTRAVENOUS; SUBCUTANEOUS at 05:30

## 2018-10-13 RX ADMIN — NICOTINE 1 PATCH: 14 PATCH, EXTENDED RELEASE TRANSDERMAL at 08:23

## 2018-10-13 RX ADMIN — ASPIRIN 81 MG: 81 TABLET, COATED ORAL at 08:28

## 2018-10-13 RX ADMIN — BACLOFEN 20 MG: 20 TABLET ORAL at 08:26

## 2018-10-13 RX ADMIN — OXYCODONE HYDROCHLORIDE 5 MG: 5 TABLET ORAL at 05:30

## 2018-10-13 NOTE — PHYSICAL THERAPY NOTE
Physical Therapy Evaluation    Patient's Name: Bruce Rosas    Admitting Diagnosis  Weakness [R53 1]  Peripheral vascular disease due to secondary diabetes mellitus ([facility] ) [E13 51]  Non-healing surgical wound of right groin, subsequent encounter [T81 89XD]  Sacral decubitus ulcer, stage II [L89 152]  Septic shock ([facility] ) [A41 9, R65 21]    Problem List  Patient Active Problem List   Diagnosis    Chronic pain    Depression    Diabetic neuropathy ([facility] )    Enlarged prostate with lower urinary tract symptoms (LUTS)    Hernia, epigastric    Injury of cervical spinal cord ([facility] )    Tobacco use disorder    Umbilical hernia    Cervical myelopathy (HCC)    Basal cell carcinoma in situ of skin    Ambulatory dysfunction    Lumbar radiculopathy    Type 2 diabetes mellitus with diabetic polyneuropathy, with long-term current use of insulin (HCC)    Hypercalcemia    Quadriplegia and quadriparesis (HCC)    Alcohol abuse    Cellulitis of leg, right    Peripheral vascular disease due to secondary diabetes mellitus ([facility] )    Ischemic foot pain at rest [facility])    Peripheral arterial disease ([facility] )    Essential hypertension    Acute blood loss anemia    Constipation    Wound dehiscence, surgical, initial encounter    Non-healing surgical wound of right groin    Postoperative state    Sacral wound, initial encounter    Esophageal thickening       Past Medical History  Past Medical History:   Diagnosis Date    Chronic obstructive lung disease ([address] )     RESOLVED: 8/17/17    Concussion     LAST ASSESSED: 8/17/17    COPD (chronic obstructive pulmonary disease) ([facility] )     Depression     Diabetes mellitus ([facility] )     Diabetic neuropathy ([facility] )     Difficulty attaining erection     LAST ASSESSEDl: 8/17/17    Elevated liver enzymes     Gall stone pancreatitis     RESOLVED: 3/8/17    Gall stones     RESOLVED: 3/8/17    History of MRSA infection     Hypertension     LAST ASSESSED: 8/17/17    Spinal cord injury, C1-C7 ([facility] )     Traumatic injury to musculoskeletal system     1-5 FLIGHT FALL DOWN THE STEPS - CERVICAL NECK INJURY - JAN 2, 2012; LAST ASSESSED: 6/13/77    Umbilical hernia without obstruction and without gangrene     RESOLVED: 3/8/17    Varicella     LAST ASSESSED: 8/17/17       Past Surgical History  Past Surgical History:   Procedure Laterality Date    ARTERIOGRAM Right 7/31/2018    Procedure: ARTERIOGRAM Completion Agram;  Surgeon: Ada Irizarry MD;  [facility];  Service: Vascular    BACK SURGERY      BYPASS FEMORAL-POPLITEAL Right 7/31/2018    Procedure: BYPASS FEMORAL-POPLITEAL R femoral- BK popliteal bypass;  Surgeon: Ada Irizarry MD;  [facility];  Service: Vascular    CERVICAL FUSION      VERTEBRAL; C3-C4 FUSION    CHOLECYSTECTOMY LAPAROSCOPIC N/A 2/24/2017    Procedure: CHOLECYSTECTOMY LAPAROSCOPIC;  Surgeon: Anson Marquez MD;  [facility];  Service:     CHOLECYSTECTOMY LAPAROSCOPIC  03/2017    EPIGASTRIC HERNIA REPAIR      OH DEBRIDEMENT, SKIN, SUB-Q TISSUE,=<20 SQ CM Right 8/28/2018    Procedure: RIGHT GROIN AND THIGH DEBRIDEMENT ([address]) Gianfranco Brantley;  Surgeon: Delpha Romberg Doctor, MD;  [facility];  Service: Vascular    OH THROMBOENDARTECTMY FEMORAL COMMON Right 7/31/2018    Procedure: ENDARTERECTOMY ARTERIAL FEMORAL R femoral endarterectomy w/ patch angioplasty ;  Surgeon: Ada Irizarry MD;  [facility];  Service: Vascular    UMBILICAL HERNIA REPAIR N/A 2/24/2017    Procedure: REPAIR HERNIA UMBILICAL;  Surgeon: Anson Marquez MD;  [facility];  Service:       10/13/18 1340   Note Type   Note type Eval only   Pain Assessment   Pain Assessment 0-10   Pain Score No Pain   Home Living   Type of Home Apartment   Additional Comments Resides w/ wife in 1st floor apt, 3 ANA  Normally indep self care, has VNA and home therapies multile days/week    ambulates short distances w/ RW   wife works days and pt alone   Prior Function   Level of [place] Independent with ADLs and functional mobility   Falls in the last 6 months (unknown)   Restrictions/Precautions   Weight Bearing Precautions Per Order No   Other Precautions Fall Risk;Impulsive;Pain   General   Family/Caregiver Present No   Cognition   Overall Cognitive Status WFL   Arousal/Participation Responsive   Orientation Level Oriented X4   Memory Unable to assess   Following Commands Follows one step commands without difficulty   RLE Assessment   RLE Assessment (impaired tone at baseline, strength grossly 3+/5)   LLE Assessment   LLE Assessment (impaired tone at baseline, strength grossly 3+/5)   Coordination   Movements are Fluid and Coordinated 0   Coordination and Movement Description significant but likely baseline B UE and LE ataxia   Bed Mobility   Supine to Sit 5  Supervision   Additional items Assist x 1   Sit to Supine 5  Supervision   Additional items Assist x 1   Transfers   Sit to Stand 5  Supervision   Additional items Assist x 1   Stand to Sit 5  Supervision   Additional items Assist x 1   Ambulation/Elevation   Gait pattern Foward flexed; Ataxia; Short stride;Decreased foot clearance; Forward Flexion; Improper Weight shift  (ataxia, short step length)   Gait Assistance (CGA > S)   Additional items Assist x 1   Assistive Device Rolling walker   Distance 35'x1   Balance   Static Sitting Good   Dynamic Sitting Fair +   Static Standing Fair   Dynamic Standing Fair -   Ambulatory Fair -   Endurance Deficit   Endurance Deficit Yes   Endurance Deficit Description fatigue, weakness   Activity Tolerance   Activity Tolerance Patient limited by fatigue;Treatment limited secondary to medical complications (Comment)   Nurse Made Aware yes   Assessment   Prognosis Fair   Problem List Decreased strength;Decreased endurance; Impaired balance;Decreased mobility; Decreased coordination; Impaired tone; Impaired sensation   Assessment Pt seen for a high complexity physical therapy evaluation    Pt is yun[age] y/o male w/ history/comorbidities of chronic pain, tobacco use/abuse, DM II w/ neuropathy  C4 incomplete quadraperesis/plegia, sacral wound, PVD who is now admitted w/ worsening B LE muscle spasms, weakness  Dx include sepsis/septic shock, SUNDEEP, non healing groin wound, acute encephalopathy  Pt had rapid response as well on 10/10 due to hypoxia  Due to multiple acute medical issues w/ baseline comorbidities chronic pain, fall risk, note unstable clinical picture  PT consulted to assess mobility, d/c needs  Pt presents w/ decreased functional mob, standing balance, endurance, B LE strength, barriers at home  If not d/c home today as planned, will benefit from skilled PT to correct for the above problems  If medically cleared, can d/c home today w/ home therapies, VNA   Goals   Patient Goals to go home today   STG Expiration Date 10/27/18   Short Term Goal #1 1-2 wks: (if not d/c home today): Bed mob and transfers w/ indep, standing balance to good w/ device, ambulate 50-75 ft w/ RW and S, increase B LE strength by 1/2 -1 grade, ambulate 3 ANA w/ S   Treatment Day 0   Plan   Treatment/Interventions Elevations; Functional transfer training;LE strengthening/ROM; Therapeutic exercise; Endurance training;Bed mobility;Gait training;Patient/family training;Equipment eval/education   PT Frequency 2-3x/wk;5x/wk  (3-5x/wk)   Recommendation   Recommendation (when stable to home w/ VNA, home therapies)   Equipment Recommended Walker   PT - OK to Discharge Yes  (when stable to home w/ VNA and home therapies)   Modified [place] Scale   Modified [place] Scale 4   Barthel Index   Feeding 10   Bathing 0   Grooming Score 5   Dressing Score 5   Bladder Score 10   Bowels Score 10   Toilet Use Score 5   Transfers (Bed/Chair) Score 10   Mobility (Level Surface) Score 0   Stairs Score 0   Barthel Index Score 55           Shanaeia Finder PT, DPT, CSRS

## 2018-10-13 NOTE — NURSING NOTE
Reviewed instructions with patient and wife, all questions answered, IV removed, wife dressed patient and pca helped into wheelchair and taken to car

## 2018-10-13 NOTE — DISCHARGE INSTRUCTIONS
Please adhere to mechanical soft diet  Only drink 2L fluid per day  You can  Pepcid and magnesium over the counter    PCP should check BMP in 1 week  Should check lipid panel in 6 weeks to see if you need to go back on Pravachol    Aspiration Pneumonia   WHAT YOU NEED TO KNOW:   Aspiration pneumonia is a lung infection that develops after you aspirate (inhale) food, liquid, or vomit into your lungs  You can also aspirate food or liquid from your stomach that backs up into your esophagus  If you are not able to cough up the aspirated material, bacteria can grow in your lungs and cause an infection  Your risk is highest if you are older than 75 or live in a nursing home or long-term care center  You may be less active, bedridden, or not able to swallow or cough well  The muscles that help you swallow can become weakened by age, illness, or disease  Your risk also increases if you have a weak immune system  DISCHARGE INSTRUCTIONS:   Seek care immediately if:   · You have chest pain  · You are confused or cannot think clearly  · You have more trouble breathing or your breathing seems faster than normal   Contact your healthcare provider if:   · You have a fever  · Your symptoms are not better after 2 or 3 days of treatment  · You have questions or concerns about your condition or care  Medicines: You may need any of the following:  · Antibiotics  treat pneumonia caused by bacteria  · Steroids  may help to open your air passages so you can breathe easier  Do not stop taking this medicine without asking your healthcare provider  Stopping on your own can cause problems  · Take your medicine as directed  Contact your healthcare provider if you think your medicine is not helping or if you have side effects  Tell him or her if you are allergic to any medicine  Keep a list of the medicines, vitamins, and herbs you take  Include the amounts, and when and why you take them   Bring the list or the pill bottles to follow-up visits  Carry your medicine list with you in case of an emergency  Follow up with your healthcare provider as directed: You may need another chest x-ray in 6 to 8 weeks  Follow up with your speech-language pathologist, dietitian, or occupational therapist as directed  Write down your questions so you remember to ask them during your follow-up visits  Prevent or manage aspiration pneumonia:   · Go to speech therapy as directed  A speech therapist can teach you exercises to strengthen the muscles you use to swallow  · Sit up while you eat  If you are bedridden, keep the head of your bed slightly up (at about a 30° to 45° angle) while you eat  Take small bites, eat slowly, and swallow with your chin down  · Eat soft foods and drink thickened liquids  A dietitian can teach you how to thicken your liquids so you have less trouble swallowing  Drink liquids through a straw or sip them from a spoon  Ask your dietitian what kinds of foods you should eat  He or she may suggest soft foods such as cooked cereal, pasta, well-cooked fruits and vegetables, and scrambled eggs  Your dietitian may also suggest moist, tender meats that are cut into small pieces  · Care for your teeth and mouth  Mouth care can help kill harmful bacteria in your mouth so you do not aspirate them  While you are sitting up, brush your teeth for 2 minutes daily after breakfast and again after dinner  Also brush your tongue  If you do not have teeth, gently brush your gums with a soft toothbrush  Dentures should be removed and cleaned with an electric toothbrush and water after breakfast and dinner  Soak dentures overnight in a cleaning solution  Visit a dentist regularly to have your teeth and gums cleaned  · Limit or avoid taking sedatives  These medicines increase the risk of aspiration because they dry out your mouth and make you drowsy   If possible, avoid taking antihistamine medicines because they also make your mouth dry  · Do not smoke  Nicotine and other chemicals in cigarettes and cigars can cause lung damage  Ask your healthcare provider for information if you currently smoke and need help to quit  E-cigarettes or smokeless tobacco still contain nicotine  Talk to your healthcare provider before you use these products  © 2017 [address] Information is for End User's use only and may not be sold, redistributed or otherwise used for commercial purposes  All illustrations and images included in CareNotes® are the copyrighted property of A D A M [employer] Inc  or Yasir Leslie  The above information is an  only  It is not intended as medical advice for individual conditions or treatments  Talk to your doctor, nurse or pharmacist before following any medical regimen to see if it is safe and effective for you

## 2018-10-13 NOTE — PROGRESS NOTES
Patient reported doing well with swallowing all consistencies  Denied hoarseness, SOB  Reported hx of heartburn after certain foods incl tomato sauce  Denied sore throat  PROCEDURE: Flexible Laryngoscopy  Indication:  Dysphagia, abnormal barium swallow/CT  Verbal consent obtained  Surgeon: Isauro Simons MD  Anesthesia: 2% lidocaine, oxymetazoline  Scope passed through nasal cavity  Nasopharynx: unremarkable  Oropharynx: lingual tonsil hypertrophy, symmetric filling vallecula  Hypopharynx/Larynx:   Vocal fold mobility = essentially intact   Laryngeal edema  = epiglottis with moderate pale edema/redundant mucosa - sticks to posterior pharyngeal wall at rest, somewhat floppy more on the right with deep inspiration; no erythema; remainder of larynx with minimal edema   Laryngeal erythema = mild/minimal   Vocal folds = unremarkable   Other findings = n/a  Scope was removed  Patient tolerated procedure well without complications        A/P:  Dysphagia - functionally appears intact; low suspicion for aspiration risk based on laryngoscopy  Continue with recommendations as proposed by speech-language pathology    Abnormal barium and CT studies - confirmed lingual tonsil hypertrophy without mass on imaging and laryngoscopy    new RUL consolidation concerning for pneumonia noted on CT neck 10/11 - mgt per primary team    Multiple odontogenic findings on CT, dental caries - consider outpatient OMFS consult    Lingual tonsil hypertrophy    Floppy/somewhat edematous epiglottis as incidental finding corresponds with appearance on barium swallow, uncertain amount of impact on swallow function at this time; This does not obstruct the airway      GERD - breakthrough heartburn symptoms with current mgt; would consider more aggressive therapy as this could be affecting the upper aerodigestive tract (dysphagia, lingual tonsil hypertrophy, edema, etc) vs pH monitor study    Patient may follow up with ENT as outpatient if desired

## 2018-10-13 NOTE — PLAN OF CARE
Problem: PHYSICAL THERAPY ADULT  Goal: Performs mobility at highest level of function for planned discharge setting  See evaluation for individualized goals  Treatment/Interventions: Elevations, Functional transfer training, LE strengthening/ROM, Therapeutic exercise, Endurance training, Bed mobility, Gait training, Patient/family training, Equipment eval/education  Equipment Recommended: Obie Castleman       See flowsheet documentation for full assessment, interventions and recommendations  Prognosis: Fair  Problem List: Decreased strength, Decreased endurance, Impaired balance, Decreased mobility, Decreased coordination, Impaired tone, Impaired sensation  Assessment: Pt seen for a high complexity physical therapy evaluation  Pt is a 60 y/o male w/ history/comorbidities of chronic pain, tobacco use/abuse, DM II w/ neuropathy  C4 incomplete quadraperesis/plegia, sacral wound, PVD who is now admitted w/ worsening B LE muscle spasms, weakness  Dx include sepsis/septic shock, SUNDEEP, non healing groin wound, acute encephalopathy  Pt had rapid response as well on 10/10 due to hypoxia  Due to multiple acute medical issues w/ baseline comorbidities chronic pain, fall risk, note unstable clinical picture  PT consulted to assess mobility, d/c needs  Pt presents w/ decreased functional mob, standing balance, endurance, B LE strength, barriers at home  If not d/c home today as planned, will benefit from skilled PT to correct for the above problems  If medically cleared, can d/c home today w/ home therapies, VNA        Recommendation:  (when stable to home w/ VNA, home therapies)     PT - OK to Discharge: (S) Yes (when stable to home w/ VNA and home therapies)    See flowsheet documentation for full assessment

## 2018-10-14 NOTE — ASSESSMENT & PLAN NOTE
Speech appreciated - VBS done - pt cleared for level 2 w/ thins  ENT consult appreciated - CT neck showed prominent lingula tonsils - this was confirmed on laryngoscopy

## 2018-10-14 NOTE — ASSESSMENT & PLAN NOTE
Lab Results   Component Value Date    HGBA1C 6 6 (H) 10/10/2018       Recent Labs      10/12/18   1613  10/12/18   2105  10/13/18   0601  10/13/18   1104   POCGLU  231*  111  139  191*       Blood Sugar Average: Last 72 hrs:  (P) 490 [id number]     s/p R fem endartarectomy and R fem-pop bypass  DAPT  LDL 22 - stop statin   Check FLP in 6 weeks    If LDL > 70, would restart Pravachol at 10 mg daily

## 2018-10-14 NOTE — DISCHARGE SUMMARY
Discharge- Adrien Neil 1960, 59 y o  male MRN: [id number]    Unit/Bed#: [facility] 425-01 Encounter: [id number]    Primary Care Provider: CHERELLE Matthews   Date and time admitted to hospital: 10/9/2018  9:46 PM        * Shock (HCC)resolved as of 10/11/2018   Assessment & Plan    PT thought to be in septic shock, but no source found and negative procal  Abx stopped  Possibly viral +/- aspiration PNA  Resolved with IVF     Esophageal thickening   Assessment & Plan    Likely related to viral gastroenteritis  Pepcid  Outpt GI follow up     Sacral wound, initial encounter   Assessment & Plan    Wound care appreciated     Non-healing surgical wound of right groin   Assessment & Plan    Vascular appreciated - not infected     Peripheral vascular disease due to secondary diabetes mellitus [facility])   Assessment & Plan    Lab Results   Component Value Date    HGBA1C 6 6 (H) 10/10/2018       Recent Labs      10/12/18   1613  10/12/18   2105  10/13/18   0601  10/13/18   1104   POCGLU  231*  111  139  191*       Blood Sugar Average: Last 72 hrs:  (P) 658 [id number]     s/p R fem endartarectomy and R fem-pop bypass  DAPT  LDL 22 - stop statin   Check FLP in 6 weeks    If LDL > 70, would restart Pravachol at 10 mg daily     Rhabdomyolysisresolved as of 10/13/2018   Assessment & Plan    IV Euphemia@Debteye given and stopped 10/12 as CK < 1000  Stop statin for now     Encephalopathyresolved as of 10/11/2018   Assessment & Plan    Resolved - 2/2 shock     Aspiration pneumonitis (HCC)resolved as of 10/13/2018   Assessment & Plan    Speech appreciated - VBS done - pt cleared for level 2 w/ thins  ENT consult appreciated - CT neck showed prominent lingula tonsils - this was confirmed on laryngoscopy     Acute respiratory failure with hypoxia (HCC)resolved as of 10/11/2018   Assessment & Plan    Resolved - now on RA  Likely 2/2 aspiration pneumonitis     Urinary retentionresolved as of 10/12/2018   Assessment & Plan    Vasques placed in ER  Removed 10/11  Flomax       Discharging Physician / Practitioner: Riana Russell DO  PCP: Loretta Santamaria [address]  Admission Date:   Admission Orders     Ordered        10/10/18 0249  Inpatient Admission  Once             Discharge Date: 10/13/18    Resolved Problems  Date Reviewed: 10/13/2018          Resolved    Urinary retention 10/12/2018     Resolved by  Riana Center, DO    * (Principal)Shock ([address] ) 10/11/2018     Resolved by  Riana Center, DO    Acute respiratory failure with hypoxia ([address] ) 10/11/2018     Resolved by  Riana Center, DO    Aspiration pneumonitis ([address] ) 10/13/2018     Resolved by  Riana Center, DO    Encephalopathy 10/11/2018     Resolved by  Riana Center, DO    Rhabdomyolysis 10/13/2018     Resolved by  Rianachano Nguyenon, DO          Consultations During Hospital Stay:  · Dr Lou Gigi  · Dr Marroquin Lilian - vascular  · Dr Misael Solano - ID  · Wound care  · Pharmacy  · Dr Minor Schirmer - ENT    Procedures Performed:     · Direct laryngoscope showed enlarged lingula tonsils    Significant Findings / Test Results:     · B Cx neg    Incidental Findings:   · CTAP showed esophageal thickening     Test Results Pending at Discharge (will require follow up):   · none     Outpatient Tests Requested:  · BMP in 1 week  · FLP in 6 weeksd    Complications:  none    Reason for Admission: shock    Hospital Course:     Junior Marti is a 59 y o  male patient who originally presented to the hospital on 10/9/2018 due to shock  Thought to be septic, but no bacterial source found  PT found to have aspiration PNA, so placed on mech soft diet  PT also placed on fluid restriction as Na mildly low  PT/OT consults requested, but not done  PT not interested in rehab, and already set up with home PT/OT  Please see above list of diagnoses and related plan for additional information       Condition at Discharge: stable     Discharge Day Visit / Exam:     Subjective:  Wants to go home  Vitals: Blood Pressure: 118/63 (10/13/18 0750)  Pulse: 76 (10/13/18 0750)  Temperature: 97 9 °F (36 6 °C) (10/13/18 0750)  Temp Source: Oral (10/13/18 0750)  Respirations: 18 (10/13/18 0750)  Height: 5' 10" (177 8 cm) (10/09/18 2151)  Weight - Scale: 83 1 kg (183 lb 3 2 oz) (10/10/18 0600)  SpO2: 90 % (10/13/18 0800)  Exam:   Physical Exam   Constitutional: He is oriented to person, place, and time  No distress  HENT:   Head: Normocephalic and atraumatic  Eyes: Conjunctivae and EOM are normal    Neck: Normal range of motion  Neck supple  Cardiovascular: Normal rate and regular rhythm  Pulmonary/Chest: Effort normal and breath sounds normal  He has no wheezes  He has no rales  Abdominal: Soft  Bowel sounds are normal  He exhibits no distension  There is no tenderness  Musculoskeletal: Normal range of motion  He exhibits no edema  Neurological: He is alert and oriented to person, place, and time  Skin: Skin is warm and dry  He is not diaphoretic  Discussion with Family: yes    Discharge instructions/Information to patient and family:   See after visit summary for information provided to patient and family  Provisions for Follow-Up Care:  See after visit summary for information related to follow-up care and any pertinent home health orders  Disposition:     Home with VNA Services (Reminder: Complete face to face encounter)    For Discharges to [facility] SNF:   · Not Applicable to this Patient - Not Applicable to this Patient    Planned Readmission: no     Discharge Statement:  I spent 35 minutes discharging the patient  This time was spent on the day of discharge  I had direct contact with the patient on the day of discharge  Greater than 50% of the total time was spent examining patient, answering all patient questions, arranging and discussing plan of care with patient as well as directly providing post-discharge instructions  Additional time then spent on discharge activities      Discharge Medications:  See after visit summary for reconciled discharge medications provided to patient and family        ** Please Note: This note has been constructed using a voice recognition system **

## 2018-10-15 ENCOUNTER — TRANSITIONAL CARE MANAGEMENT (OUTPATIENT)
Dept: FAMILY MEDICINE CLINIC | Facility: CLINIC | Age: 58
End: 2018-10-15

## 2018-10-15 LAB
BACTERIA BLD CULT: NORMAL
BACTERIA BLD CULT: NORMAL

## 2018-10-24 ENCOUNTER — TRANSITIONAL CARE MANAGEMENT (OUTPATIENT)
Dept: FAMILY MEDICINE CLINIC | Facility: CLINIC | Age: 58
End: 2018-10-24

## 2019-02-07 ENCOUNTER — APPOINTMENT (INPATIENT)
Dept: NON INVASIVE DIAGNOSTICS | Facility: HOSPITAL | Age: 59
DRG: 306 | End: 2019-02-07
Payer: COMMERCIAL

## 2019-02-07 ENCOUNTER — APPOINTMENT (EMERGENCY)
Dept: RADIOLOGY | Facility: HOSPITAL | Age: 59
DRG: 306 | End: 2019-02-07
Payer: COMMERCIAL

## 2019-02-07 ENCOUNTER — HOSPITAL ENCOUNTER (INPATIENT)
Facility: HOSPITAL | Age: 59
LOS: 7 days | Discharge: HOME HEALTH | DRG: 306 | End: 2019-02-14
Attending: EMERGENCY MEDICINE | Admitting: INTERNAL MEDICINE
Payer: COMMERCIAL

## 2019-02-07 ENCOUNTER — APPOINTMENT (INPATIENT)
Dept: RADIOLOGY | Facility: HOSPITAL | Age: 59
DRG: 306 | End: 2019-02-07
Payer: COMMERCIAL

## 2019-02-07 DIAGNOSIS — I34.0 NON-RHEUMATIC MITRAL REGURGITATION: ICD-10-CM

## 2019-02-07 DIAGNOSIS — F32.A DEPRESSION, UNSPECIFIED DEPRESSION TYPE: ICD-10-CM

## 2019-02-07 DIAGNOSIS — N28.9 ACUTE KIDNEY INSUFFICIENCY: ICD-10-CM

## 2019-02-07 DIAGNOSIS — J81.0 FLASH PULMONARY EDEMA (HCC): ICD-10-CM

## 2019-02-07 DIAGNOSIS — I50.41 ACUTE COMBINED SYSTOLIC AND DIASTOLIC CONGESTIVE HEART FAILURE (HCC): ICD-10-CM

## 2019-02-07 DIAGNOSIS — R33.9 URINARY RETENTION: ICD-10-CM

## 2019-02-07 DIAGNOSIS — N17.9 ACUTE KIDNEY INJURY (HCC): ICD-10-CM

## 2019-02-07 DIAGNOSIS — R06.03 RESPIRATORY DISTRESS: Primary | ICD-10-CM

## 2019-02-07 DIAGNOSIS — F17.200 TOBACCO USE DISORDER: ICD-10-CM

## 2019-02-07 DIAGNOSIS — E11.42 TYPE 2 DIABETES MELLITUS WITH DIABETIC POLYNEUROPATHY, WITH LONG-TERM CURRENT USE OF INSULIN (HCC): ICD-10-CM

## 2019-02-07 DIAGNOSIS — I50.1: ICD-10-CM

## 2019-02-07 DIAGNOSIS — R79.89 ELEVATED BRAIN NATRIURETIC PEPTIDE (BNP) LEVEL: ICD-10-CM

## 2019-02-07 DIAGNOSIS — I51.1: ICD-10-CM

## 2019-02-07 DIAGNOSIS — Z79.4 TYPE 2 DIABETES MELLITUS WITH DIABETIC POLYNEUROPATHY, WITH LONG-TERM CURRENT USE OF INSULIN (HCC): ICD-10-CM

## 2019-02-07 PROBLEM — N13.9 OBSTRUCTIVE UROPATHY: Status: ACTIVE | Noted: 2019-02-07

## 2019-02-07 PROBLEM — E87.70 VOLUME OVERLOAD: Status: ACTIVE | Noted: 2019-02-07

## 2019-02-07 LAB
ALBUMIN SERPL BCP-MCNC: 3.5 G/DL (ref 3.5–5)
ALP SERPL-CCNC: 53 U/L (ref 46–116)
ALT SERPL W P-5'-P-CCNC: 40 U/L (ref 12–78)
ANION GAP BLD CALC-SCNC: 17 MMOL/L (ref 4–13)
ANION GAP SERPL CALCULATED.3IONS-SCNC: 11 MMOL/L (ref 4–13)
ANION GAP SERPL CALCULATED.3IONS-SCNC: 9 MMOL/L (ref 4–13)
APTT PPP: 33 SECONDS (ref 26–38)
AST SERPL W P-5'-P-CCNC: 48 U/L (ref 5–45)
ATRIAL RATE: 112 BPM
BACTERIA UR QL AUTO: ABNORMAL /HPF
BASE EXCESS BLDA CALC-SCNC: -1 MMOL/L (ref -2–3)
BASOPHILS # BLD AUTO: 0.09 THOUSANDS/ΜL (ref 0–0.1)
BASOPHILS NFR BLD AUTO: 1 % (ref 0–1)
BILIRUB SERPL-MCNC: 0.38 MG/DL (ref 0.2–1)
BILIRUB UR QL STRIP: NEGATIVE
BUN BLD-MCNC: 44 MG/DL (ref 5–25)
BUN SERPL-MCNC: 45 MG/DL (ref 5–25)
BUN SERPL-MCNC: 50 MG/DL (ref 5–25)
CA-I BLD-SCNC: 1.18 MMOL/L (ref 1.12–1.32)
CA-I BLD-SCNC: 1.18 MMOL/L (ref 1.12–1.32)
CALCIUM SERPL-MCNC: 9 MG/DL (ref 8.3–10.1)
CALCIUM SERPL-MCNC: 9.3 MG/DL (ref 8.3–10.1)
CHLORIDE BLD-SCNC: 98 MMOL/L (ref 100–108)
CHLORIDE SERPL-SCNC: 101 MMOL/L (ref 100–108)
CHLORIDE SERPL-SCNC: 97 MMOL/L (ref 100–108)
CLARITY UR: CLEAR
CO2 SERPL-SCNC: 24 MMOL/L (ref 21–32)
CO2 SERPL-SCNC: 25 MMOL/L (ref 21–32)
COLOR UR: YELLOW
CREAT BLD-MCNC: 1.8 MG/DL (ref 0.6–1.3)
CREAT SERPL-MCNC: 2.17 MG/DL (ref 0.6–1.3)
CREAT SERPL-MCNC: 2.35 MG/DL (ref 0.6–1.3)
DEPRECATED D DIMER PPP: 941 NG/ML (FEU)
EOSINOPHIL # BLD AUTO: 0.19 THOUSAND/ΜL (ref 0–0.61)
EOSINOPHIL NFR BLD AUTO: 2 % (ref 0–6)
ERYTHROCYTE [DISTWIDTH] IN BLOOD BY AUTOMATED COUNT: 13.6 % (ref 11.6–15.1)
ERYTHROCYTE [DISTWIDTH] IN BLOOD BY AUTOMATED COUNT: 13.6 % (ref 11.6–15.1)
EST. AVERAGE GLUCOSE BLD GHB EST-MCNC: 220 MG/DL
GFR SERPL CREATININE-BSD FRML MDRD: 29 ML/MIN/1.73SQ M
GFR SERPL CREATININE-BSD FRML MDRD: 32 ML/MIN/1.73SQ M
GFR SERPL CREATININE-BSD FRML MDRD: 40 ML/MIN/1.73SQ M
GLUCOSE SERPL-MCNC: 124 MG/DL (ref 65–140)
GLUCOSE SERPL-MCNC: 339 MG/DL (ref 65–140)
GLUCOSE SERPL-MCNC: 402 MG/DL (ref 65–140)
GLUCOSE SERPL-MCNC: 408 MG/DL (ref 65–140)
GLUCOSE SERPL-MCNC: 414 MG/DL (ref 65–140)
GLUCOSE SERPL-MCNC: 417 MG/DL (ref 65–140)
GLUCOSE SERPL-MCNC: 421 MG/DL (ref 65–140)
GLUCOSE SERPL-MCNC: 425 MG/DL (ref 65–140)
GLUCOSE SERPL-MCNC: 434 MG/DL (ref 65–140)
GLUCOSE SERPL-MCNC: 493 MG/DL (ref 65–140)
GLUCOSE SERPL-MCNC: 497 MG/DL (ref 65–140)
GLUCOSE SERPL-MCNC: >500 MG/DL (ref 65–140)
GLUCOSE UR STRIP-MCNC: ABNORMAL MG/DL
HBA1C MFR BLD: 9.3 % (ref 4.2–6.3)
HCO3 BLDA-SCNC: 25.8 MMOL/L (ref 24–30)
HCT VFR BLD AUTO: 33.4 % (ref 36.5–49.3)
HCT VFR BLD AUTO: 37.8 % (ref 36.5–49.3)
HCT VFR BLD CALC: 37 % (ref 36.5–49.3)
HCT VFR BLD CALC: 38 % (ref 36.5–49.3)
HGB BLD-MCNC: 11.1 G/DL (ref 12–17)
HGB BLD-MCNC: 12.2 G/DL (ref 12–17)
HGB BLDA-MCNC: 12.6 G/DL (ref 12–17)
HGB BLDA-MCNC: 12.9 G/DL (ref 12–17)
HGB UR QL STRIP.AUTO: NEGATIVE
HYALINE CASTS #/AREA URNS LPF: ABNORMAL /LPF
IMM GRANULOCYTES # BLD AUTO: 0.07 THOUSAND/UL (ref 0–0.2)
IMM GRANULOCYTES NFR BLD AUTO: 1 % (ref 0–2)
INR PPP: 0.99 (ref 0.86–1.17)
KETONES UR STRIP-MCNC: NEGATIVE MG/DL
LACTATE SERPL-SCNC: 1.5 MMOL/L (ref 0.5–2)
LACTATE SERPL-SCNC: 5.4 MMOL/L (ref 0.5–2)
LEUKOCYTE ESTERASE UR QL STRIP: ABNORMAL
LYMPHOCYTES # BLD AUTO: 2.25 THOUSANDS/ΜL (ref 0.6–4.47)
LYMPHOCYTES NFR BLD AUTO: 21 % (ref 14–44)
MCH RBC QN AUTO: 33.8 PG (ref 26.8–34.3)
MCH RBC QN AUTO: 33.9 PG (ref 26.8–34.3)
MCHC RBC AUTO-ENTMCNC: 32.3 G/DL (ref 31.4–37.4)
MCHC RBC AUTO-ENTMCNC: 33.2 G/DL (ref 31.4–37.4)
MCV RBC AUTO: 102 FL (ref 82–98)
MCV RBC AUTO: 105 FL (ref 82–98)
MONOCYTES # BLD AUTO: 1.07 THOUSAND/ΜL (ref 0.17–1.22)
MONOCYTES NFR BLD AUTO: 10 % (ref 4–12)
NEUTROPHILS # BLD AUTO: 6.89 THOUSANDS/ΜL (ref 1.85–7.62)
NEUTS SEG NFR BLD AUTO: 65 % (ref 43–75)
NITRITE UR QL STRIP: NEGATIVE
NON-SQ EPI CELLS URNS QL MICRO: ABNORMAL /HPF
NRBC BLD AUTO-RTO: 0 /100 WBCS
NT-PROBNP SERPL-MCNC: 3787 PG/ML
P AXIS: 91 DEGREES
PCO2 BLD: 26 MMOL/L (ref 21–32)
PCO2 BLD: 27 MMOL/L (ref 21–32)
PCO2 BLD: 52.4 MM HG (ref 42–50)
PH BLD: 7.3 [PH] (ref 7.3–7.4)
PH UR STRIP.AUTO: 5 [PH] (ref 4.5–8)
PLATELET # BLD AUTO: 150 THOUSANDS/UL (ref 149–390)
PLATELET # BLD AUTO: 170 THOUSANDS/UL (ref 149–390)
PLATELET # BLD AUTO: 195 THOUSANDS/UL (ref 149–390)
PMV BLD AUTO: 10.3 FL (ref 8.9–12.7)
PMV BLD AUTO: 10.3 FL (ref 8.9–12.7)
PMV BLD AUTO: 10.5 FL (ref 8.9–12.7)
PO2 BLD: 43 MM HG (ref 35–45)
POTASSIUM BLD-SCNC: 4.3 MMOL/L (ref 3.5–5.3)
POTASSIUM BLD-SCNC: 4.3 MMOL/L (ref 3.5–5.3)
POTASSIUM SERPL-SCNC: 4.7 MMOL/L (ref 3.5–5.3)
POTASSIUM SERPL-SCNC: 5.5 MMOL/L (ref 3.5–5.3)
PR INTERVAL: 124 MS
PROCALCITONIN SERPL-MCNC: 0.11 NG/ML
PROT SERPL-MCNC: 7.7 G/DL (ref 6.4–8.2)
PROT UR STRIP-MCNC: NEGATIVE MG/DL
PROTHROMBIN TIME: 13.2 SECONDS (ref 11.8–14.2)
QRS AXIS: -49 DEGREES
QRSD INTERVAL: 120 MS
QT INTERVAL: 378 MS
QTC INTERVAL: 515 MS
RBC # BLD AUTO: 3.28 MILLION/UL (ref 3.88–5.62)
RBC # BLD AUTO: 3.6 MILLION/UL (ref 3.88–5.62)
RBC #/AREA URNS AUTO: ABNORMAL /HPF
SAO2 % BLD FROM PO2: 73 % (ref 95–98)
SODIUM BLD-SCNC: 135 MMOL/L (ref 136–145)
SODIUM BLD-SCNC: 136 MMOL/L (ref 136–145)
SODIUM SERPL-SCNC: 132 MMOL/L (ref 136–145)
SODIUM SERPL-SCNC: 135 MMOL/L (ref 136–145)
SP GR UR STRIP.AUTO: 1.02 (ref 1–1.03)
SPECIMEN SOURCE: ABNORMAL
SPECIMEN SOURCE: ABNORMAL
T WAVE AXIS: 52 DEGREES
TROPONIN I SERPL-MCNC: 0.02 NG/ML
TROPONIN I SERPL-MCNC: 0.07 NG/ML
TROPONIN I SERPL-MCNC: 0.08 NG/ML
TSH SERPL DL<=0.05 MIU/L-ACNC: 1.01 UIU/ML (ref 0.36–3.74)
UROBILINOGEN UR QL STRIP.AUTO: 0.2 E.U./DL
VENTRICULAR RATE: 112 BPM
WBC # BLD AUTO: 10.56 THOUSAND/UL (ref 4.31–10.16)
WBC # BLD AUTO: 7.94 THOUSAND/UL (ref 4.31–10.16)
WBC #/AREA URNS AUTO: ABNORMAL /HPF

## 2019-02-07 PROCEDURE — 93306 TTE W/DOPPLER COMPLETE: CPT | Performed by: INTERNAL MEDICINE

## 2019-02-07 PROCEDURE — 84484 ASSAY OF TROPONIN QUANT: CPT | Performed by: EMERGENCY MEDICINE

## 2019-02-07 PROCEDURE — 85025 COMPLETE CBC W/AUTO DIFF WBC: CPT | Performed by: EMERGENCY MEDICINE

## 2019-02-07 PROCEDURE — 82948 REAGENT STRIP/BLOOD GLUCOSE: CPT

## 2019-02-07 PROCEDURE — 85730 THROMBOPLASTIN TIME PARTIAL: CPT | Performed by: INTERNAL MEDICINE

## 2019-02-07 PROCEDURE — 99222 1ST HOSP IP/OBS MODERATE 55: CPT | Performed by: INTERNAL MEDICINE

## 2019-02-07 PROCEDURE — 85014 HEMATOCRIT: CPT

## 2019-02-07 PROCEDURE — 99291 CRITICAL CARE FIRST HOUR: CPT | Performed by: INTERNAL MEDICINE

## 2019-02-07 PROCEDURE — 85027 COMPLETE CBC AUTOMATED: CPT | Performed by: INTERNAL MEDICINE

## 2019-02-07 PROCEDURE — 36415 COLL VENOUS BLD VENIPUNCTURE: CPT | Performed by: EMERGENCY MEDICINE

## 2019-02-07 PROCEDURE — 93970 EXTREMITY STUDY: CPT | Performed by: SURGERY

## 2019-02-07 PROCEDURE — 83036 HEMOGLOBIN GLYCOSYLATED A1C: CPT | Performed by: INTERNAL MEDICINE

## 2019-02-07 PROCEDURE — 99291 CRITICAL CARE FIRST HOUR: CPT | Performed by: PHYSICIAN ASSISTANT

## 2019-02-07 PROCEDURE — 82803 BLOOD GASES ANY COMBINATION: CPT

## 2019-02-07 PROCEDURE — 94760 N-INVAS EAR/PLS OXIMETRY 1: CPT

## 2019-02-07 PROCEDURE — 94660 CPAP INITIATION&MGMT: CPT

## 2019-02-07 PROCEDURE — 81001 URINALYSIS AUTO W/SCOPE: CPT | Performed by: NURSE PRACTITIONER

## 2019-02-07 PROCEDURE — 87040 BLOOD CULTURE FOR BACTERIA: CPT | Performed by: EMERGENCY MEDICINE

## 2019-02-07 PROCEDURE — 93306 TTE W/DOPPLER COMPLETE: CPT

## 2019-02-07 PROCEDURE — 84295 ASSAY OF SERUM SODIUM: CPT

## 2019-02-07 PROCEDURE — 80047 BASIC METABLC PNL IONIZED CA: CPT

## 2019-02-07 PROCEDURE — 84484 ASSAY OF TROPONIN QUANT: CPT | Performed by: INTERNAL MEDICINE

## 2019-02-07 PROCEDURE — 99291 CRITICAL CARE FIRST HOUR: CPT

## 2019-02-07 PROCEDURE — 80053 COMPREHEN METABOLIC PANEL: CPT | Performed by: EMERGENCY MEDICINE

## 2019-02-07 PROCEDURE — 84443 ASSAY THYROID STIM HORMONE: CPT | Performed by: INTERNAL MEDICINE

## 2019-02-07 PROCEDURE — 99292 CRITICAL CARE ADDL 30 MIN: CPT | Performed by: STUDENT IN AN ORGANIZED HEALTH CARE EDUCATION/TRAINING PROGRAM

## 2019-02-07 PROCEDURE — 85049 AUTOMATED PLATELET COUNT: CPT | Performed by: INTERNAL MEDICINE

## 2019-02-07 PROCEDURE — 83880 ASSAY OF NATRIURETIC PEPTIDE: CPT | Performed by: EMERGENCY MEDICINE

## 2019-02-07 PROCEDURE — 83605 ASSAY OF LACTIC ACID: CPT | Performed by: EMERGENCY MEDICINE

## 2019-02-07 PROCEDURE — 84132 ASSAY OF SERUM POTASSIUM: CPT

## 2019-02-07 PROCEDURE — 94664 DEMO&/EVAL PT USE INHALER: CPT

## 2019-02-07 PROCEDURE — 80048 BASIC METABOLIC PNL TOTAL CA: CPT | Performed by: NURSE PRACTITIONER

## 2019-02-07 PROCEDURE — 82330 ASSAY OF CALCIUM: CPT

## 2019-02-07 PROCEDURE — 93970 EXTREMITY STUDY: CPT

## 2019-02-07 PROCEDURE — 84145 PROCALCITONIN (PCT): CPT | Performed by: EMERGENCY MEDICINE

## 2019-02-07 PROCEDURE — 93005 ELECTROCARDIOGRAM TRACING: CPT

## 2019-02-07 PROCEDURE — 85379 FIBRIN DEGRADATION QUANT: CPT | Performed by: INTERNAL MEDICINE

## 2019-02-07 PROCEDURE — 82947 ASSAY GLUCOSE BLOOD QUANT: CPT

## 2019-02-07 PROCEDURE — 93010 ELECTROCARDIOGRAM REPORT: CPT | Performed by: INTERNAL MEDICINE

## 2019-02-07 PROCEDURE — 76770 US EXAM ABDO BACK WALL COMP: CPT

## 2019-02-07 PROCEDURE — 94640 AIRWAY INHALATION TREATMENT: CPT

## 2019-02-07 PROCEDURE — 85610 PROTHROMBIN TIME: CPT | Performed by: INTERNAL MEDICINE

## 2019-02-07 PROCEDURE — 99223 1ST HOSP IP/OBS HIGH 75: CPT | Performed by: INTERNAL MEDICINE

## 2019-02-07 PROCEDURE — 71045 X-RAY EXAM CHEST 1 VIEW: CPT

## 2019-02-07 RX ORDER — INSULIN GLARGINE 100 [IU]/ML
15 INJECTION, SOLUTION SUBCUTANEOUS
Status: DISCONTINUED | OUTPATIENT
Start: 2019-02-07 | End: 2019-02-07

## 2019-02-07 RX ORDER — GLIMEPIRIDE 1 MG/1
1 TABLET ORAL DAILY
Status: DISCONTINUED | OUTPATIENT
Start: 2019-02-07 | End: 2019-02-07

## 2019-02-07 RX ORDER — MAGNESIUM HYDROXIDE/ALUMINUM HYDROXICE/SIMETHICONE 120; 1200; 1200 MG/30ML; MG/30ML; MG/30ML
5 SUSPENSION ORAL EVERY 6 HOURS PRN
Status: DISCONTINUED | OUTPATIENT
Start: 2019-02-07 | End: 2019-02-07

## 2019-02-07 RX ORDER — HEPARIN SODIUM 5000 [USP'U]/ML
5000 INJECTION, SOLUTION INTRAVENOUS; SUBCUTANEOUS EVERY 8 HOURS SCHEDULED
Status: DISCONTINUED | OUTPATIENT
Start: 2019-02-07 | End: 2019-02-07

## 2019-02-07 RX ORDER — ATORVASTATIN CALCIUM 40 MG/1
40 TABLET, FILM COATED ORAL
Status: DISCONTINUED | OUTPATIENT
Start: 2019-02-07 | End: 2019-02-14 | Stop reason: HOSPADM

## 2019-02-07 RX ORDER — HEPARIN SODIUM 10000 [USP'U]/100ML
3-30 INJECTION, SOLUTION INTRAVENOUS
Status: DISCONTINUED | OUTPATIENT
Start: 2019-02-07 | End: 2019-02-07

## 2019-02-07 RX ORDER — NICOTINE 21 MG/24HR
1 PATCH, TRANSDERMAL 24 HOURS TRANSDERMAL DAILY
Status: DISCONTINUED | OUTPATIENT
Start: 2019-02-07 | End: 2019-02-14 | Stop reason: HOSPADM

## 2019-02-07 RX ORDER — CLOPIDOGREL BISULFATE 75 MG/1
75 TABLET ORAL DAILY
Status: DISCONTINUED | OUTPATIENT
Start: 2019-02-07 | End: 2019-02-14 | Stop reason: HOSPADM

## 2019-02-07 RX ORDER — FUROSEMIDE 10 MG/ML
40 INJECTION INTRAMUSCULAR; INTRAVENOUS
Status: DISCONTINUED | OUTPATIENT
Start: 2019-02-07 | End: 2019-02-08

## 2019-02-07 RX ORDER — ROSUVASTATIN CALCIUM 20 MG/1
20 TABLET, COATED ORAL EVERY OTHER DAY
COMMUNITY
End: 2020-07-29 | Stop reason: SDUPTHER

## 2019-02-07 RX ORDER — ASPIRIN 81 MG/1
81 TABLET ORAL DAILY
Status: DISCONTINUED | OUTPATIENT
Start: 2019-02-07 | End: 2019-02-07

## 2019-02-07 RX ORDER — HEPARIN SODIUM 1000 [USP'U]/ML
6000 INJECTION, SOLUTION INTRAVENOUS; SUBCUTANEOUS AS NEEDED
Status: DISCONTINUED | OUTPATIENT
Start: 2019-02-07 | End: 2019-02-07

## 2019-02-07 RX ORDER — HEPARIN SODIUM 1000 [USP'U]/ML
3000 INJECTION, SOLUTION INTRAVENOUS; SUBCUTANEOUS AS NEEDED
Status: DISCONTINUED | OUTPATIENT
Start: 2019-02-07 | End: 2019-02-07

## 2019-02-07 RX ORDER — LEVALBUTEROL 1.25 MG/.5ML
1.25 SOLUTION, CONCENTRATE RESPIRATORY (INHALATION)
Status: DISCONTINUED | OUTPATIENT
Start: 2019-02-07 | End: 2019-02-08

## 2019-02-07 RX ORDER — TAMSULOSIN HYDROCHLORIDE 0.4 MG/1
0.4 CAPSULE ORAL
Status: DISCONTINUED | OUTPATIENT
Start: 2019-02-07 | End: 2019-02-14 | Stop reason: HOSPADM

## 2019-02-07 RX ORDER — SODIUM CHLORIDE 9 MG/ML
75 INJECTION, SOLUTION INTRAVENOUS CONTINUOUS
Status: DISCONTINUED | OUTPATIENT
Start: 2019-02-07 | End: 2019-02-07

## 2019-02-07 RX ORDER — HEPARIN SODIUM 1000 [USP'U]/ML
6000 INJECTION, SOLUTION INTRAVENOUS; SUBCUTANEOUS ONCE
Status: COMPLETED | OUTPATIENT
Start: 2019-02-07 | End: 2019-02-07

## 2019-02-07 RX ORDER — ALBUTEROL SULFATE 2.5 MG/3ML
2.5 SOLUTION RESPIRATORY (INHALATION) EVERY 6 HOURS PRN
Status: DISCONTINUED | OUTPATIENT
Start: 2019-02-07 | End: 2019-02-12

## 2019-02-07 RX ORDER — ALBUTEROL SULFATE 2.5 MG/3ML
5 SOLUTION RESPIRATORY (INHALATION) ONCE
Status: COMPLETED | OUTPATIENT
Start: 2019-02-07 | End: 2019-02-07

## 2019-02-07 RX ORDER — ONDANSETRON 2 MG/ML
4 INJECTION INTRAMUSCULAR; INTRAVENOUS EVERY 6 HOURS PRN
Status: DISCONTINUED | OUTPATIENT
Start: 2019-02-07 | End: 2019-02-14 | Stop reason: HOSPADM

## 2019-02-07 RX ORDER — FUROSEMIDE 10 MG/ML
40 INJECTION INTRAMUSCULAR; INTRAVENOUS
Status: DISCONTINUED | OUTPATIENT
Start: 2019-02-07 | End: 2019-02-07

## 2019-02-07 RX ORDER — CITALOPRAM 10 MG/1
10 TABLET ORAL DAILY
Status: DISCONTINUED | OUTPATIENT
Start: 2019-02-07 | End: 2019-02-08

## 2019-02-07 RX ORDER — BACLOFEN 10 MG/1
20 TABLET ORAL 3 TIMES DAILY
Status: DISCONTINUED | OUTPATIENT
Start: 2019-02-07 | End: 2019-02-14 | Stop reason: HOSPADM

## 2019-02-07 RX ORDER — DOCUSATE SODIUM 100 MG/1
100 CAPSULE, LIQUID FILLED ORAL 2 TIMES DAILY
Status: DISCONTINUED | OUTPATIENT
Start: 2019-02-07 | End: 2019-02-14 | Stop reason: HOSPADM

## 2019-02-07 RX ORDER — GABAPENTIN 300 MG/1
300 CAPSULE ORAL 2 TIMES DAILY
Status: DISCONTINUED | OUTPATIENT
Start: 2019-02-07 | End: 2019-02-14 | Stop reason: HOSPADM

## 2019-02-07 RX ORDER — FUROSEMIDE 10 MG/ML
40 INJECTION INTRAMUSCULAR; INTRAVENOUS ONCE
Status: COMPLETED | OUTPATIENT
Start: 2019-02-07 | End: 2019-02-07

## 2019-02-07 RX ADMIN — SODIUM CHLORIDE 12 UNITS/HR: 9 INJECTION, SOLUTION INTRAVENOUS at 21:28

## 2019-02-07 RX ADMIN — SODIUM CHLORIDE 75 ML/HR: 0.9 INJECTION, SOLUTION INTRAVENOUS at 10:36

## 2019-02-07 RX ADMIN — BACLOFEN 20 MG: 10 TABLET ORAL at 21:28

## 2019-02-07 RX ADMIN — IPRATROPIUM BROMIDE 0.5 MG: 0.5 SOLUTION RESPIRATORY (INHALATION) at 13:58

## 2019-02-07 RX ADMIN — HEPARIN SODIUM 18 UNITS/KG/HR: 10000 INJECTION, SOLUTION INTRAVENOUS at 13:18

## 2019-02-07 RX ADMIN — LEVALBUTEROL 1.25 MG: 1.25 SOLUTION, CONCENTRATE RESPIRATORY (INHALATION) at 13:58

## 2019-02-07 RX ADMIN — NICOTINE 1 PATCH: 21 PATCH, EXTENDED RELEASE TRANSDERMAL at 11:18

## 2019-02-07 RX ADMIN — SODIUM CHLORIDE 10 UNITS/HR: 9 INJECTION, SOLUTION INTRAVENOUS at 14:45

## 2019-02-07 RX ADMIN — FUROSEMIDE 40 MG: 10 INJECTION, SOLUTION INTRAMUSCULAR; INTRAVENOUS at 11:19

## 2019-02-07 RX ADMIN — ATORVASTATIN CALCIUM 40 MG: 40 TABLET, FILM COATED ORAL at 17:23

## 2019-02-07 RX ADMIN — HEPARIN SODIUM 5000 UNITS: 5000 INJECTION INTRAVENOUS; SUBCUTANEOUS at 11:19

## 2019-02-07 RX ADMIN — ALBUTEROL SULFATE 5 MG: 2.5 SOLUTION RESPIRATORY (INHALATION) at 05:29

## 2019-02-07 RX ADMIN — BACLOFEN 20 MG: 10 TABLET ORAL at 17:23

## 2019-02-07 RX ADMIN — TAMSULOSIN HYDROCHLORIDE 0.4 MG: 0.4 CAPSULE ORAL at 17:24

## 2019-02-07 RX ADMIN — FUROSEMIDE 40 MG: 10 INJECTION, SOLUTION INTRAMUSCULAR; INTRAVENOUS at 17:23

## 2019-02-07 RX ADMIN — INSULIN HUMAN 5 UNITS: 100 INJECTION, SOLUTION PARENTERAL at 12:54

## 2019-02-07 RX ADMIN — IPRATROPIUM BROMIDE 0.5 MG: 0.5 SOLUTION RESPIRATORY (INHALATION) at 05:29

## 2019-02-07 RX ADMIN — IPRATROPIUM BROMIDE 0.5 MG: 0.5 SOLUTION RESPIRATORY (INHALATION) at 19:11

## 2019-02-07 RX ADMIN — LEVALBUTEROL 1.25 MG: 1.25 SOLUTION, CONCENTRATE RESPIRATORY (INHALATION) at 19:11

## 2019-02-07 RX ADMIN — GABAPENTIN 300 MG: 300 CAPSULE ORAL at 17:23

## 2019-02-07 RX ADMIN — HEPARIN SODIUM 6000 UNITS: 1000 INJECTION INTRAVENOUS; SUBCUTANEOUS at 13:27

## 2019-02-07 RX ADMIN — INSULIN LISPRO 6 UNITS: 100 INJECTION, SOLUTION INTRAVENOUS; SUBCUTANEOUS at 11:24

## 2019-02-07 NOTE — ED NOTES
Dr Yulissa Chairez at the bedside for patient evaluation at this time     Mike Dill, RN  02/07/19 [id number]

## 2019-02-07 NOTE — ASSESSMENT & PLAN NOTE
Patient is on Zestril  However may probably need to put on hold due to elevated potassium at 5 5 with creatinine of 2 17    Normally his creatinine is approximately 0 6

## 2019-02-07 NOTE — PROGRESS NOTES
Post admit chest    Patient is a pleasant 59 year male presenting with sudden-onset shortness of breath, he denied history of chest pain diaphoresis palpitations  No history of fever chills sweats  Patient was placed on the BiPAP in the ED due to acute hypoxic respiratory failure, he continued to be hypoxic and was unable to be weaned off BiPAP      Comfortably lying in bed, neck supple, lungs diminished breath sounds bilaterally, basal crackles audible, heart sounds S1-S2 noted, abdomen soft nontender, pulses present, awake alert obeys simple commands, no rash    A/P    · Acute hypoxic respiratory failure - patient is presently on BiPAP and supplemental oxygen, will continue and titrate as able to keep O2 sats more than 90%, differentials include volume overload versus possible pulmonary embolism, will place him on IV heparin empirically, obtain lower extremity Dopplers, D-dimer, 2D echo, will place patient level 1 step-down, discussed with critical care  · Volume overload on IV Lasix, cardiology input noted, 2D echo noted  · Severe MR noted on echo cardiology following  · Acute kidney injury monitor kidney function closely, discussed with Nephrology, monitor postvoid residuals  · Diabetes mellitus type 2 uncontrolled A1c 9 3 with hyperglycemia will place him on IV insulin GTT non critical care protocol, monitor Accu-Cheks closely, avoid hypoglycemia  · Hypertension monitor blood pressures closely, ACE-inhibitor on hold, avoid hypotension  · Hyperkalemia monitor potassium levels with IV Lasix, discussed with Nephrology  · Hyponatremia monitor    Discussed with the patient, discussed with spouse and daughter in detail, they are agreeable with ongoing management as outlined    >35 min cc time spent    [facility]

## 2019-02-07 NOTE — ED PROVIDER NOTES
History  Chief Complaint   Patient presents with    Respiratory Distress     Patient reports SOB beginning approx 2 hours ago  Upon EMS arrival patient was pale with O2 sat in low 50's on RA  O2 sat improved to 85% on CPAP  Patient switched to BIPAP upon arrival      HPI   Patient is a 58-year-old gentleman with a history of COPD, diabetes mellitus, peripheral vascular disease who presents to the emergency department for respiratory distress  Patient says that he was at home sleeping in bed when he woke up and was very short of breath  When EMS arrived they started him on CPAP with duo nebs with some improvement in his work of breathing  When patient arrived to the emergency department he was satting in the mid 76s on room air  He was started on BiPAP with normalization of his SpO2  Currently he feels that his work of breathing is significantly improved  He tells me that he was feeling in his general state of health except for a progressively worsening cough over the past couple days  He has not been febrile  He is an active smoker  No history of CHF  Last echo dated 07/26/2018 showed ejection fraction of 55%  He does have a history of a femoral popliteal bypass surgery on the right leg, with the right leg being chronically larger than the left  Has not noticed any worsening leg swelling/edema/tenderness  No chest pain, dizziness, syncope  Prior to Admission Medications   Prescriptions Last Dose Informant Patient Reported?  Taking?   aspirin (ECOTRIN LOW STRENGTH) 81 mg EC tablet 2/6/2019 at Unknown time Outside Facility ([facility]) Yes Yes   Sig: Take 81 mg by mouth daily   baclofen 20 mg tablet 2/6/2019 at Unknown time Outside Facility (Specify) Yes Yes   Sig: Take 20 mg by mouth 3 (three) times a day   citalopram (CeleXA) 20 mg tablet 2/6/2019 at Unknown time Outside Facility (Specify) Yes Yes   Sig: Take 10 mg by mouth daily   clopidogrel (PLAVIX) 75 mg tablet 2/6/2019 at Unknown time  No Yes Sig: Take 1 tablet (75 mg total) by mouth daily   gabapentin (NEURONTIN) 400 mg capsule 2/6/2019 at Unknown time Outside Facility ([address] [place] [address]) Yes Yes   Sig: Take 300 mg by mouth 2 (two) times a day     glimepiride (AMARYL) 1 mg tablet 2/6/2019 at Unknown time Outside Facility (Specify) No Yes   Sig: Take 1 tablet (1 mg total) by mouth daily   lisinopril (ZESTRIL) 10 mg tablet 2/6/2019 at Unknown time Outside Facility (Specify) No Yes   Sig: Take 1 tablet (10 mg total) by mouth daily   metFORMIN (GLUCOPHAGE) 1000 MG tablet 2/7/2019 at Unknown time  Yes Yes   Sig: Take 1,000 mg by mouth 2 (two) times a day with meals   rosuvastatin (CRESTOR) 20 MG tablet 2/6/2019 at Unknown time  Yes Yes   Sig: Take 20 mg by mouth every other day   tamsulosin (FLOMAX) 0 4 mg 2/6/2019 at Unknown time Outside Facility (Specify) No Yes   Sig: Take 1 capsule (0 4 mg total) by mouth daily with dinner      Facility-Administered Medications: None       Past Medical History:   Diagnosis Date    Chronic obstructive lung disease ([address] )     RESOLVED: 8/17/17    Concussion     LAST ASSESSED: 8/17/17    COPD (chronic obstructive pulmonary disease) ([address] )     Depression     Diabetes mellitus ([address] )     Diabetic neuropathy ([address] )     Difficulty attaining erection     LAST ASSESSEDl: 8/17/17    Elevated liver enzymes     Gall stone pancreatitis     RESOLVED: 3/8/17    Gall stones     RESOLVED: 3/8/17    History of MRSA infection     Hypertension     LAST ASSESSED: 8/17/17    Spinal cord injury, C1-C7 ([address] )     Traumatic injury to musculoskeletal system     1-5 FLIGHT FALL DOWN THE STEPS - CERVICAL NECK INJURY - JAN 2, 2012; LAST ASSESSED: 1/11/89    Umbilical hernia without obstruction and without gangrene     RESOLVED: 3/8/17    Varicella     LAST ASSESSED: 8/17/17       Past Surgical History:   Procedure Laterality Date    ARTERIOGRAM Right 7/31/2018    Procedure: ARTERIOGRAM Completion Agram;  Surgeon: Chris Day MD;  [facility];  Service: Vascular    BACK SURGERY      BYPASS FEMORAL-POPLITEAL Right 7/31/2018    Procedure: BYPASS FEMORAL-POPLITEAL R femoral- BK popliteal bypass;  Surgeon: Debra Umaña MD;  [facility];  Service: Vascular    CERVICAL FUSION      VERTEBRAL; C3-C4 FUSION    CHOLECYSTECTOMY LAPAROSCOPIC N/A 2/24/2017    Procedure: CHOLECYSTECTOMY LAPAROSCOPIC;  Surgeon: Elizabeth Arechiga MD;  [facility];  Service:     CHOLECYSTECTOMY LAPAROSCOPIC  03/2017    EPIGASTRIC HERNIA REPAIR      IA DEBRIDEMENT, SKIN, SUB-Q TISSUE,=<20 SQ CM Right 8/28/2018    Procedure: RIGHT GROIN AND THIGH DEBRIDEMENT ([address]) AND VAC PLACEMENT;  Surgeon: Elizabeth Hudson MD;  [facility];  Service: Vascular    IA THROMBOENDARTECTMY FEMORAL COMMON Right 7/31/2018    Procedure: ENDARTERECTOMY ARTERIAL FEMORAL R femoral endarterectomy w/ patch angioplasty ;  Surgeon: Debra Umaña MD;  [facility];  Service: Vascular    UMBILICAL HERNIA REPAIR N/A 2/24/2017    Procedure: REPAIR HERNIA UMBILICAL;  Surgeon: Elizabeth Arechiga MD;  [facility];  Service:        Family History   Problem Relation Age of Onset    Hypertension Mother         BENIGN    Alzheimer's disease Father     Heart disease Father         CARDIAC DISORDER    Stroke Father     Heart attack Father     No Known Problems Brother      I have reviewed and agree with the history as documented  Social History   Substance Use Topics    Smoking status: Current Every Day Smoker     Packs/day: 0 50     Years: 40 00     Types: Cigarettes    Smokeless tobacco: Never Used    Alcohol use Yes      Comment: 3-4 mixed vodka drinks/daily         Review of Systems   Constitutional: Negative for chills, diaphoresis, fatigue and fever  HENT: Negative for hearing loss, nosebleeds, sinus pain and sore throat  Eyes: Negative for photophobia, pain, redness and visual disturbance  Respiratory: Positive for cough and shortness of breath  Negative for chest tightness, wheezing and stridor  Cardiovascular: Negative for chest pain, palpitations and leg swelling  Gastrointestinal: Negative for abdominal distention, abdominal pain, constipation, diarrhea, nausea and vomiting  Genitourinary: Negative for dysuria, flank pain and hematuria  Musculoskeletal: Negative for back pain, neck pain and neck stiffness  Skin: Negative for pallor and rash  Allergic/Immunologic: Negative for immunocompromised state  Neurological: Negative for syncope, weakness, numbness and headaches  Hematological: Negative for adenopathy  Psychiatric/Behavioral: Negative for agitation and confusion  All other systems reviewed and are negative  Physical Exam  ED Triage Vitals   Temperature Pulse Respirations Blood Pressure SpO2   02/07/19 0505 02/07/19 0505 02/07/19 0505 02/07/19 0505 02/07/19 0502   98 °F (36 7 °C) (!) 122 (!) 30 138/71 (!) 50 %      Temp Source Heart Rate Source Patient Position - Orthostatic VS BP Location FiO2 (%)   02/07/19 0505 02/07/19 0505 02/07/19 0505 02/07/19 0505 --   Rectal Monitor Sitting Right arm       Pain Score       02/07/19 0505       No Pain           Orthostatic Vital Signs  Vitals:    02/07/19 0600 02/07/19 0822 02/07/19 1459 02/07/19 1833   BP: 99/59 104/65 104/55 97/55   Pulse: (!) 112 (!) 114 (!) 106 100   Patient Position - Orthostatic VS: Lying Lying Lying Lying       Physical Exam   Constitutional: He is oriented to person, place, and time  He appears well-developed and well-nourished  No distress  On BiPAP  HENT:   Head: Normocephalic and atraumatic  Right Ear: External ear normal    Left Ear: External ear normal    Nose: Nose normal    Mouth/Throat: Oropharynx is clear and moist    Eyes: Pupils are equal, round, and reactive to light  Conjunctivae and EOM are normal  No scleral icterus  Neck: Normal range of motion  Neck supple  No JVD present  No tracheal deviation present     Cardiovascular: Normal rate and regular rhythm  Exam reveals no gallop and no friction rub  No murmur heard  Pulmonary/Chest: No stridor  No respiratory distress  He has wheezes  He has rales  He exhibits no tenderness  Visibly increased work of breathing  He has diffuse rales and wheezes bilaterally  Abdominal: Soft  He exhibits no distension  There is no tenderness  There is no rebound and no guarding  Musculoskeletal: Normal range of motion  He exhibits edema  He exhibits no tenderness  Trace lower extremity pitting edema bilaterally  Lymphadenopathy:     He has no cervical adenopathy  Neurological: He is alert and oriented to person, place, and time  No cranial nerve deficit or sensory deficit  He exhibits normal muscle tone  Skin: Skin is warm and dry  He is not diaphoretic  No erythema  No pallor  Psychiatric: He has a normal mood and affect  His behavior is normal    Nursing note and vitals reviewed        ED Medications  Medications    EMS REPLENISHMENT MED (not administered)   levalbuterol (XOPENEX) inhalation solution 1 25 mg (1 25 mg Nebulization Given 2/7/19 1911)   ipratropium (ATROVENT) 0 02 % inhalation solution 0 5 mg (0 5 mg Nebulization Given 2/7/19 1911)   albuterol inhalation solution 2 5 mg (not administered)   baclofen tablet 20 mg (20 mg Oral Given 2/7/19 2128)   tamsulosin (FLOMAX) capsule 0 4 mg (0 4 mg Oral Given 2/7/19 1724)   clopidogrel (PLAVIX) tablet 75 mg (0 mg Oral Hold 2/7/19 1324)   gabapentin (NEURONTIN) capsule 300 mg (300 mg Oral Given 2/7/19 1723)   citalopram (CeleXA) tablet 10 mg (0 mg Oral Hold 2/7/19 1324)   atorvastatin (LIPITOR) tablet 40 mg (40 mg Oral Given 2/7/19 1723)   docusate sodium (COLACE) capsule 100 mg (100 mg Oral Not Given 2/7/19 1723)   ondansetron (ZOFRAN) injection 4 mg (not administered)   nicotine (NICODERM CQ) 21 mg/24 hr TD 24 hr patch 1 patch (1 patch Transdermal Medication Applied 2/7/19 1118)   furosemide (LASIX) injection 40 mg (40 mg Intravenous Given 2/7/19 1723)   insulin regular (HumuLIN R,NovoLIN R) 1 Units/mL in sodium chloride 0 9 % 100 mL infusion (2 Units/hr Intravenous Rate/Dose Change 2/7/19 2157)   albuterol inhalation solution 5 mg (5 mg Nebulization Given 2/7/19 0529)   ipratropium (ATROVENT) 0 02 % inhalation solution 0 5 mg (0 5 mg Nebulization Given 2/7/19 0529)   furosemide (LASIX) injection 40 mg (40 mg Intravenous Given 2/7/19 1119)   insulin regular (HumuLIN R,NovoLIN R) injection 5 Units (5 Units Intravenous Given 2/7/19 1254)   heparin (porcine) injection 6,000 Units (6,000 Units Intravenous Given 2/7/19 1327)       Diagnostic Studies  Results Reviewed     Procedure Component Value Units Date/Time    Troponin I [[id number]]  (Abnormal) Collected:  02/07/19 1257    Lab Status:  Final result Specimen:  Blood from Arm, Right Updated:  02/07/19 1344     Troponin I 0 07 (H) ng/mL     Troponin I [[id number]]     Lab Status:  No result Specimen:  Blood     Fingerstick Glucose (POCT) [[id number]]  (Abnormal) Collected:  02/07/19 1117    Lab Status:  Final result Updated:  02/07/19 1119     POC Glucose 493 (H) mg/dl     Hemoglobin A1c w/EAG Estimation (Orders if not completed within the last 90 days) [[id number]]  (Abnormal) Collected:  02/07/19 0916    Lab Status:  Final result Specimen:  Blood from Arm, Left Updated:  02/07/19 1020     Hemoglobin A1C 9 3 (H) %       mg/dl     TSH, 3rd generation [[id number]]  (Normal) Collected:  02/07/19 0907    Lab Status:  Final result Specimen:  Blood from Arm, Left Updated:  02/07/19 0958     TSH 3RD GENERATON 1 010 uIU/mL     Narrative:         Patients undergoing fluorescein dye angiography may retain small amounts of fluorescein in the body for 48-72 hours post procedure  Samples containing fluorescein can produce falsely depressed TSH values  If the patient had this procedure,a specimen should be resubmitted post fluorescein clearance      Troponin I [[id number]]  (Abnormal) Collected:  02/07/19 0773 Lab Status:  Final result Specimen:  Blood from Arm, Left Updated:  02/07/19 0956     Troponin I 0 08 (H) ng/mL     Platelet count [[id number]]  (Normal) Collected:  02/07/19 0916    Lab Status:  Final result Specimen:  Blood from Arm, Left Updated:  02/07/19 0938     Platelets 586 Thousands/uL      MPV 10 5 fL     Lactic Acid x2 [[id number]]  (Normal) Collected:  02/07/19 0839    Lab Status:  Final result Specimen:  Blood from Arm, Left Updated:  02/07/19 0912     LACTIC ACID 1 5 mmol/L     Narrative:         Result may be elevated if tourniquet was used during collection  Lactic Acid x2 [[id number]]  (Abnormal) Collected:  02/07/19 0536    Lab Status:  Final result Specimen:  Blood from Arm, Left Updated:  02/07/19 0643     LACTIC ACID 5 4 (HH) mmol/L     Narrative:         Result may be elevated if tourniquet was used during collection  Procalcitonin [[id number]]  (Normal) Collected:  02/07/19 0537    Lab Status:  Final result Specimen:  Blood from Arm, Left Updated:  02/07/19 0627     Procalcitonin 0 11 ng/ml     Comprehensive metabolic panel [[id number]]  (Abnormal) Collected:  02/07/19 0537    Lab Status:  Final result Specimen:  Blood from Arm, Left Updated:  02/07/19 0326     Sodium 132 (L) mmol/L      Potassium 5 5 (H) mmol/L      Chloride 97 (L) mmol/L      CO2 24 mmol/L      ANION GAP 11 mmol/L      BUN 45 (H) mg/dL      Creatinine 2 17 (H) mg/dL      Glucose 402 (H) mg/dL      Calcium 9 3 mg/dL      AST 48 (H) U/L      ALT 40 U/L      Alkaline Phosphatase 53 U/L      Total Protein 7 7 g/dL      Albumin 3 5 g/dL      Total Bilirubin 0 38 mg/dL      eGFR 32 ml/min/1 73sq m     Narrative:         National Kidney Disease Education Program recommendations are as follows:  GFR calculation is accurate only with a steady state creatinine  Chronic Kidney disease less than 60 ml/min/1 73 sq  meters  Kidney failure less than 15 ml/min/1 73 sq  meters      NT-BNP PRO (BNP for [place], [place], [place], [place], [place], [facility], [facility], [place] campuses) [[id number]]  (Abnormal) Collected:  02/07/19 0537    Lab Status:  Final result Specimen:  Blood from Arm, Left Updated:  02/07/19 0624     NT-proBNP 3,787 (H) pg/mL     Troponin I [[id number]]  (Normal) Collected:  02/07/19 0537    Lab Status:  Final result Specimen:  Blood from Arm, Left Updated:  02/07/19 0616     Troponin I 0 02 ng/mL     CBC and differential [[id number]]  (Abnormal) Collected:  02/07/19 0537    Lab Status:  Final result Specimen:  Blood from Arm, Left Updated:  02/07/19 0613     WBC 10 56 (H) Thousand/uL      RBC 3 60 (L) Million/uL      Hemoglobin 12 2 g/dL      Hematocrit 37 8 %       (H) fL      MCH 33 9 pg      MCHC 32 3 g/dL      RDW 13 6 %      MPV 10 3 fL      Platelets 641 Thousands/uL      nRBC 0 /100 WBCs      Neutrophils Relative 65 %      Immat GRANS % 1 %      Lymphocytes Relative 21 %      Monocytes Relative 10 %      Eosinophils Relative 2 %      Basophils Relative 1 %      Neutrophils Absolute 6 89 Thousands/µL      Immature Grans Absolute 0 07 Thousand/uL      Lymphocytes Absolute 2 25 Thousands/µL      Monocytes Absolute 1 07 Thousand/µL      Eosinophils Absolute 0 19 Thousand/µL      Basophils Absolute 0 09 Thousands/µL     Blood culture #1 [[id number]] Collected:  02/07/19 0537    Lab Status: In process Specimen:  Blood from Arm, Left Updated:  02/07/19 0544    Blood culture #2 [[id number]] Collected:  02/07/19 0536    Lab Status:   In process Specimen:  Blood from Hand, Left Updated:  02/07/19 0544    POCT Chem 8+ [[id number]]  (Abnormal) Collected:  02/07/19 0514    Lab Status:  Final result Updated:  02/07/19 0518     SODIUM, I-STAT 135 (L) mmol/l      Potassium, i-STAT 4 3 mmol/L      Chloride, istat 98 (L) mmol/L      CO2, i-STAT 26 mmol/L      Anion Gap, i-STAT 17 (H) mmol/L      Calcium, Ionized i-STAT 1 18 mmol/L      BUN, I-STAT 44 (H) mg/dl      Creatinine, i-STAT 1 8 (H) mg/dl      eGFR 40 ml/min/1 73sq m      Glucose, i-STAT 434 (H) mg/dl      Hct, i-STAT 38 % Hgb, i-STAT 12 9 g/dl      Specimen Type VENOUS    POCT Blood Gas (CG8+) [[id number]]  (Abnormal) Collected:  02/07/19 0515    Lab Status:  Final result Updated:  02/07/19 0518     ph, Mohinder ISTAT 7 301     pCO2, Mohinder i-STAT 52 4 (H) mm HG      pO2, Mohinder i-STAT 43 0 mm HG      BE, i-STAT -1 mmol/L      HCO3, Mohinder i-STAT 25 8 mmol/L      CO2, i-STAT 27 mmol/L      O2 Sat, i-STAT 73 (L) %      SODIUM, I-STAT 136 mmol/l      Potassium, i-STAT 4 3 mmol/L      Calcium, Ionized i-STAT 1 18 mmol/L      Hct, i-STAT 37 %      Hgb, i-STAT 12 6 g/dl      Glucose, i-STAT 425 (H) mg/dl      Specimen Type VENOUS    Fingerstick Glucose (POCT) [[id number]]  (Abnormal) Collected:  02/07/19 0509    Lab Status:  Final result Updated:  02/07/19 0515     POC Glucose 414 (H) mg/dl                  VAS lower limb venous duplex study, complete bilateral   Final Result by Alexa Otto MD (02/07 2039)      US kidney and bladder   Final Result by Denice Musa MD (02/07 1019)         1  Normal kidneys  2   Trabeculation of bladder wall, which can be seen in setting of chronic outlet obstruction  No discrete focal mass lesion identified  Workstation performed: [id number]         XR chest 1 view portable   ED Interpretation by Mesha Rodas MD (24/00 3047)   ED Wet Read:  Volume overload      Final Result by Kalen Hewitt MD (02/07 0078)      Diffusely increased interstitial densities and edema bilaterally              Workstation performed: [id number]               Procedures  ECG 12 Lead Documentation  Date/Time: 2/7/2019 10:10 PM  Performed by: Ana Chan  Authorized by: Ana Chan     Indications / Diagnosis:  Respiratory distress  ECG reviewed by me, the ED Provider: yes    Patient location:  ED  Previous ECG:     Previous ECG:  Unavailable    Comparison to cardiac monitor: Yes    Interpretation:     Interpretation: abnormal    Rate:     ECG rate:  112    ECG rate assessment: tachycardic    Rhythm:     Rhythm: sinus tachycardia    Ectopy:     Ectopy: none    QRS:     QRS axis:  Left    QRS intervals:  Normal  Conduction:     Conduction: abnormal      Abnormal conduction: complete RBBB    ST segments:     ST segments:  Normal  T waves:     T waves: normal          Phone Consults  ED Phone Contact    ED Course  ED Course as of Feb 07 2211   Thu Feb 07, 2019   0632 WBC: (!) 10 56   0632 Hemoglobin: 12 2   0632 Volume overload? Sodium: (!) 132   0704 Creatinine: (!) 2 17       MDM  Number of Diagnoses or Management Options  Acute kidney injury [facility]): new and requires workup  Elevated brain natriuretic peptide (BNP) level: new and requires workup  Flash pulmonary edema [facility]): new and requires workup  Heart failure, acute, left-sided [facility]): new and requires workup  Respiratory distress: new and requires workup     Amount and/or Complexity of Data Reviewed  Clinical lab tests: ordered and reviewed  Tests in the radiology section of CPT®: ordered and reviewed  Tests in the medicine section of CPT®: ordered and reviewed  Decide to obtain previous medical records or to obtain history from someone other than the patient: yes  Obtain history from someone other than the patient: yes  Review and summarize past medical records: yes  Discuss the patient with other providers: yes  Independent visualization of images, tracings, or specimens: yes    Patient Progress  Patient progress: improved     59-year-old gentleman with history of COPD presents to the emergency department in respiratory distress  Patient was immediately started on BiPAP with rapid improvement in his SpO2 and work of breathing  Patient is now breathing comfortably on BiPAP  On auscultation of his lungs he has diffuse wheezes and rales bilaterally  Patient is afebrile and although he does complain of a mildly progressive cough I have low suspicion for pneumonia at this time  He does not appear to be septic    He does have a elevated lactic acid of 5 4, but this has been elevated previously during hospitalization and in the setting of a low procalcitonin I again have very low suspicion for sepsis  Lactic acid may be elevated secondary to poor renal clearance given his SUNDEEP  Based on physical exam findings and chest x-ray patient does appear to have some degree of volume overload  He also has a very elevated BNP  Patient says he has no history of heart failure and he did have a recent echo with an EF of 55%, but I am still suspicious that heart failure is contributing to patient's presentation  SUNDEEP could be cardiorenal in the setting of heart failure exacerbation  Hyponatremia also consistent with volume overload  Initial troponin normal, EKG no acute ischemic changes  Low concern for ACS  PE on differential but with very poor renal function he is not a candidate for CTA at this time  Case was discussed with Dr Lane Stone of [facility], who will be admitting patient step down level 1  Plan for stat echo and cardiology consult once admitted         Disposition  Final diagnoses:   Respiratory distress   Acute kidney injury ([facility] [address] )   Elevated brain natriuretic peptide (BNP) level   Heart failure, acute, left-sided (HCC)   Flash pulmonary edema ([facility] [facility] )     Time reflects when diagnosis was documented in both MDM as applicable and the Disposition within this note     Time User Action Codes Description Comment    2/7/2019  7:38 AM Go Kenning S Add [N28 9] Acute kidney insufficiency     2/7/2019  7:38 AM Go Kenning S Modify [N28 9] Acute kidney insufficiency     2/7/2019  7:38 AM Go Kenning S Modify [N28 9] Acute kidney insufficiency     2/7/2019  7:39 AM Go Kenning S Add [I50 41] Acute combined systolic and diastolic congestive heart failure ([facility] [address] )     2/7/2019  7:39 AM Michael Bal Modify [I50 41] Acute combined systolic and diastolic congestive heart failure ([facility] )     2/7/2019  7:55 AM Anjana Simple Add [R06 02] Shortness of breath     2/7/2019  7:55 AM Stormy Sprain Remove [R06 02] Shortness of breath     2/7/2019  7:55 AM Stormy Sprain Add [R06 03] Respiratory distress     2/7/2019  7:56 AM Stormy Sprain Modify [N28 9] Acute kidney insufficiency     2/7/2019  7:56 AM Stormy Sprain Modify [R06 03] Respiratory distress     2/7/2019  7:57 AM Stormy Sprain Add [N17 9] Acute kidney injury ([facility] [address] )     2/7/2019  7:57 AM Stormy Sprain Add [R79 89] Elevated brain natriuretic peptide (BNP) level     2/7/2019  7:57 AM Stormy Sprain Add [R79 89] Elevated lactic acid level     2/7/2019  5:13 PM Gregery Ravel Add [I34 0] Non-rheumatic mitral regurgitation     2/7/2019  5:13 PM Gregery Ravel Add [I51 1] Chordae tendinae rupture ([facility] [address] )     2/7/2019  5:21 PM Gregery Ravel Modify [I34 0] Non-rheumatic mitral regurgitation     2/7/2019  5:21 PM Gregery Ravel Modify [I51 1] Chordae tendinae rupture ([facility] [address] )     2/7/2019 10:06 PM Stormy Sprain Remove [R79 89] Elevated lactic acid level     2/7/2019 10:06 PM Stormy Sprain Add [I50 1] Heart failure, acute, left-sided ([facility] [address] )     2/7/2019 10:07 PM Stormy Sprain Add [J81 0] Flash pulmonary edema [facility])       ED Disposition     ED Disposition Condition Date/Time Comment    Admit  u Feb 7, 2019  7:57 AM Case was discussed with slim, and the patient's admission status was agreed to be Admission Status: inpatient status to the service of Dr Mike Chopra          Follow-up Information    None         Current Discharge Medication List      CONTINUE these medications which have NOT CHANGED    Details   aspirin (ECOTRIN LOW STRENGTH) 81 mg EC tablet Take 81 mg by mouth daily      baclofen 20 mg tablet Take 20 mg by mouth 3 (three) times a day      citalopram (CeleXA) 20 mg tablet Take 10 mg by mouth daily      clopidogrel (PLAVIX) 75 mg tablet Take 1 tablet (75 mg total) by mouth daily  Qty: 30 tablet, Refills: 0    Associated Diagnoses: Long term (current) use of anticoagulants      gabapentin (NEURONTIN) 400 mg capsule Take 300 mg by mouth 2 (two) times a day        glimepiride (AMARYL) 1 mg tablet Take 1 tablet (1 mg total) by mouth daily  Qty: 90 tablet, Refills: 0    Associated Diagnoses: Uncontrolled type 2 diabetes mellitus with complication, unspecified whether long term insulin use      lisinopril (ZESTRIL) 10 mg tablet Take 1 tablet (10 mg total) by mouth daily  Qty: 90 tablet, Refills: 0    Associated Diagnoses: Hypertension, unspecified type      metFORMIN (GLUCOPHAGE) 1000 MG tablet Take 1,000 mg by mouth 2 (two) times a day with meals      rosuvastatin (CRESTOR) 20 MG tablet Take 20 mg by mouth every other day      tamsulosin (FLOMAX) 0 4 mg Take 1 capsule (0 4 mg total) by mouth daily with dinner  Qty: 30 capsule, Refills: 0    Associated Diagnoses: Urinary retention           No discharge procedures on file  ED Provider  Attending physically available and evaluated Shanika Alvaro KEVIN managed the patient along with the ED Attending      Electronically Signed by         Hema Ybarra MD  02/07/19 9659

## 2019-02-07 NOTE — CONSULTS
Cardiology Consult  Natasha Perea 59 y o  male MRN: [id number]  Unit/Bed#: ED 14 Encounter: [id number]      Reason for Consult / Principal Problem: CHF    Physician Requesting Consult: Elisabeth Calle DO    OP Cardiologist: n/a    Assessment:  1  Acute heart failure, flash pulmonary edema  -etiology unclear  -with profound hypoxemia, tachycardia, chronic immobility PE not ruled out but less likely in the setting of L heart failure  -EKG, troponin not consistent with ACS  2  COPD with likely exacerbation  3  PAD s/p fem-pop 2018  4  HTN  5  DM with hyperglycemia  6  Tobacco abuse  7  C4 para/quadriplegia  8  SUNDEEP, cardiorenal vs prerenal, hyperglycemic state  9  Hypoxic respiratory failure    Plan:  1  Agree with 2D echo to evaluate for LV function, valvulopathy  2  Start lasix 40mg IV BID  3  Monitor renal function, K, Mg with IV diuresis  Monitor on telemetry  HPI: Natasha Perea 59y o  year old male with a history of PAD s/p fem-pop 7/20018, DM, HTN, COPD/tobacco abuse, C4 quadriplegia who presented today with sudden dyspnea  Patient was sleeping when he suddenly woke up with severe shortness of breath  He also experienced orthopnea, PND  Denies dizziness, syncope, palpitations, CP/pressure, diaphoresis  He has not noticed significant LE edema  Does not measure wt regularly but thinks he has been gaining weight in the last few months  Patient does have chronic cough with sputum production that also worsened today  On arrival he was hypoxic with SpO2 50% and was placed on BPAP with improvement  He was tachypneic, tachycardic with preserved BP  PO2 was 43 on ABG  Lab work showed a Cr of 2 17 (baseline 0 6), K 5 5, glucose 400, NTBNP 4k, trop peak 0 08, lactic 5 4, WBC 11  EKG showed ST with RBBB, possible RVH  TTE 7/26/18: EF 23%, normal diastolic function, no significant valvulopathy       Family History: reviewed  Historical Information   Past Medical History:   Diagnosis Date    Chronic obstructive lung disease ([facility] )     RESOLVED: 8/17/17    Concussion     LAST ASSESSED: 8/17/17    [facility]     Depression     [facility] )     Diabetic neuropathy ([facility] )     Difficulty attaining erection     LAST ASSESSEDl: 8/17/17    Elevated liver enzymes     Gall stone pancreatitis     RESOLVED: 3/8/17    Gall stones     RESOLVED: 3/8/17    History of MRSA infection     Hypertension     LAST ASSESSED: 8/17/17    Spinal cord injury, C1-C7 ([place] [address] )     Traumatic injury to musculoskeletal system     1-5 FLIGHT FALL DOWN THE STEPS - CERVICAL NECK INJURY - JAN 2, 2012; LAST ASSESSED: 8/04/07    Umbilical hernia without obstruction and without gangrene     RESOLVED: 3/8/17    Varicella     LAST ASSESSED: 8/17/17     Past Surgical History:   Procedure Laterality Date    ARTERIOGRAM Right 7/31/2018    Procedure: ARTERIOGRAM Completion Agram;  Surgeon: Nando Anne MD;  [facility];  Service: Vascular    BACK SURGERY      BYPASS FEMORAL-POPLITEAL Right 7/31/2018    Procedure: BYPASS FEMORAL-POPLITEAL R femoral- BK popliteal bypass;  Surgeon: Nando Anne MD;  [facility];  Service: Vascular    CERVICAL FUSION      VERTEBRAL; C3-C4 FUSION    CHOLECYSTECTOMY LAPAROSCOPIC N/A 2/24/2017    Procedure: CHOLECYSTECTOMY LAPAROSCOPIC;  Surgeon: Ayden Montano MD;  [facility];  Service:     CHOLECYSTECTOMY LAPAROSCOPIC  03/2017    EPIGASTRIC HERNIA REPAIR      WI DEBRIDEMENT, SKIN, SUB-Q TISSUE,=<20 SQ CM Right 8/28/2018    Procedure: RIGHT GROIN AND THIGH DEBRIDEMENT ([address]) [address];  Surgeon: David Moreira MD;  [facility];  Service: Vascular    WI THROMBOENDARTECTMY FEMORAL COMMON Right 7/31/2018    Procedure: ENDARTERECTOMY ARTERIAL FEMORAL R femoral endarterectomy w/ patch angioplasty ;  Surgeon: Nando Anne MD;  [facility];  Service: Vascular    UMBILICAL HERNIA REPAIR N/A 2/24/2017 Procedure: REPAIR HERNIA UMBILICAL;  Surgeon: Dewey Guardado MD;  [facility];  Service:      Social History   History   Alcohol Use    Yes     Comment: 3-4 mixed vodka drinks/daily      History   Drug Use No     History   Smoking Status    Current Every Day Smoker    Packs/day: 0 50    Years: 40 00    Types: Cigarettes   Smokeless Tobacco    Never Used     Family History:   Family History   Problem Relation Age of Onset    Hypertension Mother         BENIGN    Alzheimer's disease Father     Heart disease Father         CARDIAC DISORDER    Stroke Father     Heart attack Father     No Known Problems Brother        Review of Systems:  Review of Systems  Except as noted in the HPI and above, a comprehensive 14 point review of systems was negative      Scheduled Meds:  Current Facility-Administered Medications:  EMS replenish medication  Does not apply Once Angelina Ennis MD   albuterol 2 5 mg Nebulization Q6H PRN Angelina Ennis MD   aluminum-magnesium hydroxide-simethicone 5 mL Oral Q6H PRN Teetee Gray MD   docusate sodium 100 mg Oral BID Teetee Gray MD   furosemide 40 mg Intravenous Once Sanam Campbell MD   heparin (porcine) 5,000 Units Subcutaneous [facility] Teetee Gray MD   insulin lispro 1-5 Units Subcutaneous HS Teetee Gray MD   insulin lispro 1-6 Units Subcutaneous TID AC Teetee Gray MD   ipratropium 0 5 mg Nebulization TID Angelina Ennis MD   levalbuterol 1 25 mg Nebulization TID Angelina Ennis MD   nicotine 1 patch Transdermal Daily Teetee Gray MD   ondansetron 4 mg Intravenous Q6H PRN Teetee Gray MD   sodium chloride 75 mL/hr Intravenous Continuous Teetee Gray MD     Continuous Infusions:  sodium chloride 75 mL/hr     PRN Meds: albuterol    aluminum-magnesium hydroxide-simethicone    ondansetron  all current active meds have been reviewed    Allergies   Allergen Reactions    Seasonal Ic  [Cholestatin]        Objective   Vitals: Blood pressure 104/65, pulse (!) 114, temperature 98 °F (36 7 °C), temperature source Rectal, resp  rate 20, height 5' 10" (1 778 m), weight 79 4 kg (175 lb), SpO2 96 %  , Body mass index is 25 11 kg/m² , Orthostatic Blood Pressures      Most Recent Value   Blood Pressure  104/65 filed at 02/07/2019 4441   Patient Position - Orthostatic VS  Lying filed at 02/07/2019 6092          No intake or output data in the 24 hours ending 02/07/19 1026    Invasive Devices     Peripheral Intravenous Line            Peripheral IV 02/07/19 Left Forearm less than 1 day    Peripheral IV 02/07/19 Left Wrist less than 1 day                Physical Exam:  Physical Exam   Constitutional: He is oriented to person, place, and time  He appears well-developed and well-nourished  He appears distressed  HENT:   Head: Normocephalic and atraumatic  Eyes: Conjunctivae and EOM are normal  No scleral icterus  Neck: Normal range of motion  Neck supple  JVD present  Cardiovascular: Regular rhythm, normal heart sounds and intact distal pulses  Exam reveals no gallop and no friction rub  No murmur heard  Tachycardic     Pulmonary/Chest: He is in respiratory distress (acc mm use)  He has wheezes  He has rales  He exhibits no tenderness  Abdominal: Soft  There is no tenderness  There is no rebound and no guarding  Musculoskeletal: He exhibits edema (1-2+ pitting)  +ve contractions hands   Neurological: He is alert and oriented to person, place, and time  Skin: Skin is warm and dry  Psychiatric: He has a normal mood and affect  Lab Results: I have personally reviewed pertinent lab results  Imaging: I have personally reviewed pertinent reports  EKG: Personally reviewed    ECHO: reviewed  Previous Cath/PCI: n/a  Code Status: Level 1 - Full Code  Advance Directive and Living Will:      Power of :    POLST:        Monica Du MD  Cardiology Fellow

## 2019-02-07 NOTE — ASSESSMENT & PLAN NOTE
Patient would be given fluids to monitor creatinine  However will also place BiPAP p r n     Careful hydration  Echocardiogram to assess heart function  Monitor metabolic profile  Will also get renal involved

## 2019-02-07 NOTE — ASSESSMENT & PLAN NOTE
Lab Results   Component Value Date    HGBA1C 6 6 (H) 10/10/2018    Patient does not have any open or weeping sores  For diabetes, will continue Amaryl, Lantus and Humalog sliding scale      Recent Labs      02/07/19   0509   POCGLU  414*       Blood Sugar Average: Last 72 hrs:  (P) 414

## 2019-02-07 NOTE — ED RE-EVALUATION NOTE
Discussed labs and xray results with patient and spouse    XR chest 1 view portable   ED Interpretation   ED Wet Read:  Volume overload             Juan C Edwards MD  02/07/19 4507

## 2019-02-07 NOTE — RESPIRATORY THERAPY NOTE
RT Protocol Note  Mik Thompson 59 y o  male MRN: [id number]  Unit/Bed#: ED 14 Encounter: [id number]    Assessment    Active Problems:    * No active hospital problems   *      Home Pulmonary Medications:  None       Past Medical History:   Diagnosis Date    [facility] )     RESOLVED: 8/17/17    Concussion     LAST ASSESSED: 8/17/17    [facility]ch[facility] disease) ([facility] )     Depression     [facility]     Diabetic neuropathy ([facility] )     Difficulty attaining erection     LAST ASSESSEDl: 8/17/17    Elevated liver enzymes     Gall stone pancreatitis     RESOLVED: 3/8/17    Gall stones     RESOLVED: 3/8/17    History of MRSA infection     Hypertension     LAST ASSESSED: 8/17/17    Spinal cord injury, C1-C7 ([facility] )     Traumatic injury to musculoskeletal system     1-5 FLIGHT FALL DOWN THE STEPS - CERVICAL NECK INJURY - JAN 2, 2012; LAST ASSESSED: 2/52/01    Umbilical hernia without obstruction and without gangrene     RESOLVED: 3/8/17    Varicella     LAST ASSESSED: 8/17/17     Social History     Social History    Marital status: /Civil Union     Spouse name: N/A    Number of children: N/A    Years of education: N/A     Occupational History    RETIRED      Social History Main Topics    Smoking status: Current Every Day Smoker     Packs/day: 0 50     Years: 40 00     Types: Cigarettes    Smokeless tobacco: Never Used    Alcohol use Yes      Comment: 3-4 mixed vodka drinks/daily     Drug use: No    Sexual activity: No     Other Topics Concern    None     Social History Narrative    DAILY  [employer] CONSU[employer]    EXERCISES RARELY    LIVES WITH SIGNIFICANT OTHER    LIVES WITH WIFE    NO LIVING WILL           Subjective         Objective    Physical Exam:   Assessment Type: Assess only  General Appearance: Awake, Alert  Respiratory Pattern: Labored, Tachypneic  Chest Assessment: Chest expansion symmetrical  Bilateral Breath Sounds: Coarse  O2 Device: Bipap    Vitals:  Blood pressure 125/58, pulse (!) 116, temperature 98 °F (36 7 °C), temperature source Rectal, resp  rate (!) 25, height 5' 10" (1 778 m), weight 79 4 kg (175 lb), SpO2 96 %  Imaging and other studies: I have personally reviewed pertinent reports  O2 Device: Bipap     Plan    Respiratory Plan: Vent/NIV/HFNC        Resp Comments: (P) Pt brought in via EMS on CPAP for respiratory distress  Pt placed on bipap upon arrival  Respiratory protocol also initiated at this time  Pt states he does not have a history of COPD or asthma  Per chart pt has COPD  Pt states he does not take any respiratory meds at home  Pt is a current everyday smoker and smokes half a pack a day  Will order pt on Xopenex and Atrovent TID under protocol

## 2019-02-07 NOTE — ASSESSMENT & PLAN NOTE
Continue tamsulosin  Will also do bladder scanning see if patient has residual urine  If ever, may probably have reflux causing renal failure  Renal ultrasound

## 2019-02-07 NOTE — CONSULTS
Consultation - Nephrology   Jo-Ann Vital 59 y o  male MRN: [id number]  Unit/Bed#: ED 14 Encounter: [id number]    ASSESSMENT and PLAN:  1  Acute kidney injury (POA):  Likely prerenal with volume overload causing decreased effective circulating volume, hyperglycemia blood sugar 402, dietary indiscretion in the setting of ACE-inhibitor; concern for PE  -baseline creatinine is 0 6-0 8  -previous AK I in October, 2018 resolved on discharge  -ACE-inhibitor now on hold  -status post IV Lasix 40 mg x1 now on b i d  Per Cardiology  -status post IV fluids normal saline for increased lactic acid, now resolved  -will check UA with micro  -will check PVR  -renal ultrasound obtain-unremarkable  Right left kidney are normal echogenicity and contour without hydronephrosis  -avoid hypotension  -avoid nephrotoxins  -continue to trend I/O, lab values in volume status    2  Hypertension:  BP on the lower side  -ACE-inhibitor on hold  -avoid hypotension  -will increase hold parameters on diuretics for SBP < 120    3  Uncontrolled Diabetes:  Hyperglycemia  -need adequate glucose control-by primary team  -recent A1c on 02/07/2019 9 3    4  Volume overload: as evidence by chest x-ray revealing mild vascular congestion and elevated BNP  -currently now on diuretics IV b i d  Per Cardiology  -echocardiogram-pending  -Echocardiogram 07/26/2018 reported EF of 55% with mild concentric hypertrophy, no regional wall abnormalities    5  Hyperkalemia:  Likely secondary to AK I  -status post IV Lasix  -trend for now-will repeat BMP at 4:00 p m      6  Hyponatremia:  Corrects with elevated blood sugar at 139        HISTORY OF PRESENT ILLNESS:  Requesting Physician: Anna Farah DO  Reason for Consult: SUNDEEP    Jo-Ann Vital is a 59 y o  male quadriplegic with a past medical history of hypertension, diabetes, COPD, PAD status post right CFA endarterectomy and right fem pop bypass complicated by an nonhealing right groin incision, who presented to the emergency room with acute short of breath requiring CPAP and nebs in the field by EMS, followed by BiPAP in the emergency room  Chest x-ray revealed mild vascular congestion with increased BNP of 3787: IV Lasix 40 mg was given as well as IV fluids administered  Workup revealed elevated creatinine and potassium and a renal consultation is requested today for assistance in the management of SUNDEEP  Of note the patient was recently admitted in October of 2018 and treated prophylactically for sepsis however no source was identified and IV antibiotics was ultimately discontinued  Patient did sustain an acute kidney injury at that time but resolved prior to discharge  On discussion, the patient was feeling fine ever since he was discharged from last hospitalization  He reports his right groin incision is starting to have issues  Denies NSAID use  Currently denies chest pain, dizziness, lightheadedness,, nausea, nausea vomiting, known fevers or chills at home       PAST MEDICAL HISTORY:  Past Medical History:   Diagnosis Date    Chronic [facility] disease [facility] )     RESOLVED: 8/17/17    Concussion     LAST ASSESSED: 8/17/17    COPD [facility]chronic obstructive pulmonary disease) ([facility] )     Depression     Diabetes mellitus ([address] )     Diabetic neuropathy ([facility] )     Difficulty attaining erection     LAST ASSESSEDl: 8/17/17    Elevated liver enzymes     Gall stone pancreatitis     RESOLVED: 3/8/17    Gall stones     RESOLVED: 3/8/17    History of MRSA infection     Hypertension     LAST ASSESSED: 8/17/17    Spinal cord injury, C1-C7 ([facility] )     Traumatic injury to musculoskeletal system     1-5 FLIGHT FALL DOWN THE STEPS - CERVICAL NECK INJURY - JAN 2, 2012; LAST ASSESSED: 7/97/39    Umbilical hernia without obstruction and without gangrene     RESOLVED: 3/8/17    Varicella     LAST ASSESSED: 8/17/17       PAST SURGICAL HISTORY:  Past Surgical History:   Procedure Laterality Date    ARTERIOGRAM Right 7/31/2018 Procedure: ARTERIOGRAM Completion Agram;  Surgeon: Ricardo Pritchett MD;  [facility];  Service: Vascular    BACK SURGERY      BYPASS FEMORAL-POPLITEAL Right 7/31/2018    Procedure: BYPASS FEMORAL-POPLITEAL R femoral- BK popliteal bypass;  Surgeon: Ricardo Pritchett MD;  [facility];  Service: Vascular    CERVICAL FUSION      VERTEBRAL; C3-C4 FUSION    CHOLECYSTECTOMY LAPAROSCOPIC N/A 2/24/2017    Procedure: CHOLECYSTECTOMY LAPAROSCOPIC;  Surgeon: Anaya Cabrera MD;  [facility];  Service:     CHOLECYSTECTOMY LAPAROSCOPIC  03/2017    EPIGASTRIC HERNIA REPAIR      ND DEBRIDEMENT, SKIN, SUB-Q TISSUE,=<20 SQ CM Right 8/28/2018    Procedure: RIGHT GROIN AND THIGH DEBRIDEMENT ([address]) AND VAC PLACEMENT;  Surgeon: Tanvir Hudson MD;  [facility];  Service: Vascular    ND THROMBOENDARTECTMY FEMORAL COMMON Right 7/31/2018    Procedure: ENDARTERECTOMY ARTERIAL FEMORAL R femoral endarterectomy w/ patch angioplasty ;  Surgeon: Ricardo Pritchett MD;  [facility];  Service: Vascular    UMBILICAL HERNIA REPAIR N/A 2/24/2017    Procedure: REPAIR HERNIA UMBILICAL;  Surgeon: Anaya Cabrera MD;  [facility];  Service:        ALLERGIES:  Allergies   Allergen Reactions    Seasonal Ic  [Cholestatin]        SOCIAL HISTORY:  History   Alcohol Use    Yes     Comment: 3-4 mixed vodka drinks/daily      History   Drug Use No     History   Smoking Status    Current Every Day Smoker    Packs/day: 0 50    Years: 40 00    Types: Cigarettes   Smokeless Tobacco    Never Used       FAMILY HISTORY:  Family History   Problem Relation Age of Onset    Hypertension Mother         BENIGN    Alzheimer's disease Father     Heart disease Father         CARDIAC DISORDER    Stroke Father     Heart attack Father     No Known Problems Brother        MEDICATIONS:    Current Facility-Administered Medications:      EMS REPLENISHMENT MED, , Does not apply, Once, MD Greg Wall albuterol inhalation solution 2 5 mg, 2 5 mg, Nebulization, Q6H PRN, Varney Scheuermann, MD    aluminum-magnesium hydroxide-simethicone (MYLANTA) 200-200-20 mg/5 mL oral suspension 5 mL, 5 mL, Oral, Q6H PRN, Krystyna Lucas MD    docusate sodium (COLACE) capsule 100 mg, 100 mg, Oral, BID, Krystyna Lucas MD    heparin (porcine) subcutaneous injection 5,000 Units, 5,000 Units, Subcutaneous, Q8H [address], 5,000 Units at 02/07/19 1119 **AND** Platelet count, , , Once, Krystyna Lucas MD    insulin lispro (HumaLOG) 100 units/mL subcutaneous injection 1-5 Units, 1-5 Units, Subcutaneous, HS, Krystyna Lucas MD    insulin lispro (HumaLOG) 100 units/mL subcutaneous injection 1-6 Units, 1-6 Units, Subcutaneous, TID AC, 6 Units at 02/07/19 1124 **AND** Fingerstick Glucose (POCT), , , TID AC, Krystyna Lucas MD    ipratropium (ATROVENT) 0 02 % inhalation solution 0 5 mg, 0 5 mg, Nebulization, TID, Varney Scheuermann, MD    [facility] inhalation solution 1 25 mg, 1 25 mg, Nebulization, TID, Varney Scheuermann, MD    nicotine (NICODERM CQ) 21 mg/24 hr TD 24 hr patch 1 patch, 1 patch, Transdermal, Daily, Krystyna Lucas MD, 1 patch at 02/07/19 1118    ondansetron (ZOFRAN) injection 4 mg, 4 mg, Intravenous, Q6H PRN, Krystyna Lucas MD    sodium chloride 0 9 % infusion, 75 mL/hr, Intravenous, Continuous, Krystyna Lucas MD, Last Rate: 75 mL/hr at 02/07/19 1036, 75 mL/hr at 02/07/19 1036    Current Outpatient Prescriptions:     aspirin (ECOTRIN LOW STRENGTH) 81 mg EC tablet, Take 81 mg by mouth daily, Disp: , Rfl:     baclofen 20 mg tablet, Take 20 mg by mouth 3 (three) times a day, Disp: , Rfl:     citalopram (CeleXA) 20 mg tablet, Take 10 mg by mouth daily, Disp: , Rfl:     clopidogrel (PLAVIX) 75 mg tablet, Take 1 tablet (75 mg total) by mouth daily, Disp: 30 tablet, Rfl: 0    gabapentin (NEURONTIN) 400 mg capsule, Take 300 mg by mouth 2 (two) times a day  , Disp: , Rfl:     glimepiride (AMARYL) 1 mg tablet, Take 1 tablet (1 mg total) by mouth daily, Disp: 90 tablet, Rfl: 0    lisinopril (ZESTRIL) 10 mg tablet, Take 1 tablet (10 mg total) by mouth daily, Disp: 90 tablet, Rfl: 0    metFORMIN (GLUCOPHAGE) 1000 MG tablet, Take 1,000 mg by mouth 2 (two) times a day with meals, Disp: , Rfl:     rosuvastatin (CRESTOR) 20 MG tablet, Take 20 mg by mouth every other day, Disp: , Rfl:     tamsulosin (FLOMAX) 0 4 mg, Take 1 capsule (0 4 mg total) by mouth daily with dinner, Disp: 30 capsule, Rfl: 0    REVIEW OF SYSTEMS:  All the systems were reviewed and were negative except as documented on the HPI      PHYSICAL EXAM:  Current Weight: Weight - Scale: 79 4 kg (175 lb)  First Weight: Weight - Scale: 79 4 kg (175 lb)  Vitals:    02/07/19 0822 02/07/19 0910 02/07/19 0911 02/07/19 0915   BP: 104/65      BP Location:       Pulse: (!) 114      Resp: 20      Temp:       TempSrc:       SpO2: 97% 99% 98% 96%   Weight:       Height:         No intake or output data in the 24 hours ending 02/07/19 1143  General: conscious, coherent, cooperative, not in acute distress  Skin: no rash, warm and dry  Eyes: pale conjunctivae, anicteric sclerae  ENT: unable to assess mucus membranes with bipap in place  Neck: supple, mild JVD noted  Chest: essentially clear breath sounds, decreased bases, fine crackles in the bases  CVS:   tachycardia, regular rhythm  Abdomen: soft, non-tender, slightly distended, normoactive bowel sounds  Extremities: no  significant edema of both legs  Neuro: awake, alert, oriented  Psych: appropriate affect          Lab Results:     Results from last 7 days  Lab Units 02/07/19  0916 02/07/19  0537 02/07/19  0515 02/07/19  0514   WBC Thousand/uL  --  10 56*  --   --    HEMOGLOBIN g/dL  --  12 2  --   --    I STAT HEMOGLOBIN g/dl  --   --  12 6 12 9   HEMATOCRIT %  --  37 8  --   --    HEMATOCRIT, ISTAT %  --   --  37 38   PLATELETS Thousands/uL 150 195  --   --    POTASSIUM mmol/L  --  5 5*  --   --    CHLORIDE mmol/L  -- 97*  --   --    CO2 mmol/L  --  24  --   --    CO2, I-STAT mmol/L  --   --  27 26   BUN mg/dL  --  45*  --   --    CREATININE mg/dL  --  2 17*  --   --    CALCIUM mg/dL  --  9 3  --   --    ALK PHOS U/L  --  53  --   --    ALT U/L  --  40  --   --    AST U/L  --  48*  --   --    GLUCOSE, ISTAT mg/dl  --   --  425* 434*       Other Studies:

## 2019-02-07 NOTE — ED PROCEDURE NOTE
PROCEDURE  CriticalCare Time  Performed by: Evgeny Olguin  Authorized by: Evgeny Olguin     Critical care provider statement:     Critical care time (minutes):  31    Critical care time was exclusive of:  Separately billable procedures and treating other patients and teaching time    Critical care was necessary to treat or prevent imminent or life-threatening deterioration of the following conditions:  Respiratory failure    Critical care was time spent personally by me on the following activities:  Obtaining history from patient or surrogate, development of treatment plan with patient or surrogate, discussions with consultants, evaluation of patient's response to treatment, examination of patient, ordering and performing treatments and interventions, ordering and review of laboratory studies, ordering and review of radiographic studies, re-evaluation of patient's condition, review of old charts and ventilator management    I assumed direction of critical care for this patient from another provider in my specialty: no    Comments:      Bipap initiated with resolution of hypoxia; VBG to r/o ventilatory failure; albuterol and ipratropium mylene Worthy MD  02/07/19 3716

## 2019-02-07 NOTE — ASSESSMENT & PLAN NOTE
Patient comes in with acute shortness of breath woke up from sleep  No chest pain  Troponin normal   Much better after BiPAP and would like to continue its use for now  Need new echocardiogram to compare with previous done on July 2018  Of note echocardiogram that time had good ejection fraction with no diastolic dysfunction  Yet in today's BNP is elevated in the level of 3000  This is in the setting that the patient needs to have hydration due to  SUNDEEP, elevated lactic acid  Cardiology

## 2019-02-07 NOTE — UTILIZATION REVIEW
Initial Clinical Review    Admission: Date/Time/Statement: 2/7/19 @ 0736    Inpatient Admission     Standing Status:   Standing     Number of Occurrences:   1     Order Specific Question:   Admitting Physician     Answer:   Aly Yi [[id number]]     Order Specific Question:   Level of Care     Answer:   Level 1 Stepdown [13]     Order Specific Question:   Estimated length of stay     Answer:   More than 2 Midnights     Order Specific Question:   Certification     Answer:   I certify that inpatient services are medically necessary for this patient for a duration of greater than two midnights  See H&P and MD Progress Notes for additional information about the patient's course of treatment  ED: Date/Time/Mode of Arrival:   ED Arrival Information     Expected Arrival Acuity Means of Arrival Escorted By Service Admission Type    2/7/2019 05:00 2/7/2019 05:00 Emergent Ambulance [facility] Emergency    Arrival Complaint    Respiratory Distress        Chief Complaint:   Chief Complaint   Patient presents with    Respiratory Distress     Patient reports SOB beginning approx 2 hours ago  Upon EMS arrival patient was pale with O2 sat in low 50's on RA  O2 sat improved to 85% on CPAP  Patient switched to BIPAP upon arrival      History of Illness    59year old male presents to ed for evaluation of shortness of breath  On awakening this morning  Oxygen saturation on arrival is  50% to  73% ra   Patient is a diabetic on oral medication  And non compliant with diabetic diet      Patient is c4 para quadraplegic       ED Vital Signs:   02/07/19 0505 02/07/19 0505 02/07/19 0505 02/07/19 0505 02/07/19 0502   98 °F (36 7 °C) (!) 122 (!) 30 138/71 (!) 50 %      No Pain       02/07/19 79 4 kg (175 lb)     Vital Signs (abnormal):    bipap   Respiratory rate   30  25  23    Heart rate   122  116  112  114    O2 sats  Room air   50%   73 %     6L O2 nc   93%      Pertinent Labs/Diagnostic Test Results:    Chest x ray shows  Increased interstitial densities and edema bilaterally     US renal    1  Normal kidneys  2   Trabeculation of bladder wall, which can be seen in setting of chronic outlet obstruction  No discrete focal mass lesion identified       BLLE  VENOUS DUPLEX 2-7  IN PROCESS     Glucose   414  402  500   vbg   Ph 7 301  pco2  52 4  po2  43   hco3  25 8    Bun 44 creatinine  1 8      BNP   3787       Repeat bmp  Potassium  5 5     Sodium  132   Chloride  97   Bun 45   Creatinine 2 17   Lactic acid  5 4      Blood culture x 2  In process   D Dimer in process        ED Treatment:   Medication Administration from 02/07/2019 0500 to 02/07/2019 1207       Date/Time Order Dose Route     02/07/2019 0529 albuterol inhalation solution 5 mg 5 mg Nebulization     02/07/2019 0529 ipratropium (ATROVENT) 0 02 % inhalation solution 0 5 mg 0 5 mg Nebulization     02/07/2019 1036 sodium chloride 0 9 % infusion 75 mL/hr Intravenous     02/07/2019 1118 nicotine (NICODERM CQ) 21 mg/24 hr TD 24 hr patch 1 patch 1 patch Transdermal     02/07/2019 1119 heparin (porcine) subcutaneous injection 5,000 Units 5,000 Units Subcutaneous     02/07/2019 1124 insulin lispro (HumaLOG) 100 units/mL subcutaneous injection 1-6 Units 6 Units Subcutaneous     02/07/2019 1119 furosemide (LASIX) injection 40 mg 40 mg Intravenous     Past Medical/Surgical History:     Diagnosis    Chronic pain    Depression    Diabetic neuropathy (HCC)    Enlarged prostate with lower urinary tract symptoms (LUTS)    Hernia, epigastric    Injury of cervical spinal cord (HCC)    Tobacco use disorder    Umbilical hernia    Cervical myelopathy (HCC)    Basal cell carcinoma in situ of skin    Ambulatory dysfunction    Lumbar radiculopathy    Type 2 diabetes mellitus with diabetic polyneuropathy, with long-term current use of insulin (HCC)    Hypercalcemia    Quadriplegia and quadriparesis (HCC)    Alcohol abuse    Cellulitis of leg, right  Peripheral vascular disease due to secondary diabetes mellitus (HCC)    Ischemic foot pain at rest [facility])    Peripheral arterial disease ([facility] [address] )    Essential hypertension    Acute blood loss anemia    Constipation    Wound dehiscence, surgical, initial encounter    Non-healing surgical wound of right groin    Postoperative state    Sacral wound, initial encounter    Esophageal thickening         Admitting Diagnosis:     Respiratory distress [R06 03]  Acute kidney insufficiency [N28 9]  Acute combined systolic and diastolic congestive heart failure (HCC) [I50 41]  Acute kidney injury ([facility] [address] ) [N17 9]  Elevated brain natriuretic peptide (BNP) level [R79 89]  Elevated lactic acid level [R79 89]      Age/Sex: 59 y o  male     Assessment/Plan:     Cardiology  Acute heart failure  With flash pulmonary edema  Etiology unclear with profound hypoxemia , tachycardia  Associated with chronic immobility  PE  Not ruled out   Copd exacerbation likely    Plan    2 d Echo  Iv lasix bid   Monitor renal function    Monitor telemetry    Nephrology   Acute kidney injury (POA):  Likely prerenal with volume overload causing decreased effective circulating volume, hyperglycemia blood sugar 402, dietary indiscretion in the setting of ACE-inhibitor; concern for PE  -baseline creatinine is 0 6-0 8  -previous AK I in October, 2018 resolved on discharge  -ACE-inhibitor now on hold  -status post IV Lasix 40 mg x1 now on b i d  Per Cardiology  -status post IV fluids normal saline for increased lactic acid, now resolved  -will check UA with micro  -will check PVR  -renal ultrasound obtain-unremarkable    Right left kidney are normal echogenicity and contour without hydronephrosis  -avoid hypotension  -avoid nephrotoxins  -continue to trend I/O, lab values in volume status     Hypertension:  BP on the lower side  -ACE-inhibitor on hold  -avoid hypotension  -will increase hold parameters on diuretics for SBP < 120        Internal medicine Uncontrolled Diabetes:  Hyperglycemia  -need adequate glucose control  For diabetes, will continue Amaryl, Lantus and Humalog sliding scale   -recent A1c on 02/07/2019 9 3      Admission Orders:    HEPARIN INFUSION PROTOCOL   BIPAP  PT  And  OT evaol and treat   Consistent carb diet  Finger stick glucose  Telemetry    Echo     Scheduled Meds:       albuterol 2 5 mg Nebulization Q6H PRN   atorvastatin 40 mg Oral Daily With Dinner   baclofen 20 mg Oral TID   citalopram 10 mg Oral Daily   clopidogrel 75 mg Oral Daily   docusate sodium 100 mg Oral BID   furosemide 40 mg Intravenous BID (diuretic)   gabapentin 300 mg Oral BID   heparin (porcine) 3-30 Units/kg/hr (Order-Specific) Intravenous Titrated   heparin (porcine) 3,000 Units Intravenous PRN   heparin (porcine) 6,000 Units Intravenous Once   heparin (porcine) 6,000 Units Intravenous PRN   insulin glargine 15 Units Subcutaneous HS   insulin lispro 1-5 Units Subcutaneous HS   insulin lispro 1-5 Units Subcutaneous TID AC   insulin lispro 5 Units Subcutaneous TID With Meals   ipratropium 0 5 mg Nebulization TID   levalbuterol 1 25 mg Nebulization TID   nicotine 1 patch Transdermal Daily   ondansetron 4 mg Intravenous Q6H PRN   tamsulosin 0 4 mg Oral Daily With 15Five

## 2019-02-07 NOTE — ASSESSMENT & PLAN NOTE
Last time patient also has elevated lactic acid level with normal procalcitonin and even placed on antibiotics, it was discontinued as no source of infection was found  Currently, procalcitonin is normal; elevated lactic acid may be secondary to poor clearance  Patient has a remote history of heavy drinking  Likewise, patient is on metformin which could induce lactic acidosis
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
## 2019-02-07 NOTE — CONSULTS
Consult - [address] 59 y o  male MRN: [id number]  Unit/Bed#: XTQH 937-49 Encounter: [id number]    Physician Requesting Consult: Haley Lee MD    Reason for Consultation / Chief Complaint:  Acute hypoxic respiratory failure, acute on chronic mixed heart failure    History of Present Illness:  Jacqueline Champion is a 59 y o  male who presented to [facility] early this morning  He has a past medical history of COPD, diabetes mellitus type 2, peripheral vascular disease status post recent right femoral endarterectomy and fem-pop bypass, diabetic neuropathy, and BPH  Patient was sleeping at home and woke up very short of breath  Patient did have some dietary indiscretion reportedly eating [place] as well as drinking 3 water bottles a day  He was placed on CPAP by EMS and started on duo nebs with [place] improvement work of breathing  In the emergency department, was saturating in the mid 70s on room air was placed on BiPAP  He had a chest x-ray that showed interstitial density increase as well as edema  On laboratory evaluation, patient was found to be hyponatremic with a sodium 132, hyperkalemic with a potassium of 5 5, have a creatinine of 2 7 (baseline 0 6), troponinemia of 0 08, and a lactic acid of 5 4  Patient was admitted to step-down level 1 and critical care was consulted for the Critical Care was source team     On my arrival, patient was in the process of getting lower extremity duplexes  Patient was comfortable on BiPAP at this time, and due to patient saturating 100%, I elected to change patient to high-flow nasal cannula  He tolerated this  He reiterated history that was obtained initially from chart review  He feels as though his breathing is much improved  History obtained from chart review and the patient      Past Medical History:  Past Medical History:   Diagnosis Date    Chronic obstructive lung disease ([facility] [address] )     RESOLVED: 8/17/17    Concussion     LAST ASSESSED: 8/17/17    COPD (chronic obstructive pulmonary disease) (HCC)     Depression     Diabetes mellitus ([facility] [address] )     Diabetic neuropathy ([facility] [address] )     Difficulty attaining erection     LAST ASSESSEDl: 8/17/17    Elevated liver enzymes     Gall stone pancreatitis     RESOLVED: 3/8/17    Gall stones     RESOLVED: 3/8/17    History of MRSA infection     Hypertension     LAST ASSESSED: 8/17/17    Spinal cord injury, C1-C7 ([facility] [address] )     Traumatic injury to musculoskeletal system     1-5 FLIGHT FALL DOWN THE STEPS - CERVICAL NECK INJURY - JAN 2, 2012; LAST ASSESSED: 0/09/49    Umbilical hernia without obstruction and without gangrene     RESOLVED: 3/8/17    Varicella     LAST ASSESSED: 8/17/17       Past Surgical History:  Past Surgical History:   Procedure Laterality Date    ARTERIOGRAM Right 7/31/2018    Procedure: ARTERIOGRAM Completion Agram;  Surgeon: Carla Jerez MD;  [facility];  Service: Vascular    BACK SURGERY      BYPASS FEMORAL-POPLITEAL Right 7/31/2018    Procedure: BYPASS FEMORAL-POPLITEAL R femoral- BK popliteal bypass;  Surgeon: Carla Jerez MD;  [facility];  Service: Vascular    CERVICAL FUSION      VERTEBRAL; C3-C4 FUSION    CHOLECYSTECTOMY LAPAROSCOPIC N/A 2/24/2017    Procedure: CHOLECYSTECTOMY LAPAROSCOPIC;  Surgeon: Mortimer Keeling, MD;  [facility];  Service:     CHOLECYSTECTOMY LAPAROSCOPIC  03/2017    EPIGASTRIC HERNIA REPAIR      WV DEBRIDEMENT, SKIN, SUB-Q TISSUE,=<20 SQ CM Right 8/28/2018    Procedure: RIGHT GROIN AND THIGH DEBRIDEMENT ([address]) [address];  Surgeon: Belinda Prince MD;  [facility];  Service: Vascular    WV THROMBOENDARTECTMY FEMORAL COMMON Right 7/31/2018    Procedure: ENDARTERECTOMY ARTERIAL FEMORAL R femoral endarterectomy w/ patch angioplasty ;  Surgeon: Carla Jerez MD;  [facility];  Service: Vascular    UMBILICAL HERNIA REPAIR N/A 2/24/2017    Procedure: REPAIR HERNIA UMBILICAL;  Surgeon: Pawel Block MD;  [facility];  Service:        Past Family History:  Family History   Problem Relation Age of Onset    Hypertension Mother         BENIGN    Alzheimer's disease Father     Heart disease Father         CARDIAC DISORDER    Stroke Father     Heart attack Father     No Known Problems Brother        Social History:  History   Smoking Status    Current Every Day Smoker    Packs/day: 0 50    Years: 40 00    Types: Cigarettes   Smokeless Tobacco    Never Used     History   Alcohol Use    Yes     Comment: 3-4 mixed vodka drinks/daily      History   Drug Use No     Marital Status: /Civil Union  Exercise History:  Independent ADLs    Home Medications:   Prior to Admission medications    Medication Sig Start Date End Date Taking?  Authorizing Provider   aspirin (ECOTRIN LOW STRENGTH) 81 mg EC tablet Take 81 mg by mouth daily   Yes Historical Provider, MD   baclofen 20 mg tablet Take 20 mg by mouth 3 (three) times a day   Yes Historical Provider, MD   citalopram (CeleXA) 20 mg tablet Take 10 mg by mouth daily   Yes Historical Provider, MD   clopidogrel (PLAVIX) 75 mg tablet Take 1 tablet (75 mg total) by mouth daily 10/8/18  Yes CHERELLE Zheng   gabapentin (NEURONTIN) 400 mg capsule Take 300 mg by mouth 2 (two) times a day     Yes Historical Provider, MD   glimepiride (AMARYL) 1 mg tablet Take 1 tablet (1 mg total) by mouth daily 4/19/18  Yes Sofiya Toussaint MD   lisinopril (ZESTRIL) 10 mg tablet Take 1 tablet (10 mg total) by mouth daily 4/19/18  Yes Sofiya Toussaint MD   metFORMIN (GLUCOPHAGE) 1000 MG tablet Take 1,000 mg by mouth 2 (two) times a day with meals   Yes Historical Provider, MD   rosuvastatin (CRESTOR) 20 MG tablet Take 20 mg by mouth every other day   Yes Historical Provider, MD   tamsulosin (FLOMAX) 0 4 mg Take 1 capsule (0 4 mg total) by mouth daily with dinner 8/5/18  Yes Fatoumata Reyes MD   insulin glargine (LANTUS) 100 units/mL subcutaneous injection Inject under the skin daily at bedtime  2/7/19 Yes Historical Provider, MD   insulin lispro (HumaLOG) 100 units/mL injection Inject under the skin 3 (three) times a day before meals  2/7/19 Yes Historical Provider, MD   magnesium oxide (MAG-OX) 400 mg Take 1 tablet (400 mg total) by mouth daily 10/13/18 2/7/19 Yes Henry Maldonado DO   amLODIPine (NORVASC) 5 mg tablet Take 5 mg by mouth daily  2/7/19  Historical Provider, MD   ammonium lactate (LAC-HYDRIN) 12 % cream Apply topically as needed for dry skin  2/7/19  Historical Provider, MD   calcium carbonate (TUMS) 500 mg chewable tablet Chew 1 tablet daily  2/7/19  Historical Provider, MD   famotidine (PEPCID) 20 mg tablet Take 1 tablet (20 mg total) by mouth 2 (two) times a day 10/13/18 2/7/19  Henry Maldonado DO   glucose blood (ACCU-CHEK DAREN PLUS) test strip Check 2-3 times a day 7/10/18 2/7/19  CHERELLE Leo   Incontinence Supply Disposable (ATTENDS UNDERWEAR 7 LARGE) MISC  4/4/18 2/7/19  Historical Provider, MD   metFORMIN (GLUCOPHAGE) 1000 MG tablet Take 1 tablet (1,000 mg total) by mouth 2 (two) times a day with meals for 90 days 4/19/18 2/7/19  Komal Mcnair MD   morphine (MS CONTIN) 15 mg 12 hr tablet Take 15 mg by mouth 2 (two) times a day  2/7/19  Historical Provider, MD   neomycin-bacitracin-polymyxin (NEOSPORIN) 5-400-5,000 ointment Apply topically 4 (four) times a day  2/7/19  Historical Provider, MD   nicotine (NICODERM CQ) 21 mg/24 hr TD 24 hr patch Place 1 patch on the skin daily 8/6/18 2/7/19  Sarah Houston MD   nystatin (MYCOSTATIN) powder Apply topically 2 (two) times a day Applied to bilateral groin after cleansing skin    Avoid contact with right groin wound 9/14/18 2/7/19  CHERELLE Sotelo   oxyCODONE (ROXICODONE) 5 mg immediate release tablet Take 5 mg by mouth every 4 (four) hours as needed for moderate pain  2/7/19  Historical Provider, MD   senna-docusate sodium (SENOKOT S) 8 6-50 mg per tablet Take 1 tablet by mouth daily at bedtime Please take while taking narcotic pain medication   8/5/18 2/7/19  Brian Le MD   TiZANidine (ZANAFLEX) 2 MG capsule TAKE 1 TABLET BEFORE BED AS NEEDED ( tablets) 5/8/18 2/7/19  CHERELLE Li   Wound Dressings (MAXORB DRESSING EX) Apply topically  2/7/19  Audie Parham MD       Inpatient Medications:  Scheduled Meds:    Current Facility-Administered Medications:  EMS replenish medication  Does not apply Once Yordan Cox MD    albuterol 2 5 mg Nebulization Q6H PRN Yordan Cox MD    atorvastatin 40 mg Oral Daily With Oly Lane MD    baclofen 20 mg Oral TID Milad Hwang MD    citalopram 10 mg Oral Daily Milad Hwang MD    clopidogrel 75 mg Oral Daily Milad Hwang MD    docusate sodium 100 mg Oral BID Milad Hwang MD    furosemide 40 mg Intravenous BID (diuretic) CHERELLE Nova    gabapentin 300 mg Oral BID Milad Hwang MD    heparin (porcine) 3-30 Units/kg/hr (Order-Specific) Intravenous Titrated Carmen Moran MD Last Rate: 18 Units/kg/hr (02/07/19 1318)   heparin (porcine) 3,000 Units Intravenous PRN Carmen Moran MD    heparin (porcine) 6,000 Units Intravenous PRN Carmen Moran MD    insulin regular (HumuLIN R,NovoLIN R) infusion 0 3-21 Units/hr Intravenous Titrated Laura Jansen PA-C Last Rate: 10 Units/hr (02/07/19 1616)   ipratropium 0 5 mg Nebulization TID Yordan Cox MD    levalbuterol 1 25 mg Nebulization TID Yordan Cox MD    nicotine 1 patch Transdermal Daily Milad Hwang MD    ondansetron 4 mg Intravenous Q6H PRN Milad Hwang MD    tamsulosin 0 4 mg Oral Daily With Dinner Milad Hwang MD      Continuous Infusions:    heparin (porcine) 3-30 Units/kg/hr (Order-Specific) Last Rate: 18 Units/kg/hr (02/07/19 1318)   insulin regular (HumuLIN R,NovoLIN R) infusion 0 3-21 Units/hr Last Rate: 10 Units/hr (02/07/19 1616)     PRN Meds:    albuterol 2 5 mg Q6H PRN   heparin (porcine) 3,000 Units PRN   heparin (porcine) 6,000 Units PRN   ondansetron 4 mg Q6H PRN       Allergies: Allergies   Allergen Reactions    Seasonal Ic  [Cholestatin]        ROS:   Review of Systems   Constitutional: Positive for fatigue  Respiratory: Positive for shortness of breath  Cardiovascular: Positive for leg swelling  Vitals:  Vitals:    19 1130 19 1409 19 1459 19 1556   BP:   104/55    BP Location:   Right arm    Pulse:   (!) 106    Resp:   (!) 30    Temp:   98 °F (36 7 °C)    TempSrc:   Oral    SpO2: 93% 98% 98% 97%   Weight:       Height:         Temperature:   Temp (24hrs), Av °F (36 7 °C), Min:98 °F (36 7 °C), Max:98 °F (36 7 °C)    Current Temperature: 98 °F (36 7 °C)    Weights:   IBW: 73 kg  Body mass index is 25 11 kg/m²  Hemodynamic Monitoring:  N/A     Non-Invasive/Invasive Ventilation Settings:  Respiratory    Lab Data (Last 4 hours)    None         O2/Vent Data (Last 4 hours)       1409  1556        Non-Invasive Ventilation Mode HFNC HFNC                No results found for: PHART, BYM8FXX, PO2ART, IKO8OET, T6LWHWZT, BEART, SOURCE  SpO2: SpO2: 97 %, SpO2 Activity: SpO2 Activity: At Rest, SpO2 Device: O2 Device: Other (comment) (BiPAP)     Physical Exam:  Physical Exam   Constitutional: He is oriented to person, place, and time  High-flow cannula in place, lying in bed, appears older than stated age  Able to converse without significant distress   HENT:   Head: Normocephalic and atraumatic  Mouth/Throat: Oropharynx is clear and moist    Eyes: Pupils are equal, round, and reactive to light  EOM are normal    Neck: Normal range of motion  Neck supple  JVD present  Cardiovascular: Regular rhythm and normal pulses  Tachycardia present  No murmur heard  Pulmonary/Chest: Effort normal  No accessory muscle usage  Tachypnea noted  He has decreased breath sounds (Throughout all lung fields)  He has no rales  Abdominal: Soft   He exhibits no distension  There is no tenderness  There is no CVA tenderness  Genitourinary:   Genitourinary Comments: Deferred   Musculoskeletal: He exhibits edema (Two to 3+ pitting edema bilateral lower extremities)  Patient with decreased range of motion in bilateral lower and upper extremities due to incomplete C4 quadriplegia   Neurological: He is alert and oriented to person, place, and time  No cranial nerve deficit  Skin: Skin is warm and dry  Nursing note and vitals reviewed        Labs:    Results from last 7 days  Lab Units 02/07/19  1257 02/07/19  0916 02/07/19  0537 02/07/19  0515 02/07/19  0514   WBC Thousand/uL 7 94  --  10 56*  --   --    HEMOGLOBIN g/dL 11 1*  --  12 2  --   --    I STAT HEMOGLOBIN g/dl  --   --   --  12 6 12 9   HEMATOCRIT % 33 4*  --  37 8  --   --    HEMATOCRIT, ISTAT %  --   --   --  37 38   PLATELETS Thousands/uL 170 150 195  --   --    NEUTROS PCT %  --   --  65  --   --    MONOS PCT %  --   --  10  --   --        Results from last 7 days  Lab Units 02/07/19  1629 02/07/19  0537 02/07/19  0515 02/07/19  0514   SODIUM mmol/L 135* 132*  --   --    POTASSIUM mmol/L 4 7 5 5*  --   --    CHLORIDE mmol/L 101 97*  --   --    CO2 mmol/L 25 24  --   --    CO2, I-STAT mmol/L  --   --  27 26   AGAP mmol/L  --   --   --  17*   ANION GAP mmol/L 9 11  --   --    BUN mg/dL 50* 45*  --   --    CREATININE mg/dL 2 35* 2 17*  --   --    CALCIUM mg/dL 9 0 9 3  --   --    ALT U/L  --  40  --   --    AST U/L  --  48*  --   --    ALK PHOS U/L  --  53  --   --    ALBUMIN g/dL  --  3 5  --   --    TOTAL BILIRUBIN mg/dL  --  0 38  --   --             Results from last 7 days  Lab Units 02/07/19  1257   INR  0 99   PTT seconds 33       Results from last 7 days  Lab Units 02/07/19  1257 02/07/19  0907 02/07/19  0537   TROPONIN I ng/mL 0 07* 0 08* 0 02       Results from last 7 days  Lab Units 02/07/19  0839 02/07/19  0536   LACTIC ACID mmol/L 1 5 5 4*     ABG:  Lab Results   Component Value Date    PHART 7 374 07/31/2018    RHH0AJH 41 0 07/31/2018    PO2ART 97 3 07/31/2018    SQT2UGE 23 4 07/31/2018    BEART -1 7 07/31/2018    SOURCE Line, Arterial 07/31/2018     VBG:      Results from last 7 days  Lab Units 02/07/19  0537   PROCALCITONIN ng/ml 0 11       Imaging:  I have personally reviewed pertinent films in PACS   US kidney and bladder   Final Result by Valentin Alberto MD (02/07 7741)         1  Normal kidneys  2   Trabeculation of bladder wall, which can be seen in setting of chronic outlet obstruction  No discrete focal mass lesion identified  Workstation performed: [id number]         XR chest 1 view portable   ED Interpretation by Aviva Drummond MD (40/84 0706)   ED Wet Read:  Volume overload      Final Result by Marti Goodwin MD (02/07 5606)      Diffusely increased interstitial densities and edema bilaterally  Workstation performed: [id number]         VAS lower limb venous duplex study, complete bilateral    (Results Pending)     ECHO:  EF 60%, no regional wall motion abnormality, RV normal size, normal systolic function, mitral valve with severe flail motion of the posterior leaflet suggestive of chordal rupture with severe regurgitation    EKG: This was personally reviewed by myself  Micro:        ______________________________________________________________________    Assessment:   Principal Problem:    Acute combined systolic and diastolic congestive heart failure (HCC)  Active Problems:    Tobacco use disorder    Peripheral vascular disease due to secondary diabetes mellitus (HCC)    Essential hypertension    Acute kidney insufficiency    Elevated lactic acid level    Obstructive uropathy    Volume overload    Non-rheumatic mitral regurgitation    Chordae tendinae rupture (HCC)  Resolved Problems:    * No resolved hospital problems   *    Plan:    Neuro:   Alcohol use  - per chart, patient drinks 3 alcoholic beverages a day, the though on interview, patient says he now drinks remotely  - low threshold to place patient on CIWA monitoring    Peripheral neuropathy  - would reduce the Neurontin for renal dysfunction    Anxiety/depression  - continue Celexa    Incomplete C4 quadriplegia  - at baseline  - PT OT consultation    Regulate sleep/wake cycle    CV:   Acute on chronic heart failure  - unknown if systolic or diastolic  - previous echo on July 26th showed an EF of 56%, no diastolic dysfunction, normal RV, trace mitral regurg, trace tricuspid regurg  - new echocardiogram unfortunately showing chordal rupture of the posterior leaflet of the mitral valve with severe regurgitation  - exact etiology unclear  - due to hypoxia, tachypnea, tachycardia, chronic immobility, PE not ruled out, patient started on empiric heparin drip  - trend troponins and EKG as per below  - Lasix 40 mg IV b i d   - will defer to cardiology's recommendations  - CT surgery consult    NSTEMI  - likely type 2  - troponin peak 0 08  - EKG not consistent with ischemia  - will follow up echo    Peripheral vascular disease  - status post fem-pop bypass in 2018  - now on heparin drip, continue aspirin Plavix    Cardiac infusions:  None  Rhythm: Sinus Tachycardia  Follow rhythm on telemetry    Pulm:   Acute hypoxic respiratory failure  - etiology unclear  - likely cardiogenic, but PE not ruled out  - patient unfortunately without renal function able to tolerate PE scan  - will obtain lower extremity duplex  - can likely stop heparin drip now that etiology of heart failure found  - Encourage deep breathing/coughing/IS/PulmToileting   - Wean O2 for sats > 92%      GI:   No issues  Nutrition/diet plan:  Cardiac diet  Stress ulcer prophylaxis: No prophylaxis needed  Bowel regimen: None currently    :   SUNDEEP  - patient with significant SUNDEEP, baseline 0 6, 2 1 on arrival, 2 35 today  - likely secondary to heart failure secondary to mitral valve regurgitation  - continue aggressive diuresis  - will place repeat at 22:00  - follow urine output closely    BPH with urinary retention  - continue Flomax    Indwelling Vasques present: no     FEN:   Recheck at 22:00  Fluid/Diuretic plan: Needs diuresis lasix IV  Goal 24 hour fluid balance:  Negative 2-3 L as hemodynamics tolerate  Electrolytes repleted: yes  Goal: K >4 0, Mag >2 0, and Phos >3 0    ID:   No signs or symptoms of infection    Trend temps and WBC count    Heme:   No issues  Can consider discontinuing heparin drip  Trend hgb and plts  Transfuse as needed for goal hgb >8    Endo:   Diabetes type 2 with hyperglycemia  - likely hyperglycemia secondary to stress  - continue insulin infusion  Glycemic control plan: Blood glucose not controlled  Start insulin infusion  Msk/Skin:  Mobility goal:  Out of bed as tolerated  PT consult: yes  OT consult: yes  Frequent turning and off-loading    Family:  Family updated within 24 hours: yes , patient updated at bedside by Dr Janene Brittle and myself, patient's wife called by Dr Janene Brittle  Family meeting planned today: yes     Lines:  Peripheral IVs    VTE Prophylaxis:  Pharmacologic Prophylaxis: Heparin  Mechanical Prophylaxis: sequential compression device    Disposition: Continue Stepdown     Will place a palliative care consultation    Code Status: Level 1 - Full Code    Counseling / Coordination of Care  Total Critical Care time spent 56 minutes excluding procedures, teaching and family updates  ______________________________________________________________________      Invasive lines and devices: Invasive Devices     Peripheral Intravenous Line            Peripheral IV 02/07/19 Left Forearm less than 1 day    Peripheral IV 02/07/19 Left Wrist less than 1 day                Code Status: Level 1 - Full Code  POA:    POLST:      Given critical illness, patient length of stay will require greater than two midnights  Portions of the record may have been created with voice recognition software    Occasional wrong word or "sound a like" substitutions may have occurred due to the inherent limitations of voice recognition software  Read the chart carefully and recognize, using context, where substitutions have occurred        Jensen Low PA-C    Consults

## 2019-02-08 PROBLEM — G82.50 QUADRIPLEGIA (HCC): Chronic | Status: ACTIVE | Noted: 2019-02-08

## 2019-02-08 PROBLEM — R79.89 ELEVATED LACTIC ACID LEVEL: Status: RESOLVED | Noted: 2019-02-07 | Resolved: 2019-02-08

## 2019-02-08 LAB
ANION GAP SERPL CALCULATED.3IONS-SCNC: 6 MMOL/L (ref 4–13)
ANION GAP SERPL CALCULATED.3IONS-SCNC: 8 MMOL/L (ref 4–13)
APTT PPP: 35 SECONDS (ref 26–38)
BASOPHILS # BLD AUTO: 0.02 THOUSANDS/ΜL (ref 0–0.1)
BASOPHILS NFR BLD AUTO: 0 % (ref 0–1)
BUN SERPL-MCNC: 51 MG/DL (ref 5–25)
BUN SERPL-MCNC: 54 MG/DL (ref 5–25)
CALCIUM SERPL-MCNC: 8.8 MG/DL (ref 8.3–10.1)
CALCIUM SERPL-MCNC: 8.8 MG/DL (ref 8.3–10.1)
CHLORIDE SERPL-SCNC: 102 MMOL/L (ref 100–108)
CHLORIDE SERPL-SCNC: 103 MMOL/L (ref 100–108)
CHOLEST SERPL-MCNC: 91 MG/DL (ref 50–200)
CO2 SERPL-SCNC: 27 MMOL/L (ref 21–32)
CO2 SERPL-SCNC: 28 MMOL/L (ref 21–32)
CREAT SERPL-MCNC: 1.81 MG/DL (ref 0.6–1.3)
CREAT SERPL-MCNC: 1.97 MG/DL (ref 0.6–1.3)
EOSINOPHIL # BLD AUTO: 0.01 THOUSAND/ΜL (ref 0–0.61)
EOSINOPHIL NFR BLD AUTO: 0 % (ref 0–6)
ERYTHROCYTE [DISTWIDTH] IN BLOOD BY AUTOMATED COUNT: 13.7 % (ref 11.6–15.1)
GFR SERPL CREATININE-BSD FRML MDRD: 36 ML/MIN/1.73SQ M
GFR SERPL CREATININE-BSD FRML MDRD: 40 ML/MIN/1.73SQ M
GLUCOSE SERPL-MCNC: 105 MG/DL (ref 65–140)
GLUCOSE SERPL-MCNC: 116 MG/DL (ref 65–140)
GLUCOSE SERPL-MCNC: 136 MG/DL (ref 65–140)
GLUCOSE SERPL-MCNC: 142 MG/DL (ref 65–140)
GLUCOSE SERPL-MCNC: 150 MG/DL (ref 65–140)
GLUCOSE SERPL-MCNC: 152 MG/DL (ref 65–140)
GLUCOSE SERPL-MCNC: 156 MG/DL (ref 65–140)
GLUCOSE SERPL-MCNC: 180 MG/DL (ref 65–140)
GLUCOSE SERPL-MCNC: 192 MG/DL (ref 65–140)
GLUCOSE SERPL-MCNC: 201 MG/DL (ref 65–140)
GLUCOSE SERPL-MCNC: 213 MG/DL (ref 65–140)
GLUCOSE SERPL-MCNC: 215 MG/DL (ref 65–140)
GLUCOSE SERPL-MCNC: 219 MG/DL (ref 65–140)
GLUCOSE SERPL-MCNC: 230 MG/DL (ref 65–140)
GLUCOSE SERPL-MCNC: 234 MG/DL (ref 65–140)
GLUCOSE SERPL-MCNC: 49 MG/DL (ref 65–140)
GLUCOSE SERPL-MCNC: 79 MG/DL (ref 65–140)
HCT VFR BLD AUTO: 31.7 % (ref 36.5–49.3)
HDLC SERPL-MCNC: 67 MG/DL (ref 40–60)
HGB BLD-MCNC: 10.6 G/DL (ref 12–17)
IMM GRANULOCYTES # BLD AUTO: 0.07 THOUSAND/UL (ref 0–0.2)
IMM GRANULOCYTES NFR BLD AUTO: 1 % (ref 0–2)
LDLC SERPL CALC-MCNC: 7 MG/DL (ref 0–100)
LYMPHOCYTES # BLD AUTO: 1.39 THOUSANDS/ΜL (ref 0.6–4.47)
LYMPHOCYTES NFR BLD AUTO: 11 % (ref 14–44)
MAGNESIUM SERPL-MCNC: 1.5 MG/DL (ref 1.6–2.6)
MAGNESIUM SERPL-MCNC: 2 MG/DL (ref 1.6–2.6)
MCH RBC QN AUTO: 34.5 PG (ref 26.8–34.3)
MCHC RBC AUTO-ENTMCNC: 33.4 G/DL (ref 31.4–37.4)
MCV RBC AUTO: 103 FL (ref 82–98)
MONOCYTES # BLD AUTO: 1.35 THOUSAND/ΜL (ref 0.17–1.22)
MONOCYTES NFR BLD AUTO: 10 % (ref 4–12)
NEUTROPHILS # BLD AUTO: 10.08 THOUSANDS/ΜL (ref 1.85–7.62)
NEUTS SEG NFR BLD AUTO: 78 % (ref 43–75)
NONHDLC SERPL-MCNC: 24 MG/DL
NRBC BLD AUTO-RTO: 0 /100 WBCS
PHOSPHATE SERPL-MCNC: 3.5 MG/DL (ref 2.3–4.1)
PLATELET # BLD AUTO: 160 THOUSANDS/UL (ref 149–390)
PMV BLD AUTO: 10.9 FL (ref 8.9–12.7)
POTASSIUM SERPL-SCNC: 3.9 MMOL/L (ref 3.5–5.3)
POTASSIUM SERPL-SCNC: 4.5 MMOL/L (ref 3.5–5.3)
RBC # BLD AUTO: 3.07 MILLION/UL (ref 3.88–5.62)
SODIUM SERPL-SCNC: 137 MMOL/L (ref 136–145)
SODIUM SERPL-SCNC: 137 MMOL/L (ref 136–145)
TRIGL SERPL-MCNC: 83 MG/DL
WBC # BLD AUTO: 12.92 THOUSAND/UL (ref 4.31–10.16)

## 2019-02-08 PROCEDURE — 84100 ASSAY OF PHOSPHORUS: CPT | Performed by: INTERNAL MEDICINE

## 2019-02-08 PROCEDURE — 99255 IP/OBS CONSLTJ NEW/EST HI 80: CPT | Performed by: INTERNAL MEDICINE

## 2019-02-08 PROCEDURE — 80048 BASIC METABOLIC PNL TOTAL CA: CPT | Performed by: NURSE PRACTITIONER

## 2019-02-08 PROCEDURE — 85730 THROMBOPLASTIN TIME PARTIAL: CPT | Performed by: INTERNAL MEDICINE

## 2019-02-08 PROCEDURE — 97167 OT EVAL HIGH COMPLEX 60 MIN: CPT

## 2019-02-08 PROCEDURE — 99232 SBSQ HOSP IP/OBS MODERATE 35: CPT | Performed by: INTERNAL MEDICINE

## 2019-02-08 PROCEDURE — 94760 N-INVAS EAR/PLS OXIMETRY 1: CPT

## 2019-02-08 PROCEDURE — 83735 ASSAY OF MAGNESIUM: CPT | Performed by: NURSE PRACTITIONER

## 2019-02-08 PROCEDURE — 97116 GAIT TRAINING THERAPY: CPT

## 2019-02-08 PROCEDURE — 80061 LIPID PANEL: CPT | Performed by: INTERNAL MEDICINE

## 2019-02-08 PROCEDURE — 99233 SBSQ HOSP IP/OBS HIGH 50: CPT | Performed by: STUDENT IN AN ORGANIZED HEALTH CARE EDUCATION/TRAINING PROGRAM

## 2019-02-08 PROCEDURE — 99233 SBSQ HOSP IP/OBS HIGH 50: CPT | Performed by: INTERNAL MEDICINE

## 2019-02-08 PROCEDURE — 97163 PT EVAL HIGH COMPLEX 45 MIN: CPT

## 2019-02-08 PROCEDURE — 94640 AIRWAY INHALATION TREATMENT: CPT

## 2019-02-08 PROCEDURE — 97535 SELF CARE MNGMENT TRAINING: CPT

## 2019-02-08 PROCEDURE — G8988 SELF CARE GOAL STATUS: HCPCS

## 2019-02-08 PROCEDURE — G8987 SELF CARE CURRENT STATUS: HCPCS

## 2019-02-08 PROCEDURE — 85025 COMPLETE CBC W/AUTO DIFF WBC: CPT | Performed by: INTERNAL MEDICINE

## 2019-02-08 PROCEDURE — 82948 REAGENT STRIP/BLOOD GLUCOSE: CPT

## 2019-02-08 PROCEDURE — 99223 1ST HOSP IP/OBS HIGH 75: CPT | Performed by: THORACIC SURGERY (CARDIOTHORACIC VASCULAR SURGERY)

## 2019-02-08 PROCEDURE — G8978 MOBILITY CURRENT STATUS: HCPCS

## 2019-02-08 PROCEDURE — G8979 MOBILITY GOAL STATUS: HCPCS

## 2019-02-08 PROCEDURE — 94660 CPAP INITIATION&MGMT: CPT

## 2019-02-08 RX ORDER — MAGNESIUM SULFATE HEPTAHYDRATE 40 MG/ML
2 INJECTION, SOLUTION INTRAVENOUS ONCE
Status: COMPLETED | OUTPATIENT
Start: 2019-02-08 | End: 2019-02-08

## 2019-02-08 RX ORDER — HEPARIN SODIUM 5000 [USP'U]/ML
5000 INJECTION, SOLUTION INTRAVENOUS; SUBCUTANEOUS EVERY 8 HOURS SCHEDULED
Status: DISCONTINUED | OUTPATIENT
Start: 2019-02-08 | End: 2019-02-14 | Stop reason: HOSPADM

## 2019-02-08 RX ORDER — ACETAMINOPHEN 325 MG/1
975 TABLET ORAL EVERY 8 HOURS SCHEDULED
Status: DISCONTINUED | OUTPATIENT
Start: 2019-02-08 | End: 2019-02-14 | Stop reason: HOSPADM

## 2019-02-08 RX ORDER — CITALOPRAM 20 MG/1
20 TABLET ORAL DAILY
Status: DISCONTINUED | OUTPATIENT
Start: 2019-02-09 | End: 2019-02-14 | Stop reason: HOSPADM

## 2019-02-08 RX ORDER — FUROSEMIDE 10 MG/ML
40 INJECTION INTRAMUSCULAR; INTRAVENOUS
Status: DISCONTINUED | OUTPATIENT
Start: 2019-02-08 | End: 2019-02-09

## 2019-02-08 RX ADMIN — ACETAMINOPHEN 975 MG: 325 TABLET, FILM COATED ORAL at 21:28

## 2019-02-08 RX ADMIN — MAGNESIUM SULFATE IN WATER 2 G: 40 INJECTION, SOLUTION INTRAVENOUS at 01:40

## 2019-02-08 RX ADMIN — FUROSEMIDE 40 MG: 10 INJECTION, SOLUTION INTRAMUSCULAR; INTRAVENOUS at 09:26

## 2019-02-08 RX ADMIN — IPRATROPIUM BROMIDE 0.5 MG: 0.5 SOLUTION RESPIRATORY (INHALATION) at 07:42

## 2019-02-08 RX ADMIN — HEPARIN SODIUM 5000 UNITS: 5000 INJECTION INTRAVENOUS; SUBCUTANEOUS at 14:30

## 2019-02-08 RX ADMIN — GABAPENTIN 300 MG: 300 CAPSULE ORAL at 17:45

## 2019-02-08 RX ADMIN — FUROSEMIDE 40 MG: 10 INJECTION, SOLUTION INTRAMUSCULAR; INTRAVENOUS at 14:23

## 2019-02-08 RX ADMIN — GABAPENTIN 300 MG: 300 CAPSULE ORAL at 09:23

## 2019-02-08 RX ADMIN — BACLOFEN 20 MG: 10 TABLET ORAL at 17:46

## 2019-02-08 RX ADMIN — ACETAMINOPHEN 975 MG: 325 TABLET, FILM COATED ORAL at 17:47

## 2019-02-08 RX ADMIN — FUROSEMIDE 40 MG: 10 INJECTION, SOLUTION INTRAMUSCULAR; INTRAVENOUS at 17:45

## 2019-02-08 RX ADMIN — LEVALBUTEROL 1.25 MG: 1.25 SOLUTION, CONCENTRATE RESPIRATORY (INHALATION) at 07:42

## 2019-02-08 RX ADMIN — CLOPIDOGREL BISULFATE 75 MG: 75 TABLET ORAL at 09:23

## 2019-02-08 RX ADMIN — SODIUM CHLORIDE 6 UNITS/HR: 9 INJECTION, SOLUTION INTRAVENOUS at 16:21

## 2019-02-08 RX ADMIN — CITALOPRAM HYDROBROMIDE 10 MG: 10 TABLET ORAL at 09:23

## 2019-02-08 RX ADMIN — BACLOFEN 20 MG: 10 TABLET ORAL at 09:23

## 2019-02-08 RX ADMIN — BACLOFEN 20 MG: 10 TABLET ORAL at 21:28

## 2019-02-08 RX ADMIN — ATORVASTATIN CALCIUM 40 MG: 40 TABLET, FILM COATED ORAL at 17:46

## 2019-02-08 RX ADMIN — DOCUSATE SODIUM 100 MG: 100 CAPSULE, LIQUID FILLED ORAL at 09:23

## 2019-02-08 RX ADMIN — NICOTINE 1 PATCH: 21 PATCH, EXTENDED RELEASE TRANSDERMAL at 09:24

## 2019-02-08 RX ADMIN — TAMSULOSIN HYDROCHLORIDE 0.4 MG: 0.4 CAPSULE ORAL at 17:46

## 2019-02-08 RX ADMIN — HEPARIN SODIUM 5000 UNITS: 5000 INJECTION INTRAVENOUS; SUBCUTANEOUS at 21:27

## 2019-02-08 NOTE — ASSESSMENT & PLAN NOTE
· Due to acute volume overload state due to severe MR and ruptured chord  · Continue HFNC and wean as able   · continue diuresis as outlined  · Patient appears to be improving slowly

## 2019-02-08 NOTE — PROGRESS NOTES
Progress Note    Nelson Butler 59 y o  male MRN: [id number]  Unit/Bed#: [facility] 12-46 Encounter: [id number]      24hr events:   Patient found to have ruptured chordae with severe acute MR  Patient overnight tolerated high-flow nasal cannula, now transitioned to high-flow nasal cannula  He was seen by Cardiac surgery to discuss possibility of MitraClip and further evaluation leading up to that, as well as palliative care        Assessment and Plan:  Principal Problem:    Acute respiratory failure with hypoxia (HCC)  Active Problems:    Tobacco use disorder    Type 2 diabetes mellitus with diabetic polyneuropathy, with long-term current use of insulin (HCC)    Peripheral vascular disease due to secondary diabetes mellitus (HCC)    Essential hypertension    Acute combined systolic and diastolic congestive heart failure (HCC)    Acute kidney insufficiency    Obstructive uropathy    Volume overload    Non-rheumatic mitral regurgitation    Chordae tendinae rupture (HCC)    Quadriplegia (HCC)    Acute hypoxic respiratory failure  - likely secondary to severe mitral regurg  - patient successfully diuresed last night, negative 885 cc  - patient with improvement oxygenation, now on nasal cannula 6 L  - continue palm toileting, IS, coughing    Severe mitral regurgitation likely secondary to chordal rupture  - patient seen and evaluated by CT surgery  - will return to discuss potential MitraClip later with patient's family present  - patient not a candidate for mitral valve surgical repair  - likely need ORIN EEG if going to proceed with MitraClip to better classify mitral valve  - continue diuresis per Cardiology  - goal -1 L as hemodynamics allow    Tobacco abuse  - continue tobacco cessation education    Acute kidney injury  - likely cardiorenal syndrome  - improving with diuresis  - holding ACE-inhibitor  - follow urine output closely  - nephrology following, appreciate new recommendations    NSTEMI  - likely type 2 secondary to heart failure on arrival  - no need for ischemic workup  -cardiology following    Peripheral vessel disease  - history of right CFA endarterectomy and right fem-pop bypass with SVG  - continue aspirin Plavix    Anxiety/depression  - continue Celexa  - palliative care consultation    Peripheral neuropathy  - continue Neurontin    Spastic quadriplegia  - at baseline  - a bit as tolerated    BPH with urinary retention  - continue Flomax    Diabetes type 2 with hyperglycemia  - continue insulin drip for now  - A1c 9 3 indicative of poorly controlled diabetes  - would calculate 24 hour insulin total requirement, use 80% of this total daily dose and split as long-acting and before meal coverage  - would benefit from endocrinology consult      Misc:  Patient markedly improved from a work of breathing and hypoxia standpoint  Plan going forward for CT surgery discussion with family  Will downgrade to step-down level 2 and will sign off, but are always available at [phone] for questions or concerns at any time  Code Status: Level 1 - Full Code  --------------------------------------------------------------------  SUBJECTIVE  Patient up in chair, in much better spirits today  Work of breathing and hypoxia much improved  OBJECTIVE  Vitals:    02/08/19 0742 02/08/19 0753 02/08/19 1206 02/08/19 1300   BP:  96/54 100/68    BP Location:  Right arm Right arm    Pulse:  90 90 97   Resp:  21 20    Temp:  98 2 °F (36 8 °C) 98 8 °F (37 1 °C)    TempSrc:  Oral Oral    SpO2: 93% 96% 97% 98%   Weight:       Height:           I/O   I/O last 24 hours: In: 1334 4 [P O :1090;  I V :182 3; IV Piggyback:62 1]  Out: 2750 [Urine:2750]    Laboratory and Diagnostics:    Results from last 7 days  Lab Units 02/08/19  0532 02/07/19  1257 02/07/19  0916 02/07/19  0537   WBC Thousand/uL 12 92* 7 94  --  10 56*   HEMOGLOBIN g/dL 10 6* 11 1*  --  12 2   HEMATOCRIT % 31 7* 33 4*  --  37 8   PLATELETS Thousands/uL 160 170 150 195   NEUTROS PCT % 78*  --   --  65   MONOS PCT % 10  --   --  10       Results from last 7 days  Lab Units 02/08/19  0759 02/08/19  0038 02/07/19  1629 02/07/19  0537  02/07/19  0515 02/07/19  0514   POTASSIUM mmol/L 3 9 4 5 4 7 5 5*  < >  --   --    CHLORIDE mmol/L 103 102 101 97*  < >  --   --    CO2 mmol/L 28 27 25 24  < >  --   --    CO2, I-STAT mmol/L  --   --   --   --   --  27 26   BUN mg/dL 51* 54* 50* 45*  < >  --   --    CREATININE mg/dL 1 81* 1 97* 2 35* 2 17*  < >  --   --    CALCIUM mg/dL 8 8 8 8 9 0 9 3  < >  --   --    ALK PHOS U/L  --   --   --  53  --   --   --    ALT U/L  --   --   --  40  --   --   --    AST U/L  --   --   --  48*  --   --   --    GLUCOSE, ISTAT mg/dl  --   --   --   --   --  425* 434*   < > = values in this interval not displayed  Results from last 7 days  Lab Units 02/08/19  0759 02/08/19  0038   MAGNESIUM mg/dL 2 0 1 5*     Lab Results   Component Value Date    PHOS 3 5 02/08/2019    PHOS 3 4 10/11/2018    PHOS 3 7 07/28/2018        Results from last 7 days  Lab Units 02/08/19  0038 02/07/19  1257   INR   --  0 99   PTT seconds 35 33       0  Lab Value Date/Time   TROPONINI 0 07 (H) 02/07/2019 1257   TROPONINI 0 08 (H) 02/07/2019 0907   TROPONINI 0 02 02/07/2019 0537   TROPONINI 0 03 10/10/2018 1008       Results from last 7 days  Lab Units 02/07/19  0839 02/07/19  0536   LACTIC ACID mmol/L 1 5 5 4*     ABG:  Lab Results   Component Value Date    PHART 7 374 07/31/2018    [id number] 41 0 07/31/2018    PO2ART 97 3 07/31/2018    KQP5BVZ 23 4 07/31/2018    BEART -1 7 07/31/2018    SOURCE Line, Arterial 07/31/2018       Blood Culture:   Lab Results   Component Value Date    BLOODCX No Growth at 24 hrs  02/07/2019    BLOODCX No Growth at 24 hrs  02/07/2019    BLOODCX No Growth After 5 Days  10/09/2018    BLOODCX No Growth After 5 Days  10/09/2018    BLOODCX No Growth After 5 Days  08/01/2018    BLOODCX No Growth After 5 Days   08/01/2018     Urine Culture: No results found for: URINECX  Sputum Culture: No components found for: SPUTUMCX  EKG:  Reviewed  Imaging: I have personally reviewed pertinent films in PACS    Physical Exam:   GEN:  Appears stated age, sitting comfortably in chair  No acute distress  Does not appear ill  HEENT:  Normocephalic, atraumatic, mucous membranes moist, pupils equal round reactive, no scleral icterus  Neck:  Supple  No JVD  CV/PV:  Regular rate and rhythm, 3/6 diastolic murmur, no rubs or gallops  Peripheral pulses 2+ in radial artery bilaterally  Pulm:  Mild rales in bases, much improved aeration  Nonlabored, no accessory muscle use  GI:  Appearance normal   No distension  No tenderness  Normoactive bowel sounds x4 quadrants  : Deferred  MSK:  Decreased range of motion in bilateral lower extremities and proximal upper extremities secondary to quadriplegia, but is at baseline  Neuro:  Alert and oriented x4  No cranial nerve deficits  Decreased strength as mentioned above  Skin:  Warm, dry, intact  Trace edema bilateral lower extremities    Advance Directive and Living Will:      Power of :    POLST:      Counseling / Coordination of Care  Total time spent today 45 minutes  Greater than 50% of total time was spent with the patient and / or family counseling and / or coordination of care   A description of the counseling / coordination of care: Discussed plan of care with Cardiology, CT surgery, and internal medicine      Rosemary Alvarez PA-C

## 2019-02-08 NOTE — RESPIRATORY THERAPY NOTE
Physical Exam:   Assessment Type: Assess only  General Appearance: Alert, Awake  Respiratory Pattern: Normal  Chest Assessment: Chest expansion symmetrical  Bilateral Breath Sounds: Clear      Resp Comments: Pt in no apparent resp distres no wheezing heard  Pt transitioned to 6L NC at this time, SpO2 WNL  Pt bronchodilators will be made prn at this time  Will continue to monitor per resp protocol

## 2019-02-08 NOTE — CONSULTS
Consultation - Cardiothoracic Surgery   Hannah Hopson 59 y o  male MRN: [id number]  Unit/Bed#: [facility] 512-01 Encounter: [id number]      History of Present Illness   Physician Requesting Consult: Lilibeth Galvin MD  Reason for Consult / Principal Problem: severe MR  HPI: Hannah Hopson is a 59y o  year old male who presents for evaluation of severe MR  Presents to [facility] ED 2/7 w/ new onset SOB  Called EMS & found to be hypoxic, & placed on CPAP  In ED (+) troponin, (-) EKG changes, CXR (+) for edema  Dx CHF & admitted for management  Diuresed & transitioned to HFNC (37%, 40)  TTE w/ severe MR, cardiac sx consulted  PMH includes severe MR, seasonal allergies, depression, NIDDM2 w/ neuropathy, hiatal hernia, HTN, h/o cervical trauma s/p cervical fusion & now quadriplegic but can use RW, cholelithiasis, h/o pancreatitis, COPD, h/o concission, h/o varicella, umbilical hernia, former tobacco use, PAD s/p right fem-pop w/ SVG (w/ h/o nonhealing groin incision w/ VAC placement)  PSH includes right CFA endarterectomy, right fem-pop bypass w/ SVG, umbilical hernia repair, debridement of right groin, epigastric hernia repair, cervical fusion, a gram, lap layne   FH significant for paternal HTN/h/o MI & maternal HTN  Denies valvular disease, DM, aortic aneurysms, or SCD  Can ambulate w/ RW  Does not drive  (+) former tobacco use (0 5ppd x 20 years)  Denies current or prior use of ETOH or drugs  NKDA  Cardiac pertinent medications include: Lipitor 40mg, Plavix 75mg, Lasix 40mg IV TID  Other medications can be reviewed in the patient chart  Patient sees Dr Emily Blankenship as his primary care physician  Patient does not have a cardiologist  Patient sees Dr Misbah Bliss as his vascular surgeon  Inpatient consult to Cardiothoracic Surgery  Consult performed by: Mitcheal Gut ordered by: Munira Lopez          Review of Systems   Constitutional: Positive for activity change and fatigue   Negative for diaphoresis and unexpected weight change  Respiratory: Positive for shortness of breath  Negative for chest tightness and wheezing  Cardiovascular: Positive for leg swelling  Negative for chest pain and palpitations  Gastrointestinal: Negative  Negative for blood in stool, constipation, diarrhea, nausea and vomiting  Genitourinary: Negative  Musculoskeletal: Negative  Negative for arthralgias, back pain, gait problem and myalgias  Skin: Negative  Neurological: Negative  Negative for dizziness, syncope, weakness, light-headedness, numbness and headaches  Hematological: Negative  Does not bruise/bleed easily  All other systems reviewed and are negative        Historical Information   Past Medical History:   Diagnosis Date    [facility]     Cholelithiasis     Chronic obstructive lung disease ([facility] )     RESOLVED: 8/17/17    COPD (chronic obstructive pulmonary disease) ([place] [address] )     Depression     Diabetes mellitus ([place] [address] )     type 2, non-insulin dependent    [facility]     Difficulty attaining erection     LAST ASSESSEDl: 8/17/17    Former tobacco use     Gall stone pancreatitis     RESOLVED: 3/8/17    Hiatal hernia     History of concussion     History of MRSA infection     History of varicella infection     Hypertension     LAST ASSESSED: 8/17/17    Non-healing open wound of right groin     w/ VAC placement    PAD (peripheral artery disease) ([facility])     s/p RLE fem-pop    Severe mitral regurgitation     Spinal cord injury, C1-C7 ([facility] )     s/p fusion    Traumatic injury to musculoskeletal system     1-5 FLIGHT FALL DOWN THE STEPS - CERVICAL NECK INJURY - JAN 2, 2012; LAST ASSESSED: 0/58/13    Umbilical hernia without obstruction and without gangrene     RESOLVED: 3/8/17     Past Surgical History:   Procedure Laterality Date    ARTERIOGRAM Right 7/31/2018    Procedure: ARTERIOGRAM Completion Agr;  Surgeon: Michael Salinas MD;  [facility];  Service: Vascular    BACK SURGERY      BYPASS FEMORAL-POPLITEAL Right 7/31/2018    Procedure: BYPASS FEMORAL-POPLITEAL R femoral- BK popliteal bypass;  Surgeon: Cielo Crane MD;  [facility];  Service: Vascular    CERVICAL FUSION      VERTEBRAL; C3-C4 FUSION    CHOLECYSTECTOMY LAPAROSCOPIC N/A 2/24/2017    Procedure: CHOLECYSTECTOMY LAPAROSCOPIC;  Surgeon: Vel Chapin MD;  [facility];  Service:     CHOLECYSTECTOMY LAPAROSCOPIC  03/2017    EPIGASTRIC HERNIA REPAIR      NV DEBRIDEMENT, SKIN, SUB-Q TISSUE,=<20 SQ CM Right 8/28/2018    Procedure: RIGHT GROIN AND THIGH DEBRIDEMENT ([address]) AND VAC PLACEMENT;  Surgeon: William Hudson MD;  [facility];  Service: Vascular    NV THROMBOENDARTECTMY FEMORAL COMMON Right 7/31/2018    Procedure: ENDARTERECTOMY ARTERIAL FEMORAL R femoral endarterectomy w/ patch angioplasty ;  Surgeon: Cielo Crane MD;  [facility];  Service: Vascular    UMBILICAL HERNIA REPAIR N/A 2/24/2017    Procedure: REPAIR HERNIA UMBILICAL;  Surgeon: Vel Chapin MD;  [facility];  Service:      History   Alcohol Use    Yes     Comment: 3-4 mixed vodka drinks/daily      History   Drug Use No     History   Smoking Status    Current Every Day Smoker    Packs/day: 0 50    Years: 40 00    Types: Cigarettes   Smokeless Tobacco    Never Used     Family History: as above    Meds/Allergies   all current active meds have been reviewed, current meds:   Current Facility-Administered Medications   Medication Dose Route Frequency     EMS REPLENISHMENT MED   Does not apply Once    albuterol inhalation solution 2 5 mg  2 5 mg Nebulization Q6H PRN    atorvastatin (LIPITOR) tablet 40 mg  40 mg Oral Daily With Dinner    baclofen tablet 20 mg  20 mg Oral TID    citalopram (CeleXA) tablet 10 mg  10 mg Oral Daily    clopidogrel (PLAVIX) tablet 75 mg  75 mg Oral Daily    docusate sodium (COLACE) capsule 100 mg  100 mg Oral BID    furosemide (LASIX) injection 40 mg  40 mg Intravenous TID (diuretic)    gabapentin (NEURONTIN) capsule 300 mg  300 mg Oral BID    insulin regular (HumuLIN R,NovoLIN R) 1 Units/mL in sodium chloride 0 9 % 100 mL infusion  0 3-21 Units/hr Intravenous Titrated    ipratropium (ATROVENT) 0 02 % inhalation solution 0 5 mg  0 5 mg Nebulization TID    levalbuterol (XOPENEX) inhalation solution 1 25 mg  1 25 mg Nebulization TID    nicotine (NICODERM CQ) 21 mg/24 hr TD 24 hr patch 1 patch  1 patch Transdermal Daily    ondansetron (ZOFRAN) injection 4 mg  4 mg Intravenous Q6H PRN    tamsulosin (FLOMAX) capsule 0 4 mg  0 4 mg Oral Daily With Dinner    and PTA meds:   Prior to Admission Medications   Prescriptions Last Dose Informant Patient Reported?  Taking?   aspirin (ECOTRIN LOW STRENGTH) 81 mg EC tablet 2/6/2019 at Unknown time Outside Facility ([address] [place] [address]) Yes Yes   Sig: Take 81 mg by mouth daily   baclofen 20 mg tablet 2/6/2019 at Unknown time Outside Facility (Specify) Yes Yes   Sig: Take 20 mg by mouth 3 (three) times a day   citalopram (CeleXA) 20 mg tablet 2/6/2019 at Unknown time Outside Facility ([address] [place] [address]) Yes Yes   Sig: Take 10 mg by mouth daily   clopidogrel (PLAVIX) 75 mg tablet 2/6/2019 at Unknown time  No Yes   Sig: Take 1 tablet (75 mg total) by mouth daily   gabapentin (NEURONTIN) 400 mg capsule 2/6/2019 at Unknown time Outside Facility (Specify) Yes Yes   Sig: Take 300 mg by mouth 2 (two) times a day     glimepiride (AMARYL) 1 mg tablet 2/6/2019 at Unknown time Outside Facility (Specify) No Yes   Sig: Take 1 tablet (1 mg total) by mouth daily   lisinopril (ZESTRIL) 10 mg tablet 2/6/2019 at Unknown time Outside Facility (Specify) No Yes   Sig: Take 1 tablet (10 mg total) by mouth daily   metFORMIN (GLUCOPHAGE) 1000 MG tablet 2/7/2019 at Unknown time  Yes Yes   Sig: Take 1,000 mg by mouth 2 (two) times a day with meals   rosuvastatin (CRESTOR) 20 MG tablet 2/6/2019 at Unknown time  Yes Yes   Sig: Take 20 mg by mouth every other day   tamsulosin [facility]) 0 4 mg 2/6/2019 at Unknown time Outside Facility (Specify) No Yes   Sig: Take 1 capsule (0 4 mg total) by mouth daily with dinner      Facility-Administered Medications: None     Allergies   Allergen Reactions    Seasonal Ic  [Cholestatin]        Objective   Vitals: Blood pressure 96/54, pulse 90, temperature 98 2 °F (36 8 °C), temperature source Oral, resp  rate 21, height 5' 10" (1 778 m), weight 80 kg (176 lb 5 9 oz), SpO2 96 %  Invasive Devices     Peripheral Intravenous Line            Peripheral IV 02/08/19 Left Arm less than 1 day                Physical Exam   Constitutional: He is oriented to person, place, and time  Vital signs are normal  He appears well-developed and well-nourished  Non-toxic appearance  He does not appear ill  No distress  Laying in bed in NAD   HENT:   Head: Normocephalic and atraumatic  Mouth/Throat: Uvula is midline, oropharynx is clear and moist and mucous membranes are normal  He does not have dentures  Normal dentition  Neck: Trachea normal and normal range of motion  Neck supple  No JVD present  Carotid bruit is not present  Cardiovascular: Normal rate, regular rhythm, S1 normal and S2 normal   PMI is not displaced  Exam reveals no S3, no S4 and no friction rub  Murmur heard  Diastolic murmur is present with a grade of 4/6   No heaves/lifts on palpation   Pulmonary/Chest: Effort normal  No accessory muscle usage  No respiratory distress  He has no wheezes  He has no rhonchi  He has rales  Good inspiratory effort, equal expansion bilaterally   Abdominal: Normal appearance and bowel sounds are normal  He exhibits no distension and no mass  There is no tenderness  There is no rigidity, no rebound and no guarding  Neurological: He is alert and oriented to person, place, and time  He has normal strength  No cranial nerve deficit or sensory deficit  Decrease ROM in LE b/l from quadraplegia   Skin: Skin is warm, dry and intact   No bruising, no petechiae and no rash noted  No cyanosis  Nails show no clubbing  Trace edema b/l LE, no varicosities appreciated to b/l LE   Psychiatric: He has a normal mood and affect  Lab Results:   Results from last 7 days  Lab Units 02/08/19  0532 02/07/19  1257 02/07/19  0916 02/07/19  0537   WBC Thousand/uL 12 92* 7 94  --  10 56*   HEMOGLOBIN g/dL 10 6* 11 1*  --  12 2   HEMATOCRIT % 31 7* 33 4*  --  37 8   PLATELETS Thousands/uL 160 170 150 195       Results from last 7 days  Lab Units 02/08/19  0759 02/08/19  0038 02/07/19  1629  02/07/19  0515   POTASSIUM mmol/L 3 9 4 5 4 7  < >  --    CHLORIDE mmol/L 103 102 101  < >  --    CO2 mmol/L 28 27 25  < >  --    CO2, I-STAT mmol/L  --   --   --   --  27   BUN mg/dL 51* 54* 50*  < >  --    CREATININE mg/dL 1 81* 1 97* 2 35*  < >  --    GLUCOSE, ISTAT mg/dl  --   --   --   --  425*   CALCIUM mg/dL 8 8 8 8 9 0  < >  --    < > = values in this interval not displayed      Results from last 7 days  Lab Units 02/08/19  0038 02/07/19  1257   INR   --  0 99   PTT seconds 35 33     Lab Results   Component Value Date    HGBA1C 9 3 (H) 02/07/2019     Lab Results   Component Value Date    LQZXDJU 399 (H) 10/12/2018    CKMB 2 2 10/12/2018    CKMBINDEX <1 0 10/12/2018    TROPONINI 0 07 (H) 02/07/2019       Imaging Studies: I have personally reviewed pertinent films in PACS  EKG, Pathology, and Other Studies: I have personally reviewed pertinent films in PACS     TTE 2/7: EF 60%, no RWMA, LA mildly to mod dilated, mod MAC, severe flail motion of postioer leaflet of MV suggestive of chordal rupture, severe MR    LE duplex 2/7: no DVT b/l LE    US kidneuy/bladder 2/7: normal kidneys, trabeculation of bladder call likely from chronic outlet obstruction    CXR 2/7: diffusely increased interstitial densities & edema    EKG 2/7: done    Assessment:  Patient Active Problem List    Diagnosis Date Noted    Acute combined systolic and diastolic congestive heart failure ([facility] ) 02/07/2019    Acute kidney insufficiency 02/07/2019    Elevated lactic acid level 02/07/2019    Obstructive uropathy 02/07/2019    Volume overload 02/07/2019    Non-rheumatic mitral regurgitation 02/07/2019    Chordae tendinae rupture (HCC) 02/07/2019    Esophageal thickening 10/12/2018    Acute respiratory failure with hypoxia ([facility] ) 10/11/2018    Sacral wound, initial encounter 10/10/2018    Postoperative state 09/14/2018    Non-healing surgical wound of right groin 08/22/2018    Wound dehiscence, surgical, initial encounter 08/16/2018    Acute blood loss anemia 08/02/2018    Constipation 08/02/2018    Essential hypertension 07/26/2018    Peripheral arterial disease [facility] 07/25/2018    Ischemic foot pain at rest ([facility] ) 07/17/2018    Cellulitis of leg, right 06/11/2018    Peripheral vascular disease due to secondary diabetes mellitus ([facility] ) 06/11/2018    Hypercalcemia 05/08/2018    Alcohol abuse 05/08/2018    Ambulatory dysfunction 04/17/2018    Lumbar radiculopathy 04/17/2018    Type 2 diabetes mellitus with diabetic polyneuropathy, with long-term current use of insulin ([facility] ) 04/17/2018    Basal cell carcinoma in situ of skin 03/15/2018    Chronic pain 08/28/2017    Hernia, epigastric 02/16/2017    Cervical myelopathy ([facility] ) 08/24/2016    Depression 05/25/2016    Diabetic neuropathy ([facility] ) 05/25/2016    Injury of cervical spinal cord ([facility] ) 46/74/2344    Umbilical hernia 48/10/9179    Tobacco use disorder 08/23/2013    Quadriplegia and quadriparesis ([facility] ) 08/23/2013    Enlarged prostate with lower urinary tract symptoms (LUTS) 07/03/2012       Plan:    Risks and benefits of mitraclip vs mitral valve repair/replacement were discussed in detail today with the patient  They understand and wish to proceed with further workup and ultimately surgical intervention  Based on the patient's functional & medical status, I am recommending a mitraclip approach to his mitral regurgitation   Will have this reviewed w/ the mitraclip team prior to final surgical recommendation  We will order routine preoperative laboratory and vascular studies  Pending the results of these tests, they will be scheduled for surgery with ERIK Meehan O     Case discussed w/ Dr Ese VALENCIA  The patient was comfortable with our recommendations, and their questions were answered to their satisfaction  We will continue to evaluate the patient daily with further recommendations as work up is completed  Thank you for allowing us to participate in the care of this patient         Adelso Stark PA-C  9:29 AM  02/08/19
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
## 2019-02-08 NOTE — SOCIAL WORK
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  Pt reported he is currently open with [facility] and would like to resume services upon d/c  Pt also inquiring if he could get an aide to come into the home as his wife works M-F  Pt has hx with [facility], [facility], and acute rehab in [place]  Pt reported to have depression and is prescribed medication through his PCP, Dr Ani Vargas in [facility]  Pt denied drug/etoh tx  Pt gets mailed prescriptions through [facility] and goes to [facility] in [facility] if needed  Pt does not have POA  Pt's wife will be able to transport upon d/c        CM reviewed d/c planning process including the following: identifying help at home, patient preference for d/c planning needs, Discharge Lounge, [facility] Meds to Bed program, availability of treatment team to discuss questions or concerns patient and/or family may have regarding understanding medications and recognizing signs and symptoms once discharged  CM also encouraged patient to follow up with all recommended appointments after discharge  Patient advised of importance for patient and family to participate in managing patients medical well being
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
## 2019-02-08 NOTE — PROGRESS NOTES
[address] [facility] NOTE   Jo-Ann Vital 59 y o  male MRN: [id number]  Unit/Bed#: [facility] 512-01 Encounter: [id number]  Reason for Consult:  Acute kidney injury    ASSESSMENT and PLAN:  The patient is a 77-year-old male who presented on 02/07 with acute onset of shortness of breath  Chest x-ray showed volume overload, echocardiogram revealed severe MR  Nephrology was consulted for management of acute kidney injury  1  Acute kidney injury (POA):  · Baseline creatinine appears to be <1  · Acute kidney injury likely due to decreased effective circulating volume in the setting of volume overload and acute severe MR; alteration in renal hemodynamics due to ACE-inhibitor  Obstructive uropathy may be contributing with high PVR  Last PVR for 89, straight cathed for 375 ml  On Flomax  · Creatinine 2 2 on admission 2/7  Currently down to 1 8  · Renal ultrasound right kidney 12 1 cm, left kidney 11 9 cm  Normal echogenicity and contour  Bladder wall trabeculation which may indicate chronic outlet obstruction  · Urinalysis:  Negative for protein, no RBCs, no WBCs  · Plan:  Continue diuresis, continue urinary retention protocol- check PVR every shift  2  Acute, severe MR:    · Cardiology following  · Echocardiogram on 02/07 flail motion of the posterior leaflet noted with findings suggestive chordal rupture  ·  Mitraclip planned  3  Chronic systolic/diastolic CHF:  · Chest x-ray shows volume overload  · On Lasix 40 mg 3 times a day  4  Hypertension:  Hypotensive-blood pressure low but stable  5  Hyperkalemia:  Likely related to acute renal failure, ACE-inhibitor  Resolved with diuresis  6  Hypomagnesemia:  Magnesium level 1 5-now up to 2 0 after Mag sulfate infusion  Other:  Quadriplegia due to C4 spinal injury, diabetes mellitus, COPD, PVD recent fem-pop bypass, tobacco abuse    SUBJECTIVE / INTERVAL HISTORY:  Patient meeting with palliative care    Understands our plan of care    OBJECTIVE:  Current Weight: Weight - Scale: 80 kg (176 lb 5 9 oz)  Vitals:    02/08/19 0310 02/08/19 0535 02/08/19 0742 02/08/19 0753   BP:    96/54   BP Location:    Right arm   Pulse: 91   90   Resp:    21   Temp:    98 2 °F (36 8 °C)   TempSrc:    Oral   SpO2: 93%  93% 96%   Weight:  80 kg (176 lb 5 9 oz)     Height:           Intake/Output Summary (Last 24 hours) at 02/08/19 1107  Last data filed at 02/08/19 1026   Gross per 24 hour   Intake          1134 39 ml   Output             1750 ml   Net          -615 61 ml     General:  No acute distress  Skin:  Warm and dry  Eyes:  Sclera clear  ENT:  Oropharynx moist  Neck:  Supple no JVD appreciated  Chest:  Decreased breath sounds, crackles at the bases  CVS:  Systolic murmur noted  Abdomen:  Abdomen soft, nondistended, normal bowel sounds present  Extremities:  No edema noted, muscle contractures  :  No Vasques  Neuro:  Alert and oriented  Psych:  Appropriate  Medications:    Current Facility-Administered Medications:      EMS REPLENISHMENT MED, , Does not apply, Once, Michele Kemp MD    albuterol inhalation solution 2 5 mg, 2 5 mg, Nebulization, Q6H PRN, Michele Kemp MD    atorvastatin (LIPITOR) tablet 40 mg, 40 mg, Oral, Daily With Dinner, Antonio Farmer MD, 40 mg at 02/07/19 1723    baclofen tablet 20 mg, 20 mg, Oral, TID, Antonio Farmer MD, 20 mg at 02/08/19 3507    citalopram (CeleXA) tablet 10 mg, 10 mg, Oral, Daily, Antonio Farmer MD, 10 mg at 02/08/19 9040    clopidogrel (PLAVIX) tablet 75 mg, 75 mg, Oral, Daily, Antonio Farmer MD, 75 mg at 02/08/19 4645    docusate sodium (COLACE) capsule 100 mg, 100 mg, Oral, BID, Antonio Farmer MD, 100 mg at 02/08/19 0923    furosemide (LASIX) injection 40 mg, 40 mg, Intravenous, TID (diuretic), Daisy Land MD, 40 mg at 02/08/19 0926    gabapentin (NEURONTIN) capsule 300 mg, 300 mg, Oral, BID, Antonio Farmer MD, 300 mg at 02/08/19 0923    insulin regular (HumuLIN R,NovoLIN R) 1 Units/mL in sodium chloride 0 9 % 100 mL infusion, 0 3-21 Units/hr, Intravenous, Titrated, Demar Salgado PA-C, Last Rate: 11 mL/hr at 02/08/19 0957, 11 Units/hr at 02/08/19 0957    ipratropium (ATROVENT) 0 02 % inhalation solution 0 5 mg, 0 5 mg, Nebulization, TID, Paco Byrne MD, 0 5 mg at 02/08/19 2305    levalbuterol [facility]) inhalation solution 1 25 mg, 1 25 mg, Nebulization, TID, Paco Byrne MD, 1 25 mg at 02/08/19 0742    nicotine (NICODERM CQ) 21 mg/24 hr TD 24 hr patch 1 patch, 1 patch, Transdermal, Daily, Katherine José MD, 1 patch at 02/08/19 0924    ondansetron [facility]) injection 4 mg, 4 mg, Intravenous, Q6H PRN, Katherine José MD    tamsulosin [facility]) capsule 0 4 mg, 0 4 mg, Oral, Daily With Dinner, Katherine José MD, 0 4 mg at 02/07/19 1724    Laboratory Results:    Results from last 7 days  Lab Units 02/08/19  0759 02/08/19  0532 02/08/19  0038 02/07/19  1629 02/07/19  1257 02/07/19  0916 02/07/19  0537 02/07/19  0515 02/07/19  0514   WBC Thousand/uL  --  12 92*  --   --  7 94  --  10 56*  --   --    HEMOGLOBIN g/dL  --  10 6*  --   --  11 1*  --  12 2  --   --    I STAT HEMOGLOBIN g/dl  --   --   --   --   --   --   --  12 6 12 9   HEMATOCRIT %  --  31 7*  --   --  33 4*  --  37 8  --   --    HEMATOCRIT, ISTAT %  --   --   --   --   --   --   --  37 38   PLATELETS Thousands/uL  --  160  --   --  170 150 195  --   --    POTASSIUM mmol/L 3 9  --  4 5 4 7  --   --  5 5*  --   --    CHLORIDE mmol/L 103  --  102 101  --   --  97*  --   --    CO2 mmol/L 28  --  27 25  --   --  24  --   --    CO2, I-STAT mmol/L  --   --   --   --   --   --   --  27 26   BUN mg/dL 51*  --  54* 50*  --   --  45*  --   --    CREATININE mg/dL 1 81*  --  1 97* 2 35*  --   --  2 17*  --   --    CALCIUM mg/dL 8 8  --  8 8 9 0  --   --  9 3  --   --    MAGNESIUM mg/dL 2 0  --  1 5*  --   --   --   --   --   --    PHOSPHORUS mg/dL 3 5  --   --   --   --   --   --   --   --    GLUCOSE, ISTAT mg/dl  --   --   --   --   --   --   --  425* 434*     Work up:

## 2019-02-08 NOTE — PROGRESS NOTES
Progress Note - Iris Lazcanoos 1960, 59 y o  male MRN: [id number]    Unit/Bed#: [facility] 512-01 Encounter: [id number]    Primary Care Provider: Bev Kirk MD   Date and time admitted to hospital: 2/7/2019  5:00 AM        * Acute respiratory failure with hypoxia ([facility] [address] )   Assessment & Plan    · Due to acute volume overload state due to severe MR and ruptured chord  · Continue HFNC and wean as able   · continue diuresis as outlined  · Patient appears to be improving slowly  Acute combined systolic and diastolic congestive heart failure ([address] )   Assessment & Plan    · Patient comes in with acute shortness of breath woke up from sleep  · Required BiPAP but now tolerating high-flow oxygen  · Will wean as able  · Patient being considered for mitral clip during this admission  He is not a candidate for mitral valve replacement  · Will be going for ORIN on Monday  Acute kidney insufficiency   Assessment & Plan    · Will follow creatinine closely while on diuretics  · Overall he has improved and is now down to 1 8  Previous creatinine was over 2   · Also having some urinary retention and required catheterization early this morning  · He is on urinary retention protocol  Chordae tendinae rupture [facility])   Assessment & Plan    · See above plan  · Patient is being considered for mitraclip during this admission  Type 2 diabetes mellitus with diabetic polyneuropathy, with long-term current use of insulin [facility])   Assessment & Plan    Lab Results   Component Value Date    HGBA1C 9 3 (H) 02/07/2019       Recent Labs      02/08/19   0406  02/08/19   0625  02/08/19   0814  02/08/19   0952   POCGLU  116  152*  215*  234*       Blood Sugar Average: Last 72 hrs:  (P) 900 [id number]   Patient currently is on insulin drip  Will maintain and do blood sugar checks every 2 hr  Tobacco use disorder   Assessment & Plan    · Patient continues to smoke and placed on nicotine replacement       Quadriplegia [facility])   Assessment & Plan    · C4 quadriplegia secondary to traumatic fall in July  · Patient continues to have chronic symptoms and stiffness as well as pain  ·  Palliative Care has been consulted for symptom management  VTE Pharmacologic Prophylaxis:   Pharmacologic: Heparin  Mechanical VTE Prophylaxis in Place: Yes    Patient Centered Rounds: I have performed bedside rounds with nursing staff today  Discussions with Specialists or Other Care Team Provider:  Cardiology and case management    Education and Discussions with Family / Patient:  Primary RN    Time Spent for Care: 30 minutes  More than 50% of total time spent on counseling and coordination of care as described above  Current Length of Stay: 1 day(s)    Current Patient Status: Inpatient   Certification Statement: The patient will continue to require additional inpatient hospital stay due to acute hypoxic respiratory failure secondary to volume overload due to severe mitral valve regurgitation    Discharge Plan / Estimated Discharge Date:  Not medically stable for discharge today      Code Status: Level 1 - Full Code      Subjective:   Feels much better today  No respiratory distress at rest   Is not getting much sleep  No nausea or vomiting  No fever or chills  Is very hopeful that he will 1 day be able to get medical marijuana  Is willing to discuss this with palliative care  States he lives in chronic daily pain  Understands he may not be able to have the valve fixed  Denies headache  Is very anxious to get out of bed however he does have the stiffness secondary to quadriplegia and chronic pain  Having urinary retention but this also happens to him at home sometimes  No abdominal pain      Objective:     Vitals:   Temp (24hrs), Av 7 °F (37 1 °C), Min:98 °F (36 7 °C), Max:99 3 °F (37 4 °C)    Temp:  [98 °F (36 7 °C)-99 3 °F (37 4 °C)] 98 2 °F (36 8 °C)  HR:  [] 90  Resp:  [16-30] 21  BP: ()/(54-62) 96/54  SpO2:  [93 %-98 %] 96 %  Body mass index is 25 31 kg/m²  Input and Output Summary (last 24 hours): Intake/Output Summary (Last 24 hours) at 02/08/19 1106  Last data filed at 02/08/19 1026   Gross per 24 hour   Intake          1134 39 ml   Output             1750 ml   Net          -615 61 ml       Physical Exam:     Physical Exam   Constitutional: He is oriented to person, place, and time  No distress  HENT:   Head: Normocephalic  Eyes: Pupils are equal, round, and reactive to light  Cardiovascular: Normal rate and regular rhythm  Murmur heard  Pulmonary/Chest: Effort normal    Decreased breath sounds bilaterally, no conversational dyspnea  Remains on high-flow nasal cannula oxygen of 40%  No distress this morning  Crackles to bases  Abdominal: Soft  Bowel sounds are normal    Musculoskeletal: Normal range of motion  He exhibits no edema  Neurological: He is alert and oriented to person, place, and time  Skin: Skin is warm and dry  Psychiatric: He has a normal mood and affect  His behavior is normal  Thought content normal    Nursing note and vitals reviewed  Additional Data:     Labs:      Results from last 7 days  Lab Units 02/08/19  0532   WBC Thousand/uL 12 92*   HEMOGLOBIN g/dL 10 6*   HEMATOCRIT % 31 7*   PLATELETS Thousands/uL 160   NEUTROS PCT % 78*   LYMPHS PCT % 11*   MONOS PCT % 10   EOS PCT % 0       Results from last 7 days  Lab Units 02/08/19  0759  02/07/19  0537 02/07/19  0515   POTASSIUM mmol/L 3 9  < > 5 5*  --    CHLORIDE mmol/L 103  < > 97*  --    CO2 mmol/L 28  < > 24  --    CO2, I-STAT mmol/L  --   --   --  27   BUN mg/dL 51*  < > 45*  --    CREATININE mg/dL 1 81*  < > 2 17*  --    CALCIUM mg/dL 8 8  < > 9 3  --    ALK PHOS U/L  --   --  53  --    ALT U/L  --   --  40  --    AST U/L  --   --  48*  --    GLUCOSE, ISTAT mg/dl  --   --   --  425*   < > = values in this interval not displayed      Results from last 7 days  Lab Units 02/07/19  1257   INR 0 99       * I Have Reviewed All Lab Data Listed Above  Recent Cultures (last 7 days):       Results from last 7 days  Lab Units 02/07/19  0537 02/07/19  0536   BLOOD CULTURE  No Growth at 24 hrs  No Growth at 24 hrs  Last 24 Hours Medication List:     Current Facility-Administered Medications:  EMS replenish medication  Does not apply Once Yahir Cosby MD    albuterol 2 5 mg Nebulization Q6H PRN Yahir Cosby MD    atorvastatin 40 mg Oral Daily With Ileana Colbert MD    baclofen 20 mg Oral TID Harjeet Randolph MD    citalopram 10 mg Oral Daily Harjeet Randolph MD    clopidogrel 75 mg Oral Daily Harjeet Randolph MD    docusate sodium 100 mg Oral BID Harjeet Randolph MD    furosemide 40 mg Intravenous TID (diuretic) Bess Torres MD    gabapentin 300 mg Oral BID Harjeet Randolph MD    insulin regular (HumuLIN R,NovoLIN R) infusion 0 3-21 Units/hr Intravenous Titrated Devra Goldmann, PA-C Last Rate: 11 Units/hr (02/08/19 0957)   ipratropium 0 5 mg Nebulization TID Yahir Cosby MD    levalbuterol 1 25 mg Nebulization TID Yahir Cosby MD    nicotine 1 patch Transdermal Daily Harjeet Randolph MD    ondansetron 4 mg Intravenous Q6H PRN Harjeet Randolph MD    tamsulosin 0 4 mg Oral Daily With Ileana Colbert MD         Today, Patient Was Seen By: CHERELLE Jade    ** Please Note: Dragon 360 Dictation voice to text software may have been used in the creation of this document   **

## 2019-02-08 NOTE — ASSESSMENT & PLAN NOTE
· C4 quadriplegia secondary to traumatic fall in July  · Patient continues to have chronic symptoms and stiffness as well as pain  ·  Palliative Care has been consulted for symptom management

## 2019-02-08 NOTE — PLAN OF CARE
Problem: PHYSICAL THERAPY ADULT  Goal: Performs mobility at highest level of function for planned discharge setting  See evaluation for individualized goals  Treatment/Interventions: Functional transfer training, LE strengthening/ROM, Endurance training, Therapeutic exercise, Patient/family training, Equipment eval/education, Bed mobility, Gait training, Spoke to nursing, OT  Equipment Recommended: Tammy Cano       See flowsheet documentation for full assessment, interventions and recommendations  Prognosis: Good  Problem List: Decreased strength, Decreased range of motion, Decreased endurance, Impaired balance, Decreased mobility, Decreased coordination, Pain  Assessment: Pt is 59 y o  male seen for PT evaluation s/p admit to [facility] on 2/7/2019 w/ Acute respiratory failure with hypoxia ([facility] [address] )  PT consulted to assess pt's functional mobility and d/c needs  Order placed for PT eval and tx, w/ up as tolerated order  Comorbidities affecting pt's physical performance at time of assessment include: quadriplegia, DM II, acute kidney insufficiency, PVD, hx of cervical spinal cord injury; see problem list for full hx  PTA, pt was independent w/ all functional mobility w/ RW, ambulates community distances and elevations and on disability  Personal factors affecting pt at time of IE include: ambulating w/ assistive device, stairs to enter home, inability to navigate community distances, inability to perform IADLs and inability to perform ADLs  Please find objective findings from PT assessment regarding body systems outlined above with impairments and limitations including weakness, impaired balance, decreased endurance, gait deviations, pain, decreased activity tolerance, decreased functional mobility tolerance and fall risk  The following objective measures performed on IE also reveal limitations: Barthel Index: 55/100   Pt's clinical presentation is currently unstable/unpredictable seen in pt's presentation of pain, abnormal labs, supplemental oxygen, telemetry  Pt demonstrated flexion contractures of BUE and BLE due to hx of quadriplegia  He demonstrated poor balance with initial ambulation and required min A for stability due to reportedly being "stiff"  Pt to benefit from continued PT tx to address deficits as defined above and maximize level of functional independent mobility and consistency  From PT/mobility standpoint, recommendation at time of d/c would be Home PT with family support pending progress in order to facilitate return to PLOF  Barriers to Discharge: None     Recommendation: Home PT, Home with family support     PT - OK to Discharge: Yes    See flowsheet documentation for full assessment

## 2019-02-08 NOTE — ASSESSMENT & PLAN NOTE
Lab Results   Component Value Date    HGBA1C 9 3 (H) 02/07/2019       Recent Labs      02/08/19   0406  02/08/19   0625  02/08/19   0814  02/08/19   0952   POCGLU  116  152*  215*  234*       Blood Sugar Average: Last 72 hrs:  (P) 701 [id number]   Patient currently is on insulin drip  Will maintain and do blood sugar checks every 2 hr

## 2019-02-08 NOTE — PLAN OF CARE
Problem: OCCUPATIONAL THERAPY ADULT  Goal: Performs self-care activities at highest level of function for planned discharge setting  See evaluation for individualized goals  Treatment Interventions: ADL retraining, Functional transfer training, Endurance training, Patient/family training, Continued evaluation, Energy conservation, Activityengagement          See flowsheet documentation for full assessment, interventions and recommendations  Limitation: Decreased ADL status, Decreased endurance, Decreased self-care trans, Decreased high-level ADLs, Decreased Safe judgement during ADL  Prognosis: Good  Assessment: Yordan Meza is a 59y o  year old male, PMH severe mitral regurgitation, PAD, HTN, poorly controlled DM2, COPD, CHF, 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, and popliteal femoral bypass on RLE presented to [facility] ED on 2/7 with dyspnea  Pt with active OT orders and up as tolerated orders    Pt resides in a apt with spouse  Pt reports spouse works full time and is home alone during the day w/ occasional assistance from his neighbors  PTA pt reports wife assists w/ LB ADLs and some IADLs  Pt reports he does the dishes at home  Pt reports using RW or SPC  Pt reports using RW while wife is away at work  Currently pt is Min A for functional mobility, transfers, and UB ADLs  Pt is Mod A for LB ADLs and toileting  Pt is limited at this time 2*: pain, endurance, activity tolerance, balance, trunk control, unsupportive home environment, decreased I w/ ADLS/IADLS, decreased safety awareness and decreased insight into deficits  The following Occupational Performance Areas to address include: bathing/shower, toilet hygiene, dressing, functional mobility and household maintenance  Pt scored overall  45/100 on the Barthel Index  Based on the aforementioned OT evaluation, functional performance deficits, and assessments, pt has been identified as a high complexity evaluation   From OT standpoint, anticipate d/c home OT  Pt to continue to benefit from acute immediate OT services to address the following goals 3-5x/week to  w/in 7-10 days:   OT Discharge Recommendation: Home OT  OT - OK to Discharge:  Yes

## 2019-02-08 NOTE — PROGRESS NOTES
Progress Note - Cardiology   Luis Marquez 59 y o  male MRN: [id number]  Unit/Bed#: [facility] 12-46 Encounter: [id number]    Assessment:  Principal Problem:    Acute combined systolic and diastolic congestive heart failure (HCC)  Active Problems:    Tobacco use disorder    Peripheral vascular disease due to secondary diabetes mellitus (HCC)    Essential hypertension    Acute kidney insufficiency    Elevated lactic acid level    Obstructive uropathy    Volume overload    Non-rheumatic mitral regurgitation    Chordae tendinae rupture (HCC)    #Acute severe MR 2/2 flail posterior leaflet from ruptured chord  #PAD with fem endarterectomy, fem-pop bypass 2018  #HTN  #DM with hyperglycemia on insulin gtt  #COPD  #C4 quadriplegia  #SUNDEEP, improving  #Hypoxic respiratory failure, improving  #Tobacco abuse    Plan:  1  Increase lasix 40mg IV to TID  2  Patient will need 3D ORIN, likely to be completed Monday  3  Patient is not a candidate for MVR  He will be considered for Mitraclip this admission  4  Continue plavix, statin  Subjective/Objective     Subjective: Breathing is much better today  Orthopnea also improved  Had urinary retention, had to have straight cath overnight  Denies dizziness, palpitations, CP        Objective:  Vitals: BP 96/54 (BP Location: Right arm)   Pulse 90   Temp 98 2 °F (36 8 °C) (Oral)   Resp 21   Ht 5' 10" (1 778 m)   Wt 80 kg (176 lb 5 9 oz)   SpO2 96%   BMI 25 31 kg/m²   Vitals:    02/07/19 0505 02/08/19 0535   Weight: 79 4 kg (175 lb) 80 kg (176 lb 5 9 oz)     Orthostatic Blood Pressures      Most Recent Value   Blood Pressure  96/54 filed at 02/08/2019 0753   Patient Position - Orthostatic VS  Lying filed at 02/08/2019 0214            Intake/Output Summary (Last 24 hours) at 02/08/19 0838  Last data filed at 02/08/19 0824   Gross per 24 hour   Intake          1134 39 ml   Output             1600 ml   Net          -465 61 ml       Physical Exam:   General appearance: alert and in no acute distress  Head: Normocephalic, without obvious abnormality, atraumatic  Neck: +ve JVD and supple, symmetrical, trachea midline  Lungs: globally diminished, +ve rales primarily at bases  Heart: S1, S2 normal and no S3 or S4  3/6 holosystolic murmur  No gallop  No rub  Abdomen: soft, non-tender; no masses,  no organomegaly  Extremities: no cyanosis, 1+ edema  Pulses: 2+ and symmetric bilaterally  Skin: Skin color, texture, turgor normal  No rashes or lesions  Neurologic: Grossly normal  Alert and oriented      Medications:    Current Facility-Administered Medications:      EMS REPLENISHMENT MED, , Does not apply, Once, Benjie Pittman MD    albuterol inhalation solution 2 5 mg, 2 5 mg, Nebulization, Q6H PRN, Benjie Pittman MD    atorvastatin (LIPITOR) tablet 40 mg, 40 mg, Oral, Daily With Dinner, Surinder Chen MD, 40 mg at 02/07/19 1723    baclofen tablet 20 mg, 20 mg, Oral, TID, Surinder Chen MD, 20 mg at 02/07/19 2128    citalopram (CeleXA) tablet 10 mg, 10 mg, Oral, Daily, Surinder Chen MD, Stopped at 02/07/19 1324    clopidogrel (PLAVIX) tablet 75 mg, 75 mg, Oral, Daily, Surinder Chen MD, Stopped at 02/07/19 1324    docusate sodium (COLACE) capsule 100 mg, 100 mg, Oral, BID, Surinder Chen MD, Stopped at 02/07/19 1324    furosemide (LASIX) injection 40 mg, 40 mg, Intravenous, TID (diuretic), Natasha Wolf MD    gabapentin (NEURONTIN) capsule 300 mg, 300 mg, Oral, BID, Surinder Chen MD, 300 mg at 02/07/19 1723    insulin regular (HumuLIN R,NovoLIN R) 1 Units/mL in sodium chloride 0 9 % 100 mL infusion, 0 3-21 Units/hr, Intravenous, Titrated, Sabina Kehr, PA-C, Last Rate: 8 mL/hr at 02/08/19 0820, 8 Units/hr at 02/08/19 0820    ipratropium (ATROVENT) 0 02 % inhalation solution 0 5 mg, 0 5 mg, Nebulization, TID, Benjie Pittman MD, 0 5 mg at 02/08/19 1199    levalbuterol [facility]) inhalation solution 1 25 mg, 1 25 mg, Nebulization, TID, Benjie Pittman MD, 1 25 mg at 02/08/19 1303    nicotine (NICODERM CQ) 21 mg/24 hr TD 24 hr patch 1 patch, 1 patch, Transdermal, Daily, Bere Lyons MD, 1 patch at 02/07/19 1118    ondansetron (ZOFRAN) injection 4 mg, 4 mg, Intravenous, Q6H PRN, Bere Lyons MD    tamsulosin [facility]) capsule 0 4 mg, 0 4 mg, Oral, Daily [facility], Bere Lyons MD, 0 4 mg at 02/07/19 1724    Lab Results:    Results from last 7 days  Lab Units 02/07/19  1257 02/07/19  0907 02/07/19  0537   TROPONIN I ng/mL 0 07* 0 08* 0 02       Results from last 7 days  Lab Units 02/08/19  0532 02/07/19  1257 02/07/19  0916 02/07/19  0537   WBC Thousand/uL 12 92* 7 94  --  10 56*   HEMOGLOBIN g/dL 10 6* 11 1*  --  12 2   HEMATOCRIT % 31 7* 33 4*  --  37 8   PLATELETS Thousands/uL 160 170 150 195           Results from last 7 days  Lab Units 02/08/19  0038 02/07/19  1629 02/07/19  0537 02/07/19  0515 02/07/19  0514   POTASSIUM mmol/L 4 5 4 7 5 5*  --   --    CHLORIDE mmol/L 102 101 97*  --   --    CO2 mmol/L 27 25 24  --   --    CO2, I-STAT mmol/L  --   --   --  27 26   BUN mg/dL 54* 50* 45*  --   --    CREATININE mg/dL 1 97* 2 35* 2 17*  --   --    CALCIUM mg/dL 8 8 9 0 9 3  --   --    ALK PHOS U/L  --   --  53  --   --    ALT U/L  --   --  40  --   --    AST U/L  --   --  48*  --   --    GLUCOSE, ISTAT mg/dl  --   --   --  425* 434*       Results from last 7 days  Lab Units 02/08/19  0038 02/07/19  1257   INR   --  0 99   PTT seconds 35 33       Results from last 7 days  Lab Units 02/08/19  0038   MAGNESIUM mg/dL 1 5*       Telemetry: Personally reviewed  Imaging: Personally reviewed  EKG: Personally reviewed       Meera Pichardo MD  Cardiology Fellow

## 2019-02-08 NOTE — ASSESSMENT & PLAN NOTE
· Will follow creatinine closely while on diuretics  · Overall he has improved and is now down to 1 8  Previous creatinine was over 2   · Also having some urinary retention and required catheterization early this morning  · He is on urinary retention protocol

## 2019-02-08 NOTE — PLAN OF CARE
Problem: DISCHARGE PLANNING - CARE MANAGEMENT  Goal: Discharge to post-acute care or home with appropriate resources  INTERVENTIONS:  - Conduct assessment to determine patient/family and health care team treatment goals, and need for post-acute services based on payer coverage, community resources, and patient preferences, and barriers to discharge  - Address psychosocial, clinical, and financial barriers to discharge as identified in assessment in conjunction with the patient/family and health care team  - Arrange appropriate level of post-acute services according to patient's   needs and preference and payer coverage in collaboration with the physician and health care team  - Communicate with and update the patient/family, physician, and health care team regarding progress on the discharge plan  - Arrange appropriate transportation to post-acute venues  - Pt to d/c with appropriate resources when medically stable  Outcome: Progressing

## 2019-02-08 NOTE — ASSESSMENT & PLAN NOTE
· Patient comes in with acute shortness of breath woke up from sleep  · Required BiPAP but now tolerating high-flow oxygen  · Will wean as able  · Patient being considered for mitral clip during this admission  He is not a candidate for mitral valve replacement  · Will be going for ORIN on Monday

## 2019-02-08 NOTE — PLAN OF CARE
Problem: CARDIOVASCULAR - ADULT  Goal: Maintains optimal cardiac output and hemodynamic stability  INTERVENTIONS:  - Monitor I/O, vital signs and rhythm  - Monitor for S/S and trends of decreased cardiac output i e  bleeding, hypotension  - Administer and titrate ordered vasoactive medications to optimize hemodynamic stability  - Assess quality of pulses, skin color and temperature  - Assess for signs of decreased coronary artery perfusion - ex   Angina  - Instruct patient to report change in severity of symptoms  Outcome: Progressing    Goal: Absence of cardiac dysrhythmias or at baseline rhythm  INTERVENTIONS:  - Continuous cardiac monitoring, monitor vital signs, obtain 12 lead EKG if indicated  - Administer antiarrhythmic and heart rate control medications as ordered  - Monitor electrolytes and administer replacement therapy as ordered  Outcome: Progressing      Problem: RESPIRATORY - ADULT  Goal: Achieves optimal ventilation and oxygenation  INTERVENTIONS:  - Assess for changes in respiratory status  - Assess for changes in mentation and behavior  - Position to facilitate oxygenation and minimize respiratory effort  - Oxygen administration by appropriate delivery method based on oxygen saturation (per order) or ABGs  - Initiate smoking cessation education as indicated  - Encourage broncho-pulmonary hygiene including cough, deep breathe, Incentive Spirometry  - Assess the need for suctioning and aspirate as needed  - Assess and instruct to report SOB or any respiratory difficulty  - Respiratory Therapy support as indicated  Outcome: Progressing      Problem: METABOLIC, FLUID AND ELECTROLYTES - ADULT  Goal: Electrolytes maintained within normal limits  INTERVENTIONS:  - Monitor labs and assess patient for signs and symptoms of electrolyte imbalances  - Administer electrolyte replacement as ordered  - Monitor response to electrolyte replacements, including repeat lab results as appropriate  - Instruct patient on fluid and nutrition as appropriate  Outcome: Progressing    Goal: Fluid balance maintained  INTERVENTIONS:  - Monitor labs and assess for signs and symptoms of volume excess or deficit  - Monitor I/O and WT  - Instruct patient on fluid and nutrition as appropriate  Outcome: Progressing    Goal: Glucose maintained within target range  INTERVENTIONS:  - Monitor Blood Glucose as ordered  - Assess for signs and symptoms of hyperglycemia and hypoglycemia  - Administer ordered medications to maintain glucose within target range  - Assess nutritional intake and initiate nutrition service referral as needed  Outcome: Progressing

## 2019-02-08 NOTE — OCCUPATIONAL THERAPY NOTE
OccupationalTherapy Evaluation & [place]     Patient Name: Nelson GOMEZ Date: 2/8/2019  Problem List  Patient Active Problem List   Diagnosis    Chronic pain    Depression    Diabetic neuropathy ([facility] )    Enlarged prostate with lower urinary tract symptoms (LUTS)    Hernia, epigastric    Injury of cervical spinal cord (HCC)    Tobacco use disorder    Umbilical hernia    Cervical myelopathy (HCC)    Basal cell carcinoma in situ of skin    Ambulatory dysfunction    Lumbar radiculopathy    Type 2 diabetes mellitus with diabetic polyneuropathy, with long-term current use of insulin (HCC)    Hypercalcemia    Quadriplegia and quadriparesis (HCC)    Alcohol abuse    Cellulitis of leg, right    Peripheral vascular disease due to secondary diabetes mellitus (HCC)    Ischemic foot pain at rest [facility])    Peripheral arterial disease ([facility] )    Essential hypertension    Acute blood loss anemia    Constipation    Wound dehiscence, surgical, initial encounter    Non-healing surgical wound of right groin    Postoperative state    Sacral wound, initial encounter    Acute respiratory failure with hypoxia (HCC)    Esophageal thickening    Acute combined systolic and diastolic congestive heart failure (HCC)    Acute kidney insufficiency    Obstructive uropathy    Volume overload    Non-rheumatic mitral regurgitation    Chordae tendinae rupture (HCC)    Quadriplegia (HCC)     Past Medical History  Past Medical History:   Diagnosis Date    CHF (congestive heart failure) (HCC)     Cholelithiasis     [facility] obstructive lung disease ([facility])     RESOLVED: 8/17/17    COPD (chronic obstructive pulmonary disease) (HCC)     Depression     Diabetes mellitus ([address] )     type 2, non-insulin dependent    Diabetic neuropathy ([facility] [address] )     Difficulty attaining erection     LAST ASSESSEDl: 8/17/17    Former tobacco use     Gall stone pancreatitis     RESOLVED: 3/8/17    Hiatal hernia     History of concussion     History of MRSA infection     History of varicella infection     Hypertension     LAST ASSESSED: 8/17/17    Non-healing open wound of right groin     s/p VAC placement & post-op fungal dermatitis    PAD (peripheral artery disease) ([facility])     s/p RLE fem-pop    Seasonal allergies     Severe mitral regurgitation     Spinal cord injury, C1-C7 ([facility] [address] )     s/p fusion    Traumatic injury to musculoskeletal system     1-5 FLIGHT FALL DOWN THE STEPS - CERVICAL NECK INJURY - JAN 2, 2012; LAST ASSESSED: 7/87/40    Umbilical hernia without obstruction and without gangrene     RESOLVED: 3/8/17     Past Surgical History  Past Surgical History:   Procedure Laterality Date    ARTERIOGRAM Right 7/31/2018    Procedure: ARTERIOGRAM Completion Agram;  Surgeon: Ricardo Pritchett MD;  [facility];  Service: Vascular    BACK SURGERY      BYPASS FEMORAL-POPLITEAL Right 7/31/2018    Procedure: BYPASS FEMORAL-POPLITEAL R femoral- BK popliteal bypass;  Surgeon: Ricardo Pritchett MD;  [facility];  Service: Vascular    CERVICAL FUSION      VERTEBRAL; C3-C4 FUSION    CHOLECYSTECTOMY LAPAROSCOPIC N/A 2/24/2017    Procedure: CHOLECYSTECTOMY LAPAROSCOPIC;  Surgeon: Anaya Cabrera MD;  [facility];  Service:    Greg Elmoret EPIGASTRIC HERNIA REPAIR      ME DEBRIDEMENT, SKIN, SUB-Q TISSUE,=<20 SQ CM Right 8/28/2018    Procedure: RIGHT GROIN AND Lionel Munoz ([address]) [address] [facility];  Surgeon: Tanvir Hudson MD;  [facility];  Service: Vascular    ME THROMBOENDARTECTMY FEMORAL COMMON Right 7/31/2018    Procedure: ENDARTERECTOMY ARTERIAL FEMORAL R femoral endarterectomy w/ patch angioplasty ;  Surgeon: Ricardo Pritchett MD;  [facility];  Service: Vascular    UMBILICAL HERNIA REPAIR N/A 2/24/2017    Procedure: REPAIR HERNIA UMBILICAL;  Surgeon: Anaya Cabrera MD;  [facility];  Service:          02/08/19 1410   Note Type   Note type Eval/Treat   Restrictions/Precautions Weight Bearing Precautions Per Order No   Other Precautions Multiple lines;Telemetry;O2;Pain; Fall Risk   Pain Assessment   Pain Assessment 0-10   Pain Score 6   Pain Type Chronic pain   Pain Location Generalized   Home Living   Type of Home Apartment   Home Layout One level   Bathroom Shower/Tub Tub/shower unit   Bathroom Toilet Standard   Bathroom Equipment Shower chair   Home Equipment Walker;Cane   Prior Function   Level of [place] Needs assistance with ADLs and functional mobility; Needs assistance with IADLs   Lives With Spouse   Receives Help From Family; [address] [place] [address] Needs assistance   IADLs Needs assistance   Falls in the last 6 months 0   Vocational On disability   Comments used RW when wife is not home, using SPC when wife is home   Lifestyle   Autonomy pta pt reports needing assist for LB ADLs and IADLs 2* previous SCI   Reciprocal Relationships reports wife and supportive neighbors help when wife is at work full time   Service to Others on disability   Intrinsic Gratification enjoys baking   Psychosocial   Psychosocial (WDL) WDL   Subjective   Subjective "I feel stiff after laying in bed for so long"   ADL   Where Assessed Chair   Eating Assistance 5  Supervision/Setup   Grooming Assistance 5  Supervision/Setup   UB Bathing Assistance 4  Minimal Assistance   LB Bathing Assistance 3  Moderate Assistance   UB Dressing Assistance 4  [address] 3  Moderate Assistance   LB Dressing Deficit Don/doff R sock; Don/doff L sock   Bed Mobility   Supine to Sit 4  Minimal assistance   Additional items Increased time required;LE management   Additional Comments pt oob in chair at end of session   Transfers   Sit to Stand 4  Minimal assistance   Additional items Assist x 1; Increased time required;Verbal cues   Stand to Sit 4  Minimal assistance   Additional items Assist x 1; Increased time required;Verbal cues   Toilet transfer 3  Moderate assistance   Additional items Assist x 1; Increased time required;Standard toilet   Functional Mobility   Functional Mobility 4  Minimal assistance   Additional items Rolling walker   Balance   Static Sitting Fair +   Dynamic Sitting Fair   Static Standing Fair   Dynamic Standing Fair -   Ambulatory Poor +   Activity Tolerance   Activity Tolerance Patient tolerated treatment well   Medical Staff Made Aware PT Baljit Wagoner present   Nurse Made Aware okay to see per RN   RUE Assessment   RUE Assessment WFL   LUE Assessment   LUE Assessment WFL   Hand Function   Gross Motor Coordination Functional   Fine Motor Coordination Functional   Cognition   Overall Cognitive Status WFL   Arousal/Participation Cooperative   Attention Within functional limits   Orientation Level Oriented X4   Memory Within functional limits   Following Commands Follows multistep commands with increased time or repetition   Comments pleasant and cooperative   Assessment   Limitation Decreased ADL status; Decreased endurance;Decreased self-care trans;Decreased high-level ADLs; Decreased Safe judgement during ADL   Prognosis Good   Assessment Chris Blankenship is a 59y o  year old male, PMH severe mitral regurgitation, PAD, HTN, poorly controlled DM2, COPD, CHF, 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, and popliteal femoral bypass on RLE presented to [facility] on 2/7 with dyspnea  Pt with active OT orders and up as tolerated orders    Pt resides in a apt with spouse  Pt reports spouse works full time and is home alone during the day w/ occasional assistance from his neighbors  PTA pt reports wife assists w/ LB ADLs and some IADLs  Pt reports he does the dishes at home  Pt reports using RW or SPC  Pt reports using RW while wife is away at work  Currently pt is Min A for functional mobility, transfers, and UB ADLs  Pt is Mod A for LB ADLs and toileting   Pt is limited at this time 2*: pain, endurance, activity tolerance, balance, trunk control, unsupportive home environment, decreased I w/ ADLS/IADLS, decreased safety awareness and decreased insight into deficits  The following Occupational Performance Areas to address include: bathing/shower, toilet hygiene, dressing, functional mobility and household maintenance  Pt scored overall  45/100 on the Barthel Index  Based on the aforementioned OT evaluation, functional performance deficits, and assessments, pt has been identified as a high complexity evaluation  From OT standpoint, anticipate d/c home OT  Pt to continue to benefit from acute immediate OT services to address the following goals 3-5x/week to  w/in 7-10 days: [place] Goals   Patient Goals get stronger and walking more   LTG Time Frame 7-10   Plan   Treatment Interventions ADL retraining;Functional transfer training; Endurance training;Patient/family training;Continued evaluation; Energy conservation; Activityengagement   Goal Expiration Date 19   Treatment Day 1   OT Frequency 3-5x/wk   Additional Treatment Session   Start Time 1400   End Time 1410   Treatment Assessment Pt seen for additional tx session to trial low toilet  Pt required Mod A to slowly descend onto the toilet  Pt reports he would have plopped w/o therapist assisting him  OT retrieved commode to raise toilet in bathroom  Pt required VC for transfers  PT continues to be limited 2* decreased endurance, decreased ADL status, and limited home support  Continue to recommend home w/ home OT     Recommendation   OT Discharge Recommendation Home OT   OT - OK to Discharge Yes   Barthel Index   Feeding 10   Bathing 0   Grooming Score 5   Dressing Score 5   Bladder Score 0   Bowels Score 10   Toilet Use Score 5   Transfers (Bed/Chair) Score 10   Mobility (Level Surface) Score 0   Stairs Score 0   Barthel Index Score 45   Modified [place] Scale   Modified Reza Scale 3     GOALS    1) Pt will increase activity tolerance to G for 30 min txment sessions    2) Pt will complete LB dressing/self care w/ min A and UB w/ Mod I using adaptive device and DME as needed    3) Pt will complete bathing w/ Mod I w/ use of AE and DME as needed    4) Pt will complete toileting w/ mod I w/ G hygiene/thoroughness using DME as needed    5) Pt will improve functional transfers to Mod I on/off all surfaces using DME as needed w/ G balance/safety     6) Pt will improve functional mobility during ADL/IADL/leisure tasks to Mod I using DME as needed w/ G balance/safety     7) Pt will participate in simulated IADL management task to increase independence to  w/ G safety and endurance    8) Pt will demonstrate G carryover of pt/caregiver education and training as appropriate      Nimisha Dey MS, OTR/L

## 2019-02-09 PROBLEM — D72.829 LEUKOCYTOSIS: Status: ACTIVE | Noted: 2019-02-09

## 2019-02-09 PROBLEM — N40.0 BPH (BENIGN PROSTATIC HYPERPLASIA): Status: ACTIVE | Noted: 2019-02-09

## 2019-02-09 PROBLEM — I21.A1 TYPE 2 MYOCARDIAL INFARCTION (HCC): Status: ACTIVE | Noted: 2019-02-09

## 2019-02-09 PROBLEM — E83.42 HYPOMAGNESEMIA: Status: ACTIVE | Noted: 2019-02-09

## 2019-02-09 LAB
ANION GAP SERPL CALCULATED.3IONS-SCNC: 10 MMOL/L (ref 4–13)
BASOPHILS # BLD AUTO: 0.07 THOUSANDS/ΜL (ref 0–0.1)
BASOPHILS NFR BLD AUTO: 1 % (ref 0–1)
BUN SERPL-MCNC: 48 MG/DL (ref 5–25)
CALCIUM SERPL-MCNC: 8.6 MG/DL (ref 8.3–10.1)
CHLORIDE SERPL-SCNC: 101 MMOL/L (ref 100–108)
CO2 SERPL-SCNC: 30 MMOL/L (ref 21–32)
CREAT SERPL-MCNC: 1.62 MG/DL (ref 0.6–1.3)
EOSINOPHIL # BLD AUTO: 0.16 THOUSAND/ΜL (ref 0–0.61)
EOSINOPHIL NFR BLD AUTO: 2 % (ref 0–6)
ERYTHROCYTE [DISTWIDTH] IN BLOOD BY AUTOMATED COUNT: 13.7 % (ref 11.6–15.1)
GFR SERPL CREATININE-BSD FRML MDRD: 45 ML/MIN/1.73SQ M
GLUCOSE SERPL-MCNC: 104 MG/DL (ref 65–140)
GLUCOSE SERPL-MCNC: 122 MG/DL (ref 65–140)
GLUCOSE SERPL-MCNC: 136 MG/DL (ref 65–140)
GLUCOSE SERPL-MCNC: 146 MG/DL (ref 65–140)
GLUCOSE SERPL-MCNC: 157 MG/DL (ref 65–140)
GLUCOSE SERPL-MCNC: 169 MG/DL (ref 65–140)
GLUCOSE SERPL-MCNC: 170 MG/DL (ref 65–140)
GLUCOSE SERPL-MCNC: 181 MG/DL (ref 65–140)
GLUCOSE SERPL-MCNC: 323 MG/DL (ref 65–140)
GLUCOSE SERPL-MCNC: 355 MG/DL (ref 65–140)
GLUCOSE SERPL-MCNC: 379 MG/DL (ref 65–140)
GLUCOSE SERPL-MCNC: 99 MG/DL (ref 65–140)
HCT VFR BLD AUTO: 36.1 % (ref 36.5–49.3)
HGB BLD-MCNC: 12 G/DL (ref 12–17)
IMM GRANULOCYTES # BLD AUTO: 0.03 THOUSAND/UL (ref 0–0.2)
IMM GRANULOCYTES NFR BLD AUTO: 0 % (ref 0–2)
LYMPHOCYTES # BLD AUTO: 2.04 THOUSANDS/ΜL (ref 0.6–4.47)
LYMPHOCYTES NFR BLD AUTO: 24 % (ref 14–44)
MAGNESIUM SERPL-MCNC: 1.8 MG/DL (ref 1.6–2.6)
MCH RBC QN AUTO: 34 PG (ref 26.8–34.3)
MCHC RBC AUTO-ENTMCNC: 33.2 G/DL (ref 31.4–37.4)
MCV RBC AUTO: 102 FL (ref 82–98)
MONOCYTES # BLD AUTO: 0.86 THOUSAND/ΜL (ref 0.17–1.22)
MONOCYTES NFR BLD AUTO: 10 % (ref 4–12)
NEUTROPHILS # BLD AUTO: 5.26 THOUSANDS/ΜL (ref 1.85–7.62)
NEUTS SEG NFR BLD AUTO: 63 % (ref 43–75)
NRBC BLD AUTO-RTO: 0 /100 WBCS
PHOSPHATE SERPL-MCNC: 4.3 MG/DL (ref 2.3–4.1)
PLATELET # BLD AUTO: 191 THOUSANDS/UL (ref 149–390)
PMV BLD AUTO: 10.7 FL (ref 8.9–12.7)
POTASSIUM SERPL-SCNC: 3.4 MMOL/L (ref 3.5–5.3)
RBC # BLD AUTO: 3.53 MILLION/UL (ref 3.88–5.62)
SODIUM SERPL-SCNC: 141 MMOL/L (ref 136–145)
WBC # BLD AUTO: 8.42 THOUSAND/UL (ref 4.31–10.16)

## 2019-02-09 PROCEDURE — 80048 BASIC METABOLIC PNL TOTAL CA: CPT | Performed by: INTERNAL MEDICINE

## 2019-02-09 PROCEDURE — 94760 N-INVAS EAR/PLS OXIMETRY 1: CPT

## 2019-02-09 PROCEDURE — 84100 ASSAY OF PHOSPHORUS: CPT | Performed by: INTERNAL MEDICINE

## 2019-02-09 PROCEDURE — 99232 SBSQ HOSP IP/OBS MODERATE 35: CPT | Performed by: INTERNAL MEDICINE

## 2019-02-09 PROCEDURE — 83735 ASSAY OF MAGNESIUM: CPT | Performed by: INTERNAL MEDICINE

## 2019-02-09 PROCEDURE — 85025 COMPLETE CBC W/AUTO DIFF WBC: CPT | Performed by: INTERNAL MEDICINE

## 2019-02-09 PROCEDURE — 82948 REAGENT STRIP/BLOOD GLUCOSE: CPT

## 2019-02-09 RX ORDER — TORSEMIDE 20 MG/1
20 TABLET ORAL 2 TIMES DAILY
Status: DISCONTINUED | OUTPATIENT
Start: 2019-02-09 | End: 2019-02-14

## 2019-02-09 RX ORDER — POTASSIUM CHLORIDE 20 MEQ/1
40 TABLET, EXTENDED RELEASE ORAL 2 TIMES DAILY
Status: COMPLETED | OUTPATIENT
Start: 2019-02-09 | End: 2019-02-10

## 2019-02-09 RX ORDER — NYSTATIN 100000 U/G
CREAM TOPICAL 2 TIMES DAILY
Status: DISCONTINUED | OUTPATIENT
Start: 2019-02-09 | End: 2019-02-10

## 2019-02-09 RX ADMIN — ACETAMINOPHEN 975 MG: 325 TABLET, FILM COATED ORAL at 13:59

## 2019-02-09 RX ADMIN — HEPARIN SODIUM 5000 UNITS: 5000 INJECTION INTRAVENOUS; SUBCUTANEOUS at 21:00

## 2019-02-09 RX ADMIN — BACLOFEN 20 MG: 10 TABLET ORAL at 17:05

## 2019-02-09 RX ADMIN — CLOPIDOGREL BISULFATE 75 MG: 75 TABLET ORAL at 08:15

## 2019-02-09 RX ADMIN — DOCUSATE SODIUM 100 MG: 100 CAPSULE, LIQUID FILLED ORAL at 17:06

## 2019-02-09 RX ADMIN — ACETAMINOPHEN 975 MG: 325 TABLET, FILM COATED ORAL at 22:22

## 2019-02-09 RX ADMIN — NICOTINE 1 PATCH: 21 PATCH, EXTENDED RELEASE TRANSDERMAL at 08:16

## 2019-02-09 RX ADMIN — BACLOFEN 20 MG: 10 TABLET ORAL at 21:00

## 2019-02-09 RX ADMIN — POTASSIUM CHLORIDE 40 MEQ: 1500 TABLET, EXTENDED RELEASE ORAL at 10:09

## 2019-02-09 RX ADMIN — HEPARIN SODIUM 5000 UNITS: 5000 INJECTION INTRAVENOUS; SUBCUTANEOUS at 06:19

## 2019-02-09 RX ADMIN — HEPARIN SODIUM 5000 UNITS: 5000 INJECTION INTRAVENOUS; SUBCUTANEOUS at 13:59

## 2019-02-09 RX ADMIN — TORSEMIDE 20 MG: 20 TABLET ORAL at 17:06

## 2019-02-09 RX ADMIN — POTASSIUM CHLORIDE 40 MEQ: 1500 TABLET, EXTENDED RELEASE ORAL at 17:06

## 2019-02-09 RX ADMIN — SODIUM CHLORIDE 13 UNITS/HR: 9 INJECTION, SOLUTION INTRAVENOUS at 14:11

## 2019-02-09 RX ADMIN — ATORVASTATIN CALCIUM 40 MG: 40 TABLET, FILM COATED ORAL at 17:06

## 2019-02-09 RX ADMIN — ACETAMINOPHEN 975 MG: 325 TABLET, FILM COATED ORAL at 06:19

## 2019-02-09 RX ADMIN — FUROSEMIDE 40 MG: 10 INJECTION, SOLUTION INTRAMUSCULAR; INTRAVENOUS at 11:54

## 2019-02-09 RX ADMIN — GABAPENTIN 300 MG: 300 CAPSULE ORAL at 08:15

## 2019-02-09 RX ADMIN — GABAPENTIN 300 MG: 300 CAPSULE ORAL at 17:06

## 2019-02-09 RX ADMIN — CITALOPRAM HYDROBROMIDE 20 MG: 20 TABLET ORAL at 08:15

## 2019-02-09 RX ADMIN — FUROSEMIDE 40 MG: 10 INJECTION, SOLUTION INTRAMUSCULAR; INTRAVENOUS at 06:19

## 2019-02-09 RX ADMIN — BACLOFEN 20 MG: 10 TABLET ORAL at 08:15

## 2019-02-09 RX ADMIN — TAMSULOSIN HYDROCHLORIDE 0.4 MG: 0.4 CAPSULE ORAL at 17:06

## 2019-02-09 NOTE — ASSESSMENT & PLAN NOTE
· Await CT surgery recommendations  · ORIN to/11/19 revealed moderate mitral annular calcification and findings suggestive of chordal rupture and severe mitral regurgitation  · consideration of MitraClip over valve repair noted due to comorbidities

## 2019-02-09 NOTE — PLAN OF CARE
CARDIOVASCULAR - ADULT     Maintains optimal cardiac output and hemodynamic stability Progressing     Absence of cardiac dysrhythmias or at baseline rhythm Progressing        DISCHARGE PLANNING - CARE MANAGEMENT     Discharge to post-acute care or home with appropriate resources Progressing        METABOLIC, FLUID AND ELECTROLYTES - ADULT     Electrolytes maintained within normal limits Progressing     Fluid balance maintained Progressing     Glucose maintained within target range Progressing        Nutrition/Hydration-ADULT     Nutrient/Hydration intake appropriate for improving, restoring or maintaining nutritional needs Progressing        Potential for Falls     Patient will remain free of falls Progressing        Prexisting or High Potential for Compromised Skin Integrity     Skin integrity is maintained or improved Progressing        RESPIRATORY - ADULT     Achieves optimal ventilation and oxygenation Progressing

## 2019-02-09 NOTE — PROGRESS NOTES
Cardiology Progress Note - Michelle Galan 59 y o  male MRN: [id number]    Unit/Bed#: [facility] 512-01 Encounter: [id number]        Subjective:    No significant events overnight  Looks better  Oxygen requirement down substantially  ROS    Objective:   Vitals: Blood pressure 104/67, pulse 82, temperature 98 6 °F (37 °C), temperature source Oral, resp  rate 20, height 5' 10" (1 778 m), weight 80 kg (176 lb 5 9 oz), SpO2 97 %  , Body mass index is 25 31 kg/m² , Orthostatic Blood Pressures      Most Recent Value   Blood Pressure  104/67 filed at 02/09/2019 0748   Patient Position - Orthostatic VS  Lying filed at 02/09/2019 8889         Systolic (55JVS), JVT:59 , Min:90 , SMX:097     Diastolic (03HAQ), OJW:03, Min:53, Max:68      Intake/Output Summary (Last 24 hours) at 02/09/19 0924  Last data filed at 02/09/19 0814   Gross per 24 hour   Intake           805 98 ml   Output             3025 ml   Net         -2219 02 ml     Weight (last 2 days)     Date/Time   Weight    02/09/19 0600  80 (176 37)    02/08/19 0535  80 (176 37)    02/07/19 0505  79 4 (175)                Telemetry Review: No significant arrhythmias seen on telemetry review  nsr    Physical Exam   Constitutional: He is oriented to person, place, and time  No distress  HENT:   Mouth/Throat: No oropharyngeal exudate  Eyes: No scleral icterus  Neck: No JVD present  Cardiovascular: Normal rate and regular rhythm  Murmur heard  Pulmonary/Chest: Effort normal and breath sounds normal  No respiratory distress  He has no wheezes  He has no rales  Abdominal: Soft  Bowel sounds are normal  He exhibits no distension  There is no tenderness  There is no rebound  Musculoskeletal: He exhibits edema  Neurological: He is alert and oriented to person, place, and time  Skin: Skin is warm and dry  He is not diaphoretic           Laboratory Results:    Results from last 7 days  Lab Units 02/07/19  1257 02/07/19  0907 02/07/19  0537   TROPONIN I ng/mL 0 07* 0 08* 0  02       CBC with diff:   Results from last 7 days  Lab Units 02/09/19  0436 02/08/19  0532 02/07/19  1257 02/07/19  0916 02/07/19  0537 02/07/19  0515 02/07/19  0514   WBC Thousand/uL 8 42 12 92* 7 94  --  10 56*  --   --    HEMOGLOBIN g/dL 12 0 10 6* 11 1*  --  12 2  --   --    I STAT HEMOGLOBIN g/dl  --   --   --   --   --  12 6 12 9   HEMATOCRIT % 36 1* 31 7* 33 4*  --  37 8  --   --    HEMATOCRIT, ISTAT %  --   --   --   --   --  37 38   MCV fL 102* 103* 102*  --  105*  --   --    PLATELETS Thousands/uL 191 160 170 150 195  --   --    MCH pg 34 0 34 5* 33 8  --  33 9  --   --    MCHC g/dL 33 2 33 4 33 2  --  32 3  --   --    RDW % 13 7 13 7 13 6  --  13 6  --   --    MPV fL 10 7 10 9 10 3 10 5 10 3  --   --    NRBC AUTO /100 WBCs 0 0  --   --  0  --   --          CMP:  Results from last 7 days  Lab Units 02/09/19  0436 02/08/19  0759 02/08/19  0038 02/07/19  1629 02/07/19  0537 02/07/19  0515 02/07/19  0514   POTASSIUM mmol/L 3 4* 3 9 4 5 4 7 5 5*  --   --    CHLORIDE mmol/L 101 103 102 101 97*  --   --    CO2 mmol/L 30 28 27 25 24  --   --    CO2, I-STAT mmol/L  --   --   --   --   --  27 26   BUN mg/dL 48* 51* 54* 50* 45*  --   --    CREATININE mg/dL 1 62* 1 81* 1 97* 2 35* 2 17*  --   --    GLUCOSE, ISTAT mg/dl  --   --   --   --   --  425* 434*   CALCIUM mg/dL 8 6 8 8 8 8 9 0 9 3  --   --    AST U/L  --   --   --   --  48*  --   --    ALT U/L  --   --   --   --  40  --   --    ALK PHOS U/L  --   --   --   --  53  --   --    EGFR ml/min/1 73sq m 45 40 36 29 32  --  40         BMP:  Results from last 7 days  Lab Units 02/09/19  0436 02/08/19  0759 02/08/19  0038 02/07/19  1629 02/07/19  0537 02/07/19  0515 02/07/19  0514   POTASSIUM mmol/L 3 4* 3 9 4 5 4 7 5 5*  --   --    CHLORIDE mmol/L 101 103 102 101 97*  --   --    CO2 mmol/L 30 28 27 25 24  --   --    CO2, I-STAT mmol/L  --   --   --   --   --  27 26   BUN mg/dL 48* 51* 54* 50* 45*  --   --    CREATININE mg/dL 1 62* 1 81* 1 97* 2 35* 2 17*  --   -- GLUCOSE, ISTAT mg/dl  --   --   --   --   --  425* 434*   CALCIUM mg/dL 8 6 8 8 8 8 9 0 9 3  --   --        BNP: No results for input(s): BNP in the last 72 hours  Magnesium:   Results from last 7 days  Lab Units 19  0436 19  0759 19  0038   MAGNESIUM mg/dL 1 8 2 0 1 5*       Coags:   Results from last 7 days  Lab Units 19  0038 19  1257   PTT seconds 35 33   INR   --  0 99       TSH: No results found for: TSH    Hemoglobin A1C   Results from last 7 days  Lab Units 19  0916   HEMOGLOBIN A1C % 9 3*       Lipid Profile:   Results from last 7 days  Lab Units 19  0759   TRIGLYCERIDES mg/dL 83   HDL mg/dL 67*       Cardiac testing:   Results for orders placed during the hospital encounter of 19   Echo complete with contrast if indicated    Narrative [address]  [phone]    Transthoracic Echocardiogram  2D, M-mode, Doppler, and Color Doppler    Study date:  2019    Patient: Malika Velazquez  MR number: [id number]  Account number: [de-identified]  : 1960  Age: 59 years  Gender: Male  Status: Inpatient  Location: Bedside  Height: 70 in  Weight: 175 lb  BP: 104/ 65 mmHg    Indications: Heart failure, evaluate LV and RV function  Possible pulmpnary embolism  Diagnoses: I50 9 - Heart failure, unspecified    Sonographer:  HERMINIO Mariscal  Primary Physician:  Ulises Mack MD  Referring Physician:  Haley Lee MD  Group:  [facility]  Interpreting Physician:  Alcon Bourgeois MD    SUMMARY    LEFT VENTRICLE:  Systolic function was normal  Ejection fraction was estimated to be 60 %  Although no diagnostic regional wall motion abnormality was identified, this possibility cannot be completely excluded on the basis of this study  LEFT ATRIUM:  The atrium was mildly to moderately dilated  MITRAL VALVE:  There was moderate to marked annular calcification    There was severe flail motion of the posterior leaflet  Findings were suggestive of chordal rupture  Transmitral velocity was increased due to increased transvalvular flow  There was severe regurgitation  HISTORY: PRIOR HISTORY: COPD, PVD, DM2, alcohol abuse, current smoker, MRSA, spinal cord injury with some parathesia  PROCEDURE: The procedure was performed at the bedside  This was a routine study  The transthoracic approach was used  The study included complete 2D imaging, M-mode, complete spectral Doppler, and color Doppler  The heart rate was 115 bpm,  at the start of the study  Images were obtained from the parasternal, apical, subcostal, and suprasternal notch acoustic windows  Echocardiographic views were limited due to restricted patient mobility and poor acoustic window  availability  This was a technically difficult study  LEFT VENTRICLE: Size was normal  Systolic function was normal  Ejection fraction was estimated to be 60 %  Although no diagnostic regional wall motion abnormality was identified, this possibility cannot be completely excluded on the basis  of this study  Wall thickness was normal  There was no evidence of concentric hypertrophy  DOPPLER: The study was not technically sufficient to allow evaluation of LV diastolic function  RIGHT VENTRICLE: The size was normal  Systolic function was normal  Wall thickness was normal     LEFT ATRIUM: The atrium was mildly to moderately dilated  RIGHT ATRIUM: Size was normal     MITRAL VALVE: There was moderate to marked annular calcification  There was severe flail motion of the posterior leaflet  Findings were suggestive of chordal rupture  DOPPLER: Transmitral velocity was increased due to increased  transvalvular flow  There was no evidence for stenosis  There was severe regurgitation  AORTIC VALVE: The valve was trileaflet  Leaflets exhibited normal cuspal separation and sclerosis   DOPPLER: Transaortic velocity was within the normal range  There was no evidence for stenosis  There was no regurgitation  TRICUSPID VALVE: The valve structure was normal  There was normal leaflet separation  DOPPLER: The transtricuspid velocity was within the normal range  There was no evidence for stenosis  There was no regurgitation  PULMONIC VALVE: Leaflets exhibited normal thickness, no calcification, and normal cuspal separation  DOPPLER: The transpulmonic velocity was within the normal range  There was no regurgitation  PERICARDIUM: There was no pericardial effusion  The pericardium was normal in appearance  AORTA: The root exhibited normal size  SYSTEM MEASUREMENT TABLES    2D  %FS: 37 4 %  Ao Diam: 3 73 cm  EDV(Teich): 125 97 ml  EF(Cube): 75 47 %  EF(Teich): 67 08 %  ESV(Cube): 33 28 ml  ESV(Teich): 41 47 ml  IVSd: 0 93 cm  LA Diam: 4 48 cm  LVEDV MOD A4C: 104 91 ml  LVEF MOD A4C: 65 89 %  LVESV MOD A4C: 35 79 ml  LVIDd: 5 14 cm  LVIDs: 3 22 cm  LVLd A4C: 7 7 cm  LVLs A4C: 6 53 cm  LVPWd: 0 85 cm  SV MOD A4C: 69 12 ml  SV(Cube): 102 38 ml  SV(Teich): 84 5 ml    CW  AV Env  Ti: 205 18 ms  AV VTI: 19 47 cm  AV Vmax: 1 54 m/s  AV Vmean: 0 95 m/s  AV maxP 44 mmHg  AV meanP 06 mmHg  TR Vmax: 3 01 m/s  TR maxP 23 mmHg    MM  TAPSE: 2 05 cm    PW  E': 0 09 m/s  E/E': 20 01  LVOT Env  Ti: 255 08 ms  LVOT VTI: 12 07 cm  LVOT Vmax: 0 82 m/s  LVOT Vmean: 0 47 m/s  LVOT maxP 71 mmHg  LVOT meanP 12 mmHg  MV A [place]: 0 99 m/s  MV Dec [place]: 13 68 m/s2  MV DecT: 134 58 ms  MV E [place]: 1 84 m/s  MV E/A Ratio: 1 86    [employer] Accredited Echocardiography Laboratory    Prepared and electronically signed by    Courtenay Gowers, MD  Signed 2019 16:11:08       No results found for this or any previous visit  No results found for this or any previous visit  No results found for this or any previous visit      Meds/Allergies   current meds:   Current Facility-Administered Medications   Medication Dose Route Frequency     EMS REPLENISHMENT MED   Does not apply Once    acetaminophen (TYLENOL) tablet 975 mg  975 mg Oral Q8H [facility]    albuterol inhalation solution 2 5 mg  2 5 mg Nebulization Q6H PRN    atorvastatin (LIPITOR) tablet 40 mg  40 mg Oral Daily With Dinner    baclofen tablet 20 mg  20 mg Oral TID    citalopram (CeleXA) tablet 20 mg  20 mg Oral Daily    clopidogrel (PLAVIX) tablet 75 mg  75 mg Oral Daily    docusate sodium (COLACE) capsule 100 mg  100 mg Oral BID    gabapentin (NEURONTIN) capsule 300 mg  300 mg Oral BID    heparin (porcine) subcutaneous injection 5,000 Units  5,000 Units Subcutaneous Q8H [facility]    insulin regular (HumuLIN R,NovoLIN R) 1 Units/mL in sodium chloride 0 9 % 100 mL infusion  0 3-21 Units/hr Intravenous Titrated    nicotine (NICODERM CQ) 21 mg/24 hr TD 24 hr patch 1 patch  1 patch Transdermal Daily    ondansetron (ZOFRAN) injection 4 mg  4 mg Intravenous Q6H PRN    potassium chloride (K-DUR,KLOR-CON) CR tablet 40 mEq  40 mEq Oral BID    tamsulosin (FLOMAX) capsule 0 4 mg  0 4 mg Oral Daily With Dinner    torsemide (DEMADEX) tablet 20 mg  20 mg Oral BID     Prescriptions Prior to Admission   Medication    aspirin (ECOTRIN LOW STRENGTH) 81 mg EC tablet    baclofen 20 mg tablet    citalopram (CeleXA) 20 mg tablet    clopidogrel (PLAVIX) 75 mg tablet    gabapentin (NEURONTIN) 400 mg capsule    glimepiride (AMARYL) 1 mg tablet    lisinopril (ZESTRIL) 10 mg tablet    metFORMIN (GLUCOPHAGE) 1000 MG tablet    rosuvastatin (CRESTOR) 20 MG tablet    tamsulosin (FLOMAX) 0 4 mg         insulin regular (HumuLIN R,NovoLIN R) infusion 0 3-21 Units/hr Last Rate: 4 Units/hr (02/09/19 3211)     Assessment:  Principal Problem:    Acute respiratory failure with hypoxia (HCC)  Active Problems:    Tobacco use disorder    Type 2 diabetes mellitus with diabetic polyneuropathy, with long-term current use of insulin (HCC)    Peripheral vascular disease due to secondary diabetes mellitus ([address] )    Essential hypertension    Acute combined systolic and diastolic congestive heart failure (HCC)    Acute kidney insufficiency    Obstructive uropathy    Volume overload    Non-rheumatic mitral regurgitation    Chordae tendinae rupture (HCC)    Quadriplegia (HCC)      Plan:    Acute Decompensated heart failure secondary to severe MR      ORIN is planned for Monday  CTS eval ongoing  Clinically looks better and will transition to bid torsemide  Counseling / Coordination of Care  Total floor / unit time spent today 25 minutes  Greater than 50% of total time was spent with the patient and / or family counseling and / or coordination of care  A description of the counseling / coordination of care

## 2019-02-09 NOTE — PROGRESS NOTES
NEPHROLOGY PROGRESS NOTE    Lore Scott 59 y o  male MRN: [id number]  Unit/Bed#: [facility]  Encounter: [id number]  Reason for Consult:  Acute on chronic kidney disease    ASSESSMENT/PLAN:  1  Renal  Patient acute on chronic kidney disease and with diuresis creatinine is actually declined to 1 6  This is approaching baseline creatinine  Patient is on oral diuretic now and Cardiology is following regarding volume overload and heart failure  Since renal functions improving and Cardiology is managing diuretics we will sign off please call if we can be of further assistance  SUBJECTIVE:  Review of Systems   Constitution: Negative for chills and fever  HENT: Negative  Eyes: Negative  Cardiovascular: Negative for chest pain, leg swelling and orthopnea  Respiratory: Negative for cough and shortness of breath  Gastrointestinal: Negative  Genitourinary: Negative  OBJECTIVE:  Current Weight: Weight - Scale: 80 kg (176 lb 5 9 oz)  Vitals:Temp (24hrs), Av 3 °F (36 8 °C), Min:97 8 °F (36 6 °C), Max:98 6 °F (37 °C)  Current: Temperature: 98 3 °F (36 8 °C)   Blood pressure 117/64, pulse 103, temperature 98 3 °F (36 8 °C), resp  rate 22, height 5' 10" (1 778 m), weight 80 kg (176 lb 5 9 oz), SpO2 97 %  , Body mass index is 25 31 kg/m²  Intake/Output Summary (Last 24 hours) at 19 1347  Last data filed at 19 1101   Gross per 24 hour   Intake           805 98 ml   Output             2175 ml   Net         -1369 02 ml       Physical Exam: /64   Pulse 103   Temp 98 3 °F (36 8 °C)   Resp 22   Ht 5' 10" (1 778 m)   Wt 80 kg (176 lb 5 9 oz)   SpO2 97%   BMI 25 31 kg/m²   Physical Exam   Constitutional: No distress  HENT:   Mouth/Throat: No oropharyngeal exudate  Eyes: No scleral icterus  Neck: Neck supple  No JVD present  Cardiovascular: Normal rate  Exam reveals no friction rub  Murmur heard  Pulmonary/Chest: Effort normal  No respiratory distress   He has no rales    Abdominal: Soft  Bowel sounds are normal  He exhibits no distension  There is no tenderness  There is no rebound         Medications:    Current Facility-Administered Medications:      EMS REPLENISHMENT MED, , Does not apply, Once, Shay Rossi MD    acetaminophen (TYLENOL) tablet 975 mg, 975 mg, Oral, Q8H [facility], Sera Delgado PA-C, 975 mg at 02/09/19 6622    albuterol inhalation solution 2 5 mg, 2 5 mg, Nebulization, Q6H PRN, Shay Rossi MD    atorvastatin (LIPITOR) tablet 40 mg, 40 mg, Oral, Daily With Charla Casanova MD, 40 mg at 02/08/19 1746    baclofen tablet 20 mg, 20 mg, Oral, TID, Abad Quiroz MD, 20 mg at 02/09/19 0815    citalopram (CeleXA) tablet 20 mg, 20 mg, Oral, Daily, Sera Delgado PA-C, 20 mg at 02/09/19 0815    clopidogrel (PLAVIX) tablet 75 mg, 75 mg, Oral, Daily, Abad Quiroz MD, 75 mg at 02/09/19 0815    docusate sodium (COLACE) capsule 100 mg, 100 mg, Oral, BID, Abad Quiroz MD, 100 mg at 02/08/19 2597    gabapentin (NEURONTIN) capsule 300 mg, 300 mg, Oral, BID, Abad Quiroz MD, 300 mg at 02/09/19 0815    heparin (porcine) subcutaneous injection 5,000 Units, 5,000 Units, Subcutaneous, Q8H [facility], Afia CHERELLE Watkins, 5,000 Units at 02/09/19 0419    insulin regular (HumuLIN R,NovoLIN R) 1 Units/mL in sodium chloride 0 9 % 100 mL infusion, 0 3-21 Units/hr, Intravenous, Titrated, Alicja Celis PA-C, Last Rate: 4 mL/hr at 02/09/19 1153, 4 Units/hr at 02/09/19 1153    nicotine (NICODERM CQ) 21 mg/24 hr TD 24 hr patch 1 patch, 1 patch, Transdermal, Daily, Abad Quiroz MD, 1 patch at 02/09/19 0816    ondansetron (ZOFRAN) injection 4 mg, 4 mg, Intravenous, Q6H PRN, Abad Quiroz MD    potassium chloride (K-DUR,KLOR-CON) CR tablet 40 mEq, 40 mEq, Oral, BID, Maribeth Del Rio MD, 40 mEq at 02/09/19 1009    tamsulosin (FLOMAX) capsule 0 4 mg, 0 4 mg, Oral, Daily With Dinner, Abad Quiroz MD, 0 4 mg at 02/08/19 1926    torsemide (DEMADEX) tablet 20 mg, 20 mg, Oral, BID, Jenn Coates MD    Laboratory Results:  Lab Results   Component Value Date    WBC 8 42 02/09/2019    HGB 12 0 02/09/2019    HCT 36 1 (L) 02/09/2019     (H) 02/09/2019     02/09/2019     Lab Results   Component Value Date    GLUCOSE 425 (H) 02/07/2019    CALCIUM 8 6 02/09/2019    K 3 4 (L) 02/09/2019    CO2 30 02/09/2019     02/09/2019    BUN 48 (H) 02/09/2019    CREATININE 1 62 (H) 02/09/2019     Lab Results   Component Value Date    CALCIUM 8 6 02/09/2019    PHOS 4 3 (H) 02/09/2019     No results found for: LABPROT

## 2019-02-09 NOTE — ASSESSMENT & PLAN NOTE
· multifactorial secondary to diastolic CHF exacerbation coupled with progressive severe mitral regurgitation and chordae tendineae rupture  · has been progressively weaned off BiPAP onto nasal cannula oxygenation intermittently room air now

## 2019-02-09 NOTE — ASSESSMENT & PLAN NOTE
· HbA1c of 9 3   · Patient weaned off insulin drip yesterday, continue Lantus 34 units at bedtime and Humalog 12 units with meals  · Appreciate Endocrinology input  · Continue Gabapentin for neuropathy

## 2019-02-09 NOTE — ASSESSMENT & PLAN NOTE
· due to fall s/p C4 vertebral injury   · PRN pain control and supportive care otherwise   · frequent turning/repositioning by nursing staff during wake hours to prevent deep tissue injuries ordered   · continue Baclofen regimen for spasms

## 2019-02-09 NOTE — PROGRESS NOTES
[facility] - Internal Medicine Progress Note       PATIENT INFORMATION      Patient: Usama Alonso 59 y o  male   MRN: [id number]  PCP: Carlos Villeda MD  Unit/Bed#: [facility] 512-01 Encounter: [id number]  Date Of Visit: 02/09/19       ASSESSMENTS & PLAN     Acute respiratory failure with hypoxia   Assessment & Plan    - multifactorial secondary to diastolic CHF exacerbation coupled with progressive severe mitral regurgitation and suspected chordae tendineae rupture  - has been progressively weaned off BiPAP onto nasal cannula oxygenation intermittently room air now     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 - Severe mitral regurgitation   Assessment & Plan    - appreciate CT surgery input   - plan for ORIN on Monday - consideration of MitraClip over valve repair noted due to comorbidities  - monitor vitals and maintain hemodynamics      Acute on chronic diastolic CHF   Assessment & Plan    - EF of 60% with severe MR  - proBNP of 3,787 on admission  - cardiology following - continue Demadex regimen  - low-sodium diet with fluid restrictions enforced     Quadriplegia - history of Traumatic cervical injury   Assessment & Plan    - due to fall s/p C4 vertebral injury   - PRN pain control and supportive care otherwise   - frequent turning/repositioning by nursing staff during wake hours to prevent deep tissue injuries ordered   - continue Baclofen regimen for spasms     PAD   Assessment & Plan    - s/p right femoral-popliteal bypass   - continue Plavix/Lipitor  - fasting LDL well controlled at 7  - aggressive blood sugar control encouraged along with smoking cessation     Non-MI elevated troponin   Assessment & Plan    - likely secondary to acute CHF with severe MR   - troponin peak of 0 08     Tobacco abuse   Assessment & Plan    - smoking cessation counseling  - transdermal nicotine patch on board     Diabetes mellitus type 2 - Diabetic neuropathy   Assessment & Plan    - HbA1c of 9 3   - currently maintained on an insulin drip due to persistent hyperglycemia  - continue Gabapentin for neuropathy     Acute kidney injury   Assessment & Plan    - creatinine improved from a peak of 2 35 -> 1 62 currently   - monitor renal function limit/avoid nephrotoxins as possible   - Demadex use noted     Hypomagnesemia - Hypokalemia   Assessment & Plan    - monitor/replete as necessary      Leukocytosis   Assessment & Plan    - likely reactive secondary to acute medical issues - remains afebrile  - monitor WBC count     BPH (benign prostatic hyperplasia)   Assessment & Plan    - c/w Flomax        VTE Prophylaxis:  Heparin SC      SUBJECTIVE     Seen/examined earlier today during nursing rounds  Remains in pleasant spirits  He notes that his shortness of breath has improved today  Denies any worsening of his pain at this point either  Remains in positive spirits  OBJECTIVE     Vitals:   Temp (24hrs), Av 3 °F (36 8 °C), Min:97 8 °F (36 6 °C), Max:98 6 °F (37 °C)    Temp:  [97 8 °F (36 6 °C)-98 6 °F (37 °C)] 98 4 °F (36 9 °C)  HR:  [] 105  Resp:  [15-22] 18  BP: ()/(53-68) 100/56  SpO2:  [93 %-100 %] 100 %  Body mass index is 25 31 kg/m²  Input and Output Summary (last 24 hours):        Intake/Output Summary (Last 24 hours) at 19 1621  Last data filed at 19 1456   Gross per 24 hour   Intake          1098 41 ml   Output             2225 ml   Net         -1126 59 ml       Physical Exam:     GENERAL:  Weak/fatigued  HEAD:  Normocephalic - atraumatic  EYES: PERRL - EOMI   MOUTH:  Mucosa moist  NECK:  Supple - full range of motion  CARDIAC:  Intermittently tachycardic but regular rhythm - S1/S2 positive  PULMONARY:  Diminished but fairly clear bibasilar breath sounds - nonlabored respirations  ABDOMEN:  Soft - nontender/nondistended - active bowel sounds  MUSCULOSKELETAL:  Motor strength/range of motion markedly deconditioned  NEUROLOGIC:  Alert/oriented x 3 - quadriplegic  SKIN:  Chronic wrinkles/blemishes   PSYCHIATRIC:  Mood/affect stable      ADDITIONAL DATA       Labs & Recent Cultures:       Results from last 7 days  Lab Units 02/09/19  0436   WBC Thousand/uL 8 42   HEMOGLOBIN g/dL 12 0   HEMATOCRIT % 36 1*   PLATELETS Thousands/uL 191   NEUTROS PCT % 63   LYMPHS PCT % 24   MONOS PCT % 10   EOS PCT % 2       Results from last 7 days  Lab Units 02/09/19  0436  02/07/19  0537 02/07/19  0515   SODIUM mmol/L 141  < > 132*  --    POTASSIUM mmol/L 3 4*  < > 5 5*  --    CHLORIDE mmol/L 101  < > 97*  --    CO2 mmol/L 30  < > 24  --    CO2, I-STAT mmol/L  --   --   --  27   BUN mg/dL 48*  < > 45*  --    CREATININE mg/dL 1 62*  < > 2 17*  --    CALCIUM mg/dL 8 6  < > 9 3  --    ALK PHOS U/L  --   --  53  --    ALT U/L  --   --  40  --    AST U/L  --   --  48*  --    GLUCOSE, ISTAT mg/dl  --   --   --  425*   < > = values in this interval not displayed  Results from last 7 days  Lab Units 02/07/19  1257   INR  0 99           Results from last 7 days  Lab Units 02/07/19  0537 02/07/19  0536   BLOOD CULTURE  No Growth at 48 hrs  No Growth at 48 hrs           Last 24 Hours Medication List:     Current Facility-Administered Medications:  EMS replenish medication  Does not apply Once Michele Kemp MD    acetaminophen 975 mg Oral Q8H [address] Sera Reynoso PA-C    albuterol 2 5 mg Nebulization Q6H PRN Michele Kemp MD    atorvastatin 40 mg Oral Daily With Aide Warren MD    baclofen 20 mg Oral TID Antonio Farmer MD    citalopram 20 mg Oral Daily Sera Reynoso PA-C    clopidogrel 75 mg Oral Daily Antonio Farmer MD    docusate sodium 100 mg Oral BID Antonio Farmer MD    gabapentin 300 mg Oral BID Antonio Farmer MD    heparin (porcine) 5,000 Units Subcutaneous Q8H [address] CHERELLE Menon    insulin regular (HumuLIN R,NovoLIN R) infusion 0 3-21 Units/hr Intravenous Titrated Tae Chris PA-C Last Rate: 4 Units/hr (02/09/19 9219)   nicotine 1 patch Transdermal Daily Antonio Farmer MD nystatin  Topical BID Lilibeth Galvin MD    ondansetron 4 mg Intravenous Q6H PRN Michael Haji MD    potassium chloride 40 mEq Oral BID Lilibeth Galvin MD    tamsulosin 0 4 mg Oral Daily With Abimbola Hernandez MD    torsemide 20 mg Oral BID Jin Valenzuela MD           Time Spent for Care: 33 minutes  More than 50% of total time spent on counseling and coordination of care as described above  Current Length of Stay: 2 day(s)      Code Status: Level 3 - DNAR and DNI         ** Please Note: This note is constructed using a voice recognition dictation system   **

## 2019-02-09 NOTE — ASSESSMENT & PLAN NOTE
· s/p right femoral-popliteal bypass   · continue Plavix/Lipitor  · fasting LDL well controlled at 7  · aggressive blood sugar control encouraged along with smoking cessation

## 2019-02-09 NOTE — ASSESSMENT & PLAN NOTE
· Patient failed urinary retention protocol and Vasques catheter placed 2/11  · Bowel regimen for constipation  · Continue Flomax  · Appreciate Urology input, consider voiding trial in the next 20 for 48 hours however he undergoes cardiothoracic surgery catheter can remain indwelling until after procedure

## 2019-02-09 NOTE — ASSESSMENT & PLAN NOTE
· EF of 60% with severe MR  · proBNP of 3,787 on admission  · cardiology following - continue Demadex regimen  · low-sodium diet with fluid restrictions enforced

## 2019-02-09 NOTE — ASSESSMENT & PLAN NOTE
· creatinine improved from a peak of 2 35 -> 1 29 currently   · monitor renal function limit/avoid nephrotoxins as possible   · demadex resumed 20mg BID

## 2019-02-10 LAB
ANION GAP SERPL CALCULATED.3IONS-SCNC: 8 MMOL/L (ref 4–13)
BASOPHILS # BLD AUTO: 0.06 THOUSANDS/ΜL (ref 0–0.1)
BASOPHILS NFR BLD AUTO: 1 % (ref 0–1)
BUN SERPL-MCNC: 50 MG/DL (ref 5–25)
CALCIUM SERPL-MCNC: 8.2 MG/DL (ref 8.3–10.1)
CHLORIDE SERPL-SCNC: 104 MMOL/L (ref 100–108)
CO2 SERPL-SCNC: 27 MMOL/L (ref 21–32)
CREAT SERPL-MCNC: 1.52 MG/DL (ref 0.6–1.3)
EOSINOPHIL # BLD AUTO: 0.13 THOUSAND/ΜL (ref 0–0.61)
EOSINOPHIL NFR BLD AUTO: 2 % (ref 0–6)
ERYTHROCYTE [DISTWIDTH] IN BLOOD BY AUTOMATED COUNT: 13.6 % (ref 11.6–15.1)
GFR SERPL CREATININE-BSD FRML MDRD: 49 ML/MIN/1.73SQ M
GLUCOSE SERPL-MCNC: 121 MG/DL (ref 65–140)
GLUCOSE SERPL-MCNC: 122 MG/DL (ref 65–140)
GLUCOSE SERPL-MCNC: 122 MG/DL (ref 65–140)
GLUCOSE SERPL-MCNC: 130 MG/DL (ref 65–140)
GLUCOSE SERPL-MCNC: 148 MG/DL (ref 65–140)
GLUCOSE SERPL-MCNC: 150 MG/DL (ref 65–140)
GLUCOSE SERPL-MCNC: 151 MG/DL (ref 65–140)
GLUCOSE SERPL-MCNC: 172 MG/DL (ref 65–140)
GLUCOSE SERPL-MCNC: 194 MG/DL (ref 65–140)
GLUCOSE SERPL-MCNC: 217 MG/DL (ref 65–140)
GLUCOSE SERPL-MCNC: 291 MG/DL (ref 65–140)
GLUCOSE SERPL-MCNC: 346 MG/DL (ref 65–140)
GLUCOSE SERPL-MCNC: 356 MG/DL (ref 65–140)
HCT VFR BLD AUTO: 39.7 % (ref 36.5–49.3)
HGB BLD-MCNC: 13.2 G/DL (ref 12–17)
IMM GRANULOCYTES # BLD AUTO: 0.03 THOUSAND/UL (ref 0–0.2)
IMM GRANULOCYTES NFR BLD AUTO: 1 % (ref 0–2)
LYMPHOCYTES # BLD AUTO: 2.1 THOUSANDS/ΜL (ref 0.6–4.47)
LYMPHOCYTES NFR BLD AUTO: 36 % (ref 14–44)
MAGNESIUM SERPL-MCNC: 1.7 MG/DL (ref 1.6–2.6)
MCH RBC QN AUTO: 33.8 PG (ref 26.8–34.3)
MCHC RBC AUTO-ENTMCNC: 33.2 G/DL (ref 31.4–37.4)
MCV RBC AUTO: 102 FL (ref 82–98)
MONOCYTES # BLD AUTO: 0.78 THOUSAND/ΜL (ref 0.17–1.22)
MONOCYTES NFR BLD AUTO: 13 % (ref 4–12)
NEUTROPHILS # BLD AUTO: 2.77 THOUSANDS/ΜL (ref 1.85–7.62)
NEUTS SEG NFR BLD AUTO: 47 % (ref 43–75)
NRBC BLD AUTO-RTO: 0 /100 WBCS
PHOSPHATE SERPL-MCNC: 3.1 MG/DL (ref 2.3–4.1)
PLATELET # BLD AUTO: 210 THOUSANDS/UL (ref 149–390)
PMV BLD AUTO: 10.7 FL (ref 8.9–12.7)
POTASSIUM SERPL-SCNC: 3.4 MMOL/L (ref 3.5–5.3)
RBC # BLD AUTO: 3.9 MILLION/UL (ref 3.88–5.62)
SODIUM SERPL-SCNC: 139 MMOL/L (ref 136–145)
WBC # BLD AUTO: 5.87 THOUSAND/UL (ref 4.31–10.16)

## 2019-02-10 PROCEDURE — 83735 ASSAY OF MAGNESIUM: CPT | Performed by: INTERNAL MEDICINE

## 2019-02-10 PROCEDURE — 99232 SBSQ HOSP IP/OBS MODERATE 35: CPT | Performed by: INTERNAL MEDICINE

## 2019-02-10 PROCEDURE — 82948 REAGENT STRIP/BLOOD GLUCOSE: CPT

## 2019-02-10 PROCEDURE — 85025 COMPLETE CBC W/AUTO DIFF WBC: CPT | Performed by: INTERNAL MEDICINE

## 2019-02-10 PROCEDURE — 84100 ASSAY OF PHOSPHORUS: CPT | Performed by: INTERNAL MEDICINE

## 2019-02-10 PROCEDURE — 99222 1ST HOSP IP/OBS MODERATE 55: CPT | Performed by: INTERNAL MEDICINE

## 2019-02-10 PROCEDURE — 80048 BASIC METABOLIC PNL TOTAL CA: CPT | Performed by: INTERNAL MEDICINE

## 2019-02-10 RX ORDER — POLYETHYLENE GLYCOL 3350 17 G/17G
17 POWDER, FOR SOLUTION ORAL DAILY PRN
Status: DISCONTINUED | OUTPATIENT
Start: 2019-02-10 | End: 2019-02-14 | Stop reason: HOSPADM

## 2019-02-10 RX ORDER — MAGNESIUM SULFATE HEPTAHYDRATE 40 MG/ML
2 INJECTION, SOLUTION INTRAVENOUS ONCE
Status: COMPLETED | OUTPATIENT
Start: 2019-02-10 | End: 2019-02-10

## 2019-02-10 RX ORDER — INSULIN GLARGINE 100 [IU]/ML
30 INJECTION, SOLUTION SUBCUTANEOUS
Status: DISCONTINUED | OUTPATIENT
Start: 2019-02-10 | End: 2019-02-11

## 2019-02-10 RX ORDER — NYSTATIN 100000 [USP'U]/G
POWDER TOPICAL 2 TIMES DAILY
Status: DISCONTINUED | OUTPATIENT
Start: 2019-02-10 | End: 2019-02-14 | Stop reason: HOSPADM

## 2019-02-10 RX ORDER — POTASSIUM CHLORIDE 20 MEQ/1
40 TABLET, EXTENDED RELEASE ORAL 2 TIMES DAILY
Status: DISCONTINUED | OUTPATIENT
Start: 2019-02-10 | End: 2019-02-12

## 2019-02-10 RX ADMIN — POTASSIUM CHLORIDE 40 MEQ: 1500 TABLET, EXTENDED RELEASE ORAL at 08:14

## 2019-02-10 RX ADMIN — POTASSIUM CHLORIDE 40 MEQ: 1500 TABLET, EXTENDED RELEASE ORAL at 18:27

## 2019-02-10 RX ADMIN — CITALOPRAM HYDROBROMIDE 20 MG: 20 TABLET ORAL at 08:14

## 2019-02-10 RX ADMIN — SODIUM CHLORIDE 4 UNITS/HR: 9 INJECTION, SOLUTION INTRAVENOUS at 08:18

## 2019-02-10 RX ADMIN — NICOTINE 1 PATCH: 21 PATCH, EXTENDED RELEASE TRANSDERMAL at 08:15

## 2019-02-10 RX ADMIN — ACETAMINOPHEN 975 MG: 325 TABLET, FILM COATED ORAL at 06:23

## 2019-02-10 RX ADMIN — DOCUSATE SODIUM 100 MG: 100 CAPSULE, LIQUID FILLED ORAL at 18:26

## 2019-02-10 RX ADMIN — NYSTATIN: 100000 CREAM TOPICAL at 08:16

## 2019-02-10 RX ADMIN — ATORVASTATIN CALCIUM 40 MG: 40 TABLET, FILM COATED ORAL at 16:20

## 2019-02-10 RX ADMIN — NYSTATIN: 100000 POWDER TOPICAL at 18:29

## 2019-02-10 RX ADMIN — MAGNESIUM SULFATE IN WATER 2 G: 40 INJECTION, SOLUTION INTRAVENOUS at 09:59

## 2019-02-10 RX ADMIN — CLOPIDOGREL BISULFATE 75 MG: 75 TABLET ORAL at 08:14

## 2019-02-10 RX ADMIN — TORSEMIDE 20 MG: 20 TABLET ORAL at 18:26

## 2019-02-10 RX ADMIN — HEPARIN SODIUM 5000 UNITS: 5000 INJECTION INTRAVENOUS; SUBCUTANEOUS at 21:54

## 2019-02-10 RX ADMIN — BACLOFEN 20 MG: 10 TABLET ORAL at 21:54

## 2019-02-10 RX ADMIN — TORSEMIDE 20 MG: 20 TABLET ORAL at 08:14

## 2019-02-10 RX ADMIN — ACETAMINOPHEN 975 MG: 325 TABLET, FILM COATED ORAL at 21:54

## 2019-02-10 RX ADMIN — BACLOFEN 20 MG: 10 TABLET ORAL at 08:14

## 2019-02-10 RX ADMIN — DOCUSATE SODIUM 100 MG: 100 CAPSULE, LIQUID FILLED ORAL at 08:14

## 2019-02-10 RX ADMIN — GABAPENTIN 300 MG: 300 CAPSULE ORAL at 18:26

## 2019-02-10 RX ADMIN — HEPARIN SODIUM 5000 UNITS: 5000 INJECTION INTRAVENOUS; SUBCUTANEOUS at 06:23

## 2019-02-10 RX ADMIN — ACETAMINOPHEN 975 MG: 325 TABLET, FILM COATED ORAL at 13:51

## 2019-02-10 RX ADMIN — INSULIN GLARGINE 30 UNITS: 100 INJECTION, SOLUTION SUBCUTANEOUS at 21:54

## 2019-02-10 RX ADMIN — TAMSULOSIN HYDROCHLORIDE 0.4 MG: 0.4 CAPSULE ORAL at 16:20

## 2019-02-10 RX ADMIN — GABAPENTIN 300 MG: 300 CAPSULE ORAL at 08:14

## 2019-02-10 RX ADMIN — BACLOFEN 20 MG: 10 TABLET ORAL at 16:20

## 2019-02-10 RX ADMIN — HEPARIN SODIUM 5000 UNITS: 5000 INJECTION INTRAVENOUS; SUBCUTANEOUS at 13:50

## 2019-02-10 NOTE — PROGRESS NOTES
[facility] Hospitalist Service - Internal Medicine Progress Note       PATIENT INFORMATION      Patient: Michelle Galan 59 y o  male   MRN: [id number]  PCP: Jonatan Ruiz MD  Unit/Bed#: [facility] 512-01 Encounter: [id number]  Date Of Visit: 02/10/19       ASSESSMENTS & PLAN     Acute respiratory failure with hypoxia   Assessment & Plan    - multifactorial secondary to diastolic CHF exacerbation coupled with progressive severe mitral regurgitation and suspected chordae tendineae rupture  - has been progressively weaned off BiPAP onto nasal cannula oxygenation more frequently on room air now     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 - Severe mitral regurgitation   Assessment & Plan    - appreciate CT surgery input   - plan for ORIN likely tomorrow (NPO after midnight) - consideration of MitraClip over valve repair noted due to comorbidities  - monitor vitals and maintain hemodynamics      Acute on chronic diastolic CHF   Assessment & Plan    - EF of 60% with severe MR  - proBNP of 3,787 on admission  - cardiology following - continue Demadex regimen  - low-sodium diet with fluid restrictions enforced - net fluid balance of approximately (-) 3 49 L      Quadriplegia - history of Traumatic cervical injury   Assessment & Plan    - due to fall s/p C4 vertebral injury   - PRN pain control and supportive care otherwise   - frequent turning/repositioning by nursing staff during wake hours to prevent deep tissue injuries ordered   - continue Baclofen regimen for spasms     PAD   Assessment & Plan    - s/p right femoral-popliteal bypass   - continue Plavix/Lipitor  - fasting LDL well controlled at 7  - aggressive blood sugar control encouraged along with smoking cessation     Non-MI elevated troponin   Assessment & Plan    - likely secondary to acute CHF with severe MR   - troponin peak of 0 08     Tobacco abuse   Assessment & Plan    - smoking cessation counseling  - transdermal nicotine patch on board     Diabetes mellitus type 2 - Diabetic neuropathy   Assessment & Plan    - HbA1c of 9 3   - currently maintained on an insulin drip due to persistent hyperglycemia  - continue Gabapentin for neuropathy  - will appreciate endocrinology input      Acute kidney injury   Assessment & Plan    - creatinine improved from a peak of 2 35 -> 1 52 currently   - monitor renal function limit/avoid nephrotoxins as possible   - Demadex use noted     Hypomagnesemia - Hypokalemia   Assessment & Plan    - monitor/replete as necessary      Leukocytosis   Assessment & Plan    - likely reactive secondary to acute medical issues - remains afebrile  - monitor WBC count - currently normalized      BPH (benign prostatic hyperplasia)   Assessment & Plan    - c/w Flomax        VTE Prophylaxis:  Heparin SC      SUBJECTIVE     Seen/examined this morning during nursing rounds  No significant overnight events  Has not had a bowel movement over the last few days but notes that he only goes 2-3 times a week at home  No new complaints otherwise  OBJECTIVE     Vitals:   Temp (24hrs), Av 3 °F (36 8 °C), Min:97 9 °F (36 6 °C), Max:98 8 °F (37 1 °C)    Temp:  [97 9 °F (36 6 °C)-98 8 °F (37 1 °C)] 98 1 °F (36 7 °C)  HR:  [] 95  Resp:  [16-22] 20  BP: ()/(56-68) 97/59  SpO2:  [94 %-100 %] 97 %  Body mass index is 25 05 kg/m²  Input and Output Summary (last 24 hours):        Intake/Output Summary (Last 24 hours) at 2/10/2019 1032  Last data filed at 2/10/2019 9745  Gross per 24 hour   Intake 1060 98 ml   Output 2075 ml   Net -1014 02 ml       Physical Exam:     GENERAL:  Weak/fatigued  HEAD:  Normocephalic - atraumatic  EYES: PERRL - EOMI   MOUTH:  Mucosa moist  NECK:  Supple - full range of motion  CARDIAC:  Intermittently tachycardic but regular rhythm - S1/S2 positive  PULMONARY:  Diminished bibasilar breath sounds - nonlabored respirations  ABDOMEN:  Soft - nontender/nondistended - active bowel sounds  MUSCULOSKELETAL:  Motor strength/range of motion deconditioned  NEUROLOGIC:  Alert/oriented x 3 - quadriplegic chronically  SKIN:  Chronic wrinkles/blemishes   PSYCHIATRIC:  Mood/affect pleasant      ADDITIONAL DATA       Labs & Recent Cultures:     Results from last 7 days   Lab Units 02/10/19  0433   WBC Thousand/uL 5 87   HEMOGLOBIN g/dL 13 2   HEMATOCRIT % 39 7   PLATELETS Thousands/uL 210   NEUTROS PCT % 47   LYMPHS PCT % 36   MONOS PCT % 13*   EOS PCT % 2     Results from last 7 days   Lab Units 02/10/19  0433  02/07/19  0537 02/07/19  0515   SODIUM mmol/L 139   < > 132*  --    POTASSIUM mmol/L 3 4*   < > 5 5*  --    CHLORIDE mmol/L 104   < > 97*  --    CO2 mmol/L 27   < > 24  --    CO2, I-STAT mmol/L  --   --   --  27   BUN mg/dL 50*   < > 45*  --    CREATININE mg/dL 1 52*   < > 2 17*  --    CALCIUM mg/dL 8 2*   < > 9 3  --    ALK PHOS U/L  --   --  53  --    ALT U/L  --   --  40  --    AST U/L  --   --  48*  --    GLUCOSE, ISTAT mg/dl  --   --   --  425*    < > = values in this interval not displayed  Results from last 7 days   Lab Units 02/07/19  1257   INR  0 99         Results from last 7 days   Lab Units 02/07/19  0537 02/07/19  0536   BLOOD CULTURE  No Growth at 72 hrs  No Growth at 72 hrs           Last 24 Hours Medication List:     Current Facility-Administered Medications:  EMS replenish medication  Does not apply Once Shay Rossi MD    acetaminophen 975 mg Oral Q8H [address] Sera Taylor PA-C    albuterol 2 5 mg Nebulization Q6H PRN Shya Rossi MD    atorvastatin 40 mg Oral Daily With Ivan Schwartz MD    baclofen 20 mg Oral TID Abad Quiroz MD    citalopram 20 mg Oral Daily Sera Taylor PA-C    clopidogrel 75 mg Oral Daily Abad Quiroz MD    docusate sodium 100 mg Oral BID Abad Quiroz MD    gabapentin 300 mg Oral BID Abad Quiroz MD    heparin (porcine) 5,000 Units Subcutaneous Q8H [address] CHERELLE Porter    insulin regular (HumuLIN R,NovoLIN R) infusion 0 3-21 Units/hr Intravenous Titrated Juliana Wilkes ESTHER Meade Last Rate: 10 Units/hr (02/10/19 1006)   magnesium sulfate 2 g Intravenous Once Ana Laura Solorzano MD    nicotine 1 patch Transdermal Daily Krystyna Lucas MD    nystatin  Topical BID Ana Laura Solorzano MD    ondansetron 4 mg Intravenous Q6H PRN Krystyna Lucas MD    polyethylene glycol 17 g Oral Daily PRN Ana Laura Solorzano MD    potassium chloride 40 mEq Oral BID Ana Laura Solorzano MD    tamsulosin 0 4 mg Oral Daily With Kayleigh Velarde MD    torsemide 20 mg Oral BID Radha Teresa MD           Time Spent for Care: 33 minutes  More than 50% of total time spent on counseling and coordination of care as described above  Current Length of Stay: 3 day(s)      Code Status: Level 3 - DNAR and DNI         ** Please Note: This note is constructed using a voice recognition dictation system   **

## 2019-02-10 NOTE — PROGRESS NOTES
Cardiology Progress Note - Usama Alonso 59 y o  male MRN: [id number]    Unit/Bed#: [facility] 512-01 Encounter: [id number]        Subjective:    No significant events overnight  No chest pain  Dyspnea is better  ROS    Objective:   Vitals: Blood pressure 97/59, pulse 105, temperature 98 1 °F (36 7 °C), resp  rate 20, height 5' 10" (1 778 m), weight 79 2 kg (174 lb 9 7 oz), SpO2 97 %  , Body mass index is 25 05 kg/m² , Orthostatic Blood Pressures      Most Recent Value   Blood Pressure  97/59 filed at 02/10/2019 0808   Patient Position - Orthostatic VS  Lying filed at 02/10/2019 9805         Systolic (86LXT), FDV:867 , Min:96 , VSN:857     Diastolic (35MWO), LPW:94, Min:56, Max:68      Intake/Output Summary (Last 24 hours) at 2/10/2019 0912  Last data filed at 2/10/2019 0904  Gross per 24 hour   Intake 1172 43 ml   Output 2075 ml   Net -902 57 ml     Weight (last 2 days)     Date/Time   Weight    02/10/19 0600   79 2 (174 6)    02/09/19 0600   80 (176 37)    02/08/19 0535   80 (176 37)                Telemetry Review: No significant arrhythmias seen on telemetry review  Physical Exam   Constitutional: He is oriented to person, place, and time  No distress  Eyes: Left eye exhibits no discharge  Neck: No JVD present  Cardiovascular: Normal rate and regular rhythm  No murmur heard  Pulmonary/Chest: Effort normal and breath sounds normal  No respiratory distress  He has no wheezes  Abdominal: Soft  Bowel sounds are normal  He exhibits no distension  There is no tenderness  Musculoskeletal: He exhibits no edema  Neurological: He is alert and oriented to person, place, and time  Skin: Skin is warm and dry  He is not diaphoretic           Laboratory Results:  Results from last 7 days   Lab Units 02/07/19  1257 02/07/19  0907 02/07/19  0537   TROPONIN I ng/mL 0 07* 0 08* 0 02       CBC with diff: Results from last 7 days   Lab Units 02/10/19  0433 02/09/19  0436 02/08/19  0532 02/07/19  1257 02/07/19  9295 02/07/19  0537 02/07/19  0515 02/07/19  0514   WBC Thousand/uL 5 87 8 42 12 92* 7 94  --  10 56*  --   --    HEMOGLOBIN g/dL 13 2 12 0 10 6* 11 1*  --  12 2  --   --    I STAT HEMOGLOBIN g/dl  --   --   --   --   --   --  12 6 12 9   HEMATOCRIT % 39 7 36 1* 31 7* 33 4*  --  37 8  --   --    HEMATOCRIT, ISTAT %  --   --   --   --   --   --  37 38   MCV fL 102* 102* 103* 102*  --  105*  --   --    PLATELETS Thousands/uL 210 191 160 170 150 195  --   --    MCH pg 33 8 34 0 34 5* 33 8  --  33 9  --   --    MCHC g/dL 33 2 33 2 33 4 33 2  --  32 3  --   --    RDW % 13 6 13 7 13 7 13 6  --  13 6  --   --    MPV fL 10 7 10 7 10 9 10 3 10 5 10 3  --   --    NRBC AUTO /100 WBCs 0 0 0  --   --  0  --   --          CMP:  Results from last 7 days   Lab Units 02/10/19  0433 02/09/19  0436 02/08/19  0759 02/08/19  0038 02/07/19  1629 02/07/19  0537 02/07/19  0515 02/07/19  0514   POTASSIUM mmol/L 3 4* 3 4* 3 9 4 5 4 7 5 5*  --   --    CHLORIDE mmol/L 104 101 103 102 101 97*  --   --    CO2 mmol/L 27 30 28 27 25 24  --   --    CO2, I-STAT mmol/L  --   --   --   --   --   --  27 26   BUN mg/dL 50* 48* 51* 54* 50* 45*  --   --    CREATININE mg/dL 1 52* 1 62* 1 81* 1 97* 2 35* 2 17*  --   --    GLUCOSE, ISTAT mg/dl  --   --   --   --   --   --  425* 434*   CALCIUM mg/dL 8 2* 8 6 8 8 8 8 9 0 9 3  --   --    AST U/L  --   --   --   --   --  48*  --   --    ALT U/L  --   --   --   --   --  40  --   --    ALK PHOS U/L  --   --   --   --   --  53  --   --    EGFR ml/min/1 73sq m 49 45 40 36 29 32  --  40         BMP:  Results from last 7 days   Lab Units 02/10/19  0433 02/09/19  0436 02/08/19  0759 02/08/19  0038 02/07/19  1629 02/07/19  0537 02/07/19  0515   POTASSIUM mmol/L 3 4* 3 4* 3 9 4 5 4 7 5 5*  --    CHLORIDE mmol/L 104 101 103 102 101 97*  --    CO2 mmol/L 27 30 28 27 25 24  --    CO2, I-STAT mmol/L  --   --   --   --   --   --  27   BUN mg/dL 50* 48* 51* 54* 50* 45*  --    CREATININE mg/dL 1 52* 1 62* 1 81* 1 97* 2 35* 2 17*  -- GLUCOSE, ISTAT mg/dl  --   --   --   --   --   --  425*   CALCIUM mg/dL 8 2* 8 6 8 8 8 8 9 0 9 3  --        BNP: No results for input(s): BNP in the last 72 hours  Magnesium:   Results from last 7 days   Lab Units 02/10/19  0433 19  0436 19  0759 19  0038   MAGNESIUM mg/dL 1 7 1 8 2 0 1 5*       Coags:   Results from last 7 days   Lab Units 19  0038 19  1257   PTT seconds 35 33   INR   --  0 99       TSH: No results found for: TSH    Hemoglobin A1C   Results from last 7 days   Lab Units 19  0916   HEMOGLOBIN A1C % 9 3*       Lipid Profile: Results from last 7 days   Lab Units 19  0759   TRIGLYCERIDES mg/dL 83   HDL mg/dL 67*       Cardiac testing:   Results for orders placed during the hospital encounter of 19   Echo complete with contrast if indicated    Narrative [facility] [address] [place] [address]  [phone]    Transthoracic Echocardiogram  2D, M-mode, Doppler, and Color Doppler    Study date:  2019    Patient: Ron Meyer  MR number: [id number]  Account number: [de-identified]  : 1960  Age: 59 years  Gender: Male  Status: Inpatient  Location: Bedside  Height: 70 in  Weight: 175 lb  BP: 104/ 65 mmHg    Indications: Heart failure, evaluate LV and RV function  Possible pulmpnary embolism  Diagnoses: I50 9 - Heart failure, unspecified    Sonographer:  HERMINIO Sin  Primary Physician:  Lucy Houston MD  Referring Physician:  Max Celeste MD  Group:  [facility]  Interpreting Physician:  Jessica Herrera MD    SUMMARY    LEFT VENTRICLE:  Systolic function was normal  Ejection fraction was estimated to be 60 %  Although no diagnostic regional wall motion abnormality was identified, this possibility cannot be completely excluded on the basis of this study  LEFT ATRIUM:  The atrium was mildly to moderately dilated      MITRAL VALVE:  There was moderate to marked annular calcification  There was severe flail motion of the posterior leaflet  Findings were suggestive of chordal rupture  Transmitral velocity was increased due to increased transvalvular flow  There was severe regurgitation  HISTORY: PRIOR HISTORY: COPD, PVD, DM2, alcohol abuse, current smoker, MRSA, spinal cord injury with some parathesia  PROCEDURE: The procedure was performed at the bedside  This was a routine study  The transthoracic approach was used  The study included complete 2D imaging, M-mode, complete spectral Doppler, and color Doppler  The heart rate was 115 bpm,  at the start of the study  Images were obtained from the parasternal, apical, subcostal, and suprasternal notch acoustic windows  Echocardiographic views were limited due to restricted patient mobility and poor acoustic window  availability  This was a technically difficult study  LEFT VENTRICLE: Size was normal  Systolic function was normal  Ejection fraction was estimated to be 60 %  Although no diagnostic regional wall motion abnormality was identified, this possibility cannot be completely excluded on the basis  of this study  Wall thickness was normal  There was no evidence of concentric hypertrophy  DOPPLER: The study was not technically sufficient to allow evaluation of LV diastolic function  RIGHT VENTRICLE: The size was normal  Systolic function was normal  Wall thickness was normal     LEFT ATRIUM: The atrium was mildly to moderately dilated  RIGHT ATRIUM: Size was normal     MITRAL VALVE: There was moderate to marked annular calcification  There was severe flail motion of the posterior leaflet  Findings were suggestive of chordal rupture  DOPPLER: Transmitral velocity was increased due to increased  transvalvular flow  There was no evidence for stenosis  There was severe regurgitation  AORTIC VALVE: The valve was trileaflet  Leaflets exhibited normal cuspal separation and sclerosis   DOPPLER: Transaortic velocity was within the normal range  There was no evidence for stenosis  There was no regurgitation  TRICUSPID VALVE: The valve structure was normal  There was normal leaflet separation  DOPPLER: The transtricuspid velocity was within the normal range  There was no evidence for stenosis  There was no regurgitation  PULMONIC VALVE: Leaflets exhibited normal thickness, no calcification, and normal cuspal separation  DOPPLER: The transpulmonic velocity was within the normal range  There was no regurgitation  PERICARDIUM: There was no pericardial effusion  The pericardium was normal in appearance  AORTA: The root exhibited normal size  SYSTEM MEASUREMENT TABLES    2D  %FS: 37 4 %  Ao Diam: 3 73 cm  EDV(Teich): 125 97 ml  EF(Cube): 75 47 %  EF(Teich): 67 08 %  ESV(Cube): 33 28 ml  ESV(Teich): 41 47 ml  IVSd: 0 93 cm  LA Diam: 4 48 cm  LVEDV MOD A4C: 104 91 ml  LVEF MOD A4C: 65 89 %  LVESV MOD A4C: 35 79 ml  LVIDd: 5 14 cm  LVIDs: 3 22 cm  LVLd A4C: 7 7 cm  LVLs A4C: 6 53 cm  LVPWd: 0 85 cm  SV MOD A4C: 69 12 ml  SV(Cube): 102 38 ml  SV(Teich): 84 5 ml    CW  AV Env  Ti: 205 18 ms  AV VTI: 19 47 cm  AV Vmax: 1 54 m/s  AV Vmean: 0 95 m/s  AV maxP 44 mmHg  AV meanP 06 mmHg  TR Vmax: 3 01 m/s  TR maxP 23 mmHg    MM  TAPSE: 2 05 cm    PW  E': 0 09 m/s  E/E': 20 01  LVOT Env  Ti: 255 08 ms  LVOT VTI: 12 07 cm  LVOT Vmax: 0 82 m/s  LVOT Vmean: 0 47 m/s  LVOT maxP 71 mmHg  LVOT meanP 12 mmHg  MV A [place]: 0 99 m/s  MV Dec [place]: 13 68 m/s2  MV DecT: 134 58 ms  MV E [place]: 1 84 m/s  MV E/A Ratio: 1 86    [employer] Accredited Echocardiography Laboratory    Prepared and electronically signed by    Jasper White MD  Signed 2019 16:11:08       No results found for this or any previous visit  No results found for this or any previous visit  No results found for this or any previous visit      Meds/Allergies   current meds:   Current Facility-Administered Medications   Medication Dose Route Frequency     EMS REPLENISHMENT MED   Does not apply Once    acetaminophen (TYLENOL) tablet 975 mg  975 mg Oral Q8H [address]    albuterol inhalation solution 2 5 mg  2 5 mg Nebulization Q6H PRN    atorvastatin (LIPITOR) tablet 40 mg  40 mg Oral Daily With Dinner    baclofen tablet 20 mg  20 mg Oral TID    citalopram (CeleXA) tablet 20 mg  20 mg Oral Daily    clopidogrel (PLAVIX) tablet 75 mg  75 mg Oral Daily    docusate sodium (COLACE) capsule 100 mg  100 mg Oral BID    gabapentin (NEURONTIN) capsule 300 mg  300 mg Oral BID    heparin (porcine) subcutaneous injection 5,000 Units  5,000 Units Subcutaneous Q8H [address]    insulin regular (HumuLIN R,NovoLIN R) 1 Units/mL in sodium chloride 0 9 % 100 mL infusion  0 3-21 Units/hr Intravenous Titrated    magnesium sulfate 2 g/50 mL IVPB (premix) 2 g  2 g Intravenous Once    nicotine (NICODERM CQ) 21 mg/24 hr TD 24 hr patch 1 patch  1 patch Transdermal Daily    nystatin (MYCOSTATIN) powder   Topical BID    ondansetron (ZOFRAN) injection 4 mg  4 mg Intravenous Q6H PRN    polyethylene glycol (MIRALAX) packet 17 g  17 g Oral Daily PRN    potassium chloride (K-DUR,KLOR-CON) CR tablet 40 mEq  40 mEq Oral BID    tamsulosin (FLOMAX) capsule 0 4 mg  0 4 mg Oral Daily With Dinner    torsemide (DEMADEX) tablet 20 mg  20 mg Oral BID     Medications Prior to Admission   Medication    aspirin (ECOTRIN LOW STRENGTH) 81 mg EC tablet    baclofen 20 mg tablet    citalopram (CeleXA) 20 mg tablet    clopidogrel (PLAVIX) 75 mg tablet    gabapentin (NEURONTIN) 400 mg capsule    glimepiride (AMARYL) 1 mg tablet    lisinopril (ZESTRIL) 10 mg tablet    metFORMIN (GLUCOPHAGE) 1000 MG tablet    rosuvastatin (CRESTOR) 20 MG tablet    tamsulosin (FLOMAX) 0 4 mg         insulin regular (HumuLIN R,NovoLIN R) infusion 0 3-21 Units/hr Last Rate: 4 Units/hr (02/10/19 0818)     Assessment:  Principal Problem:    Acute respiratory failure with hypoxia  Active Problems:    Tobacco abuse Diabetes mellitus type 2 - Diabetic neuropathy    PAD    Essential hypertension    Acute on chronic diastolic CHF    Acute kidney injury    Obstructive uropathy    Volume overload    Non-rheumatic mitral regurgitation    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 - Severe mitral regurgitation    Quadriplegia - history of Traumatic cervical injury    Non-MI elevated troponin    Hypomagnesemia - Hypokalemia    Leukocytosis    BPH (benign prostatic hyperplasia)    Plan:     Acute Decompensated heart failure secondary to severe MR       ORIN is planned for Monday  CTS eval ongoing  Counseling / Coordination of Care  Total floor / unit time spent today 25 minutes  Greater than 50% of total time was spent with the patient and / or family counseling and / or coordination of care  A description of the counseling / coordination of care
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
## 2019-02-10 NOTE — PLAN OF CARE
Problem: Potential for Falls  Goal: Patient will remain free of falls  Description  INTERVENTIONS:  - Assess patient frequently for physical needs  -  Identify cognitive and physical deficits and behaviors that affect risk of falls  -  [place] fall precautions as indicated by assessment   - Educate patient/family on patient safety including physical limitations  - Instruct patient to call for assistance with activity based on assessment  - Modify environment to reduce risk of injury  - Consider OT/PT consult to assist with strengthening/mobility   Outcome: Progressing     Problem: Prexisting or High Potential for Compromised Skin Integrity  Goal: Skin integrity is maintained or improved  Description  INTERVENTIONS:  - Identify patients at risk for skin breakdown  - Assess and monitor skin integrity  - Assess and monitor nutrition and hydration status  - Monitor labs (i e  albumin)  - Assess for incontinence   - Turn and reposition patient  - Assist with mobility/ambulation  - Relieve pressure over bony prominences  - Avoid friction and shearing  - Provide appropriate hygiene as needed including keeping skin clean and dry  - Evaluate need for skin moisturizer/barrier cream  - Collaborate with interdisciplinary team (i e  Nutrition, Rehabilitation, etc )   - Patient/family teaching   Outcome: Progressing     Problem: Nutrition/Hydration-ADULT  Goal: Nutrient/Hydration intake appropriate for improving, restoring or maintaining nutritional needs  Description  Monitor and assess patient's nutrition/hydration status for malnutrition (ex- brittle hair, bruises, dry skin, pale skin and conjunctiva, muscle wasting, smooth red tongue, and disorientation)  Collaborate with interdisciplinary team and initiate plan and interventions as ordered  Monitor patient's weight and dietary intake as ordered or per policy  Utilize nutrition screening tool and intervene per policy   Determine patient's food preferences and provide high-protein, high-caloric foods as appropriate  INTERVENTIONS:  - Monitor oral intake, urinary output, labs, and treatment plans  - Assess nutrition and hydration status and recommend course of action  - Evaluate amount of meals eaten  - Assist patient with eating if necessary   - Allow adequate time for meals  - Recommend/ encourage appropriate diets, oral nutritional supplements, and vitamin/mineral supplements  - Order, calculate, and assess calorie counts as needed  - Recommend, monitor, and adjust tube feedings and TPN/PPN based on assessed needs  - Assess need for intravenous fluids  - Provide specific nutrition/hydration education as appropriate  - Include patient/family/caregiver in decisions related to nutrition   Outcome: Progressing     Problem: CARDIOVASCULAR - ADULT  Goal: Maintains optimal cardiac output and hemodynamic stability  Description  INTERVENTIONS:  - Monitor I/O, vital signs and rhythm  - Monitor for S/S and trends of decreased cardiac output i e  bleeding, hypotension  - Administer and titrate ordered vasoactive medications to optimize hemodynamic stability  - Assess quality of pulses, skin color and temperature  - Assess for signs of decreased coronary artery perfusion - ex   Angina  - Instruct patient to report change in severity of symptoms   Outcome: Progressing  Goal: Absence of cardiac dysrhythmias or at baseline rhythm  Description  INTERVENTIONS:  - Continuous cardiac monitoring, monitor vital signs, obtain 12 lead EKG if indicated  - Administer antiarrhythmic and heart rate control medications as ordered  - Monitor electrolytes and administer replacement therapy as ordered   Outcome: Progressing     Problem: RESPIRATORY - ADULT  Goal: Achieves optimal ventilation and oxygenation  Description  INTERVENTIONS:  - Assess for changes in respiratory status  - Assess for changes in mentation and behavior  - Position to facilitate oxygenation and minimize respiratory effort  - Oxygen administration by appropriate delivery method based on oxygen saturation (per order) or ABGs  - Initiate smoking cessation education as indicated  - Encourage broncho-pulmonary hygiene including cough, deep breathe, Incentive Spirometry  - Assess the need for suctioning and aspirate as needed  - Assess and instruct to report SOB or any respiratory difficulty  - Respiratory Therapy support as indicated   Outcome: Progressing     Problem: METABOLIC, FLUID AND ELECTROLYTES - ADULT  Goal: Electrolytes maintained within normal limits  Description  INTERVENTIONS:  - Monitor labs and assess patient for signs and symptoms of electrolyte imbalances  - Administer electrolyte replacement as ordered  - Monitor response to electrolyte replacements, including repeat lab results as appropriate  - Instruct patient on fluid and nutrition as appropriate   Outcome: Progressing  Goal: Fluid balance maintained  Description  INTERVENTIONS:  - Monitor labs and assess for signs and symptoms of volume excess or deficit  - Monitor I/O and WT  - Instruct patient on fluid and nutrition as appropriate   Outcome: Progressing  Goal: Glucose maintained within target range  Description  INTERVENTIONS:  - Monitor Blood Glucose as ordered  - Assess for signs and symptoms of hyperglycemia and hypoglycemia  - Administer ordered medications to maintain glucose within target range  - Assess nutritional intake and initiate nutrition service referral as needed   Outcome: Progressing     Problem: DISCHARGE PLANNING - CARE MANAGEMENT  Goal: Discharge to post-acute care or home with appropriate resources  Description  INTERVENTIONS:  - Conduct assessment to determine patient/family and health care team treatment goals, and need for post-acute services based on payer coverage, community resources, and patient preferences, and barriers to discharge  - Address psychosocial, clinical, and financial barriers to discharge as identified in assessment in conjunction with the patient/family and health care team  - Arrange appropriate level of post-acute services according to patient's   needs and preference and payer coverage in collaboration with the physician and health care team  - Communicate with and update the patient/family, physician, and health care team regarding progress on the discharge plan  - Arrange appropriate transportation to post-acute venues  - Pt to d/c with appropriate resources when medically stable   Outcome: Progressing

## 2019-02-11 ENCOUNTER — ANESTHESIA EVENT (INPATIENT)
Dept: NON INVASIVE DIAGNOSTICS | Facility: HOSPITAL | Age: 59
DRG: 306 | End: 2019-02-11
Payer: COMMERCIAL

## 2019-02-11 LAB
ANION GAP SERPL CALCULATED.3IONS-SCNC: 8 MMOL/L (ref 4–13)
BASOPHILS # BLD AUTO: 0.07 THOUSANDS/ΜL (ref 0–0.1)
BASOPHILS NFR BLD AUTO: 1 % (ref 0–1)
BUN SERPL-MCNC: 45 MG/DL (ref 5–25)
CALCIUM SERPL-MCNC: 7.5 MG/DL (ref 8.3–10.1)
CHLORIDE SERPL-SCNC: 105 MMOL/L (ref 100–108)
CO2 SERPL-SCNC: 25 MMOL/L (ref 21–32)
CREAT SERPL-MCNC: 1.33 MG/DL (ref 0.6–1.3)
EOSINOPHIL # BLD AUTO: 0.21 THOUSAND/ΜL (ref 0–0.61)
EOSINOPHIL NFR BLD AUTO: 2 % (ref 0–6)
ERYTHROCYTE [DISTWIDTH] IN BLOOD BY AUTOMATED COUNT: 13.7 % (ref 11.6–15.1)
GFR SERPL CREATININE-BSD FRML MDRD: 58 ML/MIN/1.73SQ M
GLUCOSE SERPL-MCNC: 190 MG/DL (ref 65–140)
GLUCOSE SERPL-MCNC: 219 MG/DL (ref 65–140)
GLUCOSE SERPL-MCNC: 221 MG/DL (ref 65–140)
GLUCOSE SERPL-MCNC: 249 MG/DL (ref 65–140)
GLUCOSE SERPL-MCNC: 310 MG/DL (ref 65–140)
GLUCOSE SERPL-MCNC: 371 MG/DL (ref 65–140)
HCT VFR BLD AUTO: 39.6 % (ref 36.5–49.3)
HGB BLD-MCNC: 13.4 G/DL (ref 12–17)
IMM GRANULOCYTES # BLD AUTO: 0.05 THOUSAND/UL (ref 0–0.2)
IMM GRANULOCYTES NFR BLD AUTO: 1 % (ref 0–2)
LYMPHOCYTES # BLD AUTO: 2.13 THOUSANDS/ΜL (ref 0.6–4.47)
LYMPHOCYTES NFR BLD AUTO: 23 % (ref 14–44)
MAGNESIUM SERPL-MCNC: 1.7 MG/DL (ref 1.6–2.6)
MCH RBC QN AUTO: 34.5 PG (ref 26.8–34.3)
MCHC RBC AUTO-ENTMCNC: 33.8 G/DL (ref 31.4–37.4)
MCV RBC AUTO: 102 FL (ref 82–98)
MONOCYTES # BLD AUTO: 1.13 THOUSAND/ΜL (ref 0.17–1.22)
MONOCYTES NFR BLD AUTO: 12 % (ref 4–12)
NEUTROPHILS # BLD AUTO: 5.69 THOUSANDS/ΜL (ref 1.85–7.62)
NEUTS SEG NFR BLD AUTO: 61 % (ref 43–75)
NRBC BLD AUTO-RTO: 0 /100 WBCS
PHOSPHATE SERPL-MCNC: 3.5 MG/DL (ref 2.3–4.1)
PLATELET # BLD AUTO: 214 THOUSANDS/UL (ref 149–390)
PMV BLD AUTO: 10.2 FL (ref 8.9–12.7)
POTASSIUM SERPL-SCNC: 4.5 MMOL/L (ref 3.5–5.3)
RBC # BLD AUTO: 3.88 MILLION/UL (ref 3.88–5.62)
SODIUM SERPL-SCNC: 138 MMOL/L (ref 136–145)
WBC # BLD AUTO: 9.28 THOUSAND/UL (ref 4.31–10.16)

## 2019-02-11 PROCEDURE — 0T9B70Z DRAINAGE OF BLADDER WITH DRAINAGE DEVICE, VIA NATURAL OR ARTIFICIAL OPENING: ICD-10-PCS | Performed by: INTERNAL MEDICINE

## 2019-02-11 PROCEDURE — B24BZZ4 ULTRASONOGRAPHY OF HEART WITH AORTA, TRANSESOPHAGEAL: ICD-10-PCS | Performed by: INTERNAL MEDICINE

## 2019-02-11 PROCEDURE — 97535 SELF CARE MNGMENT TRAINING: CPT

## 2019-02-11 PROCEDURE — 93320 DOPPLER ECHO COMPLETE: CPT | Performed by: INTERNAL MEDICINE

## 2019-02-11 PROCEDURE — 93312 ECHO TRANSESOPHAGEAL: CPT

## 2019-02-11 PROCEDURE — 99232 SBSQ HOSP IP/OBS MODERATE 35: CPT | Performed by: INTERNAL MEDICINE

## 2019-02-11 PROCEDURE — 84100 ASSAY OF PHOSPHORUS: CPT | Performed by: INTERNAL MEDICINE

## 2019-02-11 PROCEDURE — 83735 ASSAY OF MAGNESIUM: CPT | Performed by: INTERNAL MEDICINE

## 2019-02-11 PROCEDURE — 76376 3D RENDER W/INTRP POSTPROCES: CPT

## 2019-02-11 PROCEDURE — 93325 DOPPLER ECHO COLOR FLOW MAPG: CPT | Performed by: INTERNAL MEDICINE

## 2019-02-11 PROCEDURE — 93312 ECHO TRANSESOPHAGEAL: CPT | Performed by: INTERNAL MEDICINE

## 2019-02-11 PROCEDURE — 82948 REAGENT STRIP/BLOOD GLUCOSE: CPT

## 2019-02-11 PROCEDURE — 80048 BASIC METABOLIC PNL TOTAL CA: CPT | Performed by: INTERNAL MEDICINE

## 2019-02-11 PROCEDURE — 85025 COMPLETE CBC W/AUTO DIFF WBC: CPT | Performed by: INTERNAL MEDICINE

## 2019-02-11 RX ORDER — PROPOFOL 10 MG/ML
INJECTION, EMULSION INTRAVENOUS CONTINUOUS PRN
Status: DISCONTINUED | OUTPATIENT
Start: 2019-02-11 | End: 2019-02-11 | Stop reason: SURG

## 2019-02-11 RX ORDER — SODIUM CHLORIDE 9 MG/ML
INJECTION, SOLUTION INTRAVENOUS CONTINUOUS PRN
Status: DISCONTINUED | OUTPATIENT
Start: 2019-02-11 | End: 2019-02-11 | Stop reason: SURG

## 2019-02-11 RX ORDER — INSULIN GLARGINE 100 [IU]/ML
34 INJECTION, SOLUTION SUBCUTANEOUS
Status: DISCONTINUED | OUTPATIENT
Start: 2019-02-11 | End: 2019-02-12

## 2019-02-11 RX ORDER — PROPOFOL 10 MG/ML
INJECTION, EMULSION INTRAVENOUS AS NEEDED
Status: DISCONTINUED | OUTPATIENT
Start: 2019-02-11 | End: 2019-02-11 | Stop reason: SURG

## 2019-02-11 RX ADMIN — INSULIN LISPRO 10 UNITS: 100 INJECTION, SOLUTION INTRAVENOUS; SUBCUTANEOUS at 12:54

## 2019-02-11 RX ADMIN — POTASSIUM CHLORIDE 40 MEQ: 1500 TABLET, EXTENDED RELEASE ORAL at 08:10

## 2019-02-11 RX ADMIN — ACETAMINOPHEN 975 MG: 325 TABLET, FILM COATED ORAL at 21:58

## 2019-02-11 RX ADMIN — GABAPENTIN 300 MG: 300 CAPSULE ORAL at 08:10

## 2019-02-11 RX ADMIN — TAMSULOSIN HYDROCHLORIDE 0.4 MG: 0.4 CAPSULE ORAL at 17:26

## 2019-02-11 RX ADMIN — HEPARIN SODIUM 5000 UNITS: 5000 INJECTION INTRAVENOUS; SUBCUTANEOUS at 21:57

## 2019-02-11 RX ADMIN — PROPOFOL 100 MG: 10 INJECTION, EMULSION INTRAVENOUS at 11:04

## 2019-02-11 RX ADMIN — BACLOFEN 20 MG: 10 TABLET ORAL at 21:56

## 2019-02-11 RX ADMIN — HEPARIN SODIUM 5000 UNITS: 5000 INJECTION INTRAVENOUS; SUBCUTANEOUS at 06:44

## 2019-02-11 RX ADMIN — VASOPRESSIN 2 UNITS: 20 INJECTION, SOLUTION INTRAMUSCULAR; SUBCUTANEOUS at 11:11

## 2019-02-11 RX ADMIN — INSULIN LISPRO 4 UNITS: 100 INJECTION, SOLUTION INTRAVENOUS; SUBCUTANEOUS at 12:54

## 2019-02-11 RX ADMIN — INSULIN LISPRO 8 UNITS: 100 INJECTION, SOLUTION INTRAVENOUS; SUBCUTANEOUS at 17:31

## 2019-02-11 RX ADMIN — CITALOPRAM HYDROBROMIDE 20 MG: 20 TABLET ORAL at 08:10

## 2019-02-11 RX ADMIN — PROPOFOL 120 MCG/KG/MIN: 10 INJECTION, EMULSION INTRAVENOUS at 11:03

## 2019-02-11 RX ADMIN — TORSEMIDE 20 MG: 20 TABLET ORAL at 17:26

## 2019-02-11 RX ADMIN — INSULIN LISPRO 2 UNITS: 100 INJECTION, SOLUTION INTRAVENOUS; SUBCUTANEOUS at 02:40

## 2019-02-11 RX ADMIN — VASOPRESSIN 2 UNITS: 20 INJECTION, SOLUTION INTRAMUSCULAR; SUBCUTANEOUS at 11:16

## 2019-02-11 RX ADMIN — INSULIN LISPRO 10 UNITS: 100 INJECTION, SOLUTION INTRAVENOUS; SUBCUTANEOUS at 21:58

## 2019-02-11 RX ADMIN — BACLOFEN 20 MG: 10 TABLET ORAL at 08:10

## 2019-02-11 RX ADMIN — ACETAMINOPHEN 975 MG: 325 TABLET, FILM COATED ORAL at 06:43

## 2019-02-11 RX ADMIN — BACLOFEN 20 MG: 10 TABLET ORAL at 17:26

## 2019-02-11 RX ADMIN — ATORVASTATIN CALCIUM 40 MG: 40 TABLET, FILM COATED ORAL at 17:26

## 2019-02-11 RX ADMIN — POTASSIUM CHLORIDE 40 MEQ: 1500 TABLET, EXTENDED RELEASE ORAL at 17:26

## 2019-02-11 RX ADMIN — CLOPIDOGREL BISULFATE 75 MG: 75 TABLET ORAL at 08:10

## 2019-02-11 RX ADMIN — DOCUSATE SODIUM 100 MG: 100 CAPSULE, LIQUID FILLED ORAL at 17:26

## 2019-02-11 RX ADMIN — NICOTINE 1 PATCH: 21 PATCH, EXTENDED RELEASE TRANSDERMAL at 08:10

## 2019-02-11 RX ADMIN — SODIUM CHLORIDE: 0.9 INJECTION, SOLUTION INTRAVENOUS at 10:29

## 2019-02-11 RX ADMIN — TORSEMIDE 20 MG: 20 TABLET ORAL at 08:30

## 2019-02-11 RX ADMIN — INSULIN GLARGINE 34 UNITS: 100 INJECTION, SOLUTION SUBCUTANEOUS at 21:56

## 2019-02-11 RX ADMIN — INSULIN LISPRO 4 UNITS: 100 INJECTION, SOLUTION INTRAVENOUS; SUBCUTANEOUS at 08:11

## 2019-02-11 RX ADMIN — HEPARIN SODIUM 5000 UNITS: 5000 INJECTION INTRAVENOUS; SUBCUTANEOUS at 13:13

## 2019-02-11 RX ADMIN — DOCUSATE SODIUM 100 MG: 100 CAPSULE, LIQUID FILLED ORAL at 08:10

## 2019-02-11 RX ADMIN — NYSTATIN: 100000 POWDER TOPICAL at 17:31

## 2019-02-11 RX ADMIN — GABAPENTIN 300 MG: 300 CAPSULE ORAL at 17:26

## 2019-02-11 RX ADMIN — ACETAMINOPHEN 975 MG: 325 TABLET, FILM COATED ORAL at 13:13

## 2019-02-11 NOTE — MEDICAL STUDENT
MEDICAL STUDENT  Inpatient Progress Note for TRAINING ONLY  Not Part of Legal Medical Record       [facility] Internal Medicine Progress Note  Patient: Nelson Butler 59 y o  male   MRN: [id number]  PCP: Balbina Khan MD  Unit/Bed#: [facility] 512-01 Encounter: [id number]  Date Of Visit: 02/11/19    Assessment:    Principal Problem:    Acute respiratory failure with hypoxia  Active Problems:    Tobacco abuse    Diabetes mellitus type 2 - Diabetic neuropathy    PAD    Essential hypertension    Acute on chronic diastolic CHF    Acute kidney injury    Obstructive uropathy    Volume overload    Non-rheumatic mitral regurgitation    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 - Severe mitral regurgitation    Quadriplegia - history of Traumatic cervical injury    Non-MI elevated troponin    Hypomagnesemia - Hypokalemia    Leukocytosis    BPH (benign prostatic hyperplasia)      Plan:    Acute respiratory failure with hypoxia  · 2/2 mitral regurgitation, diastolic CHF  TTE demonstrated flail mitral valve consistent with chorda tendinae rupture  · Has had decreasing O2 requirements  On 2 5L O2 NC  · Appreciate cardiology recommendations  · ORIN scheduled for today at 1:30 PM  F/u results from ORIN  · NPO until procedure, resume diet after procedure  · Per patient, MitraClip ?tomorrow  Awaiting CT surgery recommendations  · Continue monitoring hemodynamic status with vitals  · Elective cardiac ischemia workup outpatient  Acute on Chronic diastolic CHF  · EF 49%, MR  · Currently fluid net negative 1L today, negative 5L for this admission  · Continue torsemide   · Low sodium diet, fluid restriction  · Monitor I/Os, daily weight    DM Type 2  · Patient started on 30u Lantus at bedtime, 10u Humalog with meals with sliding scale correction  Insulin drip dc'd last night  · BG elevated and above goals for hospitalization  · Patient did not receive 10u Humalog with meals, which could be why his sugars still elevated    · Appreciate endocrinology recommendations  Follow plan as outlined by endocrinology     SUNDEEP   · Cr now 1 33 from 1 52, improving  · Monitor renal function with daily CMP, avoid nephrotoxins    Urinary retention  · Patient has a history of BPH, complains of not being able to urinate despite bladder being full and feeling urge  · Straight cath'd overnight for 850mL  · Possibly 2/2 immobilization, medication side effects  · Continue flomax  · Encourage ambulation, continue straight catheterization if patient continues to complain of retention    PAD  · Patient has history of R CFA endarterectomy, R fem-pop bypass  · Continue plavix, lipitor, management for diabetes, HTN  Hyperkalemia  · Potassium on AM CMP was 4 5  Discontinue potassium supplement  Quadriplegia, chronic  · Patient is able to ambulate with a walker  · PT/OT evaluation have evaluated and recommend home PT with family support upon discharge  Tobacco abuse  · Encourage smoking cessation  Depression  · Mood is stable at this time  · Continue citalopram       VTE Pharmacologic Prophylaxis:   Pharmacologic: Heparin  Mechanical VTE Prophylaxis in Place: Yes        Time Spent for Care: 30 minutes  More than 50% of total time spent on counseling and coordination of care as described above  Current Length of Stay: 4 day(s)    Current Patient Status: Inpatient     Discharge Plan / Estimated Discharge Date: inpatient     Code Status: Level 3 - DNAR and DNI      Subjective:   Mr Fawad Barker reports feeling "fearful" today for his ORIN procedure and subsequent mitral clip  He was reassured that his feelings were valid and questions about the procedure should be brought to the attention of cardiology and anesthesiology  He verbalized understanding  Mr Fawad Barker denies any chest pain or SOB today  His only complaint is with his inability to urinate  Denies pain, change in urine appearance  Patient has not had a BM in 4 days   Encouraged to use colace  Denies abdominal pain  Objective:     Vitals:   Temp (24hrs), Av °F (36 7 °C), Min:97 °F (36 1 °C), Max:98 9 °F (37 2 °C)    Temp:  [97 °F (36 1 °C)-98 9 °F (37 2 °C)] 98 °F (36 7 °C)  HR:  [] 94  Resp:  [16-20] 16  BP: ()/(65-75) 110/71  SpO2:  [95 %-98 %] 98 %  Body mass index is 24 26 kg/m²  Input and Output Summary (last 24 hours): Intake/Output Summary (Last 24 hours) at 2019 0849  Last data filed at 2019 0300  Gross per 24 hour   Intake 1144 57 ml   Output 2450 ml   Net -1305 43 ml       Physical Exam:     Physical Exam   Constitutional: He is oriented to person, place, and time  He appears well-developed and well-nourished  HENT:   Head: Normocephalic and atraumatic  Eyes: Pupils are equal, round, and reactive to light  Cardiovascular: Normal rate, regular rhythm, normal heart sounds and intact distal pulses  Exam reveals no gallop and no friction rub  No murmur heard  Pulmonary/Chest: Effort normal and breath sounds normal    Abdominal: Soft  Bowel sounds are normal  He exhibits no distension and no mass  There is no tenderness  There is no guarding  Musculoskeletal: Normal range of motion  He exhibits no edema  Neurological: He is alert and oriented to person, place, and time  Skin: Skin is warm  Capillary refill takes less than 2 seconds         Additional Data:     Labs:    Results from last 7 days   Lab Units 19  0527   WBC Thousand/uL 9 28   HEMOGLOBIN g/dL 13 4   HEMATOCRIT % 39 6   PLATELETS Thousands/uL 214   NEUTROS PCT % 61   LYMPHS PCT % 23   MONOS PCT % 12   EOS PCT % 2     Results from last 7 days   Lab Units 19  0527  19  0537 19  0515   POTASSIUM mmol/L 4 5   < > 5 5*  --    CHLORIDE mmol/L 105   < > 97*  --    CO2 mmol/L 25   < > 24  --    CO2, I-STAT mmol/L  --   --   --  27   BUN mg/dL 45*   < > 45*  --    CREATININE mg/dL 1 33*   < > 2 17*  --    CALCIUM mg/dL 7 5*   < > 9 3  --    ALK PHOS U/L  --   -- 53  --    ALT U/L  --   --  40  --    AST U/L  --   --  48*  --    GLUCOSE, ISTAT mg/dl  --   --   --  425*    < > = values in this interval not displayed  Results from last 7 days   Lab Units 02/07/19  1257   INR  0 99       * I Have Reviewed All Lab Data Listed Above  * Additional Pertinent Lab Tests Reviewed: All Labs Within Last 24 Hours Reviewed      Recent Cultures (last 7 days):     Results from last 7 days   Lab Units 02/07/19  0537 02/07/19  0536   BLOOD CULTURE  No Growth at 72 hrs  No Growth at 72 hrs         Last 24 Hours Medication List:     Current Facility-Administered Medications:  EMS replenish medication  Does not apply Once Jim Worthy MD   acetaminophen 975 mg Oral Q8H [address] Sera Aguilar PA-C   albuterol 2 5 mg Nebulization Q6H PRN Jim Worthy MD   atorvastatin 40 mg Oral Daily With Gena Vital MD   baclofen 20 mg Oral TID Bere Lyons MD   citalopram 20 mg Oral Daily Sera Aguilar PA-C   clopidogrel 75 mg Oral Daily Bere Lyons MD   docusate sodium 100 mg Oral BID Bere Lyons MD   gabapentin 300 mg Oral BID Bere Lyons MD   heparin (porcine) 5,000 Units Subcutaneous Q8H [address] CHERELLE Dhaliwal   insulin glargine 30 Units Subcutaneous HS Juan Arechiga MD   insulin lispro 10 Units Subcutaneous TID With Meals Juan Arechiga MD   insulin lispro 2-12 Units Subcutaneous TID AC Juan Arechiga MD   insulin lispro 2-12 Units Subcutaneous HS Juan Arechiga MD   insulin lispro 2-12 Units Subcutaneous 0200 Juan Arechiga MD   nicotine 1 patch Transdermal Daily Bere Lyons MD   nystatin  Topical BID Zara Rebolledo MD   ondansetron 4 mg Intravenous Q6H PRN Bere Lyons MD   polyethylene glycol 17 g Oral Daily PRN Zara Rebolledo MD   potassium chloride 40 mEq Oral BID Zara Rebolledo MD   tamsulosin 0 4 mg Oral Daily With Gena Vital MD   torsemide 20 mg Oral BID Fabio Tracy MD        Today, Patient Was Seen By: [id number] [facility] Student, Year 4  CORBY, Attending: Dr Flavio Ayers  02/11/19, 11:24 AM

## 2019-02-11 NOTE — CONSULTS
Patient was seen for wound care consult today of the skin   Patient is alert and oriented X 3   Patient is out of bed in the chair on the soft care cushion   Please refer to the plan of care in the wound progress note for the assessment and the plan of care for the patient   Reviewed weight shifting while in the chair   Patient is able to demonstrate    Maciej Samuel RN BSN [id number]

## 2019-02-11 NOTE — SOCIAL WORK
Heart Failure Care Coordinator Note  Met with patient to provide HF education and identify barriers for HF management at home    HF Booklet: given and reviewed    Patient has Scale no  Patient has BP Cuff  no  Scale given:yes  BP Cuff given:yes

## 2019-02-11 NOTE — DISCHARGE INSTR - OTHER ORDERS
Soft care cushion to chair when OOB  Elevate heels off the bed with pillows   Turn and reposition every two hours  Hydraguard to bilateral heels, buttocks and sacrum BID and PRN  Skin nourishing cream daily  Cleanse bilateral groin with soap and water, pat completely dry   Dust lightly with nystatin power to bilateral groin BID

## 2019-02-11 NOTE — WOUND OSTOMY CARE
Progress Note - Wound   Rushie Raquel 59 y o  male MRN: [id number]  Unit/Bed#: [facility] 512-01 Encounter: [id number]      Assessment: Wound care consulted for bilateral excoriated groin  Patient skin assessed, heels, sacrum and buttocks intact, no open areas, or redness  Bilateral groin excoriated and reddened  Nystatin powder ordered  Skin on bilateral feet dry and intact  Patient on soft care cushion  Patient was seen Marek Matta in the chair  Assist to care, front wheel walker with minimal assist   Vsaques catheter in place  Reviewed plan of care with RN        Plan:   Soft care cushion to chair when OOB  Elevate heels off the bed with pillows   Turn and reposition every two hours  Hydraguard to bilateral heels, buttocks and sacrum BID and PRN  Skin nourishing cream daily  Cleanse bilateral groin with soap and water, pat completely dry   Dust lightly with nystatin power to bilateral groin BID          Objective:    Vitals: Blood pressure 102/69, pulse 99, temperature 98 3 °F (36 8 °C), temperature source Oral, resp  rate 18, height 5' 10" (1 778 m), weight 76 7 kg (169 lb 1 5 oz), SpO2 96 %  ,Body mass index is 24 26 kg/m²  Wound care sign off  Please reconsult if needed    Margret Burkitt RN BSN

## 2019-02-11 NOTE — OCCUPATIONAL THERAPY NOTE
[address] Progress Note     Patient Name: Hannah Hopson  OGNPF'T Date: 2/11/2019  Problem List  Patient Active Problem List   Diagnosis    Chronic pain    Depression    Diabetic neuropathy (HCC)    Enlarged prostate with lower urinary tract symptoms (LUTS)    Hernia, epigastric    Injury of cervical spinal cord (HCC)    Tobacco abuse    Umbilical hernia    Cervical myelopathy (HCC)    Basal cell carcinoma in situ of skin    Ambulatory dysfunction    Lumbar radiculopathy    Diabetes mellitus type 2 - Diabetic neuropathy    Hypercalcemia    [facility]ri[facility] and [facility]riparesis ([facility] )    Alcohol abuse    Cellulitis of leg, right    PAD    Ischemic foot pain at rest [facility])    Peripheral arterial disease ([facility] )    Essential hypertension    Acute blood loss anemia    Constipation    Wound dehiscence, surgical, initial encounter    Non-healing surgical wound of right groin    Postoperative state    Sacral wound, initial encounter    Acute respiratory failure with hypoxia    Esophageal thickening    Acute on chronic diastolic CHF    Acute kidney injury    Obstructive uropathy    Volume overload    Non-rheumatic mitral regurgitation    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 - Severe mitral regurgitation    Quadriplegia - history of Traumatic cervical injury    Non-MI elevated troponin    Hypomagnesemia - Hypokalemia    Leukocytosis    BPH (benign prostatic hyperplasia)           02/11/19 1022   Restrictions/Precautions   Weight Bearing Precautions Per Order No   Other Precautions Fall Risk;Pain;Telemetry;Multiple lines;O2   General   Response to Previous Treatment Patient with no complaints from previous session   Lifestyle   Autonomy pta pt reports needing assist for LB ADLs and IADLs 2* previous SCI   Reciprocal Relationships reports wife and supportive neighbors help when wife is at work full time   Service to Others on disability   Intrinsic Gratification enjoys baking   Pain Assessment   Pain Assessment No/denies pain   Pain Score No Pain   ADL   Where Assessed Chair   Grooming Assistance 5  Supervision/Setup   Grooming Deficit Wash/dry hands; Wash/dry face; Teeth care;Use of adaptive equipment;Setup  (Use of foam on toothpaste)   UB Bathing Assistance 4  Minimal Assistance   UB Bathing Deficit Increased time to complete;Right arm;Left arm  (assistance for underarms)   LB Bathing Assistance 3  Moderate Assistance   LB Bathing Deficit Perineal area  (standing w/ RW)   UB Dressing Assistance 4  Minimal Assistance   UB Dressing Deficit Thread RUE; Thread LUE; Increased time to complete   LB Dressing Assistance 2  Maximal Assistance   LB Dressing Deficit Don/doff R sock; Don/doff L sock   Functional Standing Tolerance   Time 2 minutes   Activity LB ADLs   Comments RW   Bed Mobility   Supine to Sit 4  Minimal assistance   Additional items Assist x 1; Increased time required;Verbal cues   Transfers   Sit to Stand 5  Supervision   Additional items Assist x 1; Increased time required;Verbal cues   Stand to Sit 5  Supervision   Additional items Increased time required;Verbal cues   Functional Mobility   Functional Mobility 4  Minimal assistance   Additional Comments CGA   Additional items Rolling walker   Cognition   Overall Cognitive Status WFL   Arousal/Participation Cooperative   Attention Within functional limits   Orientation Level Oriented X4   Memory Within functional limits   Following Commands Follows multistep commands with increased time or repetition   Activity Tolerance   Activity Tolerance Patient tolerated treatment well   Medical Staff Made Aware RN aware   Assessment   Assessment Patient participated in Skilled OT session this date with interventions consisting of ADL re training with the use of correct body mechnaics, safety awareness and fall prevention techniques,  therapeutic activities to: increase activity tolerance, increase postural control, increase trunk control and increase OOB/ sitting tolerance   Patient agreeable to OT treatment session, upon arrival patient was found supine in bed  In comparison to previous session, patient with improvements in transfers*   Patient requiring ocassional safety reminders  Patient continues to be functioning below baseline level, occupational performance remains limited secondary to factors listed above and increased risk for falls and injury  From OT standpoint, recommendation at time of d/c would be Home OT  Patient to benefit from continued Occupational Therapy treatment while in the hospital to address deficits as defined above and maximize level of functional independence with ADLs and functional mobility  Plan   Treatment Interventions ADL retraining; Endurance training;Equipment evaluation/education; Compensatory technique education;Continued evaluation; Energy conservation; Activityengagement   Goal Expiration Date 02/18/19   Treatment Day 2   OT Frequency 3-5x/wk   Recommendation   OT Discharge Recommendation Home OT   OT - OK to Discharge Yes   Barthel Index   Feeding 10   Bathing 0   Grooming Score 5   Dressing Score 5   Bladder Score 10   Bowels Score 10   Toilet Use Score 5   Transfers (Bed/Chair) Score 10   Mobility (Level Surface) Score 10   Stairs Score 0   Barthel Index Score 65   Modified McPherson Scale   Modified McPherson Scale 3     Aleyda Taylor MS, OTR/L

## 2019-02-11 NOTE — PROGRESS NOTES
[facility] Hospitalist Service - Internal Medicine Progress Note       PATIENT INFORMATION      Patient: Jo-Ann Vital 59 y o  male   MRN: [id number]  PCP: Chuy Briggs MD  Unit/Bed#: [facility] 512-01 Encounter: [id number]  Date Of Visit: 02/11/19       ASSESSMENTS & PLAN     Acute respiratory failure with hypoxia   Assessment & Plan    - multifactorial secondary to diastolic CHF exacerbation coupled with progressive severe mitral regurgitation and suspected chordae tendineae rupture  - has been progressively weaned off BiPAP onto nasal cannula oxygenation more frequently on room air now     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 - Severe mitral regurgitation   Assessment & Plan    - appreciate CT surgery input   - underwent ORIN earlier today which confirmed chordal rupture   - consideration of MitraClip over valve repair noted due to comorbidities  - monitor vitals and maintain hemodynamics      Acute on chronic diastolic CHF   Assessment & Plan    - EF of 60% with severe MR  - proBNP of 3,787 on admission  - cardiology following - continue Demadex regimen  - low-sodium diet with fluid restrictions enforced - net fluid balance of approximately (-) 4 02 L      Quadriplegia - history of Traumatic cervical injury   Assessment & Plan    - due to fall s/p C4 vertebral injury   - PRN pain control and supportive care otherwise   - frequent turning/repositioning by nursing staff during wake hours to prevent deep tissue injuries ordered   - continue Baclofen regimen for spasms     PAD   Assessment & Plan    - s/p right femoral-popliteal bypass   - continue Plavix/Lipitor  - fasting LDL well controlled at 7  - aggressive blood sugar control encouraged along with smoking cessation     Non-MI elevated troponin   Assessment & Plan    - likely secondary to acute CHF with severe MR   - troponin peak of 0 08     Tobacco abuse   Assessment & Plan    - smoking cessation counseling  - transdermal nicotine patch on board     Diabetes mellitus type 2 - Diabetic neuropathy   Assessment & Plan    - HbA1c of 9 3   - has been maintained on an insulin drip due to persistent hyperglycemia  - continue Gabapentin for neuropathy  - appreciate endocrinology input - will wean off insulin drip tonight and transition to basal regimen with fix/prandial short-acting insulin and additional SSI coverage per Accu-Cheks     Acute kidney injury   Assessment & Plan    - creatinine improved from a peak of 2 35 -> 1 33 currently   - monitor renal function limit/avoid nephrotoxins as possible   - Demadex use noted     Hypomagnesemia - Hypokalemia   Assessment & Plan    - monitor/replete as necessary      Leukocytosis   Assessment & Plan    - likely reactive secondary to acute medical issues - remains afebrile  - monitor WBC count - currently normalized      BPH (benign prostatic hyperplasia)   Assessment & Plan    - c/w Flomax   - worsening urinary retention noted over last 24 hrs amenable to Vasques catheterization - will appreciate urology evaluation       VTE Prophylaxis:  Heparin SC      SUBJECTIVE     Seen/examined earlier today after ORIN which he tolerated quite well  Denies any worsening shortness of breath or chest pain at this time  He was sitting upright in a chair consuming a meal during my encounter  Remains in pleasant and hopeful spirits  OBJECTIVE     Vitals:   Temp (24hrs), Av 1 °F (36 7 °C), Min:97 7 °F (36 5 °C), Max:98 4 °F (36 9 °C)    Temp:  [97 7 °F (36 5 °C)-98 4 °F (36 9 °C)] 98 3 °F (36 8 °C)  HR:  [] 99  Resp:  [16-20] 18  BP: (102-156)/(67-78) 102/69  SpO2:  [95 %-100 %] 96 %  Body mass index is 24 26 kg/m²  Input and Output Summary (last 24 hours):        Intake/Output Summary (Last 24 hours) at 2019 1640  Last data filed at 2019 1313  Gross per 24 hour   Intake 2389 77 ml   Output 1975 ml   Net 414 77 ml       Physical Exam:     GENERAL:  Weak/fatigued  HEAD:  Normocephalic - atraumatic  EYES: PERRL - EOMI MOUTH:  Mucosa moist  NECK:  Supple - full range of motion  CARDIAC:  Intermittently tachycardic but regular rhythm - S1/S2 positive  PULMONARY:  Diminished to auscultation bilaterally - nonlabored respirations  ABDOMEN:  Soft - nontender/nondistended - active bowel sounds  MUSCULOSKELETAL:  Motor strength/range of motion remains deconditioned  NEUROLOGIC:  Alert/oriented x 3 - quadriplegic chronically  SKIN:  Chronic wrinkles/blemishes   PSYCHIATRIC:  Mood/affect stable      ADDITIONAL DATA       Labs & Recent Cultures:     Results from last 7 days   Lab Units 02/11/19  0527   WBC Thousand/uL 9 28   HEMOGLOBIN g/dL 13 4   HEMATOCRIT % 39 6   PLATELETS Thousands/uL 214   NEUTROS PCT % 61   LYMPHS PCT % 23   MONOS PCT % 12   EOS PCT % 2     Results from last 7 days   Lab Units 02/11/19  0527  02/07/19  0537 02/07/19  0515   SODIUM mmol/L 138   < > 132*  --    POTASSIUM mmol/L 4 5   < > 5 5*  --    CHLORIDE mmol/L 105   < > 97*  --    CO2 mmol/L 25   < > 24  --    CO2, I-STAT mmol/L  --   --   --  27   BUN mg/dL 45*   < > 45*  --    CREATININE mg/dL 1 33*   < > 2 17*  --    CALCIUM mg/dL 7 5*   < > 9 3  --    ALK PHOS U/L  --   --  53  --    ALT U/L  --   --  40  --    AST U/L  --   --  48*  --    GLUCOSE, ISTAT mg/dl  --   --   --  425*    < > = values in this interval not displayed  Results from last 7 days   Lab Units 02/07/19  1257   INR  0 99         Results from last 7 days   Lab Units 02/07/19  0537 02/07/19  0536   BLOOD CULTURE  No Growth After 4 Days  No Growth After 4 Days           Last 24 Hours Medication List:     Current Facility-Administered Medications:  EMS replenish medication  Does not apply Once Rizwana Chris MD   acetaminophen 975 mg Oral Q8H [facility] Sera Calvin PA-C   albuterol 2 5 mg Nebulization Q6H PRN Rizwana Chris MD   atorvastatin 40 mg Oral Daily With Hermila Parrish MD   baclofen 20 mg Oral TID Rashawn Gil MD   citalopram 20 mg Oral Daily Stephanie Eduardo PA-C clopidogrel 75 mg Oral Daily Dion Silva MD   docusate sodium 100 mg Oral BID Dion Silva MD   gabapentin 300 mg Oral BID Dion Silva MD   heparin (porcine) 5,000 Units Subcutaneous Q8H [address] Zelpha Navin, CRNP   insulin glargine 30 Units Subcutaneous HS Daquan Martini MD   insulin lispro 10 Units Subcutaneous TID With Meals Daquan Martini MD   insulin lispro 2-12 Units Subcutaneous TID AC Daquan Martini MD   insulin lispro 2-12 Units Subcutaneous HS Daquan Martini MD   insulin lispro 2-12 Units Subcutaneous 0200 Daquan Martini MD   nicotine 1 patch Transdermal Daily Dion Silva MD   nystatin  Topical BID Felix Diez MD   ondansetron 4 mg Intravenous Q6H PRN Dion Silva MD   polyethylene glycol 17 g Oral Daily PRN Felix Diez MD   potassium chloride 40 mEq Oral BID Felix Diez MD   tamsulosin 0 4 mg Oral Daily With Monica Chopra MD   torsemide 20 mg Oral BID Narendra Ng MD          Time Spent for Care: 33 minutes  More than 50% of total time spent on counseling and coordination of care as described above  Current Length of Stay: 4 day(s)      Code Status: Level 3 - DNAR and DNI         ** Please Note: This note is constructed using a voice recognition dictation system   **

## 2019-02-11 NOTE — ANESTHESIA PREPROCEDURE EVALUATION
Admitted with Acute SOB  Review of Systems/Medical History          Cardiovascular  Hypertension , Past MI , CHF ,   Comment: ECHO-60% EF- 2/7/19, No AS   MR, QUestion of Acute MR with Chord Rupture,  Pulmonary  COPD , Shortness of breath,        GI/Hepatic     Hiatal hernia,        Kidney disease ARF, Chronic kidney disease ,        Endo/Other  Diabetes ,      GYN       Hematology  Anemia ,     Musculoskeletal       Neurology    Diabetic neuropathy,   Comment: Spinal Cord Injury,C-C7 Fusion with Quadriparesis Psychology   Depression ,              Physical Exam    Airway    Mallampati score: I    Neck ROM: full     Dental   Comment: Very Poor  Multiple Stubs and Cariuos,     Cardiovascular      Pulmonary      Other Findings        Anesthesia Plan  ASA Score- 4     Anesthesia Type- IV sedation with anesthesia with ASA Monitors  Additional Monitors:   Airway Plan:         Plan Factors-Patient not instructed to abstain from smoking on day of procedure  Patient did not smoke on day of surgery  Induction- intravenous  Postoperative Plan-     Informed Consent- Anesthetic plan and risks discussed with patient  I personally reviewed this patient with the CRNA  Discussed and agreed on the Anesthesia Plan with the CRNA  Sophia Haji

## 2019-02-11 NOTE — RESTORATIVE TECHNICIAN NOTE
Restorative Specialist Mobility Note       Activity: Bedrest, Dangle, Ambulate in room, Ambulate in farr     Assistive Device: Front wheel walker     Ambulation Response:  Tolerated well  Repositioned: Sitting

## 2019-02-11 NOTE — ANESTHESIA POSTPROCEDURE EVALUATION
Post-Op Assessment Note    CV Status:  Stable  Pain Score: 0    Pain management: satisfactory to patient     Mental Status:  Alert   PONV Controlled:  None   Airway Patency:  Patent   Post Op Vitals Reviewed: Yes      Staff: Anesthesiologist, with CRNAs           /78 (02/11/19 1150)    Temp      Pulse 78 (02/11/19 1150)   Resp 18 (02/11/19 1150)    SpO2 98 % (02/11/19 1150)

## 2019-02-11 NOTE — UTILIZATION REVIEW
Continued Stay Review    Date:  2-11-19        59YEAR OLD MALE  WITH ACUTE RESPIRATORY FAILURE WITH HYPOXIA  RELATED TO FLAIL MITRAL VALVE  REQUIRING MITRAL CLIP TODAY  Vital Signs: /74   Pulse 98   Temp 98 °F (36 7 °C) (Oral)   Resp 19   Ht 5' 10" (1 778 m)   Wt 76 7 kg (169 lb 1 5 oz)   SpO2 100%   BMI 24 26 kg/m²        Assessment/Plan:    Acute respiratory failure with hypoxia  · 2/2 mitral regurgitation, diastolic CHF  TTE demonstrated flail mitral valve consistent with chorda tendinae rupture  · Has had decreasing O2 requirements  On 2 5L O2 NC    · ORIN scheduled for today at 1:30 PM  F/u results from ORIN  · NPO until procedure, resume diet after procedure  · Per patient, MitraClip ?tomorrow  Awaiting CT surgery recommendations  · Continue monitoring hemodynamic status with vitals             Medications:     acetaminophen 975 mg Oral Q8H [address]   albuterol 2 5 mg Nebulization Q6H PRN   atorvastatin 40 mg Oral Daily With Dinner   baclofen 20 mg Oral TID   citalopram 20 mg Oral Daily   clopidogrel 75 mg Oral Daily   docusate sodium 100 mg Oral BID   gabapentin 300 mg Oral BID   heparin (porcine) 5,000 Units Subcutaneous Q8H [address]   insulin glargine 30 Units Subcutaneous HS   insulin lispro 10 Units Subcutaneous TID With Meals   insulin lispro 2-12 Units Subcutaneous TID AC   insulin lispro 2-12 Units Subcutaneous HS   insulin lispro 2-12 Units Subcutaneous 0200   nicotine 1 patch Transdermal Daily   nystatin  Topical BID   ondansetron 4 mg Intravenous Q6H PRN   polyethylene glycol 17 g Oral Daily PRN   potassium chloride 40 mEq Oral BID   tamsulosin 0 4 mg Oral Daily With Dinner   torsemide 20 mg Oral BID       Discharge Plan:  TO BE DETERMINED

## 2019-02-11 NOTE — ANESTHESIA POSTPROCEDURE EVALUATION
Post-Op Assessment Note    CV Status:  Stable  Pain Score: 0    Pain management: satisfactory to patient     Mental Status:  Awake   Hydration Status:  Stable   PONV Controlled:  None   Airway Patency:  Patent   Post Op Vitals Reviewed: Yes      Staff: Anesthesiologist, with CRNAs           BP  99/70   Temp      Pulse  98   Resp   18   SpO2   100

## 2019-02-11 NOTE — PROGRESS NOTES
Progress Note - Faith Oshea 59 y o  male MRN: [id number]    Unit/Bed#: [address] 572-99 Encounter: [id number]      CC: diabetes f/u    Subjective:   Faith Oshea is a 59y o  year old male with type 2 diabetes  Feels well  Baseline chronic pain but no other complaints   No hypoglycemia  Patient was transitioned from an insulin drip to subcutaneous insulin yesterday  Was NPO this morning for a TTE but a lunch well  Has a good appetite  Objective:     Vitals: Blood pressure 102/69, pulse 99, temperature 98 3 °F (36 8 °C), temperature source Oral, resp  rate 18, height 5' 10" (1 778 m), weight 76 7 kg (169 lb 1 5 oz), SpO2 96 %  ,Body mass index is 24 26 kg/m²  Intake/Output Summary (Last 24 hours) at 2/11/2019 1640  Last data filed at 2/11/2019 1313  Gross per 24 hour   Intake 2389 77 ml   Output 1975 ml   Net 414 77 ml       Physical Exam:  General Appearance: awake, appears stated age and cooperative  Head: Normocephalic, without obvious abnormality, atraumatic  Extremities: moves all extremities  Skin: Skin color and temperature normal    Pulm: no labored breathing    Lab, Imaging and other studies: I have personally reviewed pertinent reports  POC Glucose (mg/dl)   Date Value   02/11/2019 310 (H)   02/11/2019 249 (H)   02/11/2019 221 (H)   02/11/2019 190 (H)   02/10/2019 150 (H)   02/10/2019 194 (H)   02/10/2019 346 (H)   02/10/2019 151 (H)   02/10/2019 356 (H)   02/10/2019 172 (H)       Assessment and plan:  Type 2 diabetes mellitus with hyperglycemia:   Increase Lantus to 34 units at bedtime  Increase Humalog to 12 units with each meal  continue sliding scale insulin  Will continue to follow blood sugars and make recommendations  Heart failure- care per Cardiology and primary team  Hypertension- blood pressure well controlled    Continue current management  Hyperlipidemia- continue statin    Portions of the record may have been created with voice recognition software   Occasional wrong word or "sound a like" substitutions may have occurred due to the inherent limitations of voice recognition software   Read the chart carefully and recognize, using context, where substitutions have occurred

## 2019-02-11 NOTE — PLAN OF CARE
Problem: OCCUPATIONAL THERAPY ADULT  Goal: Performs self-care activities at highest level of function for planned discharge setting  See evaluation for individualized goals  Description  Treatment Interventions: ADL retraining, Functional transfer training, Endurance training, Patient/family training, Continued evaluation, Energy conservation, Activityengagement          See flowsheet documentation for full assessment, interventions and recommendations      Outcome: Progressing

## 2019-02-12 LAB
ANION GAP SERPL CALCULATED.3IONS-SCNC: 5 MMOL/L (ref 4–13)
ANION GAP SERPL CALCULATED.3IONS-SCNC: 7 MMOL/L (ref 4–13)
BACTERIA BLD CULT: NORMAL
BACTERIA BLD CULT: NORMAL
BASOPHILS # BLD AUTO: 0.06 THOUSANDS/ΜL (ref 0–0.1)
BASOPHILS NFR BLD AUTO: 1 % (ref 0–1)
BUN SERPL-MCNC: 36 MG/DL (ref 5–25)
BUN SERPL-MCNC: 42 MG/DL (ref 5–25)
CALCIUM SERPL-MCNC: 7.6 MG/DL (ref 8.3–10.1)
CALCIUM SERPL-MCNC: 7.6 MG/DL (ref 8.3–10.1)
CHLORIDE SERPL-SCNC: 105 MMOL/L (ref 100–108)
CHLORIDE SERPL-SCNC: 107 MMOL/L (ref 100–108)
CO2 SERPL-SCNC: 24 MMOL/L (ref 21–32)
CO2 SERPL-SCNC: 28 MMOL/L (ref 21–32)
CREAT SERPL-MCNC: 1.29 MG/DL (ref 0.6–1.3)
CREAT SERPL-MCNC: 1.32 MG/DL (ref 0.6–1.3)
EOSINOPHIL # BLD AUTO: 0.17 THOUSAND/ΜL (ref 0–0.61)
EOSINOPHIL NFR BLD AUTO: 3 % (ref 0–6)
ERYTHROCYTE [DISTWIDTH] IN BLOOD BY AUTOMATED COUNT: 13.7 % (ref 11.6–15.1)
GFR SERPL CREATININE-BSD FRML MDRD: 58 ML/MIN/1.73SQ M
GFR SERPL CREATININE-BSD FRML MDRD: 60 ML/MIN/1.73SQ M
GLUCOSE SERPL-MCNC: 172 MG/DL (ref 65–140)
GLUCOSE SERPL-MCNC: 197 MG/DL (ref 65–140)
GLUCOSE SERPL-MCNC: 239 MG/DL (ref 65–140)
GLUCOSE SERPL-MCNC: 269 MG/DL (ref 65–140)
GLUCOSE SERPL-MCNC: 284 MG/DL (ref 65–140)
GLUCOSE SERPL-MCNC: 308 MG/DL (ref 65–140)
GLUCOSE SERPL-MCNC: 343 MG/DL (ref 65–140)
HCT VFR BLD AUTO: 40.2 % (ref 36.5–49.3)
HGB BLD-MCNC: 13.1 G/DL (ref 12–17)
IMM GRANULOCYTES # BLD AUTO: 0.05 THOUSAND/UL (ref 0–0.2)
IMM GRANULOCYTES NFR BLD AUTO: 1 % (ref 0–2)
LYMPHOCYTES # BLD AUTO: 1.91 THOUSANDS/ΜL (ref 0.6–4.47)
LYMPHOCYTES NFR BLD AUTO: 28 % (ref 14–44)
MAGNESIUM SERPL-MCNC: 1.8 MG/DL (ref 1.6–2.6)
MCH RBC QN AUTO: 34 PG (ref 26.8–34.3)
MCHC RBC AUTO-ENTMCNC: 32.6 G/DL (ref 31.4–37.4)
MCV RBC AUTO: 104 FL (ref 82–98)
MONOCYTES # BLD AUTO: 1.03 THOUSAND/ΜL (ref 0.17–1.22)
MONOCYTES NFR BLD AUTO: 15 % (ref 4–12)
NEUTROPHILS # BLD AUTO: 3.53 THOUSANDS/ΜL (ref 1.85–7.62)
NEUTS SEG NFR BLD AUTO: 52 % (ref 43–75)
NRBC BLD AUTO-RTO: 0 /100 WBCS
PHOSPHATE SERPL-MCNC: 3.7 MG/DL (ref 2.3–4.1)
PLATELET # BLD AUTO: 237 THOUSANDS/UL (ref 149–390)
PMV BLD AUTO: 10.3 FL (ref 8.9–12.7)
POTASSIUM SERPL-SCNC: 4.8 MMOL/L (ref 3.5–5.3)
POTASSIUM SERPL-SCNC: 5.8 MMOL/L (ref 3.5–5.3)
RBC # BLD AUTO: 3.85 MILLION/UL (ref 3.88–5.62)
SODIUM SERPL-SCNC: 138 MMOL/L (ref 136–145)
SODIUM SERPL-SCNC: 138 MMOL/L (ref 136–145)
WBC # BLD AUTO: 6.75 THOUSAND/UL (ref 4.31–10.16)

## 2019-02-12 PROCEDURE — 82948 REAGENT STRIP/BLOOD GLUCOSE: CPT

## 2019-02-12 PROCEDURE — 99232 SBSQ HOSP IP/OBS MODERATE 35: CPT | Performed by: PHYSICIAN ASSISTANT

## 2019-02-12 PROCEDURE — 80048 BASIC METABOLIC PNL TOTAL CA: CPT | Performed by: INTERNAL MEDICINE

## 2019-02-12 PROCEDURE — 80048 BASIC METABOLIC PNL TOTAL CA: CPT | Performed by: PHYSICIAN ASSISTANT

## 2019-02-12 PROCEDURE — 83735 ASSAY OF MAGNESIUM: CPT | Performed by: INTERNAL MEDICINE

## 2019-02-12 PROCEDURE — 99232 SBSQ HOSP IP/OBS MODERATE 35: CPT | Performed by: INTERNAL MEDICINE

## 2019-02-12 PROCEDURE — 85025 COMPLETE CBC W/AUTO DIFF WBC: CPT | Performed by: INTERNAL MEDICINE

## 2019-02-12 PROCEDURE — 84100 ASSAY OF PHOSPHORUS: CPT | Performed by: INTERNAL MEDICINE

## 2019-02-12 PROCEDURE — 99222 1ST HOSP IP/OBS MODERATE 55: CPT | Performed by: UROLOGY

## 2019-02-12 RX ORDER — LANOLIN ALCOHOL/MO/W.PET/CERES
6 CREAM (GRAM) TOPICAL
Status: DISCONTINUED | OUTPATIENT
Start: 2019-02-12 | End: 2019-02-14 | Stop reason: HOSPADM

## 2019-02-12 RX ORDER — INSULIN GLARGINE 100 [IU]/ML
38 INJECTION, SOLUTION SUBCUTANEOUS
Status: DISCONTINUED | OUTPATIENT
Start: 2019-02-12 | End: 2019-02-13

## 2019-02-12 RX ADMIN — ACETAMINOPHEN 975 MG: 325 TABLET, FILM COATED ORAL at 21:14

## 2019-02-12 RX ADMIN — POLYETHYLENE GLYCOL 3350 17 G: 17 POWDER, FOR SOLUTION ORAL at 08:17

## 2019-02-12 RX ADMIN — INSULIN LISPRO 8 UNITS: 100 INJECTION, SOLUTION INTRAVENOUS; SUBCUTANEOUS at 12:19

## 2019-02-12 RX ADMIN — INSULIN LISPRO 8 UNITS: 100 INJECTION, SOLUTION INTRAVENOUS; SUBCUTANEOUS at 17:03

## 2019-02-12 RX ADMIN — INSULIN LISPRO 4 UNITS: 100 INJECTION, SOLUTION INTRAVENOUS; SUBCUTANEOUS at 02:06

## 2019-02-12 RX ADMIN — DOCUSATE SODIUM 100 MG: 100 CAPSULE, LIQUID FILLED ORAL at 08:17

## 2019-02-12 RX ADMIN — GABAPENTIN 300 MG: 300 CAPSULE ORAL at 08:18

## 2019-02-12 RX ADMIN — HEPARIN SODIUM 5000 UNITS: 5000 INJECTION INTRAVENOUS; SUBCUTANEOUS at 13:31

## 2019-02-12 RX ADMIN — ACETAMINOPHEN 975 MG: 325 TABLET, FILM COATED ORAL at 05:08

## 2019-02-12 RX ADMIN — BACLOFEN 20 MG: 10 TABLET ORAL at 08:17

## 2019-02-12 RX ADMIN — DOCUSATE SODIUM 100 MG: 100 CAPSULE, LIQUID FILLED ORAL at 17:03

## 2019-02-12 RX ADMIN — HEPARIN SODIUM 5000 UNITS: 5000 INJECTION INTRAVENOUS; SUBCUTANEOUS at 21:13

## 2019-02-12 RX ADMIN — TORSEMIDE 20 MG: 20 TABLET ORAL at 17:03

## 2019-02-12 RX ADMIN — TAMSULOSIN HYDROCHLORIDE 0.4 MG: 0.4 CAPSULE ORAL at 17:03

## 2019-02-12 RX ADMIN — BACLOFEN 20 MG: 10 TABLET ORAL at 21:14

## 2019-02-12 RX ADMIN — CLOPIDOGREL BISULFATE 75 MG: 75 TABLET ORAL at 08:18

## 2019-02-12 RX ADMIN — GABAPENTIN 300 MG: 300 CAPSULE ORAL at 17:03

## 2019-02-12 RX ADMIN — INSULIN GLARGINE 38 UNITS: 100 INJECTION, SOLUTION SUBCUTANEOUS at 21:13

## 2019-02-12 RX ADMIN — ATORVASTATIN CALCIUM 40 MG: 40 TABLET, FILM COATED ORAL at 17:02

## 2019-02-12 RX ADMIN — MELATONIN 6 MG: at 21:14

## 2019-02-12 RX ADMIN — INSULIN LISPRO 2 UNITS: 100 INJECTION, SOLUTION INTRAVENOUS; SUBCUTANEOUS at 08:20

## 2019-02-12 RX ADMIN — NYSTATIN: 100000 POWDER TOPICAL at 17:05

## 2019-02-12 RX ADMIN — INSULIN LISPRO 6 UNITS: 100 INJECTION, SOLUTION INTRAVENOUS; SUBCUTANEOUS at 21:13

## 2019-02-12 RX ADMIN — NYSTATIN: 100000 POWDER TOPICAL at 08:20

## 2019-02-12 RX ADMIN — NICOTINE 1 PATCH: 21 PATCH, EXTENDED RELEASE TRANSDERMAL at 08:19

## 2019-02-12 RX ADMIN — BACLOFEN 20 MG: 10 TABLET ORAL at 17:03

## 2019-02-12 RX ADMIN — ACETAMINOPHEN 975 MG: 325 TABLET, FILM COATED ORAL at 13:31

## 2019-02-12 RX ADMIN — TORSEMIDE 20 MG: 20 TABLET ORAL at 09:44

## 2019-02-12 RX ADMIN — HEPARIN SODIUM 5000 UNITS: 5000 INJECTION INTRAVENOUS; SUBCUTANEOUS at 05:08

## 2019-02-12 RX ADMIN — CITALOPRAM HYDROBROMIDE 20 MG: 20 TABLET ORAL at 08:18

## 2019-02-12 NOTE — PROGRESS NOTES
Progress Note - Yordan Meza 59 y o  male MRN: [id number]    Unit/Bed#: [address] 744-36 Encounter: [id number]      CC: diabetes f/u    Subjective:   Yordan Meza is a 59y o  year old male with type 2 diabetes  Feels well  No complaints  No hypoglycemia  Blood sugars reviewed patient noted to be hyperglycemic throughout the day  Awaiting CT surgery recommendations for severe mitral regurgitation  Objective:     Vitals: Blood pressure 119/70, pulse 93, temperature 98 °F (36 7 °C), temperature source Oral, resp  rate 18, height 5' 10" (1 778 m), weight 78 3 kg (172 lb 9 9 oz), SpO2 97 %  ,Body mass index is 24 77 kg/m²  Intake/Output Summary (Last 24 hours) at 2/12/2019 1521  Last data filed at 2/12/2019 1439  Gross per 24 hour   Intake 1530 ml   Output 3125 ml   Net -1595 ml       Physical Exam:  General Appearance: awake, appears stated age and cooperative  Head: Normocephalic, without obvious abnormality, atraumatic  Extremities: moves all extremities  Skin: Skin color and temperature normal    Pulm: no labored breathing    Lab, Imaging and other studies: I have personally reviewed pertinent reports  POC Glucose (mg/dl)   Date Value   02/12/2019 343 (H)   02/12/2019 197 (H)   02/12/2019 239 (H)   02/11/2019 371 (H)   02/11/2019 310 (H)   02/11/2019 249 (H)   02/11/2019 221 (H)   02/11/2019 190 (H)   02/10/2019 150 (H)   02/10/2019 194 (H)       Assessment and Plan:  Type 2 diabetes mellitus with hyperglycemia  - increase Lantus to 38 units subcutaneously at bedtime  - increase Humalog to 14 units with meals-continue insulin sliding scale  Will continue to follow blood sugars    Heart failure, mitral regurgitation-care per Cardiology and CT surgery    Hypertension, hyperlipidemia-continue current management    Portions of the record may have been created with voice recognition software   Occasional wrong word or "sound a like" substitutions may have occurred due to the inherent limitations of voice recognition software   Read the chart carefully and recognize, using context, where substitutions have occurred

## 2019-02-12 NOTE — PROGRESS NOTES
Progress Note - Luis Marquez 1960, 59 y o  male MRN: [id number]  Unit/Bed#: [facility] 12-46 Encounter: [id number] DOS: 2/12/19  Primary Care Provider: Anju Roman MD   Date and time admitted to hospital: 2/7/2019  5:00 AM    Chordae tendineae rupture - Severe mitral regurgitation  Assessment & Plan  · Await CT surgery recommendations  · ORIN to/11/19 revealed moderate mitral annular calcification and findings suggestive of chordal rupture and severe mitral regurgitation  · consideration of MitraClip over valve repair noted due to comorbidities    Acute on chronic diastolic CHF  Assessment & Plan  · EF of 60% with severe MR  · proBNP of 3,787 on admission  · cardiology following - continue Demadex regimen  · low-sodium diet with fluid restrictions enforced    Tobacco abuse  Assessment & Plan  - smoking cessation counseling  - transdermal nicotine patch on board    Diabetes mellitus type 2 - Diabetic neuropathy  Assessment & Plan  · HbA1c of 9 3   · Patient weaned off insulin drip yesterday, continue Lantus 34 units at bedtime and Humalog 12 units with meals  · Appreciate Endocrinology input  · Continue Gabapentin for neuropathy    * Acute respiratory failure with hypoxia  Assessment & Plan  · multifactorial secondary to diastolic CHF exacerbation coupled with progressive severe mitral regurgitation and chordae tendineae rupture  · has been progressively weaned off BiPAP onto nasal cannula oxygenation intermittently room air now    Acute kidney injury  Assessment & Plan  · creatinine improved from a peak of 2 35 -> 1 29 currently   · monitor renal function limit/avoid nephrotoxins as possible   · demadex resumed 20mg BID     PAD  Assessment & Plan  · s/p right femoral-popliteal bypass   · continue Plavix/Lipitor  · fasting LDL well controlled at 7  · aggressive blood sugar control encouraged along with smoking cessation    BPH (benign prostatic hyperplasia)  Assessment & Plan  · Patient failed urinary retention protocol and Vasques catheter placed 2/11  · Bowel regimen for constipation  · Continue Flomax  · Appreciate Urology input, consider voiding trial in the next 20 for 48 hours however he undergoes cardiothoracic surgery catheter can remain indwelling until after procedure    Hypomagnesemia - Hypokalemia  Assessment & Plan  · monitor/replete as necessary   · Discontinue potassium supplement and he was hyperkalemic today    Non-MI elevated troponin  Assessment & Plan  · likely secondary to acute CHF with severe MR   · troponin peak of 0 08    Quadriplegia - history of Traumatic cervical injury  Assessment & Plan  · due to fall s/p C4 vertebral injury   · PRN pain control and supportive care otherwise   · frequent turning/repositioning by nursing staff during wake hours to prevent deep tissue injuries ordered   · continue Baclofen regimen for spasms    Essential hypertension  Assessment & Plan  · Lisinopril discontinued due to SUNDEEP and hyperkalemia    VTE Pharmacologic Prophylaxis:   Pharmacologic: Heparin  Mechanical VTE Prophylaxis in Place: Yes    Patient Centered Rounds: I have performed bedside rounds with nursing staff today  Discussions with Specialists or Other Care Team Provider: nursing, tried paging CT surgery but on hold w      Education and Discussions with Family / Patient: patient, will call and update wife     Time Spent for Care: 30 minutes  More than 50% of total time spent on counseling and coordination of care as described above  Current Length of Stay: 5 day(s)    Current Patient Status: Inpatient   Certification Statement: The patient will continue to require additional inpatient hospital stay due to ongoing plan per cardiology and CT surgery for mitral valve clip     Discharge Plan / Estimated Discharge Date: pending clinical course     Code Status: Level 3 - DNAR and DNI    Subjective:   Pt seen and examined at bedside  No new complaints  Wants to talk to  CT surgery   Notes improvement in SOB  Asking me if I can liberalize his fluid restrictions  Objective:     Vitals:   Temp (24hrs), Av °F (36 7 °C), Min:97 5 °F (36 4 °C), Max:98 3 °F (36 8 °C)    Temp:  [97 5 °F (36 4 °C)-98 3 °F (36 8 °C)] 98 °F (36 7 °C)  HR:  [92-99] 93  Resp:  [16-20] 18  BP: ()/(59-70) 119/70  SpO2:  [96 %-99 %] 97 %  Body mass index is 24 77 kg/m²  Input and Output Summary (last 24 hours): Intake/Output Summary (Last 24 hours) at 2019 1426  Last data filed at 2019 1336  Gross per 24 hour   Intake 1290 ml   Output 3125 ml   Net -1835 ml     Physical Exam:     Physical Exam   Constitutional: He is oriented to person, place, and time  He appears well-developed and well-nourished  Chronically ill appearing, generalized muscle wasting    HENT:   Head: Normocephalic and atraumatic  Eyes: EOM are normal  No scleral icterus  Neck: Normal range of motion  Neck supple  Cardiovascular: Normal rate and regular rhythm  Murmur heard  Pulmonary/Chest: Effort normal and breath sounds normal    Abdominal: Soft  Bowel sounds are normal    Genitourinary:   Genitourinary Comments: Vasques patent clear yellow urine    Musculoskeletal: Normal range of motion  He exhibits no edema  Neurological: He is alert and oriented to person, place, and time  Skin: Skin is warm and dry       Additional Data:     Labs:    Results from last 7 days   Lab Units 19  0453   WBC Thousand/uL 6 75   HEMOGLOBIN g/dL 13 1   HEMATOCRIT % 40 2   PLATELETS Thousands/uL 237   NEUTROS PCT % 52   LYMPHS PCT % 28   MONOS PCT % 15*   EOS PCT % 3     Results from last 7 days   Lab Units 19  1257  19  0537 19  0515   POTASSIUM mmol/L 4 8   < > 5 5*  --    CHLORIDE mmol/L 105   < > 97*  --    CO2 mmol/L 28   < > 24  --    CO2, I-STAT mmol/L  --   --   --  27   BUN mg/dL 36*   < > 45*  --    CREATININE mg/dL 1 29   < > 2 17*  --    CALCIUM mg/dL 7 6*   < > 9 3  --    ALK PHOS U/L  --   --  53  -- ALT U/L  --   --  40  --    AST U/L  --   --  48*  --    GLUCOSE, ISTAT mg/dl  --   --   --  425*    < > = values in this interval not displayed  Results from last 7 days   Lab Units 02/07/19  1257   INR  0 99       * I Have Reviewed All Lab Data Listed Above  * Additional Pertinent Lab Tests Reviewed: Bre 66 Admission Reviewed    Imaging:    Imaging Reports Reviewed Today Include: all  Imaging Personally Reviewed by Myself Includes:  none    Recent Cultures (last 7 days):     Results from last 7 days   Lab Units 02/07/19  0537 02/07/19  0536   BLOOD CULTURE  No Growth After 5 Days  No Growth After 5 Days         Last 24 Hours Medication List:     Current Facility-Administered Medications:  EMS replenish medication  Does not apply Once Caesar Ortez MD   acetaminophen 975 mg Oral Q8H [address] Sera Olson PA-C   atorvastatin 40 mg Oral Daily With Tyson Minor MD   baclofen 20 mg Oral TID Marge Richardsno MD   citalopram 20 mg Oral Daily Sera Olson PA-C   clopidogrel 75 mg Oral Daily Marge Richardson MD   docusate sodium 100 mg Oral BID Marge Richardson MD   gabapentin 300 mg Oral BID Marge Richardson MD   heparin (porcine) 5,000 Units Subcutaneous Q8H [address] CHERELLE Austin   insulin glargine 34 Units Subcutaneous HS Kervin Guillen MD   insulin lispro 12 Units Subcutaneous TID With Meals Kervin Guillen MD   insulin lispro 2-12 Units Subcutaneous TID AC Dolores Gonzales MD   insulin lispro 2-12 Units Subcutaneous HS Dolores Gonzales MD   insulin lispro 2-12 Units Subcutaneous 0200 Dolores Gonzales MD   nicotine 1 patch Transdermal Daily Marge Richardson MD   nystatin  Topical BID Shawna Hair MD   ondansetron 4 mg Intravenous Q6H PRN Marge Richardson MD   polyethylene glycol 17 g Oral Daily PRN Shawna Hair MD   tamsulosin 0 4 mg Oral Daily With Tyson Minor MD   torsemide 20 mg Oral BID Jackeline Celeste MD        Today, Patient Was Seen By: Rivka Aguilar ESTHER Jones    ** Please Note: This note has been constructed using a voice recognition system   **

## 2019-02-12 NOTE — CONSULTS
UROLOGY CONSULTATION NOTE     Patient Identifiers: Ashley Russell (MRN [id number])  Service Requesting Consultation: SLIM  Service Providing Consultation:  Urology, Gurjit Ribera MD    Date of Service: 2/12/2019  Consults  Reason for Consultation: urinary retention    History of Present Illness:     Ashley Russell is a 59 y o  old with a history of acute shortness of breath woke up from sleep  No chest pain  Troponin normal   Much better after BiPAP and would like to continue its use for now  Need new echocardiogram to compare with previous done on July 2018  Of note echocardiogram that time had good ejection fraction with no diastolic dysfunction  Yet in today's BNP is elevated in the level of 3000  This is in the setting that the patient needs to have hydration due to  SUNDEEP, elevated lactic acid  He is noted to be in urinary retention for which a Vasques catheter was placed  Started on Flomax  In discussing symptoms with patient he had noted frequency and voiding of large amounts of to sudden development of inability to urinate  Denies any previous such episodes  No prior history of genitourinary surgery  No hematuria or past urinary tract infection      Past Medical, Past Surgical History:     Past Medical History:   Diagnosis Date    [facility] failure[facility])     Cholelithiasis     Chronic obstructive lung disease ([facility])     RESOLVED: 8/17/17    [facility]) [facility] [address]     Depression     Diabetes mellitus ([facility])     type 2, non-insulin dependent    Diabetic neuropathy ([facility] [address] [facility]     Difficulty attaining erection     LAST ASSESSEDl: 8/17/17    Former tobacco use     Gall stone pancreatitis     RESOLVED: 3/8/17    Hiatal hernia     History of concussion     History of MRSA infection     History of varicella infection     Hypertension     LAST ASSESSED: 8/17/17    Non-healing open wound of right groin     s/p VAC placement & post-op fungal dermatitis    PAD (peripheral artery disease) ([facility] [address]     s/p RLE fem-pop    Seasonal allergies     Severe mitral regurgitation     Spinal cord injury, C1-C7 ([facility] [address] )     s/p fusion    Traumatic injury to musculoskeletal system     1-5 FLIGHT FALL DOWN THE STEPS - CERVICAL NECK INJURY - JAN 2, 2012; LAST ASSESSED: 8/50/98    Umbilical hernia without obstruction and without gangrene     RESOLVED: 3/8/17   :    Past Surgical History:   Procedure Laterality Date    ARTERIOGRAM Right 7/31/2018    Procedure: ARTERIOGRAM Completion Agram;  Surgeon: Anish Napier MD;  [facility];  Service: Vascular    BACK SURGERY      BYPASS FEMORAL-POPLITEAL Right 7/31/2018    Procedure: BYPASS FEMORAL-POPLITEAL R femoral- BK popliteal bypass;  Surgeon: Anish Napier MD;  [facility];  Service: Vascular    CERVICAL FUSION      VERTEBRAL; C3-C4 FUSION    CHOLECYSTECTOMY LAPAROSCOPIC N/A 2/24/2017    Procedure: CHOLECYSTECTOMY LAPAROSCOPIC;  Surgeon: Devon Dodd MD;  [facility];  Service:    Wilber [place] EPIGASTRIC HERNIA REPAIR      TN DEBRIDEMENT, SKIN, SUB-Q TISSUE,=<20 SQ CM Right 8/28/2018    Procedure: RIGHT GROIN AND THIGH DEBRIDEMENT ([address]) [facility];  Surgeon: Gabriel Hudson MD;  [facility];  Service: Vascular    TN THROMBOENDARTECTMY FEMORAL COMMON Right 7/31/2018    Procedure: ENDARTERECTOMY ARTERIAL FEMORAL R femoral endarterectomy w/ patch angioplasty ;  Surgeon: Anish Napier MD;  [facility];  Service: Vascular    UMBILICAL HERNIA REPAIR N/A 2/24/2017    Procedure: REPAIR HERNIA UMBILICAL;  Surgeon: Devon Dodd MD;  [facility];  Service:    :    Medications, Allergies:     Current Facility-Administered Medications   Medication Dose Route Frequency     EMS REPLENISHMENT MED   Does not apply Once    acetaminophen (TYLENOL) tablet 975 mg  975 mg Oral Q8H [address]    atorvastatin (LIPITOR) tablet 40 mg  40 mg Oral Daily With Dinner    baclofen tablet 20 mg  20 mg Oral TID    citalopram (CeleXA) tablet 20 mg  20 mg Oral Daily    clopidogrel (PLAVIX) tablet 75 mg  75 mg Oral Daily    docusate sodium (COLACE) capsule 100 mg  100 mg Oral BID    gabapentin (NEURONTIN) capsule 300 mg  300 mg Oral BID    heparin (porcine) subcutaneous injection 5,000 Units  5,000 Units Subcutaneous Q8H [facility]    insulin glargine (LANTUS) subcutaneous injection 34 Units 0 34 mL  34 Units Subcutaneous HS    insulin lispro (HumaLOG) 100 units/mL subcutaneous injection 12 Units  12 Units Subcutaneous TID With Meals    insulin lispro (HumaLOG) 100 units/mL subcutaneous injection 2-12 Units  2-12 Units Subcutaneous TID AC    insulin lispro (HumaLOG) 100 units/mL subcutaneous injection 2-12 Units  2-12 Units Subcutaneous HS    insulin lispro (HumaLOG) 100 units/mL subcutaneous injection 2-12 Units  2-12 Units Subcutaneous 0200    nicotine (NICODERM CQ) 21 mg/24 hr TD 24 hr patch 1 patch  1 patch Transdermal Daily    nystatin (MYCOSTATIN) powder   Topical BID    ondansetron (ZOFRAN) injection 4 mg  4 mg Intravenous Q6H PRN    polyethylene glycol (MIRALAX) packet 17 g  17 g Oral Daily PRN    tamsulosin (FLOMAX) capsule 0 4 mg  0 4 mg Oral Daily With Dinner    torsemide (DEMADEX) tablet 20 mg  20 mg Oral BID       Allergies:   Allergies   Allergen Reactions    Seasonal Ic  [Cholestatin]    :    Social and Family History:   Social History:   Social History     Tobacco Use    Smoking status: Current Every Day Smoker     Packs/day: 0 50     Years: 40 00     Pack years: 20 00     Types: Cigarettes    Smokeless tobacco: Never Used   Substance Use Topics    Alcohol use: Yes     Comment: 3-4 mixed vodka drinks/daily     Drug use: No        Social History     Tobacco Use   Smoking Status Current Every Day Smoker    Packs/day: 0 50    Years: 40 00    Pack years: 20 00    Types: Cigarettes   Smokeless Tobacco Never Used       Family History:  Family History   Problem Relation Age of Onset    Hypertension Mother         BENIGN    Alzheimer's disease Father     Heart disease Father         CARDIAC DISORDER    Stroke Father     Heart attack Father     No Known Problems Brother    :     Review of Systems:     General: Fever, chills, or night sweats: negative  Cardiac: Negative for chest pain  Pulmonary: Negative for shortness of breath  Gastrointestinal: Abdominal pain negative  Nausea, vomiting, or diarrhea negative,  Genitourinary: See HPI above  Patient does not have hematuria  All other systems queried were negative  Physical Exam:   I/O last 24 hours: In: 1725 [P O :1790; I V :1100]  Out: 2950 [Urine:2950]  Temp (24hrs), Av °F (36 7 °C), Min:97 5 °F (36 4 °C), Max:98 3 °F (36 8 °C)  current: Temperature: 98 °F (36 7 °C)  General: Patient is pleasant and in NAD   Awake and alert  /70 (BP Location: Right arm)   Pulse 93   Temp 98 °F (36 7 °C) (Oral)   Resp 18   Ht 5' 10" (1 778 m)   Wt 78 3 kg (172 lb 9 9 oz)   SpO2 97%   BMI 24 77 kg/m²   /70 (BP Location: Right arm)   Pulse 93   Temp 98 °F (36 7 °C) (Oral)   Resp 18   Ht 5' 10" (1 778 m)   Wt 78 3 kg (172 lb 9 9 oz)   SpO2 97%   BMI 24 77 kg/m²   General appearance: alert and oriented, in no acute distress  Head: Normocephalic, without obvious abnormality, atraumatic  Lungs: clear to auscultation bilaterally  Abdomen: soft, non-tender; bowel sounds normal; no masses,  no organomegaly  Skin: Skin color, texture, turgor normal  No rashes or lesions  Lymph nodes: Cervical, supraclavicular, and axillary nodes normal   HERRERA: in place draining clear yellow urine and none        Labs:     Lab Results   Component Value Date    HGB 13 1 2019    HCT 40 2 2019    WBC 6 75 2019     2019   ]    Lab Results   Component Value Date    K 5 8 (H) 2019     2019    CO2 24 2019    BUN 42 (H) 2019    CREATININE 1 32 (H) 2019    CALCIUM 7 6 (L) 2019    GLUCOSE 425 (H) 02/07/2019   ]    Imaging:   I personally reviewed the images and report of the following studies, and reviewed them with the patient:  VAS lower limb venous duplex study, complete bilateral   Final Result      US kidney and bladder   Final Result         1  Normal kidneys  2   Trabeculation of bladder wall, which can be seen in setting of chronic outlet obstruction  No discrete focal mass lesion identified  Workstation performed: [id number]         XR chest 1 view portable   ED Interpretation   ED Wet Read:  Volume overload      Final Result      Diffusely increased interstitial densities and edema bilaterally  Workstation performed: [id number]             ASSESSMENT:     59 y o  old male with  urinary retention  PLAN:     -Bowel regimen  -Continue flomax  -Patient may undergo voiding trial in the next 24-48 hours  My understanding is that he may be pending cardiothoracic surgery  If this is the case, catheter may remain indwelling until after procedure performed  Thank you for allowing me to participate in this patients care  Please do not hesitate to call with any additional questions    Imelda Agarwal MD

## 2019-02-12 NOTE — RESPIRATORY THERAPY NOTE
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 [address] [facility]     RESOLVED: 8/17/17    COPD (chronic obstructive pulmonary disease) ([facility] [address] [facility]     Depression     Diabetes mellitus ([facility] [address] [facility]     type 2, non-insulin dependent    Diabetic neuropathy ([facility] [address] [facility]     Difficulty attaining erection     LAST ASSESSEDl: 8/17/17    Former tobacco use     Gall stone pancreatitis     RESOLVED: 3/8/17    Hiatal hernia     History of concussion     History of MRSA infection     History of varicella infection     Hypertension     LAST ASSESSED: 8/17/17    Non-healing open wound of right groin     s/p VAC placement & post-op fungal dermatitis    PAD (peripheral artery disease) ([facility])     s/p RLE fem-pop    Seasonal allergies     Severe mitral regurgitation     Spinal cord injury, C1-C7 ([facility] [address] [facility]     s/p fusion    Traumatic injury to musculoskeletal system     1-5 FLIGHT FALL DOWN THE STEPS - CERVICAL NECK INJURY - JAN 2, 2012; LAST ASSESSED: 1/57/58    Umbilical hernia without obstruction and without gangrene     RESOLVED: 3/8/17     Social History     Socioeconomic History    Marital status: /Civil Union     Spouse name: None    Number of children: None    Years of education: None    Highest education level: None   Occupational History    Occupation: RETIRED   Social Needs    Financial resource strain: None    Food insecurity:     Worry: None     Inability: None    Transportation needs:     Medical: None     Non-medical: None   Tobacco Use    Smoking status: Current Every Day Smoker     Packs/day: 0 50     Years: 40 00     Pack years: 20 00     Types: Cigarettes    Smokeless tobacco: Never Used   Substance and Sexual Activity    Alcohol use: Yes     Comment: 3-4 mixed vodka drinks/daily     Drug use: No    Sexual activity: Never     Partners: Female   Lifestyle    Physical activity:     Days per week: None     Minutes per session: None    Stress: None   Relationships    Social connections:     Talks on phone: None     Gets together: None     Attends Hinduism service: None     Active member of club or organization: None     Attends meetings of clubs or organizations: None     Relationship status: None    Intimate partner violence:     Fear of current or ex partner: None     Emotionally abused: None     Physically abused: None     Forced sexual activity: None   Other Topics Concern    None   Social History Narrative    DAILY  [employer]    EXERCISES RARELY    LIVES WITH SIGNIFICANT OTHER    LIVES WITH WIFE    NO LIVING WILL       Subjective         Objective    Physical Exam:   Assessment Type: Assess only  General Appearance: Awake, Alert  Respiratory Pattern: Normal  Chest Assessment: Chest expansion symmetrical  Bilateral Breath Sounds: Diminished, Clear  Cough: None    Vitals:  Blood pressure 90/60, pulse 93, temperature 97 6 °F (36 4 °C), temperature source Oral, resp  rate 16, height 5' 10" (1 778 m), weight 78 3 kg (172 lb 9 9 oz), SpO2 99 %  Imaging and other studies: I have personally reviewed pertinent reports        O2 Device: Bipap     Plan    Respiratory Plan: Vent/NIV/HFNC        Resp Comments: pt comfortable and denies sob, no prn neb indicated at this time, RMA sat 95% at rest, will d/c respiratory protocol at this time and reassess if needed at a future time

## 2019-02-12 NOTE — PROGRESS NOTES
Progress Note - Cardiology   Diamond Reece 59 y o  male MRN: [id number]  Unit/Bed#: [facility] 830-66 Encounter: [id number]    Assessment:    1  Acute hypoxic respiratory failure  2  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 leading to severe mitral regurgitation  3  Acute on chronic diastolic congestive heart failure  4  Quadriplegia-history of traumatic cervical injury  5  Type 2 diabetes mellitus  6  Hypertension  7  Peripheral vascular disease    -transesophageal echocardiogram from 02/11/2019 read as normal left ventricular systolic function with estimated LVEF 60% and no regional wall motion abnormalities identified and left ventricular diastolic parameters are normal   There was moderate mitral annular calcification with a severe flail motion of the middle scallop of the posterior leaflet of the mitral valve suggestive of chordal rupture with flail gap of 5 mm and severe mitral regurgitation with systolic flow reversal in all 4 pulmonary veins  The aortic root also exhibited mild dilation at 42 mm and mild atheroma in the distal aortic arch  -continue current medical therapy with torsemide 20 mg twice daily  -patient hyperkalemic today and potassium supplementation discontinued  -will continue to monitor at this time  -will also continue Plavix and statin therapy as well   -will await cardiac surgery recommendations        Subjective/Objective   Subjective:   Patient seen in exam   Per nursing, there were no acute events overnight  Per patient he denies any shortness of breath at this time, PET chest pain, or palpitations  He states he feels much better than when he was admitted      Objective:   Vitals: /59   Pulse 93   Temp 97 6 °F (36 4 °C) (Oral)   Resp 16   Ht 5' 10" (1 778 m)   Wt 78 3 kg (172 lb 9 9 oz)   SpO2 99%   BMI 24 77 kg/m²   Vitals:    02/11/19 0600 02/12/19 0503   Weight: 76 7 kg (169 lb 1 5 oz) 78 3 kg (172 lb 9 9 oz)     Orthostatic Blood Pressures      Most Recent Value   Blood Pressure 121/59 filed at 02/12/2019 0940   Patient Position - Orthostatic VS  Lying filed at 02/11/2019 2300        Physical Exam   Constitutional: No distress  HENT:   Head: Normocephalic and atraumatic  Eyes: Conjunctivae are normal  No scleral icterus  Neck: No JVD present  No tracheal deviation present  Cardiovascular: Normal rate and regular rhythm  Murmur heard  Pulmonary/Chest:   Decreased breath sounds bilaterally however no wheezing or rhonchi appreciated   Abdominal: Soft  Bowel sounds are normal  There is no tenderness  Musculoskeletal: He exhibits no edema  Neurological:   Awake, alert, able to answer questions appropriately   Skin: Skin is warm and dry  He is not diaphoretic  Psychiatric: He has a normal mood and affect  Intake/Output Summary (Last 24 hours) at 2/12/2019 1107  Last data filed at 2/12/2019 4375  Gross per 24 hour   Intake 2670 ml   Output 2950 ml   Net -280 ml       Invasive Devices     Peripheral Intravenous Line            Peripheral IV 02/12/19 Left;Upper Arm less than 1 day          Drain            Urethral Catheter Non-latex 16 Fr  less than 1 day                Lab Results: I have personally reviewed pertinent lab results  Imaging: I have personally reviewed pertinent reports      EKG:  Currently sinus rhythm on telemetry  VTE Pharmacologic Prophylaxis: Heparin  VTE Mechanical Prophylaxis: sequential compression device

## 2019-02-12 NOTE — MEDICAL STUDENT
Suman Hickey MEDICAL STUDENT  Inpatient H&P for TRAINING ONLY   Not Part of Legal Medical Record     Medical Student Progress Note    Unit/Bed#: [facility] 512-01 Encounter: [id number]        Mehran Franklin 59 y o  male [id number]    Hospital Stay Days: 5      Assessment/Plan:    1  Acute Respiratory Failure with Hypoxia  - Secondary to CFH exacerbation and chordae tendae rupture   -Comfortable on room air currently and resolving     2  Chordae Tendae Rupture   -ORIN confirmation  -CT surgery following for possible mitraclip over valve repair   -Awaiting surgery recommendations    3  CHF exacerbation:   -Cardiology following  -continue Torsemide 20mg BID   -(-5 2 L today) weight 172 from 175lb on admission (increased from 169 yesterday)     4  Hyperkalemia  -K 5 8  -Potassium supplementation d/c    4  DM2  -weaned off insulin drip tonight and transition to basal regimen with fix/prandial short-acting insulin and additional SSI coverage per Accu-Cheks      Subjective:   Patient resting comfortably in bed on room air  Reports feeling tired and did not sleep well  Denies any chest pain or SOB  Denies N/V, fever, chills  Reports having to have a BM  No pain  Vitals: Temp (24hrs), Av °F (36 7 °C), Min:97 5 °F (36 4 °C), Max:98 3 °F (36 8 °C)  Current: Temperature: 97 6 °F (36 4 °C)  Vitals:    19 0503 19 0702 19 0827 19 0940   BP:  95/64 90/60 121/59   BP Location:   Right arm    Pulse:  93     Resp:  16     Temp:  97 6 °F (36 4 °C)     TempSrc:  Oral     SpO2:  99%     Weight: 78 3 kg (172 lb 9 9 oz)      Height:        Body mass index is 24 77 kg/m²  I/O last 24 hours: In: 2670 [P O :1570;  I V :1100]  Out: 2950 [Urine:2950]      Physical Exam: /70 (BP Location: Right arm)   Pulse 93   Temp 98 °F (36 7 °C) (Oral)   Resp 18   Ht 5' 10" (1 778 m)   Wt 78 3 kg (172 lb 9 9 oz)   SpO2 97%   BMI 24 77 kg/m²     General Appearance:    Alert, cooperative, no distress, appears stated age   Head: Normocephalic, without obvious abnormality, atraumatic   Eyes:    PERRL, conjunctiva/corneas clear, EOM's intact, fundi     benign, both eyes        Ears:    Normal TM's and external ear canals, both ears   Nose:   Nares normal, septum midline, mucosa normal, no drainage    or sinus tenderness   Throat:   Lips, mucosa, and tongue normal; teeth and gums normal   Neck:   Supple, symmetrical, trachea midline, no adenopathy;        thyroid:  No enlargement/tenderness/nodules; no carotid    bruit or JVD   Back:     Symmetric, no curvature, ROM normal, no CVA tenderness   Lungs:     Clear to auscultation bilaterally, respirations unlabored   Chest wall:    No tenderness or deformity   Heart:    Regular rate and rhythm, S1 and S2 normal, no murmur, rub   or gallop   Abdomen:     Soft, non-tender, bowel sounds active all four quadrants,     no masses, no organomegaly   Genitalia:    Normal male without lesion, discharge or tenderness   Rectal:    Normal tone, normal prostate, no masses or tenderness;    guaiac negative stool   Extremities:   Extremities normal, atraumatic, no cyanosis or edema   Pulses:   2+ and symmetric all extremities   Skin:   Skin color, texture, turgor normal, no rashes or lesions   Lymph nodes:   Cervical, supraclavicular, and axillary nodes normal   Neurologic:   CNII-XII intact   Normal strength, sensation and reflexes       throughout        Invasive Devices     Peripheral Intravenous Line            Peripheral IV 02/12/19 Left;Upper Arm less than 1 day          Drain            Urethral Catheter Non-latex 16 Fr  less than 1 day                      Labs:   Recent Results (from the past 24 hour(s))   Fingerstick Glucose (POCT)    Collection Time: 02/11/19 12:53 PM   Result Value Ref Range    POC Glucose 249 (H) 65 - 140 mg/dl   Fingerstick Glucose (POCT)    Collection Time: 02/11/19  4:26 PM   Result Value Ref Range    POC Glucose 310 (H) 65 - 140 mg/dl   Fingerstick Glucose (POCT)    Collection Time: 02/11/19  9:08 PM   Result Value Ref Range    POC Glucose 371 (H) 65 - 140 mg/dl   Fingerstick Glucose (POCT)    Collection Time: 02/12/19  2:06 AM   Result Value Ref Range    POC Glucose 239 (H) 65 - 140 mg/dl   Basic metabolic panel    Collection Time: 02/12/19  4:53 AM   Result Value Ref Range    Sodium 138 136 - 145 mmol/L    Potassium 5 8 (H) 3 5 - 5 3 mmol/L    Chloride 107 100 - 108 mmol/L    CO2 24 21 - 32 mmol/L    ANION GAP 7 4 - 13 mmol/L    BUN 42 (H) 5 - 25 mg/dL    Creatinine 1 32 (H) 0 60 - 1 30 mg/dL    Glucose 172 (H) 65 - 140 mg/dL    Calcium 7 6 (L) 8 3 - 10 1 mg/dL    eGFR 58 ml/min/1 73sq m   CBC and differential    Collection Time: 02/12/19  4:53 AM   Result Value Ref Range    WBC 6 75 4 31 - 10 16 Thousand/uL    RBC 3 85 (L) 3 88 - 5 62 Million/uL    Hemoglobin 13 1 12 0 - 17 0 g/dL    Hematocrit 40 2 36 5 - 49 3 %     (H) 82 - 98 fL    MCH 34 0 26 8 - 34 3 pg    MCHC 32 6 31 4 - 37 4 g/dL    RDW 13 7 11 6 - 15 1 %    MPV 10 3 8 9 - 12 7 fL    Platelets 076 213 - 937 Thousands/uL    nRBC 0 /100 WBCs    Neutrophils Relative 52 43 - 75 %    Immat GRANS % 1 0 - 2 %    Lymphocytes Relative 28 14 - 44 %    Monocytes Relative 15 (H) 4 - 12 %    Eosinophils Relative 3 0 - 6 %    Basophils Relative 1 0 - 1 %    Neutrophils Absolute 3 53 1 85 - 7 62 Thousands/µL    Immature Grans Absolute 0 05 0 00 - 0 20 Thousand/uL    Lymphocytes Absolute 1 91 0 60 - 4 47 Thousands/µL    Monocytes Absolute 1 03 0 17 - 1 22 Thousand/µL    Eosinophils Absolute 0 17 0 00 - 0 61 Thousand/µL    Basophils Absolute 0 06 0 00 - 0 10 Thousands/µL   Magnesium    Collection Time: 02/12/19  4:53 AM   Result Value Ref Range    Magnesium 1 8 1 6 - 2 6 mg/dL   Phosphorus    Collection Time: 02/12/19  4:53 AM   Result Value Ref Range    Phosphorus 3 7 2 3 - 4 1 mg/dL   Fingerstick Glucose (POCT)    Collection Time: 02/12/19  7:03 AM   Result Value Ref Range    POC Glucose 197 (H) 65 - 140 mg/dl             Other Diagnostic Testing:   I have personally reviewed pertinent films in PACS      Active Meds:   Current Facility-Administered Medications   Medication Dose Route Frequency     EMS REPLENISHMENT MED   Does not apply Once    acetaminophen (TYLENOL) tablet 975 mg  975 mg Oral Q8H [address]    atorvastatin (LIPITOR) tablet 40 mg  40 mg Oral Daily With Dinner    baclofen tablet 20 mg  20 mg Oral TID    citalopram (CeleXA) tablet 20 mg  20 mg Oral Daily    clopidogrel (PLAVIX) tablet 75 mg  75 mg Oral Daily    docusate sodium (COLACE) capsule 100 mg  100 mg Oral BID    gabapentin (NEURONTIN) capsule 300 mg  300 mg Oral BID    heparin (porcine) subcutaneous injection 5,000 Units  5,000 Units Subcutaneous Q8H [address]    insulin glargine (LANTUS) subcutaneous injection 34 Units 0 34 mL  34 Units Subcutaneous HS    insulin lispro (HumaLOG) 100 units/mL subcutaneous injection 12 Units  12 Units Subcutaneous TID With Meals    insulin lispro (HumaLOG) 100 units/mL subcutaneous injection 2-12 Units  2-12 Units Subcutaneous TID AC    insulin lispro (HumaLOG) 100 units/mL subcutaneous injection 2-12 Units  2-12 Units Subcutaneous HS    insulin lispro (HumaLOG) 100 units/mL subcutaneous injection 2-12 Units  2-12 Units Subcutaneous 0200    nicotine (NICODERM CQ) 21 mg/24 hr TD 24 hr patch 1 patch  1 patch Transdermal Daily    nystatin (MYCOSTATIN) powder   Topical BID    ondansetron (ZOFRAN) injection 4 mg  4 mg Intravenous Q6H PRN    polyethylene glycol (MIRALAX) packet 17 g  17 g Oral Daily PRN    tamsulosin (FLOMAX) capsule 0 4 mg  0 4 mg Oral Daily With Dinner    torsemide (DEMADEX) tablet 20 mg  20 mg Oral BID       Rajendra Level

## 2019-02-12 NOTE — SOCIAL WORK
Patient and wife are requesting a meeting on Wednesday morning at 9:30 to discuss next steps  SLIM, palliative, and ct surgery informed

## 2019-02-13 LAB
GLUCOSE SERPL-MCNC: 177 MG/DL (ref 65–140)
GLUCOSE SERPL-MCNC: 184 MG/DL (ref 65–140)
GLUCOSE SERPL-MCNC: 187 MG/DL (ref 65–140)
GLUCOSE SERPL-MCNC: 212 MG/DL (ref 65–140)
GLUCOSE SERPL-MCNC: 230 MG/DL (ref 65–140)

## 2019-02-13 PROCEDURE — 99233 SBSQ HOSP IP/OBS HIGH 50: CPT | Performed by: INTERNAL MEDICINE

## 2019-02-13 PROCEDURE — 82948 REAGENT STRIP/BLOOD GLUCOSE: CPT

## 2019-02-13 PROCEDURE — 99232 SBSQ HOSP IP/OBS MODERATE 35: CPT | Performed by: INTERNAL MEDICINE

## 2019-02-13 PROCEDURE — 99233 SBSQ HOSP IP/OBS HIGH 50: CPT | Performed by: NURSE PRACTITIONER

## 2019-02-13 RX ORDER — INSULIN GLARGINE 100 [IU]/ML
42 INJECTION, SOLUTION SUBCUTANEOUS
Status: DISCONTINUED | OUTPATIENT
Start: 2019-02-13 | End: 2019-02-14 | Stop reason: HOSPADM

## 2019-02-13 RX ADMIN — BACLOFEN 20 MG: 10 TABLET ORAL at 08:23

## 2019-02-13 RX ADMIN — TAMSULOSIN HYDROCHLORIDE 0.4 MG: 0.4 CAPSULE ORAL at 17:01

## 2019-02-13 RX ADMIN — DOCUSATE SODIUM 100 MG: 100 CAPSULE, LIQUID FILLED ORAL at 17:02

## 2019-02-13 RX ADMIN — INSULIN GLARGINE 42 UNITS: 100 INJECTION, SOLUTION SUBCUTANEOUS at 22:06

## 2019-02-13 RX ADMIN — NYSTATIN: 100000 POWDER TOPICAL at 08:29

## 2019-02-13 RX ADMIN — BACLOFEN 20 MG: 10 TABLET ORAL at 17:02

## 2019-02-13 RX ADMIN — ATORVASTATIN CALCIUM 40 MG: 40 TABLET, FILM COATED ORAL at 17:01

## 2019-02-13 RX ADMIN — HEPARIN SODIUM 5000 UNITS: 5000 INJECTION INTRAVENOUS; SUBCUTANEOUS at 22:06

## 2019-02-13 RX ADMIN — NYSTATIN: 100000 POWDER TOPICAL at 17:05

## 2019-02-13 RX ADMIN — TORSEMIDE 20 MG: 20 TABLET ORAL at 08:23

## 2019-02-13 RX ADMIN — ACETAMINOPHEN 975 MG: 325 TABLET, FILM COATED ORAL at 13:26

## 2019-02-13 RX ADMIN — BACLOFEN 20 MG: 10 TABLET ORAL at 22:06

## 2019-02-13 RX ADMIN — HEPARIN SODIUM 5000 UNITS: 5000 INJECTION INTRAVENOUS; SUBCUTANEOUS at 06:57

## 2019-02-13 RX ADMIN — MELATONIN 6 MG: at 22:06

## 2019-02-13 RX ADMIN — ACETAMINOPHEN 975 MG: 325 TABLET, FILM COATED ORAL at 22:06

## 2019-02-13 RX ADMIN — INSULIN LISPRO 2 UNITS: 100 INJECTION, SOLUTION INTRAVENOUS; SUBCUTANEOUS at 08:25

## 2019-02-13 RX ADMIN — GABAPENTIN 300 MG: 300 CAPSULE ORAL at 17:01

## 2019-02-13 RX ADMIN — CLOPIDOGREL BISULFATE 75 MG: 75 TABLET ORAL at 08:23

## 2019-02-13 RX ADMIN — ACETAMINOPHEN 975 MG: 325 TABLET, FILM COATED ORAL at 06:57

## 2019-02-13 RX ADMIN — NICOTINE 1 PATCH: 21 PATCH, EXTENDED RELEASE TRANSDERMAL at 08:23

## 2019-02-13 RX ADMIN — HEPARIN SODIUM 5000 UNITS: 5000 INJECTION INTRAVENOUS; SUBCUTANEOUS at 13:27

## 2019-02-13 RX ADMIN — INSULIN LISPRO 2 UNITS: 100 INJECTION, SOLUTION INTRAVENOUS; SUBCUTANEOUS at 17:02

## 2019-02-13 RX ADMIN — TORSEMIDE 20 MG: 20 TABLET ORAL at 17:02

## 2019-02-13 RX ADMIN — CITALOPRAM HYDROBROMIDE 20 MG: 20 TABLET ORAL at 08:23

## 2019-02-13 RX ADMIN — INSULIN LISPRO 4 UNITS: 100 INJECTION, SOLUTION INTRAVENOUS; SUBCUTANEOUS at 22:07

## 2019-02-13 RX ADMIN — GABAPENTIN 300 MG: 300 CAPSULE ORAL at 08:23

## 2019-02-13 RX ADMIN — INSULIN LISPRO 2 UNITS: 100 INJECTION, SOLUTION INTRAVENOUS; SUBCUTANEOUS at 03:44

## 2019-02-13 RX ADMIN — INSULIN LISPRO 4 UNITS: 100 INJECTION, SOLUTION INTRAVENOUS; SUBCUTANEOUS at 12:30

## 2019-02-13 RX ADMIN — DOCUSATE SODIUM 100 MG: 100 CAPSULE, LIQUID FILLED ORAL at 08:23

## 2019-02-13 NOTE — ASSESSMENT & PLAN NOTE
· HbA1c of 9 3   · Patient weaned off insulin drip 2/11/19,   · Endocrinology following increased Lantus to 38 units from 34 units at bedtime and Humalog 14 units from 12 units with meals  · Appreciate Endocrinology input  · Continue Gabapentin for neuropathy

## 2019-02-13 NOTE — PROGRESS NOTES
Progress Note - Jacqueline Champion 1960, 59 y o  male MRN: [id number]    Unit/Bed#: [address] 12-46 Encounter: [id number]    Primary Care Provider: Ulises Mack MD   Date and time admitted to hospital: 2/7/2019  5:00 AM        * Acute respiratory failure with hypoxia  Assessment & Plan  · multifactorial secondary to diastolic CHF exacerbation coupled with progressive severe mitral regurgitation and chordae tendineae rupture  · has been progressively weaned off BiPAP onto nasal cannula oxygenation intermittently on room air now  · Meeting today with Palliative, Cardiology, and SLIM along with wife and patient at bedside multiple options were discussed such as 2nd opinion and medical management  · Patient opting at this time for medical management cardiology will continue to adjust medications while in-patient  · And patient would like to go home     Non-MI elevated troponin  Assessment & Plan  · likely secondary to acute CHF with severe MR   · troponin peak of 0 08    Chordae tendineae rupture - Severe mitral regurgitation  Assessment & Plan  · Await CT surgery recommendations  · ORIN to/11/19 revealed moderate mitral annular calcification and findings suggestive of chordal rupture and severe mitral regurgitation  · consideration of MitraClip over valve repair noted due to comorbidities unfortunately patient after work up per CT surgery is not a candidate for MitraClip or valve repair  · Cardiology discuss options with patient 2nd opinion versus medical management  At this time patient is opting for medical management cardiology will continue adjusting medications based on patient response  · Palliative will continue to follow patient as a supportive measure on an outpatient basis      Essential hypertension  Assessment & Plan  · BP stable  · Continue torsemide  · ACE I discontinued previously in setting of SUNDEEP and hyperkalemia     Diabetes mellitus type 2 - Diabetic neuropathy  Assessment & Plan  · HbA1c of 9 3 · Patient weaned off insulin drip 2/11/19,   · Endocrinology following increased Lantus to 38 units from 34 units at bedtime and Humalog 14 units from 12 units with meals  · Appreciate Endocrinology input  · Continue Gabapentin for neuropathy    Acute kidney injury  Assessment & Plan  · creatinine improved from a peak of 2 35 -> 1 29 yesterday  · monitor renal function limit/avoid nephrotoxins as possible   · demadex has been resumed 20mg BID     Quadriplegia - history of Traumatic cervical injury  Assessment & Plan  · due to fall s/p C4 vertebral injury   · PRN pain control and supportive care otherwise   · Continue supportive measures   · continue Baclofen regimen for spasms    Acute on chronic diastolic CHF  Assessment & Plan  · EF of 60% with severe MR  · proBNP of 3,787 on admission  · cardiology following - patient has been restarted on Demadex regimen  · Continue daily weight, I/O, and low-sodium diet with fluid restrictions     BPH (benign prostatic hyperplasia)  Assessment & Plan  · Patient failed urinary retention protocol and Vasques catheter placed 2/11  · Bowel regimen for constipation  · Continue Flomax  · Urology following or awaiting as patient was having a surgical intervention  · Brief discussion with Urology voiding trial tomorrow if feels will have to remain go home and follow up with Urology as outpatient    Hypomagnesemia - Hypokalemia  Assessment & Plan  · monitor/replete as necessary   · Discontinue potassium supplement as patient had hyperkalemia     PAD  Assessment & Plan  · s/p right femoral-popliteal bypass   · continue Plavix/Lipitor  · fasting LDL well controlled  · aggressive blood sugar control encouraged along with smoking cessation      VTE Pharmacologic Prophylaxis:   Pharmacologic: Heparin  Mechanical VTE Prophylaxis in Place: Yes    Patient Centered Rounds: I have performed bedside rounds with nursing staff today      Discussions with Specialists or Other Care Team Provider: Palliative care, cardiology, urology    Education and Discussions with Family / Patient:  Patient and wife    Time Spent for Care: 1 hour  More than 50% of total time spent on counseling and coordination of care as described above  Current Length of Stay: 6 day(s)    Current Patient Status: Inpatient   Certification Statement: The patient will continue to require additional inpatient hospital stay due to Ongoing need for intervention for acute respiratory failure with hypoxia with need for medication management    Discharge Plan:  Anticipate discharge home in next 24-48 hours pending voiding trial and further cardiology recommendations    Code Status: Level 3 - DNAR and DNI      Subjective: At bedside with patient his wife palliative care and Cardiology discussion his options of CT surgery is not recommending surgical intervention at this time  Long discussion regarding 2nd opinion versus medical management were discussed at length by the cardiologist at the bedside  Patient and wife for receptive to the information have to opted for medical management and following with his Cardiology team as an outpatient  Palliative at bedside will continue to follow patient on outpatient for supportive measures of his disease process  Internal Medicine discussed his urological needs with the Vasques in attempt another voiding trial if he would fail another voiding trial he would have to go home with Vasques and follow up with Urology as an outpatient  Objective:     Vitals:   Temp (24hrs), Av 7 °F (36 5 °C), Min:97 4 °F (36 3 °C), Max:98 6 °F (37 °C)    Temp:  [97 4 °F (36 3 °C)-98 6 °F (37 °C)] 97 5 °F (36 4 °C)  HR:  [] 102  Resp:  [16-20] 20  BP: ()/(54-78) 105/65  SpO2:  [95 %-98 %] 96 %  Body mass index is 24 39 kg/m²  Input and Output Summary (last 24 hours):        Intake/Output Summary (Last 24 hours) at 2019 1140  Last data filed at 2019 1100  Gross per 24 hour   Intake 1140 ml Output 2900 ml   Net -1760 ml       Physical Exam:     Physical Exam   Constitutional: He is oriented to person, place, and time  HENT:   Head: Normocephalic and atraumatic  Eyes: Conjunctivae and EOM are normal    Neck: Normal range of motion  Cardiovascular: Normal rate and regular rhythm  Murmur heard  Pulmonary/Chest: Effort normal and breath sounds normal    Abdominal: Soft  Bowel sounds are normal    Genitourinary:   Genitourinary Comments: Vasques intact draining clear yellow urine   Musculoskeletal: He exhibits no edema  Neurological: He is alert and oriented to person, place, and time  Patient able to set up with atrophy with assistance x1   Skin: Skin is warm and dry  Psychiatric: He has a normal mood and affect  His behavior is normal          Additional Data:     Labs:    Results from last 7 days   Lab Units 02/12/19  0453   WBC Thousand/uL 6 75   HEMOGLOBIN g/dL 13 1   HEMATOCRIT % 40 2   PLATELETS Thousands/uL 237   NEUTROS PCT % 52   LYMPHS PCT % 28   MONOS PCT % 15*   EOS PCT % 3     Results from last 7 days   Lab Units 02/12/19  1257  02/07/19  0537   SODIUM mmol/L 138   < > 132*   POTASSIUM mmol/L 4 8   < > 5 5*   CHLORIDE mmol/L 105   < > 97*   CO2 mmol/L 28   < > 24   BUN mg/dL 36*   < > 45*   CREATININE mg/dL 1 29   < > 2 17*   ANION GAP mmol/L 5   < > 11   CALCIUM mg/dL 7 6*   < > 9 3   ALBUMIN g/dL  --   --  3 5   TOTAL BILIRUBIN mg/dL  --   --  0 38   ALK PHOS U/L  --   --  53   ALT U/L  --   --  40   AST U/L  --   --  48*   GLUCOSE RANDOM mg/dL 284*   < > 402*    < > = values in this interval not displayed       Results from last 7 days   Lab Units 02/07/19  1257   INR  0 99     Results from last 7 days   Lab Units 02/13/19  1100 02/13/19  0747 02/13/19  0343 02/12/19  2036 02/12/19  1619 02/12/19  1124 02/12/19  0703 02/12/19  0206 02/11/19  2108 02/11/19  1626 02/11/19  1253 02/11/19  0630   POC GLUCOSE mg/dl 230* 177* 187* 269* 308* 343* 197* 239* 371* 310* 249* 221* Results from last 7 days   Lab Units 02/07/19  0916   HEMOGLOBIN A1C % 9 3*     Results from last 7 days   Lab Units 02/07/19  0839 02/07/19  0537 02/07/19  0536   LACTIC ACID mmol/L 1 5  --  5 4*   PROCALCITONIN ng/ml  --  0 11  --            * I Have Reviewed All Lab Data Listed Above  * Additional Pertinent Lab Tests Reviewed: All Labs Within Last 24 Hours Reviewed        Recent Cultures (last 7 days):     Results from last 7 days   Lab Units 02/07/19  0537 02/07/19  0536   BLOOD CULTURE  No Growth After 5 Days  No Growth After 5 Days         Last 24 Hours Medication List:     Current Facility-Administered Medications:  EMS replenish medication  Does not apply Once Simeon Chaudhari MD   acetaminophen 975 mg Oral Q8H [address] Sera Philip PA-C   atorvastatin 40 mg Oral Daily With Areli Carroll MD   baclofen 20 mg Oral TID Kallie Alarcon MD   citalopram 20 mg Oral Daily Sera Philip PA-C   clopidogrel 75 mg Oral Daily Kallie Alarcon MD   docusate sodium 100 mg Oral BID Kallie Alarcon MD   gabapentin 300 mg Oral BID Kallie Alarcon MD   heparin (porcine) 5,000 Units Subcutaneous Q8H [address] CHERELLE Cherry   insulin glargine 42 Units Subcutaneous HS Akash Mackey MD   insulin lispro 18 Units Subcutaneous TID With Meals Akash Mackey MD   insulin lispro 2-12 Units Subcutaneous TID AC Olivia Banda MD   insulin lispro 2-12 Units Subcutaneous HS Olivia Banda MD   insulin lispro 2-12 Units Subcutaneous 0200 Olivia Banda MD   melatonin 6 mg Oral HS Esvin Arias PA-C   nicotine 1 patch Transdermal Daily Kallie Alarcon MD   nystatin  Topical BID Dominic Carrero MD   ondansetron 4 mg Intravenous Q6H PRN Kallie Alarcon MD   polyethylene glycol 17 g Oral Daily PRN Dominic Carrero MD   tamsulosin 0 4 mg Oral Daily With Areli Carroll MD   torsemide 20 mg Oral BID Hira Hickey MD        Today, Patient Was Seen By: CHERELLE Malcolm    ** Please Note: Dictation voice to text software may have been used in the creation of this document   **

## 2019-02-13 NOTE — ASSESSMENT & PLAN NOTE
· creatinine improved from a peak of 2 35 -> 1 29 yesterday  · monitor renal function limit/avoid nephrotoxins as possible   · demadex has been resumed 20mg BID

## 2019-02-13 NOTE — ASSESSMENT & PLAN NOTE
· s/p right femoral-popliteal bypass   · continue Plavix/Lipitor  · fasting LDL well controlled  · aggressive blood sugar control encouraged along with smoking cessation

## 2019-02-13 NOTE — PROGRESS NOTES
Progress note - Palliative and Supportive Care   [facility] 59 y o  male [id number]    Assessment:  Patient Active Problem List   Diagnosis    Chronic pain    Depression    Diabetic neuropathy ([facility] [address] )    Enlarged prostate with lower urinary tract symptoms (LUTS)    Hernia, epigastric    Injury of cervical spinal cord (HCC)    Tobacco abuse    Umbilical hernia    Cervical myelopathy (HCC)    Basal cell carcinoma in situ of skin    Ambulatory dysfunction    Lumbar radiculopathy    Diabetes mellitus type 2 - Diabetic neuropathy    Hypercalcemia    Quadriplegia and quadriparesis ([facility])    Alcohol abuse    Cellulitis of leg, right    PAD    Ischemic foot pain at rest [facility])    Peripheral arterial disease ([facility] [address] )    Essential hypertension    Acute blood loss anemia    Constipation    Wound dehiscence, surgical, initial encounter    Non-healing surgical wound of right groin    Postoperative state    Sacral wound, initial encounter    Acute respiratory failure with hypoxia    Esophageal thickening    Acute on chronic diastolic CHF    Acute kidney injury    Obstructive uropathy    Volume overload    Non-rheumatic mitral regurgitation    Chordae tendineae rupture - Severe mitral regurgitation    Quadriplegia - history of Traumatic cervical injury    Non-MI elevated troponin    Hypomagnesemia - Hypokalemia    BPH (benign prostatic hyperplasia)     Plan:  1  Symptom management -   Chronic pain   - acetaminophen 975mg Q8H [address]   - referral to Dr Luiza Dugan for MMJ evaluation at his request   - neurontin 300mg BID   - previously opioid dependent post-traumatic injury but has decided to defer any narcotic use at this time   - baclofen 20mg TID  Depression   - celexa 20mg QD (increased from 10mg on admission) titrate up as tolerated    2   Goals - level 3 DNR   - Not a candidate for mitral valve replacement or clip for chordae tendinae rupture, therefor pursue medical management with torsemide, diet, and fluid restriction  - close outpatient follow-up with cardiology  - discussed with patient, his wife (Fito Olson), Dr Flora Goldberg, and [facility])  - patient is hopeful for continued medical management to prolong his life, but is grateful for establishment with palliative care to assist with pain, depression, and ongoing goals of care discussions if and when patient experiences heart failure exacerbations  Code Status: DNR - Level 3   Decisional apparatus:  Patient is competent on my exam today  If competence is lost, patient's substitute decision maker would default to wifeDaniella, [place]  Advance Directive / Living Will / POLST:  None completed     I have reviewed the patient's controlled substance dispensing history in the Prescription Drug Monitoring Program in compliance with the [facility] regulations before prescribing any controlled substances  Interval history:       Patient admits to poor sleep, anxiety, and depression after discussion last evening worrying him that he is "walking the line"  However, after discussion with cardiologist today 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, return home with his wife and cats, and continue a lifestyle as functional as possible  Patient understands commitment to diet modifications and fluid restrictions  He is hopeful that since receiving "good news" that his heart is otherwise compensating at this time, that his anxiety and depression will improve  Patient has had a poor appetite since he has been worried, but is now relieved and feeling hungry  Patient is "not thrilled" about going home with a lemus catheter, but is accepting of this if neccessary until OP urology follow-up       MEDICATIONS / ALLERGIES:     all current active meds have been reviewed and current meds:   Current Facility-Administered Medications   Medication Dose Route Frequency     EMS REPLENISHMENT MED   Does not apply Once    acetaminophen (TYLENOL) tablet 975 mg  975 mg Oral Q8H [address]    atorvastatin (LIPITOR) tablet 40 mg  40 mg Oral Daily With Dinner    baclofen tablet 20 mg  20 mg Oral TID    citalopram (CeleXA) tablet 20 mg  20 mg Oral Daily    clopidogrel (PLAVIX) tablet 75 mg  75 mg Oral Daily    docusate sodium (COLACE) capsule 100 mg  100 mg Oral BID    gabapentin (NEURONTIN) capsule 300 mg  300 mg Oral BID    heparin (porcine) subcutaneous injection 5,000 Units  5,000 Units Subcutaneous Q8H [address]    insulin glargine (LANTUS) subcutaneous injection 42 Units 0 42 mL  42 Units Subcutaneous HS    insulin lispro (HumaLOG) 100 units/mL subcutaneous injection 18 Units  18 Units Subcutaneous TID With Meals    insulin lispro (HumaLOG) 100 units/mL subcutaneous injection 2-12 Units  2-12 Units Subcutaneous TID AC    insulin lispro (HumaLOG) 100 units/mL subcutaneous injection 2-12 Units  2-12 Units Subcutaneous HS    insulin lispro (HumaLOG) 100 units/mL subcutaneous injection 2-12 Units  2-12 Units Subcutaneous 0200    melatonin tablet 6 mg  6 mg Oral HS    nicotine (NICODERM CQ) 21 mg/24 hr TD 24 hr patch 1 patch  1 patch Transdermal Daily    nystatin (MYCOSTATIN) powder   Topical BID    ondansetron (ZOFRAN) injection 4 mg  4 mg Intravenous Q6H PRN    polyethylene glycol (MIRALAX) packet 17 g  17 g Oral Daily PRN    tamsulosin (FLOMAX) capsule 0 4 mg  0 4 mg Oral Daily With Dinner    torsemide (DEMADEX) tablet 20 mg  20 mg Oral BID       Allergies   Allergen Reactions    Seasonal Ic  [Cholestatin]        OBJECTIVE:    Physical Exam  Physical Exam   Constitutional: He is oriented to person, place, and time  He appears well-developed  Lying in bed, awake, alert, in no distress   HENT:   Head: Normocephalic and atraumatic  Eyes: Conjunctivae are normal    Cardiovascular:   Systolic ejection murmur   Pulmonary/Chest: Effort normal and breath sounds normal    Abdominal: Soft  He exhibits no distension     Genitourinary:   Genitourinary Comments: Vasques  Catheter in place with yellow urine in bag   Musculoskeletal: He exhibits no edema  Neurological: He is alert and oriented to person, place, and time  Skin: Skin is warm and dry  Psychiatric:   Appropriately worried/tearful initially but improved to cheerful mood by end of visit  Lab Results: I have personally reviewed pertinent labs  Results from last 7 days   Lab Units 02/12/19 0453 02/11/19  0527 02/10/19  0433   WBC Thousand/uL 6 75 9 28 5 87   HEMOGLOBIN g/dL 13 1 13 4 13 2   HEMATOCRIT % 40 2 39 6 39 7   PLATELETS Thousands/uL 237 214 210   NEUTROS PCT % 52 61 47   MONOS PCT % 15* 12 13*     Results from last 7 days   Lab Units 02/12/19  1257 02/12/19  0453 02/11/19  0527  02/07/19  0537 02/07/19  0515 02/07/19  0514   POTASSIUM mmol/L 4 8 5 8* 4 5   < > 5 5*  --   --    CHLORIDE mmol/L 105 107 105   < > 97*  --   --    CO2 mmol/L 28 24 25   < > 24  --   --    CO2, I-STAT mmol/L  --   --   --   --   --  27 26   BUN mg/dL 36* 42* 45*   < > 45*  --   --    CREATININE mg/dL 1 29 1 32* 1 33*   < > 2 17*  --   --    CALCIUM mg/dL 7 6* 7 6* 7 5*   < > 9 3  --   --    ALK PHOS U/L  --   --   --   --  53  --   --    ALT U/L  --   --   --   --  40  --   --    AST U/L  --   --   --   --  48*  --   --    GLUCOSE, ISTAT mg/dl  --   --   --   --   --  425* 434*    < > = values in this interval not displayed       Results from last 7 days   Lab Units 02/12/19 0453 02/11/19  0527 02/10/19  0433   MAGNESIUM mg/dL 1 8 1 7 1 7     Lab Results   Component Value Date    PHOS 3 7 02/12/2019    PHOS 3 5 02/11/2019    PHOS 3 1 02/10/2019      Results from last 7 days   Lab Units 02/08/19  0038 02/07/19  1257   INR   --  0 99   PTT seconds 35 33     Results from last 7 days   Lab Units 02/07/19  0839   LACTIC ACID mmol/L 1 5     0   Lab Value Date/Time    TROPONINI 0 07 (H) 02/07/2019 1257    TROPONINI 0 08 (H) 02/07/2019 0907    TROPONINI 0 02 02/07/2019 0537    TROPONINI 0 03 10/10/2018 1008 ABG:  Lab Results   Component Value Date    PHART 7 374 07/31/2018    DRE8WMB 41 0 07/31/2018    PO2ART 97 3 07/31/2018    CFM9SYV 23 4 07/31/2018    BEART -1 7 07/31/2018    SOURCE Line, Arterial 07/31/2018       Imaging Studies: Devyn confirmed flail P2, chordal tendon a rupture, severe MR  He has normal LV function      Counseling / Coordination of Care  Total floor / unit time spent today 60+ minutes  Greater than 50% of total time was spent with the patient and / or family counseling and / or coordination of care  A description of the counseling / coordination of care: time spent communicating with nursing staff, cardiology team, CT surgery, patient, his wife, and SLIM

## 2019-02-13 NOTE — ASSESSMENT & PLAN NOTE
· Patient failed urinary retention protocol and Vasques catheter placed 2/11  · Bowel regimen for constipation  · Continue Flomax  · Urology following or awaiting as patient was having a surgical intervention  · Brief discussion with Urology voiding trial tomorrow if feels will have to remain go home and follow up with Urology as outpatient

## 2019-02-13 NOTE — ASSESSMENT & PLAN NOTE
· BP stable  · Continue torsemide  · ACE I discontinued previously in setting of SUNDEEP and hyperkalemia

## 2019-02-13 NOTE — ASSESSMENT & PLAN NOTE
· Await CT surgery recommendations  · ORIN to/11/19 revealed moderate mitral annular calcification and findings suggestive of chordal rupture and severe mitral regurgitation  · consideration of MitraClip over valve repair noted due to comorbidities unfortunately patient after work up per CT surgery is not a candidate for MitraClip or valve repair  · Cardiology discuss options with patient 2nd opinion versus medical management  At this time patient is opting for medical management cardiology will continue adjusting medications based on patient response  · Palliative will continue to follow patient as a supportive measure on an outpatient basis

## 2019-02-13 NOTE — ASSESSMENT & PLAN NOTE
· EF of 60% with severe MR  · proBNP of 3,787 on admission  · cardiology following - patient has been restarted on Demadex regimen  · Continue daily weight, I/O, and low-sodium diet with fluid restrictions

## 2019-02-13 NOTE — ASSESSMENT & PLAN NOTE
· multifactorial secondary to diastolic CHF exacerbation coupled with progressive severe mitral regurgitation and chordae tendineae rupture  · has been progressively weaned off BiPAP onto nasal cannula oxygenation intermittently on room air now  · Meeting today with Palliative, Cardiology, and SLIM along with wife and patient at bedside multiple options were discussed such as 2nd opinion and medical management  · Patient opting at this time for medical management cardiology will continue to adjust medications while in-patient  · And patient would like to go home

## 2019-02-13 NOTE — ASSESSMENT & PLAN NOTE
· due to fall s/p C4 vertebral injury   · PRN pain control and supportive care otherwise   · Continue supportive measures   · continue Baclofen regimen for spasms

## 2019-02-13 NOTE — PROGRESS NOTES
General Cardiology Progress Note   Iris Thomas 59 y o  male MRN: [id number]  Unit/Bed#: [facility] 12-46 Encounter: [id number]      Assessment:  Principal Problem:    Acute respiratory failure with hypoxia  Active Problems:    Tobacco abuse    Diabetes mellitus type 2 - Diabetic neuropathy    PAD    Essential hypertension    Acute on chronic diastolic CHF    Acute kidney injury    Obstructive uropathy    Volume overload    Non-rheumatic mitral regurgitation    Chordae tendineae rupture - Severe mitral regurgitation    Quadriplegia - history of Traumatic cervical injury    Non-MI elevated troponin    Hypomagnesemia - Hypokalemia    BPH (benign prostatic hyperplasia)      Impression:    Flail posterior mitral valve leaflet with severe MR  - felt to be a poor candidate for surgical repair  - CT surgery discussed with the patient yesterday that they do not believe MitraClip is an option either  - I spent quite some time today discussing with the patient the nature of mitral valve regurgitation, the fact that his mood LVEF currently is normal, the fact that his CHF symptoms have been well controlled on current oral diuretics  Acute diastolic CHF due to severe MR  Hypertension-controlled  Lightheadedness-euvolemic on exam,  Mild aortic root enlargement -42 mm    Plan:    Continue current meds  Orthostatic vital signs  If positive, decrease torsemide to 20 mg once daily    Subjective:   Patient seen and examined  Complains of a head rush/lightheadedness when getting up from lying to standing position  Denies syncope  No palpitation  No current shortness of breath  Has been ambulating the hallways  CT surgery spoke with the patient yesterday, he is nonsurgical and they do not feel a MitraClip is an option    Objective   Vitals: Blood pressure 119/74, pulse 75, temperature 97 6 °F (36 4 °C), temperature source Oral, resp  rate 16, height 5' 10" (1 778 m), weight 77 1 kg (169 lb 15 6 oz), SpO2 97 %  , Body mass index is 24 39 kg/m² , I/O last 3 completed shifts: In: 3314 [P O :1790]  Out: 1697 [Urine:4325]  I/O this shift:  In: 180 [P O :180]  Out: 475 [Urine:475]  Wt Readings from Last 3 Encounters:   02/13/19 77 1 kg (169 lb 15 6 oz)   10/10/18 83 1 kg (183 lb 3 2 oz)   08/28/18 80 7 kg (178 lb)       Intake/Output Summary (Last 24 hours) at 2/13/2019 1028  Last data filed at 2/13/2019 0857  Gross per 24 hour   Intake 1140 ml   Output 2475 ml   Net -1335 ml     I/O last 3 completed shifts:   In: 8823 [P O :1790]  Out: [address] [CEQJC:5630]      Physical Exam:    GEN: Armanda Schilder appears well, alert and oriented x 3, pleasant and cooperative   NECK: supple, no carotid bruits, no JVD or HJR  HEART: normal rate, regular rhythm, normal S1 and S2, 3/6 harsh systolic murmur  LUNGS: clear to auscultation bilaterally; no wheezes, rales, or rhonchi   ABDOMEN: normal bowel sounds, soft, no tenderness, no distention  EXTREMITIES: peripheral pulses normal; no clubbing, cyanosis, or edema      Meds/Allergies   Allergies   Allergen Reactions    Seasonal Ic  [Cholestatin]        Current Facility-Administered Medications:      EMS REPLENISHMENT MED, , Does not apply, Once, Paco Byrne MD    acetaminophen (TYLENOL) tablet 975 mg, 975 mg, Oral, Q8H [address], Sera Delgado PA-C, 975 mg at 02/13/19 0657    atorvastatin (LIPITOR) tablet 40 mg, 40 mg, Oral, Daily With Stephy Booker MD, 40 mg at 02/12/19 1702    baclofen tablet 20 mg, 20 mg, Oral, TID, Katherine José MD, 20 mg at 02/13/19 0823    citalopram (CeleXA) tablet 20 mg, 20 mg, Oral, Daily, Sera Delgado PA-C, 20 mg at 02/13/19 3144    clopidogrel (PLAVIX) tablet 75 mg, 75 mg, Oral, Daily, Katherine José MD, 75 mg at 02/13/19 4618    docusate sodium (COLACE) capsule 100 mg, 100 mg, Oral, BID, Katherine José MD, 100 mg at 02/13/19 3543    gabapentin (NEURONTIN) capsule 300 mg, 300 mg, Oral, BID, Katherine José MD, 300 mg at 02/13/19 0823    heparin (porcine) subcutaneous injection 5,000 Units, 5,000 Units, Subcutaneous, Q8H [facility], CHERELLE Garza, 5,000 Units at 02/13/19 0657    insulin glargine (LANTUS) subcutaneous injection 38 Units 0 38 mL, 38 Units, Subcutaneous, HS, Yunior Holcomb MD, 38 Units at 02/12/19 2113    insulin lispro (HumaLOG) 100 units/mL subcutaneous injection 14 Units, 14 Units, Subcutaneous, TID With Meals, Yunior Holcomb MD, 14 Units at 02/13/19 0825    insulin lispro (HumaLOG) 100 units/mL subcutaneous injection 2-12 Units, 2-12 Units, Subcutaneous, TID AC, 2 Units at 02/13/19 0825 **AND** Fingerstick Glucose (POCT), , , TID AC, Brenda Sutton MD    insulin lispro (HumaLOG) 100 units/mL subcutaneous injection 2-12 Units, 2-12 Units, Subcutaneous, HS, Brenda Sutton MD, 6 Units at 02/12/19 2113    insulin lispro (HumaLOG) 100 units/mL subcutaneous injection 2-12 Units, 2-12 Units, Subcutaneous, 0200, Brenda Sutton MD, 2 Units at 02/13/19 0344    melatonin tablet 6 mg, 6 mg, Oral, HS, Johnathan Matt PA-C, 6 mg at 02/12/19 2114    nicotine (NICODERM CQ) 21 mg/24 hr TD 24 hr patch 1 patch, 1 patch, Transdermal, Daily, Haley Lee MD, 1 patch at 02/13/19 4339    nystatin (MYCOSTATIN) powder, , Topical, BID, Symone Mojica MD    ondansetron [facility]) injection 4 mg, 4 mg, Intravenous, Q6H PRN, Haley Lee MD    polyethylene glycol (MIRALAX) packet 17 g, 17 g, Oral, Daily PRN, Symone Mojica MD, 17 g at 02/12/19 0817    tamsulosin (FLOMAX) capsule 0 4 mg, 0 4 mg, Oral, Daily With Dinner, Haley Lee MD, 0 4 mg at 02/12/19 1703    torsemide (DEMADEX) tablet 20 mg, 20 mg, Oral, BID, Jasbir Fan MD, 20 mg at 02/13/19 4065    Laboratory Results:  Results from last 7 days   Lab Units 02/07/19  1257 02/07/19  0907 02/07/19  0537   TROPONIN I ng/mL 0 07* 0 08* 0 02       CBC with diff:   Results from last 7 days   Lab Units 02/12/19  0453 02/11/19  0527 02/10/19  0433 02/09/19  0436 02/08/19  0532 02/07/19  1257 02/07/19  0916 02/07/19  0537   WBC Thousand/uL 6 75 9 28 5 87 8 42 12 92* 7 94  --  10 56*   HEMOGLOBIN g/dL 13 1 13 4 13 2 12 0 10 6* 11 1*  --  12 2   HEMATOCRIT % 40 2 39 6 39 7 36 1* 31 7* 33 4*  --  37 8   MCV fL 104* 102* 102* 102* 103* 102*  --  105*   PLATELETS Thousands/uL 237 214 210 191 160 170 150 195   MCH pg 34 0 34 5* 33 8 34 0 34 5* 33 8  --  33 9   MCHC g/dL 32 6 33 8 33 2 33 2 33 4 33 2  --  32 3   RDW % 13 7 13 7 13 6 13 7 13 7 13 6  --  13 6   MPV fL 10 3 10 2 10 7 10 7 10 9 10 3 10 5 10 3   NRBC AUTO /100 WBCs 0 0 0 0 0  --   --  0       CMP:  Results from last 7 days   Lab Units 02/12/19  1257 02/12/19  0453 02/11/19  0527 02/10/19  0433 02/09/19  0436 02/08/19  0759 02/08/19  0038  02/07/19  0537 02/07/19  0515 02/07/19  0514   POTASSIUM mmol/L 4 8 5 8* 4 5 3 4* 3 4* 3 9 4 5   < > 5 5*  --   --    CHLORIDE mmol/L 105 107 105 104 101 103 102   < > 97*  --   --    CO2 mmol/L 28 24 25 27 30 28 27   < > 24  --   --    CO2, I-STAT mmol/L  --   --   --   --   --   --   --   --   --  27 26   BUN mg/dL 36* 42* 45* 50* 48* 51* 54*   < > 45*  --   --    CREATININE mg/dL 1 29 1 32* 1 33* 1 52* 1 62* 1 81* 1 97*   < > 2 17*  --   --    GLUCOSE, ISTAT mg/dl  --   --   --   --   --   --   --   --   --  425* 434*   CALCIUM mg/dL 7 6* 7 6* 7 5* 8 2* 8 6 8 8 8 8   < > 9 3  --   --    AST U/L  --   --   --   --   --   --   --   --  48*  --   --    ALT U/L  --   --   --   --   --   --   --   --  40  --   --    ALK PHOS U/L  --   --   --   --   --   --   --   --  53  --   --    EGFR ml/min/1 73sq m 60 58 58 49 45 40 36   < > 32  --  40    < > = values in this interval not displayed         BMP:  Results from last 7 days   Lab Units 02/12/19  1257 02/12/19  0453 02/11/19  0527 02/10/19  0433 02/09/19  0436 02/08/19  0759 02/08/19  0038  02/07/19  0515   POTASSIUM mmol/L 4 8 5 8* 4 5 3 4* 3 4* 3 9 4 5   < >  --    CHLORIDE mmol/L 105 107 105 104 101 103 102   < >  --    CO2 mmol/L 28 24 25 27 30 28 27   < >  -- CO2, I-STAT mmol/L  --   --   --   --   --   --   --   --  27   BUN mg/dL 36* 42* 45* 50* 48* 51* 54*   < >  --    CREATININE mg/dL 1 29 1 32* 1 33* 1 52* 1 62* 1 81* 1 97*   < >  --    GLUCOSE, ISTAT mg/dl  --   --   --   --   --   --   --   --  425*   CALCIUM mg/dL 7 6* 7 6* 7 5* 8 2* 8 6 8 8 8 8   < >  --     < > = values in this interval not displayed  NT-proBNP: No results for input(s): NTBNP in the last 72 hours       Magnesium:   Results from last 7 days   Lab Units 19  0453 19  0527 02/10/19  0433 19  0436 19  0759 19  0038   MAGNESIUM mg/dL 1 8 1 7 1 7 1 8 2 0 1 5*       Coags:   Results from last 7 days   Lab Units 19  0038 19  1257   PTT seconds 35 33   INR   --  0 99       TSH:    Results from last 7 days   Lab Units 19  0907   TSH 3RD GENERATON uIU/mL 1 010       Hemoglobin A1C )  Results from last 7 days   Lab Units 19  0916   HEMOGLOBIN A1C % 9 3*       Lipid Profile:   No results found for: CHOL  Lab Results   Component Value Date    HDL 67 (H) 2019    HDL 45 10/12/2018    HDL 63 (H) 2018     Lab Results   Component Value Date    LDLCALC 7 2019    LDLCALC 22 10/12/2018    LDLCALC 48 2018     No results found for: LDLDIRECT  Lab Results   Component Value Date    TRIG 83 2019    TRIG 97 10/12/2018    TRIG 135 2018       Cardiac testing:       Results for orders placed during the hospital encounter of 19   Echo complete with contrast if indicated    Makenna Taylor 175  [address] [facility], [address] [place] [address]  [phone]    Transthoracic Echocardiogram  2D, M-mode, Doppler, and Color Doppler    Study date:  2019    Patient: Roddy Caruso  MR number: [id number]  Account number: [de-identified]  : 1960  Age: 59 years  Gender: Male  Status: Inpatient  Location: Bedside  Height: 70 in  Weight: 175 lb  BP: 104/ 65 mmHg    Indications: Heart failure, evaluate LV and RV function  Possible pulmpnary embolism  Diagnoses: I50 9 - Heart failure, unspecified    Sonographer:  HERMINIO Asencio  Primary Physician:  Balbina Khan MD  Referring Physician:  Marge Richardson MD  Group:  [facility]  Interpreting Physician:  Jasper White MD    SUMMARY    LEFT VENTRICLE:  Systolic function was normal  Ejection fraction was estimated to be 60 %  Although no diagnostic regional wall motion abnormality was identified, this possibility cannot be completely excluded on the basis of this study  LEFT ATRIUM:  The atrium was mildly to moderately dilated  MITRAL VALVE:  There was moderate to marked annular calcification  There was severe flail motion of the posterior leaflet  Findings were suggestive of chordal rupture  Transmitral velocity was increased due to increased transvalvular flow  There was severe regurgitation  HISTORY: PRIOR HISTORY: COPD, PVD, DM2, alcohol abuse, current smoker, MRSA, spinal cord injury with some parathesia  PROCEDURE: The procedure was performed at the bedside  This was a routine study  The transthoracic approach was used  The study included complete 2D imaging, M-mode, complete spectral Doppler, and color Doppler  The heart rate was 115 bpm,  at the start of the study  Images were obtained from the parasternal, apical, subcostal, and suprasternal notch acoustic windows  Echocardiographic views were limited due to restricted patient mobility and poor acoustic window  availability  This was a technically difficult study  LEFT VENTRICLE: Size was normal  Systolic function was normal  Ejection fraction was estimated to be 60 %  Although no diagnostic regional wall motion abnormality was identified, this possibility cannot be completely excluded on the basis  of this study  Wall thickness was normal  There was no evidence of concentric hypertrophy   DOPPLER: The study was not technically sufficient to allow evaluation of LV diastolic function  RIGHT VENTRICLE: The size was normal  Systolic function was normal  Wall thickness was normal     LEFT ATRIUM: The atrium was mildly to moderately dilated  RIGHT ATRIUM: Size was normal     MITRAL VALVE: There was moderate to marked annular calcification  There was severe flail motion of the posterior leaflet  Findings were suggestive of chordal rupture  DOPPLER: Transmitral velocity was increased due to increased  transvalvular flow  There was no evidence for stenosis  There was severe regurgitation  AORTIC VALVE: The valve was trileaflet  Leaflets exhibited normal cuspal separation and sclerosis  DOPPLER: Transaortic velocity was within the normal range  There was no evidence for stenosis  There was no regurgitation  TRICUSPID VALVE: The valve structure was normal  There was normal leaflet separation  DOPPLER: The transtricuspid velocity was within the normal range  There was no evidence for stenosis  There was no regurgitation  PULMONIC VALVE: Leaflets exhibited normal thickness, no calcification, and normal cuspal separation  DOPPLER: The transpulmonic velocity was within the normal range  There was no regurgitation  PERICARDIUM: There was no pericardial effusion  The pericardium was normal in appearance  AORTA: The root exhibited normal size  SYSTEM MEASUREMENT TABLES    2D  %FS: 37 4 %  Ao Diam: 3 73 cm  EDV(Teich): 125 97 ml  EF(Cube): 75 47 %  EF(Teich): 67 08 %  ESV(Cube): 33 28 ml  ESV(Teich): 41 47 ml  IVSd: 0 93 cm  LA Diam: 4 48 cm  LVEDV MOD A4C: 104 91 ml  LVEF MOD A4C: 65 89 %  LVESV MOD A4C: 35 79 ml  LVIDd: 5 14 cm  LVIDs: 3 22 cm  LVLd A4C: 7 7 cm  LVLs A4C: 6 53 cm  LVPWd: 0 85 cm  SV MOD A4C: 69 12 ml  SV(Cube): 102 38 ml  SV(Teich): 84 5 ml    CW  AV Env  Ti: 205 18 ms  AV VTI: 19 47 cm  AV Vmax: 1 54 m/s  AV Vmean: 0 95 m/s  AV maxP 44 mmHg  AV meanP 06 mmHg  TR Vmax: 3 01 m/s  TR maxP 23 mmHg    MM  TAPSE: 2 05 cm    PW  E': 0 09 m/s  E/E': 20 01  LVOT Env  Ti: 255 08 ms  LVOT VTI: 12 07 cm  LVOT Vmax: 0 82 m/s  LVOT Vmean: 0 47 m/s  LVOT maxP 71 mmHg  LVOT meanP 12 mmHg  MV A Milton: 0 99 m/s  MV Dec Benson: 13 68 m/s2  MV DecT: 134 58 ms  MV E Milton: 1 84 m/s  MV E/A Ratio: 1 86    [facility]    Prepared and electronically signed by    Courtenay Gowers, MD  Signed 2019 16:11:08       No results found for this or any previous visit  No results found for this or any previous visit  No results found for this or any previous visit  No results found for this or any previous visit  No results found for this or any previous visit  Madalyn Davila MD    Portions of the record may have been created with voice recognition software   Occasional wrong word or "sound a like" substitutions may have occurred due to the inherent limitations of voice recognition software   Read the chart carefully and recognize, using context, where substitutions have occurred

## 2019-02-13 NOTE — PROGRESS NOTES
Progress Note - Kerri Shah 59 y o  male MRN: [id number]    Unit/Bed#: [address] 600-57 Encounter: [id number]      CC: diabetes f/u    Subjective:   Kerri Shah is a 59y o  year old male with type 2 diabetes  Feels well  States that he has been stressed but has no other  complaints  No hypoglycemia  Plan per CT surgery is to continue medical management for mitral regurgitation  Objective:     Vitals: Blood pressure 105/65, pulse 102, temperature 97 5 °F (36 4 °C), temperature source Oral, resp  rate 20, height 5' 10" (1 778 m), weight 77 1 kg (169 lb 15 6 oz), SpO2 96 %  ,Body mass index is 24 39 kg/m²  Intake/Output Summary (Last 24 hours) at 2/13/2019 1131  Last data filed at 2/13/2019 1100  Gross per 24 hour   Intake 1140 ml   Output 2900 ml   Net -1760 ml       Physical Exam:  General Appearance: awake, appears stated age and cooperative  Head: Normocephalic, without obvious abnormality, atraumatic  Extremities: moves all extremities  Skin: Skin color and temperature normal    Pulm: no labored breathing    Lab, Imaging and other studies: I have personally reviewed pertinent reports  POC Glucose (mg/dl)   Date Value   02/13/2019 230 (H)   02/13/2019 177 (H)   02/13/2019 187 (H)   02/12/2019 269 (H)   02/12/2019 308 (H)   02/12/2019 343 (H)   02/12/2019 197 (H)   02/12/2019 239 (H)   02/11/2019 371 (H)   02/11/2019 310 (H)       Assessment and Plan:  Type 2 diabetes mellitus with hyperglycemia  - increase Lantus to 42 units subcutaneously at bedtime  - increase Humalog to 18 units with meals  - continue sliding scale    Heart failure, mitral regurgitation - care per Cardiology  Hypertension, hyperlipidemia-continue current management    Portions of the record may have been created with voice recognition software  Occasional wrong word or "sound a like" substitutions may have occurred due to the inherent limitations of voice recognition software    Read the chart carefully and recognize, using context, where substitutions have occurred

## 2019-02-14 VITALS
DIASTOLIC BLOOD PRESSURE: 66 MMHG | WEIGHT: 170.42 LBS | SYSTOLIC BLOOD PRESSURE: 124 MMHG | BODY MASS INDEX: 24.4 KG/M2 | OXYGEN SATURATION: 97 % | RESPIRATION RATE: 17 BRPM | HEIGHT: 70 IN | HEART RATE: 94 BPM | TEMPERATURE: 97.5 F

## 2019-02-14 PROBLEM — E83.42 HYPOMAGNESEMIA: Status: RESOLVED | Noted: 2019-02-09 | Resolved: 2019-02-14

## 2019-02-14 PROBLEM — E87.70 VOLUME OVERLOAD: Status: RESOLVED | Noted: 2019-02-07 | Resolved: 2019-02-14

## 2019-02-14 PROBLEM — J96.01 ACUTE RESPIRATORY FAILURE WITH HYPOXIA (HCC): Status: RESOLVED | Noted: 2018-10-11 | Resolved: 2019-02-14

## 2019-02-14 LAB
GLUCOSE SERPL-MCNC: 164 MG/DL (ref 65–140)
GLUCOSE SERPL-MCNC: 290 MG/DL (ref 65–140)
GLUCOSE SERPL-MCNC: 347 MG/DL (ref 65–140)

## 2019-02-14 PROCEDURE — 97116 GAIT TRAINING THERAPY: CPT

## 2019-02-14 PROCEDURE — 99239 HOSP IP/OBS DSCHRG MGMT >30: CPT | Performed by: INTERNAL MEDICINE

## 2019-02-14 PROCEDURE — 97112 NEUROMUSCULAR REEDUCATION: CPT

## 2019-02-14 PROCEDURE — 82948 REAGENT STRIP/BLOOD GLUCOSE: CPT

## 2019-02-14 PROCEDURE — 97530 THERAPEUTIC ACTIVITIES: CPT

## 2019-02-14 PROCEDURE — 99232 SBSQ HOSP IP/OBS MODERATE 35: CPT | Performed by: INTERNAL MEDICINE

## 2019-02-14 RX ORDER — INSULIN GLARGINE 100 [IU]/ML
42 INJECTION, SOLUTION SUBCUTANEOUS
Qty: 10 ML | Refills: 0 | Status: SHIPPED | OUTPATIENT
Start: 2019-02-14 | End: 2019-02-14 | Stop reason: HOSPADM

## 2019-02-14 RX ORDER — BLOOD-GLUCOSE METER
1 KIT MISCELLANEOUS AS NEEDED
Qty: 1 EACH | Refills: 0 | Status: SHIPPED | OUTPATIENT
Start: 2019-02-14

## 2019-02-14 RX ORDER — TORSEMIDE 20 MG/1
20 TABLET ORAL DAILY
Status: DISCONTINUED | OUTPATIENT
Start: 2019-02-15 | End: 2019-02-14 | Stop reason: HOSPADM

## 2019-02-14 RX ORDER — ACETAMINOPHEN 325 MG/1
975 TABLET ORAL EVERY 8 HOURS SCHEDULED
Qty: 30 TABLET | Refills: 0 | Status: SHIPPED | OUTPATIENT
Start: 2019-02-14

## 2019-02-14 RX ORDER — NICOTINE 21 MG/24HR
1 PATCH, TRANSDERMAL 24 HOURS TRANSDERMAL DAILY
Qty: 28 PATCH | Refills: 0 | Status: SHIPPED | OUTPATIENT
Start: 2019-02-15 | End: 2019-02-19 | Stop reason: CLARIF

## 2019-02-14 RX ORDER — CITALOPRAM 20 MG/1
20 TABLET ORAL DAILY
Qty: 30 TABLET | Refills: 0 | Status: SHIPPED | OUTPATIENT
Start: 2019-02-15 | End: 2019-02-14

## 2019-02-14 RX ORDER — CITALOPRAM 20 MG/1
20 TABLET ORAL DAILY
Qty: 30 TABLET | Refills: 0 | Status: SHIPPED | OUTPATIENT
Start: 2019-02-15 | End: 2020-10-06 | Stop reason: SDUPTHER

## 2019-02-14 RX ORDER — TORSEMIDE 20 MG/1
20 TABLET ORAL DAILY
Qty: 30 TABLET | Refills: 0 | Status: SHIPPED | OUTPATIENT
Start: 2019-02-15 | End: 2019-03-05 | Stop reason: SDUPTHER

## 2019-02-14 RX ADMIN — NICOTINE 1 PATCH: 21 PATCH, EXTENDED RELEASE TRANSDERMAL at 09:20

## 2019-02-14 RX ADMIN — INSULIN LISPRO 2 UNITS: 100 INJECTION, SOLUTION INTRAVENOUS; SUBCUTANEOUS at 09:22

## 2019-02-14 RX ADMIN — CLOPIDOGREL BISULFATE 75 MG: 75 TABLET ORAL at 09:14

## 2019-02-14 RX ADMIN — CITALOPRAM HYDROBROMIDE 20 MG: 20 TABLET ORAL at 09:15

## 2019-02-14 RX ADMIN — ACETAMINOPHEN 975 MG: 325 TABLET, FILM COATED ORAL at 06:32

## 2019-02-14 RX ADMIN — INSULIN LISPRO 6 UNITS: 100 INJECTION, SOLUTION INTRAVENOUS; SUBCUTANEOUS at 02:25

## 2019-02-14 RX ADMIN — TORSEMIDE 20 MG: 20 TABLET ORAL at 09:15

## 2019-02-14 RX ADMIN — INSULIN LISPRO 8 UNITS: 100 INJECTION, SOLUTION INTRAVENOUS; SUBCUTANEOUS at 12:28

## 2019-02-14 RX ADMIN — BACLOFEN 20 MG: 10 TABLET ORAL at 09:14

## 2019-02-14 RX ADMIN — HEPARIN SODIUM 5000 UNITS: 5000 INJECTION INTRAVENOUS; SUBCUTANEOUS at 06:32

## 2019-02-14 RX ADMIN — GABAPENTIN 300 MG: 300 CAPSULE ORAL at 09:15

## 2019-02-14 RX ADMIN — NYSTATIN: 100000 POWDER TOPICAL at 09:25

## 2019-02-14 NOTE — PROGRESS NOTES
Progress Note - Louie Mack 59 y o  male MRN: [id number]    Unit/Bed#: [address] 556-73 Encounter: [id number]      CC: diabetes f/u    Subjective:   Louie Mack is a 59y o  year old male with type 2 diabetes  Feels well  No complaints  Patient is going to be discharged home today  Under review of blood sugars noted to be hyperglycemic throughout the day  Ranging between 164-347 mg/dl  Recall breakfast patient ate some with meals, pancakes with sugar free syrup, apple sauce and a banana  denies any juice  Objective:     Vitals: Blood pressure 124/66, pulse 94, temperature 97 5 °F (36 4 °C), temperature source Oral, resp  rate 17, height 5' 10" (1 778 m), weight 77 3 kg (170 lb 6 7 oz), SpO2 97 %  ,Body mass index is 24 45 kg/m²  Intake/Output Summary (Last 24 hours) at 2/14/2019 1414  Last data filed at 2/14/2019 1324  Gross per 24 hour   Intake 920 ml   Output 1788 ml   Net -868 ml       Physical Exam:  General Appearance: awake, appears stated age and cooperative  Head: Normocephalic, without obvious abnormality, atraumatic  Extremities: moves all extremities  Skin: Skin color and temperature normal    Pulm: no labored breathing    Lab, Imaging and other studies: I have personally reviewed pertinent reports  POC Glucose (mg/dl)   Date Value   02/14/2019 347 (H)   02/14/2019 164 (H)   02/14/2019 290 (H)   02/13/2019 212 (H)   02/13/2019 184 (H)   02/13/2019 230 (H)   02/13/2019 177 (H)   02/13/2019 187 (H)   02/12/2019 269 (H)   02/12/2019 308 (H)       Assessment and Plan:  1  Type 2 diabetes mellitus with hyperglycemia  - increase Lantus to 48 units subcutaneously at bedtime  - increase Humalog to 20 units with meals  Recommend checking blood sugar prior to discharge to ensure that it is at a safe level  Recommend following up with endocrinology/primary care within 2 weeks from discharge    Strongly recommend diabetes education as I think the patient would benefit from understanding the relationship between carbohydrates and blood sugars as well as appropriate changes to make to his insulin doses based on the same  This was discussed with him at the hospital  I did discuss with the patient how to make changes such as takeoff the dose of prandial coverage if eating but eating less carbohydrates, keeping prandial coverage if not eating or eating very little carbs etc      Upon discharge, if Lantus is not the preferred basal insulin for the patient's insurance company, we can use Tresiba, Basaglar, Levemir, Toujeo at the same dose instead      Upon discharge, if Humalog is not the preferred mealtime insulin for the patient's insurance, we can use NovoLog, Fiasp, or Apidra at the same dose instead  2  Heart failure, mitral regurgitation - care per Cardiology  3  Hypertension, hyperlipidemia-continue current management    Portions of the record may have been created with voice recognition software  Occasional wrong word or "sound a like" substitutions may have occurred due to the inherent limitations of voice recognition software  Read the chart carefully and recognize, using context, where substitutions have occurred

## 2019-02-14 NOTE — PLAN OF CARE
Problem: PHYSICAL THERAPY ADULT  Goal: Performs mobility at highest level of function for planned discharge setting  See evaluation for individualized goals  Description  Treatment/Interventions: Functional transfer training, LE strengthening/ROM, Endurance training, Therapeutic exercise, Patient/family training, Equipment eval/education, Bed mobility, Gait training, Spoke to nursing, OT  Equipment Recommended: Rosio Welsh       See flowsheet documentation for full assessment, interventions and recommendations     Outcome: Progressing

## 2019-02-14 NOTE — RESTORATIVE TECHNICIAN NOTE
Restorative Specialist Mobility Note          Pt refused ambulation at this time  Pt wants to eat breakfast before walking  Will continue to follow up daily  RN aware          Мария Haywood Restorative Technician, BS

## 2019-02-14 NOTE — CONSULTS
Patient was counseled on 2/14/19 at 1400 on the following cardiac medications (as well as all discharge medications):       Rosuvastatin 20 mg PO every other day  Clopidogrel 75 mg PO daily  Torsemide 20 mg PO daily     Patient was educated on the importance of taking these medications to prevent further hospitalizations and improve mortality  Common side effects, what to do when a dose is missed, easy ways to remember to take the medications, and when to contact the doctor and/or pharmacist regarding the medications, were discussed  The patient seemed understanding of the education and did not have any questions  Thank you for involving me in this patient's care and please do not hesitate to contact me with any questions or concerns      Danelle Rosa, PharmD  Pharmacy Resident

## 2019-02-14 NOTE — NURSING NOTE
Discharge instructions reviewed and sent with patient along with med scripts  Patient and his wife taught on how to use the insulin pen  Patient taken in wheelchair by volunteer

## 2019-02-14 NOTE — SOCIAL WORK
Pt cleared for d/c home spouse will be transporting  Pt requested that spouse be here for pharmacy education  Juan thompson pharmacist Amrit Hernandez aware  Pt will be followed by [facility]

## 2019-02-14 NOTE — PHYSICAL THERAPY NOTE
Physical Therapy Progress Note     02/14/19 1000   Pain Assessment   Pain Assessment No/denies pain   Pain Score No Pain   Restrictions/Precautions   Other Precautions Fall Risk   Subjective   Subjective The pt  states that he is glad that he is returning home today  Bed Mobility   Supine to Sit 5  Supervision   Additional items Increased time required   Sit to Supine 5  Supervision   Additional items Increased time required;LE management   Transfers   Sit to Stand 5  Supervision   Additional items Increased time required   Stand to Sit 5  Supervision   Additional items Increased time required   Ambulation/Elevation   Gait pattern Excessively slow; Inconsistent siomara;Decreased foot clearance; Forward Flexion   Gait Assistance 4  Minimal assist   Additional items Assist x 1;Verbal cues; Tactile cues   Assistive Device Rolling walker   Distance 120 feet x 2  Balance   Static Sitting Fair +   Dynamic Sitting Fair   Static Standing Fair   Ambulatory Poor +   Activity Tolerance   Activity Tolerance Patient tolerated treatment well   Nurse Rhina Curran 2, RN  Assessment   Prognosis Good   Problem List Decreased strength;Decreased range of motion;Decreased endurance; Impaired balance;Decreased mobility; Decreased coordination;Pain   Assessment The pt  was able to progress his ambulatory distance today, but he did require increased time  He had no loss of balance, but he was able to manuever around multiple obstacles  He was educated on home safety and the benefit of continued mobility  Barriers to Discharge None   Goals   Patient Goals To go home  STG Expiration Date 02/18/19   Treatment Day 2   Plan   Treatment/Interventions Functional transfer training;LE strengthening/ROM; Elevations; Therapeutic exercise; Endurance training;Patient/family training;Bed mobility;Gait training   Progress Progressing toward goals   PT Frequency   (3-5x a week )   Recommendation   Recommendation Home PT; Home with family support Equipment Recommended Anne-Marie Briones, PTA

## 2019-02-14 NOTE — DISCHARGE SUMMARY
Discharge Summary - [address] [facility]    Patient Information: Ahmet Tabares 59 y o  male MRN: [id number]  Unit/Bed#: [address] 12-46 Encounter: [id number]    Discharging Physician / Practitioner: Juancarlos Matias DO  PCP: Dinesh Blank MD  Admission Date: 2/7/2019  Discharge Date: 02/14/19    Disposition:     Home with [facility] (Reminder: Complete face to face encounter)    Reason for Admission:   Acute on chronic diastolic CHF secondary to severe MR  Acute hypoxic respiratory failure secondary to above   SUNDEEP    Discharge Diagnoses:     Principal Problem (Resolved):    Acute respiratory failure with hypoxia  Active Problems:    Tobacco abuse    Diabetes mellitus type 2 - Diabetic neuropathy    PAD    Essential hypertension    Acute on chronic diastolic CHF    Acute kidney injury, resolved    Obstructive uropathy    Non-rheumatic mitral regurgitation    Chordae tendineae rupture - Severe mitral regurgitation    Quadriplegia - history of Traumatic cervical injury    Non-MI elevated troponin    BPH (benign prostatic hyperplasia)  Resolved Problems:    Elevated lactic acid level    Volume overload    Hypomagnesemia - Hypokalemia      Consultations During Hospital Stay:  · Cardiology  · Endocrine  · Palliative care  · Cardiac surgery  · Urology  · Wound care  · Nephrology    Procedures Performed:   Cardiac echo with EF 60%, severe mitral valve flare motion of the posterior leaflet    ORIN suggestive of chordal rupture of mitral valve    Chest x-ray with interstitial edema  Renal ultrasound with normal kidneys  Lower extremity venous Doppler no DVT  ·     Significant Findings / Test Results:     · As above    Incidental Findings:   · none    Test Results Pending at Discharge (will require follow up):   · none     Outpatient Tests Requested:  · none    Complications:  none    Hospital Course:     Ahmet Tabares is a 59 y o  male patient who originally presented to the hospital on 2/7/2019 due to shortness of breath  Patient was admitted the hospital with diagnosis of acute on chronic diastolic CHF and acute hypoxic respiratory failure,  he was found to have severe MR and chorda tendineae rupture  Evaluated by Cardiology and Cardiac surgery, felt to be a nonsurgical candidate and deemed not appropriate for MitraClip, he responded well to diuresis, cardiac medications were adjusted, with plan to continue with medical management  Endocrine consulted regarding hyperglycemia with A1c 9 3, patient was started on insulin  Urology consulted regarding urinary retention, which is resolved before discharge    Currently patient is in stable condition, tolerating oral diet, anxious to go home, he will be discharged home to follow up with his family doctor in 1 week and with Cardiology  Patient was seen by palliative care, Celexa dose was increased, plan for outpatient palliative care follow-up    Acute on chronic diastolic CHF/severe MR secondary to chorda tendineae rupture  Acute hypoxic respiratory failure secondary to above, resolved   SUNDEEP poa resolved  Quadriplegia with history of traumatic cervical injury  BPH/urinary retention  Diabetes mellitus type 2 with A1c 9 3  PAD status post right femoral popliteal bypass  Tobacco smoking    Condition at Discharge: stable     Discharge Day Visit / Exam:     Subjective:    Patient seen and examined earlier today  Comfortable in bed  Anxious to go home  Vasques catheter removed, patient was able to urinate  Denied chest pain or shortness of breath    Vitals: Blood Pressure: 124/66 (02/14/19 1122)  Pulse: 94 (02/14/19 1122)  Temperature: 97 5 °F (36 4 °C) (02/14/19 1122)  Temp Source: Oral (02/14/19 1122)  Respirations: 17 (02/14/19 1122)  Height: 5' 10" (177 8 cm) (02/07/19 0505)  Weight - Scale: 77 3 kg (170 lb 6 7 oz) (02/14/19 0600)  SpO2: 97 % (02/14/19 1122)  Exam:   Physical Exam  Patient is awake alert oriented in no acute distress  Lung clear to auscultation bilateral  Heart positive S1-S2 no murmur  Abdomen soft nontender  Lower extremities no edema    Discussion with Family:  Discussed with patient's wife    Discharge instructions/Information to patient and family:   See after visit summary for information provided to patient and family  Provisions for Follow-Up Care:  See after visit summary for information related to follow-up care and any pertinent home health orders  Planned Readmission: no     Discharge Statement:  I spent 38  minutes discharging the patient  This time was spent on the day of discharge  I had direct contact with the patient on the day of discharge  Greater than 50% of the total time was spent examining patient, answering all patient questions, arranging and discussing plan of care with patient as well as directly providing post-discharge instructions  Additional time then spent on discharge activities  Discharge Medications:  See after visit summary for reconciled discharge medications provided to patient and family        ** Please Note: This note has been constructed using a voice recognition system **

## 2019-02-15 ENCOUNTER — PATIENT OUTREACH (OUTPATIENT)
Dept: CARDIOLOGY CLINIC | Facility: CLINIC | Age: 59
End: 2019-02-15

## 2019-02-15 DIAGNOSIS — Z71.89 COMPLEX CARE COORDINATION: Primary | ICD-10-CM

## 2019-02-15 NOTE — PROGRESS NOTES
HF Post hospital discharge call template:    Self management/teach back:  Primary learner: Patient   Following low sodium diet: Yes  Weighing daily: Hasn't started yet- his wife will help him start tonight and then he will do every am     - Weight today: N/A  Monitoring symptoms: Yes  Any current symptoms: No  Knows when to call provider: Yes  Taking all medication as prescribed: Yes  Knows name of diuretic: Yes    Care Coordination:  Aware of cardiology follow up appointment: Yes  Aware of PCP follow up appointment: NA, has not made and recommended to call  Home Health Care provider has contacted patient: Yes  Home Health RN (name) Contacted: Yes, spoke with Any Clinical Supervisor  Gave her my contact information as well as Cardiology office #  For start of care visit this weekend  Additional comments: Also recommended he call palliative care office to schedule appointment

## 2019-02-19 ENCOUNTER — OFFICE VISIT (OUTPATIENT)
Dept: CARDIOLOGY CLINIC | Facility: CLINIC | Age: 59
End: 2019-02-19
Payer: COMMERCIAL

## 2019-02-19 VITALS
OXYGEN SATURATION: 97 % | HEART RATE: 100 BPM | DIASTOLIC BLOOD PRESSURE: 52 MMHG | BODY MASS INDEX: 25.43 KG/M2 | SYSTOLIC BLOOD PRESSURE: 98 MMHG | HEIGHT: 70 IN | WEIGHT: 177.6 LBS

## 2019-02-19 DIAGNOSIS — E11.42 TYPE 2 DIABETES MELLITUS WITH DIABETIC POLYNEUROPATHY, WITH LONG-TERM CURRENT USE OF INSULIN (HCC): ICD-10-CM

## 2019-02-19 DIAGNOSIS — Z79.4 TYPE 2 DIABETES MELLITUS WITH DIABETIC POLYNEUROPATHY, WITH LONG-TERM CURRENT USE OF INSULIN (HCC): ICD-10-CM

## 2019-02-19 DIAGNOSIS — I50.32 CHRONIC DIASTOLIC CONGESTIVE HEART FAILURE (HCC): Primary | ICD-10-CM

## 2019-02-19 DIAGNOSIS — I34.0 SEVERE MITRAL VALVE REGURGITATION: ICD-10-CM

## 2019-02-19 DIAGNOSIS — F17.200 TOBACCO USE DISORDER: ICD-10-CM

## 2019-02-19 DIAGNOSIS — S14.109S INJURY OF CERVICAL SPINAL CORD, SEQUELA (HCC): ICD-10-CM

## 2019-02-19 PROCEDURE — 99215 OFFICE O/P EST HI 40 MIN: CPT | Performed by: NURSE PRACTITIONER

## 2019-02-19 NOTE — PATIENT INSTRUCTIONS
Maintain a 2 gram daily sodium diet and 1500 ml daily fluid restriction  Check daily weights  If you gained 3 pounds in one day, 5 pounds in one week, or experience worsening shortness of breath or increasing lower leg swelling  Please call the heart failure office at [phone]    Please bring a  list of your current medications and daily weights to the office visit

## 2019-02-19 NOTE — PROGRESS NOTES
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 from fall and neck injury in 2012  Patient Active Problem List   Diagnosis    Chronic pain    Depression    Diabetic neuropathy ([facility] [address] )    Enlarged prostate with lower urinary tract symptoms (LUTS)    Hernia, epigastric    Injury of cervical spinal cord (HCC)    Tobacco abuse    Umbilical hernia    Cervical myelopathy (HCC)    Basal cell carcinoma in situ of skin    Ambulatory dysfunction    Lumbar radiculopathy    Diabetes mellitus type 2 - Diabetic neuropathy    Hypercalcemia    Quadriplegia and quadriparesis ([facility] [address] )    Alcohol abuse    Cellulitis of leg, right    PAD    Ischemic foot pain at rest [facility])    Peripheral arterial disease ([facility] [address] )    Essential hypertension    Acute blood loss anemia    Constipation    Wound dehiscence, surgical, initial encounter    Non-healing surgical wound of right groin    Postoperative state    Sacral wound, initial encounter    Esophageal thickening    Acute on chronic diastolic CHF    Acute kidney injury    Obstructive uropathy    Non-rheumatic mitral regurgitation    Chordae tendineae rupture - Severe mitral regurgitation    Quadriplegia - history of Traumatic cervical injury    Non-MI elevated troponin    BPH (benign prostatic hyperplasia)     Mr Brigida Malin was admitted to [facility] on  2/07- 2/14/19 with acute respiratory failure, acute on chronic diastolic heart failure secondary to severe MR and SUNDEEP  Mr Magaly Hanley presented to the ED due to acute dyspnea  He woke up from sleep with severe Shortness of breath  On arrival to the ED oxygen saturation 50%  He was placed on BIPAP  Creat 2 17, K+ 5 5, NT pro BNP 4000  SUNDEEP in setting of volume overload and severe MR    He was diuresed with IV Lasix and transitioned to torsemide  EKG showed RBBB  TTE showed LVEF 60%,  LA mild to mod dilated, atrial valve severe flail motion of the posterior leaflet finding suggestive chordae rupture transmitral velocity was increased to increase trans valvular flow  There was severe mitral valve regurgitation  Right ventricular size systolic function normal   ORIN left ventricular systolic function normal LV EF 60% left ventricular diastolic function parameters are abnormal no evidence of thrombus  Mitral valve moderate annular calcification  There was severe flail motion the middle scallop of the posterior leaflet findings were suggestive chordal rupture  Will get a with 5 mm  Mitral valve area was 4 7 centimeters subcutaneously  Trans mitral velocity was increased to increased transvalvular flow  There is no evidence of stenosis  Severe mitral regurgitation  Aortic root exhibited mild dilatation 42 mm mild atheroma in the distal aortic arch  Mr Brayden Ramirez was seen by Dr Maryuri Candelario, Cardiothoracic surgery for severe mitral valve regurgitation  He was referred for consideration for mitral valve repair was versus mitral clip  Due to poorly controlled diabetes, smoking history and 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 he was deemed not a good candidate for surgical valve repair  It was felt he would likely better served with a mitral clip  After the ORIN was  completed CT surgery felt that Mr Brayden Ramirez would not be a candidate for a Mitral Clip  Mr Brayden Ramirez was followed by palliative care during this hospitalization  Mr Brayden Ramirez wanted to continue with medical management to prolong his life  He would continue to follow up with Palliative care for pain management  Hgb A1C 9 3  Endocrinology was consulted  Started on insulin  Urology consulted for urinary retention prior to discharge  Lab studies at discharge sodium 138, potassium 4 8, BUN 36, creat 1 29, GFR 60      Today  Marshall Ralph presents to our office for a recent hospitalization follow up visit  He is accompanied by his wife today  [facility] denies dyspnea with minimal or moderate exertion, CP, palpitations, lightheadedness or dizziness  He is abiding by a 2 gm sodium diet  He continues to smoke  His weight at home is 173-174 pounds  ROS    Objective:   Vitals: [facility] sitting 100/50  Vitals:    02/19/19 1602   BP: 98/52   BP Location: Left arm   Patient Position: Sitting   Cuff Size: Standard   Pulse: 100   SpO2: 97%   Weight: 80 6 kg (177 lb 9 6 oz)   Height: 5' 10" (1 778 m)     Body mass index is 25 48 kg/m²      Wt Readings from Last 3 Encounters:   02/19/19 80 6 kg (177 lb 9 6 oz)   02/14/19 77 3 kg (170 lb 6 7 oz)   10/10/18 83 1 kg (183 lb 3 2 oz)         Physical Exam:  GEN: Natasha Perea appears well, alert and oriented x 3, pleasant and cooperative   HEENT: pupils equal, round, and reactive to light; extraocular muscles intact  NECK: supple, no carotid bruits   HEART: regular rhythm, normal S1 and S2, 4/6 left apex murmur, clicks, gallops or rubs, JVP is flat    LUNGS: clear to auscultation bilaterally; no wheezes, rales, or rhonchi   ABDOMEN: normal bowel sounds, soft, no tenderness, no distention  EXTREMITIES: peripheral pulses normal; no clubbing, cyanosis, or edema  NEURO: stiffened arms and hands   SKIN: normal without suspicious lesions on exposed skin      Current Outpatient Medications:     acetaminophen (TYLENOL) 325 mg tablet, Take 3 tablets (975 mg total) by mouth every 8 (eight) hours, Disp: 30 tablet, Rfl: 0    baclofen 20 mg tablet, Take 20 mg by mouth 3 (three) times a day, Disp: , Rfl:     citalopram (CeleXA) 20 mg tablet, Take 1 tablet (20 mg total) by mouth daily, Disp: 30 tablet, Rfl: 0    clopidogrel (PLAVIX) 75 mg tablet, Take 1 tablet (75 mg total) by mouth daily, Disp: 30 tablet, Rfl: 0    gabapentin (NEURONTIN) 400 mg capsule, Take 300 mg by mouth 2 (two) times a day  , Disp: , Rfl:    glucose blood test strip, Use as instructed, Disp: 100 each, Rfl: 0    glucose monitoring kit (FREESTYLE) monitoring kit, 1 each by Does not apply route as needed (3 times a day as needed), Disp: 1 each, Rfl: 0    insulin glargine (LANTUS SOLOSTAR) 100 units/mL injection pen, Inject 48 Units under the skin daily at bedtime, Disp: 5 pen, Rfl: 0    insulin lispro (HUMALOG KWIKPEN) 100 units/mL injection pen, Inject 20 Units under the skin 3 (three) times a day with meals, Disp: 5 pen, Rfl: 0    rosuvastatin (CRESTOR) 20 MG tablet, Take 20 mg by mouth every other day, Disp: , Rfl:     tamsulosin (FLOMAX) 0 4 mg, Take 1 capsule (0 4 mg total) by mouth daily with dinner, Disp: 30 capsule, Rfl: 0    torsemide (DEMADEX) 20 mg tablet, Take 1 tablet (20 mg total) by mouth daily, Disp: 30 tablet, Rfl: 0      Labs & Results:        [id number]            Invalid input(s): LABALBU            Assessment/Plan:   1  Chronic diastolic heart failure NYHA class III stage C- On PE eu volemic and compensated  Weight in the office 177 pounds and at home 173-174 pounds  BP controlled,  BPM,  continue on Torsemide 20mg daily, Plavix 75mg daily, Crestor 20mg daily  BMP and mag to be done in near future,   if HR continues elevated will need low dose BB  Maintain a 2 gram daily sodium diet and 1500 ml daily fluid restriction  Check daily weights  If you gain 3 pounds in one day, 5 pounds in one week, or experience worsening shortness of breath or increasing lower leg swelling  Please call the heart failure office at [phone]  Please bring a  list of your current medications and daily weights to the office visit  2  Severe MR,severe flail motion of the middle scallop of the posterior leaflet  Findings were suggestive of chordal rupture  The flail gap was 5 mm   Mitral valve area by planimetry was 4 7 cm2 - deemed not a surgical candidate for MVR or mitral clip,   OP follow up with Palliative care, patient continue with medical management  He would like to enjoy his life and  baseball this spring  3  DM2 Hgb A1C 9 3 - BS improved at home, continues on insulin  4  Tobacco abuse- continues smoking, he has significantly decreased use, will continue with attempting to quit smoking, refusing medical assistance  5  C4 traumatic injury with Quadriplegia- follow up with Palliative care for pain management    Follow up with Dr Dong Real in 4 weeks  Sammy Stanley  2/19/2019  4:10 PM

## 2019-02-20 NOTE — UTILIZATION REVIEW
Notification of Discharge  This is a Notification of Discharge from our facility [address]  Please be advised that this patient has been discharge from our facility  Below you will find the admission and discharge date and time including the patients disposition  PRESENTATION DATE: 2/7/2019  5:00 AM  IP ADMISSION DATE: 2/7/19 0736  DISCHARGE DATE: 2/14/2019  3:08 PM  DISPOSITION: Home with [address] Utilization Review Department  Phone: [phone] Татьяна Zhang; Fax [phone]    Send all requests for admission clinical reviews, approved or denied determinations and any other requests to fax [phone]   All voicemails are confidential

## 2019-02-21 ENCOUNTER — TELEPHONE (OUTPATIENT)
Dept: PALLIATIVE MEDICINE | Facility: CLINIC | Age: 59
End: 2019-02-21

## 2019-02-21 NOTE — TELEPHONE ENCOUNTER
----- Message from Steve Limon MA sent at 2/14/2019 10:39 AM EST -----  Regarding: [facility] Candidate? I will re route this patient info to Kelley Wheatley to reach out to patient for screening process/questionaire   ----- Message -----  From: Emerson Powell PA-C  Sent: 2/14/2019  10:12 AM  To: [address] [facility],    This patient would like to see Dr Libia Rodriguez for [facility] evaluation  He was notified of screening process  Please let me know if I can assist with anything else       Thank you,  Vivek Baumann

## 2019-02-21 NOTE — TELEPHONE ENCOUNTER
Called patients home number and left message to call this nurse back regarding previsit screening [facility]  Gary Ivey

## 2019-02-22 ENCOUNTER — TELEPHONE (OUTPATIENT)
Dept: CARDIOLOGY CLINIC | Facility: CLINIC | Age: 59
End: 2019-02-22

## 2019-02-22 NOTE — TELEPHONE ENCOUNTER
P/C states has 3 4 lb wt gain in 1 day  Asymptomatic  Anjana Beckman's pt off today    Taking 20 mg torsemide daily          Please advise

## 2019-02-25 NOTE — TELEPHONE ENCOUNTER
For 3 lb of weight gain in 24 hours, I advised taking an additional half tablet of torsemide in the p m , if weight goes up again the following 24 hours, I would advise taking a full torsemide 20 mg in the p m  In addition to his morning dose

## 2019-02-28 ENCOUNTER — PATIENT OUTREACH (OUTPATIENT)
Dept: CARDIOLOGY CLINIC | Facility: CLINIC | Age: 59
End: 2019-02-28

## 2019-02-28 NOTE — PROGRESS NOTES
Daniella reports patient is doing "pretty well " She denies weight gain or edema or worsening SOB  Compliant with medication and diet  She is requesting phone number for palliative care office, so they can follow up- # given

## 2019-03-05 DIAGNOSIS — I50.41 ACUTE COMBINED SYSTOLIC AND DIASTOLIC CONGESTIVE HEART FAILURE (HCC): ICD-10-CM

## 2019-03-05 RX ORDER — TORSEMIDE 20 MG/1
20 TABLET ORAL DAILY
Qty: 90 TABLET | Refills: 1 | Status: SHIPPED | OUTPATIENT
Start: 2019-03-05 | End: 2019-08-20 | Stop reason: SDUPTHER

## 2019-04-02 RX ORDER — GLIMEPIRIDE 1 MG/1
1 TABLET ORAL
COMMUNITY
Start: 2016-01-25 | End: 2019-08-15 | Stop reason: ALTCHOICE

## 2019-04-02 RX ORDER — LISINOPRIL 10 MG/1
10 TABLET ORAL
COMMUNITY
Start: 2016-01-25 | End: 2019-08-15 | Stop reason: ALTCHOICE

## 2019-04-03 ENCOUNTER — OFFICE VISIT (OUTPATIENT)
Dept: CARDIOLOGY CLINIC | Facility: CLINIC | Age: 59
End: 2019-04-03
Payer: COMMERCIAL

## 2019-04-03 VITALS
BODY MASS INDEX: 26.2 KG/M2 | WEIGHT: 183 LBS | HEIGHT: 70 IN | SYSTOLIC BLOOD PRESSURE: 108 MMHG | HEART RATE: 84 BPM | DIASTOLIC BLOOD PRESSURE: 66 MMHG

## 2019-04-03 DIAGNOSIS — I77.810 AORTIC ROOT DILATION (HCC): ICD-10-CM

## 2019-04-03 DIAGNOSIS — I51.1: ICD-10-CM

## 2019-04-03 DIAGNOSIS — N28.9 ACUTE KIDNEY INSUFFICIENCY: ICD-10-CM

## 2019-04-03 DIAGNOSIS — I50.42 CHRONIC COMBINED SYSTOLIC AND DIASTOLIC CONGESTIVE HEART FAILURE (HCC): Primary | ICD-10-CM

## 2019-04-03 DIAGNOSIS — I10 ESSENTIAL HYPERTENSION: ICD-10-CM

## 2019-04-03 DIAGNOSIS — E13.51 PERIPHERAL VASCULAR DISEASE DUE TO SECONDARY DIABETES MELLITUS (HCC): ICD-10-CM

## 2019-04-03 DIAGNOSIS — I34.0 NON-RHEUMATIC MITRAL REGURGITATION: ICD-10-CM

## 2019-04-03 DIAGNOSIS — R26.2 AMBULATORY DYSFUNCTION: ICD-10-CM

## 2019-04-03 PROBLEM — I21.A1 TYPE 2 MYOCARDIAL INFARCTION (HCC): Status: RESOLVED | Noted: 2019-02-09 | Resolved: 2019-04-03

## 2019-04-03 PROCEDURE — 99215 OFFICE O/P EST HI 40 MIN: CPT | Performed by: INTERNAL MEDICINE

## 2019-05-30 ENCOUNTER — APPOINTMENT (EMERGENCY)
Dept: RADIOLOGY | Facility: HOSPITAL | Age: 59
End: 2019-05-30
Payer: COMMERCIAL

## 2019-05-30 ENCOUNTER — HOSPITAL ENCOUNTER (EMERGENCY)
Facility: HOSPITAL | Age: 59
Discharge: HOME | End: 2019-05-30
Attending: EMERGENCY MEDICINE | Admitting: EMERGENCY MEDICINE
Payer: COMMERCIAL

## 2019-05-30 VITALS
HEART RATE: 97 BPM | BODY MASS INDEX: 26.54 KG/M2 | WEIGHT: 185 LBS | OXYGEN SATURATION: 97 % | TEMPERATURE: 97.9 F | DIASTOLIC BLOOD PRESSURE: 65 MMHG | SYSTOLIC BLOOD PRESSURE: 106 MMHG | RESPIRATION RATE: 18 BRPM

## 2019-05-30 DIAGNOSIS — M79.605 LEG PAIN, LATERAL, LEFT: Primary | ICD-10-CM

## 2019-05-30 LAB
ALBUMIN SERPL BCP-MCNC: 3.8 G/DL (ref 3.5–5)
ALP SERPL-CCNC: 97 U/L (ref 46–116)
ALT SERPL W P-5'-P-CCNC: 47 U/L (ref 12–78)
ANION GAP SERPL CALCULATED.3IONS-SCNC: 5 MMOL/L (ref 4–13)
AST SERPL W P-5'-P-CCNC: 23 U/L (ref 5–45)
BASOPHILS # BLD AUTO: 0.05 THOUSANDS/ΜL (ref 0–0.1)
BASOPHILS NFR BLD AUTO: 1 % (ref 0–1)
BILIRUB SERPL-MCNC: 0.62 MG/DL (ref 0.2–1)
BUN SERPL-MCNC: 13 MG/DL (ref 5–25)
CALCIUM SERPL-MCNC: 8.6 MG/DL (ref 8.3–10.1)
CHLORIDE SERPL-SCNC: 102 MMOL/L (ref 100–108)
CO2 SERPL-SCNC: 31 MMOL/L (ref 21–32)
CREAT SERPL-MCNC: 0.99 MG/DL (ref 0.6–1.3)
EOSINOPHIL # BLD AUTO: 0.14 THOUSAND/ΜL (ref 0–0.61)
EOSINOPHIL NFR BLD AUTO: 1 % (ref 0–6)
ERYTHROCYTE [DISTWIDTH] IN BLOOD BY AUTOMATED COUNT: 12.1 % (ref 11.6–15.1)
GFR SERPL CREATININE-BSD FRML MDRD: 82 ML/MIN/1.73SQ M
GLUCOSE SERPL-MCNC: 124 MG/DL (ref 65–140)
HCT VFR BLD AUTO: 43.9 % (ref 36.5–49.3)
HGB BLD-MCNC: 14.8 G/DL (ref 12–17)
IMM GRANULOCYTES # BLD AUTO: 0.04 THOUSAND/UL (ref 0–0.2)
IMM GRANULOCYTES NFR BLD AUTO: 0 % (ref 0–2)
LYMPHOCYTES # BLD AUTO: 2.55 THOUSANDS/ΜL (ref 0.6–4.47)
LYMPHOCYTES NFR BLD AUTO: 24 % (ref 14–44)
MCH RBC QN AUTO: 33.2 PG (ref 26.8–34.3)
MCHC RBC AUTO-ENTMCNC: 33.7 G/DL (ref 31.4–37.4)
MCV RBC AUTO: 98 FL (ref 82–98)
MONOCYTES # BLD AUTO: 1.38 THOUSAND/ΜL (ref 0.17–1.22)
MONOCYTES NFR BLD AUTO: 13 % (ref 4–12)
NEUTROPHILS # BLD AUTO: 6.29 THOUSANDS/ΜL (ref 1.85–7.62)
NEUTS SEG NFR BLD AUTO: 61 % (ref 43–75)
NRBC BLD AUTO-RTO: 0 /100 WBCS
PLATELET # BLD AUTO: 226 THOUSANDS/UL (ref 149–390)
PMV BLD AUTO: 10.3 FL (ref 8.9–12.7)
POTASSIUM SERPL-SCNC: 3.9 MMOL/L (ref 3.5–5.3)
PROT SERPL-MCNC: 8.1 G/DL (ref 6.4–8.2)
RBC # BLD AUTO: 4.46 MILLION/UL (ref 3.88–5.62)
SODIUM SERPL-SCNC: 138 MMOL/L (ref 136–145)
WBC # BLD AUTO: 10.45 THOUSAND/UL (ref 4.31–10.16)

## 2019-05-30 PROCEDURE — 73502 X-RAY EXAM HIP UNI 2-3 VIEWS: CPT

## 2019-05-30 PROCEDURE — 96375 TX/PRO/DX INJ NEW DRUG ADDON: CPT

## 2019-05-30 PROCEDURE — 99284 EMERGENCY DEPT VISIT MOD MDM: CPT

## 2019-05-30 PROCEDURE — 85025 COMPLETE CBC W/AUTO DIFF WBC: CPT | Performed by: EMERGENCY MEDICINE

## 2019-05-30 PROCEDURE — 96361 HYDRATE IV INFUSION ADD-ON: CPT

## 2019-05-30 PROCEDURE — 72193 CT PELVIS W/DYE: CPT

## 2019-05-30 PROCEDURE — 96374 THER/PROPH/DIAG INJ IV PUSH: CPT

## 2019-05-30 PROCEDURE — 36415 COLL VENOUS BLD VENIPUNCTURE: CPT | Performed by: EMERGENCY MEDICINE

## 2019-05-30 PROCEDURE — 80053 COMPREHEN METABOLIC PANEL: CPT | Performed by: EMERGENCY MEDICINE

## 2019-05-30 PROCEDURE — 99284 EMERGENCY DEPT VISIT MOD MDM: CPT | Performed by: EMERGENCY MEDICINE

## 2019-05-30 RX ORDER — LIDOCAINE 50 MG/G
1 PATCH TOPICAL DAILY
Qty: 6 PATCH | Refills: 0 | Status: SHIPPED | OUTPATIENT
Start: 2019-05-30 | End: 2020-11-16 | Stop reason: ALTCHOICE

## 2019-05-30 RX ORDER — LIDOCAINE 50 MG/G
1 PATCH TOPICAL ONCE
Status: DISCONTINUED | OUTPATIENT
Start: 2019-05-30 | End: 2019-05-30 | Stop reason: HOSPADM

## 2019-05-30 RX ORDER — ONDANSETRON 2 MG/ML
4 INJECTION INTRAMUSCULAR; INTRAVENOUS ONCE
Status: COMPLETED | OUTPATIENT
Start: 2019-05-30 | End: 2019-05-30

## 2019-05-30 RX ORDER — METHOCARBAMOL 500 MG/1
500 TABLET, FILM COATED ORAL 2 TIMES DAILY PRN
Qty: 20 TABLET | Refills: 0 | Status: SHIPPED | OUTPATIENT
Start: 2019-05-30 | End: 2022-04-27

## 2019-05-30 RX ORDER — NAPROXEN 500 MG/1
500 TABLET ORAL ONCE
Status: COMPLETED | OUTPATIENT
Start: 2019-05-30 | End: 2019-05-30

## 2019-05-30 RX ORDER — METHOCARBAMOL 500 MG/1
500 TABLET, FILM COATED ORAL ONCE
Status: COMPLETED | OUTPATIENT
Start: 2019-05-30 | End: 2019-05-30

## 2019-05-30 RX ORDER — SENNOSIDES 8.6 MG
650 CAPSULE ORAL EVERY 8 HOURS PRN
Qty: 30 TABLET | Refills: 0 | Status: SHIPPED | OUTPATIENT
Start: 2019-05-30 | End: 2020-06-10 | Stop reason: SDUPTHER

## 2019-05-30 RX ORDER — FENTANYL CITRATE 50 UG/ML
75 INJECTION, SOLUTION INTRAMUSCULAR; INTRAVENOUS ONCE
Status: COMPLETED | OUTPATIENT
Start: 2019-05-30 | End: 2019-05-30

## 2019-05-30 RX ADMIN — SODIUM CHLORIDE 1000 ML: 0.9 INJECTION, SOLUTION INTRAVENOUS at 17:38

## 2019-05-30 RX ADMIN — LIDOCAINE 1 PATCH: 50 PATCH CUTANEOUS at 15:48

## 2019-05-30 RX ADMIN — IOHEXOL 100 ML: 350 INJECTION, SOLUTION INTRAVENOUS at 19:36

## 2019-05-30 RX ADMIN — METHOCARBAMOL TABLETS 500 MG: 500 TABLET, COATED ORAL at 15:48

## 2019-05-30 RX ADMIN — NAPROXEN 500 MG: 500 TABLET ORAL at 15:48

## 2019-05-30 RX ADMIN — ONDANSETRON 4 MG: 2 INJECTION INTRAMUSCULAR; INTRAVENOUS at 17:37

## 2019-05-30 RX ADMIN — FENTANYL CITRATE 75 MCG: 50 INJECTION, SOLUTION INTRAMUSCULAR; INTRAVENOUS at 17:37

## 2019-08-09 ENCOUNTER — APPOINTMENT (OUTPATIENT)
Dept: LAB | Facility: CLINIC | Age: 59
End: 2019-08-09
Payer: COMMERCIAL

## 2019-08-09 DIAGNOSIS — I34.0 SEVERE MITRAL VALVE REGURGITATION: ICD-10-CM

## 2019-08-09 DIAGNOSIS — I50.42 CHRONIC COMBINED SYSTOLIC AND DIASTOLIC CONGESTIVE HEART FAILURE (HCC): ICD-10-CM

## 2019-08-09 LAB
ALBUMIN SERPL BCP-MCNC: 3.6 G/DL (ref 3.5–5)
ALP SERPL-CCNC: 90 U/L (ref 46–116)
ALT SERPL W P-5'-P-CCNC: 50 U/L (ref 12–78)
ANION GAP SERPL CALCULATED.3IONS-SCNC: 6 MMOL/L (ref 4–13)
AST SERPL W P-5'-P-CCNC: 26 U/L (ref 5–45)
BILIRUB SERPL-MCNC: 0.63 MG/DL (ref 0.2–1)
BUN SERPL-MCNC: 17 MG/DL (ref 5–25)
CALCIUM SERPL-MCNC: 9 MG/DL (ref 8.3–10.1)
CHLORIDE SERPL-SCNC: 102 MMOL/L (ref 100–108)
CO2 SERPL-SCNC: 28 MMOL/L (ref 21–32)
CREAT SERPL-MCNC: 0.86 MG/DL (ref 0.6–1.3)
GFR SERPL CREATININE-BSD FRML MDRD: 94 ML/MIN/1.73SQ M
GLUCOSE P FAST SERPL-MCNC: 166 MG/DL (ref 65–99)
MAGNESIUM SERPL-MCNC: 1.9 MG/DL (ref 1.6–2.6)
POTASSIUM SERPL-SCNC: 3.8 MMOL/L (ref 3.5–5.3)
PROT SERPL-MCNC: 7.8 G/DL (ref 6.4–8.2)
SODIUM SERPL-SCNC: 136 MMOL/L (ref 136–145)

## 2019-08-09 PROCEDURE — 36415 COLL VENOUS BLD VENIPUNCTURE: CPT

## 2019-08-09 PROCEDURE — 80053 COMPREHEN METABOLIC PANEL: CPT

## 2019-08-09 PROCEDURE — 83735 ASSAY OF MAGNESIUM: CPT

## 2019-08-14 RX ORDER — PEN NEEDLE, DIABETIC 31 GX5/16"
NEEDLE, DISPOSABLE MISCELLANEOUS
COMMUNITY
Start: 2019-06-07

## 2019-08-14 RX ORDER — PEN NEEDLE, DIABETIC 32GX 5/32"
NEEDLE, DISPOSABLE MISCELLANEOUS
COMMUNITY
Start: 2019-07-15 | End: 2021-01-21 | Stop reason: SDUPTHER

## 2019-08-14 RX ORDER — GABAPENTIN 300 MG/1
CAPSULE ORAL
COMMUNITY
Start: 2019-06-20 | End: 2020-05-05

## 2019-08-14 RX ORDER — TIZANIDINE 2 MG/1
TABLET ORAL
COMMUNITY
Start: 2017-06-05 | End: 2022-04-27

## 2019-08-15 ENCOUNTER — OFFICE VISIT (OUTPATIENT)
Dept: CARDIOLOGY CLINIC | Facility: CLINIC | Age: 59
End: 2019-08-15
Payer: COMMERCIAL

## 2019-08-15 VITALS
BODY MASS INDEX: 25.62 KG/M2 | HEART RATE: 86 BPM | SYSTOLIC BLOOD PRESSURE: 108 MMHG | DIASTOLIC BLOOD PRESSURE: 62 MMHG | WEIGHT: 179 LBS | HEIGHT: 70 IN

## 2019-08-15 DIAGNOSIS — F17.200 TOBACCO USE DISORDER: ICD-10-CM

## 2019-08-15 DIAGNOSIS — I73.9 PERIPHERAL ARTERIAL DISEASE (HCC): ICD-10-CM

## 2019-08-15 DIAGNOSIS — I10 ESSENTIAL HYPERTENSION: ICD-10-CM

## 2019-08-15 DIAGNOSIS — I50.42 CHRONIC COMBINED SYSTOLIC AND DIASTOLIC CONGESTIVE HEART FAILURE (HCC): ICD-10-CM

## 2019-08-15 DIAGNOSIS — I34.0 NON-RHEUMATIC MITRAL REGURGITATION: ICD-10-CM

## 2019-08-15 DIAGNOSIS — I77.810 AORTIC ROOT DILATION (HCC): Primary | ICD-10-CM

## 2019-08-15 PROCEDURE — 99214 OFFICE O/P EST MOD 30 MIN: CPT | Performed by: INTERNAL MEDICINE

## 2019-08-15 PROCEDURE — 3074F SYST BP LT 130 MM HG: CPT | Performed by: INTERNAL MEDICINE

## 2019-08-15 NOTE — PROGRESS NOTES
[facility]  Follow up note  Delvin Shi 62 y o  male MRN: [id number]        Problems    1  Aortic root dilation (HCC)     2  Chronic combined systolic and diastolic congestive heart failure ([address] )     3  Non-rheumatic mitral regurgitation     4  Essential hypertension     5  Peripheral arterial disease ([address] )     6  Tobacco abuse         Impression:     Flail mitral valve leaflet with severe MR  o Initial opinion by cardiac surgery earlier this year in the setting of acute kidney injury, heart failure was that he was not a good candidate, but he has been more physically active, his heart failure has been clinically stable, and his kidney injury has completely resolved, now with a creatinine of 0 86   o I find him to be quite a reasonable candidate for MitraClip and will refer him to cardiac surgery valve Center   Chronic diastolic CHF  o Continues to be very clinically stable on torsemide 20 mg once a day  o Prior weight 183 lb, today's weight 179 lb   CKD  o Multiple acute kidney injury events in 2018 in the setting of heart failure, but since has had complete resolution, and most recent creatinine 0 86 and normal   Ambulatory dysfunction  o Due to prior back injury, uses a walker   Peripheral arterial disease  o No significant increasing claudication symptoms   Hypertension  o Well controlled on lisinopril   Dyslipidemia  o Continues to be controlled on rosuvastatin   Aortic root dilatation  o 42 mm 2/19, mild    Plan:    · Refer to valve Center for cardiac evaluation for MitraClip considering severe MR due to flail posterior leaflet  · Continue six-month follow-up in my office    HPI:   Delvin Shi is a 62y o  year old male with a history of back injury, ambulatory dysfunction, peripheral arterial disease, tobacco abuse, alcohol abuse, type 2 diabetes, hypertension, dyslipidemia, flail mitral valve leaflet with severe MR and chronic diastolic CHF      Blood pressure is normal  Cholesterol is well controlled  All labs reviewed, interestingly despite being label this CKD stage 3, creatinine is now normal at 0 86  CHF is been incredibly stable, creatinine 0 86, weight 179, no CHF hospitalizations, and continues to be followed for severe MR with flail posterior mitral valve leaflet  Had some recent left knee swelling and pain, was seen in the ER for this  It is slowly improved  He has ambulatory dysfunction due to history of back injury, but continues to ambulate with a walker      Review of Systems   Constitutional: Negative for appetite change, diaphoresis, fatigue and fever  Respiratory: Negative for chest tightness, shortness of breath and wheezing  Cardiovascular: Negative for chest pain, palpitations and leg swelling  Gastrointestinal: Negative for abdominal pain and blood in stool  Musculoskeletal: Positive for arthralgias and joint swelling  Skin: Negative for rash  Neurological: Negative for dizziness, syncope and light-headedness           Past Medical History:   Diagnosis Date    [facility]EP [facility]) [facility]     [facility]     Cholelithiasis     Chronic obstructive lung [facility] )     RESOLVED: 8/17/17    COPD (chronic obstructive pulmonary disease) [facility]     Depression     Diabetes mellitus ([facility] )     type 2, non-insulin dependent    Diabetic neuropathy ([facility] )     Difficulty attaining erection     LAST ASSESSEDl: 8/17/17    Former tobacco use     Gall stone pancreatitis     RESOLVED: 3/8/17    Hiatal hernia     History of concussion     History of MRSA infection     History of varicella infection     Hypertension     LAST ASSESSED: 8/17/17    Non-healing open wound of right groin     s/p VAC placement & post-op fungal dermatitis    PAD (peripheral artery disease) ([facility]     s/p RLE fem-pop    Seasonal allergies     Severe mitral regurgitation     Spinal cord injury, C1-C7 ([facility] )     s/p fusion    Traumatic injury to musculoskeletal system     1-5 FLIGHT FALL DOWN THE STEPS - CERVICAL NECK INJURY - JAN 2, 2012; LAST ASSESSED: 4/79/26    Umbilical hernia without obstruction and without gangrene     RESOLVED: 3/8/17     Social History     Substance and Sexual Activity   Alcohol Use Not Currently    Comment: pt  reports he has not drank since February 2019     Social History     Substance and Sexual Activity   Drug Use No     Social History     Tobacco Use   Smoking Status Current Every Day Smoker    Packs/day: 0 25    Years: 40 00    Pack years: 10 00    Types: Cigarettes   Smokeless Tobacco Never Used       Allergies:   Allergies   Allergen Reactions    Seasonal Ic  [Cholestatin]        Medications:     Current Outpatient Medications:     acetaminophen (TYLENOL) 325 mg tablet, Take 3 tablets (975 mg total) by mouth every 8 (eight) hours, Disp: 30 tablet, Rfl: 0    acetaminophen (TYLENOL) 650 mg CR tablet, Take 1 tablet (650 mg total) by mouth every 8 (eight) hours as needed for mild pain or moderate pain, Disp: 30 tablet, Rfl: 0    Alcohol Swabs (ALCOHOL PREP) PADS, , Disp: , Rfl:     baclofen 20 mg tablet, Take 20 mg by mouth 3 (three) times a day, Disp: , Rfl:     CAREONE UNIFINE PENTIPS PLUS 32G X 4 MM MISC, , Disp: , Rfl:     citalopram (CeleXA) 20 mg tablet, Take 1 tablet (20 mg total) by mouth daily, Disp: 30 tablet, Rfl: 0    clopidogrel (PLAVIX) 75 mg tablet, Take 1 tablet (75 mg total) by mouth daily, Disp: 30 tablet, Rfl: 0    gabapentin (NEURONTIN) 300 mg capsule, , Disp: , Rfl:     glucose blood test strip, Use as instructed, Disp: 100 each, Rfl: 0    glucose monitoring kit (FREESTYLE) monitoring kit, 1 each by Does not apply route as needed (3 times a day as needed), Disp: 1 each, Rfl: 0    insulin aspart (NovoLOG) 100 units/mL injection, Inject under the skin 3 (three) times a day before meals, Disp: , Rfl:     methocarbamol (ROBAXIN) 500 mg tablet, Take 1 tablet (500 mg total) by mouth 2 (two) times a day as needed for muscle spasms, Disp: 20 tablet, Rfl: 0    rosuvastatin (CRESTOR) 20 MG tablet, Take 20 mg by mouth every other day, Disp: , Rfl:     tamsulosin (FLOMAX) 0 4 mg, Take 1 capsule (0 4 mg total) by mouth daily with dinner, Disp: 30 capsule, Rfl: 0    torsemide (DEMADEX) 20 mg tablet, Take 1 tablet (20 mg total) by mouth daily, Disp: 90 tablet, Rfl: 1    CALCIUM CITRATE-VITAMIN D PO, , Disp: , Rfl:     diclofenac sodium (VOLTAREN) 1 %, Apply 2 g topically 4 (four) times a day (Patient not taking: Reported on 8/15/2019), Disp: 1 Tube, Rfl: 0    insulin glargine (LANTUS SOLOSTAR) 100 units/mL injection pen, Inject 48 Units under the skin daily at bedtime (Patient not taking: Reported on 8/15/2019), Disp: 5 pen, Rfl: 0    insulin lispro (HUMALOG KWIKPEN) 100 units/mL injection pen, Inject 20 Units under the skin 3 (three) times a day with meals (Patient not taking: Reported on 8/15/2019), Disp: 5 pen, Rfl: 0    lidocaine (LIDODERM) 5 %, Apply 1 patch topically daily Remove & Discard patch within 12 hours or as directed by MD (Patient not taking: Reported on 8/15/2019), Disp: 6 patch, Rfl: 0    Multiple Vitamin (MULTI-VITAMIN DAILY PO), , Disp: , Rfl:     Multiple Vitamins-Minerals (MULTIVITAMIN ADULT PO), Take 1 tablet by mouth, Disp: , Rfl:     tiZANidine (ZANAFLEX) 2 mg tablet, TAKE 1 TABLET THREE TIMES DAILY, Disp: , Rfl:       Vitals:    08/15/19 0843   BP: 108/62   Pulse: 86     Weight (last 2 days)     Date/Time   Weight    08/15/19 0843   81 2 (179)            Physical Exam   Constitutional: He is oriented to person, place, and time  No distress  HENT:   Head: Normocephalic and atraumatic  Eyes: Conjunctivae are normal  No scleral icterus  Neck: Normal range of motion  No JVD present  Cardiovascular: Normal rate, regular rhythm and intact distal pulses  Murmur heard  Pulmonary/Chest: Effort normal and breath sounds normal  No respiratory distress  He has no decreased breath sounds   He has no wheezes  He has no rhonchi  He has no rales  Musculoskeletal: He exhibits no edema  Right lower leg: Normal  He exhibits no edema  Left lower leg: Normal  He exhibits no edema  Neurological: He is alert and oriented to person, place, and time  Skin: Skin is warm and dry  He is not diaphoretic  Laboratory Studies: All laboratory studies reviewed, notable creatinine 0 86    Cardiac testing:     EKG reviewed personally:     Echocardiogram-2/7/2019-LVEF 60%, moderate to marked annular calcification, flail posterior leaflet, likely chordal rupture, severe MR    ORIN-2/11/2019-LVEF 60%, mildly increased wall thickness, mild to moderately dilated left atrium, normal RV function, moderate annular calcification, flail middle scallop of the posterior leaflet, severe MR, mildly dilated aortic root      Saul Frye MD    Portions of the record may have been created with voice recognition software  Occasional wrong word or "sound a like" substitutions may have occurred due to the inherent limitations of voice recognition software  Read the chart carefully and recognize, using context, where substitutions have occurred

## 2019-08-20 DIAGNOSIS — I50.41 ACUTE COMBINED SYSTOLIC AND DIASTOLIC CONGESTIVE HEART FAILURE (HCC): ICD-10-CM

## 2019-08-20 RX ORDER — TORSEMIDE 20 MG/1
TABLET ORAL
Qty: 90 TABLET | Refills: 1 | Status: SHIPPED | OUTPATIENT
Start: 2019-08-20 | End: 2020-03-09

## 2020-03-06 DIAGNOSIS — I50.41 ACUTE COMBINED SYSTOLIC AND DIASTOLIC CONGESTIVE HEART FAILURE (HCC): ICD-10-CM

## 2020-03-09 RX ORDER — TORSEMIDE 20 MG/1
TABLET ORAL
Qty: 90 TABLET | Refills: 1 | Status: SHIPPED | OUTPATIENT
Start: 2020-03-09 | End: 2020-09-02 | Stop reason: SDUPTHER

## 2020-03-18 ENCOUNTER — APPOINTMENT (EMERGENCY)
Dept: RADIOLOGY | Facility: HOSPITAL | Age: 60
End: 2020-03-18
Payer: COMMERCIAL

## 2020-03-18 ENCOUNTER — HOSPITAL ENCOUNTER (EMERGENCY)
Facility: HOSPITAL | Age: 60
Discharge: HOME | End: 2020-03-19
Admitting: SURGERY
Payer: COMMERCIAL

## 2020-03-18 DIAGNOSIS — S01.01XA SCALP LACERATION, INITIAL ENCOUNTER: Primary | ICD-10-CM

## 2020-03-18 LAB
BASE EXCESS BLDA CALC-SCNC: 5 MMOL/L (ref -2–3)
BASOPHILS # BLD AUTO: 0.1 THOUSANDS/ΜL (ref 0–0.1)
BASOPHILS NFR BLD AUTO: 1 % (ref 0–1)
CA-I BLD-SCNC: 1.01 MMOL/L (ref 1.12–1.32)
EOSINOPHIL # BLD AUTO: 0.18 THOUSAND/ΜL (ref 0–0.61)
EOSINOPHIL NFR BLD AUTO: 2 % (ref 0–6)
ERYTHROCYTE [DISTWIDTH] IN BLOOD BY AUTOMATED COUNT: 13.1 % (ref 11.6–15.1)
GLUCOSE SERPL-MCNC: 257 MG/DL (ref 65–140)
HCO3 BLDA-SCNC: 28.4 MMOL/L (ref 24–30)
HCT VFR BLD AUTO: 40.9 % (ref 36.5–49.3)
HCT VFR BLD CALC: 40 % (ref 36.5–49.3)
HGB BLD-MCNC: 14.1 G/DL (ref 12–17)
HGB BLDA-MCNC: 13.6 G/DL (ref 12–17)
IMM GRANULOCYTES # BLD AUTO: 0.04 THOUSAND/UL (ref 0–0.2)
IMM GRANULOCYTES NFR BLD AUTO: 0 % (ref 0–2)
LYMPHOCYTES # BLD AUTO: 2.4 THOUSANDS/ΜL (ref 0.6–4.47)
LYMPHOCYTES NFR BLD AUTO: 27 % (ref 14–44)
MCH RBC QN AUTO: 34.6 PG (ref 26.8–34.3)
MCHC RBC AUTO-ENTMCNC: 34.5 G/DL (ref 31.4–37.4)
MCV RBC AUTO: 101 FL (ref 82–98)
MONOCYTES # BLD AUTO: 0.99 THOUSAND/ΜL (ref 0.17–1.22)
MONOCYTES NFR BLD AUTO: 11 % (ref 4–12)
NEUTROPHILS # BLD AUTO: 5.21 THOUSANDS/ΜL (ref 1.85–7.62)
NEUTS SEG NFR BLD AUTO: 59 % (ref 43–75)
NRBC BLD AUTO-RTO: 0 /100 WBCS
PCO2 BLD: 29 MMOL/L (ref 21–32)
PCO2 BLD: 37.7 MM HG (ref 42–50)
PH BLD: 7.49 [PH] (ref 7.3–7.4)
PLATELET # BLD AUTO: 199 THOUSANDS/UL (ref 149–390)
PMV BLD AUTO: 10.3 FL (ref 8.9–12.7)
PO2 BLD: 145 MM HG (ref 35–45)
POTASSIUM BLD-SCNC: 3.5 MMOL/L (ref 3.5–5.3)
RBC # BLD AUTO: 4.07 MILLION/UL (ref 3.88–5.62)
SAO2 % BLD FROM PO2: 99 % (ref 60–85)
SODIUM BLD-SCNC: 139 MMOL/L (ref 136–145)
SPECIMEN SOURCE: ABNORMAL
WBC # BLD AUTO: 8.92 THOUSAND/UL (ref 4.31–10.16)

## 2020-03-18 PROCEDURE — 82947 ASSAY GLUCOSE BLOOD QUANT: CPT

## 2020-03-18 PROCEDURE — 85014 HEMATOCRIT: CPT

## 2020-03-18 PROCEDURE — 72125 CT NECK SPINE W/O DYE: CPT

## 2020-03-18 PROCEDURE — NC001 PR NO CHARGE: Performed by: EMERGENCY MEDICINE

## 2020-03-18 PROCEDURE — 99291 CRITICAL CARE FIRST HOUR: CPT | Performed by: SURGERY

## 2020-03-18 PROCEDURE — 84295 ASSAY OF SERUM SODIUM: CPT

## 2020-03-18 PROCEDURE — 85025 COMPLETE CBC W/AUTO DIFF WBC: CPT | Performed by: STUDENT IN AN ORGANIZED HEALTH CARE EDUCATION/TRAINING PROGRAM

## 2020-03-18 PROCEDURE — 99284 EMERGENCY DEPT VISIT MOD MDM: CPT

## 2020-03-18 PROCEDURE — 70450 CT HEAD/BRAIN W/O DYE: CPT

## 2020-03-18 PROCEDURE — 82803 BLOOD GASES ANY COMBINATION: CPT

## 2020-03-18 PROCEDURE — 84132 ASSAY OF SERUM POTASSIUM: CPT

## 2020-03-18 PROCEDURE — 71045 X-RAY EXAM CHEST 1 VIEW: CPT

## 2020-03-18 PROCEDURE — 82330 ASSAY OF CALCIUM: CPT

## 2020-03-18 PROCEDURE — 80048 BASIC METABOLIC PNL TOTAL CA: CPT | Performed by: STUDENT IN AN ORGANIZED HEALTH CARE EDUCATION/TRAINING PROGRAM

## 2020-03-18 PROCEDURE — 36415 COLL VENOUS BLD VENIPUNCTURE: CPT | Performed by: STUDENT IN AN ORGANIZED HEALTH CARE EDUCATION/TRAINING PROGRAM

## 2020-03-18 RX ORDER — TAMSULOSIN HYDROCHLORIDE 0.4 MG/1
0.4 CAPSULE ORAL
COMMUNITY
End: 2020-06-10 | Stop reason: SDUPTHER

## 2020-03-18 RX ORDER — CLOPIDOGREL BISULFATE 75 MG/1
75 TABLET ORAL DAILY
COMMUNITY
End: 2020-06-10 | Stop reason: SDUPTHER

## 2020-03-18 RX ORDER — LIDOCAINE HYDROCHLORIDE AND EPINEPHRINE 10; 10 MG/ML; UG/ML
10 INJECTION, SOLUTION INFILTRATION; PERINEURAL ONCE
Status: COMPLETED | OUTPATIENT
Start: 2020-03-18 | End: 2020-03-18

## 2020-03-18 RX ORDER — GABAPENTIN 600 MG/1
600 TABLET ORAL 3 TIMES DAILY
COMMUNITY
End: 2020-06-10 | Stop reason: SDUPTHER

## 2020-03-18 RX ORDER — CITALOPRAM 20 MG/1
20 TABLET ORAL DAILY
COMMUNITY
End: 2020-06-10 | Stop reason: SDUPTHER

## 2020-03-18 RX ORDER — INSULIN GLARGINE 100 [IU]/ML
INJECTION, SOLUTION SUBCUTANEOUS
COMMUNITY
End: 2020-06-10 | Stop reason: SDUPTHER

## 2020-03-18 RX ORDER — ROSUVASTATIN CALCIUM 20 MG/1
20 TABLET, COATED ORAL DAILY
COMMUNITY
End: 2020-06-10 | Stop reason: SDUPTHER

## 2020-03-18 RX ORDER — ACETAMINOPHEN 325 MG/1
650 TABLET ORAL EVERY 6 HOURS PRN
COMMUNITY
End: 2020-06-10 | Stop reason: SDUPTHER

## 2020-03-18 RX ORDER — METHOCARBAMOL 500 MG/1
500 TABLET, FILM COATED ORAL 4 TIMES DAILY
COMMUNITY
End: 2020-06-10 | Stop reason: SDUPTHER

## 2020-03-18 RX ORDER — TIZANIDINE 2 MG/1
2 TABLET ORAL EVERY 8 HOURS PRN
COMMUNITY
End: 2020-06-10 | Stop reason: SDUPTHER

## 2020-03-18 RX ADMIN — LIDOCAINE HYDROCHLORIDE,EPINEPHRINE BITARTRATE 10 ML: 10; .01 INJECTION, SOLUTION INFILTRATION; PERINEURAL at 23:47

## 2020-03-19 VITALS
SYSTOLIC BLOOD PRESSURE: 102 MMHG | OXYGEN SATURATION: 95 % | DIASTOLIC BLOOD PRESSURE: 56 MMHG | HEART RATE: 98 BPM | RESPIRATION RATE: 18 BRPM | WEIGHT: 178 LBS | TEMPERATURE: 98.1 F

## 2020-03-19 PROBLEM — S01.01XA SCALP LACERATION, INITIAL ENCOUNTER: Status: ACTIVE | Noted: 2020-03-19

## 2020-03-19 LAB
ANION GAP SERPL CALCULATED.3IONS-SCNC: 10 MMOL/L (ref 4–13)
BUN SERPL-MCNC: 21 MG/DL (ref 5–25)
CALCIUM SERPL-MCNC: 8.3 MG/DL (ref 8.3–10.1)
CHLORIDE SERPL-SCNC: 104 MMOL/L (ref 100–108)
CO2 SERPL-SCNC: 27 MMOL/L (ref 21–32)
CREAT SERPL-MCNC: 1 MG/DL (ref 0.6–1.3)
GFR SERPL CREATININE-BSD FRML MDRD: 81 ML/MIN/1.73SQ M
GLUCOSE SERPL-MCNC: 252 MG/DL (ref 65–140)
POTASSIUM SERPL-SCNC: 3.5 MMOL/L (ref 3.5–5.3)
SODIUM SERPL-SCNC: 141 MMOL/L (ref 136–145)

## 2020-03-19 PROCEDURE — 13121 CMPLX RPR S/A/L 2.6-7.5 CM: CPT | Performed by: SURGERY

## 2020-03-19 PROCEDURE — NC001 PR NO CHARGE: Performed by: STUDENT IN AN ORGANIZED HEALTH CARE EDUCATION/TRAINING PROGRAM

## 2020-03-19 PROCEDURE — NC001 PR NO CHARGE: Performed by: SURGERY

## 2020-03-19 NOTE — DISCHARGE INSTRUCTIONS
Laceration   WHAT YOU NEED TO KNOW:   A laceration is an injury to the skin and the soft tissue underneath it  Lacerations happen when you are cut or hit by something  They can happen anywhere on the body  DISCHARGE INSTRUCTIONS:   Return to the emergency department if:   · You have heavy bleeding or bleeding that does not stop after 10 minutes of holding firm, direct pressure over the wound  · Your wound opens up  Contact your healthcare provider if:   · You have a fever or chills  · Your laceration is red, warm, or swollen  · You have red streaks on your skin coming from your wound  · You have white or yellow drainage from the wound that smells bad  · You have pain that gets worse, even after treatment  · You have questions or concerns about your condition or care  Medicines:   · Prescription pain medicine  may be given  Ask how to take this medicine safely  · Antibiotics  help treat or prevent a bacterial infection  · Take your medicine as directed  Contact your healthcare provider if you think your medicine is not helping or if you have side effects  Tell him or her if you are allergic to any medicine  Keep a list of the medicines, vitamins, and herbs you take  Include the amounts, and when and why you take them  Bring the list or the pill bottles to follow-up visits  Carry your medicine list with you in case of an emergency  Care for your wound as directed:   · Do not get your wound wet  until your healthcare provider says it is okay  Do not soak your wound in water  Do not go swimming until your healthcare provider says it is okay  Carefully wash the wound with soap and water  Gently pat the area dry or allow it to air dry  · Change your bandages  when they get wet, dirty, or after washing  Apply new, clean bandages as directed  Do not apply elastic bandages or tape too tight  Do not put powders or lotions over your incision  · Apply antibiotic ointment as directed  Your healthcare provider may give you antibiotic ointment to put over your wound if you have stitches  If you have strips of tape over your incision, let them dry up and fall off on their own  If they do not fall off within 14 days, gently remove them  If you have glue over your wound, do not remove or pick at it  If your glue comes off, do not replace it with glue that you have at home  · Check your wound every day for signs of infection such as swelling, redness, or pus  Self-care:   · Apply ice  on your wound for 15 to 20 minutes every hour or as directed  Use an ice pack, or put crushed ice in a plastic bag  Cover it with a towel  Ice helps prevent tissue damage and decreases swelling and pain  · Use a splint as directed  A splint will decrease movement and stress on your wound  It may help it heal faster  A splint may be used for lacerations over joints or areas of your body that bend  Ask your healthcare provider how to apply and remove a splint  · Decrease scarring of your wound  by applying ointments as directed  Do not apply ointments until your healthcare provider says it is okay  You may need to wait until your wound is healed  Ask which ointment to buy and how often to use it  After your wound is healed, use sunscreen over the area when you are out in the sun  You should do this for at least 6 months to 1 year after your injury  Follow up with your healthcare provider as directed: You may need to follow up in 24 to 48 hours to have your wound checked for infection  You will need to return in 3 to 14 days if you have stitches or staples so they can be removed  Care for your wound as directed to prevent infection and help it heal  Write down your questions so you remember to ask them during your visits  © 2017 [employer] Information is for End User's use only and may not be sold, redistributed or otherwise used for commercial purposes   All illustrations and images included in [facility] are the copyrighted property of [employer]  or Yasir Leslie  The above information is an  only  It is not intended as medical advice for individual conditions or treatments  Talk to your doctor, nurse or pharmacist before following any medical regimen to see if it is safe and effective for you

## 2020-03-19 NOTE — DISCHARGE SUMMARY
Discharge Summary - Caridad Jolley 62 y o  male MRN: [id number]    Unit/Bed#: ED 08 Encounter: [id number]    Admission Date:     Admitting Diagnosis: Unspecified multiple injuries, initial encounter [T07  XXXA]    HPI:  Caridad Jolley is a 62 y o  male who presents with a scalp laceration and active bleeding  Ambulatory dysfunction from a C4 injury  He fell against a wall in his house this evening  Procedures Performed:   Orders Placed This Encounter   Procedures    Laceration repair       Summary of Hospital Course: Seen in trauma bay and scans negative  Scalp laceration closed by 12 Mouna Khan Fellow  No hematoma, no further bleeding, requesting to go home  Will follow up with [facility] in 1 week and with PCP to review events of recent injury  Significant Findings, Care, Treatment and Services Provided: Trauma - Ct Head Wo Contrast    Result Date: 3/18/2020  Impression: No intracranial hemorrhage or calvarial fracture  Workstation performed: [id number]     Ct Spine Cervical Without Contrast    Result Date: 3/18/2020  Impression: 1  No cervical spine fracture or traumatic malalignment  2   Anterior cervical fixation at C3-C4  Disc space narrowing most pronounced at C6-C7  Workstation performed: [id number]     Xr Trauma Multiple    Result Date: 3/19/2020  Impression: No acute cardiopulmonary disease within limitations of supine imaging  Workstation performed: [id number]       Complications: none    Discharge Diagnosis: S/P Fall into a wall  Head laceration with active bleeding  History of DM  History of C4 fracture    Resolved Problems  Date Reviewed: 3/19/2020    None          Condition at Discharge: stable         Discharge instructions/Information to patient and family:   See after visit summary for information provided to patient and family  Provisions for Follow-Up Care:  See after visit summary for information related to follow-up care and any pertinent home health orders        PCP: Sourav Garrett Magdalena Pleitez MD    Disposition: Home    Planned Readmission: No      Discharge Statement   I spent 27 minutes discharging the patient  This time was spent on the day of discharge  I had direct contact with the patient on the day of discharge  Additional documentation is required if more than 30 minutes were spent on discharge  Discharge Medications:  See after visit summary for reconciled discharge medications provided to patient and family

## 2020-03-19 NOTE — PROCEDURES
Laceration repair  Date/Time: 3/19/2020 1:26 AM  Performed by: Maxx Mclean  Authorized by: Maxx Mclean   Consent: Verbal consent obtained  Written consent not obtained  Consent given by: patient  Patient understanding: patient states understanding of the procedure being performed  Patient consent: the patient's understanding of the procedure matches consent given  Test results: test results available and properly labeled  Site marked: the operative site was marked  Patient identity confirmed: verbally with patient and hospital-assigned identification number  Body area: head/neck  Location details: scalp  Laceration length: 3 cm  Foreign bodies: no foreign bodies  Tendon involvement: none  Nerve involvement: none  Vascular damage: pulsatile scalp bleeding   Anesthesia: local infiltration    Anesthesia:  Local Anesthetic: lidocaine 1% with epinephrine  Anesthetic total: 10 mL    Sedation:  Patient sedated: no      Wound Dehiscence:    Secondary closure or dehiscence: complex    Procedure Details:  Irrigation solution: saline  Debridement: moderate  Degree of undermining: none  Skin closure: 3-0 nylon  Subcutaneous closure: 3-0 Vicryl  Number of sutures: 4  Technique: running  Approximation: close  Approximation difficulty: simple  Dressing: antibiotic ointment  Patient tolerance: Patient tolerated the procedure well with no immediate complications  Comments: A hemostatic whip stitch complete in trauma bay after dressing taken down revealing steady stream of blood emanating from scalp laceration  Local anesthesia infiltrated and area cleansed  Hemostatic sutures removed resulting in recurrence of uncontrolled oozing from wound bed  Attempt made to control with several figure of 8 sutures with improvement  However there was persistent pulsatile bleeding localized deep into one corner of wound and not able to control secondary to location and exposure   At this point proceeded with running continuous suture incorporating 1cm lateral from edge and halting bleeding  Wound approximated  Bacitracin applied  Hemostasis ensured

## 2020-03-19 NOTE — ED PROVIDER NOTES
Emergency Department Airway Evaluation and Management Form    History  Obtained from: EMS  Patient has no allergy information on record  No chief complaint on file  HPI  Pt tripped getting cheese  Pt is on plavix Pt had one etoh drink  Pt denies loc  No past medical history on file  No past surgical history on file  No family history on file  Social History     Tobacco Use    Smoking status: Not on file   Substance Use Topics    Alcohol use: Not on file    Drug use: Not on file     I have reviewed and agree with the history as documented      Review of Systems  Unable due to acuity  Physical Exam  /59   Pulse 97   Temp 98 1 °F (36 7 °C) (Oral)   Resp 18   SpO2 98%     Physical Exam   alert GCS 15  Lac to head bleeding controled   Moving all ext   abd small umbilical hernia   Moving allext  ED Medications  Medications - No data to display    Intubation  Procedures    Notes  No airway mgt at this time    Final Diagnosis  Final diagnoses:   None    fall on plavix with head strike   Lac to scalp    ED Provider  Electronically Signed by     Jeramy Orta MD  03/18/20 [id number]

## 2020-03-19 NOTE — H&P
H&P Exam - Trauma   Lulu Lambert 62 y o  male MRN: [id number]  Unit/Bed#: TR 02 Encounter: [id number]    Assessment/Plan   Trauma Alert: Level B  Model of Arrival: Ambulance  Trauma Team: Attending Suzi Olson, Fellow Yanique Yanez and ALEJANDRA Chris  Consultants: none    Trauma Active Problems: S/P Fall  Scalp laceration with active bleeding  Abrasion to right knee    Trauma Plan:   Scalp lac repaired in ED  Review scans  Neuro check - GCS - 15  Ambulate and feed, then discharge home to wife    Chief Complaint: Just wants to go home      History of Present Illness   HPI:  Lulu Lambert is a 62 y o  male who presents with a scalp laceration and active bleeding  Ambulatory dysfunction from a C4 injury  He fell against a wall in his house this evening  Mechanism:Fall    Review of Systems   Constitutional: Negative  HENT: Negative  Eyes: Negative  Respiratory: Negative  Cardiovascular: Negative  Gastrointestinal: Negative  Genitourinary: Negative  Musculoskeletal: Negative  Skin: Positive for wound  Allergic/Immunologic: Negative  Neurological: Negative  Hematological: Negative  Psychiatric/Behavioral: Negative  12-point, complete review of systems was reviewed and negative except as stated above  Historical Information   History is obtainable from the patient and her     No past medical history on file  No past surgical history on file  Social History   Social History     Substance and Sexual Activity   Alcohol Use Not on file     Social History     Substance and Sexual Activity   Drug Use Not on file     Social History     Tobacco Use   Smoking Status Not on file     No existing history information found  No existing history information found  There is no immunization history on file for this patient  Last Tetanus: today  Family History: Non-contributory        Meds/Allergies   current meds:   No current facility-administered medications for this encounter  Allergies not on file      PHYSICAL EXAM    Objective   Vitals:   First set: Temperature: 98 1 °F (36 7 °C) (03/18/20 2320)  Pulse: 97 (03/18/20 2320)  Respirations: 18 (03/18/20 2320)  Blood Pressure: 118/59 (03/18/20 2320)    Primary Survey:   (A) Airway: patent  (B) Breathing: non-labored  (C) Circulation: Pulses:  normal  (D) Disabliity:  GCS Total:  15, Eye Opening:   Spontaneous = 4, Motor Response: Obeys commands = 6 and Verbal Response:  Oriented = 5  (E) Expose:  Completed    Secondary Survey: (Click on Physical Exam tab above)  Physical Exam   Constitutional: He is oriented to person, place, and time  He appears well-developed and well-nourished  No distress  HENT:   Head: Normocephalic and atraumatic  Right Ear: External ear normal    Left Ear: External ear normal    Nose: Nose normal    Mouth/Throat: Oropharynx is clear and moist  No oropharyngeal exudate  Eyes: Pupils are equal, round, and reactive to light  Conjunctivae and EOM are normal  Right eye exhibits no discharge  Left eye exhibits no discharge  No scleral icterus  Neck: Normal range of motion  Neck supple  No JVD present  No tracheal deviation present  No thyromegaly present  Cardiovascular: Normal rate, regular rhythm, normal heart sounds and intact distal pulses  Exam reveals no gallop and no friction rub  No murmur heard  Pulmonary/Chest: Effort normal and breath sounds normal  No stridor  No respiratory distress  He has no wheezes  He has no rales  He exhibits no tenderness  Abdominal: Soft  Bowel sounds are normal  He exhibits no distension and no mass  There is no tenderness  There is no rebound and no guarding  No hernia  Genitourinary:   Genitourinary Comments: deferred   Musculoskeletal: Normal range of motion  He exhibits no edema, tenderness or deformity  Lymphadenopathy:     He has no cervical adenopathy  Neurological: He is alert and oriented to person, place, and time  He displays normal reflexes   No cranial nerve deficit or sensory deficit  He exhibits normal muscle tone  Coordination normal    Skin: Skin is warm and dry  Capillary refill takes less than 2 seconds  No rash noted  He is not diaphoretic  No erythema  Invasive Devices     None                 Lab Results: Results for Harsha Veloz (MRN [id number]) as of 3/19/2020 01:14   Ref   Range 3/18/2020 23:32   Sodium Latest Ref Range: 136 - 145 mmol/L 141   Potassium Latest Ref Range: 3 5 - 5 3 mmol/L 3 5   Chloride Latest Ref Range: 100 - 108 mmol/L 104   CO2 Latest Ref Range: 21 - 32 mmol/L 27   Anion Gap Latest Ref Range: 4 - 13 mmol/L 10   BUN Latest Ref Range: 5 - 25 mg/dL 21   Creatinine Latest Ref Range: 0 60 - 1 30 mg/dL 1 00   Glucose, Random Latest Ref Range: 65 - 140 mg/dL 252 (H)   Calcium Latest Ref Range: 8 3 - 10 1 mg/dL 8 3   eGFR Latest Units: ml/min/1 73sq m 81   WBC Latest Ref Range: 4 31 - 10 16 Thousand/uL 8 92   Red Blood Cell Count Latest Ref Range: 3 88 - 5 62 Million/uL 4 07   Hemoglobin Latest Ref Range: 12 0 - 17 0 g/dL 14 1   HCT Latest Ref Range: 36 5 - 49 3 % 40 9   MCV Latest Ref Range: 82 - 98 fL 101 (H)   MCH Latest Ref Range: 26 8 - 34 3 pg 34 6 (H)   MCHC Latest Ref Range: 31 4 - 37 4 g/dL 34 5   RDW Latest Ref Range: 11 6 - 15 1 % 13 1   Platelet Count Latest Ref Range: 149 - 390 Thousands/uL 199   MPV Latest Ref Range: 8 9 - 12 7 fL 10 3   nRBC Latest Units: /100 WBCs 0   Neutrophils % Latest Ref Range: 43 - 75 % 59   Immat GRANS % Latest Ref Range: 0 - 2 % 0   Lymphocytes Relative Latest Ref Range: 14 - 44 % 27   Monocytes Relative Latest Ref Range: 4 - 12 % 11   Eosinophils Latest Ref Range: 0 - 6 % 2   Basophils Relative Latest Ref Range: 0 - 1 % 1   Immature Grans Absolute Latest Ref Range: 0 00 - 0 20 Thousand/uL 0 04   Absolute Neutrophils Latest Ref Range: 1 85 - 7 62 Thousands/µL 5 21   Lymphocytes Absolute Latest Ref Range: 0 60 - 4 47 Thousands/µL 2 40   Absolute Monocytes Latest Ref Range: 0 17 - 1 22 Thousand/µL 0 99   Absolute Eosinophils Latest Ref Range: 0 00 - 0 61 Thousand/µL 0 18   Basophils Absolute Latest Ref Range: 0 00 - 0 10 Thousands/µL 0 10     Imaging/EKG Studies: CT C-spine 0 neg  HCT - neg  Other Studies: none    Code Status: No Order  Advance Directive and Living Will:      Power of :    POLST:

## 2020-04-15 DIAGNOSIS — Z79.4 TYPE 2 DIABETES MELLITUS WITH DIABETIC POLYNEUROPATHY, WITH LONG-TERM CURRENT USE OF INSULIN (HCC): Primary | ICD-10-CM

## 2020-04-15 DIAGNOSIS — E11.42 TYPE 2 DIABETES MELLITUS WITH DIABETIC POLYNEUROPATHY, WITH LONG-TERM CURRENT USE OF INSULIN (HCC): Primary | ICD-10-CM

## 2020-04-16 RX ORDER — (INSULIN DEGLUDEC AND LIRAGLUTIDE) 100; 3.6 [IU]/ML; MG/ML
INJECTION, SOLUTION SUBCUTANEOUS
Qty: 45 ML | Refills: 11 | Status: SHIPPED | OUTPATIENT
Start: 2020-04-16 | End: 2020-12-02 | Stop reason: SDUPTHER

## 2020-04-23 ENCOUNTER — TELEPHONE (OUTPATIENT)
Dept: FAMILY MEDICINE CLINIC | Facility: CLINIC | Age: 60
End: 2020-04-23

## 2020-04-23 DIAGNOSIS — Z79.4 TYPE 2 DIABETES MELLITUS WITH DIABETIC POLYNEUROPATHY, WITH LONG-TERM CURRENT USE OF INSULIN (HCC): ICD-10-CM

## 2020-04-23 DIAGNOSIS — Z79.4 TYPE 2 DIABETES MELLITUS WITH DIABETIC PERIPHERAL ANGIOPATHY AND GANGRENE, WITH LONG-TERM CURRENT USE OF INSULIN (HCC): ICD-10-CM

## 2020-04-23 DIAGNOSIS — E11.52 TYPE 2 DIABETES MELLITUS WITH DIABETIC PERIPHERAL ANGIOPATHY AND GANGRENE, WITH LONG-TERM CURRENT USE OF INSULIN (HCC): ICD-10-CM

## 2020-04-23 DIAGNOSIS — E11.42 TYPE 2 DIABETES MELLITUS WITH DIABETIC POLYNEUROPATHY, WITH LONG-TERM CURRENT USE OF INSULIN (HCC): ICD-10-CM

## 2020-04-23 DIAGNOSIS — E11.49 OTHER DIABETIC NEUROLOGICAL COMPLICATION ASSOCIATED WITH TYPE 2 DIABETES MELLITUS (HCC): Primary | ICD-10-CM

## 2020-04-28 DIAGNOSIS — N40.1 BENIGN PROSTATIC HYPERPLASIA WITH LOWER URINARY TRACT SYMPTOMS, SYMPTOM DETAILS UNSPECIFIED: Primary | ICD-10-CM

## 2020-04-28 RX ORDER — TAMSULOSIN HYDROCHLORIDE 0.4 MG/1
CAPSULE ORAL
Qty: 90 CAPSULE | Refills: 1 | Status: SHIPPED | OUTPATIENT
Start: 2020-04-28 | End: 2020-06-10 | Stop reason: SDUPTHER

## 2020-05-05 DIAGNOSIS — E11.49 OTHER DIABETIC NEUROLOGICAL COMPLICATION ASSOCIATED WITH TYPE 2 DIABETES MELLITUS (HCC): Primary | ICD-10-CM

## 2020-05-05 RX ORDER — GABAPENTIN 300 MG/1
CAPSULE ORAL
Qty: 180 CAPSULE | Refills: 1 | Status: SHIPPED | OUTPATIENT
Start: 2020-05-05 | End: 2020-11-02

## 2020-05-12 DIAGNOSIS — Z79.4 TYPE 2 DIABETES MELLITUS WITH DIABETIC POLYNEUROPATHY, WITH LONG-TERM CURRENT USE OF INSULIN (HCC): Primary | ICD-10-CM

## 2020-05-12 DIAGNOSIS — E11.42 TYPE 2 DIABETES MELLITUS WITH DIABETIC POLYNEUROPATHY, WITH LONG-TERM CURRENT USE OF INSULIN (HCC): Primary | ICD-10-CM

## 2020-05-12 RX ORDER — ROSUVASTATIN CALCIUM 20 MG/1
TABLET, COATED ORAL
Qty: 45 TABLET | Refills: 1 | Status: SHIPPED | OUTPATIENT
Start: 2020-05-12 | End: 2020-06-10 | Stop reason: SDUPTHER

## 2020-06-04 DIAGNOSIS — E13.51 PERIPHERAL VASCULAR DISEASE DUE TO SECONDARY DIABETES MELLITUS (HCC): Primary | ICD-10-CM

## 2020-06-05 RX ORDER — CITALOPRAM 20 MG/1
TABLET ORAL
Qty: 90 TABLET | Refills: 1 | Status: SHIPPED | OUTPATIENT
Start: 2020-06-05 | End: 2020-06-10 | Stop reason: SDUPTHER

## 2020-06-05 RX ORDER — CLOPIDOGREL BISULFATE 75 MG/1
TABLET ORAL
Qty: 90 TABLET | Refills: 1 | Status: SHIPPED | OUTPATIENT
Start: 2020-06-05 | End: 2020-06-10 | Stop reason: SDUPTHER

## 2020-06-10 ENCOUNTER — TELEMEDICINE (OUTPATIENT)
Dept: CARDIOLOGY CLINIC | Facility: CLINIC | Age: 60
End: 2020-06-10
Payer: COMMERCIAL

## 2020-06-10 VITALS — BODY MASS INDEX: 25.77 KG/M2 | WEIGHT: 180 LBS | HEIGHT: 70 IN

## 2020-06-10 DIAGNOSIS — E13.51 PERIPHERAL VASCULAR DISEASE DUE TO SECONDARY DIABETES MELLITUS (HCC): ICD-10-CM

## 2020-06-10 DIAGNOSIS — I77.810 AORTIC ROOT DILATION (HCC): ICD-10-CM

## 2020-06-10 DIAGNOSIS — I10 ESSENTIAL HYPERTENSION: ICD-10-CM

## 2020-06-10 DIAGNOSIS — F10.10 ALCOHOL ABUSE: ICD-10-CM

## 2020-06-10 DIAGNOSIS — G82.50 QUADRIPLEGIA AND QUADRIPARESIS (HCC): ICD-10-CM

## 2020-06-10 DIAGNOSIS — E11.42 TYPE 2 DIABETES MELLITUS WITH DIABETIC POLYNEUROPATHY, WITH LONG-TERM CURRENT USE OF INSULIN (HCC): ICD-10-CM

## 2020-06-10 DIAGNOSIS — Z79.4 TYPE 2 DIABETES MELLITUS WITH DIABETIC POLYNEUROPATHY, WITH LONG-TERM CURRENT USE OF INSULIN (HCC): ICD-10-CM

## 2020-06-10 DIAGNOSIS — I51.1: Primary | ICD-10-CM

## 2020-06-10 DIAGNOSIS — I34.0 NON-RHEUMATIC MITRAL REGURGITATION: ICD-10-CM

## 2020-06-10 DIAGNOSIS — I50.42 CHRONIC COMBINED SYSTOLIC AND DIASTOLIC CONGESTIVE HEART FAILURE (HCC): ICD-10-CM

## 2020-06-10 PROCEDURE — 99442 PR PHYS/QHP TELEPHONE EVALUATION 11-20 MIN: CPT | Performed by: INTERNAL MEDICINE

## 2020-07-29 DIAGNOSIS — E83.52 HYPERCALCEMIA: Primary | ICD-10-CM

## 2020-07-29 DIAGNOSIS — R33.9 URINARY RETENTION: ICD-10-CM

## 2020-07-29 RX ORDER — ROSUVASTATIN CALCIUM 20 MG/1
20 TABLET, COATED ORAL EVERY OTHER DAY
Qty: 90 TABLET | Refills: 0 | Status: SHIPPED | OUTPATIENT
Start: 2020-07-29 | End: 2020-11-12

## 2020-07-29 RX ORDER — TAMSULOSIN HYDROCHLORIDE 0.4 MG/1
0.4 CAPSULE ORAL
Qty: 90 CAPSULE | Refills: 0 | Status: SHIPPED | OUTPATIENT
Start: 2020-07-29 | End: 2020-08-17 | Stop reason: SDUPTHER

## 2020-08-17 DIAGNOSIS — R33.9 URINARY RETENTION: ICD-10-CM

## 2020-08-17 RX ORDER — TAMSULOSIN HYDROCHLORIDE 0.4 MG/1
0.4 CAPSULE ORAL
Qty: 90 CAPSULE | Refills: 0 | Status: SHIPPED | OUTPATIENT
Start: 2020-08-17 | End: 2020-10-27

## 2020-08-17 RX ORDER — CEPHALEXIN 500 MG/1
500 CAPSULE ORAL 2 TIMES DAILY
COMMUNITY
Start: 2020-05-14 | End: 2020-11-16 | Stop reason: ALTCHOICE

## 2020-09-02 DIAGNOSIS — I50.41 ACUTE COMBINED SYSTOLIC AND DIASTOLIC CONGESTIVE HEART FAILURE (HCC): ICD-10-CM

## 2020-09-02 DIAGNOSIS — Z79.01 LONG TERM (CURRENT) USE OF ANTICOAGULANTS: ICD-10-CM

## 2020-09-04 RX ORDER — CLOPIDOGREL BISULFATE 75 MG/1
75 TABLET ORAL DAILY
Qty: 90 TABLET | Refills: 1 | Status: SHIPPED | OUTPATIENT
Start: 2020-09-04 | End: 2021-01-20 | Stop reason: SDUPTHER

## 2020-09-04 RX ORDER — TORSEMIDE 20 MG/1
20 TABLET ORAL DAILY
Qty: 90 TABLET | Refills: 1 | Status: SHIPPED | OUTPATIENT
Start: 2020-09-04 | End: 2021-01-20 | Stop reason: SDUPTHER

## 2020-10-06 DIAGNOSIS — F32.A DEPRESSION, UNSPECIFIED DEPRESSION TYPE: ICD-10-CM

## 2020-10-07 RX ORDER — CITALOPRAM 20 MG/1
20 TABLET ORAL DAILY
Qty: 30 TABLET | Refills: 0 | Status: SHIPPED | OUTPATIENT
Start: 2020-10-07 | End: 2020-10-12 | Stop reason: SDUPTHER

## 2020-10-11 ENCOUNTER — APPOINTMENT (EMERGENCY)
Dept: RADIOLOGY | Facility: HOSPITAL | Age: 60
End: 2020-10-11
Payer: COMMERCIAL

## 2020-10-11 ENCOUNTER — HOSPITAL ENCOUNTER (EMERGENCY)
Facility: HOSPITAL | Age: 60
Discharge: HOME | End: 2020-10-11
Attending: EMERGENCY MEDICINE
Payer: COMMERCIAL

## 2020-10-11 VITALS
SYSTOLIC BLOOD PRESSURE: 140 MMHG | TEMPERATURE: 98.5 F | HEART RATE: 97 BPM | RESPIRATION RATE: 16 BRPM | OXYGEN SATURATION: 95 % | DIASTOLIC BLOOD PRESSURE: 79 MMHG | BODY MASS INDEX: 25.11 KG/M2 | WEIGHT: 175 LBS

## 2020-10-11 DIAGNOSIS — S42.001A RIGHT CLAVICLE FRACTURE: Primary | ICD-10-CM

## 2020-10-11 DIAGNOSIS — S51.011A ELBOW LACERATION, RIGHT, INITIAL ENCOUNTER: ICD-10-CM
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
## 2020-10-11 LAB
ANION GAP SERPL CALCULATED.3IONS-SCNC: 6 MMOL/L (ref 4–13)
APTT PPP: 35 SECONDS (ref 23–37)
BASOPHILS # BLD AUTO: 0.06 THOUSANDS/ΜL (ref 0–0.1)
BASOPHILS NFR BLD AUTO: 1 % (ref 0–1)
BUN SERPL-MCNC: 14 MG/DL (ref 5–25)
CALCIUM SERPL-MCNC: 8.4 MG/DL (ref 8.3–10.1)
CHLORIDE SERPL-SCNC: 104 MMOL/L (ref 100–108)
CO2 SERPL-SCNC: 28 MMOL/L (ref 21–32)
CREAT SERPL-MCNC: 0.87 MG/DL (ref 0.6–1.3)
EOSINOPHIL # BLD AUTO: 0.05 THOUSAND/ΜL (ref 0–0.61)
EOSINOPHIL NFR BLD AUTO: 1 % (ref 0–6)
ERYTHROCYTE [DISTWIDTH] IN BLOOD BY AUTOMATED COUNT: 12.9 % (ref 11.6–15.1)
GFR SERPL CREATININE-BSD FRML MDRD: 92 ML/MIN/1.73SQ M
GLUCOSE SERPL-MCNC: 255 MG/DL (ref 65–140)
HCT VFR BLD AUTO: 40.6 % (ref 36.5–49.3)
HGB BLD-MCNC: 14.1 G/DL (ref 12–17)
IMM GRANULOCYTES # BLD AUTO: 0.04 THOUSAND/UL (ref 0–0.2)
IMM GRANULOCYTES NFR BLD AUTO: 0 % (ref 0–2)
INR PPP: 0.91 (ref 0.84–1.19)
LYMPHOCYTES # BLD AUTO: 1.73 THOUSANDS/ΜL (ref 0.6–4.47)
LYMPHOCYTES NFR BLD AUTO: 18 % (ref 14–44)
MCH RBC QN AUTO: 35.5 PG (ref 26.8–34.3)
MCHC RBC AUTO-ENTMCNC: 34.7 G/DL (ref 31.4–37.4)
MCV RBC AUTO: 102 FL (ref 82–98)
MONOCYTES # BLD AUTO: 1.18 THOUSAND/ΜL (ref 0.17–1.22)
MONOCYTES NFR BLD AUTO: 12 % (ref 4–12)
NEUTROPHILS # BLD AUTO: 6.51 THOUSANDS/ΜL (ref 1.85–7.62)
NEUTS SEG NFR BLD AUTO: 68 % (ref 43–75)
NRBC BLD AUTO-RTO: 0 /100 WBCS
PLATELET # BLD AUTO: 213 THOUSANDS/UL (ref 149–390)
PMV BLD AUTO: 9.9 FL (ref 8.9–12.7)
POTASSIUM SERPL-SCNC: 3.7 MMOL/L (ref 3.5–5.3)
PROTHROMBIN TIME: 12.3 SECONDS (ref 11.6–14.5)
RBC # BLD AUTO: 3.97 MILLION/UL (ref 3.88–5.62)
SODIUM SERPL-SCNC: 138 MMOL/L (ref 136–145)
WBC # BLD AUTO: 9.57 THOUSAND/UL (ref 4.31–10.16)

## 2020-10-11 PROCEDURE — 71045 X-RAY EXAM CHEST 1 VIEW: CPT

## 2020-10-11 PROCEDURE — 85025 COMPLETE CBC W/AUTO DIFF WBC: CPT | Performed by: EMERGENCY MEDICINE

## 2020-10-11 PROCEDURE — 73030 X-RAY EXAM OF SHOULDER: CPT

## 2020-10-11 PROCEDURE — 85610 PROTHROMBIN TIME: CPT | Performed by: EMERGENCY MEDICINE

## 2020-10-11 PROCEDURE — 80048 BASIC METABOLIC PNL TOTAL CA: CPT | Performed by: EMERGENCY MEDICINE

## 2020-10-11 PROCEDURE — 70450 CT HEAD/BRAIN W/O DYE: CPT

## 2020-10-11 PROCEDURE — 36415 COLL VENOUS BLD VENIPUNCTURE: CPT | Performed by: EMERGENCY MEDICINE

## 2020-10-11 PROCEDURE — 73000 X-RAY EXAM OF COLLAR BONE: CPT

## 2020-10-11 PROCEDURE — G1004 CDSM NDSC: HCPCS

## 2020-10-11 PROCEDURE — 85730 THROMBOPLASTIN TIME PARTIAL: CPT | Performed by: EMERGENCY MEDICINE

## 2020-10-11 PROCEDURE — NC001 PR NO CHARGE: Performed by: ORTHOPAEDIC SURGERY

## 2020-10-11 PROCEDURE — 99285 EMERGENCY DEPT VISIT HI MDM: CPT | Performed by: EMERGENCY MEDICINE

## 2020-10-11 PROCEDURE — 73080 X-RAY EXAM OF ELBOW: CPT

## 2020-10-11 PROCEDURE — 99284 EMERGENCY DEPT VISIT MOD MDM: CPT

## 2020-10-11 RX ORDER — LIDOCAINE HYDROCHLORIDE AND EPINEPHRINE 10; 10 MG/ML; UG/ML
5 INJECTION, SOLUTION INFILTRATION; PERINEURAL ONCE
Status: DISCONTINUED | OUTPATIENT
Start: 2020-10-11 | End: 2020-10-11 | Stop reason: HOSPADM

## 2020-10-11 RX ORDER — OXYCODONE HYDROCHLORIDE AND ACETAMINOPHEN 5; 325 MG/1; MG/1
1 TABLET ORAL EVERY 8 HOURS PRN
Qty: 6 TABLET | Refills: 0 | Status: SHIPPED | OUTPATIENT
Start: 2020-10-11 | End: 2020-11-16 | Stop reason: ALTCHOICE

## 2020-10-11 RX ORDER — LIDOCAINE HYDROCHLORIDE 10 MG/ML
20 INJECTION, SOLUTION EPIDURAL; INFILTRATION; INTRACAUDAL; PERINEURAL ONCE
Status: DISCONTINUED | OUTPATIENT
Start: 2020-10-11 | End: 2020-10-11 | Stop reason: HOSPADM

## 2020-10-11 RX ORDER — OXYCODONE HYDROCHLORIDE AND ACETAMINOPHEN 5; 325 MG/1; MG/1
1 TABLET ORAL ONCE
Status: COMPLETED | OUTPATIENT
Start: 2020-10-11 | End: 2020-10-11

## 2020-10-11 RX ORDER — LIDOCAINE HYDROCHLORIDE 10 MG/ML
INJECTION, SOLUTION EPIDURAL; INFILTRATION; INTRACAUDAL; PERINEURAL
Status: DISCONTINUED
Start: 2020-10-11 | End: 2020-10-11 | Stop reason: HOSPADM

## 2020-10-11 RX ADMIN — OXYCODONE HYDROCHLORIDE AND ACETAMINOPHEN 1 TABLET: 5; 325 TABLET ORAL at 17:32

## 2020-10-12 ENCOUNTER — TELEPHONE (OUTPATIENT)
Dept: FAMILY MEDICINE CLINIC | Facility: CLINIC | Age: 60
End: 2020-10-12

## 2020-10-12 DIAGNOSIS — F32.A DEPRESSION, UNSPECIFIED DEPRESSION TYPE: ICD-10-CM

## 2020-10-12 RX ORDER — CITALOPRAM 20 MG/1
20 TABLET ORAL DAILY
Qty: 90 TABLET | Refills: 1 | Status: SHIPPED | OUTPATIENT
Start: 2020-10-12 | End: 2021-01-20 | Stop reason: SDUPTHER

## 2020-10-16 ENCOUNTER — TELEPHONE (OUTPATIENT)
Dept: OBGYN CLINIC | Facility: HOSPITAL | Age: 60
End: 2020-10-16

## 2020-10-27 ENCOUNTER — OFFICE VISIT (OUTPATIENT)
Dept: OBGYN CLINIC | Facility: HOSPITAL | Age: 60
End: 2020-10-27
Payer: COMMERCIAL

## 2020-10-27 ENCOUNTER — HOSPITAL ENCOUNTER (OUTPATIENT)
Dept: RADIOLOGY | Facility: HOSPITAL | Age: 60
Discharge: HOME | End: 2020-10-27
Attending: ORTHOPAEDIC SURGERY
Payer: COMMERCIAL

## 2020-10-27 VITALS — SYSTOLIC BLOOD PRESSURE: 142 MMHG | DIASTOLIC BLOOD PRESSURE: 81 MMHG | HEART RATE: 93 BPM

## 2020-10-27 DIAGNOSIS — T14.8XXA FRACTURE: ICD-10-CM

## 2020-10-27 DIAGNOSIS — S42.034A CLOSED NONDISPLACED FRACTURE OF ACROMIAL END OF RIGHT CLAVICLE, INITIAL ENCOUNTER: Primary | ICD-10-CM

## 2020-10-27 PROCEDURE — 73000 X-RAY EXAM OF COLLAR BONE: CPT

## 2020-10-27 PROCEDURE — 99203 OFFICE O/P NEW LOW 30 MIN: CPT | Performed by: ORTHOPAEDIC SURGERY

## 2020-10-27 RX ORDER — LIDOCAINE 50 MG/G
1 PATCH TOPICAL DAILY
Qty: 60 PATCH | Refills: 0 | Status: SHIPPED | OUTPATIENT
Start: 2020-10-27 | End: 2020-11-16 | Stop reason: ALTCHOICE

## 2020-10-27 RX ORDER — TRAMADOL HYDROCHLORIDE 50 MG/1
50 TABLET ORAL EVERY 6 HOURS PRN
Qty: 60 TABLET | Refills: 0 | Status: SHIPPED | OUTPATIENT
Start: 2020-10-27 | End: 2022-04-27

## 2020-10-31 DIAGNOSIS — E11.49 OTHER DIABETIC NEUROLOGICAL COMPLICATION ASSOCIATED WITH TYPE 2 DIABETES MELLITUS (HCC): ICD-10-CM

## 2020-11-02 RX ORDER — GABAPENTIN 300 MG/1
CAPSULE ORAL
Qty: 180 CAPSULE | Refills: 0 | Status: SHIPPED | OUTPATIENT
Start: 2020-11-02 | End: 2021-01-20 | Stop reason: SDUPTHER

## 2020-11-03 ENCOUNTER — TELEPHONE (OUTPATIENT)
Dept: FAMILY MEDICINE CLINIC | Facility: CLINIC | Age: 60
End: 2020-11-03

## 2020-11-04 DIAGNOSIS — S42.034A CLOSED NONDISPLACED FRACTURE OF ACROMIAL END OF RIGHT CLAVICLE, INITIAL ENCOUNTER: ICD-10-CM

## 2020-11-04 DIAGNOSIS — N40.0 BENIGN PROSTATIC HYPERPLASIA WITHOUT LOWER URINARY TRACT SYMPTOMS: Primary | ICD-10-CM

## 2020-11-04 RX ORDER — TAMSULOSIN HYDROCHLORIDE 0.4 MG/1
0.4 CAPSULE ORAL
Qty: 90 CAPSULE | Refills: 1 | Status: SHIPPED | OUTPATIENT
Start: 2020-11-04 | End: 2021-01-20 | Stop reason: SDUPTHER

## 2020-11-11 DIAGNOSIS — E83.52 HYPERCALCEMIA: ICD-10-CM

## 2020-11-12 RX ORDER — ROSUVASTATIN CALCIUM 20 MG/1
TABLET, COATED ORAL
Qty: 45 TABLET | Refills: 1 | Status: SHIPPED | OUTPATIENT
Start: 2020-11-12 | End: 2021-01-20 | Stop reason: SDUPTHER

## 2020-11-16 ENCOUNTER — OFFICE VISIT (OUTPATIENT)
Dept: FAMILY MEDICINE CLINIC | Facility: CLINIC | Age: 60
End: 2020-11-16
Payer: COMMERCIAL

## 2020-11-16 ENCOUNTER — APPOINTMENT (OUTPATIENT)
Dept: RADIOLOGY | Facility: CLINIC | Age: 60
End: 2020-11-16
Payer: COMMERCIAL

## 2020-11-16 VITALS
OXYGEN SATURATION: 97 % | TEMPERATURE: 98.2 F | HEIGHT: 70 IN | BODY MASS INDEX: 23.91 KG/M2 | SYSTOLIC BLOOD PRESSURE: 140 MMHG | DIASTOLIC BLOOD PRESSURE: 80 MMHG | WEIGHT: 167 LBS | HEART RATE: 100 BPM | RESPIRATION RATE: 16 BRPM

## 2020-11-16 DIAGNOSIS — I73.9 PERIPHERAL ARTERIAL DISEASE (HCC): Primary | ICD-10-CM

## 2020-11-16 DIAGNOSIS — E11.49 OTHER DIABETIC NEUROLOGICAL COMPLICATION ASSOCIATED WITH TYPE 2 DIABETES MELLITUS (HCC): ICD-10-CM

## 2020-11-16 DIAGNOSIS — R06.2 WHEEZE: ICD-10-CM

## 2020-11-16 DIAGNOSIS — G95.9 CERVICAL MYELOPATHY (HCC): ICD-10-CM

## 2020-11-16 DIAGNOSIS — S42.034A CLOSED NONDISPLACED FRACTURE OF ACROMIAL END OF RIGHT CLAVICLE, INITIAL ENCOUNTER: ICD-10-CM

## 2020-11-16 DIAGNOSIS — F17.200 TOBACCO USE DISORDER: ICD-10-CM

## 2020-11-16 PROCEDURE — 3077F SYST BP >= 140 MM HG: CPT | Performed by: FAMILY MEDICINE

## 2020-11-16 PROCEDURE — 71046 X-RAY EXAM CHEST 2 VIEWS: CPT

## 2020-11-16 PROCEDURE — 99214 OFFICE O/P EST MOD 30 MIN: CPT | Performed by: FAMILY MEDICINE

## 2020-11-16 PROCEDURE — 3725F SCREEN DEPRESSION PERFORMED: CPT | Performed by: FAMILY MEDICINE

## 2020-11-16 PROCEDURE — 3079F DIAST BP 80-89 MM HG: CPT | Performed by: FAMILY MEDICINE

## 2020-11-16 PROCEDURE — 3008F BODY MASS INDEX DOCD: CPT | Performed by: ORTHOPAEDIC SURGERY

## 2020-11-16 RX ORDER — UMECLIDINIUM BROMIDE AND VILANTEROL TRIFENATATE 62.5; 25 UG/1; UG/1
1 POWDER RESPIRATORY (INHALATION) DAILY
Qty: 3 INHALER | Refills: 3 | Status: SHIPPED | OUTPATIENT
Start: 2020-11-16 | End: 2021-07-23

## 2020-11-17 ENCOUNTER — OFFICE VISIT (OUTPATIENT)
Dept: OBGYN CLINIC | Facility: HOSPITAL | Age: 60
End: 2020-11-17
Payer: COMMERCIAL

## 2020-11-17 ENCOUNTER — HOSPITAL ENCOUNTER (OUTPATIENT)
Dept: RADIOLOGY | Facility: HOSPITAL | Age: 60
Discharge: HOME | End: 2020-11-17
Attending: ORTHOPAEDIC SURGERY
Payer: COMMERCIAL

## 2020-11-17 VITALS — SYSTOLIC BLOOD PRESSURE: 161 MMHG | HEART RATE: 103 BPM | DIASTOLIC BLOOD PRESSURE: 91 MMHG

## 2020-11-17 DIAGNOSIS — T14.8XXA FRACTURE: ICD-10-CM

## 2020-11-17 DIAGNOSIS — S42.034D CLOSED NONDISPLACED FRACTURE OF ACROMIAL END OF RIGHT CLAVICLE WITH ROUTINE HEALING, SUBSEQUENT ENCOUNTER: Primary | ICD-10-CM

## 2020-11-17 PROCEDURE — 73000 X-RAY EXAM OF COLLAR BONE: CPT

## 2020-11-17 PROCEDURE — 4004F PT TOBACCO SCREEN RCVD TLK: CPT | Performed by: ORTHOPAEDIC SURGERY

## 2020-11-17 PROCEDURE — 99213 OFFICE O/P EST LOW 20 MIN: CPT | Performed by: ORTHOPAEDIC SURGERY

## 2020-11-18 ENCOUNTER — TELEPHONE (OUTPATIENT)
Dept: FAMILY MEDICINE CLINIC | Facility: CLINIC | Age: 60
End: 2020-11-18

## 2020-11-19 DIAGNOSIS — J44.9 CHRONIC OBSTRUCTIVE PULMONARY DISEASE, UNSPECIFIED COPD TYPE (HCC): Primary | ICD-10-CM

## 2020-11-19 RX ORDER — TIOTROPIUM BROMIDE AND OLODATEROL 3.124; 2.736 UG/1; UG/1
2 SPRAY, METERED RESPIRATORY (INHALATION) DAILY
Qty: 1 INHALER | Refills: 0 | Status: SHIPPED | OUTPATIENT
Start: 2020-11-19

## 2020-12-01 ENCOUNTER — TELEPHONE (OUTPATIENT)
Dept: FAMILY MEDICINE CLINIC | Facility: CLINIC | Age: 60
End: 2020-12-01

## 2020-12-02 ENCOUNTER — TELEPHONE (OUTPATIENT)
Dept: LAB | Facility: HOSPITAL | Age: 60
End: 2020-12-02

## 2020-12-02 ENCOUNTER — TELEPHONE (OUTPATIENT)
Dept: FAMILY MEDICINE CLINIC | Facility: CLINIC | Age: 60
End: 2020-12-02

## 2020-12-02 DIAGNOSIS — I73.9 PERIPHERAL ARTERIAL DISEASE (HCC): ICD-10-CM

## 2020-12-02 DIAGNOSIS — I10 ESSENTIAL HYPERTENSION: ICD-10-CM

## 2020-12-02 DIAGNOSIS — Z79.4 TYPE 2 DIABETES MELLITUS WITH DIABETIC POLYNEUROPATHY, WITH LONG-TERM CURRENT USE OF INSULIN (HCC): Primary | ICD-10-CM

## 2020-12-02 DIAGNOSIS — E53.8 B12 DEFICIENCY: ICD-10-CM

## 2020-12-02 DIAGNOSIS — Z11.59 NEED FOR HEPATITIS C SCREENING TEST: ICD-10-CM

## 2020-12-02 DIAGNOSIS — E61.1 IRON DEFICIENCY: ICD-10-CM

## 2020-12-02 DIAGNOSIS — E55.9 VITAMIN D DEFICIENCY: ICD-10-CM

## 2020-12-02 DIAGNOSIS — E11.42 TYPE 2 DIABETES MELLITUS WITH DIABETIC POLYNEUROPATHY, WITH LONG-TERM CURRENT USE OF INSULIN (HCC): Primary | ICD-10-CM

## 2020-12-02 DIAGNOSIS — Z12.5 SCREENING FOR PROSTATE CANCER: ICD-10-CM

## 2020-12-02 RX ORDER — (INSULIN DEGLUDEC AND LIRAGLUTIDE) 100; 3.6 [IU]/ML; MG/ML
INJECTION, SOLUTION SUBCUTANEOUS
Qty: 15 ML | Refills: 0 | Status: SHIPPED | OUTPATIENT
Start: 2020-12-02 | End: 2021-06-24 | Stop reason: SDUPTHER

## 2020-12-02 RX ORDER — INSULIN ASPART 100 [IU]/ML
INJECTION, SOLUTION INTRAVENOUS; SUBCUTANEOUS
Qty: 15 ML | Refills: 0 | Status: SHIPPED | OUTPATIENT
Start: 2020-12-02 | End: 2021-07-23

## 2020-12-03 ENCOUNTER — TRANSCRIBE ORDERS (OUTPATIENT)
Dept: LAB | Facility: HOSPITAL | Age: 60
End: 2020-12-03

## 2020-12-11 DIAGNOSIS — E11.49 OTHER DIABETIC NEUROLOGICAL COMPLICATION ASSOCIATED WITH TYPE 2 DIABETES MELLITUS (HCC): Primary | ICD-10-CM

## 2020-12-11 RX ORDER — BACLOFEN 20 MG/1
TABLET ORAL
Qty: 270 TABLET | Refills: 1 | Status: SHIPPED | OUTPATIENT
Start: 2020-12-11 | End: 2021-01-20 | Stop reason: SDUPTHER

## 2020-12-16 ENCOUNTER — LAB (OUTPATIENT)
Dept: LAB | Facility: HOSPITAL | Age: 60
End: 2020-12-16
Attending: INTERNAL MEDICINE
Payer: COMMERCIAL

## 2020-12-16 DIAGNOSIS — I10 ESSENTIAL HYPERTENSION: ICD-10-CM

## 2020-12-16 DIAGNOSIS — E61.1 IRON DEFICIENCY: ICD-10-CM

## 2020-12-16 DIAGNOSIS — Z11.59 NEED FOR HEPATITIS C SCREENING TEST: ICD-10-CM

## 2020-12-16 DIAGNOSIS — E53.8 B12 DEFICIENCY: ICD-10-CM

## 2020-12-16 DIAGNOSIS — Z12.5 SCREENING FOR PROSTATE CANCER: ICD-10-CM

## 2020-12-16 DIAGNOSIS — Z79.4 TYPE 2 DIABETES MELLITUS WITH DIABETIC POLYNEUROPATHY, WITH LONG-TERM CURRENT USE OF INSULIN (HCC): ICD-10-CM

## 2020-12-16 DIAGNOSIS — I50.42 CHRONIC COMBINED SYSTOLIC AND DIASTOLIC CONGESTIVE HEART FAILURE (HCC): ICD-10-CM

## 2020-12-16 DIAGNOSIS — E11.42 TYPE 2 DIABETES MELLITUS WITH DIABETIC POLYNEUROPATHY, WITH LONG-TERM CURRENT USE OF INSULIN (HCC): ICD-10-CM

## 2020-12-16 DIAGNOSIS — E55.9 VITAMIN D DEFICIENCY: ICD-10-CM

## 2020-12-16 LAB
25(OH)D3 SERPL-MCNC: 18.4 NG/ML (ref 30–100)
ALBUMIN SERPL BCP-MCNC: 3.9 G/DL (ref 3.5–5)
ALP SERPL-CCNC: 46 U/L (ref 46–116)
ALT SERPL W P-5'-P-CCNC: 61 U/L (ref 12–78)
ANION GAP SERPL CALCULATED.3IONS-SCNC: 9 MMOL/L (ref 4–13)
AST SERPL W P-5'-P-CCNC: 50 U/L (ref 5–45)
BASOPHILS # BLD AUTO: 0.08 THOUSANDS/ΜL (ref 0–0.1)
BASOPHILS NFR BLD AUTO: 1 % (ref 0–1)
BILIRUB SERPL-MCNC: 0.41 MG/DL (ref 0.2–1)
BUN SERPL-MCNC: 16 MG/DL (ref 5–25)
CALCIUM SERPL-MCNC: 9.6 MG/DL (ref 8.3–10.1)
CHLORIDE SERPL-SCNC: 102 MMOL/L (ref 100–108)
CHOLEST SERPL-MCNC: 131 MG/DL (ref 50–200)
CO2 SERPL-SCNC: 27 MMOL/L (ref 21–32)
CREAT SERPL-MCNC: 0.85 MG/DL (ref 0.6–1.3)
EOSINOPHIL # BLD AUTO: 0.16 THOUSAND/ΜL (ref 0–0.61)
EOSINOPHIL NFR BLD AUTO: 2 % (ref 0–6)
ERYTHROCYTE [DISTWIDTH] IN BLOOD BY AUTOMATED COUNT: 13.2 % (ref 11.6–15.1)
EST. AVERAGE GLUCOSE BLD GHB EST-MCNC: 217 MG/DL
FERRITIN SERPL-MCNC: 175 NG/ML (ref 8–388)
GFR SERPL CREATININE-BSD FRML MDRD: 93 ML/MIN/1.73SQ M
GLUCOSE SERPL-MCNC: 229 MG/DL (ref 65–140)
HBA1C MFR BLD: 9.2 %
HCT VFR BLD AUTO: 45.3 % (ref 36.5–49.3)
HCV AB SER QL: NORMAL
HDLC SERPL-MCNC: 82 MG/DL
HGB BLD-MCNC: 14.6 G/DL (ref 12–17)
IMM GRANULOCYTES # BLD AUTO: 0.04 THOUSAND/UL (ref 0–0.2)
IMM GRANULOCYTES NFR BLD AUTO: 1 % (ref 0–2)
IRON SATN MFR SERPL: 25 %
IRON SERPL-MCNC: 104 UG/DL (ref 65–175)
LDLC SERPL CALC-MCNC: 31 MG/DL (ref 0–100)
LYMPHOCYTES # BLD AUTO: 1.91 THOUSANDS/ΜL (ref 0.6–4.47)
LYMPHOCYTES NFR BLD AUTO: 26 % (ref 14–44)
MCH RBC QN AUTO: 34.4 PG (ref 26.8–34.3)
MCHC RBC AUTO-ENTMCNC: 32.2 G/DL (ref 31.4–37.4)
MCV RBC AUTO: 107 FL (ref 82–98)
MONOCYTES # BLD AUTO: 0.76 THOUSAND/ΜL (ref 0.17–1.22)
MONOCYTES NFR BLD AUTO: 10 % (ref 4–12)
NEUTROPHILS # BLD AUTO: 4.47 THOUSANDS/ΜL (ref 1.85–7.62)
NEUTS SEG NFR BLD AUTO: 60 % (ref 43–75)
NONHDLC SERPL-MCNC: 49 MG/DL
NRBC BLD AUTO-RTO: 0 /100 WBCS
PLATELET # BLD AUTO: 170 THOUSANDS/UL (ref 149–390)
PMV BLD AUTO: 10.7 FL (ref 8.9–12.7)
POTASSIUM SERPL-SCNC: 3.6 MMOL/L (ref 3.5–5.3)
PROT SERPL-MCNC: 7.4 G/DL (ref 6.4–8.2)
PSA SERPL-MCNC: 2.6 NG/ML (ref 0–4)
RBC # BLD AUTO: 4.24 MILLION/UL (ref 3.88–5.62)
SODIUM SERPL-SCNC: 138 MMOL/L (ref 136–145)
TIBC SERPL-MCNC: 415 UG/DL (ref 250–450)
TRIGL SERPL-MCNC: 90 MG/DL
TSH SERPL DL<=0.05 MIU/L-ACNC: 1.61 UIU/ML (ref 0.36–3.74)
VIT B12 SERPL-MCNC: 340 PG/ML (ref 100–900)
WBC # BLD AUTO: 7.42 THOUSAND/UL (ref 4.31–10.16)

## 2020-12-16 PROCEDURE — 80053 COMPREHEN METABOLIC PANEL: CPT

## 2020-12-16 PROCEDURE — 83036 HEMOGLOBIN GLYCOSYLATED A1C: CPT

## 2020-12-16 PROCEDURE — 3046F HEMOGLOBIN A1C LEVEL >9.0%: CPT | Performed by: ORTHOPAEDIC SURGERY

## 2020-12-16 PROCEDURE — 85025 COMPLETE CBC W/AUTO DIFF WBC: CPT

## 2020-12-16 PROCEDURE — 80061 LIPID PANEL: CPT

## 2020-12-16 PROCEDURE — 83550 IRON BINDING TEST: CPT

## 2020-12-16 PROCEDURE — 83540 ASSAY OF IRON: CPT

## 2020-12-16 PROCEDURE — 82728 ASSAY OF FERRITIN: CPT

## 2020-12-16 PROCEDURE — 82607 VITAMIN B-12: CPT

## 2020-12-16 PROCEDURE — 36415 COLL VENOUS BLD VENIPUNCTURE: CPT

## 2020-12-16 PROCEDURE — 84443 ASSAY THYROID STIM HORMONE: CPT

## 2020-12-16 PROCEDURE — G0103 PSA SCREENING: HCPCS

## 2020-12-16 PROCEDURE — 86803 HEPATITIS C AB TEST: CPT

## 2020-12-16 PROCEDURE — 82306 VITAMIN D 25 HYDROXY: CPT

## 2020-12-19 DIAGNOSIS — E55.9 VITAMIN D DEFICIENCY: Primary | ICD-10-CM

## 2020-12-19 RX ORDER — ERGOCALCIFEROL 1.25 MG/1
50000 CAPSULE ORAL WEEKLY
Qty: 12 CAPSULE | Refills: 0 | Status: SHIPPED | OUTPATIENT
Start: 2020-12-19

## 2021-01-19 ENCOUNTER — TELEPHONE (OUTPATIENT)
Dept: FAMILY MEDICINE CLINIC | Facility: CLINIC | Age: 61
End: 2021-01-19

## 2021-01-19 NOTE — TELEPHONE ENCOUNTER
Patients wife called for refills on all patients mediations as he has new insurance  I called patients wife to clarify what mail order pharmacy patient is to use  Patients wife was unsure and advised she would contact the insurance company and verify  Also will be facing an updated medication list to me tomorrow

## 2021-01-20 ENCOUNTER — HOSPITAL ENCOUNTER (EMERGENCY)
Facility: HOSPITAL | Age: 61
Discharge: HOME | End: 2021-01-20
Attending: EMERGENCY MEDICINE | Admitting: EMERGENCY MEDICINE
Payer: COMMERCIAL

## 2021-01-20 ENCOUNTER — APPOINTMENT (EMERGENCY)
Dept: RADIOLOGY | Facility: HOSPITAL | Age: 61
End: 2021-01-20
Payer: COMMERCIAL

## 2021-01-20 ENCOUNTER — TELEPHONE (OUTPATIENT)
Dept: FAMILY MEDICINE CLINIC | Facility: CLINIC | Age: 61
End: 2021-01-20

## 2021-01-20 VITALS
SYSTOLIC BLOOD PRESSURE: 127 MMHG | HEART RATE: 86 BPM | HEIGHT: 70 IN | RESPIRATION RATE: 16 BRPM | BODY MASS INDEX: 23.91 KG/M2 | TEMPERATURE: 97.8 F | DIASTOLIC BLOOD PRESSURE: 80 MMHG | WEIGHT: 167 LBS | OXYGEN SATURATION: 95 %

## 2021-01-20 DIAGNOSIS — E83.52 HYPERCALCEMIA: ICD-10-CM

## 2021-01-20 DIAGNOSIS — F32.A DEPRESSION, UNSPECIFIED DEPRESSION TYPE: ICD-10-CM

## 2021-01-20 DIAGNOSIS — R60.0 LOWER EXTREMITY EDEMA: ICD-10-CM

## 2021-01-20 DIAGNOSIS — N40.0 BENIGN PROSTATIC HYPERPLASIA WITHOUT LOWER URINARY TRACT SYMPTOMS: ICD-10-CM

## 2021-01-20 DIAGNOSIS — E11.49 OTHER DIABETIC NEUROLOGICAL COMPLICATION ASSOCIATED WITH TYPE 2 DIABETES MELLITUS (HCC): ICD-10-CM

## 2021-01-20 DIAGNOSIS — Z79.01 LONG TERM (CURRENT) USE OF ANTICOAGULANTS: ICD-10-CM

## 2021-01-20 DIAGNOSIS — I87.8 VENOUS STASIS: Primary | ICD-10-CM

## 2021-01-20 DIAGNOSIS — I50.41 ACUTE COMBINED SYSTOLIC AND DIASTOLIC CONGESTIVE HEART FAILURE (HCC): ICD-10-CM

## 2021-01-20 DIAGNOSIS — L03.119 CELLULITIS OF LOWER EXTREMITY, UNSPECIFIED LATERALITY: Primary | ICD-10-CM

## 2021-01-20 LAB
ALBUMIN SERPL BCP-MCNC: 3.6 G/DL (ref 3.5–5)
ALP SERPL-CCNC: 52 U/L (ref 46–116)
ALT SERPL W P-5'-P-CCNC: 46 U/L (ref 12–78)
ANION GAP SERPL CALCULATED.3IONS-SCNC: 10 MMOL/L (ref 4–13)
AST SERPL W P-5'-P-CCNC: 69 U/L (ref 5–45)
BASOPHILS # BLD AUTO: 0.08 THOUSANDS/ΜL (ref 0–0.1)
BASOPHILS NFR BLD AUTO: 1 % (ref 0–1)
BILIRUB SERPL-MCNC: 0.58 MG/DL (ref 0.2–1)
BUN SERPL-MCNC: 13 MG/DL (ref 5–25)
CALCIUM SERPL-MCNC: 9 MG/DL (ref 8.3–10.1)
CHLORIDE SERPL-SCNC: 97 MMOL/L (ref 100–108)
CO2 SERPL-SCNC: 29 MMOL/L (ref 21–32)
CREAT SERPL-MCNC: 1.03 MG/DL (ref 0.6–1.3)
D DIMER PPP FEU-MCNC: 0.34 UG/ML FEU
EOSINOPHIL # BLD AUTO: 0.1 THOUSAND/ΜL (ref 0–0.61)
EOSINOPHIL NFR BLD AUTO: 2 % (ref 0–6)
ERYTHROCYTE [DISTWIDTH] IN BLOOD BY AUTOMATED COUNT: 14 % (ref 11.6–15.1)
GFR SERPL CREATININE-BSD FRML MDRD: 77 ML/MIN/1.73SQ M
GLUCOSE SERPL-MCNC: 281 MG/DL (ref 65–140)
HCT VFR BLD AUTO: 42.8 % (ref 36.5–49.3)
HGB BLD-MCNC: 14.8 G/DL (ref 12–17)
IMM GRANULOCYTES # BLD AUTO: 0.02 THOUSAND/UL (ref 0–0.2)
IMM GRANULOCYTES NFR BLD AUTO: 0 % (ref 0–2)
LYMPHOCYTES # BLD AUTO: 1.79 THOUSANDS/ΜL (ref 0.6–4.47)
LYMPHOCYTES NFR BLD AUTO: 28 % (ref 14–44)
MCH RBC QN AUTO: 34.9 PG (ref 26.8–34.3)
MCHC RBC AUTO-ENTMCNC: 34.6 G/DL (ref 31.4–37.4)
MCV RBC AUTO: 101 FL (ref 82–98)
MONOCYTES # BLD AUTO: 0.6 THOUSAND/ΜL (ref 0.17–1.22)
MONOCYTES NFR BLD AUTO: 9 % (ref 4–12)
NEUTROPHILS # BLD AUTO: 3.91 THOUSANDS/ΜL (ref 1.85–7.62)
NEUTS SEG NFR BLD AUTO: 60 % (ref 43–75)
NRBC BLD AUTO-RTO: 0 /100 WBCS
NT-PROBNP SERPL-MCNC: 313 PG/ML
PLATELET # BLD AUTO: 215 THOUSANDS/UL (ref 149–390)
PMV BLD AUTO: 10.2 FL (ref 8.9–12.7)
POTASSIUM SERPL-SCNC: 4.9 MMOL/L (ref 3.5–5.3)
PROT SERPL-MCNC: 7.8 G/DL (ref 6.4–8.2)
RBC # BLD AUTO: 4.24 MILLION/UL (ref 3.88–5.62)
SODIUM SERPL-SCNC: 136 MMOL/L (ref 136–145)
WBC # BLD AUTO: 6.5 THOUSAND/UL (ref 4.31–10.16)

## 2021-01-20 PROCEDURE — 99285 EMERGENCY DEPT VISIT HI MDM: CPT | Performed by: EMERGENCY MEDICINE

## 2021-01-20 PROCEDURE — 85025 COMPLETE CBC W/AUTO DIFF WBC: CPT | Performed by: STUDENT IN AN ORGANIZED HEALTH CARE EDUCATION/TRAINING PROGRAM

## 2021-01-20 PROCEDURE — 83880 ASSAY OF NATRIURETIC PEPTIDE: CPT | Performed by: STUDENT IN AN ORGANIZED HEALTH CARE EDUCATION/TRAINING PROGRAM

## 2021-01-20 PROCEDURE — 85379 FIBRIN DEGRADATION QUANT: CPT | Performed by: STUDENT IN AN ORGANIZED HEALTH CARE EDUCATION/TRAINING PROGRAM

## 2021-01-20 PROCEDURE — 93005 ELECTROCARDIOGRAM TRACING: CPT

## 2021-01-20 PROCEDURE — 36415 COLL VENOUS BLD VENIPUNCTURE: CPT | Performed by: STUDENT IN AN ORGANIZED HEALTH CARE EDUCATION/TRAINING PROGRAM

## 2021-01-20 PROCEDURE — 99284 EMERGENCY DEPT VISIT MOD MDM: CPT

## 2021-01-20 PROCEDURE — 71045 X-RAY EXAM CHEST 1 VIEW: CPT

## 2021-01-20 PROCEDURE — 80053 COMPREHEN METABOLIC PANEL: CPT | Performed by: STUDENT IN AN ORGANIZED HEALTH CARE EDUCATION/TRAINING PROGRAM

## 2021-01-20 RX ORDER — GABAPENTIN 300 MG/1
300 CAPSULE ORAL 2 TIMES DAILY
Qty: 180 CAPSULE | Refills: 1 | Status: SHIPPED | OUTPATIENT
Start: 2021-01-20 | End: 2021-05-17

## 2021-01-20 RX ORDER — CLINDAMYCIN HYDROCHLORIDE 150 MG/1
150 CAPSULE ORAL EVERY 8 HOURS SCHEDULED
Qty: 30 CAPSULE | Refills: 0 | Status: SHIPPED | OUTPATIENT
Start: 2021-01-20 | End: 2021-01-30

## 2021-01-20 RX ORDER — TORSEMIDE 20 MG/1
20 TABLET ORAL DAILY
Qty: 90 TABLET | Refills: 1 | Status: SHIPPED | OUTPATIENT
Start: 2021-01-20 | End: 2021-05-17

## 2021-01-20 RX ORDER — SODIUM CHLORIDE 9 MG/ML
3 INJECTION INTRAVENOUS
Status: DISCONTINUED | OUTPATIENT
Start: 2021-01-20 | End: 2021-01-20 | Stop reason: HOSPADM

## 2021-01-20 RX ORDER — CLOPIDOGREL BISULFATE 75 MG/1
75 TABLET ORAL DAILY
Qty: 90 TABLET | Refills: 1 | Status: SHIPPED | OUTPATIENT
Start: 2021-01-20 | End: 2021-03-08 | Stop reason: SDUPTHER

## 2021-01-20 RX ORDER — TAMSULOSIN HYDROCHLORIDE 0.4 MG/1
0.4 CAPSULE ORAL
Qty: 90 CAPSULE | Refills: 1 | Status: SHIPPED | OUTPATIENT
Start: 2021-01-20 | End: 2021-05-17

## 2021-01-20 RX ORDER — BACLOFEN 20 MG/1
20 TABLET ORAL 3 TIMES DAILY
Qty: 270 TABLET | Refills: 1 | Status: SHIPPED | OUTPATIENT
Start: 2021-01-20 | End: 2021-10-06 | Stop reason: SDUPTHER

## 2021-01-20 RX ORDER — CITALOPRAM 20 MG/1
20 TABLET ORAL DAILY
Qty: 90 TABLET | Refills: 1 | Status: SHIPPED | OUTPATIENT
Start: 2021-01-20 | End: 2021-05-17

## 2021-01-20 RX ORDER — ROSUVASTATIN CALCIUM 20 MG/1
20 TABLET, COATED ORAL EVERY OTHER DAY
Qty: 45 TABLET | Refills: 1 | Status: SHIPPED | OUTPATIENT
Start: 2021-01-20 | End: 2021-06-10

## 2021-01-20 NOTE — DISCHARGE INSTRUCTIONS
Wear compression socks, try to stay mobile, and elevate you legs when you lay down  Return to the ED if you develop shortness of breath  Follow-up with your primary care doctor for further treatment of venous stasis

## 2021-01-20 NOTE — TELEPHONE ENCOUNTER
Patient is also requesting Humalog  Patients wife advised that she was advised the Novalog is not covered under plan  Was using Novalog 20 units tid with meals       While calling to clarify the insulin patients wife advised me that both patients feet are swollen patient is more SOB than normal  Especially with movement  Advised ER         New mail order pharmacy [facility]

## 2021-01-20 NOTE — ED PROVIDER NOTES
History  Chief Complaint   Patient presents with    Leg Swelling     Patient reports swelling in his legs and feet starting about 2 days ago  HPI  61yo old male w/ a past medical history of CHF, CAD diabetes, remote history of DVT presents with bilateral leg swelling  Patient states that the leg swelling began 2 days ago has remained constant since  It has not worsened  Patient states that his leg swelling is currently painless however if he keeps his legs still for a long period of time they can become painful  Patient states that walking makes the pain better  He has been compliant with his medications  He denies SOB, CP, fever, chills, nausea, vomiting, abdominal pain, numbness and tingling in the extremities  Pt admits to cough  Prior to Admission Medications   Prescriptions Last Dose Informant Patient Reported? Taking?    Alcohol Swabs (ALCOHOL PREP) PADS  Self Yes No   CALCIUM CITRATE-VITAMIN D PO  Self Yes No   CAREONE UNIFINE PENTIPS PLUS 32G X 4 MM MISC  Self Yes No   Insulin Degludec-Liraglutide (Xultophy) 100 units-3 6 mg/mL injection pen   No No   Sig: Inject 24u qhs   Multiple Vitamin (MULTI-VITAMIN DAILY PO)  Self Yes No   acetaminophen (TYLENOL) 325 mg tablet  Self No No   Sig: Take 3 tablets (975 mg total) by mouth every 8 (eight) hours   baclofen 20 mg tablet   No No   Sig: Take 1 tablet (20 mg total) by mouth 3 (three) times a day   citalopram (CeleXA) 20 mg tablet   No No   Sig: Take 1 tablet (20 mg total) by mouth daily   clopidogrel (PLAVIX) 75 mg tablet   No No   Sig: Take 1 tablet (75 mg total) by mouth daily   ergocalciferol (VITAMIN D2) 50,000 units   No No   Sig: Take 1 capsule (50,000 Units total) by mouth once a week   gabapentin (NEURONTIN) 300 mg capsule   No No   Sig: Take 1 capsule (300 mg total) by mouth 2 (two) times a day   glucose blood test strip  Self No No   Sig: Use as instructed  Qid   glucose monitoring kit (FREESTYLE) monitoring kit  Self No No   Si each by Does not apply route as needed (3 times a day as needed)   insulin aspart (NovoLOG FlexPen) 100 UNIT/ML injection pen   No No   Sig: Inject 20u tid with meals   methocarbamol (ROBAXIN) 500 mg tablet  Self No No   Sig: Take 1 tablet (500 mg total) by mouth 2 (two) times a day as needed for muscle spasms   rosuvastatin (CRESTOR) 20 MG tablet   No No   Sig: Take 1 tablet (20 mg total) by mouth every other day   tamsulosin (FLOMAX) 0 4 mg   No No   Sig: Take 1 capsule (0 4 mg total) by mouth daily with dinner   tiZANidine (ZANAFLEX) 2 mg tablet  Self Yes No   Sig: TAKE 1 TABLET THREE TIMES DAILY   tiotropium-olodaterol (Stiolto Respimat) 2 5-2 5 MCG/ACT inhaler   No No   Sig: Inhale 2 puffs daily   torsemide (DEMADEX) 20 mg tablet   No No   Sig: Take 1 tablet (20 mg total) by mouth daily   traMADol (ULTRAM) 50 mg tablet   No No   Sig: Take 1 tablet (50 mg total) by mouth every 6 (six) hours as needed for moderate pain   umeclidinium-vilanterol (Anoro Ellipta) 62 5-25 MCG/INH inhaler   No No   Sig: Inhale 1 puff daily      Facility-Administered Medications: None       Past Medical History:   Diagnosis Date    SUNDEEP (acute kidney injury) ([facility] )     CH[facility]estive [facility] failure[facility]     Cholelithiasis     Chronic obstructive lung disease ([facility])     RESOLVED: 8/17/17    COPD (chronic obstructive pulmonary disease) ([facility])     Depression     Diabetes mellitus ([facility])     type 2, non-insulin dependent    Diabetic neuropathy ([facility] )     Difficulty attaining erection     LAST ASSESSEDl: 8/17/17    Former tobacco use     Gall stone pancreatitis     RESOLVED: 3/8/17    Hiatal hernia     History of concussion     History of MRSA infection     History of varicella infection     Hypertension     LAST ASSESSED: 8/17/17    Non-healing open wound of right groin     s/p VAC placement & post-op fungal dermatitis    PAD (peripheral artery disease) ([facility])     s/p RLE fem-pop    Seasonal allergies     Severe mitral regurgitation  Spinal cord injury, C1-C7 [facility]     s/p fusion    Traumatic injury to musculoskeletal system     1-5 FLIGHT FALL DOWN THE STEPS - CERVICAL NECK INJURY - JAN 2, 2012; LAST ASSESSED: 0/14/88    Umbilical hernia without obstruction and without gangrene     RESOLVED: 3/8/17       Past Surgical History:   Procedure Laterality Date    ANGIOPLASTY      ARTERIOGRAM Right 7/31/2018    Procedure: ARTERIOGRAM Completion Agram;  Surgeon: Lata Adame MD;  [facility];  Service: Vascular    BACK SURGERY      BYPASS FEMORAL-POPLITEAL Right 7/31/2018    Procedure: BYPASS FEMORAL-POPLITEAL R femoral- BK popliteal bypass;  Surgeon: Lata Adame MD;  [facility];  Service: Vascular    CERVICAL FUSION      VERTEBRAL; C3-C4 FUSION    CHOLECYSTECTOMY LAPAROSCOPIC N/A 2/24/2017    Procedure: CHOLECYSTECTOMY LAPAROSCOPIC;  Surgeon: Myrna Zhou MD;  [facility];  Service:    [facility] EPIGASTRIC HERNIA REPAIR      FEMORAL BYPASS Right     Leg    INCISION AND DRAINAGE OF WOUND      Groin area    CA DEBRIDEMENT, SKIN, SUB-Q TISSUE,=<20 SQ CM Right 8/28/2018    Procedure: [address] [place] [address] ([address] [place] [address]) Dejuan Guzman;  Surgeon: Kale Castillo MD;  [facility];  Service: Vascular    CA THROMBOENDARTECTMY FEMORAL COMMON Right 7/31/2018    Procedure: ENDARTERECTOMY ARTERIAL FEMORAL R femoral endarterectomy w/ patch angioplasty ;  Surgeon: Lata Adame MD;  [facility];  Service: Vascular    SPINE SURGERY      UMBILICAL HERNIA REPAIR N/A 2/24/2017    Procedure: REPAIR HERNIA UMBILICAL;  Surgeon: Myrna Zhou MD;  [facility];  Service:    [facility] WISDOM TOOTH EXTRACTION         Family History   Problem Relation Age of Onset    Hypertension Mother         BENIGN    Alzheimer's disease Father     Heart disease Father         CARDIAC DISORDER    Stroke Father     Heart attack Father     No Known Problems Brother      I have reviewed and agree with the history as documented  E-Cigarette/Vaping    E-Cigarette Use Never User      E-Cigarette/Vaping Substances    Nicotine No     THC No     CBD No     Flavoring No     Other No     Unknown No      Social History     Tobacco Use    Smoking status: Current Every Day Smoker     Packs/day: 0 25     Years: 40 00     Pack years: 10 00     Types: Cigarettes    Smokeless tobacco: Never Used   Substance Use Topics    Alcohol use: Not Currently     Comment: pt  reports he has not drank since February 2019    Drug use: No        Review of Systems   Constitutional: Negative for chills and fever  HENT: Negative for congestion, ear pain, rhinorrhea and sore throat  Eyes: Negative for pain  Respiratory: Positive for cough (chronic)  Negative for apnea, choking, chest tightness, shortness of breath, wheezing and stridor  Cardiovascular: Positive for leg swelling  Negative for chest pain and palpitations  Gastrointestinal: Negative for abdominal pain, constipation, diarrhea, nausea and vomiting  Genitourinary: Negative for hematuria  Musculoskeletal: Negative for arthralgias and back pain  Skin: Negative for rash and wound  Neurological: Negative for dizziness  Psychiatric/Behavioral: Negative for agitation and hallucinations  All other systems reviewed and are negative        Physical Exam  ED Triage Vitals   Temperature Pulse Respirations Blood Pressure SpO2   01/20/21 1313 01/20/21 1313 01/20/21 1313 01/20/21 1313 01/20/21 1313   97 8 °F (36 6 °C) 91 17 143/72 96 %      Temp Source Heart Rate Source Patient Position - Orthostatic VS BP Location FiO2 (%)   01/20/21 1313 01/20/21 1313 01/20/21 1400 01/20/21 1400 --   Oral Monitor Lying Right arm       Pain Score       01/20/21 1313       6             Orthostatic Vital Signs  Vitals:    01/20/21 1313 01/20/21 1400 01/20/21 1430 01/20/21 1500   BP: 143/72 127/69 126/70 120/72   Pulse: 91 86 88 88   Patient Position - Orthostatic VS:  Lying Physical Exam  Vitals signs reviewed  Constitutional:       General: He is not in acute distress  Appearance: He is well-developed  HENT:      Head: Normocephalic and atraumatic  Right Ear: External ear normal       Left Ear: External ear normal       Nose: Nose normal       Mouth/Throat:      Mouth: Mucous membranes are moist    Eyes:      Extraocular Movements: Extraocular movements intact  Conjunctiva/sclera: Conjunctivae normal       Pupils: Pupils are equal, round, and reactive to light  Neck:      Musculoskeletal: Normal range of motion and neck supple  Cardiovascular:      Rate and Rhythm: Normal rate and regular rhythm  Heart sounds: Normal heart sounds  Comments: UE pulses palpable     LE extremity pulses diminished however LE is warm and well perfused   Pulmonary:      Effort: Pulmonary effort is normal  No respiratory distress  Breath sounds: Normal breath sounds  No wheezing or rales  Abdominal:      Palpations: Abdomen is soft  Musculoskeletal:      Right lower leg: Edema present  Left lower leg: Edema present  Skin:     General: Skin is warm  Neurological:      General: No focal deficit present  Mental Status: He is alert     Psychiatric:         Mood and Affect: Mood normal          ED Medications  Medications   sodium chloride (PF) 0 9 % injection 3 mL (has no administration in time range)       Diagnostic Studies  Results Reviewed     Procedure Component Value Units Date/Time    NT-BNP PRO [[id number]]  (Abnormal) Collected: 01/20/21 1350    Lab Status: Final result Specimen: Blood from Arm, Right Updated: 01/20/21 1456     NT-proBNP 313 pg/mL     Comprehensive metabolic panel [[id number]]  (Abnormal) Collected: 01/20/21 1350    Lab Status: Final result Specimen: Blood from Arm, Right Updated: 01/20/21 1456     Sodium 136 mmol/L      Potassium 4 9 mmol/L      Chloride 97 mmol/L      CO2 29 mmol/L      ANION GAP 10 mmol/L      BUN 13 mg/dL Creatinine 1 03 mg/dL      Glucose 281 mg/dL      Calcium 9 0 mg/dL      AST 69 U/L      ALT 46 U/L      Alkaline Phosphatase 52 U/L      Total Protein 7 8 g/dL      Albumin 3 6 g/dL      Total Bilirubin 0 58 mg/dL      eGFR 77 ml/min/1 73sq m     Narrative:      [facility] guidelines for Chronic Kidney Disease (CKD):     Stage 1 with normal or high GFR (GFR > 90 mL/min/1 73 square meters)    Stage 2 Mild CKD (GFR = 60-89 mL/min/1 73 square meters)    Stage 3A Moderate CKD (GFR = 45-59 mL/min/1 73 square meters)    Stage 3B Moderate CKD (GFR = 30-44 mL/min/1 73 square meters)    Stage 4 Severe CKD (GFR = 15-29 mL/min/1 73 square meters)    Stage 5 End Stage CKD (GFR <15 mL/min/1 73 square meters)  Note: GFR calculation is accurate only with a steady state creatinine    D-dimer, quantitative [[id number]] Updated: 01/20/21 1417    Lab Status: No result Specimen: Blood from Arm, Right     CBC and differential [[id number]]  (Abnormal) Collected: 01/20/21 1350    Lab Status: Final result Specimen: Blood from Arm, Right Updated: 01/20/21 1404     WBC 6 50 Thousand/uL      RBC 4 24 Million/uL      Hemoglobin 14 8 g/dL      Hematocrit 42 8 %       fL      MCH 34 9 pg      MCHC 34 6 g/dL      RDW 14 0 %      MPV 10 2 fL      Platelets 515 Thousands/uL      nRBC 0 /100 WBCs      Neutrophils Relative 60 %      Immat GRANS % 0 %      Lymphocytes Relative 28 %      Monocytes Relative 9 %      Eosinophils Relative 2 %      Basophils Relative 1 %      Neutrophils Absolute 3 91 Thousands/µL      Immature Grans Absolute 0 02 Thousand/uL      Lymphocytes Absolute 1 79 Thousands/µL      Monocytes Absolute 0 60 Thousand/µL      Eosinophils Absolute 0 10 Thousand/µL      Basophils Absolute 0 08 Thousands/µL                  X-ray chest 1 view portable    (Results Pending)         Procedures  ECG 12 Lead Documentation Only    Date/Time: 1/20/2021 3:10 PM  Performed by: DO Magdy Rivero by: Xiomara Nash DO     Indications / Diagnosis:  Leg swelling  ECG reviewed by me, the ED Provider: yes    Patient location:  ED  Previous ECG:     Previous ECG:  Compared to current    Similarity:  Changes noted  Interpretation:     Interpretation: abnormal    Rate:     ECG rate:  86    ECG rate assessment: normal    Rhythm:     Rhythm: sinus rhythm    Ectopy:     Ectopy: none    QRS:     QRS axis:  Normal  Conduction:     Conduction: abnormal      Abnormal conduction: complete RBBB    ST segments:     ST segments:  Normal  T waves:     T waves: normal            ED Course                             SBIRT 22yo+      Most Recent Value   SBIRT (24 yo +)   In order to provide better care to our patients, we are screening all of our patients for alcohol and drug use  Would it be okay to ask you these screening questions? No Filed at: 01/20/2021 1                MDM  61yo old male w/ a past medical history of CHF, CAD diabetes, remote history of DVT presents with bilateral leg swelling  Ddx: Venous stasis, DVT, CHF    Pt's lungs are clear to auscultation and BNP is 313  CxR does not show acute cardiopulmonary disease  D-dimer negative    Patient stable for discharge, patient advised to follow-up with primary care doctor and use compression socks for venous stasis  Disposition  Final diagnoses:   None     ED Disposition     None      Follow-up Information    None         Patient's Medications   Discharge Prescriptions    No medications on file     No discharge procedures on file  PDMP Review       Value Time User    PDMP Reviewed  Yes 11/4/2020  7:26 AM Maida Trevino MD           ED Provider  Attending physically available and evaluated Brenden Oats  I managed the patient along with the ED Attending      Electronically Signed by         Xiomara Nash DO  01/24/21 1524

## 2021-01-20 NOTE — ED ATTENDING ATTESTATION
1/20/2021  I, Amber Wells MD, saw and evaluated the patient  I have discussed the patient with the resident/non-physician practitioner and agree with the resident's/non-physician practitioner's findings, Plan of Care, and MDM as documented in the resident's/non-physician practitioner's note, except where noted  All available labs and Radiology studies were reviewed  I was present for key portions of any procedure(s) performed by the resident/non-physician practitioner and I was immediately available to provide assistance  At this point I agree with the current assessment done in the Emergency Department    I have conducted an independent evaluation of this patient a history and physical is as follows:  C/o chf    Spinal cord injury in past  No residual effects     Uses a walker     Leg swelling x 2 days   No leg pain no cp no sob no redness or warmth  On diuretics  Urinating normally    Prior PVD    Prior dvt at a young age   Exam  Looks  Neck no jvd lungs clear   Heart rrr no m abd soft nd pos bs  Ext b/l edemabilateral right greater than left pulses are intact    No cellulitis   No crepitation   Warm and perfused   Imp    Leg edema   Venous stasis   Vs right sided CHF     Renal failure   Liver         ED Course         Critical Care Time  Procedures

## 2021-01-21 DIAGNOSIS — E11.42 TYPE 2 DIABETES MELLITUS WITH DIABETIC POLYNEUROPATHY, WITH LONG-TERM CURRENT USE OF INSULIN (HCC): Primary | ICD-10-CM

## 2021-01-21 DIAGNOSIS — Z79.4 TYPE 2 DIABETES MELLITUS WITH DIABETIC POLYNEUROPATHY, WITH LONG-TERM CURRENT USE OF INSULIN (HCC): Primary | ICD-10-CM

## 2021-01-21 LAB
ATRIAL RATE: 85 BPM
P AXIS: -72 DEGREES
QRS AXIS: -82 DEGREES
QRSD INTERVAL: 122 MS
QT INTERVAL: 438 MS
QTC INTERVAL: 524 MS
T WAVE AXIS: 52 DEGREES
VENTRICULAR RATE: 86 BPM

## 2021-01-21 PROCEDURE — 93010 ELECTROCARDIOGRAM REPORT: CPT | Performed by: INTERNAL MEDICINE

## 2021-01-21 RX ORDER — PEN NEEDLE, DIABETIC 32GX 5/32"
NEEDLE, DISPOSABLE MISCELLANEOUS 2 TIMES DAILY
Qty: 100 EACH | Refills: 5 | Status: SHIPPED | OUTPATIENT
Start: 2021-01-21 | End: 2021-06-03 | Stop reason: SDUPTHER

## 2021-01-22 DIAGNOSIS — E11.42 TYPE 2 DIABETES MELLITUS WITH DIABETIC POLYNEUROPATHY, WITH LONG-TERM CURRENT USE OF INSULIN (HCC): Primary | ICD-10-CM

## 2021-01-22 DIAGNOSIS — Z79.4 TYPE 2 DIABETES MELLITUS WITH DIABETIC POLYNEUROPATHY, WITH LONG-TERM CURRENT USE OF INSULIN (HCC): Primary | ICD-10-CM

## 2021-01-22 RX ORDER — INSULIN LISPRO 100 [IU]/ML
20 INJECTION, SOLUTION INTRAVENOUS; SUBCUTANEOUS
Qty: 15 PEN | Refills: 1 | Status: SHIPPED | OUTPATIENT
Start: 2021-01-22 | End: 2021-04-21 | Stop reason: SDUPTHER

## 2021-03-08 DIAGNOSIS — Z79.01 LONG TERM (CURRENT) USE OF ANTICOAGULANTS: ICD-10-CM

## 2021-03-08 RX ORDER — CLOPIDOGREL BISULFATE 75 MG/1
75 TABLET ORAL DAILY
Qty: 14 TABLET | Refills: 0 | Status: SHIPPED | OUTPATIENT
Start: 2021-03-08 | End: 2021-06-09 | Stop reason: SDUPTHER

## 2021-04-21 DIAGNOSIS — Z79.4 TYPE 2 DIABETES MELLITUS WITH DIABETIC POLYNEUROPATHY, WITH LONG-TERM CURRENT USE OF INSULIN (HCC): ICD-10-CM

## 2021-04-21 DIAGNOSIS — E11.42 TYPE 2 DIABETES MELLITUS WITH DIABETIC POLYNEUROPATHY, WITH LONG-TERM CURRENT USE OF INSULIN (HCC): ICD-10-CM

## 2021-04-21 RX ORDER — INSULIN LISPRO 100 [IU]/ML
20 INJECTION, SOLUTION INTRAVENOUS; SUBCUTANEOUS
Qty: 15 ML | Refills: 1 | Status: SHIPPED | OUTPATIENT
Start: 2021-04-21 | End: 2021-09-21

## 2021-04-22 ENCOUNTER — VBI (OUTPATIENT)
Dept: ADMINISTRATIVE | Facility: OTHER | Age: 61
End: 2021-04-22

## 2021-05-15 DIAGNOSIS — E11.49 OTHER DIABETIC NEUROLOGICAL COMPLICATION ASSOCIATED WITH TYPE 2 DIABETES MELLITUS (HCC): ICD-10-CM

## 2021-05-15 DIAGNOSIS — N40.0 BENIGN PROSTATIC HYPERPLASIA WITHOUT LOWER URINARY TRACT SYMPTOMS: ICD-10-CM

## 2021-05-15 DIAGNOSIS — F32.A DEPRESSION, UNSPECIFIED DEPRESSION TYPE: ICD-10-CM

## 2021-05-15 DIAGNOSIS — I50.41 ACUTE COMBINED SYSTOLIC AND DIASTOLIC CONGESTIVE HEART FAILURE (HCC): ICD-10-CM

## 2021-05-17 RX ORDER — CITALOPRAM 20 MG/1
TABLET ORAL
Qty: 90 TABLET | Refills: 3 | Status: SHIPPED | OUTPATIENT
Start: 2021-05-17 | End: 2022-07-18

## 2021-05-17 RX ORDER — GABAPENTIN 300 MG/1
CAPSULE ORAL
Qty: 180 CAPSULE | Refills: 3 | Status: SHIPPED | OUTPATIENT
Start: 2021-05-17 | End: 2021-09-14

## 2021-05-17 RX ORDER — TAMSULOSIN HYDROCHLORIDE 0.4 MG/1
CAPSULE ORAL
Qty: 90 CAPSULE | Refills: 3 | Status: SHIPPED | OUTPATIENT
Start: 2021-05-17 | End: 2022-07-18

## 2021-05-17 RX ORDER — TORSEMIDE 20 MG/1
TABLET ORAL
Qty: 90 TABLET | Refills: 3 | Status: SHIPPED | OUTPATIENT
Start: 2021-05-17 | End: 2022-05-16

## 2021-06-03 DIAGNOSIS — E11.42 TYPE 2 DIABETES MELLITUS WITH DIABETIC POLYNEUROPATHY, WITH LONG-TERM CURRENT USE OF INSULIN (HCC): ICD-10-CM

## 2021-06-03 DIAGNOSIS — Z79.4 TYPE 2 DIABETES MELLITUS WITH DIABETIC POLYNEUROPATHY, WITH LONG-TERM CURRENT USE OF INSULIN (HCC): ICD-10-CM

## 2021-06-04 RX ORDER — PEN NEEDLE, DIABETIC 32GX 5/32"
NEEDLE, DISPOSABLE MISCELLANEOUS 2 TIMES DAILY
Qty: 100 EACH | Refills: 5 | Status: SHIPPED | OUTPATIENT
Start: 2021-06-04 | End: 2021-06-09 | Stop reason: SDUPTHER

## 2021-06-08 DIAGNOSIS — E83.52 HYPERCALCEMIA: ICD-10-CM

## 2021-06-09 ENCOUNTER — TELEPHONE (OUTPATIENT)
Dept: FAMILY MEDICINE CLINIC | Facility: CLINIC | Age: 61
End: 2021-06-09

## 2021-06-09 DIAGNOSIS — E11.42 TYPE 2 DIABETES MELLITUS WITH DIABETIC POLYNEUROPATHY, WITH LONG-TERM CURRENT USE OF INSULIN (HCC): ICD-10-CM

## 2021-06-09 DIAGNOSIS — Z79.4 TYPE 2 DIABETES MELLITUS WITH DIABETIC POLYNEUROPATHY, WITH LONG-TERM CURRENT USE OF INSULIN (HCC): ICD-10-CM

## 2021-06-09 DIAGNOSIS — Z79.01 LONG TERM (CURRENT) USE OF ANTICOAGULANTS: ICD-10-CM

## 2021-06-09 NOTE — TELEPHONE ENCOUNTER
Pt's daughter called requesting lab orders for her dad  He does have an appointment on 7/23  Also, he is homebound so she is requesting that the mobile lab goes to him

## 2021-06-10 RX ORDER — PEN NEEDLE, DIABETIC 32GX 5/32"
NEEDLE, DISPOSABLE MISCELLANEOUS 2 TIMES DAILY
Qty: 100 EACH | Refills: 5 | Status: SHIPPED | OUTPATIENT
Start: 2021-06-10 | End: 2022-05-04

## 2021-06-10 RX ORDER — ROSUVASTATIN CALCIUM 20 MG/1
TABLET, COATED ORAL
Qty: 45 TABLET | Refills: 3 | Status: SHIPPED | OUTPATIENT
Start: 2021-06-10 | End: 2022-07-18

## 2021-06-10 RX ORDER — CLOPIDOGREL BISULFATE 75 MG/1
75 TABLET ORAL DAILY
Qty: 60 TABLET | Refills: 0 | Status: SHIPPED | OUTPATIENT
Start: 2021-06-10 | End: 2021-08-10

## 2021-06-14 ENCOUNTER — TELEPHONE (OUTPATIENT)
Dept: LAB | Facility: HOSPITAL | Age: 61
End: 2021-06-14

## 2021-06-14 DIAGNOSIS — I50.41 ACUTE COMBINED SYSTOLIC AND DIASTOLIC CONGESTIVE HEART FAILURE (HCC): ICD-10-CM

## 2021-06-14 DIAGNOSIS — I10 ESSENTIAL HYPERTENSION: ICD-10-CM

## 2021-06-14 DIAGNOSIS — Z79.4 TYPE 2 DIABETES MELLITUS WITH DIABETIC POLYNEUROPATHY, WITH LONG-TERM CURRENT USE OF INSULIN (HCC): Primary | ICD-10-CM

## 2021-06-14 DIAGNOSIS — E55.9 VITAMIN D DEFICIENCY: ICD-10-CM

## 2021-06-14 DIAGNOSIS — E11.42 TYPE 2 DIABETES MELLITUS WITH DIABETIC POLYNEUROPATHY, WITH LONG-TERM CURRENT USE OF INSULIN (HCC): Primary | ICD-10-CM

## 2021-06-21 ENCOUNTER — APPOINTMENT (OUTPATIENT)
Dept: LAB | Facility: HOSPITAL | Age: 61
End: 2021-06-21
Payer: COMMERCIAL

## 2021-06-21 DIAGNOSIS — I10 ESSENTIAL HYPERTENSION: ICD-10-CM

## 2021-06-21 DIAGNOSIS — Z79.4 TYPE 2 DIABETES MELLITUS WITH DIABETIC POLYNEUROPATHY, WITH LONG-TERM CURRENT USE OF INSULIN (HCC): ICD-10-CM

## 2021-06-21 DIAGNOSIS — E11.42 TYPE 2 DIABETES MELLITUS WITH DIABETIC POLYNEUROPATHY, WITH LONG-TERM CURRENT USE OF INSULIN (HCC): ICD-10-CM

## 2021-06-21 DIAGNOSIS — E55.9 VITAMIN D DEFICIENCY: ICD-10-CM

## 2021-06-21 LAB
25(OH)D3 SERPL-MCNC: 36.8 NG/ML (ref 30–100)
ALBUMIN SERPL BCP-MCNC: 3.6 G/DL (ref 3.5–5)
ALP SERPL-CCNC: 46 U/L (ref 46–116)
ALT SERPL W P-5'-P-CCNC: 42 U/L (ref 12–78)
ANION GAP SERPL CALCULATED.3IONS-SCNC: 8 MMOL/L (ref 4–13)
AST SERPL W P-5'-P-CCNC: 40 U/L (ref 5–45)
BASOPHILS # BLD AUTO: 0.07 THOUSANDS/ΜL (ref 0–0.1)
BASOPHILS NFR BLD AUTO: 1 % (ref 0–1)
BILIRUB SERPL-MCNC: 0.64 MG/DL (ref 0.2–1)
BUN SERPL-MCNC: 15 MG/DL (ref 5–25)
CALCIUM SERPL-MCNC: 9.4 MG/DL (ref 8.3–10.1)
CHLORIDE SERPL-SCNC: 97 MMOL/L (ref 100–108)
CO2 SERPL-SCNC: 32 MMOL/L (ref 21–32)
CREAT SERPL-MCNC: 0.78 MG/DL (ref 0.6–1.3)
EOSINOPHIL # BLD AUTO: 0.12 THOUSAND/ΜL (ref 0–0.61)
EOSINOPHIL NFR BLD AUTO: 2 % (ref 0–6)
ERYTHROCYTE [DISTWIDTH] IN BLOOD BY AUTOMATED COUNT: 13.2 % (ref 11.6–15.1)
EST. AVERAGE GLUCOSE BLD GHB EST-MCNC: 220 MG/DL
GFR SERPL CREATININE-BSD FRML MDRD: 96 ML/MIN/1.73SQ M
GLUCOSE SERPL-MCNC: 243 MG/DL (ref 65–140)
HBA1C MFR BLD: 9.3 %
HCT VFR BLD AUTO: 43.9 % (ref 36.5–49.3)
HGB BLD-MCNC: 14.8 G/DL (ref 12–17)
IMM GRANULOCYTES # BLD AUTO: 0.02 THOUSAND/UL (ref 0–0.2)
IMM GRANULOCYTES NFR BLD AUTO: 0 % (ref 0–2)
LYMPHOCYTES # BLD AUTO: 1.56 THOUSANDS/ΜL (ref 0.6–4.47)
LYMPHOCYTES NFR BLD AUTO: 24 % (ref 14–44)
MCH RBC QN AUTO: 36.8 PG (ref 26.8–34.3)
MCHC RBC AUTO-ENTMCNC: 33.7 G/DL (ref 31.4–37.4)
MCV RBC AUTO: 109 FL (ref 82–98)
MONOCYTES # BLD AUTO: 0.74 THOUSAND/ΜL (ref 0.17–1.22)
MONOCYTES NFR BLD AUTO: 12 % (ref 4–12)
NEUTROPHILS # BLD AUTO: 3.88 THOUSANDS/ΜL (ref 1.85–7.62)
NEUTS SEG NFR BLD AUTO: 61 % (ref 43–75)
NRBC BLD AUTO-RTO: 0 /100 WBCS
NT-PROBNP SERPL-MCNC: 523 PG/ML
PLATELET # BLD AUTO: 206 THOUSANDS/UL (ref 149–390)
PMV BLD AUTO: 10.6 FL (ref 8.9–12.7)
POTASSIUM SERPL-SCNC: 2.3 MMOL/L (ref 3.5–5.3)
PROT SERPL-MCNC: 7.4 G/DL (ref 6.4–8.2)
RBC # BLD AUTO: 4.02 MILLION/UL (ref 3.88–5.62)
SODIUM SERPL-SCNC: 137 MMOL/L (ref 136–145)
WBC # BLD AUTO: 6.39 THOUSAND/UL (ref 4.31–10.16)

## 2021-06-21 PROCEDURE — 82306 VITAMIN D 25 HYDROXY: CPT

## 2021-06-21 PROCEDURE — 85025 COMPLETE CBC W/AUTO DIFF WBC: CPT

## 2021-06-21 PROCEDURE — 80053 COMPREHEN METABOLIC PANEL: CPT

## 2021-06-21 PROCEDURE — 83880 ASSAY OF NATRIURETIC PEPTIDE: CPT

## 2021-06-21 PROCEDURE — 83036 HEMOGLOBIN GLYCOSYLATED A1C: CPT

## 2021-06-21 PROCEDURE — 36415 COLL VENOUS BLD VENIPUNCTURE: CPT

## 2021-06-24 DIAGNOSIS — Z79.4 TYPE 2 DIABETES MELLITUS WITH DIABETIC POLYNEUROPATHY, WITH LONG-TERM CURRENT USE OF INSULIN (HCC): ICD-10-CM

## 2021-06-24 DIAGNOSIS — E11.42 TYPE 2 DIABETES MELLITUS WITH DIABETIC POLYNEUROPATHY, WITH LONG-TERM CURRENT USE OF INSULIN (HCC): ICD-10-CM

## 2021-06-24 RX ORDER — (INSULIN DEGLUDEC AND LIRAGLUTIDE) 100; 3.6 [IU]/ML; MG/ML
INJECTION, SOLUTION SUBCUTANEOUS
Qty: 15 ML | Refills: 1 | Status: SHIPPED | OUTPATIENT
Start: 2021-06-24 | End: 2021-07-02 | Stop reason: CLARIF

## 2021-07-23 ENCOUNTER — OFFICE VISIT (OUTPATIENT)
Dept: FAMILY MEDICINE CLINIC | Facility: CLINIC | Age: 61
End: 2021-07-23
Payer: COMMERCIAL

## 2021-07-23 VITALS
HEART RATE: 99 BPM | HEIGHT: 70 IN | OXYGEN SATURATION: 96 % | WEIGHT: 183 LBS | BODY MASS INDEX: 26.2 KG/M2 | RESPIRATION RATE: 16 BRPM | DIASTOLIC BLOOD PRESSURE: 80 MMHG | SYSTOLIC BLOOD PRESSURE: 124 MMHG

## 2021-07-23 DIAGNOSIS — G82.50 QUADRIPLEGIA AND QUADRIPARESIS (HCC): ICD-10-CM

## 2021-07-23 DIAGNOSIS — I51.1: ICD-10-CM

## 2021-07-23 DIAGNOSIS — N18.31 STAGE 3A CHRONIC KIDNEY DISEASE (HCC): ICD-10-CM

## 2021-07-23 DIAGNOSIS — I73.9 PERIPHERAL ARTERIAL DISEASE (HCC): ICD-10-CM

## 2021-07-23 DIAGNOSIS — Z00.00 WELL ADULT EXAM: ICD-10-CM

## 2021-07-23 DIAGNOSIS — Z12.11 SCREENING FOR MALIGNANT NEOPLASM OF COLON: Primary | ICD-10-CM

## 2021-07-23 DIAGNOSIS — Z79.4 TYPE 2 DIABETES MELLITUS WITH DIABETIC POLYNEUROPATHY, WITH LONG-TERM CURRENT USE OF INSULIN (HCC): ICD-10-CM

## 2021-07-23 DIAGNOSIS — R09.89 CHEST CONGESTION: ICD-10-CM

## 2021-07-23 DIAGNOSIS — E11.49 OTHER DIABETIC NEUROLOGICAL COMPLICATION ASSOCIATED WITH TYPE 2 DIABETES MELLITUS (HCC): ICD-10-CM

## 2021-07-23 DIAGNOSIS — E87.6 HYPOKALEMIA: ICD-10-CM

## 2021-07-23 DIAGNOSIS — I50.42 CHRONIC COMBINED SYSTOLIC AND DIASTOLIC CONGESTIVE HEART FAILURE (HCC): ICD-10-CM

## 2021-07-23 DIAGNOSIS — E11.42 TYPE 2 DIABETES MELLITUS WITH DIABETIC POLYNEUROPATHY, WITH LONG-TERM CURRENT USE OF INSULIN (HCC): ICD-10-CM

## 2021-07-23 DIAGNOSIS — I77.810 AORTIC ROOT DILATION (HCC): ICD-10-CM

## 2021-07-23 PROCEDURE — 99214 OFFICE O/P EST MOD 30 MIN: CPT | Performed by: FAMILY MEDICINE

## 2021-07-23 PROCEDURE — G0439 PPPS, SUBSEQ VISIT: HCPCS | Performed by: FAMILY MEDICINE

## 2021-07-23 RX ORDER — FLASH GLUCOSE SENSOR
KIT MISCELLANEOUS
Qty: 2 EACH | Refills: 1 | Status: SHIPPED | OUTPATIENT
Start: 2021-07-23

## 2021-07-23 RX ORDER — FLASH GLUCOSE SENSOR
KIT MISCELLANEOUS
Qty: 2 EACH | Refills: 1 | Status: SHIPPED | OUTPATIENT
Start: 2021-07-23 | End: 2021-07-23

## 2021-07-23 RX ORDER — FLASH GLUCOSE SCANNING READER
EACH MISCELLANEOUS
Qty: 1 EACH | Refills: 0 | Status: SHIPPED | OUTPATIENT
Start: 2021-07-23

## 2021-07-23 RX ORDER — FLASH GLUCOSE SCANNING READER
EACH MISCELLANEOUS
Qty: 1 EACH | Refills: 0 | Status: SHIPPED | OUTPATIENT
Start: 2021-07-23 | End: 2021-07-23

## 2021-07-23 RX ORDER — ALBUTEROL SULFATE 2.5 MG/3ML
2.5 SOLUTION RESPIRATORY (INHALATION) 2 TIMES DAILY
Qty: 180 ML | Refills: 5 | Status: SHIPPED | OUTPATIENT
Start: 2021-07-23

## 2021-07-23 NOTE — PROGRESS NOTES
Assessment and Plan:     Problem List Items Addressed This Visit        Endocrine    Diabetic neuropathy ([facility] )    Relevant Medications    Insulin Glargine-Lixisenatide (Soliqua) 100 units-33 mcg/mL injection pen    Diabetes mellitus type 2 - Diabetic neuropathy    Relevant Medications    Continuous Blood Gluc  (FreeStyle Christel 14 Day [place]) HARDIK    Continuous Blood Gluc Sensor (FreeStyle Christel 14 Day Sensor) MISC    Insulin Glargine-Lixisenatide Jeraline Ventura) 100 units-33 mcg/mL injection pen       Cardiovascular and Mediastinum    Peripheral arterial disease ([facility] )    Relevant Orders    Ambulatory referral to Cardiology    Chronic combined systolic and diastolic congestive heart failure ([place] [address] )    Relevant Orders    Ambulatory referral to Cardiology    Chordae tendineae rupture - Severe mitral regurgitation    Relevant Orders    Ambulatory referral to Cardiology    Aortic root dilation [facility])    Relevant Orders    Ambulatory referral to Cardiology       Nervous and Auditory    Quadriplegia and quadriparesis ([place] [address] )     -stable/controlled, continue same medication  Will evaluate again next visit               Genitourinary    Stage 3a chronic kidney disease ([place] [address] )      Other Visit Diagnoses     Screening for malignant neoplasm of colon    -  Primary    Relevant Orders    Cologuard    Hypokalemia        Relevant Orders    Basic metabolic panel    Magnesium    Chest congestion        Relevant Medications    albuterol (2 5 mg/3 mL) 0 083 % nebulizer solution    Well adult exam        BMI 26 0-26 9,adult            BMI Counseling: Body mass index is 26 26 kg/m²  The BMI is above normal  Nutrition recommendations include decreasing portion sizes, encouraging healthy choices of fruits and vegetables and limiting drinks that contain sugar  Exercise recommendations include moderate physical activity 150 minutes/week  No pharmacotherapy was ordered         Tobacco Cessation Counseling: Tobacco cessation counseling was provided  The patient is sincerely urged to quit consumption of tobacco  He is not ready to quit tobacco      Preventive health issues were discussed with patient, and age appropriate screening tests were ordered as noted in patient's After Visit Summary  Personalized health advice and appropriate referrals for health education or preventive services given if needed, as noted in patient's After Visit Summary       History of Present Illness:     Patient presents for Medicare Annual Wellness visit    Patient Care Team:  Sonia Pinedo MD as PCP - General  Sonia Pinedo MD as PCP - [address] [place] [address] (RTE)  Sonia Pinedo MD     Problem List:     Patient Active Problem List   Diagnosis    Chronic pain    Depression    Diabetic neuropathy ([facility] )    Enlarged prostate with lower urinary tract symptoms (LUTS)    Hernia, epigastric    Injury of cervical spinal cord ([facility] )    Tobacco abuse    Umbilical hernia    Cervical myelopathy ([facility] )    Basal cell carcinoma in situ of skin    Ambulatory dysfunction    Lumbar radiculopathy    Diabetes mellitus type 2 - Diabetic neuropathy    Hypercalcemia    Quadriplegia and quadriparesis ([facility] )    Alcohol abuse    Cellulitis of leg, right    PAD    Ischemic foot pain at rest    Peripheral arterial disease ([address] )    Essential hypertension    Acute blood loss anemia    Constipation    Wound dehiscence, surgical, initial encounter    Non-healing surgical wound of right groin    Postoperative state    Sacral wound, initial encounter    Esophageal thickening    Chronic combined systolic and diastolic congestive heart failure (HCC)    Obstructive uropathy    Non-rheumatic mitral regurgitation    Chordae tendineae rupture - Severe mitral regurgitation    Quadriplegia - history of Traumatic cervical injury    BPH (benign prostatic hyperplasia)    Aortic root dilation (HCC)    Scalp laceration, initial encounter    Stage 3a chronic kidney disease ([facility] ) Past Medical and Surgical History:     Past Medical History:   Diagnosis Date    SUNDEEP (acute kidney injury) ([place] [address] )     CHF (congestive heart failure) ([facility])     Cholelithiasis     Chronic obstructive lung disease ([place] [address] )     RESOLVED: 8/17/17    COPD (chronic obstructive pulmonary disease) ([place] [address] )     Depression     Diabetes mellitus ([facility])     type 2, non-insulin dependent    Diabetic neuropathy ([place] [address] )     Difficulty attaining erection     LAST ASSESSEDl: 8/17/17    Former tobacco use     Gall stone pancreatitis     RESOLVED: 3/8/17    Hiatal hernia     History of concussion     History of MRSA infection     History of varicella infection     Hypertension     LAST ASSESSED: 8/17/17    Non-healing open wound of right groin     s/p VAC placement & post-op fungal dermatitis    PAD (peripheral artery disease) ([facility])     s/p RLE fem-pop    Seasonal allergies     Severe mitral regurgitation     Spinal cord injury, C1-C7 ([place] [address] )     s/p fusion    Traumatic injury to musculoskeletal system     1-5 FLIGHT FALL DOWN THE STEPS - CERVICAL NECK INJURY - JAN 2, 2012; LAST ASSESSED: 1/99/72    Umbilical hernia without obstruction and without gangrene     RESOLVED: 3/8/17     Past Surgical History:   Procedure Laterality Date    ANGIOPLASTY      ARTERIOGRAM Right 7/31/2018    Procedure: ARTERIOGRAM Completion Agram;  Surgeon: Jyotsna Chase MD;  [facility];  Service: Vascular    BACK SURGERY      BYPASS FEMORAL-POPLITEAL Right 7/31/2018    Procedure: BYPASS FEMORAL-POPLITEAL R femoral- BK popliteal bypass;  Surgeon: Jyotsna Chase MD;  [facility];  Service: Vascular    CERVICAL FUSION      VERTEBRAL; C3-C4 FUSION    CHOLECYSTECTOMY LAPAROSCOPIC N/A 2/24/2017    Procedure: CHOLECYSTECTOMY LAPAROSCOPIC;  Surgeon: Ivonne Wells MD;  [facility];  Service:    [facility] EPIGASTRIC HERNIA REPAIR      FEMORAL BYPASS Right     Leg    INCISION AND DRAINAGE OF WOUND      Groin area    RI DEBRIDEMENT, SKIN, SUB-Q TISSUE,=<20 SQ CM Right 2018    Procedure: RIGHT GROIN AND THIGH DEBRIDEMENT ([address]) AND VAC PLACEMENT;  Surgeon: Zhen Hudson MD;  [facility];  Service: Vascular    DC THROMBOENDARTECTMY FEMORAL COMMON Right 2018    Procedure: ENDARTERECTOMY ARTERIAL FEMORAL R femoral endarterectomy w/ patch angioplasty ;  Surgeon: Henry Biswas MD;  [facility];  Service: Vascular    SPINE SURGERY      UMBILICAL HERNIA REPAIR N/A 2017    Procedure: REPAIR HERNIA UMBILICAL;  Surgeon: Crystal Coreas MD;  [facility];  Service:    Lear WISDOM TOOTH EXTRACTION        Family History:     Family History   Problem Relation Age of Onset    Hypertension Mother         BENIGN    Alzheimer's disease Father     Heart disease Father         CARDIAC DISORDER    Stroke Father     Heart attack Father     No Known Problems Brother       Social History:     Social History     Socioeconomic History    Marital status: /Civil Union     Spouse name: None    Number of children: None    Years of education: 15    Highest education level: None   Occupational History    Occupation: RETIRED   Tobacco Use    Smoking status: Current Every Day Smoker     Packs/day: 0 25     Years: 40 00     Pack years: 10 00     Types: Cigarettes    Smokeless tobacco: Never Used   Vaping Use    Vaping Use: Never used   Substance and Sexual Activity    Alcohol use: Not Currently     Comment: pt  reports he has not drank since 2019    Drug use: No    Sexual activity: Never     Partners: Female   Other Topics Concern    None   Social History Narrative    DAILY  CAFFEINE CONSUMPTION    EXERCISES RARELY    LIVES WITH SIGNIFICANT OTHER    LIVES WITH WIFE    NO LIVING WILL            Most recent tobacco use screenin2020    Do you currently or have you served in the [employer]: No    Were you activated, into active duty, as a member of the [employer] or as a Reservist: No    Occupation: disabled    Education: 12    Marital status:     Sexual orientation: Heterosexual    Exercise level: Occasional    Diet: Diabetic    General stress level: Low    Has smoked since age: 10    Alcohol intake: Moderate    Caffeine intake: Occasional    Chewing tobacco: none    Illicit drugs: none    Guns present in home: No    Seat belts used routinely: No    Sunscreen used routinely: Yes    Smoke alarm in home: Yes    Advance directive: No    Salt Intake: moderate    Is the patient interested in a colorectal cancer screening: No    Has patient visited an area known to be high risk for 2019 n-CoV: No     Social Determinants of Health     Financial Resource Strain:     Difficulty of Paying Living Expenses:    Food Insecurity:     Worried About Running Out of Food in the Last Year:     Ran Out of Food in the Last Year:    Transportation Needs:     Lack of Transportation (Medical):      Lack of Transportation (Non-Medical):    Physical Activity:     Days of Exercise per Week:     Minutes of Exercise per Session:    Stress:     Feeling of Stress :    Social Connections:     Frequency of Communication with Friends and Family:     Frequency of Social Gatherings with Friends and Family:     Attends Mu-ism Services:     Active Member of Clubs or Organizations:     Attends Club or Organization Meetings:     Marital Status:    Intimate Partner Violence:     Fear of Current or Ex-Partner:     Emotionally Abused:     Physically Abused:     Sexually Abused:       Medications and Allergies:     Current Outpatient Medications   Medication Sig Dispense Refill    acetaminophen (TYLENOL) 325 mg tablet Take 3 tablets (975 mg total) by mouth every 8 (eight) hours 30 tablet 0    Alcohol Swabs (ALCOHOL PREP) PADS       baclofen 20 mg tablet Take 1 tablet (20 mg total) by mouth 3 (three) times a day 270 tablet 1    CALCIUM CITRATE-VITAMIN D PO       [employer] Unifine Pentips Plus 32G X 4 MM MISC Inject as directed 2 (two) times a day 100 each 5    citalopram (CeleXA) 20 mg tablet TAKE 1 TABLET BY MOUTH  DAILY 90 tablet 3    clopidogrel (PLAVIX) 75 mg tablet Take 1 tablet (75 mg total) by mouth daily 60 tablet 0    ergocalciferol (VITAMIN D2) 50,000 units Take 1 capsule (50,000 Units total) by mouth once a week 12 capsule 0    gabapentin (NEURONTIN) 300 mg capsule TAKE 1 CAPSULE BY MOUTH  TWICE DAILY 180 capsule 3    glucose blood test strip Use as instructed  Qid 400 each 11    glucose monitoring kit (FREESTYLE) monitoring kit 1 each by Does not apply route as needed (3 times a day as needed) 1 each 0    Insulin Glargine-Lixisenatide (Soliqua) 100 units-33 mcg/mL injection pen Inject 30 Units under the skin daily 15 mL 1    insulin lispro (HumaLOG KwikPen) 100 units/mL injection pen Inject 20 Units under the skin 3 (three) times a day with meals 15 mL 1    methocarbamol (ROBAXIN) 500 mg tablet Take 1 tablet (500 mg total) by mouth 2 (two) times a day as needed for muscle spasms 20 tablet 0    Multiple Vitamin (MULTI-VITAMIN DAILY PO)       rosuvastatin (CRESTOR) 20 MG tablet TAKE 1 TABLET BY MOUTH  EVERY OTHER DAY 45 tablet 3    tamsulosin (FLOMAX) 0 4 mg TAKE 1 CAPSULE BY MOUTH  DAILY WITH DINNER 90 capsule 3    tiotropium-olodaterol (Stiolto Respimat) 2 5-2 5 MCG/ACT inhaler Inhale 2 puffs daily 1 Inhaler 0    tiZANidine (ZANAFLEX) 2 mg tablet TAKE 1 TABLET THREE TIMES DAILY      torsemide (DEMADEX) 20 mg tablet TAKE 1 TABLET BY MOUTH  DAILY 90 tablet 3    traMADol (ULTRAM) 50 mg tablet Take 1 tablet (50 mg total) by mouth every 6 (six) hours as needed for moderate pain 60 tablet 0    albuterol (2 5 mg/3 mL) 0 083 % nebulizer solution Take 3 mL (2 5 mg total) by nebulization 2 (two) times a day 180 mL 5    Continuous Blood Gluc  (FreeStyle Christel 14 Day [place]) HARDIK Use to test prn 1 each 0    Continuous Blood Gluc Sensor (FreeStyle Christel 14 Day Sensor) MISC Use to test prn 2 each 1     No current facility-administered medications for this visit  Allergies   Allergen Reactions    Seasonal Ic  [Cholestatin]       Immunizations:     Immunization History   Administered Date(s) Administered    Pneumococcal Polysaccharide PPV23 08/28/2017      Health Maintenance:         Topic Date Due    Colorectal Cancer Screening  Never done    HIV Screening  11/14/2022 (Originally 5/13/1975)    Hepatitis C Screening  Completed         Topic Date Due    COVID-19 Vaccine (1) Never done    Influenza Vaccine (1) 09/01/2021      Medicare Health Risk Assessment:     /80 (BP Location: Left arm, Patient Position: Sitting, Cuff Size: Standard)   Pulse 99   Resp 16   Ht 5' 10" (1 778 m)   Wt 83 kg (183 lb)   SpO2 96%   BMI 26 26 kg/m²      Odin Chavez is here for his Subsequent Wellness visit  Last Medicare Wellness visit information reviewed, patient interviewed and updates made to the record today  Health Risk Assessment:   Patient rates overall health as fair  Patient feels that their physical health rating is slightly worse  Patient is very satisfied with their life  Eyesight was rated as same  Hearing was rated as same  Patient feels that their emotional and mental health rating is same  Patients states they are never, rarely angry  Patient states they are never, rarely unusually tired/fatigued  Pain experienced in the last 7 days has been some  Patient's pain rating has been 5/10  Patient states that he has experienced no weight loss or gain in last 6 months  Depression Screening:   PHQ-2 Score: 0  PHQ-9 Score: 0      Fall Risk Screening: In the past year, patient has experienced: history of falling in past year    Number of falls: 1  Injured during fall?: No    Feels unsteady when standing or walking?: Yes    Worried about falling?: Yes      Home Safety:  Patient has trouble with stairs inside or outside of their home   Patient has working smoke alarms and has working carbon monoxide detector  Home safety hazards include: none  Nutrition:   Current diet is Regular  Medications:   Patient is currently taking over-the-counter supplements  OTC medications include: see medication list  Patient is not able to manage medications  Activities of Daily Living (ADLs)/Instrumental Activities of Daily Living (IADLs):   Walk and transfer into and out of bed and chair?: Yes  Dress and groom yourself?: No    Bathe or shower yourself?: No    Feed yourself? Yes  Do your laundry/housekeeping?: No  Manage your money, pay your bills and track your expenses?: No  Make your own meals?: No    Do your own shopping?: No    Previous Hospitalizations:   Any hospitalizations or ED visits within the last 12 months?: Yes    How many hospitalizations have you had in the last year?: 1-2    Advance Care Planning:   Living will: No    Durable POA for healthcare: No    Advanced directive: No    Five wishes given: No      Cognitive Screening:   Provider or family/friend/caregiver concerned regarding cognition?: No    PREVENTIVE SCREENINGS      Cardiovascular Screening:    General: Screening Current      Diabetes Screening:     General: Screening Not Indicated and History Diabetes      Colorectal Cancer Screening:       Due for: Cologuard      Prostate Cancer Screening:    General: Screening Current      Osteoporosis Screening:    General: Screening Not Indicated      Abdominal Aortic Aneurysm (AAA) Screening:    Risk factors include: tobacco use        Lung Cancer Screening:     General: Screening Not Indicated      Hepatitis C Screening:    General: Screening Current    Screening, Brief Intervention, and Referral to Treatment (SBIRT)    Screening  Typical number of drinks in a day: 4  Typical number of drinks in a week: 28  Interpretation: Low risk drinking behavior      Single Item Drug Screening:  How often have you used an illegal drug (including marijuana) or a prescription medication for non-medical reasons in the past year? never    Single Item Drug Screen Score: 0  Interpretation: Negative screen for possible drug use disorder    Brief Intervention  Alcohol & drug use screenings were reviewed  No concerns regarding substance use disorder identified       Review of Current Opioid Use    Opioid Risk Tool (ORT) Interpretation: Complete Opioid Risk Tool (ORT)      Mino Remy MD

## 2021-07-23 NOTE — PATIENT INSTRUCTIONS
Medicare Preventive Visit Patient Instructions  Thank you for completing your Welcome to Medicare Visit or Medicare Annual Wellness Visit today  Your next wellness visit will be due in one year (7/24/2022)  The screening/preventive services that you may require over the next 5-10 years are detailed below  Some tests may not apply to you based off risk factors and/or age  Screening tests ordered at today's visit but not completed yet may show as past due  Also, please note that scanned in results may not display below  Preventive Screenings:  Service Recommendations Previous Testing/Comments   Colorectal Cancer Screening  · Colonoscopy    · Fecal Occult Blood Test (FOBT)/Fecal Immunochemical Test (FIT)  · Fecal DNA/Cologuard Test  · Flexible Sigmoidoscopy Age: 54-65 years old   Colonoscopy: every 10 years (May be performed more frequently if at higher risk)  OR  FOBT/FIT: every 1 year  OR  Cologuard: every 3 years  OR  Sigmoidoscopy: every 5 years  Screening may be recommended earlier than age 48 if at higher risk for colorectal cancer  Also, an individualized decision between you and your healthcare provider will decide whether screening between the ages of 74-80 would be appropriate   Colonoscopy: Not on file  FOBT/FIT: Not on file  Cologuard: Not on file  Sigmoidoscopy: Not on file          Prostate Cancer Screening Individualized decision between patient and health care provider in men between ages of 53-78   Medicare will cover every 12 months beginning on the day after your 50th birthday PSA: 2 6 ng/mL     Screening Current     Hepatitis C Screening Once for adults born between 1945 and 1965  More frequently in patients at high risk for Hepatitis C Hep C Antibody: 12/16/2020    Screening Current   Diabetes Screening 1-2 times per year if you're at risk for diabetes or have pre-diabetes Fasting glucose: 166 mg/dL   A1C: 9 3 %    Screening Not Indicated  History Diabetes   Cholesterol Screening Once every 5 years if you don't have a lipid disorder  May order more often based on risk factors  Lipid panel: 12/16/2020    Screening Current      Other Preventive Screenings Covered by Medicare:  1  Abdominal Aortic Aneurysm (AAA) Screening: covered once if your at risk  You're considered to be at risk if you have a family history of AAA or a male between the age of 73-68 who smoking at least 100 cigarettes in your lifetime  2  Lung Cancer Screening: covers low dose CT scan once per year if you meet all of the following conditions: (1) Age 50-69; (2) No signs or symptoms of lung cancer; (3) Current smoker or have quit smoking within the last 15 years; (4) You have a tobacco smoking history of at least 30 pack years (packs per day x number of years you smoked); (5) You get a written order from a healthcare provider  3  Glaucoma Screening: covered annually if you're considered high risk: (1) You have diabetes OR (2) Family history of glaucoma OR (3)  aged 48 and older OR (3)  American aged 72 and older  3  Osteoporosis Screening: covered every 2 years if you meet one of the following conditions: (1) Have a vertebral abnormality; (2) On glucocorticoid therapy for more than 3 months; (3) Have primary hyperparathyroidism; (4) On osteoporosis medications and need to assess response to drug therapy  5  HIV Screening: covered annually if you're between the age of 12-76  Also covered annually if you are younger than 13 and older than 72 with risk factors for HIV infection  For pregnant patients, it is covered up to 3 times per pregnancy      Immunizations:  Immunization Recommendations   Influenza Vaccine Annual influenza vaccination during flu season is recommended for all persons aged >= 6 months who do not have contraindications   Pneumococcal Vaccine (Prevnar and Pneumovax)  * Prevnar = PCV13  * Pneumovax = PPSV23 Adults 25-60 years old: 1-3 doses may be recommended based on certain risk factors  Adults 72 years old: Prevnar (PCV13) vaccine recommended followed by Pneumovax (PPSV23) vaccine  If already received PPSV23 since turning 65, then PCV13 recommended at least one year after PPSV23 dose  Hepatitis B Vaccine 3 dose series if at intermediate or high risk (ex: diabetes, end stage renal disease, liver disease)   Tetanus (Td) Vaccine - COST NOT COVERED BY MEDICARE PART B Following completion of primary series, a booster dose should be given every 10 years to maintain immunity against tetanus  Td may also be given as tetanus wound prophylaxis  Tdap Vaccine - COST NOT COVERED BY MEDICARE PART B Recommended at least once for all adults  For pregnant patients, recommended with each pregnancy  Shingles Vaccine (Shingrix) - COST NOT COVERED BY MEDICARE PART B  2 shot series recommended in those aged 48 and above     Health Maintenance Due:      Topic Date Due    Colorectal Cancer Screening  Never done    HIV Screening  11/14/2022 (Originally 5/13/1975)    Hepatitis C Screening  Completed     Immunizations Due:      Topic Date Due    COVID-19 Vaccine (1) Never done    Influenza Vaccine (1) 09/01/2021     Advance Directives   What are advance directives? Advance directives are legal documents that state your wishes and plans for medical care  These plans are made ahead of time in case you lose your ability to make decisions for yourself  Advance directives can apply to any medical decision, such as the treatments you want, and if you want to donate organs  What are the types of advance directives? There are many types of advance directives, and each state has rules about how to use them  You may choose a combination of any of the following:  · Living will: This is a written record of the treatment you want  You can also choose which treatments you do not want, which to limit, and which to stop at a certain time  This includes surgery, medicine, IV fluid, and tube feedings     · Durable power of  for healthcare [facility]): This is a written record that states who you want to make healthcare choices for you when you are unable to make them for yourself  This person, called a proxy, is usually a family member or a friend  You may choose more than 1 proxy  · Do not resuscitate (DNR) order:  A DNR order is used in case your heart stops beating or you stop breathing  It is a request not to have certain forms of treatment, such as CPR  A DNR order may be included in other types of advance directives  · Medical directive: This covers the care that you want if you are in a coma, near death, or unable to make decisions for yourself  You can list the treatments you want for each condition  Treatment may include pain medicine, surgery, blood transfusions, dialysis, IV or tube feedings, and a ventilator (breathing machine)  · Values history: This document has questions about your views, beliefs, and how you feel and think about life  This information can help others choose the care that you would choose  Why are advance directives important? An advance directive helps you control your care  Although spoken wishes may be used, it is better to have your wishes written down  Spoken wishes can be misunderstood, or not followed  Treatments may be given even if you do not want them  An advance directive may make it easier for your family to make difficult choices about your care  Cigarette Smoking and Your Health   Risks to your health if you smoke:  Nicotine and other chemicals found in tobacco damage every cell in your body  Even if you are a light smoker, you have an increased risk for cancer, heart disease, and lung disease  If you are pregnant or have diabetes, smoking increases your risk for complications  Benefits to your health if you stop smoking:   · You decrease respiratory symptoms such as coughing, wheezing, and shortness of breath     · You reduce your risk for cancers of the lung, mouth, throat, kidney, bladder, pancreas, stomach, and cervix  If you already have cancer, you increase the benefits of chemotherapy  You also reduce your risk for cancer returning or a second cancer from developing  · You reduce your risk for heart disease, blood clots, heart attack, and stroke  · You reduce your risk for lung infections, and diseases such as pneumonia, asthma, chronic bronchitis, and emphysema  · Your circulation improves  More oxygen can be delivered to your body  If you have diabetes, you lower your risk for complications, such as kidney, artery, and eye diseases  You also lower your risk for nerve damage  Nerve damage can lead to amputations, poor vision, and blindness  · You improve your body's ability to heal and to fight infections  For more information and support to stop smoking:   · [employer]  Phone: 1- 599 - [phone]  Web Address: [employer]  Weight Management   Why it is important to manage your weight:  Being overweight increases your risk of health conditions such as heart disease, high blood pressure, type 2 diabetes, and certain types of cancer  It can also increase your risk for osteoarthritis, sleep apnea, and other respiratory problems  Aim for a slow, steady weight loss  Even a small amount of weight loss can lower your risk of health problems  How to lose weight safely:  A safe and healthy way to lose weight is to eat fewer calories and get regular exercise  You can lose up about 1 pound a week by decreasing the number of calories you eat by 500 calories each day  Healthy meal plan for weight management:  A healthy meal plan includes a variety of foods, contains fewer calories, and helps you stay healthy  A healthy meal plan includes the following:  · Eat whole-grain foods more often  A healthy meal plan should contain fiber  Fiber is the part of grains, fruits, and vegetables that is not broken down by your body   Whole-grain foods are healthy and provide extra fiber in your diet  Some examples of whole-grain foods are whole-wheat breads and pastas, oatmeal, brown rice, and bulgur  · Eat a variety of vegetables every day  Include dark, leafy greens such as spinach, kale, lalit greens, and mustard greens  Eat yellow and orange vegetables such as carrots, sweet potatoes, and winter squash  · Eat a variety of fruits every day  Choose fresh or canned fruit (canned in its own juice or light syrup) instead of juice  Fruit juice has very little or no fiber  · Eat low-fat dairy foods  Drink fat-free (skim) milk or 1% milk  Eat fat-free yogurt and low-fat cottage cheese  Try low-fat cheeses such as mozzarella and other reduced-fat cheeses  · Choose meat and other protein foods that are low in fat  Choose beans or other legumes such as split peas or lentils  Choose fish, skinless poultry (chicken or turkey), or lean cuts of red meat (beef or pork)  Before you cook meat or poultry, cut off any visible fat  · Use less fat and oil  Try baking foods instead of frying them  Add less fat, such as margarine, sour cream, regular salad dressing and mayonnaise to foods  Eat fewer high-fat foods  Some examples of high-fat foods include french fries, doughnuts, ice cream, and cakes  · Eat fewer sweets  Limit foods and drinks that are high in sugar  This includes candy, cookies, regular soda, and sweetened drinks  Exercise:  Exercise at least 30 minutes per day on most days of the week  Some examples of exercise include walking, biking, dancing, and swimming  You can also fit in more physical activity by taking the stairs instead of the elevator or parking farther away from stores  Ask your healthcare provider about the best exercise plan for you     Narcotic (Opioid) Safety    Use narcotics safely:  · Take prescribed narcotics exactly as directed  · Do not give narcotics to others or take narcotics that belong to someone else  · Do not mix narcotics without medicines or alcohol  · Do not drive or operate heavy machinery after you take the narcotic  · Monitor for side effects and notify your healthcare provider if you experienced side effects such as nausea, sleepiness, itching, or trouble thinking clearly  Manage constipation:    Constipation is the most common side effect of narcotic medicine  Constipation is when you have hard, dry bowel movements, or you go longer than usual between bowel movements  Tell your healthcare provider about all changes in your bowel movements while you are taking narcotics  He or she may recommend laxative medicine to help you have a bowel movement  He or she may also change the kind of narcotic you are taking, or change when you take it  The following are more ways you can prevent or relieve constipation:    · Drink liquids as directed  You may need to drink extra liquids to help soften and move your bowels  Ask how much liquid to drink each day and which liquids are best for you  · Eat high-fiber foods  This may help decrease constipation by adding bulk to your bowel movements  High-fiber foods include fruits, vegetables, whole-grain breads and cereals, and beans  Your healthcare provider or dietitian can help you create a high-fiber meal plan  Your provider may also recommend a fiber supplement if you cannot get enough fiber from food  · Exercise regularly  Regular physical activity can help stimulate your intestines  Walking is a good exercise to prevent or relieve constipation  Ask which exercises are best for you  · Schedule a time each day to have a bowel movement  This may help train your body to have regular bowel movements  Bend forward while you are on the toilet to help move the bowel movement out  Sit on the toilet for at least 10 minutes, even if you do not have a bowel movement  Store narcotics safely:   · Store narcotics where others cannot easily get them  Keep them in a locked cabinet or secure area   Do not  keep them in a purse or other bag you carry with you  A person may be looking for something else and find the narcotics  · Make sure narcotics are stored out of the reach of children  A child can easily overdose on narcotics  Narcotics may look like candy to a small child  The best way to dispose of narcotics: The laws vary by country and area  In the [place], the best way is to return the narcotics through a take-back program  This program is offered by the [employer]  The following are options for using the program:  · Take the narcotics to a RENARD collection site  The site is often a law enforcement center  Call your local law enforcement center for scheduled take-back days in your area  You will be given information on where to go if the collection site is in a different location  · Take the narcotics to an approved pharmacy or hospital   A pharmacy or hospital may be set up as a collection site  You will need to ask if it is a RENARD collection site if you were not directed there  A pharmacy or doctor's office may not be able to take back narcotics unless it is a RENARD site  · Use a mail-back system  This means you are given containers to put the narcotics into  You will then mail them in the containers  · Use a take-back drop box  This is a place to leave the narcotics at any time  People and animals will not be able to get into the box  Your local law enforcement agency can tell you where to find a drop box in your area  Other ways to manage pain:   · Ask your healthcare provider about non-narcotic medicines to control pain  Nonprescription medicines include NSAIDs (such as ibuprofen) and acetaminophen  Prescription medicines include muscle relaxers, antidepressants, and steroids  · Pain may be managed without any medicines  Some ways to relieve pain include massage, aromatherapy, or meditation  Physical or occupational therapy may also help      For more information:   · Drug Enforcement Administration  [address] [facility]  [address]  Phone: 3- 707 - 989-8680  Web Address: [facility]/drug_disposal/    · [address]  and Drug Administration  [place] , [address]  Phone: 0- 061 - 213-0782  Web Address: http://[employer]/     © Copyright [employer] 2018 Information is for End User's use only and may not be sold, redistributed or otherwise used for commercial purposes   All illustrations and images included in CareNotes® are the copyrighted property of [employer]  or [facility] Arcenio Mcintyre

## 2021-07-23 NOTE — PROGRESS NOTES
Assessment/Plan:  Do the home colon Ca screening   Needs better controll on the DM   We can increase soliqua to 30 qhs   Cont with crestor and torsemide for cardiac protection   And chf   Add in neb at night for chest congestion   cxr from January was nl     Has not seen cardio for many cardac issues in about 6 months or more   Please follow up with them - ill place the referall again       1  Screening for malignant neoplasm of colon  -     Cologuard; Future    2  Stage 3a chronic kidney disease ([facility] [address] )    3  Other diabetic neurological complication associated with type 2 diabetes mellitus ([facility] [address] )    4  Hypokalemia  -     Basic metabolic panel; Future  -     Magnesium; Future    5  Type 2 diabetes mellitus with diabetic polyneuropathy, with long-term current use of insulin (HCC)  -     Continuous Blood Gluc  (FreeStyle Christel 14 Day [place]) HARDIK; Use to test prn  -     Continuous Blood Gluc Sensor (FreeStyle Christel 14 Day Sensor) MISC; Use to test prn  -     Insulin Glargine-Lixisenatide (Soliqua) 100 units-33 mcg/mL injection pen; Inject 30 Units under the skin daily    6  Chest congestion  -     albuterol (2 5 mg/3 mL) 0 083 % nebulizer solution; Take 3 mL (2 5 mg total) by nebulization 2 (two) times a day    7  Aortic root dilation (HCC)  -     Ambulatory referral to Cardiology; Future    8  Chordae tendineae rupture - Severe mitral regurgitation  -     Ambulatory referral to Cardiology; Future    9  Chronic combined systolic and diastolic congestive heart failure ([facility] [address] )  -     Ambulatory referral to Cardiology; Future    10  [facility]  -     Ambulatory referral to Cardiology; Future    11  Quadriplegia and quadriparesis [facility])  Assessment & Plan:  -stable/controlled, continue same medication  Will evaluate again next visit            Subjective:      Patient ID: Keagan Hart is a 59 y o  male      HPI   Taking Kcl - 1 pill daily and bananas   Stools are more firm -   BM daily to qod and then has few times in a row   Gurgleing with laying on the left sided      DM: on glimepiride 1mg and met 1gm bid? now a1c 9 3 on xultophy and novolog    on xultophy 24u qHS  if BG gives 20u humolog depends    HLD: on crestor 20mg  depression: on celexa 20mg  HTN: on lisinopril 10mg torsemide 20mg  Neuropathy on sofia 300 bid  PVD: needs fem pop bypass with camargo : ASA 81mg daily plavix 75mg  smoker: cant use chantix will try wellbutrin    spinal cord injury C3C4 fusion after fall down steps saw [facility] and botox injections for contractures  muscle spasm and contracture: was on tizanidine he felt baclofen 20 tid was better  here in wheel    severe MR with ORNI showing cordae rupture and flash pulmonary edema  not a candidate for replacement (not strong enough) or clip (has 2 leakages) --> acute on chronic diastolic CHF seeing [facility] cardio EF improving  torsemide 20mg    bph/ acute urinary retention flomax daily    rash on back - saw urgent care - gave keto    The following portions of the patient's history were reviewed and updated as appropriate: allergies, current medications, past family history, past medical history, past social history, past surgical history and problem list     Review of Systems   Constitutional: Positive for fatigue  Negative for activity change, appetite change and fever  HENT: Positive for rhinorrhea  Negative for congestion, nosebleeds and trouble swallowing  Eyes: Negative for itching  Respiratory: Positive for shortness of breath  Negative for cough, chest tightness and wheezing  Cardiovascular: Negative for chest pain and palpitations  Gastrointestinal: Negative for abdominal pain, constipation, diarrhea and nausea  Endocrine: Negative for cold intolerance  Genitourinary: Negative for frequency  Musculoskeletal: Positive for arthralgias, back pain, gait problem, joint swelling, myalgias, neck pain and neck stiffness  Skin: Positive for color change   Negative for rash    Allergic/Immunologic: Negative for immunocompromised state  Neurological: Positive for weakness, light-headedness and numbness  Negative for dizziness, tremors, seizures, syncope and headaches  Psychiatric/Behavioral: Positive for behavioral problems  Negative for hallucinations and suicidal ideas  The patient is nervous/anxious  Objective:      /80 (BP Location: Left arm, Patient Position: Sitting, Cuff Size: Standard)   Pulse 99   Resp 16   Ht 5' 10" (1 778 m)   Wt 83 kg (183 lb)   SpO2 96%   BMI 26 26 kg/m²     No visits with results within 2 Week(s) from this visit     Latest known visit with results is:   Appointment on 06/21/2021   Component Date Value    Sodium 06/21/2021 137     Potassium 06/21/2021 2 3*    Chloride 06/21/2021 97*    CO2 06/21/2021 32     ANION GAP 06/21/2021 8     BUN 06/21/2021 15     Creatinine 06/21/2021 0 78     Glucose 06/21/2021 243*    Calcium 06/21/2021 9 4     AST 06/21/2021 40     ALT 06/21/2021 42     Alkaline Phosphatase 06/21/2021 46     Total Protein 06/21/2021 7 4     Albumin 06/21/2021 3 6     Total Bilirubin 06/21/2021 0 64     eGFR 06/21/2021 96     WBC 06/21/2021 6 39     RBC 06/21/2021 4 02     Hemoglobin 06/21/2021 14 8     Hematocrit 06/21/2021 43 9     MCV 06/21/2021 109*    MCH 06/21/2021 36 8*    MCHC 06/21/2021 33 7     RDW 06/21/2021 13 2     MPV 06/21/2021 10 6     Platelets 24/27/8376 206     nRBC 06/21/2021 0     Neutrophils Relative 06/21/2021 61     Immat GRANS % 06/21/2021 0     Lymphocytes Relative 06/21/2021 24     Monocytes Relative 06/21/2021 12     Eosinophils Relative 06/21/2021 2     Basophils Relative 06/21/2021 1     Neutrophils Absolute 06/21/2021 3 88     Immature Grans Absolute 06/21/2021 0 02     Lymphocytes Absolute 06/21/2021 1 56     Monocytes Absolute 06/21/2021 0 74     Eosinophils Absolute 06/21/2021 0 12     Basophils Absolute 06/21/2021 0 07     NT-proBNP 06/21/2021 523*    Hemoglobin A1C 06/21/2021 9 3*    EAG 06/21/2021 220     Vit D, 25-Hydroxy 06/21/2021 36 8           Physical Exam  Vitals and nursing note reviewed  Constitutional:       General: He is not in acute distress  Appearance: He is well-developed  He is ill-appearing  HENT:      Head: Normocephalic and atraumatic  Cardiovascular:      Rate and Rhythm: Normal rate and regular rhythm  Pulses:           Dorsalis pedis pulses are 0 on the right side and 0 on the left side  Heart sounds: Normal heart sounds  No murmur heard  Pulmonary:      Effort: Pulmonary effort is normal       Breath sounds: Wheezing present  No rales  Abdominal:      General: Bowel sounds are normal  There is no distension  Palpations: Abdomen is soft  Tenderness: There is no abdominal tenderness  There is no guarding or rebound  Musculoskeletal:         General: Deformity and signs of injury present  No tenderness  Normal range of motion  Cervical back: Normal range of motion and neck supple  Comments: contractures   Feet:      Right foot:      Skin integrity: No ulcer, skin breakdown, erythema, warmth, callus or dry skin  Left foot:      Skin integrity: No ulcer, skin breakdown, erythema, warmth, callus or dry skin  Lymphadenopathy:      Cervical: No cervical adenopathy  Skin:     General: Skin is warm and dry  Capillary Refill: Capillary refill takes less than 2 seconds  Findings: Erythema present  No rash  Neurological:      Mental Status: He is alert and oriented to person, place, and time  Cranial Nerves: No cranial nerve deficit  Sensory: Sensory deficit present  Motor: Weakness present  No abnormal muscle tone  Coordination: Coordination abnormal       Gait: Gait abnormal    Psychiatric:         Behavior: Behavior normal          Thought Content:  Thought content normal          Judgment: Judgment normal              Luke Mendez MD  [facility]

## 2021-07-24 RX ORDER — INSULIN GLARGINE AND LIXISENATIDE 100; 33 U/ML; UG/ML
30 INJECTION, SOLUTION SUBCUTANEOUS DAILY
Qty: 15 ML | Refills: 1 | Status: SHIPPED | OUTPATIENT
Start: 2021-07-24 | End: 2021-10-11

## 2021-07-28 ENCOUNTER — TELEPHONE (OUTPATIENT)
Dept: LAB | Facility: HOSPITAL | Age: 61
End: 2021-07-28

## 2021-07-29 ENCOUNTER — TELEPHONE (OUTPATIENT)
Dept: LAB | Facility: HOSPITAL | Age: 61
End: 2021-07-29

## 2021-08-09 DIAGNOSIS — Z79.01 LONG TERM (CURRENT) USE OF ANTICOAGULANTS: ICD-10-CM

## 2021-08-10 ENCOUNTER — APPOINTMENT (OUTPATIENT)
Dept: LAB | Facility: HOSPITAL | Age: 61
End: 2021-08-10
Payer: COMMERCIAL

## 2021-08-10 DIAGNOSIS — E55.9 VITAMIN D DEFICIENCY: ICD-10-CM

## 2021-08-10 DIAGNOSIS — E87.6 HYPOKALEMIA: ICD-10-CM

## 2021-08-10 LAB
25(OH)D3 SERPL-MCNC: 33.4 NG/ML (ref 30–100)
ANION GAP SERPL CALCULATED.3IONS-SCNC: 11 MMOL/L (ref 4–13)
BUN SERPL-MCNC: 16 MG/DL (ref 5–25)
CALCIUM SERPL-MCNC: 9 MG/DL (ref 8.3–10.1)
CHLORIDE SERPL-SCNC: 96 MMOL/L (ref 100–108)
CO2 SERPL-SCNC: 29 MMOL/L (ref 21–32)
CREAT SERPL-MCNC: 0.75 MG/DL (ref 0.6–1.3)
GFR SERPL CREATININE-BSD FRML MDRD: 98 ML/MIN/1.73SQ M
GLUCOSE SERPL-MCNC: 181 MG/DL (ref 65–140)
MAGNESIUM SERPL-MCNC: 1.6 MG/DL (ref 1.6–2.6)
POTASSIUM SERPL-SCNC: 3 MMOL/L (ref 3.5–5.3)
SODIUM SERPL-SCNC: 136 MMOL/L (ref 136–145)

## 2021-08-10 PROCEDURE — 80048 BASIC METABOLIC PNL TOTAL CA: CPT

## 2021-08-10 PROCEDURE — 36415 COLL VENOUS BLD VENIPUNCTURE: CPT

## 2021-08-10 PROCEDURE — 82306 VITAMIN D 25 HYDROXY: CPT

## 2021-08-10 PROCEDURE — 83735 ASSAY OF MAGNESIUM: CPT

## 2021-08-10 RX ORDER — CLOPIDOGREL BISULFATE 75 MG/1
TABLET ORAL
Qty: 60 TABLET | Refills: 0 | Status: SHIPPED | OUTPATIENT
Start: 2021-08-10 | End: 2021-10-06 | Stop reason: SDUPTHER

## 2021-08-13 ENCOUNTER — TELEPHONE (OUTPATIENT)
Dept: FAMILY MEDICINE CLINIC | Facility: CLINIC | Age: 61
End: 2021-08-13

## 2021-08-13 NOTE — TELEPHONE ENCOUNTER
----- Message from Amish Fischer MD sent at 8/12/2021  4:34 PM EDT -----  Well take the potasium 3x a day now please   Sugar is better on this one   Take magnesium otc  500mg daily  to 2x daily   And vit D is good!   No changes there

## 2021-09-14 ENCOUNTER — TELEPHONE (OUTPATIENT)
Dept: FAMILY MEDICINE CLINIC | Facility: CLINIC | Age: 61
End: 2021-09-14

## 2021-09-14 NOTE — TELEPHONE ENCOUNTER
Lyrica would replace sofia   I can order it if they would like to try it   Its about the same but I think I would try it anyways too

## 2021-09-14 NOTE — TELEPHONE ENCOUNTER
I spoke with Daniella and she said that Lyrica would be fine to try  I did advise her that this would replace the gabapentin, she verbalized understanding  Please send to [facility] in [place]

## 2021-09-20 DIAGNOSIS — E11.42 TYPE 2 DIABETES MELLITUS WITH DIABETIC POLYNEUROPATHY, WITH LONG-TERM CURRENT USE OF INSULIN (HCC): ICD-10-CM

## 2021-09-20 DIAGNOSIS — Z79.4 TYPE 2 DIABETES MELLITUS WITH DIABETIC POLYNEUROPATHY, WITH LONG-TERM CURRENT USE OF INSULIN (HCC): ICD-10-CM

## 2021-09-21 RX ORDER — INSULIN LISPRO 100 [IU]/ML
INJECTION, SOLUTION INTRAVENOUS; SUBCUTANEOUS
Qty: 30 ML | Refills: 6 | Status: SHIPPED | OUTPATIENT
Start: 2021-09-21 | End: 2022-04-27 | Stop reason: SDUPTHER

## 2021-10-06 DIAGNOSIS — Z79.01 LONG TERM (CURRENT) USE OF ANTICOAGULANTS: ICD-10-CM

## 2021-10-06 DIAGNOSIS — E11.49 OTHER DIABETIC NEUROLOGICAL COMPLICATION ASSOCIATED WITH TYPE 2 DIABETES MELLITUS (HCC): ICD-10-CM

## 2021-10-06 RX ORDER — CLOPIDOGREL BISULFATE 75 MG/1
75 TABLET ORAL DAILY
Qty: 90 TABLET | Refills: 1 | Status: SHIPPED | OUTPATIENT
Start: 2021-10-06 | End: 2022-03-15 | Stop reason: SDUPTHER

## 2021-10-06 RX ORDER — BACLOFEN 20 MG/1
20 TABLET ORAL 3 TIMES DAILY
Qty: 270 TABLET | Refills: 1 | Status: SHIPPED | OUTPATIENT
Start: 2021-10-06 | End: 2022-03-21 | Stop reason: SDUPTHER

## 2021-10-11 DIAGNOSIS — Z79.4 TYPE 2 DIABETES MELLITUS WITH DIABETIC POLYNEUROPATHY, WITH LONG-TERM CURRENT USE OF INSULIN (HCC): ICD-10-CM

## 2021-10-11 DIAGNOSIS — E11.42 TYPE 2 DIABETES MELLITUS WITH DIABETIC POLYNEUROPATHY, WITH LONG-TERM CURRENT USE OF INSULIN (HCC): ICD-10-CM

## 2021-10-11 RX ORDER — INSULIN GLARGINE AND LIXISENATIDE 100; 33 U/ML; UG/ML
INJECTION, SOLUTION SUBCUTANEOUS
Qty: 30 ML | Refills: 3 | Status: SHIPPED | OUTPATIENT
Start: 2021-10-11

## 2021-11-18 DIAGNOSIS — B34.9 VIRAL INFECTION, UNSPECIFIED: Primary | ICD-10-CM

## 2021-11-19 ENCOUNTER — VBI (OUTPATIENT)
Dept: ADMINISTRATIVE | Facility: OTHER | Age: 61
End: 2021-11-19

## 2022-01-01 ENCOUNTER — HOME CARE VISIT (OUTPATIENT)
Dept: HOME HEALTH SERVICES | Facility: HOME HEALTHCARE | Age: 62
End: 2022-01-01

## 2022-01-01 ENCOUNTER — TELEPHONE (OUTPATIENT)
Dept: FAMILY MEDICINE CLINIC | Facility: CLINIC | Age: 62
End: 2022-01-01

## 2022-01-01 ENCOUNTER — HOME CARE VISIT (OUTPATIENT)
Dept: HOME HOSPICE | Facility: HOSPICE | Age: 62
End: 2022-01-01

## 2022-01-01 ENCOUNTER — HOSPICE ADMISSION (OUTPATIENT)
Dept: HOME HOSPICE | Facility: HOSPICE | Age: 62
End: 2022-01-01

## 2022-01-01 VITALS
RESPIRATION RATE: 16 BRPM | HEART RATE: 102 BPM | DIASTOLIC BLOOD PRESSURE: 58 MMHG | TEMPERATURE: 98.2 F | SYSTOLIC BLOOD PRESSURE: 102 MMHG

## 2022-01-01 DIAGNOSIS — Z51.5 HOSPICE CARE: Primary | ICD-10-CM

## 2022-01-01 DIAGNOSIS — C25.9 PANCREATIC ADENOCARCINOMA (HCC): ICD-10-CM

## 2022-01-01 RX ORDER — MORPHINE SULFATE 100 MG/5ML
SOLUTION, CONCENTRATE ORAL
Qty: 30 ML | Refills: 0 | Status: SHIPPED | OUTPATIENT
Start: 2022-01-01

## 2022-01-01 NOTE — ASSESSMENT & PLAN NOTE
Appreciate vascular surgery input, outpatient follow up  Bilateral LEADs today, results pending can f/u in office  This note was copied from the mother's chart.  Breastfeeding support services met with mother and infant.    Consult time was [id number], 11 minutes for breast feeding follow up.    Mother states it was a rough night, but she will figure it out. She is feeding every 2-3 hours for 7-15 minutes at a time per side.   Encouraged feeding for a little longer, but mother feels her nipples are sore and she isn't producing the milk yet. She is hanging tough.  Explained demand and supply and reinforced her thick milk is coming.   Voids were 4 stools were 8 and he has transitioning poop. Weight is at 5.47% loss, which is within the normal range.  Follow up phone call is needed.   No other questions at this time.     Lactation to follow up in 2 days.

## 2022-01-07 ENCOUNTER — VBI (OUTPATIENT)
Dept: ADMINISTRATIVE | Facility: OTHER | Age: 62
End: 2022-01-07

## 2022-03-15 DIAGNOSIS — Z79.01 LONG TERM (CURRENT) USE OF ANTICOAGULANTS: ICD-10-CM

## 2022-03-15 RX ORDER — CLOPIDOGREL BISULFATE 75 MG/1
75 TABLET ORAL DAILY
Qty: 90 TABLET | Refills: 1 | Status: SHIPPED | OUTPATIENT
Start: 2022-03-15 | End: 2022-07-18

## 2022-03-21 DIAGNOSIS — E11.49 OTHER DIABETIC NEUROLOGICAL COMPLICATION ASSOCIATED WITH TYPE 2 DIABETES MELLITUS (HCC): ICD-10-CM

## 2022-03-21 RX ORDER — BACLOFEN 20 MG/1
20 TABLET ORAL 3 TIMES DAILY
Qty: 270 TABLET | Refills: 1 | Status: SHIPPED | OUTPATIENT
Start: 2022-03-21

## 2022-03-31 ENCOUNTER — TELEPHONE (OUTPATIENT)
Dept: FAMILY MEDICINE CLINIC | Facility: CLINIC | Age: 62
End: 2022-03-31

## 2022-03-31 NOTE — TELEPHONE ENCOUNTER
Patients wife called and advised that her husbands urine is very red  Dr Delbert Stark was sitting in the clinical area and advised me to tell his wife he NEEDS to go to the ER  Patients wife advised she would try   I advised patients wife HE NEEDS TO GO

## 2022-04-03 ENCOUNTER — HOSPITAL ENCOUNTER (EMERGENCY)
Facility: HOSPITAL | Age: 62
Discharge: HOME | End: 2022-04-03
Attending: EMERGENCY MEDICINE | Admitting: EMERGENCY MEDICINE
Payer: COMMERCIAL

## 2022-04-03 ENCOUNTER — APPOINTMENT (EMERGENCY)
Dept: RADIOLOGY | Facility: HOSPITAL | Age: 62
End: 2022-04-03
Payer: COMMERCIAL

## 2022-04-03 VITALS
HEART RATE: 88 BPM | RESPIRATION RATE: 20 BRPM | OXYGEN SATURATION: 97 % | TEMPERATURE: 97.1 F | DIASTOLIC BLOOD PRESSURE: 57 MMHG | SYSTOLIC BLOOD PRESSURE: 114 MMHG

## 2022-04-03 DIAGNOSIS — E87.6 HYPOKALEMIA: ICD-10-CM

## 2022-04-03 DIAGNOSIS — R31.9 HEMATURIA: Primary | ICD-10-CM

## 2022-04-03 DIAGNOSIS — R19.7 DIARRHEA: ICD-10-CM

## 2022-04-03 LAB
ANION GAP SERPL CALCULATED.3IONS-SCNC: 5 MMOL/L (ref 4–13)
ANION GAP SERPL CALCULATED.3IONS-SCNC: 9 MMOL/L (ref 4–13)
APTT PPP: 31 SECONDS (ref 23–37)
BACTERIA UR QL AUTO: NORMAL /HPF
BASOPHILS # BLD AUTO: 0.05 THOUSANDS/ΜL (ref 0–0.1)
BASOPHILS NFR BLD AUTO: 1 % (ref 0–1)
BILIRUB UR QL STRIP: NEGATIVE
BUN SERPL-MCNC: 12 MG/DL (ref 5–25)
BUN SERPL-MCNC: 14 MG/DL (ref 5–25)
CALCIUM SERPL-MCNC: 8.9 MG/DL (ref 8.3–10.1)
CALCIUM SERPL-MCNC: 8.9 MG/DL (ref 8.3–10.1)
CHLORIDE SERPL-SCNC: 96 MMOL/L (ref 100–108)
CHLORIDE SERPL-SCNC: 99 MMOL/L (ref 100–108)
CLARITY UR: CLEAR
CO2 SERPL-SCNC: 31 MMOL/L (ref 21–32)
CO2 SERPL-SCNC: 33 MMOL/L (ref 21–32)
COLOR UR: ABNORMAL
CREAT SERPL-MCNC: 0.82 MG/DL (ref 0.6–1.3)
CREAT SERPL-MCNC: 0.9 MG/DL (ref 0.6–1.3)
EOSINOPHIL # BLD AUTO: 0.1 THOUSAND/ΜL (ref 0–0.61)
EOSINOPHIL NFR BLD AUTO: 2 % (ref 0–6)
ERYTHROCYTE [DISTWIDTH] IN BLOOD BY AUTOMATED COUNT: 14.8 % (ref 11.6–15.1)
GFR SERPL CREATININE-BSD FRML MDRD: 90 ML/MIN/1.73SQ M
GFR SERPL CREATININE-BSD FRML MDRD: 94 ML/MIN/1.73SQ M
GLUCOSE SERPL-MCNC: 266 MG/DL (ref 65–140)
GLUCOSE SERPL-MCNC: 320 MG/DL (ref 65–140)
GLUCOSE SERPL-MCNC: 358 MG/DL (ref 65–140)
GLUCOSE UR STRIP-MCNC: ABNORMAL MG/DL
HCT VFR BLD AUTO: 42.4 % (ref 36.5–49.3)
HGB BLD-MCNC: 14.3 G/DL (ref 12–17)
HGB UR QL STRIP.AUTO: NEGATIVE
IMM GRANULOCYTES # BLD AUTO: 0.03 THOUSAND/UL (ref 0–0.2)
IMM GRANULOCYTES NFR BLD AUTO: 0 % (ref 0–2)
INR PPP: 0.96 (ref 0.84–1.19)
KETONES UR STRIP-MCNC: ABNORMAL MG/DL
LEUKOCYTE ESTERASE UR QL STRIP: ABNORMAL
LYMPHOCYTES # BLD AUTO: 1.26 THOUSANDS/ΜL (ref 0.6–4.47)
LYMPHOCYTES NFR BLD AUTO: 19 % (ref 14–44)
MCH RBC QN AUTO: 35.4 PG (ref 26.8–34.3)
MCHC RBC AUTO-ENTMCNC: 33.7 G/DL (ref 31.4–37.4)
MCV RBC AUTO: 105 FL (ref 82–98)
MONOCYTES # BLD AUTO: 0.85 THOUSAND/ΜL (ref 0.17–1.22)
MONOCYTES NFR BLD AUTO: 13 % (ref 4–12)
NEUTROPHILS # BLD AUTO: 4.45 THOUSANDS/ΜL (ref 1.85–7.62)
NEUTS SEG NFR BLD AUTO: 65 % (ref 43–75)
NITRITE UR QL STRIP: NEGATIVE
NON-SQ EPI CELLS URNS QL MICRO: NORMAL /HPF
NRBC BLD AUTO-RTO: 0 /100 WBCS
PH UR STRIP.AUTO: 6.5 [PH]
PLATELET # BLD AUTO: 185 THOUSANDS/UL (ref 149–390)
PMV BLD AUTO: 11.4 FL (ref 8.9–12.7)
POTASSIUM SERPL-SCNC: 2.9 MMOL/L (ref 3.5–5.3)
POTASSIUM SERPL-SCNC: 3 MMOL/L (ref 3.5–5.3)
PROT UR STRIP-MCNC: ABNORMAL MG/DL
PROTHROMBIN TIME: 12.4 SECONDS (ref 11.6–14.5)
RBC # BLD AUTO: 4.04 MILLION/UL (ref 3.88–5.62)
RBC #/AREA URNS AUTO: NORMAL /HPF
SODIUM SERPL-SCNC: 136 MMOL/L (ref 136–145)
SODIUM SERPL-SCNC: 137 MMOL/L (ref 136–145)
SP GR UR STRIP.AUTO: 1.02 (ref 1–1.03)
UROBILINOGEN UR STRIP-ACNC: <2 MG/DL
WBC # BLD AUTO: 6.74 THOUSAND/UL (ref 4.31–10.16)
WBC #/AREA URNS AUTO: NORMAL /HPF

## 2022-04-03 PROCEDURE — 36415 COLL VENOUS BLD VENIPUNCTURE: CPT | Performed by: EMERGENCY MEDICINE

## 2022-04-03 PROCEDURE — 81001 URINALYSIS AUTO W/SCOPE: CPT | Performed by: EMERGENCY MEDICINE

## 2022-04-03 PROCEDURE — 96372 THER/PROPH/DIAG INJ SC/IM: CPT

## 2022-04-03 PROCEDURE — 71046 X-RAY EXAM CHEST 2 VIEWS: CPT

## 2022-04-03 PROCEDURE — 93005 ELECTROCARDIOGRAM TRACING: CPT

## 2022-04-03 PROCEDURE — 96365 THER/PROPH/DIAG IV INF INIT: CPT

## 2022-04-03 PROCEDURE — 82948 REAGENT STRIP/BLOOD GLUCOSE: CPT

## 2022-04-03 PROCEDURE — 85610 PROTHROMBIN TIME: CPT | Performed by: EMERGENCY MEDICINE

## 2022-04-03 PROCEDURE — 85025 COMPLETE CBC W/AUTO DIFF WBC: CPT | Performed by: EMERGENCY MEDICINE

## 2022-04-03 PROCEDURE — 99284 EMERGENCY DEPT VISIT MOD MDM: CPT

## 2022-04-03 PROCEDURE — 99284 EMERGENCY DEPT VISIT MOD MDM: CPT | Performed by: EMERGENCY MEDICINE

## 2022-04-03 PROCEDURE — 80048 BASIC METABOLIC PNL TOTAL CA: CPT | Performed by: EMERGENCY MEDICINE

## 2022-04-03 PROCEDURE — 85730 THROMBOPLASTIN TIME PARTIAL: CPT | Performed by: EMERGENCY MEDICINE

## 2022-04-03 RX ORDER — NICOTINE 21 MG/24HR
14 PATCH, TRANSDERMAL 24 HOURS TRANSDERMAL ONCE
Status: DISCONTINUED | OUTPATIENT
Start: 2022-04-03 | End: 2022-04-03 | Stop reason: HOSPADM

## 2022-04-03 RX ORDER — POTASSIUM CHLORIDE 20 MEQ/1
40 TABLET, EXTENDED RELEASE ORAL ONCE
Status: COMPLETED | OUTPATIENT
Start: 2022-04-03 | End: 2022-04-03

## 2022-04-03 RX ORDER — POTASSIUM CHLORIDE 750 MG/1
20 TABLET, EXTENDED RELEASE ORAL 2 TIMES DAILY
Qty: 28 TABLET | Refills: 0 | Status: SHIPPED | OUTPATIENT
Start: 2022-04-03 | End: 2022-04-27

## 2022-04-03 RX ORDER — SODIUM CHLORIDE, SODIUM GLUCONATE, SODIUM ACETATE, POTASSIUM CHLORIDE, MAGNESIUM CHLORIDE, SODIUM PHOSPHATE, DIBASIC, AND POTASSIUM PHOSPHATE .53; .5; .37; .037; .03; .012; .00082 G/100ML; G/100ML; G/100ML; G/100ML; G/100ML; G/100ML; G/100ML
1000 INJECTION, SOLUTION INTRAVENOUS ONCE
Status: DISCONTINUED | OUTPATIENT
Start: 2022-04-03 | End: 2022-04-03

## 2022-04-03 RX ORDER — SODIUM CHLORIDE, SODIUM GLUCONATE, SODIUM ACETATE, POTASSIUM CHLORIDE, MAGNESIUM CHLORIDE, SODIUM PHOSPHATE, DIBASIC, AND POTASSIUM PHOSPHATE .53; .5; .37; .037; .03; .012; .00082 G/100ML; G/100ML; G/100ML; G/100ML; G/100ML; G/100ML; G/100ML
500 INJECTION, SOLUTION INTRAVENOUS ONCE
Status: COMPLETED | OUTPATIENT
Start: 2022-04-03 | End: 2022-04-03

## 2022-04-03 RX ADMIN — SODIUM CHLORIDE, SODIUM GLUCONATE, SODIUM ACETATE, POTASSIUM CHLORIDE, MAGNESIUM CHLORIDE, SODIUM PHOSPHATE, DIBASIC, AND POTASSIUM PHOSPHATE 500 ML: .53; .5; .37; .037; .03; .012; .00082 INJECTION, SOLUTION INTRAVENOUS at 14:47

## 2022-04-03 RX ADMIN — POTASSIUM CHLORIDE 40 MEQ: 1500 TABLET, EXTENDED RELEASE ORAL at 15:26

## 2022-04-03 RX ADMIN — NICOTINE 14 MG: 14 PATCH, EXTENDED RELEASE TRANSDERMAL at 17:15

## 2022-04-03 RX ADMIN — POTASSIUM CHLORIDE 40 MEQ: 1500 TABLET, EXTENDED RELEASE ORAL at 13:10

## 2022-04-03 RX ADMIN — INSULIN HUMAN 10 UNITS: 100 INJECTION, SOLUTION PARENTERAL at 14:49
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
## 2022-04-03 NOTE — DISCHARGE INSTRUCTIONS
Please take medications as prescribed  Follow up with your PCP within 1 week for reevaluation  Return to the emergency department for any new or worsening symptoms including fevers, chills, chest pain, difficulty breathing, recurrent bleeding, worsening diarrhea, or other concerning symptoms

## 2022-04-03 NOTE — ED ATTENDING ATTESTATION
4/3/2022  ICarmella MD, saw and evaluated the patient  I have discussed the patient with the resident/non-physician practitioner and agree with the resident's/non-physician practitioner's findings, Plan of Care, and MDM as documented in the resident's/non-physician practitioner's note, except where noted  All available labs and Radiology studies were reviewed  I was present for key portions of any procedure(s) performed by the resident/non-physician practitioner and I was immediately available to provide assistance  At this point I agree with the current assessment done in the Emergency Department  I have conducted an independent evaluation of this patient a history and physical is as follows:    ED Course     Patient presents for evaluation secondary to hematuria  Symptoms started 4 days ago  PAtient was told to come to the ED by his PCP but he did not want to come in at that time  Since that time, symptoms have resolved  Patient does report some difficulty with bowel continence, but no additional complaints  A/P: Hematuria  Will check labs and urine  Patient has been reporting some cough, will check CXR       Critical Care Time  Procedures

## 2022-04-04 LAB
ATRIAL RATE: 85 BPM
P AXIS: 85 DEGREES
PR INTERVAL: 140 MS
QRS AXIS: 264 DEGREES
QRSD INTERVAL: 134 MS
QT INTERVAL: 432 MS
QTC INTERVAL: 514 MS
T WAVE AXIS: 67 DEGREES
VENTRICULAR RATE: 85 BPM

## 2022-04-04 PROCEDURE — 93010 ELECTROCARDIOGRAM REPORT: CPT | Performed by: INTERNAL MEDICINE

## 2022-04-06 ENCOUNTER — TELEPHONE (OUTPATIENT)
Dept: LAB | Facility: HOSPITAL | Age: 62
End: 2022-04-06

## 2022-04-06 ENCOUNTER — TELEPHONE (OUTPATIENT)
Dept: FAMILY MEDICINE CLINIC | Facility: CLINIC | Age: 62
End: 2022-04-06

## 2022-04-06 NOTE — TELEPHONE ENCOUNTER
Patient's wife called and stated Marisol Garsia was in the ER on Sunday and they gave him Potassium to take for 7 days  She wants to know if she should continue with OTC potassium and if so how much should he get  He is scheduled for an appointment on 4/27    Please advise

## 2022-04-11 DIAGNOSIS — E11.49 OTHER DIABETIC NEUROLOGICAL COMPLICATION ASSOCIATED WITH TYPE 2 DIABETES MELLITUS (HCC): ICD-10-CM

## 2022-04-12 ENCOUNTER — APPOINTMENT (OUTPATIENT)
Dept: LAB | Facility: HOSPITAL | Age: 62
End: 2022-04-12
Payer: COMMERCIAL

## 2022-04-12 DIAGNOSIS — E87.6 HYPOKALEMIA: ICD-10-CM

## 2022-04-12 DIAGNOSIS — I10 ESSENTIAL HYPERTENSION: ICD-10-CM

## 2022-04-12 DIAGNOSIS — Z79.4 TYPE 2 DIABETES MELLITUS WITH DIABETIC POLYNEUROPATHY, WITH LONG-TERM CURRENT USE OF INSULIN (HCC): ICD-10-CM

## 2022-04-12 DIAGNOSIS — E11.42 TYPE 2 DIABETES MELLITUS WITH DIABETIC POLYNEUROPATHY, WITH LONG-TERM CURRENT USE OF INSULIN (HCC): ICD-10-CM

## 2022-04-12 LAB
ANION GAP SERPL CALCULATED.3IONS-SCNC: 7 MMOL/L (ref 4–13)
BUN SERPL-MCNC: 15 MG/DL (ref 5–25)
CALCIUM SERPL-MCNC: 9.4 MG/DL (ref 8.3–10.1)
CHLORIDE SERPL-SCNC: 102 MMOL/L (ref 100–108)
CHOLEST SERPL-MCNC: 103 MG/DL
CO2 SERPL-SCNC: 30 MMOL/L (ref 21–32)
CREAT SERPL-MCNC: 0.8 MG/DL (ref 0.6–1.3)
GFR SERPL CREATININE-BSD FRML MDRD: 95 ML/MIN/1.73SQ M
GLUCOSE SERPL-MCNC: 204 MG/DL (ref 65–140)
HDLC SERPL-MCNC: 68 MG/DL
LDLC SERPL CALC-MCNC: 19 MG/DL (ref 0–100)
MAGNESIUM SERPL-MCNC: 1.8 MG/DL (ref 1.6–2.6)
NT-PROBNP SERPL-MCNC: 1118 PG/ML
POTASSIUM SERPL-SCNC: 4 MMOL/L (ref 3.5–5.3)
SODIUM SERPL-SCNC: 139 MMOL/L (ref 136–145)
TRIGL SERPL-MCNC: 79 MG/DL

## 2022-04-12 PROCEDURE — 83880 ASSAY OF NATRIURETIC PEPTIDE: CPT

## 2022-04-12 PROCEDURE — 83735 ASSAY OF MAGNESIUM: CPT

## 2022-04-12 PROCEDURE — 36415 COLL VENOUS BLD VENIPUNCTURE: CPT

## 2022-04-12 PROCEDURE — 80048 BASIC METABOLIC PNL TOTAL CA: CPT

## 2022-04-12 PROCEDURE — 83036 HEMOGLOBIN GLYCOSYLATED A1C: CPT

## 2022-04-12 PROCEDURE — 80061 LIPID PANEL: CPT

## 2022-04-12 RX ORDER — PREGABALIN 100 MG/1
CAPSULE ORAL
Qty: 270 CAPSULE | Refills: 1 | Status: SHIPPED | OUTPATIENT
Start: 2022-04-12

## 2022-04-13 LAB
EST. AVERAGE GLUCOSE BLD GHB EST-MCNC: 217 MG/DL
HBA1C MFR BLD: 9.2 %

## 2022-04-21 ENCOUNTER — TELEPHONE (OUTPATIENT)
Dept: FAMILY MEDICINE CLINIC | Facility: CLINIC | Age: 62
End: 2022-04-21

## 2022-04-21 NOTE — TELEPHONE ENCOUNTER
Dainella called to get the results of Jovan's labs  I told her the doctor had not reviewed them yet and that the patient had an appt on 4/27 and would probably discuss them with him then  I told her I would let the doctor know she called

## 2022-04-27 ENCOUNTER — TELEMEDICINE (OUTPATIENT)
Dept: FAMILY MEDICINE CLINIC | Facility: CLINIC | Age: 62
End: 2022-04-27
Payer: COMMERCIAL

## 2022-04-27 VITALS — HEIGHT: 70 IN | BODY MASS INDEX: 25.48 KG/M2 | WEIGHT: 178 LBS

## 2022-04-27 DIAGNOSIS — I50.42 CHRONIC COMBINED SYSTOLIC AND DIASTOLIC CONGESTIVE HEART FAILURE (HCC): ICD-10-CM

## 2022-04-27 DIAGNOSIS — Z79.4 TYPE 2 DIABETES MELLITUS WITH DIABETIC POLYNEUROPATHY, WITH LONG-TERM CURRENT USE OF INSULIN (HCC): ICD-10-CM

## 2022-04-27 DIAGNOSIS — E11.42 TYPE 2 DIABETES MELLITUS WITH DIABETIC POLYNEUROPATHY, WITH LONG-TERM CURRENT USE OF INSULIN (HCC): ICD-10-CM

## 2022-04-27 DIAGNOSIS — E87.6 HYPOKALEMIA: Primary | ICD-10-CM

## 2022-04-27 DIAGNOSIS — G82.50 QUADRIPLEGIA AND QUADRIPARESIS (HCC): ICD-10-CM

## 2022-04-27 DIAGNOSIS — J44.9 CHRONIC OBSTRUCTIVE PULMONARY DISEASE, UNSPECIFIED COPD TYPE (HCC): ICD-10-CM

## 2022-04-27 PROCEDURE — 99443 PR PHYS/QHP TELEPHONE EVALUATION 21-30 MIN: CPT | Performed by: FAMILY MEDICINE

## 2022-04-27 RX ORDER — CEPHALEXIN 500 MG/1
CAPSULE ORAL
COMMUNITY
Start: 2022-04-26

## 2022-04-27 RX ORDER — POTASSIUM CHLORIDE 20 MEQ/1
20 TABLET, EXTENDED RELEASE ORAL 2 TIMES DAILY
Qty: 180 TABLET | Refills: 1 | Status: SHIPPED | OUTPATIENT
Start: 2022-04-27

## 2022-04-27 RX ORDER — INSULIN LISPRO 100 [IU]/ML
24 INJECTION, SOLUTION INTRAVENOUS; SUBCUTANEOUS
Qty: 66 ML | Refills: 1 | Status: SHIPPED | OUTPATIENT
Start: 2022-04-27 | End: 2022-07-26

## 2022-04-27 NOTE — PROGRESS NOTES
Virtual Brief Visit    Patient is located in the following state in which I hold an active license [place]      Assessment/Plan:  Please increase humalog to 24u tid with meals unless the the breakfast is light then use 12u   Keep the soliqua the same at 32 for now please   a1c stable at 9 2 9 3   Copd is stable no need for nebulizers   DM neuro pain is moderately controled on lyrica 100 tid   Mood is stable on celexa 20mg   HLD goal of ldl under 70 - on crestor 20 and very good ()could consider backing down to 10)    CHF  Chronic diastolic: on torsemide 85WK qd with kcl 20 meq  Bid  - some edema and BNP doubled  - will consider taking additional 1/2 torsemide in the after noon   HypoK: was very low due to diarrhea - better now on otc kcl and 1 banana a day  4 0  With Mag 1 8   PAD: on plavix - stable   BPH: on flomax and stable   Quadriparesis: on baclofen 20mg tid   Vit D def 50K qW     Problem List Items Addressed This Visit        Endocrine    Diabetes mellitus type 2 - Diabetic neuropathy    Relevant Medications    insulin lispro (HumaLOG KwikPen) 100 units/mL injection pen    Other Relevant Orders    Ambulatory Referral to [address] [place] [address]       Cardiovascular and Mediastinum    Chronic combined systolic and diastolic congestive heart failure ([facility] )    Relevant Orders    Ambulatory Referral to [address] [place] [address] and Auditory    Quadriplegia and quadriparesis ([facility] )    Relevant Orders    Ambulatory Referral to [address] [place] [address]      Other Visit Diagnoses     Hypokalemia    -  Primary    Relevant Medications    potassium chloride (K-DUR,KLOR-CON) 20 mEq tablet    Chronic obstructive pulmonary disease, unspecified COPD type ([facility] )        Relevant Orders    Ambulatory Referral to [facility] Visits  Date Type Provider Dept   04/21/22 Telephone Rahel Moise MD [facility]   Showing recent visits within past 7 days and meeting all other requirements  Today's Visits  Date Type Provider Dept   04/27/22 Telemedicine Miguel Pearson MD [facility]   Showing today's visits and meeting all other requirements  Future Appointments  No visits were found meeting these conditions    Showing future appointments within next 150 days and meeting all other requirements     Was seen in the ER for hematuria   And K was really low   Bowels are better  Now   humalog 24 bid  And soliqua  26    Usually can get out and  baseball but he is declining in the past month or 2 and cannot move around as well   He would benefit from home health eval for his DM COPD functional capacity as he is too weak to come to the office for the visit   He was not able to do the video visit and it was done as phone only       I spent 30 minutes directly with the patient during this visit

## 2022-04-27 NOTE — PATIENT INSTRUCTIONS

## 2022-05-04 DIAGNOSIS — Z79.4 TYPE 2 DIABETES MELLITUS WITH DIABETIC POLYNEUROPATHY, WITH LONG-TERM CURRENT USE OF INSULIN (HCC): ICD-10-CM

## 2022-05-04 DIAGNOSIS — E11.42 TYPE 2 DIABETES MELLITUS WITH DIABETIC POLYNEUROPATHY, WITH LONG-TERM CURRENT USE OF INSULIN (HCC): ICD-10-CM

## 2022-05-04 RX ORDER — PEN NEEDLE, DIABETIC 32GX 5/32"
NEEDLE, DISPOSABLE MISCELLANEOUS
Qty: 100 EACH | Refills: 5 | Status: SHIPPED | OUTPATIENT
Start: 2022-05-04 | End: 2022-06-07 | Stop reason: SDUPTHER

## 2022-05-05 ENCOUNTER — TELEPHONE (OUTPATIENT)
Dept: FAMILY MEDICINE CLINIC | Facility: CLINIC | Age: 62
End: 2022-05-05

## 2022-05-05 NOTE — TELEPHONE ENCOUNTER
[facility] called regarding the order for Home Health they have to decline at this time they have hit maximum capacity

## 2022-06-06 NOTE — TELEPHONE ENCOUNTER
Alcira, from [address] [place] [address], called to request that the order for the [facility] be changed to reflect that the patient injects 3 times a day instead of 2 times

## 2022-06-07 DIAGNOSIS — Z79.4 TYPE 2 DIABETES MELLITUS WITH DIABETIC POLYNEUROPATHY, WITH LONG-TERM CURRENT USE OF INSULIN (HCC): ICD-10-CM

## 2022-06-07 DIAGNOSIS — E11.42 TYPE 2 DIABETES MELLITUS WITH DIABETIC POLYNEUROPATHY, WITH LONG-TERM CURRENT USE OF INSULIN (HCC): ICD-10-CM

## 2022-06-07 RX ORDER — PEN NEEDLE, DIABETIC 32GX 5/32"
NEEDLE, DISPOSABLE MISCELLANEOUS
Qty: 300 EACH | Refills: 11 | Status: SHIPPED | OUTPATIENT
Start: 2022-06-07

## 2022-07-18 DIAGNOSIS — Z79.01 LONG TERM (CURRENT) USE OF ANTICOAGULANTS: ICD-10-CM

## 2022-07-18 RX ORDER — CLOPIDOGREL BISULFATE 75 MG/1
TABLET ORAL
Qty: 90 TABLET | Refills: 3 | Status: SHIPPED | OUTPATIENT
Start: 2022-07-18

## 2022-09-01 DIAGNOSIS — F32.A DEPRESSION, UNSPECIFIED DEPRESSION TYPE: ICD-10-CM

## 2022-09-01 DIAGNOSIS — E11.49 OTHER DIABETIC NEUROLOGICAL COMPLICATION ASSOCIATED WITH TYPE 2 DIABETES MELLITUS (HCC): ICD-10-CM

## 2022-09-01 DIAGNOSIS — N40.0 BENIGN PROSTATIC HYPERPLASIA WITHOUT LOWER URINARY TRACT SYMPTOMS: ICD-10-CM

## 2022-09-02 RX ORDER — CITALOPRAM 20 MG/1
TABLET ORAL
Qty: 90 TABLET | Refills: 0 | Status: SHIPPED | OUTPATIENT
Start: 2022-09-02

## 2022-09-02 RX ORDER — BACLOFEN 20 MG/1
TABLET ORAL
Qty: 270 TABLET | Refills: 0 | Status: SHIPPED | OUTPATIENT
Start: 2022-09-02

## 2022-09-02 RX ORDER — PREGABALIN 100 MG/1
CAPSULE ORAL
Qty: 270 CAPSULE | Refills: 0 | Status: SHIPPED | OUTPATIENT
Start: 2022-09-02

## 2022-09-02 RX ORDER — TAMSULOSIN HYDROCHLORIDE 0.4 MG/1
CAPSULE ORAL
Qty: 90 CAPSULE | Refills: 0 | Status: SHIPPED | OUTPATIENT
Start: 2022-09-02

## 2022-09-30 ENCOUNTER — VBI (OUTPATIENT)
Dept: ADMINISTRATIVE | Facility: OTHER | Age: 62
End: 2022-09-30

## 2022-09-30 NOTE — TELEPHONE ENCOUNTER
09/30/22 10:42 AM     [facility]       [place] [facility]

## 2022-10-14 DIAGNOSIS — E11.42 TYPE 2 DIABETES MELLITUS WITH DIABETIC POLYNEUROPATHY, WITH LONG-TERM CURRENT USE OF INSULIN (HCC): ICD-10-CM

## 2022-10-14 DIAGNOSIS — Z79.4 TYPE 2 DIABETES MELLITUS WITH DIABETIC POLYNEUROPATHY, WITH LONG-TERM CURRENT USE OF INSULIN (HCC): ICD-10-CM

## 2022-10-14 RX ORDER — INSULIN GLARGINE AND LIXISENATIDE 100; 33 U/ML; UG/ML
INJECTION, SOLUTION SUBCUTANEOUS
Qty: 30 ML | Refills: 3 | Status: SHIPPED | OUTPATIENT
Start: 2022-10-14 | End: 2022-10-27

## 2022-10-16 ENCOUNTER — HOSPITAL ENCOUNTER (INPATIENT)
Facility: HOSPITAL | Age: 62
LOS: 11 days | Discharge: NURSING, REHAB OR LONG-TERM CARE | DRG: 445 | End: 2022-10-27
Attending: EMERGENCY MEDICINE | Admitting: INTERNAL MEDICINE
Payer: COMMERCIAL

## 2022-10-16 ENCOUNTER — APPOINTMENT (EMERGENCY)
Dept: RADIOLOGY | Facility: HOSPITAL | Age: 62
DRG: 445 | End: 2022-10-16
Payer: COMMERCIAL

## 2022-10-16 DIAGNOSIS — F32.A DEPRESSION: ICD-10-CM

## 2022-10-16 DIAGNOSIS — K83.1 JAUNDICE, OBSTRUCTIVE, INTRAHEPATIC: ICD-10-CM

## 2022-10-16 DIAGNOSIS — E11.42 TYPE 2 DIABETES MELLITUS WITH DIABETIC POLYNEUROPATHY, WITH LONG-TERM CURRENT USE OF INSULIN (HCC): ICD-10-CM

## 2022-10-16 DIAGNOSIS — R26.2 AMBULATORY DYSFUNCTION: ICD-10-CM

## 2022-10-16 DIAGNOSIS — R19.7 DIARRHEA: ICD-10-CM

## 2022-10-16 DIAGNOSIS — S22.39XA RIB FRACTURE: Primary | ICD-10-CM

## 2022-10-16 DIAGNOSIS — S31.000A SACRAL WOUND, INITIAL ENCOUNTER: ICD-10-CM

## 2022-10-16 DIAGNOSIS — Z79.4 TYPE 2 DIABETES MELLITUS WITH DIABETIC POLYNEUROPATHY, WITH LONG-TERM CURRENT USE OF INSULIN (HCC): ICD-10-CM

## 2022-10-16 DIAGNOSIS — E80.6 HYPERBILIRUBINEMIA: ICD-10-CM

## 2022-10-16 LAB
ALBUMIN SERPL BCP-MCNC: 2.8 G/DL (ref 3.5–5)
ALP SERPL-CCNC: 670 U/L (ref 46–116)
ALT SERPL W P-5'-P-CCNC: 282 U/L (ref 12–78)
AMMONIA PLAS-SCNC: 20 UMOL/L (ref 11–35)
ANION GAP SERPL CALCULATED.3IONS-SCNC: 10 MMOL/L (ref 4–13)
APAP SERPL-MCNC: <2 UG/ML (ref 10–20)
APTT PPP: 33 SECONDS (ref 23–37)
AST SERPL W P-5'-P-CCNC: 375 U/L (ref 5–45)
ATRIAL RATE: 91 BPM
BACTERIA UR QL AUTO: NORMAL /HPF
BASOPHILS # BLD AUTO: 0.04 THOUSANDS/ÂΜL (ref 0–0.1)
BASOPHILS NFR BLD AUTO: 0 % (ref 0–1)
BILIRUB SERPL-MCNC: 11.78 MG/DL (ref 0.2–1)
BILIRUB UR QL STRIP: ABNORMAL
BUN SERPL-MCNC: 20 MG/DL (ref 5–25)
CALCIUM ALBUM COR SERPL-MCNC: 9.7 MG/DL (ref 8.3–10.1)
CALCIUM SERPL-MCNC: 8.7 MG/DL (ref 8.3–10.1)
CHLORIDE SERPL-SCNC: 92 MMOL/L (ref 96–108)
CLARITY UR: CLEAR
CO2 SERPL-SCNC: 30 MMOL/L (ref 21–32)
COLOR UR: ABNORMAL
CREAT SERPL-MCNC: 1.19 MG/DL (ref 0.6–1.3)
EOSINOPHIL # BLD AUTO: 0.04 THOUSAND/ÂΜL (ref 0–0.61)
EOSINOPHIL NFR BLD AUTO: 0 % (ref 0–6)
ERYTHROCYTE [DISTWIDTH] IN BLOOD BY AUTOMATED COUNT: 14.5 % (ref 11.6–15.1)
EST. AVERAGE GLUCOSE BLD GHB EST-MCNC: 243 MG/DL
ETHANOL SERPL-MCNC: <3 MG/DL (ref 0–3)
GFR SERPL CREATININE-BSD FRML MDRD: 64 ML/MIN/1.73SQ M
GLUCOSE SERPL-MCNC: 377 MG/DL (ref 65–140)
GLUCOSE UR STRIP-MCNC: ABNORMAL MG/DL
HAV IGM SER QL: NORMAL
HBA1C MFR BLD: 10.1 %
HBV CORE IGM SER QL: NORMAL
HBV SURFACE AG SER QL: NORMAL
HCT VFR BLD AUTO: 40.6 % (ref 36.5–49.3)
HCV AB SER QL: NORMAL
HGB BLD-MCNC: 14.7 G/DL (ref 12–17)
HGB UR QL STRIP.AUTO: NEGATIVE
IMM GRANULOCYTES # BLD AUTO: 0.05 THOUSAND/UL (ref 0–0.2)
IMM GRANULOCYTES NFR BLD AUTO: 1 % (ref 0–2)
INR PPP: 0.9 (ref 0.84–1.19)
KETONES UR STRIP-MCNC: NEGATIVE MG/DL
LEUKOCYTE ESTERASE UR QL STRIP: ABNORMAL
LIPASE SERPL-CCNC: 188 U/L (ref 73–393)
LYMPHOCYTES # BLD AUTO: 0.97 THOUSANDS/ÂΜL (ref 0.6–4.47)
LYMPHOCYTES NFR BLD AUTO: 10 % (ref 14–44)
MCH RBC QN AUTO: 36.8 PG (ref 26.8–34.3)
MCHC RBC AUTO-ENTMCNC: 36.2 G/DL (ref 31.4–37.4)
MCV RBC AUTO: 102 FL (ref 82–98)
MONOCYTES # BLD AUTO: 0.87 THOUSAND/ÂΜL (ref 0.17–1.22)
MONOCYTES NFR BLD AUTO: 9 % (ref 4–12)
NEUTROPHILS # BLD AUTO: 8.22 THOUSANDS/ÂΜL (ref 1.85–7.62)
NEUTS SEG NFR BLD AUTO: 80 % (ref 43–75)
NITRITE UR QL STRIP: NEGATIVE
NON-SQ EPI CELLS URNS QL MICRO: NORMAL /HPF
NRBC BLD AUTO-RTO: 0 /100 WBCS
P AXIS: 256 DEGREES
PH UR STRIP.AUTO: 7.5 [PH]
PLATELET # BLD AUTO: 182 THOUSANDS/UL (ref 149–390)
PMV BLD AUTO: 11.8 FL (ref 8.9–12.7)
POTASSIUM SERPL-SCNC: 4.1 MMOL/L (ref 3.5–5.3)
PR INTERVAL: 112 MS
PROT SERPL-MCNC: 7 G/DL (ref 6.4–8.4)
PROT UR STRIP-MCNC: ABNORMAL MG/DL
PROTHROMBIN TIME: 12.3 SECONDS (ref 11.6–14.5)
QRS AXIS: -88 DEGREES
QRSD INTERVAL: 122 MS
QT INTERVAL: 472 MS
QTC INTERVAL: 580 MS
RBC # BLD AUTO: 4 MILLION/UL (ref 3.88–5.62)
RBC #/AREA URNS AUTO: NORMAL /HPF
RENAL EPI CELLS #/AREA URNS HPF: PRESENT /[HPF]
SALICYLATES SERPL-MCNC: <3 MG/DL (ref 3–20)
SODIUM SERPL-SCNC: 132 MMOL/L (ref 135–147)
SP GR UR STRIP.AUTO: 1.02 (ref 1–1.03)
T WAVE AXIS: 86 DEGREES
UROBILINOGEN UR STRIP-ACNC: 2 MG/DL
VENTRICULAR RATE: 91 BPM
WBC # BLD AUTO: 10.19 THOUSAND/UL (ref 4.31–10.16)
WBC #/AREA URNS AUTO: NORMAL /HPF

## 2022-10-16 PROCEDURE — 85730 THROMBOPLASTIN TIME PARTIAL: CPT

## 2022-10-16 PROCEDURE — 99223 1ST HOSP IP/OBS HIGH 75: CPT | Performed by: INTERNAL MEDICINE

## 2022-10-16 PROCEDURE — 80143 DRUG ASSAY ACETAMINOPHEN: CPT

## 2022-10-16 PROCEDURE — 83036 HEMOGLOBIN GLYCOSYLATED A1C: CPT

## 2022-10-16 PROCEDURE — 70450 CT HEAD/BRAIN W/O DYE: CPT

## 2022-10-16 PROCEDURE — G1004 CDSM NDSC: HCPCS

## 2022-10-16 PROCEDURE — 85025 COMPLETE CBC W/AUTO DIFF WBC: CPT

## 2022-10-16 PROCEDURE — 83690 ASSAY OF LIPASE: CPT

## 2022-10-16 PROCEDURE — 36415 COLL VENOUS BLD VENIPUNCTURE: CPT

## 2022-10-16 PROCEDURE — 80179 DRUG ASSAY SALICYLATE: CPT

## 2022-10-16 PROCEDURE — 80074 ACUTE HEPATITIS PANEL: CPT

## 2022-10-16 PROCEDURE — 82140 ASSAY OF AMMONIA: CPT

## 2022-10-16 PROCEDURE — 80053 COMPREHEN METABOLIC PANEL: CPT

## 2022-10-16 PROCEDURE — 74177 CT ABD & PELVIS W/CONTRAST: CPT

## 2022-10-16 PROCEDURE — 81001 URINALYSIS AUTO W/SCOPE: CPT

## 2022-10-16 PROCEDURE — 85610 PROTHROMBIN TIME: CPT

## 2022-10-16 PROCEDURE — 99285 EMERGENCY DEPT VISIT HI MDM: CPT

## 2022-10-16 PROCEDURE — 93010 ELECTROCARDIOGRAM REPORT: CPT | Performed by: INTERNAL MEDICINE

## 2022-10-16 PROCEDURE — 96360 HYDRATION IV INFUSION INIT: CPT

## 2022-10-16 PROCEDURE — 82077 ASSAY SPEC XCP UR&BREATH IA: CPT

## 2022-10-16 PROCEDURE — 93005 ELECTROCARDIOGRAM TRACING: CPT

## 2022-10-16 PROCEDURE — 94640 AIRWAY INHALATION TREATMENT: CPT

## 2022-10-16 PROCEDURE — 71260 CT THORAX DX C+: CPT

## 2022-10-16 PROCEDURE — 96361 HYDRATE IV INFUSION ADD-ON: CPT

## 2022-10-16 RX ORDER — CITALOPRAM 20 MG/1
20 TABLET ORAL DAILY
Status: DISCONTINUED | OUTPATIENT
Start: 2022-10-17 | End: 2022-10-27 | Stop reason: HOSPADM

## 2022-10-16 RX ORDER — FOLIC ACID 1 MG/1
1 TABLET ORAL DAILY
Status: DISCONTINUED | OUTPATIENT
Start: 2022-10-17 | End: 2022-10-27 | Stop reason: HOSPADM

## 2022-10-16 RX ORDER — INSULIN GLARGINE 100 [IU]/ML
30 INJECTION, SOLUTION SUBCUTANEOUS EVERY MORNING
Status: DISCONTINUED | OUTPATIENT
Start: 2022-10-17 | End: 2022-10-25

## 2022-10-16 RX ORDER — INSULIN LISPRO 100 [IU]/ML
1-5 INJECTION, SOLUTION INTRAVENOUS; SUBCUTANEOUS
Status: DISCONTINUED | OUTPATIENT
Start: 2022-10-16 | End: 2022-10-27 | Stop reason: HOSPADM

## 2022-10-16 RX ORDER — NICOTINE 21 MG/24HR
1 PATCH, TRANSDERMAL 24 HOURS TRANSDERMAL DAILY
Status: DISCONTINUED | OUTPATIENT
Start: 2022-10-17 | End: 2022-10-17

## 2022-10-16 RX ORDER — IPRATROPIUM BROMIDE AND ALBUTEROL SULFATE 2.5; .5 MG/3ML; MG/3ML
3 SOLUTION RESPIRATORY (INHALATION)
Status: DISCONTINUED | OUTPATIENT
Start: 2022-10-16 | End: 2022-10-17

## 2022-10-16 RX ORDER — ENOXAPARIN SODIUM 100 MG/ML
40 INJECTION SUBCUTANEOUS DAILY
Status: DISCONTINUED | OUTPATIENT
Start: 2022-10-17 | End: 2022-10-27 | Stop reason: HOSPADM

## 2022-10-16 RX ORDER — INSULIN LISPRO 100 [IU]/ML
1-6 INJECTION, SOLUTION INTRAVENOUS; SUBCUTANEOUS
Status: DISCONTINUED | OUTPATIENT
Start: 2022-10-17 | End: 2022-10-27 | Stop reason: HOSPADM

## 2022-10-16 RX ORDER — DOCUSATE SODIUM 100 MG/1
100 CAPSULE, LIQUID FILLED ORAL 2 TIMES DAILY PRN
Status: DISCONTINUED | OUTPATIENT
Start: 2022-10-16 | End: 2022-10-22

## 2022-10-16 RX ORDER — ERGOCALCIFEROL 1.25 MG/1
50000 CAPSULE ORAL WEEKLY
Status: DISCONTINUED | OUTPATIENT
Start: 2022-10-16 | End: 2022-10-27 | Stop reason: HOSPADM

## 2022-10-16 RX ORDER — MAGNESIUM HYDROXIDE/ALUMINUM HYDROXICE/SIMETHICONE 120; 1200; 1200 MG/30ML; MG/30ML; MG/30ML
30 SUSPENSION ORAL EVERY 6 HOURS PRN
Status: DISCONTINUED | OUTPATIENT
Start: 2022-10-16 | End: 2022-10-27 | Stop reason: HOSPADM

## 2022-10-16 RX ORDER — LANOLIN ALCOHOL/MO/W.PET/CERES
100 CREAM (GRAM) TOPICAL DAILY
Status: DISCONTINUED | OUTPATIENT
Start: 2022-10-17 | End: 2022-10-27 | Stop reason: HOSPADM

## 2022-10-16 RX ORDER — ONDANSETRON 2 MG/ML
4 INJECTION INTRAMUSCULAR; INTRAVENOUS EVERY 6 HOURS PRN
Status: DISCONTINUED | OUTPATIENT
Start: 2022-10-16 | End: 2022-10-27 | Stop reason: HOSPADM

## 2022-10-16 RX ORDER — BACLOFEN 20 MG/1
20 TABLET ORAL 3 TIMES DAILY
Status: DISCONTINUED | OUTPATIENT
Start: 2022-10-16 | End: 2022-10-27 | Stop reason: HOSPADM

## 2022-10-16 RX ORDER — TAMSULOSIN HYDROCHLORIDE 0.4 MG/1
0.4 CAPSULE ORAL
Status: DISCONTINUED | OUTPATIENT
Start: 2022-10-17 | End: 2022-10-27 | Stop reason: HOSPADM

## 2022-10-16 RX ORDER — PREGABALIN 100 MG/1
100 CAPSULE ORAL 3 TIMES DAILY
Status: DISCONTINUED | OUTPATIENT
Start: 2022-10-16 | End: 2022-10-27 | Stop reason: HOSPADM

## 2022-10-16 RX ORDER — POTASSIUM CHLORIDE 20 MEQ/1
20 TABLET, EXTENDED RELEASE ORAL 2 TIMES DAILY
Status: DISCONTINUED | OUTPATIENT
Start: 2022-10-16 | End: 2022-10-27 | Stop reason: HOSPADM

## 2022-10-16 RX ORDER — INSULIN LISPRO 100 [IU]/ML
24 INJECTION, SOLUTION INTRAVENOUS; SUBCUTANEOUS
Status: DISCONTINUED | OUTPATIENT
Start: 2022-10-17 | End: 2022-10-23

## 2022-10-16 RX ORDER — CLOPIDOGREL BISULFATE 75 MG/1
75 TABLET ORAL DAILY
Status: DISCONTINUED | OUTPATIENT
Start: 2022-10-17 | End: 2022-10-17

## 2022-10-16 RX ORDER — NYSTATIN 100000 [USP'U]/G
POWDER TOPICAL 2 TIMES DAILY
Status: DISCONTINUED | OUTPATIENT
Start: 2022-10-16 | End: 2022-10-27 | Stop reason: HOSPADM

## 2022-10-16 RX ORDER — TORSEMIDE 20 MG/1
20 TABLET ORAL DAILY
Status: DISCONTINUED | OUTPATIENT
Start: 2022-10-17 | End: 2022-10-27 | Stop reason: HOSPADM

## 2022-10-16 RX ORDER — ALBUTEROL SULFATE 2.5 MG/3ML
2.5 SOLUTION RESPIRATORY (INHALATION) 2 TIMES DAILY
Status: DISCONTINUED | OUTPATIENT
Start: 2022-10-16 | End: 2022-10-17

## 2022-10-16 RX ADMIN — IPRATROPIUM BROMIDE AND ALBUTEROL SULFATE 3 ML: 2.5; .5 SOLUTION RESPIRATORY (INHALATION) at 19:30

## 2022-10-16 RX ADMIN — SODIUM CHLORIDE 500 ML: 0.9 INJECTION, SOLUTION INTRAVENOUS at 18:46

## 2022-10-16 RX ADMIN — THIAMINE HYDROCHLORIDE 100 MG: 100 INJECTION, SOLUTION INTRAMUSCULAR; INTRAVENOUS at 23:56

## 2022-10-16 RX ADMIN — IOHEXOL 95 ML: 350 INJECTION, SOLUTION INTRAVENOUS at 20:21

## 2022-10-16 NOTE — ED ATTENDING ATTESTATION
10/16/2022   IAviva MD, saw and evaluated the patient  I have discussed the patient with the resident/non-physician practitioner and agree with the resident's/non-physician practitioner's findings, Plan of Care, and MDM as documented in the resident's/non-physician practitioner's note, except where noted  All available labs and Radiology studies were reviewed  I was present for key portions of any procedure(s) performed by the resident/non-physician practitioner and I was immediately available to provide assistance  At this point I agree with the current assessment done in the Emergency Department  I have conducted an independent evaluation of this patient a history and physical is as follows:    Chief Complaint   Patient presents with   • Diarrhea     Pt presents by hospital wheelchair with c/o inability to control bowels, dark urine, jaundice  57 y o  male presenting with DM, CHF, COPD, mitral regurgitation, C4 spinal cord injury with residual left sided hemiplegia, presenting with diarrhea  Normally, patient ambulates with a walker, but has to rely on a wheelchair this week  No abdominal pain  Monday-Tuesday, stopped drinking cold turkey  Withdrawal symptoms: hallucinations, disorientation, sweats earlier in the week  No vomiting  No hematemesis, no blood per rectum  Profuse non-bloody diarrhea  Dark malodorous urine  Intertriginous fungal infection  Jaundice this week    Plan:  Breathing treatment  Monitor for alcohol withdrawal  IV fluids  CMP, lipase, CBC, Coags  Ammonia  ETOH level  Tylenol panel    EKG  UA    A1C  Hepatitis panel    Physical Exam  BP 97/64   Pulse 94   Temp 98 6 °F (37 °C) (Temporal)   Resp 20   SpO2 96%      Vital signs and nursing notes reviewed    ** IF YOU ARE READING THIS, THE EXAM TEMPLATE BELOW HAS NOT BEEN UPDATED**    CONSTITUTIONAL: male appearing stated age resting in bed, in no acute distress  HEENT: atraumatic, normocephalic   Sclera anicteric, conjunctiva are not injected  Moist oral mucosa  CARDIOVASCULAR/CHEST: RRR, no M/R/G  2+ radial pulses  PULMONARY: Breathing comfortably on RA  Breath sounds are equal and clear to auscultation  ABDOMEN: non-distended  BS present, normoactive  Non-tender  MSK: moves all extremities, no deformities, no peripheral edema, no calf asymmetry  NEURO: Awake, alert, and oriented x 3  Face symmetric  Moves all extremities spontaneously   No focal neurologic deficits  SKIN: Warm, appears well-perfused  MENTAL STATUS: Normal affect      Labs and Imaging  Labs Reviewed - No data to display    No orders to display         Procedures  Procedures        ED Course  Medications - No data to display

## 2022-10-16 NOTE — ED PROVIDER NOTES
History  Chief Complaint   Patient presents with   • Diarrhea     Pt presents by hospital wheelchair with c/o inability to control bowels, dark urine, jaundice  78-year-old male presenting with a chief complaint of “yellow skin” as well as diarrhea and dark urine  Patient states that approximately 4 days ago he stopped drinking alcohol after an extensive history of alcohol abuse  Patient endorses drinking at least 5 drinks of vodka daily for many years  This week he states that he felt like he needed to change for his health, stopped drinking on his own, and has been experiencing some withdrawal symptoms throughout the week  According to wife who is also present at time of examination, patient's symptoms were worst around 48 hours after his last drink when he was having hallucinations and a moderate tremor with diaphoresis and anxiety  The patient now is endorsing a mild tremor with light anxiety but states his symptoms are significantly improved from 2 days ago  However over the last 2 days he has also noticed a yellowing of his skin and his eyes  States he has never had similar symptoms in the past   Around 3 days ago he also began having watery diarrhea that is yellow in color  He stays that now he is having innumerable episodes daily and that when he gets tenesmus he is unable to stop          Prior to Admission Medications   Prescriptions Last Dose Informant Patient Reported? Taking?    Alcohol Swabs (ALCOHOL PREP) PADS  Self Yes No   CALCIUM CITRATE-VITAMIN D PO  Self Yes No   Continuous Blood Gluc  (FreeStyle Christel 14 Day Powers) HARDIK   No No   Sig: Use to test prn   Continuous Blood Gluc Sensor (FreeStyle Christel 14 Day Sensor) MISC   No No   Sig: Use to test prn   Insulin Pen Needle (CareOne Unifine Pentips Plus) 32G X 4 MM MISC   No No   Sig: Use to inject tid   Multiple Vitamin (MULTI-VITAMIN DAILY PO)  Self Yes No   Soliqua 100-33 UNT-MCG/ML injection pen   No No   Sig: INJECT SUBCUTANEOUSLY 30  UNITS DAILY   acetaminophen (TYLENOL) 325 mg tablet  Self No No   Sig: Take 3 tablets (975 mg total) by mouth every 8 (eight) hours   albuterol (2 5 mg/3 mL) 0 083 % nebulizer solution   No No   Sig: Take 3 mL (2 5 mg total) by nebulization 2 (two) times a day   baclofen 20 mg tablet   No No   Sig: TAKE 1 TABLET BY MOUTH 3  TIMES DAILY   citalopram (CeleXA) 20 mg tablet   No No   Sig: TAKE 1 TABLET BY MOUTH  DAILY   clopidogrel (PLAVIX) 75 mg tablet   No No   Sig: TAKE 1 TABLET BY MOUTH  DAILY   ergocalciferol (VITAMIN D2) 50,000 units   No No   Sig: Take 1 capsule (50,000 Units total) by mouth once a week   glucose blood test strip  Self No No   Sig: Use as instructed  Qid   glucose monitoring kit (FREESTYLE) monitoring kit  Self No No   Si each by Does not apply route as needed (3 times a day as needed)   insulin lispro (HumaLOG KwikPen) 100 units/mL injection pen   No No   Sig: Inject 24 Units under the skin 3 (three) times a day with meals   potassium chloride (K-DUR,KLOR-CON) 20 mEq tablet   No No   Sig: Take 1 tablet (20 mEq total) by mouth 2 (two) times a day   pregabalin (LYRICA) 100 mg capsule   No No   Sig: TAKE 1 CAPSULE BY MOUTH 3  TIMES DAILY   rosuvastatin (CRESTOR) 20 MG tablet   No No   Sig: TAKE 1 TABLET BY MOUTH  EVERY OTHER DAY   tamsulosin (FLOMAX) 0 4 mg   No No   Sig: TAKE 1 CAPSULE BY MOUTH  DAILY WITH DINNER   tiotropium-olodaterol (Stiolto Respimat) 2 5-2 5 MCG/ACT inhaler   No No   Sig: Inhale 2 puffs daily   torsemide (DEMADEX) 20 mg tablet   No No   Sig: TAKE 1 TABLET BY MOUTH  DAILY      Facility-Administered Medications: None       Past Medical History:   Diagnosis Date   • SUNDEEP (acute kidney injury) ([facility])    • CHF (congestive heart failure) ([facility])    • Cholelithiasis    • Chronic obstructive lung disease ([facility])     RESOLVED: 17   • COPD (chronic obstructive pulmonary disease) ([facility])    • Depression    • Diabetes mellitus ([facility])     type 2, non-insulin dependent • Diabetic neuropathy ([address] )    • Difficulty attaining erection     LAST ASSESSEDl: 8/17/17   • Former tobacco use    • Gall stone pancreatitis     RESOLVED: 3/8/17   • Hiatal hernia    • History of concussion    • History of MRSA infection    • History of varicella infection    • Hypertension     LAST ASSESSED: 8/17/17   • Non-healing open wound of right groin     s/p VAC placement & post-op fungal dermatitis   • PAD (peripheral artery disease) ([facility])     s/p RLE fem-pop   • Seasonal allergies    • Severe mitral regurgitation    • Spinal cord injury, C1-C7 ([address] [address] )     s/p fusion   • Traumatic injury to musculoskeletal system     1-5 FLIGHT FALL DOWN THE STEPS - CERVICAL NECK INJURY - JAN 2, 2012; LAST ASSESSED: 2/59/09   • Umbilical hernia without obstruction and without gangrene     RESOLVED: 3/8/17       Past Surgical History:   Procedure Laterality Date   • ANGIOPLASTY     • ARTERIOGRAM Right 7/31/2018    Procedure: ARTERIOGRAM Completion Agram;  Surgeon: Barby Mcgee MD;  [facility];  Service: Vascular   • BACK SURGERY     • BYPASS FEMORAL-POPLITEAL Right 7/31/2018    Procedure: BYPASS FEMORAL-POPLITEAL R femoral- BK popliteal bypass;  Surgeon: Barby Mcgee MD;  [facility];  Service: Vascular   • CERVICAL FUSION      VERTEBRAL; C3-C4 FUSION   • CHOLECYSTECTOMY LAPAROSCOPIC N/A 2/24/2017    Procedure: CHOLECYSTECTOMY LAPAROSCOPIC;  Surgeon: Christopher Wen MD;  [facility];  Service:    • EPIGASTRIC HERNIA REPAIR     • FEMORAL BYPASS Right     Leg   • INCISION AND DRAINAGE OF WOUND      Groin area   • PA DEBRIDEMENT, SKIN, SUB-Q TISSUE,=<20 SQ CM Right 8/28/2018    Procedure: RIGHT GROIN AND Delle Lazier ([address]) [address];  Surgeon: Maciej Hudson MD;  [facility];  Service: Vascular   • PA THROMBOENDARTECTMY FEMORAL COMMON Right 7/31/2018    Procedure: ENDARTERECTOMY ARTERIAL FEMORAL R femoral endarterectomy w/ patch angioplasty ;  Surgeon: Barby Mcgee MD;  [facility];  Service: Vascular   • SPINE SURGERY     • UMBILICAL HERNIA REPAIR N/A 2/24/2017    Procedure: REPAIR HERNIA UMBILICAL;  Surgeon: Fransisco Pham MD;  [facility];  Service:    • WISDOM TOOTH EXTRACTION         Family History   Problem Relation Age of Onset   • Hypertension Mother         BENIGN   • Alzheimer's disease Father    • Heart disease Father         CARDIAC DISORDER   • Stroke Father    • Heart attack Father    • No Known Problems Brother      I have reviewed and agree with the history as documented  E-Cigarette/Vaping   • E-Cigarette Use Never User      E-Cigarette/Vaping Substances   • Nicotine No    • THC No    • CBD No    • Flavoring No    • Other No    • Unknown No      Social History     Tobacco Use   • Smoking status: Current Every Day Smoker     Packs/day: 0 25     Years: 40 00     Pack years: 10 00     Types: Cigarettes   • Smokeless tobacco: Never Used   Vaping Use   • Vaping Use: Never used   Substance Use Topics   • Alcohol use: Not Currently     Comment: pt  reports he has not drank since February 2019   • Drug use: No        Review of Systems   Constitutional: Positive for activity change, appetite change and fatigue  Negative for chills and fever  HENT: Negative for congestion and sore throat  Eyes: Negative for pain and visual disturbance  Respiratory: Negative for cough, chest tightness and shortness of breath  Cardiovascular: Negative for chest pain and palpitations  Gastrointestinal: Positive for diarrhea  Negative for abdominal pain, blood in stool, constipation, nausea and vomiting  Genitourinary: Negative for dysuria, flank pain and hematuria  Dark urine   Musculoskeletal: Negative for arthralgias, back pain and neck pain  Skin: Positive for color change (Yellow)  Negative for rash  Neurological: Negative for dizziness, syncope and light-headedness  Hematological: Negative for adenopathy  Does not bruise/bleed easily  All other systems reviewed and are negative  Physical Exam  ED Triage Vitals   Temperature Pulse Respirations Blood Pressure SpO2   10/16/22 1603 10/16/22 1603 10/16/22 1603 10/16/22 1603 10/16/22 1603   98 6 °F (37 °C) 94 20 97/64 96 %      Temp Source Heart Rate Source Patient Position - Orthostatic VS BP Location FiO2 (%)   10/16/22 1603 10/16/22 1603 10/16/22 1857 10/16/22 1857 --   Temporal Monitor Lying Right arm       Pain Score       10/16/22 1857       No Pain             Orthostatic Vital Signs  Vitals:    10/16/22 1857 10/16/22 2030 10/16/22 2200 10/16/22 2330   BP: 115/70 114/63 98/50 118/79   Pulse: 96 100 100 100   Patient Position - Orthostatic VS: Lying Lying Lying Lying       Physical Exam  Vitals and nursing note reviewed  Constitutional:       General: He is not in acute distress  Appearance: He is well-developed  He is obese  He is ill-appearing  He is not toxic-appearing or diaphoretic  HENT:      Head: Atraumatic  Right Ear: External ear normal       Left Ear: External ear normal       Nose: Nose normal  No congestion or rhinorrhea  Mouth/Throat:      Mouth: Mucous membranes are moist       Pharynx: No oropharyngeal exudate or posterior oropharyngeal erythema  Eyes:      General: Scleral icterus present  Extraocular Movements: Extraocular movements intact  Conjunctiva/sclera: Conjunctivae normal       Pupils: Pupils are equal, round, and reactive to light  Cardiovascular:      Rate and Rhythm: Regular rhythm  Tachycardia present  Pulses: Normal pulses  Heart sounds: Murmur (4/6 systolic) heard  Pulmonary:      Effort: Pulmonary effort is normal  No respiratory distress  Breath sounds: Wheezing (Diffuse, mild) present  No rales  Abdominal:      General: There is distension  Palpations: Abdomen is soft  There is no mass  Tenderness: There is no abdominal tenderness   There is no right CVA tenderness, left CVA tenderness, guarding or rebound  Hernia: No hernia is present  Genitourinary:     Comments: Rash in the perineum especially on inguinal folds, with scaly and erythematous regions concerning for possible fungal infection  Musculoskeletal:         General: No swelling or deformity  Normal range of motion  Cervical back: Normal range of motion and neck supple  Right lower leg: No edema  Left lower leg: No edema  Comments: Patient has baseline contractures from prior C4 fracture   Skin:     General: Skin is warm and dry  Capillary Refill: Capillary refill takes less than 2 seconds  Coloration: Skin is jaundiced  Neurological:      General: No focal deficit present  Mental Status: He is alert and oriented to person, place, and time     Psychiatric:         Mood and Affect: Mood normal          Behavior: Behavior normal          ED Medications  Medications   nystatin (MYCOSTATIN) powder ( Topical Not Given 10/16/22 2204)   ipratropium-albuterol (DUO-NEB) 0 5-2 5 mg/3 mL inhalation solution 3 mL (3 mL Nebulization Given 10/16/22 1930)   albuterol inhalation solution 2 5 mg (2 5 mg Nebulization Given 10/17/22 0012)   baclofen tablet 20 mg (has no administration in time range)   calcium carbonate-vitamin D (OSCAL-D) 500 mg-200 units per tablet 1 tablet (has no administration in time range)   citalopram (CeleXA) tablet 20 mg (has no administration in time range)   clopidogrel (PLAVIX) tablet 75 mg (has no administration in time range)   ergocalciferol (VITAMIN D2) capsule 50,000 Units (has no administration in time range)   insulin lispro (HumaLOG) 100 units/mL subcutaneous injection 24 Units (has no administration in time range)   multivitamin stress formula tablet 1 tablet (has no administration in time range)   potassium chloride (K-DUR,KLOR-CON) CR tablet 20 mEq (20 mEq Oral Given 10/17/22 0011)   pregabalin (LYRICA) capsule 100 mg (100 mg Oral Given 10/17/22 0011)   insulin glargine (LANTUS) subcutaneous injection 30 Units 0 3 mL (has no administration in time range)   tamsulosin (FLOMAX) capsule 0 4 mg (has no administration in time range)   tiotropium (SPIRIVA) capsule for inhaler 18 mcg (has no administration in time range)   salmeterol (Serevent Diskus) 50 mcg/dose inhaler 1 puff (has no administration in time range)   torsemide (DEMADEX) tablet 20 mg (has no administration in time range)   docusate sodium (COLACE) capsule 100 mg (has no administration in time range)   ondansetron (ZOFRAN) injection 4 mg (has no administration in time range)   aluminum-magnesium hydroxide-simethicone (MYLANTA) oral suspension 30 mL (has no administration in time range)   nicotine (NICODERM CQ) 21 mg/24 hr TD 24 hr patch 1 patch (has no administration in time range)   enoxaparin (LOVENOX) subcutaneous injection 40 mg (has no administration in time range)   insulin lispro (HumaLOG) 100 units/mL subcutaneous injection 1-6 Units (has no administration in time range)   insulin lispro (HumaLOG) 100 units/mL subcutaneous injection 1-5 Units (has no administration in time range)   thiamine tablet 100 mg (has no administration in time range)   folic acid (FOLVITE) tablet 1 mg (has no administration in time range)   sodium chloride 0 9 % bolus 500 mL (0 mL Intravenous Stopped 10/16/22 2044)   iohexol (OMNIPAQUE) 350 MG/ML injection (SINGLE-DOSE) 95 mL (95 mL Intravenous Given 10/16/22 2021)   thiamine (VITAMIN B1) 100 mg in sodium chloride 0 9 % 50 mL IVPB (100 mg Intravenous New Bag 36/97/87 7269)   folic acid 1 mg in sodium chloride 0 9 % 50 mL IVPB (1 mg Intravenous New Bag 10/17/22 0008)       Diagnostic Studies  Results Reviewed     Procedure Component Value Units Date/Time    Fingerstick Glucose (POCT) [[id number]]  (Abnormal) Collected: 10/17/22 0015    Lab Status: Final result Updated: 10/17/22 0019     POC Glucose 262 mg/dl     Urine Microscopic [[id number]] Collected: 10/16/22 1953    Lab Status: Final result Specimen: Urine, Clean Catch Updated: 10/16/22 2024     RBC, UA 1-2 /hpf      WBC, UA None Seen /hpf      Epithelial Cells Occasional /hpf      Bacteria, UA None Seen /hpf      Renal Epithelial Cells Present    UA w Reflex to Microscopic w Reflex to Culture [[id number]]  (Abnormal) Collected: 10/16/22 1953    Lab Status: Final result Specimen: Urine, Clean Catch Updated: 10/16/22 2017     Color, UA Dark Yellow     Clarity, UA Clear     Specific Gravity, UA 1 022     pH, UA 7 5     Leukocytes, UA Elevated glucose may cause decreased leukocyte values  See urine microscopic for [facility] result/     Nitrite, UA Negative     Protein, UA 30 (1+) mg/dl      Glucose, UA >=1000 (1%) mg/dl      Ketones, UA Negative mg/dl      Urobilinogen, UA 2 0 mg/dl      Bilirubin, UA Moderate     Occult Blood, UA Negative    Hepatitis panel, acute [[id number]]  (Normal) Collected: 10/16/22 1755    Lab Status: Final result Specimen: Blood from Arm, Left Updated: 10/16/22 1926     Hepatitis B Surface Ag Non-reactive     Hep A IgM Non-reactive     Hepatitis C Ab Non-reactive     Hep B C IgM Non-reactive    Lipase [[id number]]  (Normal) Collected: 10/16/22 1755    Lab Status: Final result Specimen: Blood from Arm, Left Updated: 10/16/22 1845     Lipase 789 u/L     Salicylate level [[id number]]  (Abnormal) Collected: 10/16/22 1755    Lab Status: Final result Specimen: Blood from Arm, Left Updated: 43/19/03 1490     Salicylate Lvl <3 mg/dL     Acetaminophen level-If concentration is detectable, please discuss with medical  on call   [[id number]]  (Abnormal) Collected: 10/16/22 1755    Lab Status: Final result Specimen: Blood from Arm, Left Updated: 10/16/22 1845     Acetaminophen Level <2 ug/mL     Comprehensive metabolic panel [[id number]]  (Abnormal) Collected: 10/16/22 1755    Lab Status: Final result Specimen: Blood from Arm, Left Updated: 10/16/22 1845     Sodium 132 mmol/L      Potassium 4 1 mmol/L      Chloride 92 mmol/L      CO2 30 mmol/L      ANION GAP 10 mmol/L      BUN 20 mg/dL      Creatinine 1 19 mg/dL      Glucose 377 mg/dL      Calcium 8 7 mg/dL      Corrected Calcium 9 7 mg/dL       U/L       U/L      Alkaline Phosphatase 670 U/L      Total Protein 7 0 g/dL      Albumin 2 8 g/dL      Total Bilirubin 11 78 mg/dL      eGFR 64 ml/min/1 73sq m     Narrative:      [place] guidelines for Chronic Kidney Disease (CKD):   •  Stage 1 with normal or high GFR (GFR > 90 mL/min/1 73 square meters)  •  Stage 2 Mild CKD (GFR = 60-89 mL/min/1 73 square meters)  •  Stage 3A Moderate CKD (GFR = 45-59 mL/min/1 73 square meters)  •  Stage 3B Moderate CKD (GFR = 30-44 mL/min/1 73 square meters)  •  Stage 4 Severe CKD (GFR = 15-29 mL/min/1 73 square meters)  •  Stage 5 End Stage CKD (GFR <15 mL/min/1 73 square meters)  Note: GFR calculation is accurate only with a steady state creatinine    Ammonia [[id number]]  (Normal) Collected: 10/16/22 1755    Lab Status: Final result Specimen: Blood from Arm, Left Updated: 10/16/22 1833     Ammonia 20 umol/L     Ethanol [[id number]]  (Normal) Collected: 10/16/22 1755    Lab Status: Final result Specimen: Blood from Arm, Left Updated: 10/16/22 1831     Ethanol Lvl <3 mg/dL     Hemoglobin A1C [[id number]]  (Abnormal) Collected: 10/16/22 1755    Lab Status: Final result Specimen: Blood from Arm, Left Updated: 10/16/22 1829     Hemoglobin A1C 10 1 %       mg/dl     Protime-INR [[id number]]  (Normal) Collected: 10/16/22 1755    Lab Status: Final result Specimen: Blood from Arm, Left Updated: 10/16/22 1828     Protime 12 3 seconds      INR 0 90    APTT [[id number]]  (Normal) Collected: 10/16/22 1755    Lab Status: Final result Specimen: Blood from Arm, Left Updated: 10/16/22 1828     PTT 33 seconds     CBC and differential [[id number]]  (Abnormal) Collected: 10/16/22 1755    Lab Status: Final result Specimen: Blood from Arm, Left Updated: 10/16/22 1803     WBC 10 19 Thousand/uL RBC 4 00 Million/uL      Hemoglobin 14 7 g/dL      Hematocrit 40 6 %       fL      MCH 36 8 pg      MCHC 36 2 g/dL      RDW 14 5 %      MPV 11 8 fL      Platelets 606 Thousands/uL      nRBC 0 /100 WBCs      Neutrophils Relative 80 %      Immat GRANS % 1 %      Lymphocytes Relative 10 %      Monocytes Relative 9 %      Eosinophils Relative 0 %      Basophils Relative 0 %      Neutrophils Absolute 8 22 Thousands/µL      Immature Grans Absolute 0 05 Thousand/uL      Lymphocytes Absolute 0 97 Thousands/µL      Monocytes Absolute 0 87 Thousand/µL      Eosinophils Absolute 0 04 Thousand/µL      Basophils Absolute 0 04 Thousands/µL                  CT chest abdomen pelvis w contrast   Final Result by Christina Peña MD (10/16 2101)      1  Mildly displaced fracture of the right posterior 12th rib  2   Hepatic steatosis  3   Atrophic pancreas with prominent main pancreatic duct likely sequela of chronic pancreatitis  4   Small hiatal hernia  Workstation performed: [id number]         CT head without contrast   Final Result by Christina Peña MD (10/16 2101)      No acute intracranial hemorrhage, midline shift, or mass effect  Workstation performed: [id number]         MRI inpatient order    (Results Pending)         Procedures  Procedures     ECG interpreted by me:  Date 10/16/2022 time 6:22 p m  normal sinus rhythm, with findings concerning for developing right bundle-branch block  Q-waves in 2, 3, AVF  No ST elevations or depressions concerning for acute ischemia  Left axis deviation  Compared to prior on 04/03/2022, without significant change  ED Course                             SBIRT 22yo+    Flowsheet Row Most Recent Value   SBIRT (23 yo +)    In order to provide better care to our patients, we are screening all of our patients for alcohol and drug use  Would it be okay to ask you these screening questions?  No Filed at: 10/16/2022 1604                [facility]  Number of Diagnoses or Management Options  Diarrhea  Hyperbilirubinemia  Rib fracture  Diagnosis management comments:     Assessment:  20-year-old male with long history of alcohol abuse now with some mild alcohol withdrawal symptoms is presenting with concerns for yellow skin and profuse yellow diarrhea  Plan:  Patient will need broad workup including CMP, coags, imaging to include CT of the abdomen and pelvis, hepatitis panel, A1c, and symptom relief with nystatin powder, DuoNeb  Due to ongoing concerns for slight confusion, will also obtain CT of the head even though there is no known trauma history  Reassessment/disposition:  Patient will need admission for further workup of his hyperbilirubinemia  Unclear etiology of the symptoms that we do suspect it is possible that is alcohol related  There are some incidental findings including an atrophic pancreas  Patient also has a rib fracture that does not have clear traumatic etiology  Patient needs further monitoring for decompensation due to his ongoing withdrawal symptoms as well as workup due to his hyperbilirubinemia and elevated liver enzymes  I discussed this case with internal medicine team (Dr Sherrill Mathur) who admitted the patient to his service for further management of his hyperbilirubinemia and workup and evaluation        Disposition  Final diagnoses:   Rib fracture   Diarrhea   Hyperbilirubinemia     Time reflects when diagnosis was documented in both MDM as applicable and the Disposition within this note     Time User Action Codes Description Comment    10/16/2022  9:10 PM Radha Ennis Add [S22 39XA] Rib fracture     10/16/2022  9:10 PM Floyce Shar [R19 7] Diarrhea     10/16/2022  9:10 PM Radha Ennis Add [E80 6] Hyperbilirubinemia     10/16/2022  9:31 PM Disha GRAHAM Add [K83 1] Jaundice, obstructive, intrahepatic     10/16/2022  9:35 PM Vidal Tracy Add [R26 2] Ambulatory dysfunction       ED Disposition     ED Disposition   Admit Condition   Stable    Date/Time   Sun Oct 16, 2022  9:31 PM    Comment   Case was discussed with Dr Chip Gunter and the patient's admission status was agreed to be Admission Status: inpatient status to the service of Dr Chip Gunter   Follow-up Information    None         Patient's Medications   Discharge Prescriptions    No medications on file     No discharge procedures on file  PDMP Review       Value Time User    PDMP Reviewed  Yes 4/27/2022 12:18 PM Dago Mckeon MD           ED Provider  Attending physically available and evaluated Stevo Stanford  I managed the patient along with the ED Attending      Electronically Signed by         Rita Villalba MD  10/17/22 8195

## 2022-10-16 NOTE — ED ATTENDING ATTESTATION
10/16/2022   IYoel MD, saw and evaluated the patient  I have discussed the patient with the resident/non-physician practitioner and agree with the resident's/non-physician practitioner's findings, Plan of Care, and MDM as documented in the resident's/non-physician practitioner's note, except where noted  All available labs and Radiology studies were reviewed  I was present for key portions of any procedure(s) performed by the resident/non-physician practitioner and I was immediately available to provide assistance  At this point I agree with the current assessment done in the Emergency Department  I have conducted an independent evaluation of this patient a history and physical is as follows:    Chief Complaint   Patient presents with   • Diarrhea     Pt presents by hospital wheelchair with c/o inability to control bowels, dark urine, jaundice  57 y o  male presenting with DM, CHF, COPD, mitral regurgitation, C4 spinal cord injury with residual left sided hemiplegia, presenting with diarrhea  Normally, patient ambulates with a walker, but has to rely on a wheelchair this week  No abdominal pain  Monday-Tuesday, stopped drinking cold turkey  Withdrawal symptoms: hallucinations, disorientation, sweats earlier in the week  No vomiting  No hematemesis, no blood per rectum  Profuse non-bloody diarrhea  Dark malodorous urine  Intertriginous fungal infection  Jaundice this week    Plan:  Breathing treatment  Monitor for alcohol withdrawal  IV fluids  CMP, lipase, CBC, Coags  Ammonia  ETOH level  Tylenol panel    EKG  UA    A1C  Hepatitis panel    Physical Exam  BP 97/64   Pulse 94   Temp 98 6 °F (37 °C) (Temporal)   Resp 20   SpO2 96%      Vital signs and nursing notes reviewed    ** IF YOU ARE READING THIS, THE EXAM TEMPLATE BELOW HAS NOT BEEN UPDATED**    CONSTITUTIONAL: male appearing stated age resting in bed, in no acute distress  HEENT: atraumatic, normocephalic   Sclera anicteric, conjunctiva are not injected  Moist oral mucosa  CARDIOVASCULAR/CHEST: RRR, no M/R/G  2+ radial pulses  PULMONARY: Breathing comfortably on RA  Breath sounds are equal and clear to auscultation  ABDOMEN: non-distended  BS present, normoactive  Non-tender  MSK: moves all extremities, no deformities, no peripheral edema, no calf asymmetry  NEURO: Awake, alert, and oriented x 3  Face symmetric  Moves all extremities spontaneously   No focal neurologic deficits  SKIN: Warm, appears well-perfused  MENTAL STATUS: Normal affect      Labs and Imaging  Labs Reviewed - No data to display    No orders to display         Procedures  Procedures        ED Course  Medications - No data to display

## 2022-10-17 ENCOUNTER — APPOINTMENT (INPATIENT)
Dept: RADIOLOGY | Facility: HOSPITAL | Age: 62
DRG: 445 | End: 2022-10-17
Payer: COMMERCIAL

## 2022-10-17 PROBLEM — I34.0 SEVERE MITRAL REGURGITATION: Status: ACTIVE | Noted: 2019-02-07

## 2022-10-17 PROBLEM — S14.109A CERVICAL SPINAL CORD INJURY (HCC): Status: ACTIVE | Noted: 2019-02-08

## 2022-10-17 LAB
ALBUMIN SERPL BCP-MCNC: 2.6 G/DL (ref 3.5–5)
ALP SERPL-CCNC: 616 U/L (ref 46–116)
ALT SERPL W P-5'-P-CCNC: 265 U/L (ref 12–78)
ANION GAP SERPL CALCULATED.3IONS-SCNC: 5 MMOL/L (ref 4–13)
APTT PPP: 34 SECONDS (ref 23–37)
AST SERPL W P-5'-P-CCNC: 348 U/L (ref 5–45)
BASOPHILS # BLD AUTO: 0.05 THOUSANDS/ÂΜL (ref 0–0.1)
BASOPHILS NFR BLD AUTO: 1 % (ref 0–1)
BILIRUB SERPL-MCNC: 11.49 MG/DL (ref 0.2–1)
BILIRUB UR QL STRIP: ABNORMAL
BUN SERPL-MCNC: 18 MG/DL (ref 5–25)
CALCIUM ALBUM COR SERPL-MCNC: 9.8 MG/DL (ref 8.3–10.1)
CALCIUM SERPL-MCNC: 8.7 MG/DL (ref 8.3–10.1)
CHLORIDE SERPL-SCNC: 97 MMOL/L (ref 96–108)
CLARITY UR: CLEAR
CO2 SERPL-SCNC: 27 MMOL/L (ref 21–32)
COLOR UR: YELLOW
CREAT SERPL-MCNC: 0.98 MG/DL (ref 0.6–1.3)
EOSINOPHIL # BLD AUTO: 0.03 THOUSAND/ÂΜL (ref 0–0.61)
EOSINOPHIL NFR BLD AUTO: 0 % (ref 0–6)
ERYTHROCYTE [DISTWIDTH] IN BLOOD BY AUTOMATED COUNT: 14.5 % (ref 11.6–15.1)
GFR SERPL CREATININE-BSD FRML MDRD: 81 ML/MIN/1.73SQ M
GLUCOSE SERPL-MCNC: 126 MG/DL (ref 65–140)
GLUCOSE SERPL-MCNC: 139 MG/DL (ref 65–140)
GLUCOSE SERPL-MCNC: 205 MG/DL (ref 65–140)
GLUCOSE SERPL-MCNC: 248 MG/DL (ref 65–140)
GLUCOSE SERPL-MCNC: 262 MG/DL (ref 65–140)
GLUCOSE SERPL-MCNC: 281 MG/DL (ref 65–140)
GLUCOSE UR STRIP-MCNC: ABNORMAL MG/DL
HCT VFR BLD AUTO: 37.3 % (ref 36.5–49.3)
HGB BLD-MCNC: 13.5 G/DL (ref 12–17)
HGB UR QL STRIP.AUTO: NEGATIVE
IMM GRANULOCYTES # BLD AUTO: 0.06 THOUSAND/UL (ref 0–0.2)
IMM GRANULOCYTES NFR BLD AUTO: 1 % (ref 0–2)
INR PPP: 0.97 (ref 0.84–1.19)
KETONES UR STRIP-MCNC: NEGATIVE MG/DL
LEUKOCYTE ESTERASE UR QL STRIP: ABNORMAL
LYMPHOCYTES # BLD AUTO: 0.99 THOUSANDS/ÂΜL (ref 0.6–4.47)
LYMPHOCYTES NFR BLD AUTO: 10 % (ref 14–44)
MAGNESIUM SERPL-MCNC: 1.8 MG/DL (ref 1.6–2.6)
MCH RBC QN AUTO: 36.7 PG (ref 26.8–34.3)
MCHC RBC AUTO-ENTMCNC: 36.2 G/DL (ref 31.4–37.4)
MCV RBC AUTO: 101 FL (ref 82–98)
MONOCYTES # BLD AUTO: 1.01 THOUSAND/ÂΜL (ref 0.17–1.22)
MONOCYTES NFR BLD AUTO: 10 % (ref 4–12)
NEUTROPHILS # BLD AUTO: 7.84 THOUSANDS/ÂΜL (ref 1.85–7.62)
NEUTS SEG NFR BLD AUTO: 78 % (ref 43–75)
NITRITE UR QL STRIP: NEGATIVE
NRBC BLD AUTO-RTO: 0 /100 WBCS
PH UR STRIP.AUTO: 7 [PH] (ref 4.5–8)
PLATELET # BLD AUTO: 171 THOUSANDS/UL (ref 149–390)
PMV BLD AUTO: 12.6 FL (ref 8.9–12.7)
POTASSIUM SERPL-SCNC: 3.6 MMOL/L (ref 3.5–5.3)
PROT SERPL-MCNC: 6.7 G/DL (ref 6.4–8.4)
PROT UR STRIP-MCNC: ABNORMAL MG/DL
PROTHROMBIN TIME: 13.1 SECONDS (ref 11.6–14.5)
RBC # BLD AUTO: 3.68 MILLION/UL (ref 3.88–5.62)
SODIUM SERPL-SCNC: 129 MMOL/L (ref 135–147)
SP GR UR STRIP.AUTO: 1.01 (ref 1–1.03)
UROBILINOGEN UR QL STRIP.AUTO: 1 E.U./DL
WBC # BLD AUTO: 9.98 THOUSAND/UL (ref 4.31–10.16)

## 2022-10-17 PROCEDURE — 85730 THROMBOPLASTIN TIME PARTIAL: CPT | Performed by: INTERNAL MEDICINE

## 2022-10-17 PROCEDURE — 82948 REAGENT STRIP/BLOOD GLUCOSE: CPT

## 2022-10-17 PROCEDURE — 83735 ASSAY OF MAGNESIUM: CPT | Performed by: INTERNAL MEDICINE

## 2022-10-17 PROCEDURE — 94640 AIRWAY INHALATION TREATMENT: CPT

## 2022-10-17 PROCEDURE — 80053 COMPREHEN METABOLIC PANEL: CPT | Performed by: INTERNAL MEDICINE

## 2022-10-17 PROCEDURE — 85610 PROTHROMBIN TIME: CPT | Performed by: INTERNAL MEDICINE

## 2022-10-17 PROCEDURE — 36415 COLL VENOUS BLD VENIPUNCTURE: CPT | Performed by: INTERNAL MEDICINE

## 2022-10-17 PROCEDURE — 94640 AIRWAY INHALATION TREATMENT: CPT | Performed by: SOCIAL WORKER

## 2022-10-17 PROCEDURE — 94760 N-INVAS EAR/PLS OXIMETRY 1: CPT

## 2022-10-17 PROCEDURE — G1004 CDSM NDSC: HCPCS

## 2022-10-17 PROCEDURE — 85025 COMPLETE CBC W/AUTO DIFF WBC: CPT | Performed by: INTERNAL MEDICINE

## 2022-10-17 PROCEDURE — 74181 MRI ABDOMEN W/O CONTRAST: CPT

## 2022-10-17 PROCEDURE — 99232 SBSQ HOSP IP/OBS MODERATE 35: CPT | Performed by: NURSE PRACTITIONER

## 2022-10-17 PROCEDURE — 99223 1ST HOSP IP/OBS HIGH 75: CPT | Performed by: INTERNAL MEDICINE

## 2022-10-17 RX ORDER — NICOTINE 21 MG/24HR
1 PATCH, TRANSDERMAL 24 HOURS TRANSDERMAL DAILY
Status: DISCONTINUED | OUTPATIENT
Start: 2022-10-17 | End: 2022-10-27 | Stop reason: HOSPADM

## 2022-10-17 RX ORDER — MICONAZOLE NITRATE 20 MG/G
1 CREAM TOPICAL 3 TIMES DAILY
Status: DISCONTINUED | OUTPATIENT
Start: 2022-10-17 | End: 2022-10-27 | Stop reason: HOSPADM

## 2022-10-17 RX ORDER — SODIUM CHLORIDE FOR INHALATION 0.9 %
3 VIAL, NEBULIZER (ML) INHALATION
Status: DISCONTINUED | OUTPATIENT
Start: 2022-10-17 | End: 2022-10-22

## 2022-10-17 RX ORDER — LEVALBUTEROL 1.25 MG/.5ML
1.25 SOLUTION, CONCENTRATE RESPIRATORY (INHALATION)
Status: DISCONTINUED | OUTPATIENT
Start: 2022-10-17 | End: 2022-10-22

## 2022-10-17 RX ORDER — ALBUTEROL SULFATE 2.5 MG/3ML
2.5 SOLUTION RESPIRATORY (INHALATION) EVERY 4 HOURS PRN
Status: DISCONTINUED | OUTPATIENT
Start: 2022-10-17 | End: 2022-10-27 | Stop reason: HOSPADM

## 2022-10-17 RX ADMIN — MICONAZOLE NITRATE 1 APPLICATION: 20 CREAM TOPICAL at 20:34

## 2022-10-17 RX ADMIN — B-COMPLEX W/ C & FOLIC ACID TAB 1 TABLET: TAB at 08:07

## 2022-10-17 RX ADMIN — CITALOPRAM HYDROBROMIDE 20 MG: 20 TABLET ORAL at 08:07

## 2022-10-17 RX ADMIN — FOLIC ACID 1 MG: 1 TABLET ORAL at 08:07

## 2022-10-17 RX ADMIN — ALBUTEROL SULFATE 2.5 MG: 2.5 SOLUTION RESPIRATORY (INHALATION) at 00:12

## 2022-10-17 RX ADMIN — POTASSIUM CHLORIDE 20 MEQ: 1500 TABLET, EXTENDED RELEASE ORAL at 00:11

## 2022-10-17 RX ADMIN — PREGABALIN 100 MG: 100 CAPSULE ORAL at 16:15

## 2022-10-17 RX ADMIN — TORSEMIDE 20 MG: 20 TABLET ORAL at 08:07

## 2022-10-17 RX ADMIN — BACLOFEN 20 MG: 20 TABLET ORAL at 08:07

## 2022-10-17 RX ADMIN — BACLOFEN 20 MG: 20 TABLET ORAL at 22:10

## 2022-10-17 RX ADMIN — INSULIN LISPRO 24 UNITS: 100 INJECTION, SOLUTION INTRAVENOUS; SUBCUTANEOUS at 08:09

## 2022-10-17 RX ADMIN — INSULIN LISPRO 24 UNITS: 100 INJECTION, SOLUTION INTRAVENOUS; SUBCUTANEOUS at 16:22

## 2022-10-17 RX ADMIN — NYSTATIN: 100000 POWDER TOPICAL at 20:34

## 2022-10-17 RX ADMIN — Medication 1 TABLET: at 08:07

## 2022-10-17 RX ADMIN — UMECLIDINIUM 1 PUFF: 62.5 AEROSOL, POWDER ORAL at 10:32

## 2022-10-17 RX ADMIN — PREGABALIN 100 MG: 100 CAPSULE ORAL at 08:07

## 2022-10-17 RX ADMIN — NYSTATIN: 100000 POWDER TOPICAL at 08:07

## 2022-10-17 RX ADMIN — BACLOFEN 20 MG: 20 TABLET ORAL at 01:44

## 2022-10-17 RX ADMIN — SALMETEROL XINAFOATE 1 PUFF: 50 POWDER, METERED ORAL; RESPIRATORY (INHALATION) at 10:31

## 2022-10-17 RX ADMIN — NICOTINE 1 PATCH: 21 PATCH, EXTENDED RELEASE TRANSDERMAL at 01:43

## 2022-10-17 RX ADMIN — LEVALBUTEROL HYDROCHLORIDE 1.25 MG: 1.25 SOLUTION, CONCENTRATE RESPIRATORY (INHALATION) at 19:27

## 2022-10-17 RX ADMIN — POTASSIUM CHLORIDE 20 MEQ: 1500 TABLET, EXTENDED RELEASE ORAL at 17:10

## 2022-10-17 RX ADMIN — PREGABALIN 100 MG: 100 CAPSULE ORAL at 00:11

## 2022-10-17 RX ADMIN — INSULIN LISPRO 3 UNITS: 100 INJECTION, SOLUTION INTRAVENOUS; SUBCUTANEOUS at 08:09

## 2022-10-17 RX ADMIN — PREGABALIN 100 MG: 100 CAPSULE ORAL at 22:10

## 2022-10-17 RX ADMIN — INSULIN LISPRO 2 UNITS: 100 INJECTION, SOLUTION INTRAVENOUS; SUBCUTANEOUS at 11:45

## 2022-10-17 RX ADMIN — INSULIN GLARGINE 30 UNITS: 100 INJECTION, SOLUTION SUBCUTANEOUS at 08:10

## 2022-10-17 RX ADMIN — ISODIUM CHLORIDE 3 ML: 0.03 SOLUTION RESPIRATORY (INHALATION) at 19:27

## 2022-10-17 RX ADMIN — TAMSULOSIN HYDROCHLORIDE 0.4 MG: 0.4 CAPSULE ORAL at 16:15

## 2022-10-17 RX ADMIN — FOLIC ACID 1 MG: 5 INJECTION, SOLUTION INTRAMUSCULAR; INTRAVENOUS; SUBCUTANEOUS at 00:08

## 2022-10-17 RX ADMIN — INSULIN LISPRO 2 UNITS: 100 INJECTION, SOLUTION INTRAVENOUS; SUBCUTANEOUS at 01:44

## 2022-10-17 RX ADMIN — BACLOFEN 20 MG: 20 TABLET ORAL at 16:15

## 2022-10-17 RX ADMIN — THIAMINE HCL TAB 100 MG 100 MG: 100 TAB at 08:07

## 2022-10-17 RX ADMIN — ENOXAPARIN SODIUM 40 MG: 40 INJECTION SUBCUTANEOUS at 08:07

## 2022-10-17 RX ADMIN — ALBUTEROL SULFATE 2.5 MG: 2.5 SOLUTION RESPIRATORY (INHALATION) at 07:41

## 2022-10-17 RX ADMIN — INSULIN LISPRO 24 UNITS: 100 INJECTION, SOLUTION INTRAVENOUS; SUBCUTANEOUS at 11:45

## 2022-10-17 RX ADMIN — ERGOCALCIFEROL 50000 UNITS: 1.25 CAPSULE ORAL at 01:43

## 2022-10-17 RX ADMIN — POTASSIUM CHLORIDE 20 MEQ: 1500 TABLET, EXTENDED RELEASE ORAL at 08:07

## 2022-10-17 RX ADMIN — SALMETEROL XINAFOATE 1 PUFF: 50 POWDER, METERED ORAL; RESPIRATORY (INHALATION) at 22:10

## 2022-10-17 NOTE — ASSESSMENT & PLAN NOTE
Lab Results   Component Value Date    HGBA1C 10 1 (H) 10/16/2022       No results for input(s): POCGLU in the last 72 hours      Blood Sugar Average: Last 72 hrs:

## 2022-10-17 NOTE — WOUND OSTOMY CARE
Consult Note - Wound   Stevo Dire 57 y o  male MRN: [id number]  Unit/Bed#: [facility] 810-01 Encounter: [id number]        History and Present Illness:  Patient is 56 yo male admitted to [facility] with obstructive jaundice  Patient has history of CHF, CKD3, diabetes, hypertension, alcohol and tobacco abuse, and cervical spinal injury  Patient is continent of bowel and bladder  Patient is independent with ADLs  Assessment Findings:     1  MASD groin/buttocks- excoriation and hyperpigmentation from contact with moisture  ELMO ordered  2  B/L elbows callus- patient states he leans on his elbows when he rolling his cigarettes and they are now callused  3  Diabetic ulcer left lateral foot- 100% moist, yellow slough tissue, moderate amount of drainage  4  Diabetic ulcer left heel- intact, yellow scab, fragile, no drainage  No induration, fluctuance, odor, warmth/temperature differences, redness, or purulence noted to the above noted wounds and skin areas assessed  New dressings applied per orders listed below  Patient tolerated well- no s/s of non-verbal pain or discomfort observed during the encounter  Bedside nurse aware of plan of care  See flow sheets for more detailed assessment findings  Wound care will continue to follow  Skin care plans:  1-Apply ELMO to sacrum/buttocks/groin TID and PRN  2-Cleanse left lateral foot and heel with soap and water, apply silver alginate to left lateral foot and cover foot and heel with small clover allevyn foam  Change every other day and PRN soilage/displacement  3-Elevate heels to offload pressure  4-Ehob cushion when out of bed  5-Turn/repoisiton q2h or when medically stable for pressure re-distribution on skin  6-Moisturize skin daily with skin nourishing cream        Wounds:  Wound 10/17/22 Groin (Active)   Wound Image   10/17/22 1206   Wound Description Epithelialization;Fragile 10/17/22 1206   Lucy-wound Assessment Hyperpigmented; Excoriated;Fragile 10/17/22 1206   Wound Length (cm) 0 cm 10/17/22 1206   Wound Width (cm) 0 cm 10/17/22 1206   Wound Depth (cm) 0 cm 10/17/22 1206   Wound Surface Area (cm^2) 0 cm^2 10/17/22 1206   Wound Volume (cm^3) 0 cm^3 10/17/22 1206   Calculated Wound Volume (cm^3) 0 cm^3 10/17/22 1206   Tunneling 0 cm 10/17/22 1206   Tunneling in depth located at 0 10/17/22 1206   Undermining 0 10/17/22 1206   Undermining is depth extending from 0 10/17/22 1206   Wound Site Closure GURU 10/17/22 1206   Drainage Amount None 10/17/22 1206   Non-staged Wound Description Not applicable 88/01/93 0930   Treatments Cleansed 10/17/22 1206   Dressing Moisture barrier 10/17/22 1206   Wound packed? No 10/17/22 1206   Packing- # removed 0 10/17/22 1206   Packing- # inserted 0 10/17/22 1206   Dressing Changed New 10/17/22 1206   Patient Tolerance Tolerated well 10/17/22 1206   Dressing Status Clean;Dry; Intact 10/17/22 1206       Wound 10/17/22 Elbow Anterior; Left (Active)   Wound Image   10/17/22 1205   Wound Description Fragile;Epithelialization 10/17/22 1205   Lucy-wound Assessment Callus 10/17/22 1205   Wound Length (cm) 0 cm 10/17/22 1205   Wound Width (cm) 0 cm 10/17/22 1205   Wound Depth (cm) 0 cm 10/17/22 1205   Wound Surface Area (cm^2) 0 cm^2 10/17/22 1205   Wound Volume (cm^3) 0 cm^3 10/17/22 1205   Calculated Wound Volume (cm^3) 0 cm^3 10/17/22 1205   Tunneling 0 cm 10/17/22 1205   Tunneling in depth located at 0 10/17/22 1205   Undermining 0 10/17/22 1205   Undermining is depth extending from 0 10/17/22 1205   Wound Site Closure GURU 10/17/22 1205   Drainage Amount None 10/17/22 1205   Non-staged Wound Description Not applicable 07/92/41 6480   Treatments Cleansed 10/17/22 1205   Dressing Open to air 10/17/22 1205   Wound packed? No 10/17/22 1205   Packing- # removed 0 10/17/22 1205   Packing- # inserted 0 10/17/22 1205   Dressing Changed New 10/17/22 1205   Patient Tolerance Tolerated well 10/17/22 1205   Dressing Status Clean;Dry; Intact 10/17/22 1205 Wound 10/17/22 Diabetic Ulcer Foot Anterior; Left (Active)   Wound Image   10/17/22 1548   Wound Description [facility] 10/17/22 1548   Lucy-wound Assessment Edema 10/17/22 1548   Wound Length (cm) 0 5 cm 10/17/22 1548   Wound Width (cm) 0 5 cm 10/17/22 1548   Wound Depth (cm) 0 2 cm 10/17/22 1548   Wound Surface Area (cm^2) 0 25 cm^2 10/17/22 1548   Wound Volume (cm^3) 0 05 cm^3 10/17/22 1548   Calculated Wound Volume (cm^3) 0 05 cm^3 10/17/22 1548   Tunneling 0 cm 10/17/22 1548   Tunneling in depth located at 0 10/17/22 1548   Undermining 0 10/17/22 1548   Undermining is depth extending from 0 10/17/22 1548   Wound Site Closure GURU 10/17/22 1548   Drainage Amount Moderate 10/17/22 1548   Drainage Description Serosanguineous 10/17/22 1548   Non-staged Wound Description Full thickness 10/17/22 1548   Treatments Cleansed 10/17/22 1548   Dressing Calcium Alginate with Silver; Foam, Silicon (eg  Allevyn, etc) 10/17/22 1548   Wound packed? No 10/17/22 1548   Packing- # removed 0 10/17/22 1548   Packing- # inserted 0 10/17/22 7256   Dressing Changed New 10/17/22 1548   Patient Tolerance Tolerated well 10/17/22 1548   Dressing Status Clean;Dry; Intact 10/17/22 1548       Wound 10/17/22 Diabetic Ulcer Heel Left (Active)   Wound Image   10/17/22 1549   Wound Description Epithelialization;Fragile 10/17/22 1549   Lucy-wound Assessment Fragile;Edema 10/17/22 1549   Wound Length (cm) 1 cm 10/17/22 1549   Wound Width (cm) 1 cm 10/17/22 1549   Wound Depth (cm) 0 cm 10/17/22 1549   Wound Surface Area (cm^2) 1 cm^2 10/17/22 1549   Wound Volume (cm^3) 0 cm^3 10/17/22 1549   Calculated Wound Volume (cm^3) 0 cm^3 10/17/22 1549   Tunneling 0 cm 10/17/22 1549   Tunneling in depth located at 0 10/17/22 1549   Undermining 0 10/17/22 1549   Undermining is depth extending from 0 10/17/22 1549   Wound Site Closure GURU 10/17/22 1549   Drainage Amount None 10/17/22 1549   Non-staged Wound Description Not applicable 34/80/30 8218   Treatments Cleansed 10/17/22 1549   Dressing Foam, Silicon (eg  Allevyn, etc) 10/17/22 1549   Wound packed? No 10/17/22 1549   Packing- # removed 0 10/17/22 1549   Packing- # inserted 0 10/17/22 0486   Dressing Changed New 10/17/22 1549   Patient Tolerance Tolerated well 10/17/22 1549   Dressing Status Clean;Dry; Intact 10/17/22 1549         Kely Monroe BSN, RN, [facility]

## 2022-10-17 NOTE — ASSESSMENT & PLAN NOTE
· Laboratory studies are consistent with intrahepatic obstructive jaundice despite CT scan being negative for any overt obstructive lesion  · Acute hepatitis panel negative  · Personally observed bowel movement with note of light color  · Will recheck comprehensive metabolic profile in the morning  · Also in this light that the patient is with history of chronic alcoholism and recently stopped, will also need to rule out any commitant hepatic insufficiency  Will do coagulation profile in the morning  · MRCP  · Gastroenterology consult  Possibility ERCP?

## 2022-10-17 NOTE — PROGRESS NOTES
[address] [facility]  Progress Note - Jamie Correia 1960, 57 y o  male MRN: [id number]  Unit/Bed#: [facility] 810-01 Encounter: [id number]  Primary Care Provider: Zahida Horvath MD   Date and time admitted to hospital: 10/16/2022  4:28 PM    * Jaundice, obstructive, intrahepatic  Assessment & Plan  Patient presents with jaundice, no other symptoms  Noted 10/13, had just stopped drinking ETOH 10/11  · CT A/P showed pancreas is atrophic and there's mild prominence of main pancreatic duct 5mm 2/2 chronic pancreatitis sequelae  · Acute hepatitis panel negative  · GI following, MRCP pending  Possible CBD dilation noted by GI  · Plan for possible ERCP/EUS pending offical read  · Holding plavix for potential procedure  · Trend CMP    Chronic combined systolic and diastolic congestive heart failure [facility])  Assessment & Plan  Wt Readings from Last 3 Encounters:   10/17/22 80 7 kg (178 lb)   04/27/22 80 7 kg (178 lb)   07/23/21 83 kg (183 lb)     · Continue torsemide  Not on BB at baseline  · Last echo showed EF 60% with flail mitral valve leaflet with severe MR, referred to CTS for mitraclip however does not appear he followed up  Last seen in 2020 by Dr Ana Wolfe  · I/O, daily weights  · Monitor volume status         Stage 3a chronic kidney disease [facility])  Assessment & Plan  Lab Results   Component Value Date    EGFR 81 10/17/2022    EGFR 64 10/16/2022    EGFR 95 04/12/2022    CREATININE 0 98 10/17/2022    CREATININE 1 19 10/16/2022    CREATININE 0 80 04/12/2022   · Renal function overall stable  · Continue to monitor     Severe mitral regurgitation  Assessment & Plan  · Was told to see CTS for consideration of mitraclip in 2019 however did not follow up    [address]  · , corrects to 132  · Monitor     Essential hypertension  Assessment & Plan  · BP acceptable    · Monitor     Diabetes mellitus type 2 - Diabetic neuropathy  Assessment & Plan  Lab Results Component Value Date    HGBA1C 10 1 (H) 10/16/2022       Poorly controlled  · Continue home regimen, Lantus 30 units QHS and Humalog 24 units TID with meals + SSI  · Diabetic diet  · Monitor accuchecks and adjust regimen as needed       Ambulatory dysfunction  Assessment & Plan  · Patient walks with a walker, has history of spinal cord injury  · PT/OT    Peripheral arterial disease ([address] )  Assessment & Plan  · On plavix and statin at baseline, plavix to be held for possible EUS/ERCP  Alcohol abuse  Assessment & Plan  · Last drink reportedly 10/11, will continue CIWA for now but can likely d/c within 24-48 hours if no concerns  · Continue thiamine and folate with multivitamins daily  Tobacco abuse  Assessment & Plan  · Nicotine patch    History of cervical spinal cord injury  Assessment & Plan  In setting cervical spinal cord injury  Walks with walker  · PT/OT      VTE Pharmacologic Prophylaxis:   Moderate Risk (Score 3-4) - Pharmacological DVT Prophylaxis Ordered: enoxaparin (Lovenox)  Patient Centered Rounds: I performed bedside rounds with nursing staff today  Discussions with Specialists or Other Care Team Provider: nursing, case management     Education and Discussions with Family / Patient: Attempted to update  (wife) via phone  Left voicemail  Time Spent for Care: 30 minutes  More than 50% of total time spent on counseling and coordination of care as described above  Current Length of Stay: 1 day(s)    Current Patient Status: Inpatient     Certification Statement: The patient will continue to require additional inpatient hospital stay due to MRCP, possible additional procedures     Discharge Plan: Anticipate discharge in 48-72 hrs to discharge location to be determined pending rehab evaluations  Code Status: Level 3 - DNAR and DNI    Subjective:   Patient offers no acute complaints  Denies abdominal pain, nausea or vomiting  Has an appetite    Has noted that his urine is dark  Objective:     Vitals:   Temp (24hrs), Av 5 °F (36 9 °C), Min:98 3 °F (36 8 °C), Max:98 6 °F (37 °C)    Temp:  [98 3 °F (36 8 °C)-98 6 °F (37 °C)] 98 3 °F (36 8 °C)  HR:  [] 80  Resp:  [16-20] 18  BP: ()/(50-98) 148/88  SpO2:  [93 %-96 %] 96 %  Body mass index is 27 06 kg/m²  Input and Output Summary (last 24 hours): Intake/Output Summary (Last 24 hours) at 10/17/2022 1146  Last data filed at 10/17/2022 1045  Gross per 24 hour   Intake --   Output 400 ml   Net -400 ml       Physical Exam:   Physical Exam  Vitals and nursing note reviewed  Constitutional:       General: He is not in acute distress  Eyes:      General: Scleral icterus present  Cardiovascular:      Rate and Rhythm: Normal rate  Pulmonary:      Breath sounds: Normal breath sounds  Abdominal:      Tenderness: There is no abdominal tenderness  Musculoskeletal:         General: No swelling  Skin:     General: Skin is warm  Coloration: Skin is jaundiced  Neurological:      Mental Status: He is alert  Mental status is at baseline        Comments: Chronic weakness from prior spinal cord injury    Psychiatric:         Mood and Affect: Mood normal           Additional Data:     Labs:  Results from last 7 days   Lab Units 10/17/22  042   WBC Thousand/uL 9 98   HEMOGLOBIN g/dL 13 5   HEMATOCRIT % 37 3   PLATELETS Thousands/uL 171   NEUTROS PCT % 78*   LYMPHS PCT % 10*   MONOS PCT % 10   EOS PCT % 0     Results from last 7 days   Lab Units 10/17/22  0421   SODIUM mmol/L 129*   POTASSIUM mmol/L 3 6   CHLORIDE mmol/L 97   CO2 mmol/L 27   BUN mg/dL 18   CREATININE mg/dL 0 98   ANION GAP mmol/L 5   CALCIUM mg/dL 8 7   ALBUMIN g/dL 2 6*   TOTAL BILIRUBIN mg/dL 11 49*   ALK PHOS U/L 616*   ALT U/L 265*   AST U/L 348*   GLUCOSE RANDOM mg/dL 281*     Results from last 7 days   Lab Units 10/17/22  0421   INR  0 97     Results from last 7 days   Lab Units 10/17/22  0805 10/17/22  0015   POC GLUCOSE mg/dl 248* 262* Results from last 7 days   Lab Units 10/16/22  1755   HEMOGLOBIN A1C % 10 1*           Lines/Drains:  Invasive Devices  Report    Peripheral Intravenous Line  Duration           Peripheral IV 10/16/22 Left Antecubital <1 day                      Imaging: No pertinent imaging reviewed      Recent Cultures (last 7 days):         Last 24 Hours Medication List:   Current Facility-Administered Medications   Medication Dose Route Frequency Provider Last Rate   • albuterol  2 5 mg Nebulization Q4H PRN Daquan Lemos MD     • aluminum-magnesium hydroxide-simethicone  30 mL Oral Q6H PRN Daquan Lemos MD     • baclofen  20 mg Oral TID Daquan Lemos MD     • calcium carbonate-vitamin D  1 tablet Oral Daily With Breakfast Daquan Lemos MD     • citalopram  20 mg Oral Daily Daquan Lemos MD     • docusate sodium  100 mg Oral BID PRN Daquan Lemos MD     • enoxaparin  40 mg Subcutaneous Daily Daquan Lemos MD     • ergocalciferol  50,000 Units Oral Weekly Daquan Lemos MD     • folic acid  1 mg Oral Daily Daquan Lemos MD     • insulin glargine  30 Units Subcutaneous QAM Daquan Lemos MD     • insulin lispro  1-5 Units Subcutaneous HS Daquan Lemos MD     • insulin lispro  1-6 Units Subcutaneous TID AC Daquan Lemos MD     • insulin lispro  24 Units Subcutaneous TID With Meals Daquan Lemos MD     • levalbuterol  1 25 mg Nebulization BID Daquan Lemos MD     • multivitamin stress formula  1 tablet Oral Daily Daquan Lemos MD     • nicotine  1 patch Transdermal Daily Daquan Lemos MD     • nystatin   Topical BID Daquan Lemos MD     • ondansetron  4 mg Intravenous Q6H PRN Daquan Lemos MD     • potassium chloride  20 mEq Oral BID Daquan Lemos MD     • pregabalin  100 mg Oral TID Daquan Lemos MD     • salmeterol  1 puff Inhalation Q12H [facility] Daquan Lemos MD     • sodium chloride  3 mL Nebulization BID Kalpesh Ashraf MD     • tamsulosin  0 4 mg Oral Daily With Myrna Bowers MD     • thiamine  100 mg Oral Daily Kalpesh Ashraf MD     • torsemide  20 mg Oral Daily Kalpesh Ashraf MD     • umeclidinium bromide  1 puff Inhalation Daily Kalpesh Ashraf MD          Today, Patient Was Seen By: Reji Pickett    **Please Note: This note may have been constructed using a voice recognition system  **

## 2022-10-17 NOTE — ASSESSMENT & PLAN NOTE
Patient walks with a walker  Most likely secondary to uncontrolled diabetes with diabetic neuropathy  Physical therapy consult with case management

## 2022-10-17 NOTE — ASSESSMENT & PLAN NOTE
Lab Results   Component Value Date    EGFR 64 10/16/2022    EGFR 95 04/12/2022    EGFR 94 04/03/2022    CREATININE 1 19 10/16/2022    CREATININE 0 80 04/12/2022    CREATININE 0 82 04/03/2022   On Demadex; continue to monitor GFR

## 2022-10-17 NOTE — ASSESSMENT & PLAN NOTE
Lab Results   Component Value Date    HGBA1C 10 1 (H) 10/16/2022       Poorly controlled     · Continue home regimen, Lantus 30 units QHS and Humalog 24 units TID with meals + SSI  · Diabetic diet  · Monitor accuchecks and adjust regimen as needed

## 2022-10-17 NOTE — DISCHARGE INSTR - OTHER ORDERS
Skin care plans:  1-Apply ELMO to sacrum/buttocks/groin TID and PRN  2-Cleanse left lateral foot and heel with soap and water, apply silver alginate to left lateral foot and cover foot and heel with small clover allevyn foam  Change every other day and PRN soilage/displacement  3-Elevate heels to offload pressure  4-Ehob cushion when out of bed  5-Turn/repoisiton q2h or when medically stable for pressure re-distribution on skin    6-Moisturize skin daily with skin nourishing cream

## 2022-10-17 NOTE — RESPIRATORY THERAPY NOTE
RT Protocol Note  Effie Lui 57 y o  male MRN: [id number]  Unit/Bed#: [facility] 810-01 Encounter: [id number]    Assessment    Principal Problem:    Jaundice, obstructive, intrahepatic  Active Problems:    Tobacco abuse    Ambulatory dysfunction    Diabetes mellitus type 2 - Diabetic neuropathy    Alcohol abuse    Essential hypertension    Chronic combined systolic and diastolic congestive heart failure ([facility])    Chordae tendineae rupture - Severe mitral regurgitation    Stage 3a chronic kidney disease ([facility])      Home Pulmonary Medications:  Udn bid       Past Medical History:   Diagnosis Date    SUNDEEP (acute kidney injury) ([facility] )     CHF (congestive heart failure) ([facility])     Cholelithiasis     Chronic obstructive lung disease ([facility] [address] )     RESOLVED: 8/17/17    COPD (chronic obstructive pulmonary disease) ([facility] [address] )     Depression     Diabetes mellitus ([facility] [address] )     type 2, non-insulin dependent    Diabetic neuropathy ([facility] [address] )     Difficulty attaining erection     LAST ASSESSEDl: 8/17/17    Former tobacco use     Gall stone pancreatitis     RESOLVED: 3/8/17    Hiatal hernia     History of concussion     History of MRSA infection     History of varicella infection     Hypertension     LAST ASSESSED: 8/17/17    Non-healing open wound of right groin     s/p VAC placement & post-op fungal dermatitis    PAD (peripheral artery disease) ([facility])     s/p RLE fem-pop    Seasonal allergies     Severe mitral regurgitation     Spinal cord injury, C1-C7 ([facility] [address] )     s/p fusion    Traumatic injury to musculoskeletal system     1-5 FLIGHT FALL DOWN THE STEPS - CERVICAL NECK INJURY - JAN 2, 2012; LAST ASSESSED: 1/50/57    Umbilical hernia without obstruction and without gangrene     RESOLVED: 3/8/17     Social History     Socioeconomic History    Marital status: /Civil Union     Spouse name: None    Number of children: None    Years of education: 12    Highest education level: None   Occupational History    Occupation: RETIRED   Tobacco Use    Smoking status: Current Every Day Smoker     Packs/day: 0 25     Years: 40 00     Pack years: 10 00     Types: Cigarettes    Smokeless tobacco: Never Used   Vaping Use    Vaping Use: Never used   Substance and Sexual Activity    Alcohol use: Not Currently     Comment: pt  reports he has not drank since 2019    Drug use: No    Sexual activity: Never     Partners: Female   Other Topics Concern    None   Social History Narrative    DAILY  [employer]    EXERCISES RARELY    LIVES WITH SIGNIFICANT OTHER    LIVES WITH WIFE    NO LIVING WILL            Most recent tobacco use screenin2020    Do you currently or have you served in [employer]: No    Were you activated, into active duty, as a member of [employer] or as a Reservist: No    Occupation: disabled    Education: 15    Marital status:     Sexual orientation: Heterosexual    Exercise level: Occasional    Diet: Diabetic    General stress level: Low    Has smoked since age: 10    Alcohol intake: Moderate    Caffeine intake: Occasional    Chewing tobacco: none    Illicit drugs: none    Guns present in home: No    Seat belts used routinely: No    Sunscreen used routinely: Yes    Smoke alarm in home: Yes    Advance directive: No    Salt Intake: moderate    Is the patient interested in a colorectal cancer screening: No    Has patient visited an area known to be high risk for 2019 n-CoV: No     Social Determinants of Health     Financial Resource Strain: Not on file   Food Insecurity: Not on file   Transportation Needs: Not on file   Physical Activity: Not on file   Stress: Not on file   Social Connections: Not on file   Intimate Partner Violence: Not on file   Housing Stability: Not on file       Subjective         Objective    Physical Exam:        Vitals:  Blood pressure 122/77, pulse 88, temperature 98 6 °F (37 °C), temperature source Temporal, resp  rate 16, SpO2 95 %            Imaging and other studies: I have personally reviewed pertinent reports  Plan    Respiratory Plan: Home Bronchodilator Patient pathway        Resp Comments: (P) PT admitted with diarrhea  Pt has copd hx and uses albuerol udn  Pt states sometime everyday and some days not at all  Pt states no stiolto mdi as documented in med rec  Pt offers no resp c/o  Will cont udn bid as pt uses at home

## 2022-10-17 NOTE — ASSESSMENT & PLAN NOTE
Recently stopped; will place patient on CIWA protocol  Continue thiamine and folate with multivitamins daily

## 2022-10-17 NOTE — PLAN OF CARE
Problem: MOBILITY - ADULT  Goal: Maintain or return to baseline ADL function  Description: INTERVENTIONS:  -  Assess patient's ability to carry out ADLs; assess patient's baseline for ADL function and identify physical deficits which impact ability to perform ADLs (bathing, care of mouth/teeth, toileting, grooming, dressing, etc )  - Assess/evaluate cause of self-care deficits   - Assess range of motion  - Assess patient's mobility; develop plan if impaired  - Assess patient's need for assistive devices and provide as appropriate  - Encourage maximum independence but intervene and supervise when necessary  - Involve family in performance of ADLs  - Assess for home care needs following discharge   - Consider OT consult to assist with ADL evaluation and planning for discharge  - Provide patient education as appropriate  10/17/2022 1000 by Raul Meza RN  Outcome: Progressing  10/17/2022 1000 by Raul Meza RN  Outcome: Progressing  Goal: Maintains/Returns to pre admission functional level  Description: INTERVENTIONS:  - Perform BMAT or MOVE assessment daily    - Set and communicate daily mobility goal to care team and patient/family/caregiver     - Collaborate with rehabilitation services on mobility goals if consulted   - Out of bed for toileting  - Record patient progress and toleration of activity level   10/17/2022 1000 by Raul Meza RN  Outcome: Progressing  10/17/2022 1000 by Raul Meza RN  Outcome: Progressing     Problem: PAIN - ADULT  Goal: Verbalizes/displays adequate comfort level or baseline comfort level  Description: Interventions:  - Encourage patient to monitor pain and request assistance  - Assess pain using appropriate pain scale  - Administer analgesics based on type and severity of pain and evaluate response  - Implement non-pharmacological measures as appropriate and evaluate response  - Consider cultural and social influences on pain and pain management  - Notify physician/advanced practitioner if interventions unsuccessful or patient reports new pain  Outcome: Progressing     Problem: INFECTION - ADULT  Goal: Absence or prevention of progression during hospitalization  Description: INTERVENTIONS:  - Assess and monitor for signs and symptoms of infection  - Monitor lab/diagnostic results  - Monitor all insertion sites, i e  indwelling lines, tubes, and drains  - Monitor endotracheal if appropriate and nasal secretions for changes in amount and color  - [place] appropriate cooling/warming therapies per order  - Administer medications as ordered  - Instruct and encourage patient and family to use good hand hygiene technique  - Identify and instruct in appropriate isolation precautions for identified infection/condition  Outcome: Progressing  Goal: Absence of fever/infection during neutropenic period  Description: INTERVENTIONS:  - Monitor WBC    Outcome: Progressing     Problem: SAFETY ADULT  Goal: Maintain or return to baseline ADL function  Description: INTERVENTIONS:  -  Assess patient's ability to carry out ADLs; assess patient's baseline for ADL function and identify physical deficits which impact ability to perform ADLs (bathing, care of mouth/teeth, toileting, grooming, dressing, etc )  - Assess/evaluate cause of self-care deficits   - Assess range of motion  - Assess patient's mobility; develop plan if impaired  - Assess patient's need for assistive devices and provide as appropriate  - Encourage maximum independence but intervene and supervise when necessary  - Involve family in performance of ADLs  - Assess for home care needs following discharge   - Consider OT consult to assist with ADL evaluation and planning for discharge  - Provide patient education as appropriate  10/17/2022 1000 by Donn De Jesus RN  Outcome: Progressing  10/17/2022 1000 by Donn De Jesus RN  Outcome: Progressing  Goal: Maintains/Returns to pre admission functional level  Description: INTERVENTIONS:  - Perform BMAT or MOVE assessment daily    - Set and communicate daily mobility goal to care team and patient/family/caregiver     - Collaborate with rehabilitation services on mobility goals if consulted  - Out of bed for toileting  - Record patient progress and toleration of activity level   10/17/2022 1000 by Pedro Aiken RN  Outcome: Progressing  10/17/2022 1000 by Pedro Aiken RN  Outcome: Progressing  Goal: Patient will remain free of falls  Description: INTERVENTIONS:  - Educate patient/family on patient safety including physical limitations  - Instruct patient to call for assistance with activity   - Consult OT/PT to assist with strengthening/mobility   - Keep Call bell within reach  - Keep bed low and locked with side rails adjusted as appropriate  - Keep care items and personal belongings within reach  - Initiate and maintain comfort rounds  - Make Fall Risk Sign visible to staff  - Apply yellow socks and bracelet for high fall risk patients  - Consider moving patient to room near nurses station  Outcome: Progressing     Problem: DISCHARGE PLANNING  Goal: Discharge to home or other facility with appropriate resources  Description: INTERVENTIONS:  - Identify barriers to discharge w/patient and caregiver  - Arrange for needed discharge resources and transportation as appropriate  - Identify discharge learning needs (meds, wound care, etc )  - Arrange for interpretive services to assist at discharge as needed  - Refer to Case Management Department for coordinating discharge planning if the patient needs post-hospital services based on physician/advanced practitioner order or complex needs related to functional status, cognitive ability, or social support system  Outcome: Progressing     Problem: Knowledge Deficit  Goal: Patient/family/caregiver demonstrates understanding of disease process, treatment plan, medications, and discharge instructions  Description: Complete learning assessment and assess knowledge base   Interventions:  - Provide teaching at level of understanding  - Provide teaching via preferred learning methods  Outcome: Progressing
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
## 2022-10-17 NOTE — ASSESSMENT & PLAN NOTE
Lab Results   Component Value Date    EGFR 81 10/17/2022    EGFR 64 10/16/2022    EGFR 95 04/12/2022    CREATININE 0 98 10/17/2022    CREATININE 1 19 10/16/2022    CREATININE 0 80 04/12/2022   · Renal function overall stable  · Continue to monitor

## 2022-10-17 NOTE — ASSESSMENT & PLAN NOTE
Patient presents with jaundice, no other symptoms  Noted 10/13, had just stopped drinking ETOH 10/11  · CT A/P showed pancreas is atrophic and there's mild prominence of main pancreatic duct 5mm 2/2 chronic pancreatitis sequelae  · Acute hepatitis panel negative  · GI following, MRCP pending  Possible CBD dilation noted by GI  · Plan for possible ERCP/EUS pending offical read     · Holding plavix for potential procedure  · Trend CMP

## 2022-10-17 NOTE — H&P
[facility]  H&P- Naya CruzGaston 1960, 57 y o  male MRN: [id number]  Unit/Bed#: ED 25 Encounter: [id number]  Primary Care Provider: Trevor Perez MD   Date and time admitted to hospital: 10/16/2022  4:28 PM    * Jaundice, obstructive, intrahepatic  Assessment & Plan  · Laboratory studies are consistent with intrahepatic obstructive jaundice despite CT scan being negative for any overt obstructive lesion  · Acute hepatitis panel negative  · Personally observed bowel movement with note of light color  · Will recheck comprehensive metabolic profile in the morning  · Also in this light that the patient is with history of chronic alcoholism and recently stopped, will also need to rule out any commitant hepatic insufficiency  Will do coagulation profile in the morning  · MRCP  · Gastroenterology consult  Possibility ERCP? Ambulatory dysfunction  Assessment & Plan  Patient walks with a walker  Most likely secondary to uncontrolled diabetes with diabetic neuropathy  Physical therapy consult with case management  Tobacco abuse  Assessment & Plan  Nicotine replacement  Chronic combined systolic and diastolic congestive heart failure [facility])  Assessment & Plan  Wt Readings from Last 3 Encounters:   04/27/22 80 7 kg (178 lb)   07/23/21 83 kg (183 lb)   01/20/21 75 8 kg (167 lb)     Currently without shortness of breath  Patient able to lie flat on bed  No conversational dyspnea  Diabetes mellitus type 2 - Diabetic neuropathy  Assessment & Plan  Lab Results   Component Value Date    HGBA1C 10 1 (H) 10/16/2022       Continue insulin glargine 30 units at night with insulin sliding scale per protocol  Blood Sugar Average: Last 72 hrs:      PAD  Assessment & Plan  Lab Results   Component Value Date    HGBA1C 10 1 (H) 10/16/2022       No results for input(s): POCGLU in the last 72 hours      Blood Sugar Average: Last 72 hrs:      Stage 3a chronic kidney disease [facility])  Assessment & Plan  Lab Results   Component Value Date    EGFR 64 10/16/2022    EGFR 95 04/12/2022    EGFR 94 04/03/2022    CREATININE 1 19 10/16/2022    CREATININE 0 80 04/12/2022    CREATININE 0 82 04/03/2022   On Demadex; continue to monitor GFR  Chordae tendineae rupture - Severe mitral regurgitation  Assessment & Plan  With significant severe mitral regurgitation; currently not short of breath  Essential hypertension  Assessment & Plan  Continue torsemide 20 mg daily  Alcohol abuse  Assessment & Plan  Recently stopped; will place patient on CIWA protocol  Continue thiamine and folate with multivitamins daily  VTE Prophylaxis: Enoxaparin (Lovenox)  / sequential compression device   Code Status: Level 3 - DNAR and DNI as discussed with patient  POLST: There is no POLST form on file for this patient (pre-hospital)    Anticipated Length of Stay:  Patient will be admitted on an Inpatient basis with an anticipated length of stay of  greater than 2 midnights  Justification for Hospital Stay: Please see detailed plans noted above  Or new onset hyperbilirubinemia with elevation of transaminases and alkaline phosphatase seemingly consistent with obstructive jaundice  Also observed patient has pale color stools  CT scan of the abdomen and pelvis unremarkable for any obstructive lesions of the hepatic biliary tract  Gastroenterology consult  Chief Complaint:     New onset yellowness/jaundice with pale colored stools  History of Present Illness:  Melissa Vanegas is a 57 y o  male who has past medical history significant for alcohol abuse during as much as 3-5 shots at least per day, however he stopped drinking since Tuesday last week    In addition, he has a history of diabetes mellitus on insulin with diabetic neuropathy, peripheral arterial disease, severe mitral regurgitation secondary to chordae rupture, tobacco abuse, hypertension, ambulatory dysfunction however uses a walker to ambulate  Patient states that he noted yellowness of the skin starting approximately Thursday, 2 days after stopping alcohol cold turkey  Patient denies any abdominal pain  Mild icterus with note of pale colored stools  Patient states that he ate something wrong however this was limited to 2 bowels of raw hamburger on Friday  (noting that onset of jaundice was Thursday )  No nausea or vomiting  Initially with softer stools than usual currently it is getting more formed  No sick contacts at home  Currently, patient is lying flat on bed and is able to converse without any dyspnea  Review of Systems:    Constitutional:  Denies fever or chills   Eyes:  Denies change in visual acuity   HENT:  Denies nasal congestion or sore throat   Respiratory:  Denies cough or shortness of breath   Cardiovascular:  Denies chest pain or edema   GI:  Denies abdominal pain, nausea, vomiting, bloody stools or diarrhea however claims pale colored stools    :  Denies dysuria   Musculoskeletal:  Denies back pain or joint pain   Integument:  Denies rash   Neurologic:  Denies headache, focal weakness or sensory changes   Endocrine:  Denies polyuria or polydipsia   Psychiatric:  Denies depression or anxiety     Past Medical and Surgical History:   Past Medical History:   Diagnosis Date   • SUNDEEP [facility]acute kidney [facility]) ([facility] )    • CHF (congestive heart failure) ([facility])    • Cholelithiasis    • Chronic obstructive lung disease ([facility] )     RESOLVED: 8/17/17   • COPD (chronic obstructive pulmonary disease) ([facility] )    • Depression    • Diabetes mellitus ([facility] )     type 2, non-insulin dependent   • Diabetic neuropathy ([facility] )    • Difficulty attaining erection     LAST ASSESSEDl: 8/17/17   • Former tobacco use    • Gall stone pancreatitis     RESOLVED: 3/8/17   • Hiatal hernia    • History of concussion    • History of MRSA infection    • History of varicella infection    • Hypertension     LAST ASSESSED: 8/17/17   • Non-healing open wound of right groin     s/p VAC placement & post-op fungal dermatitis   • PAD (peripheral artery disease) ([facility])     s/p RLE fem-pop   • Seasonal allergies    • Severe mitral regurgitation    • Spinal cord injury, C1-C7 ([address] )     s/p fusion   • Traumatic injury to musculoskeletal system     1-5 FLIGHT FALL DOWN THE STEPS - CERVICAL NECK INJURY - JAN 2, 2012; LAST ASSESSED: 1/89/48   • Umbilical hernia without obstruction and without gangrene     RESOLVED: 3/8/17     Past Surgical History:   Procedure Laterality Date   • ANGIOPLASTY     • ARTERIOGRAM Right 7/31/2018    Procedure: ARTERIOGRAM Completion Agram;  Surgeon: Teresa Clarke MD;  [facility];  Service: Vascular   • BACK SURGERY     • BYPASS FEMORAL-POPLITEAL Right 7/31/2018    Procedure: BYPASS FEMORAL-POPLITEAL R femoral- BK popliteal bypass;  Surgeon: Teresa Clarke MD;  [facility];  Service: Vascular   • CERVICAL FUSION      VERTEBRAL; C3-C4 FUSION   • CHOLECYSTECTOMY LAPAROSCOPIC N/A 2/24/2017    Procedure: CHOLECYSTECTOMY LAPAROSCOPIC;  Surgeon: Jeovanny Amor MD;  [facility];  Service:    • EPIGASTRIC HERNIA REPAIR     • FEMORAL BYPASS Right     Leg   • INCISION AND DRAINAGE OF WOUND      Groin area   • MT DEBRIDEMENT, SKIN, SUB-Q TISSUE,=<20 SQ CM Right 8/28/2018    Procedure: RIGHT GROIN AND [facility] ([address] [place] [address];  Surgeon: Damaris Hudson MD;  [facility];  Service: Vascular   • MT THROMBOENDARTECTMY FEMORAL COMMON Right 7/31/2018    Procedure: ENDARTERECTOMY ARTERIAL FEMORAL R femoral endarterectomy w/ patch angioplasty ;  Surgeon: Teresa Clarke MD;  [facility];  Service: Vascular   • SPINE SURGERY     • UMBILICAL HERNIA REPAIR N/A 2/24/2017    Procedure: REPAIR HERNIA UMBILICAL;  Surgeon: Jeovanny Amor MD;  [facility];  Service:    • WISDOM TOOTH EXTRACTION         Meds/Allergies:  (Not in a hospital admission)      Allergies:    Allergies   Allergen Reactions   • Seasonal Ic [Cholestatin]      History:  Marital Status: /Civil Union   Occupation:   Patient Pre-hospital Living Situation:  Lives at home with wife  Patient Pre-hospital Level of Mobility:  Ambulatory  Patient Pre-hospital Diet Restrictions:  Diabetic  Substance Use History:   Social History     Substance and Sexual Activity   Alcohol Use Not Currently    Comment: pt  reports he has not drank since February 2019     Social History     Tobacco Use   Smoking Status Current Every Day Smoker   • Packs/day: 0 25   • Years: 40 00   • Pack years: 10 00   • Types: Cigarettes   Smokeless Tobacco Never Used     Social History     Substance and Sexual Activity   Drug Use No       Family History:  Family History   Problem Relation Age of Onset   • Hypertension Mother         BENIGN   • Alzheimer's disease Father    • Heart disease Father         CARDIAC DISORDER   • Stroke Father    • Heart attack Father    • No Known Problems Brother        Physical Exam:     Vitals:   Blood Pressure: 114/63 (10/16/22 2030)  Pulse: 100 (10/16/22 2030)  Temperature: 98 6 °F (37 °C) (10/16/22 1603)  Temp Source: Temporal (10/16/22 1603)  Respirations: 20 (10/16/22 2030)  SpO2: 93 % (10/16/22 2030)    Constitutional:  Well developed, well nourished, no acute distress, non-toxic appearance with overall jaundice  Eyes:  PERRL, conjunctiva with note of icterus  HENT:  Atraumatic, external ears normal, nose normal, oropharynx moist, no pharyngeal exudates  Neck- normal range of motion, no tenderness, supple   Respiratory:  No respiratory distress, normal breath sounds, no rales, no wheezing   Cardiovascular:  Normal rate, normal rhythm, harsh murmur at the base 3/6, no gallops, no rubs   GI:  Soft, nondistended, normal bowel sounds, nontender, no organomegaly, no mass, no rebound, no guarding   :  No costovertebral angle tenderness   Musculoskeletal:  No edema, no tenderness, no deformities   Back- no tenderness  Integument:  Well hydrated, with bilateral irregular leg abrasions? Lymphatic:  No lymphadenopathy noted   Neurologic:  Alert &awake, communicative, CN 2-12 normal, normal motor function, normal sensory function, no focal deficits noted   Psychiatric:  Speech and behavior appropriate       Lab Results: I have personally reviewed pertinent reports  Results from last 7 days   Lab Units 10/16/22  1755   WBC Thousand/uL 10 19*   HEMOGLOBIN g/dL 14 7   HEMATOCRIT % 40 6   PLATELETS Thousands/uL 182   NEUTROS PCT % 80*   LYMPHS PCT % 10*   MONOS PCT % 9   EOS PCT % 0     Results from last 7 days   Lab Units 10/16/22  1755   POTASSIUM mmol/L 4 1   CHLORIDE mmol/L 92*   CO2 mmol/L 30   BUN mg/dL 20   CREATININE mg/dL 1 19   CALCIUM mg/dL 8 7   ALK PHOS U/L 670*   ALT U/L 282*   AST U/L 375*     Results from last 7 days   Lab Units 10/16/22  1755   INR  0 90         Imaging: I have personally reviewed pertinent reports  CT head without contrast    Result Date: 10/16/2022  Narrative: CT BRAIN - WITHOUT CONTRAST INDICATION:   Head trauma, abnormal mental status (Age 19-64y) COMPARISON:  CT of abdomen October 11, 2020  TECHNIQUE:  CT examination of the brain was performed  In addition to axial images, sagittal and coronal 2D reformatted images were created and submitted for interpretation  Radiation dose length product (DLP) for this visit:  920 59 mGy-cm   This examination, like all CT scans performed in the [facility], was performed utilizing techniques to minimize radiation dose exposure, including the use of iterative  reconstruction and automated exposure control  IMAGE QUALITY:  Diagnostic  FINDINGS: PARENCHYMA:  No intracranial mass, mass effect or midline shift  No CT signs of acute infarction  No acute parenchymal hemorrhage  VENTRICLES AND EXTRA-AXIAL SPACES:  Normal for the patient's age  VISUALIZED ORBITS AND PARANASAL SINUSES:  Mild mucosal thickening in the maxillary sinuses   CALVARIUM AND EXTRACRANIAL SOFT TISSUES:  Normal      Impression: No acute intracranial hemorrhage, midline shift, or mass effect  Workstation performed: [id number]     CT chest abdomen pelvis w contrast    Result Date: 10/16/2022  Narrative: CT CHEST, ABDOMEN AND PELVIS WITH IV CONTRAST INDICATION:   Abdominal distension, diarrhea, jaundice  COMPARISON:  None  TECHNIQUE: CT examination of the chest, abdomen and pelvis was performed  Axial, sagittal, and coronal 2D reformatted images were created from the source data and submitted for interpretation  Radiation dose length product (DLP) for this visit:  881 6 mGy-cm   This examination, like all CT scans performed in the [facility], was performed utilizing techniques to minimize radiation dose exposure, including the use of iterative reconstruction and automated exposure control  IV Contrast:  95 mL of iohexol (OMNIPAQUE) Enteric Contrast: Enteric contrast was administered  FINDINGS: CHEST LUNGS:  No focal consolidation  There is no tracheal or endobronchial lesion  PLEURA:  Unremarkable  HEART/GREAT VESSELS: Coronary artery calcifications  Mitral annular calcification  No pericardial effusion  No thoracic aortic aneurysm  MEDIASTINUM AND CHAS:  Unremarkable  CHEST WALL AND LOWER NECK:  Unremarkable  ABDOMEN LIVER/BILIARY TREE:  Hepatic steatosis  GALLBLADDER:  Gallbladder is surgically absent  SPLEEN:  Unremarkable  PANCREAS:  Atrophic pancreas  Mildly prominent main pancreatic duct measuring up to 5 mm may be sequela of chronic pancreatitis  ADRENAL GLANDS:  Unremarkable  KIDNEYS/URETERS:  Unremarkable  No hydronephrosis  STOMACH AND BOWEL:  Small hiatal hernia  No bowel obstruction  APPENDIX:  A normal appendix was visualized  ABDOMINOPELVIC CAVITY:  No ascites  No pneumoperitoneum  No lymphadenopathy  VESSELS:  Moderate atherosclerotic calcifications  PELVIS REPRODUCTIVE ORGANS:  Unremarkable for patient's age  URINARY BLADDER:  Unremarkable   ABDOMINAL WALL/INGUINAL REGIONS:  Moderate fat-containing umbilical hernia with mild fat stranding  OSSEOUS STRUCTURES:  Mildly displaced fracture of the right posterior 12th rib (series 2, image 71)  Impression: 1  Mildly displaced fracture of the right posterior 12th rib  2   Hepatic steatosis  3   Atrophic pancreas with prominent main pancreatic duct likely sequela of chronic pancreatitis  4   Small hiatal hernia  Workstation performed: [id number]         ** Please Note: Dragon 360 Dictation voice to text software was used in the creation of this document   **

## 2022-10-17 NOTE — ASSESSMENT & PLAN NOTE
· Last drink reportedly 10/11, will continue CIWA for now but can likely d/c within 24-48 hours if no concerns  · Continue thiamine and folate with multivitamins daily

## 2022-10-17 NOTE — ASSESSMENT & PLAN NOTE
Wt Readings from Last 3 Encounters:   04/27/22 80 7 kg (178 lb)   07/23/21 83 kg (183 lb)   01/20/21 75 8 kg (167 lb)     Currently without shortness of breath  Patient able to lie flat on bed  No conversational dyspnea

## 2022-10-17 NOTE — ASSESSMENT & PLAN NOTE
Wt Readings from Last 3 Encounters:   10/17/22 80 7 kg (178 lb)   04/27/22 80 7 kg (178 lb)   07/23/21 83 kg (183 lb)     · Continue torsemide  Not on BB at baseline  · Last echo showed EF 60% with flail mitral valve leaflet with severe MR, referred to CTS for mitraclip however does not appear he followed up  Last seen in 2020 by Dr Eric Epstein     · I/O, daily weights  · Monitor volume status

## 2022-10-17 NOTE — ASSESSMENT & PLAN NOTE
Lab Results   Component Value Date    HGBA1C 10 1 (H) 10/16/2022       Continue insulin glargine 30 units at night with insulin sliding scale per protocol      Blood Sugar Average: Last 72 hrs:

## 2022-10-18 PROBLEM — R30.0 DYSURIA: Status: ACTIVE | Noted: 2022-10-18

## 2022-10-18 LAB
ALBUMIN SERPL BCP-MCNC: 2.3 G/DL (ref 3.5–5)
ALP SERPL-CCNC: 635 U/L (ref 46–116)
ALT SERPL W P-5'-P-CCNC: 243 U/L (ref 12–78)
ANION GAP SERPL CALCULATED.3IONS-SCNC: 11 MMOL/L (ref 4–13)
AST SERPL W P-5'-P-CCNC: 272 U/L (ref 5–45)
BACTERIA UR QL AUTO: ABNORMAL /HPF
BASOPHILS # BLD AUTO: 0.04 THOUSANDS/ÂΜL (ref 0–0.1)
BASOPHILS NFR BLD AUTO: 0 % (ref 0–1)
BILIRUB SERPL-MCNC: 13.55 MG/DL (ref 0.2–1)
BILIRUB UR QL STRIP: ABNORMAL
BUN SERPL-MCNC: 19 MG/DL (ref 5–25)
CALCIUM ALBUM COR SERPL-MCNC: 9.8 MG/DL (ref 8.3–10.1)
CALCIUM SERPL-MCNC: 8.4 MG/DL (ref 8.3–10.1)
CHLORIDE SERPL-SCNC: 99 MMOL/L (ref 96–108)
CLARITY UR: CLEAR
CO2 SERPL-SCNC: 22 MMOL/L (ref 21–32)
COLOR UR: ABNORMAL
CREAT SERPL-MCNC: 1.05 MG/DL (ref 0.6–1.3)
EOSINOPHIL # BLD AUTO: 0.04 THOUSAND/ÂΜL (ref 0–0.61)
EOSINOPHIL NFR BLD AUTO: 0 % (ref 0–6)
ERYTHROCYTE [DISTWIDTH] IN BLOOD BY AUTOMATED COUNT: 15.2 % (ref 11.6–15.1)
GFR SERPL CREATININE-BSD FRML MDRD: 74 ML/MIN/1.73SQ M
GLUCOSE SERPL-MCNC: 103 MG/DL (ref 65–140)
GLUCOSE SERPL-MCNC: 135 MG/DL (ref 65–140)
GLUCOSE SERPL-MCNC: 273 MG/DL (ref 65–140)
GLUCOSE SERPL-MCNC: 319 MG/DL (ref 65–140)
GLUCOSE SERPL-MCNC: 388 MG/DL (ref 65–140)
GLUCOSE SERPL-MCNC: 62 MG/DL (ref 65–140)
GLUCOSE UR STRIP-MCNC: ABNORMAL MG/DL
GRAN CASTS #/AREA URNS LPF: ABNORMAL /[LPF]
HCT VFR BLD AUTO: 36.8 % (ref 36.5–49.3)
HGB BLD-MCNC: 12.9 G/DL (ref 12–17)
HGB UR QL STRIP.AUTO: NEGATIVE
HYALINE CASTS #/AREA URNS LPF: ABNORMAL /LPF
IMM GRANULOCYTES # BLD AUTO: 0.07 THOUSAND/UL (ref 0–0.2)
IMM GRANULOCYTES NFR BLD AUTO: 1 % (ref 0–2)
KETONES UR STRIP-MCNC: ABNORMAL MG/DL
LEUKOCYTE ESTERASE UR QL STRIP: NEGATIVE
LYMPHOCYTES # BLD AUTO: 0.85 THOUSANDS/ÂΜL (ref 0.6–4.47)
LYMPHOCYTES NFR BLD AUTO: 8 % (ref 14–44)
MCH RBC QN AUTO: 35.5 PG (ref 26.8–34.3)
MCHC RBC AUTO-ENTMCNC: 35.1 G/DL (ref 31.4–37.4)
MCV RBC AUTO: 101 FL (ref 82–98)
MONOCYTES # BLD AUTO: 0.93 THOUSAND/ÂΜL (ref 0.17–1.22)
MONOCYTES NFR BLD AUTO: 9 % (ref 4–12)
NEUTROPHILS # BLD AUTO: 8.53 THOUSANDS/ÂΜL (ref 1.85–7.62)
NEUTS SEG NFR BLD AUTO: 82 % (ref 43–75)
NITRITE UR QL STRIP: NEGATIVE
NON-SQ EPI CELLS URNS QL MICRO: ABNORMAL /HPF
NRBC BLD AUTO-RTO: 0 /100 WBCS
PH UR STRIP.AUTO: 5 [PH]
PLATELET # BLD AUTO: 177 THOUSANDS/UL (ref 149–390)
PMV BLD AUTO: 12.9 FL (ref 8.9–12.7)
POTASSIUM SERPL-SCNC: 3.7 MMOL/L (ref 3.5–5.3)
PROT SERPL-MCNC: 6.6 G/DL (ref 6.4–8.4)
PROT UR STRIP-MCNC: NEGATIVE MG/DL
RBC # BLD AUTO: 3.63 MILLION/UL (ref 3.88–5.62)
RBC #/AREA URNS AUTO: ABNORMAL /HPF
SODIUM SERPL-SCNC: 132 MMOL/L (ref 135–147)
SP GR UR STRIP.AUTO: 1.01 (ref 1–1.03)
UROBILINOGEN UR STRIP-ACNC: <2 MG/DL
WBC # BLD AUTO: 10.46 THOUSAND/UL (ref 4.31–10.16)
WBC #/AREA URNS AUTO: ABNORMAL /HPF

## 2022-10-18 PROCEDURE — 85025 COMPLETE CBC W/AUTO DIFF WBC: CPT | Performed by: NURSE PRACTITIONER

## 2022-10-18 PROCEDURE — 81001 URINALYSIS AUTO W/SCOPE: CPT | Performed by: NURSE PRACTITIONER

## 2022-10-18 PROCEDURE — 94760 N-INVAS EAR/PLS OXIMETRY 1: CPT

## 2022-10-18 PROCEDURE — 80053 COMPREHEN METABOLIC PANEL: CPT | Performed by: NURSE PRACTITIONER

## 2022-10-18 PROCEDURE — 99232 SBSQ HOSP IP/OBS MODERATE 35: CPT | Performed by: NURSE PRACTITIONER

## 2022-10-18 PROCEDURE — 97163 PT EVAL HIGH COMPLEX 45 MIN: CPT

## 2022-10-18 PROCEDURE — 86301 IMMUNOASSAY TUMOR CA 19-9: CPT

## 2022-10-18 PROCEDURE — 99232 SBSQ HOSP IP/OBS MODERATE 35: CPT | Performed by: INTERNAL MEDICINE

## 2022-10-18 PROCEDURE — 82948 REAGENT STRIP/BLOOD GLUCOSE: CPT

## 2022-10-18 PROCEDURE — 97167 OT EVAL HIGH COMPLEX 60 MIN: CPT

## 2022-10-18 PROCEDURE — 94640 AIRWAY INHALATION TREATMENT: CPT

## 2022-10-18 RX ORDER — METRONIDAZOLE 500 MG/1
500 TABLET ORAL EVERY 8 HOURS SCHEDULED
Status: DISCONTINUED | OUTPATIENT
Start: 2022-10-18 | End: 2022-10-25

## 2022-10-18 RX ORDER — LANOLIN ALCOHOL/MO/W.PET/CERES
3 CREAM (GRAM) TOPICAL
Status: DISCONTINUED | OUTPATIENT
Start: 2022-10-18 | End: 2022-10-27 | Stop reason: HOSPADM

## 2022-10-18 RX ADMIN — METRONIDAZOLE 500 MG: 500 TABLET ORAL at 09:25

## 2022-10-18 RX ADMIN — BACLOFEN 20 MG: 20 TABLET ORAL at 20:56

## 2022-10-18 RX ADMIN — PREGABALIN 100 MG: 100 CAPSULE ORAL at 20:56

## 2022-10-18 RX ADMIN — FOLIC ACID 1 MG: 1 TABLET ORAL at 08:02

## 2022-10-18 RX ADMIN — PREGABALIN 100 MG: 100 CAPSULE ORAL at 08:02

## 2022-10-18 RX ADMIN — ISODIUM CHLORIDE 3 ML: 0.03 SOLUTION RESPIRATORY (INHALATION) at 08:38

## 2022-10-18 RX ADMIN — BACLOFEN 20 MG: 20 TABLET ORAL at 17:10

## 2022-10-18 RX ADMIN — B-COMPLEX W/ C & FOLIC ACID TAB 1 TABLET: TAB at 08:02

## 2022-10-18 RX ADMIN — Medication 3 MG: at 21:00

## 2022-10-18 RX ADMIN — TAMSULOSIN HYDROCHLORIDE 0.4 MG: 0.4 CAPSULE ORAL at 17:10

## 2022-10-18 RX ADMIN — METRONIDAZOLE 500 MG: 500 TABLET ORAL at 21:00

## 2022-10-18 RX ADMIN — TORSEMIDE 20 MG: 20 TABLET ORAL at 08:02

## 2022-10-18 RX ADMIN — SALMETEROL XINAFOATE 1 PUFF: 50 POWDER, METERED ORAL; RESPIRATORY (INHALATION) at 20:56

## 2022-10-18 RX ADMIN — NYSTATIN: 100000 POWDER TOPICAL at 17:10

## 2022-10-18 RX ADMIN — LEVALBUTEROL HYDROCHLORIDE 1.25 MG: 1.25 SOLUTION, CONCENTRATE RESPIRATORY (INHALATION) at 19:21

## 2022-10-18 RX ADMIN — CITALOPRAM HYDROBROMIDE 20 MG: 20 TABLET ORAL at 08:02

## 2022-10-18 RX ADMIN — INSULIN LISPRO 24 UNITS: 100 INJECTION, SOLUTION INTRAVENOUS; SUBCUTANEOUS at 11:34

## 2022-10-18 RX ADMIN — MICONAZOLE NITRATE 1 APPLICATION: 20 CREAM TOPICAL at 08:04

## 2022-10-18 RX ADMIN — POTASSIUM CHLORIDE 20 MEQ: 1500 TABLET, EXTENDED RELEASE ORAL at 17:10

## 2022-10-18 RX ADMIN — INSULIN LISPRO 4 UNITS: 100 INJECTION, SOLUTION INTRAVENOUS; SUBCUTANEOUS at 08:03

## 2022-10-18 RX ADMIN — LEVALBUTEROL HYDROCHLORIDE 1.25 MG: 1.25 SOLUTION, CONCENTRATE RESPIRATORY (INHALATION) at 08:38

## 2022-10-18 RX ADMIN — Medication 1 TABLET: at 08:02

## 2022-10-18 RX ADMIN — CEFTRIAXONE 1000 MG: 1 INJECTION, POWDER, FOR SOLUTION INTRAMUSCULAR; INTRAVENOUS at 10:48

## 2022-10-18 RX ADMIN — INSULIN GLARGINE 30 UNITS: 100 INJECTION, SOLUTION SUBCUTANEOUS at 08:09

## 2022-10-18 RX ADMIN — POTASSIUM CHLORIDE 20 MEQ: 1500 TABLET, EXTENDED RELEASE ORAL at 08:02

## 2022-10-18 RX ADMIN — UMECLIDINIUM 1 PUFF: 62.5 AEROSOL, POWDER ORAL at 08:03

## 2022-10-18 RX ADMIN — INSULIN LISPRO 6 UNITS: 100 INJECTION, SOLUTION INTRAVENOUS; SUBCUTANEOUS at 11:34

## 2022-10-18 RX ADMIN — NICOTINE 1 PATCH: 21 PATCH, EXTENDED RELEASE TRANSDERMAL at 08:02

## 2022-10-18 RX ADMIN — THIAMINE HCL TAB 100 MG 100 MG: 100 TAB at 08:02

## 2022-10-18 RX ADMIN — SALMETEROL XINAFOATE 1 PUFF: 50 POWDER, METERED ORAL; RESPIRATORY (INHALATION) at 08:03

## 2022-10-18 RX ADMIN — Medication 3 MG: at 01:14

## 2022-10-18 RX ADMIN — ISODIUM CHLORIDE 3 ML: 0.03 SOLUTION RESPIRATORY (INHALATION) at 19:20

## 2022-10-18 RX ADMIN — ENOXAPARIN SODIUM 40 MG: 40 INJECTION SUBCUTANEOUS at 08:02

## 2022-10-18 RX ADMIN — INSULIN LISPRO 24 UNITS: 100 INJECTION, SOLUTION INTRAVENOUS; SUBCUTANEOUS at 08:03

## 2022-10-18 RX ADMIN — PREGABALIN 100 MG: 100 CAPSULE ORAL at 17:10

## 2022-10-18 RX ADMIN — INSULIN LISPRO 24 UNITS: 100 INJECTION, SOLUTION INTRAVENOUS; SUBCUTANEOUS at 17:10

## 2022-10-18 RX ADMIN — BACLOFEN 20 MG: 20 TABLET ORAL at 08:02

## 2022-10-18 RX ADMIN — MICONAZOLE NITRATE 1 APPLICATION: 20 CREAM TOPICAL at 17:11

## 2022-10-18 RX ADMIN — METRONIDAZOLE 500 MG: 500 TABLET ORAL at 13:32

## 2022-10-18 RX ADMIN — MICONAZOLE NITRATE 1 APPLICATION: 20 CREAM TOPICAL at 20:56

## 2022-10-18 NOTE — PROGRESS NOTES
Progress Note- Angela Matamoros 57 y o  male MRN: [id number]    Unit/Bed#: [facility] 810-01 Encounter: [id number]      Assessment and Plan:  Patient is a 61M with PMH of cholecystectomy 2/2 gallstone pancreatitis in 2017, alcohol use, PAD on plavix, CKD3a, HTN, DM2, combined diastolic-systolic CHF, severe MR who presents with painless jaundice  He was found to have total bilirubin elevation to 11 78 now elevated to 13 55      1  Obstructive jaundice (w/ CBD and pancreatic duct obstruction)  Patient presents with painless jaundice without weight loss, fever with T bili elevated to 11 7  Patient has elevated , ,   R factor <2  INR is normal 0 97, Plt 171, and albumin slightly decreased 2 8 -- does not appear to have significantly decreased synthetic liver function  [facility] discriminant function of 5 2  CT Abdomen 10/16: Surgically absent gallbladder, hepatic steatosis, with atrophic pancreas and mild prominence of main pancreatic duct 5 mm 2/2 chronic pancreatitis sequelae  MRCP 10/16: Obstruction of extrahepatic bile duct and pancreatic duct at pancreatic neck  Fibrosis of chronic pancreatitis vs mass, but no obvious mass seen  Patient's acute hepatitis panel was negative     Appears to be extrahepatic cholestatic source with obstruction at the confluence of bile and pancreatic duct  Etiology includes cholestatic disease such as biliary stone, stricture, pancreatic/biliary neoplastic mass; less likely primary sclerosing cholangitis, primary biliary cholangitis, and alcoholic hepatitis      · Worsening Tbili from 11 7 to 13 6  · Placed on antibiotics CTX and flagyl for leukocytosis WBC 10, potential for cholangitis  · EUS potentially on Friday, if not on Monday  · Hold plavix for 5 days for potential procedure; Now day 3/5 (last plavix on Saturday 10/15) -- potential EUS on Fri  · Trend CMP, INR     2   History of gallstone pancreatitis s/p cholecystectomy   Gallstone pancreatitis and cholecystectomy in 2017 without recurrence in pancreatitis  On CT of abdomen 10/16, pancreas is atrophic and there's mild prominence of main pancreatic duct 5mm 2/2 chronic pancreatitis sequelae  T bili, ALP, AST, ALT all elevated  Possible obstructive gallstone vs pancreatic cyst/duct compression into bile duct in setting of prior surgery  See above  · Follow up on MRCP results  · Continue to trend CMP     3  Peripheral artery disease  Patient has a history of PAD on statin and plavix  No history of stents  · Hold plavix for 5 days, for potential procedure      ______________________________________________________________________    Subjective:   Patient is doing well and has a great appetite  Had 5 formed brown bowel movements  No abdominal pain, nausea, vomiting, diarrhea       Medication Administration - last 24 hours from 10/17/2022 1154 to 10/18/2022 1154       Date/Time Order Dose Route Action Action by     10/18/2022 0804 nystatin (MYCOSTATIN) powder   Topical Not Given Martina Kirkpatrick RN     10/17/2022 2034 nystatin (MYCOSTATIN) powder   Topical Given Melvin Canales     10/18/2022 0802 baclofen tablet 20 mg 20 mg Oral Given Martina Kirkpatrick RN     10/17/2022 2210 baclofen tablet 20 mg 20 mg Oral Given Melvin Canales     10/17/2022 1615 baclofen tablet 20 mg 20 mg Oral Given [employer], RN     10/18/2022 0802 calcium carbonate-vitamin D (OSCAL-D) 500 mg-200 units per tablet 1 tablet 1 tablet Oral Given Martina Kirkpatrick RN     10/18/2022 0802 citalopram (CeleXA) tablet 20 mg 20 mg Oral Given Martina Kirkpatrick RN     10/18/2022 1134 insulin lispro (HumaLOG) 100 units/mL subcutaneous injection 24 Units 24 Units Subcutaneous Given Martina Kirkpatrick RN     10/18/2022 0803 insulin lispro (HumaLOG) 100 units/mL subcutaneous injection 24 Units 24 Units Subcutaneous Given Martina Kirkpatrick RN     10/17/2022 1622 insulin lispro (HumaLOG) 100 units/mL subcutaneous injection 24 Units 24 Units Subcutaneous Given [employer], RN 10/18/2022 0802 multivitamin stress formula tablet 1 tablet 1 tablet Oral Given Elfida Delay, RN     10/18/2022 0802 potassium chloride (K-DUR,KLOR-CON) CR tablet 20 mEq 20 mEq Oral Given Elfida Delay, RN     10/17/2022 1710 potassium chloride (K-DUR,KLOR-CON) CR tablet 20 mEq 20 mEq Oral Given [employer], RN     10/18/2022 0802 pregabalin (LYRICA) capsule 100 mg 100 mg Oral Given Elfida Delay, RN     10/17/2022 2210 pregabalin (LYRICA) capsule 100 mg 100 mg Oral Given Michelle Books     10/17/2022 1615 pregabalin (LYRICA) capsule 100 mg 100 mg Oral Given [employer], RN     10/18/2022 0809 insulin glargine (LANTUS) subcutaneous injection 30 Units 0 3 mL 30 Units Subcutaneous Given Elfida Delay, RN     10/17/2022 1615 tamsulosin (FLOMAX) capsule 0 4 mg 0 4 mg Oral Given [employer], RN     10/18/2022 0803 salmeterol (Serevent Diskus) 50 mcg/dose inhaler 1 puff 1 puff Inhalation Given Elfida Delay, RN     10/17/2022 2210 salmeterol (Serevent Diskus) 50 mcg/dose inhaler 1 puff 1 puff Inhalation Given [employer]     10/18/2022 0802 torsemide (DEMADEX) tablet 20 mg 20 mg Oral Given Elfida Delay, RN     10/18/2022 0802 enoxaparin (LOVENOX) subcutaneous injection 40 mg 40 mg Subcutaneous Given Elfida Delay, RN     10/18/2022 1134 insulin lispro (HumaLOG) 100 units/mL subcutaneous injection 1-6 Units 6 Units Subcutaneous Given Elfida Delay, RN     10/18/2022 0803 insulin lispro (HumaLOG) 100 units/mL subcutaneous injection 1-6 Units 4 Units Subcutaneous Given Elfida Delay, RN     10/17/2022 1621 insulin lispro (HumaLOG) 100 units/mL subcutaneous injection 1-6 Units 1 Units Subcutaneous Not Given [employer], RN     10/17/2022 2138 insulin lispro (HumaLOG) 100 units/mL subcutaneous injection 1-5 Units 1 Units Subcutaneous Not Given [employer]     10/18/2022 0802 thiamine tablet 100 mg 100 mg Oral Given Elfida Delay, RN     48/38/7947 3370 folic acid (FOLVITE) tablet 1 mg 1 mg Oral Given Elfida Delay, RN     10/18/2022 0802 nicotine (NICODERM CQ) 21 mg/24 hr TD 24 hr patch 1 patch 1 patch Transdermal Medication Applied Teresita Wright RN     10/18/2022 0803 umeclidinium bromide (INCRUSE ELLIPTA) 62 5 mcg/inh inhaler AEPB 1 puff 1 puff Inhalation Given Teresita Wright RN     10/18/2022 0773 levalbuterol Finis Boyer) inhalation solution 1 25 mg 1 25 mg Nebulization Given Idamae Limes, RT     10/17/2022 1927 levalbuterol (XOPENEX) inhalation solution 1 25 mg 1 25 mg Nebulization Given Caralyn Bunting, RT     10/18/2022 0838 sodium chloride 0 9 % inhalation solution 3 mL 3 mL Nebulization Given Idamae Limes, RT     10/17/2022 1927 sodium chloride 0 9 % inhalation solution 3 mL 3 mL Nebulization Given Caralyn Bunting, RT     10/18/2022 0804 moisture barrier miconazole 2% cream (aka ELMO MOISTURE BARRIER ANTIFUNGAL CREAM) 1 application Topical Given Teresita Wright RN     10/17/2022 2208 moisture barrier miconazole 2% cream (aka ELMO MOISTURE BARRIER ANTIFUNGAL CREAM) 1 application Topical Not Given Khadijah Devoid     10/17/2022 2034 moisture barrier miconazole 2% cream (aka ELMO MOISTURE BARRIER ANTIFUNGAL CREAM) 1 application Topical Given Khadijah Devoid     10/18/2022 0114 melatonin tablet 3 mg 3 mg Oral Given Khadijah Devoid     10/18/2022 1048 cefTRIAXone (ROCEPHIN) 1,000 mg in dextrose 5 % 50 mL IVPB 1,000 mg Intravenous [address] Teresita Wright RN     10/18/2022 0567 metroNIDAZOLE (FLAGYL) tablet 500 mg 500 mg Oral Given Teresita Wright RN          Objective:     Vitals: Blood pressure 111/77, pulse 87, temperature (!) 97 3 °F (36 3 °C), resp  rate 16, height 5' 8" (1 727 m), weight 80 7 kg (178 lb), SpO2 93 %  ,Body mass index is 27 06 kg/m²  Intake/Output Summary (Last 24 hours) at 10/18/2022 1154  Last data filed at 10/18/2022 0806  Gross per 24 hour   Intake --   Output 650 ml   Net -650 ml       Physical Exam:   General Appearance: Awake and alert, in no acute distress;  Diffuse jaundice  Abdomen: Soft, non-tender, non-distended; bowel sounds normal; no masses or no organomegaly    Invasive Devices  Report    Peripheral Intravenous Line  Duration           Peripheral IV 10/18/22 Distal;Right;Ventral (anterior) Forearm <1 day                Lab Results:  Admission on 10/16/2022   Component Date Value   • Sodium 10/16/2022 132 (A)   • Potassium 10/16/2022 4 1    • Chloride 10/16/2022 92 (A)   • CO2 10/16/2022 30    • ANION GAP 10/16/2022 10    • BUN 10/16/2022 20    • Creatinine 10/16/2022 1 19    • Glucose 10/16/2022 377 (A)   • Calcium 10/16/2022 8 7    • Corrected Calcium 10/16/2022 9 7    • AST 10/16/2022 375 (A)   • ALT 10/16/2022 282 (A)   • Alkaline Phosphatase 10/16/2022 670 (A)   • Total Protein 10/16/2022 7 0    • Albumin 10/16/2022 2 8 (A)   • Total Bilirubin 10/16/2022 11 78 (A)   • eGFR 10/16/2022 64    • WBC 10/16/2022 10 19 (A)   • RBC 10/16/2022 4 00    • Hemoglobin 10/16/2022 14 7    • Hematocrit 10/16/2022 40 6    • MCV 10/16/2022 102 (A)   • MCH 10/16/2022 36 8 (A)   • MCHC 10/16/2022 36 2    • RDW 10/16/2022 14 5    • MPV 10/16/2022 11 8    • Platelets 49/77/7964 182    • nRBC 10/16/2022 0    • Neutrophils Relative 10/16/2022 80 (A)   • Immat GRANS % 10/16/2022 1    • Lymphocytes Relative 10/16/2022 10 (A)   • Monocytes Relative 10/16/2022 9    • Eosinophils Relative 10/16/2022 0    • Basophils Relative 10/16/2022 0    • Neutrophils Absolute 10/16/2022 8 22 (A)   • Immature Grans Absolute 10/16/2022 0 05    • Lymphocytes Absolute 10/16/2022 0 97    • Monocytes Absolute 10/16/2022 0 87    • Eosinophils Absolute 10/16/2022 0 04    • Basophils Absolute 10/16/2022 0 04    • Ammonia 10/16/2022 20    • Protime 10/16/2022 12 3    • INR 10/16/2022 0 90    • PTT 10/16/2022 33    • Hepatitis B Surface Ag 10/16/2022 Non-reactive    • Hep A IgM 10/16/2022 Non-reactive    • Hepatitis C Ab 10/16/2022 Non-reactive    • Hep B C IgM 10/16/2022 Non-reactive    • Hemoglobin A1C 10/16/2022 10 1 (A)   • EAG 10/16/2022 243    • Lipase 10/16/2022 188    • Ethanol Lvl 32/77/4280 <3    • Salicylate Lvl 13/55/7562 <3 (A)   • Acetaminophen Level 10/16/2022 <2 (A)   • Ventricular Rate 10/16/2022 91    • Atrial Rate 10/16/2022 91    • VA Interval 10/16/2022 112    • QRSD Interval 10/16/2022 122    • QT Interval 10/16/2022 472    • QTC Interval 10/16/2022 580    • P Axis 10/16/2022 256    • QRS Axis 10/16/2022 -88    • T Wave Axis 10/16/2022 86    • Color, UA 10/16/2022 Dark Yellow    • Clarity, UA 10/16/2022 Clear    • Specific Gravity, UA 10/16/2022 1 022    • pH, UA 10/16/2022 7 5    • Leukocytes, UA 10/16/2022 Elevated glucose may cause decreased leukocyte values   See urine microscopic for [facility] result/ (A)   • Nitrite, UA 10/16/2022 Negative    • Protein, UA 10/16/2022 30 (1+) (A)   • Glucose, UA 10/16/2022 >=1000 (1%) (A)   • Ketones, UA 10/16/2022 Negative    • Urobilinogen, UA 10/16/2022 2 0 (A)   • Bilirubin, UA 10/16/2022 Moderate (A)   • Occult Blood, UA 10/16/2022 Negative    • RBC, UA 10/16/2022 1-2    • WBC, UA 10/16/2022 None Seen    • Epithelial Cells 10/16/2022 Occasional    • Bacteria, UA 10/16/2022 None Seen    • Renal Epithelial Cells 10/16/2022 Present    • POC Glucose 10/17/2022 262 (A)   • Sodium 10/17/2022 129 (A)   • Potassium 10/17/2022 3 6    • Chloride 10/17/2022 97    • CO2 10/17/2022 27    • ANION GAP 10/17/2022 5    • BUN 10/17/2022 18    • Creatinine 10/17/2022 0 98    • Glucose 10/17/2022 281 (A)   • Calcium 10/17/2022 8 7    • Corrected Calcium 10/17/2022 9 8    • AST 10/17/2022 348 (A)   • ALT 10/17/2022 265 (A)   • Alkaline Phosphatase 10/17/2022 616 (A)   • Total Protein 10/17/2022 6 7    • Albumin 10/17/2022 2 6 (A)   • Total Bilirubin 10/17/2022 11 49 (A)   • eGFR 10/17/2022 81    • Magnesium 10/17/2022 1 8    • Protime 10/17/2022 13 1    • INR 10/17/2022 0 97    • PTT 10/17/2022 34    • WBC 10/17/2022 9 98    • RBC 10/17/2022 3 68 (A)   • Hemoglobin 10/17/2022 13 5    • Hematocrit 10/17/2022 37 3    • MCV 10/17/2022 101 (A) • [facility] 10/17/2022 36 7 (A)   • MCHC 10/17/2022 36 2    • RDW 10/17/2022 14 5    • MPV 10/17/2022 12 6    • Platelets 97/65/7727 171    • nRBC 10/17/2022 0    • Neutrophils Relative 10/17/2022 78 (A)   • Immat GRANS % 10/17/2022 1    • Lymphocytes Relative 10/17/2022 10 (A)   • Monocytes Relative 10/17/2022 10    • Eosinophils Relative 10/17/2022 0    • Basophils Relative 10/17/2022 1    • Neutrophils Absolute 10/17/2022 7 84 (A)   • Immature Grans Absolute 10/17/2022 0 06    • Lymphocytes Absolute 10/17/2022 0 99    • Monocytes Absolute 10/17/2022 1 01    • Eosinophils Absolute 10/17/2022 0 03    • Basophils Absolute 10/17/2022 0 05    • POC Glucose 10/17/2022 248 (A)   • Color, UA 10/16/2022 Yellow    • Clarity, UA 10/16/2022 Clear    • pH, UA 10/16/2022 7 0    • Leukocytes, UA 10/16/2022 Elevated glucose may cause decreased leukocyte values   See urine microscopic for [facility] result/ (A)   • Nitrite, UA 10/16/2022 Negative    • Protein, UA 10/16/2022 100 (2+) (A)   • Glucose, UA 10/16/2022 >=1000 (1%) (A)   • Ketones, UA 10/16/2022 Negative    • Urobilinogen, UA 10/16/2022 1 0    • Bilirubin, UA 10/16/2022 Large (A)   • Occult Blood, UA 10/16/2022 Negative    • Specific Gravity, UA 10/16/2022 1 015    • POC Glucose 10/17/2022 205 (A)   • POC Glucose 10/17/2022 139    • POC Glucose 10/17/2022 126    • Sodium 10/18/2022 132 (A)   • Potassium 10/18/2022 3 7    • Chloride 10/18/2022 99    • CO2 10/18/2022 22    • ANION GAP 10/18/2022 11    • BUN 10/18/2022 19    • Creatinine 10/18/2022 1 05    • Glucose 10/18/2022 319 (A)   • Calcium 10/18/2022 8 4    • Corrected Calcium 10/18/2022 9 8    • AST 10/18/2022 272 (A)   • ALT 10/18/2022 243 (A)   • Alkaline Phosphatase 10/18/2022 635 (A)   • Total Protein 10/18/2022 6 6    • Albumin 10/18/2022 2 3 (A)   • Total Bilirubin 10/18/2022 13 55 (A)   • eGFR 10/18/2022 74    • WBC 10/18/2022 10 46 (A)   • RBC 10/18/2022 3 63 (A)   • Hemoglobin 10/18/2022 12 9    • Hematocrit 10/18/2022 36 8    • MCV 10/18/2022 101 (A)   • MCH 10/18/2022 35 5 (A)   • MCHC 10/18/2022 35 1    • RDW 10/18/2022 15 2 (A)   • MPV 10/18/2022 12 9 (A)   • Platelets 82/20/0688 177    • nRBC 10/18/2022 0    • Neutrophils Relative 10/18/2022 82 (A)   • Immat GRANS % 10/18/2022 1    • Lymphocytes Relative 10/18/2022 8 (A)   • Monocytes Relative 10/18/2022 9    • Eosinophils Relative 10/18/2022 0    • Basophils Relative 10/18/2022 0    • Neutrophils Absolute 10/18/2022 8 53 (A)   • Immature Grans Absolute 10/18/2022 0 07    • Lymphocytes Absolute 10/18/2022 0 85    • Monocytes Absolute 10/18/2022 0 93    • Eosinophils Absolute 10/18/2022 0 04    • Basophils Absolute 10/18/2022 0 04    • POC Glucose 10/18/2022 273 (A)   • POC Glucose 10/18/2022 388 (A)       Imaging Studies: I have personally reviewed pertinent imaging studies

## 2022-10-18 NOTE — PHYSICAL THERAPY NOTE
Physical Therapy evaluation note     Patient Name: Hilda Swanson    XMOPG'Q Date: 10/18/2022     Problem List  Principal Problem:    Jaundice, obstructive, intrahepatic  Active Problems:    Tobacco abuse    Ambulatory dysfunction    Diabetes mellitus type 2 - Diabetic neuropathy    Alcohol abuse    Peripheral arterial disease ([facility])    Essential hypertension    Chronic combined systolic and diastolic congestive heart failure ([facility])    Pseudohyponatremia    Severe mitral regurgitation    History of cervical spinal cord injury    Stage 3a chronic kidney disease ([facility] )    Dysuria       Past Medical History  Past Medical History:   Diagnosis Date   • SUNDEEP (acute kidney injury) ([facility] )    • CHF (congestive heart failure) ([facility])    • Cholelithiasis    • Chronic obstructive lung disease ([facility] )     RESOLVED: 8/17/17   • COPD (chronic obstructive pulmonary disease) ([facility] )    • Depression    • Diabetes mellitus ([facility] )     type 2, non-insulin dependent   • Diabetic neuropathy ([facility] )    • Difficulty attaining erection     LAST ASSESSEDl: 8/17/17   • Former tobacco use    • Gall stone pancreatitis     RESOLVED: 3/8/17   • Hiatal hernia    • History of concussion    • History of MRSA infection    • History of varicella infection    • Hypertension     LAST ASSESSED: 8/17/17   • Non-healing open wound of right groin     s/p VAC placement & post-op fungal dermatitis   • PAD (peripheral artery disease) ([facility])     s/p RLE fem-pop   • Seasonal allergies    • Severe mitral regurgitation    • Spinal cord injury, C1-C7 ([facility] )     s/p fusion   • Traumatic injury to musculoskeletal system     1-5 FLIGHT FALL DOWN THE STEPS - CERVICAL NECK INJURY - JAN 2, 2012; LAST ASSESSED: 0/70/06   • Umbilical hernia without obstruction and without gangrene     RESOLVED: 3/8/17        Past Surgical History  Past Surgical History:   Procedure Laterality Date   • ANGIOPLASTY     • ARTERIOGRAM Right 7/31/2018 Procedure: ARTERIOGRAM Completion Agram;  Surgeon: Priyanka Tomas MD;  [facility]: [facility];  Service: Vascular   • BACK SURGERY     • BYPASS FEMORAL-POPLITEAL Right 7/31/2018    Procedure: BYPASS FEMORAL-POPLITEAL R femoral- BK popliteal bypass;  Surgeon: Priyanka Tomas MD;  [facility];  Service: Vascular   • CERVICAL FUSION      VERTEBRAL; C3-C4 FUSION   • CHOLECYSTECTOMY LAPAROSCOPIC N/A 2/24/2017    Procedure: CHOLECYSTECTOMY LAPAROSCOPIC;  Surgeon: Corinne Manges, MD;  Location[facility];  Service:    • EPIGASTRIC HERNIA REPAIR     • FEMORAL BYPASS Right     Leg   • INCISION AND DRAINAGE OF WOUND      Groin area   • MS DEBRIDEMENT, SKIN, SUB-Q TISSUE,=<20 SQ CM Right 8/28/2018    Procedure: RIGHT GROIN AND THIGH DEBRIDEMENT ([address]) [address];  Surgeon: Galina Hudson MD;  [facility];  Service: Vascular   • MS THROMBOENDARTECTMY FEMORAL COMMON Right 7/31/2018    Procedure: ENDARTERECTOMY ARTERIAL FEMORAL R femoral endarterectomy w/ patch angioplasty ;  Surgeon: Priyanka Tomas MD;  [facility];  Service: Vascular   • SPINE SURGERY     • UMBILICAL HERNIA REPAIR N/A 2/24/2017    Procedure: REPAIR HERNIA UMBILICAL;  Surgeon: Corinne Manges, MD;  [facility]: [facility];  Service:    • WISDOM TOOTH EXTRACTION           10/18/22 1017   PT Last Visit   PT Visit Date 10/18/22   Note Type   Note type Evaluation   Pain Assessment   Pain Assessment Tool 0-10   Pain Score No Pain   Restrictions/Precautions   Weight Bearing Precautions Per Order No   Other Precautions Multiple lines; Fall Risk   Home Living   Type of [facility] One level   Additional Comments Pt lives in an apartment with 3 ANA  Pt lives with his wife who works full time  Pt uses a RW for mobility  Pt also owns w/c   Pt's wife checks in on him by phone throughout the day  Pt's neighbor also checks on him  Pt required A With ADL's and IADL's prior   per patient he ambulates 30ft at a time   Prior Function Level of [place] Independent with ADLs; Needs assistance with ADLs; Needs assistance with functional mobility; Needs assistance with IADLS   Lives With Spouse   Receives Help From Family; Neighbor   IADLs Family/Friend/Other provides transportation   Falls in the last 6 months 1 to 4  (3)   Vocational Retired   General   Family/Caregiver Present No   Cognition   Overall Cognitive Status WFL   Arousal/Participation Alert   RLE Assessment   RLE Assessment X   Strength RLE   R Hip Flexion 3-/5   R Knee Extension 3-/5   R Ankle Dorsiflexion 3-/5   LLE Assessment   LLE Assessment X   Strength LLE   L Hip Flexion 3-/5   L Knee Extension 3-/5   L Ankle Dorsiflexion 3-/5   Light Touch   RLE Light Touch Impaired   RLE Light Touch Comments neuropathy at baseline   LLE Light Touch Impaired   LLE Light Touch Comments neuropathy at baseline   Bed Mobility   Supine to Sit 3  Moderate assistance   Additional items Assist x 1   Additional Comments sitting EOB S level   Transfers   Sit to Stand 4  Minimal assistance   Additional items Assist x 1   Stand to Sit 4  Minimal assistance   Additional items Assist x 1   Ambulation/Elevation   Gait pattern Excessively slow; Step to; Foward flexed; Shuffling  (B knee flexion)   Gait Assistance 4  Minimal assist   Additional items Assist x 1   Assistive Device Rolling walker   Distance 3ft to chair   Balance   Static Sitting Fair   Dynamic Sitting Fair -   Static Standing Poor +   Dynamic Standing Poor +   Ambulatory Poor   Endurance Deficit   Endurance Deficit Yes   Activity Tolerance   Activity Tolerance Patient limited by fatigue   Medical Staff Made Aware OT   Nurse Made Aware nurse approved therapy session   Assessment   Prognosis Good   Problem List Decreased strength;Decreased endurance; Impaired balance;Decreased mobility   Assessment Pt is a 58 yo male admitted to [place] [address] on 10/16/2022 s/p diarrhea   DX: jaundice, HF, CKD, HTN, ambulatory dysfunction, PAD, alcohol abuse, DM, pseudohyponatremia, hx of cervical spinal cord injury  Two patient identifiers were used to confirm  Pt lives with his spouse in a apartment with 3 ANA  Pt required A With ADL's and IADL's prior  Pt used a RW and ambulates 30ft at a time  Pt is home alone due to his spouse working  Pt's neighbor checks on him throughout the day  Pt's impairments include reduced mobility, gait abnormalities, poor endurance, high risk of falling, reduced BLE Strength and ROM  These impairments limit the ability of the patient to perform mobility without increased assistance, return to PLOF and participate in everyday life activities  Pt would benefit from continued skilled therapy while in the hospital to improve overall mobility and work towards safe d/c  Recommend discharge to rehab  At the end of the session the patient was left in seated position with call bell and phone within reach  The patient's AM-PAC Basic Mobility Inpatient Short Form Raw Score is 14  A Raw score of less than or equal to 16 suggests the patient may benefit from discharge to post-acute rehabilitation services  Please also refer to the recommendation of the Physical Therapist for safe discharge planning  Barriers to Discharge Inaccessible home environment;Decreased caregiver support   Goals   STG Expiration Date 11/01/22   Short Term Goal #1 STG 1: Pt will perform transfers at a MI level to return to baseline of function  STG 2: Pt will ambulate 30ft with RW at a MI level to reduce the level of assistance needed upon d/c home  STG 3: Pt will negotiate 3 steps with HR at a S level to ensure safety with activity when able to ambulate 50ft at a min A level  STG 4: Pt will perform bed mobility at a MI to safety return to PLOF  PT Treatment Day 0   Plan   Treatment/Interventions Functional transfer training;LE strengthening/ROM; Therapeutic exercise; Endurance training;Bed mobility;Gait training;Equipment eval/education;OT   PT Frequency 2-3x/wk   Recommendation PT Discharge Recommendation Post acute rehabilitation services   AM-PAC Basic Mobility Inpatient   Turning in Bed Without Bedrails 3   Lying on Back to Sitting on Edge of Flat Bed 3   Moving Bed to Chair 3   Standing Up From Chair 3   Walk in Room 1   Climb 3-5 Stairs 1   Basic Mobility Inpatient Raw Score 14   Basic Mobility Standardized Score 35 55   Highest Level Of Mobility   JH-HLM Goal 4: Move to chair/commode   JH-HLM Achieved 4: Move to chair/commode   Louis Myles, PT, DPT

## 2022-10-18 NOTE — CASE MANAGEMENT
Case Management Assessment & Discharge Planning Note    Patient name Ofe Perla  Location [facility] 653-92 MRN [id number]  : 1960 Date 10/18/2022       Current Admission Date: 10/16/2022  Current Admission Diagnosis:Jaundice, obstructive, intrahepatic   Patient Active Problem List    Diagnosis Date Noted   • Dysuria 10/18/2022   • Jaundice, obstructive, intrahepatic 10/16/2022   • Stage 3a chronic kidney disease ([facility] ) 2021   • Scalp laceration, initial encounter 2020   • Aortic root dilation ([place] [address] ) 2019   • BPH (benign prostatic hyperplasia) 2019   • History of cervical spinal cord injury 2019   • Chronic combined systolic and diastolic congestive heart failure ([place] [address] ) 2019   • Pseudohyponatremia 2019   • Obstructive uropathy 2019   • Non-rheumatic mitral regurgitation 2019   • Severe mitral regurgitation 2019   • Esophageal thickening 10/12/2018   • Sacral wound, initial encounter 10/10/2018   • Postoperative state 2018   • Non-healing surgical wound of right groin 2018   • Wound dehiscence, surgical, initial encounter 2018   • Acute blood loss anemia 2018   • Constipation 2018   • Essential hypertension 2018   • Peripheral arterial disease ([place] [address] ) 2018   • Ischemic foot pain at rest 2018   • Cellulitis of leg, right 2018   • PAD 2018   • Hypercalcemia 2018   • Alcohol abuse 2018   • Ambulatory dysfunction 2018   • Lumbar radiculopathy 2018   • Diabetes mellitus type 2 - Diabetic neuropathy 2018   • Basal cell carcinoma in situ of skin 03/15/2018   • Chronic pain 2017   • Hernia, epigastric 2017   • Cervical myelopathy ([facility] ) 2016   • Depression 2016   • Diabetic neuropathy ([place] [address] ) 2016   • Injury of cervical spinal cord ([place] [address] )    • Umbilical hernia 15/47/5177   • Tobacco abuse 2013   • Quadriplegia and quadriparesis ([facility] ) 08/23/2013   • Enlarged prostate with lower urinary tract symptoms (LUTS) 07/03/2012      LOS (days): 2  Geometric Mean LOS (GMLOS) (days): 3 00  Days to GMLOS:1 2     OBJECTIVE:    Risk of Unplanned Readmission Score: 12 84         Current admission status: Inpatient       Preferred Pharmacy:   [facility]  ([address]  [address]  [address] [facility] [address]  [facility] [address]  Phone: [phone] Fax: [address] [facility] [address] [facility], [address] [facility] [address]  Phone: [phone] Fax: [phone]    [facility] ([facility]) - [address],[facility]  [address]  Phone: [phone] Fax: [phone]    Primary Care Provider: Denver Sell, MD    Primary Insurance: [facility]  Secondary Insurance:     ASSESSMENT:  Llye Beaulieu Proxies    There are no active Health Care Proxies on file  Patient Information  Admitted from[de-identified] Home  Mental Status: Alert  During Assessment patient was accompanied by: Spouse  Assessment information provided by[de-identified] Patient  Primary Caregiver: Spouse  Support Systems: Spouse/significant other  [place] of Residence: [address] do you live in?: [address] entry access options   Select all that apply : Stairs  Number of steps to enter home : 3  Type of Current Residence: Apartment  Floor Level: 1  Upon entering residence, is there a bedroom on the main floor (no further steps)?: Yes  Upon entering residence, is there a bathroom on the main floor (no further steps)?: Yes  In the last 12 months, was there a time when you were not able to pay the mortgage or rent on time?: No  In the last 12 months, how many places have you lived?: 1  In the last 12 months, was there a time when you did not have a steady place to sleep or slept in a shelter (including now)?: No  Homeless/housing insecurity resource given?: N/A  Living Arrangements: Lives w/ Spouse/significant other  Is patient a ?: No    Activities of Daily Living Prior to Admission  Functional Status: Assistance  Completes ADLs independently?: No  Level of ADL dependence: Assistance  Ambulates independently?: No  Level of ambulatory dependence: Assistance  Does patient use assisted devices?: Yes  Assisted Devices (DME) used: Priyanka Misty, Wheelchair  Does patient currently own DME?: Yes  What DME does the patient currently own?: Landy Cosbys, Wheelchair  Does patient have a history of Outpatient Therapy (PT/OT)?: No  Does the patient have a history of Short-Term Rehab?: Yes  Does patient have a history of HHC?: Yes (Marcus Cazares)  Does patient currently have [facility]?: No         Patient Information Continued  Income Source: SSI/SSD  Does patient have prescription coverage?: Yes  Within the past 12 months, you worried that your food would run out before you got the money to buy more : Never true  Within the past 12 months, the food you bought just didn't last and you didn't have money to get more : Never true  Food insecurity resource given?: N/A  Does patient receive dialysis treatments?: No  Does patient have a history of substance abuse?: Yes  Historical substance use preference: Alcohol/ETOH  Is patient currently in treatment for substance abuse?: N/A - sober (Per chart pt stated he quit 10/11)  Does patient have a history of Mental Health Diagnosis?: Yes (Depression)  Has patient received inpatient treatment related to mental health in the last 2 years?: No         Means of Transportation  Means of Transport to Appts[de-identified] Family transport  In the past 12 months, has lack of transportation kept you from medical appointments or from getting medications?: No  In the past 12 months, has lack of transportation kept you from meetings, work, or from getting things needed for daily living?: No  Was application for public transport provided?: N/A        DISCHARGE DETAILS:    Discharge planning discussed with[de-identified] pt and spouse  Freedom of Choice: Yes  Comments - Freedom of Choice: pt stated he was not sure about STR  Would prefer to d/c home w/ VNA  Open to blanket referral for STR and will re-evaluste closer to d/c   CM contacted family/caregiver?: Yes  Were Treatment Team discharge recommendations reviewed with patient/caregiver?: Yes  Did patient/caregiver verbalize understanding of patient care needs?: Yes  Were patient/caregiver advised of the risks associated with not following Treatment Team discharge recommendations?: Yes    Contacts  Patient Contacts: Daniella - Spouse  Relationship to Patient[de-identified] Family  Contact Method:  In Person  Reason/Outcome: Referral, Discharge [address]         Is the patient interested in [facility] at discharge?: Yes  [address] requested[de-identified] Nursing, Occupational Therapy, Physical [address] Name[de-identified] Other  [address] [place] [address] Provider[de-identified] PCP  Home Health Services Needed[de-identified] Evaluate Functional Status and Safety, Gait/ADL Training, Strengthening/Theraputic Exercises to Improve Function  Homebound Criteria Met[de-identified] Requires the Assistance of Another Person for Safe Ambulation or to Leave the Home, Uses an Assist Device (i e  cane, walker, etc)  Supporting Clincal Findings[de-identified] Limited Endurance, Bed Bound or Wheelchair Bound    DME Referral Provided  Referral made for DME?: No    Other Referral/Resources/Interventions Provided:  Interventions: HHC, Short Term Rehab  Referral Comments: Referrals sent as requested         Treatment Team Recommendation: Short Term Rehab

## 2022-10-18 NOTE — PROGRESS NOTES
[address] [facility]  Progress Note - Hilda Swanson 1960, 57 y o  male MRN: [id number]  Unit/Bed#: [facility] 810-01 Encounter: [id number]  Primary Care Provider: Sarthak Flores MD   Date and time admitted to hospital: 10/16/2022  4:28 PM    * Jaundice, obstructive, intrahepatic  Assessment & Plan  Patient presents with jaundice, no other symptoms  Noted 10/13, had just stopped drinking ETOH 10/11  · CT A/P showed pancreas is atrophic and there's mild prominence of main pancreatic duct 5mm 2/2 chronic pancreatitis sequelae  · Acute hepatitis panel negative  · GI following, MRCP showed Obstruction of the extrahepatic bile duct and pancreatic duct in the region of the pancreatic neck  Differential diagnosis includes obstruction from fibrosis of chronic pancreatitis versus mass however no obvious mass or lesion identified  · Plan for ERCP/EUS, awaiting timing from GI  Holding Plavix in preparation for this  · Trend CMP, bilirubin continues to worsen    Chronic combined systolic and diastolic congestive heart failure [facility])  Assessment & Plan  Wt Readings from Last 3 Encounters:   10/17/22 80 7 kg (178 lb)   04/27/22 80 7 kg (178 lb)   07/23/21 83 kg (183 lb)     · Continue torsemide  Not on BB at baseline  · Last echo showed EF 60% with flail mitral valve leaflet with severe MR, referred to CTS for mitraclip however does not appear he followed up  Last seen in 2020 by Dr Meghan Cormier     · I/O, daily weights  · Monitor volume status         Stage 3a chronic kidney disease [facility])  Assessment & Plan  Lab Results   Component Value Date    EGFR 74 10/18/2022    EGFR 81 10/17/2022    EGFR 64 10/16/2022    CREATININE 1 05 10/18/2022    CREATININE 0 98 10/17/2022    CREATININE 1 19 10/16/2022   · Renal function overall stable  · Continue to monitor as needed    Severe mitral regurgitation  Assessment & Plan  · Was told to see CTS for consideration of mitraclip in 2019 however did not follow up    [address]  · Within normal range when corrected  · Monitor     Essential hypertension  Assessment & Plan  · BP acceptable  · Monitor     Diabetes mellitus type 2 - Diabetic neuropathy  Assessment & Plan  Lab Results   Component Value Date    HGBA1C 10 1 (H) 10/16/2022       Poorly controlled  · Continue home regimen, Lantus 30 units QHS and Humalog 24 units TID with meals + SSI  · Diabetic diet  · Monitor accuchecks and adjust regimen as needed       Ambulatory dysfunction  Assessment & Plan  · Patient walks with a walker, has history of spinal cord injury  · PT/OT    Peripheral arterial disease (HCC)  Assessment & Plan  · On plavix and statin at baseline, plavix on hold for EUS/ERCP  Alcohol abuse  Assessment & Plan  · Last drink reportedly 10/11  No clinical concerns for withdrawal, will discontinue CIWA protocol  · Continue thiamine and folate with multivitamins daily  Tobacco abuse  Assessment & Plan  · Nicotine patch    History of cervical spinal cord injury  Assessment & Plan  In setting cervical spinal cord injury  Walks with walker  · PT/OT        VTE Pharmacologic Prophylaxis:   Moderate Risk (Score 3-4) - Pharmacological DVT Prophylaxis Ordered: enoxaparin (Lovenox)  Patient Centered Rounds: I performed bedside rounds with nursing staff today  Discussions with Specialists or Other Care Team Provider: nursing, case management     Education and Discussions with Family / Patient: Updated  (wife) via phone  Time Spent for Care: 30 minutes  More than 50% of total time spent on counseling and coordination of care as described above      Current Length of Stay: 2 day(s)  Current Patient Status: Inpatient   Certification Statement: The patient will continue to require additional inpatient hospital stay due to Pending EUS/ERCP  Discharge Plan: Anticipate discharge in >72 hrs to discharge location to be determined pending rehab evaluations  Code Status: Level 3 - DNAR and DNI    Subjective:   Patient reports that he had an episode of dysuria this morning but subsequently has been urinating without difficulty  Also had several episodes of loose stools this morning  Otherwise, denies itchiness, abdominal pain, nausea or vomiting  We discussed results of MRCP  He is awaiting timing for further testing  Objective:     Vitals:   Temp (24hrs), Av 5 °F (36 4 °C), Min:97 3 °F (36 3 °C), Max:97 7 °F (36 5 °C)    Temp:  [97 3 °F (36 3 °C)-97 7 °F (36 5 °C)] 97 5 °F (36 4 °C)  HR:  [81-96] 96  Resp:  [14-20] 20  BP: (107-112)/(58-78) 112/78  SpO2:  [93 %-95 %] 93 %  Body mass index is 27 06 kg/m²  Input and Output Summary (last 24 hours): Intake/Output Summary (Last 24 hours) at 10/18/2022 1230  Last data filed at 10/18/2022 0806  Gross per 24 hour   Intake 120 ml   Output 650 ml   Net -530 ml       Physical Exam:   Physical Exam  Vitals and nursing note reviewed  Constitutional:       General: He is not in acute distress  Appearance: He is obese  HENT:      Mouth/Throat:      Comments: Poor dentition  Eyes:      General: Scleral icterus present  Cardiovascular:      Rate and Rhythm: Normal rate  Pulmonary:      Breath sounds: Normal breath sounds  Abdominal:      Tenderness: There is no abdominal tenderness  Musculoskeletal:         General: No swelling  Skin:     General: Skin is warm  Coloration: Skin is jaundiced  Neurological:      Mental Status: He is alert and oriented to person, place, and time  Mental status is at baseline     Psychiatric:         Mood and Affect: Mood normal           Additional Data:     Labs:  Results from last 7 days   Lab Units 10/18/22  0522   WBC Thousand/uL 10 46*   HEMOGLOBIN g/dL 12 9   HEMATOCRIT % 36 8   PLATELETS Thousands/uL 177   NEUTROS PCT % 82*   LYMPHS PCT % 8*   MONOS PCT % 9   EOS PCT % 0     Results from last 7 days   Lab Units 10/18/22  0522   SODIUM mmol/L 132*   POTASSIUM mmol/L 3 7   CHLORIDE mmol/L 99   CO2 mmol/L 22   BUN mg/dL 19   CREATININE mg/dL 1 05   ANION GAP mmol/L 11   CALCIUM mg/dL 8 4   ALBUMIN g/dL 2 3*   TOTAL BILIRUBIN mg/dL 13 55*   ALK PHOS U/L 635*   ALT U/L 243*   AST U/L 272*   GLUCOSE RANDOM mg/dL 319*     Results from last 7 days   Lab Units 10/17/22  0421   INR  0 97     Results from last 7 days   Lab Units 10/18/22  1103 10/18/22  0753 10/17/22  2047 10/17/22  1621 10/17/22  1145 10/17/22  0805 10/17/22  0015   POC GLUCOSE mg/dl 388* 273* 126 139 205* 248* 262*     Results from last 7 days   Lab Units 10/16/22  1755   HEMOGLOBIN A1C % 10 1*           Lines/Drains:  Invasive Devices  Report    Peripheral Intravenous Line  Duration           Peripheral IV 10/18/22 Distal;Right;Ventral (anterior) Forearm <1 day                      Imaging: Reviewed radiology reports from this admission including: MRI abdomen/MRCP  Results were discussed with patient and wife over the phone    Recent Cultures (last 7 days):         Last 24 Hours Medication List:   Current Facility-Administered Medications   Medication Dose Route Frequency Provider Last Rate   • albuterol  2 5 mg Nebulization Q4H PRN Phil Dawkins MD     • aluminum-magnesium hydroxide-simethicone  30 mL Oral Q6H PRN Phil Dawkins MD     • baclofen  20 mg Oral TID Phil Dawkins MD     • calcium carbonate-vitamin D  1 tablet Oral Daily With Breakfast Phil Dawkins MD     • cefTRIAXone  1,000 mg Intravenous Q24H Seferino Spann MD 1,000 mg (10/18/22 1048)   • citalopram  20 mg Oral Daily Phil Dawkins MD     • docusate sodium  100 mg Oral BID PRN Phil Dawkins MD     • enoxaparin  40 mg Subcutaneous Daily Phil Dawkins MD     • ergocalciferol  50,000 Units Oral Weekly Phil Dawkins MD     • folic acid  1 mg Oral Daily Phil Dawkins MD     • insulin glargine  30 Units Subcutaneous QAM Phil Dawkins MD     • insulin lispro  1-5 Units Subcutaneous HS Tanisha Corrales MD     • insulin lispro  1-6 Units Subcutaneous TID AC Tanisha Corrales MD     • insulin lispro  24 Units Subcutaneous TID With Meals Tanisha Corrales MD     • levalbuterol  1 25 mg Nebulization BID Tanisha Corrales MD     • melatonin  3 mg Oral HS Haydee Mae PA-C     • metroNIDAZOLE  500 mg Oral Q8H Jaja Stuart MD     • ELMO ANTIFUNGAL  1 application Topical TID Tanisha Corrales MD     • multivitamin stress formula  1 tablet Oral Daily Tanisha Corrales MD     • nicotine  1 patch Transdermal Daily Tanisha Corrales MD     • nystatin   Topical BID Tanisha Corrales MD     • ondansetron  4 mg Intravenous Q6H PRN Tanisha Corrales MD     • potassium chloride  20 mEq Oral BID Tanisha Corrales MD     • pregabalin  100 mg Oral TID Tanisha Corrales MD     • salmeterol  1 puff Inhalation Q12H [address] Mich Hernandez MD     • sodium chloride  3 mL Nebulization BID Tanisha Corrales MD     • tamsulosin  0 4 mg Oral Daily With Wilfredo Rascon MD     • thiamine  100 mg Oral Daily Tanisha Corrales MD     • torsemide  20 mg Oral Daily Tanisha Corrales MD     • umeclidinium bromide  1 puff Inhalation Daily Tanisha Corrales MD          Today, Patient Was Seen By: Ankit Feliz    **Please Note: This note may have been constructed using a voice recognition system  **

## 2022-10-18 NOTE — ASSESSMENT & PLAN NOTE
Lab Results   Component Value Date    EGFR 74 10/18/2022    EGFR 81 10/17/2022    EGFR 64 10/16/2022    CREATININE 1 05 10/18/2022    CREATININE 0 98 10/17/2022    CREATININE 1 19 10/16/2022   · Renal function overall stable  · Continue to monitor as needed

## 2022-10-18 NOTE — PLAN OF CARE
Problem: MOBILITY - ADULT  Goal: Maintain or return to baseline ADL function  Description: INTERVENTIONS:  -  Assess patient's ability to carry out ADLs; assess patient's baseline for ADL function and identify physical deficits which impact ability to perform ADLs (bathing, care of mouth/teeth, toileting, grooming, dressing, etc )  - Assess/evaluate cause of self-care deficits   - Assess range of motion  - Assess patient's mobility; develop plan if impaired  - Assess patient's need for assistive devices and provide as appropriate  - Encourage maximum independence but intervene and supervise when necessary  - Involve family in performance of ADLs  - Assess for home care needs following discharge   - Consider OT consult to assist with ADL evaluation and planning for discharge  - Provide patient education as appropriate  Outcome: Progressing  Goal: Maintains/Returns to pre admission functional level  Description: INTERVENTIONS:  - Perform BMAT or MOVE assessment daily    - Set and communicate daily mobility goal to care team and patient/family/caregiver  - Collaborate with rehabilitation services on mobility goals if consulted  - Perform Range of Motion 3 times a day  - Reposition patient every 3 hours    - Dangle patient 3 times a day  - Stand patient 3 times a day  - Ambulate patient 3 times a day  - Out of bed to chair 3 times a day   - Out of bed for meals 3 times a day  - Out of bed for toileting  - Record patient progress and toleration of activity level   Outcome: Progressing     Problem: PAIN - ADULT  Goal: Verbalizes/displays adequate comfort level or baseline comfort level  Description: Interventions:  - Encourage patient to monitor pain and request assistance  - Assess pain using appropriate pain scale  - Administer analgesics based on type and severity of pain and evaluate response  - Implement non-pharmacological measures as appropriate and evaluate response  - Consider cultural and social influences on pain and pain management  - Notify physician/advanced practitioner if interventions unsuccessful or patient reports new pain  Outcome: Progressing     Problem: INFECTION - ADULT  Goal: Absence or prevention of progression during hospitalization  Description: INTERVENTIONS:  - Assess and monitor for signs and symptoms of infection  - Monitor lab/diagnostic results  - Monitor all insertion sites, i e  indwelling lines, tubes, and drains  - Monitor endotracheal if appropriate and nasal secretions for changes in amount and color  - [place] appropriate cooling/warming therapies per order  - Administer medications as ordered  - Instruct and encourage patient and family to use good hand hygiene technique  - Identify and instruct in appropriate isolation precautions for identified infection/condition  Outcome: Progressing  Goal: Absence of fever/infection during neutropenic period  Description: INTERVENTIONS:  - Monitor WBC    Outcome: Progressing     Problem: SAFETY ADULT  Goal: Maintain or return to baseline ADL function  Description: INTERVENTIONS:  -  Assess patient's ability to carry out ADLs; assess patient's baseline for ADL function and identify physical deficits which impact ability to perform ADLs (bathing, care of mouth/teeth, toileting, grooming, dressing, etc )  - Assess/evaluate cause of self-care deficits   - Assess range of motion  - Assess patient's mobility; develop plan if impaired  - Assess patient's need for assistive devices and provide as appropriate  - Encourage maximum independence but intervene and supervise when necessary  - Involve family in performance of ADLs  - Assess for home care needs following discharge   - Consider OT consult to assist with ADL evaluation and planning for discharge  - Provide patient education as appropriate  Outcome: Progressing  Goal: Maintains/Returns to pre admission functional level  Description: INTERVENTIONS:  - Perform BMAT or MOVE assessment daily    - Set and communicate daily mobility goal to care team and patient/family/caregiver  - Collaborate with rehabilitation services on mobility goals if consulted  - Out of bed for toileting  - Record patient progress and toleration of activity level   Outcome: Progressing  Goal: Patient will remain free of falls  Description: INTERVENTIONS:  - Educate patient/family on patient safety including physical limitations  - Instruct patient to call for assistance with activity   - Consult OT/PT to assist with strengthening/mobility   - Keep Call bell within reach  - Keep bed low and locked with side rails adjusted as appropriate  - Keep care items and personal belongings within reach  - Initiate and maintain comfort rounds  - Make Fall Risk Sign visible to staff  - Apply yellow socks and bracelet for high fall risk patients  - Consider moving patient to room near nurses station  Outcome: Progressing     Problem: DISCHARGE PLANNING  Goal: Discharge to home or other facility with appropriate resources  Description: INTERVENTIONS:  - Identify barriers to discharge w/patient and caregiver  - Arrange for needed discharge resources and transportation as appropriate  - Identify discharge learning needs (meds, wound care, etc )  - Arrange for interpretive services to assist at discharge as needed  - Refer to Case Management Department for coordinating discharge planning if the patient needs post-hospital services based on physician/advanced practitioner order or complex needs related to functional status, cognitive ability, or social support system  Outcome: Progressing     Problem: Knowledge Deficit  Goal: Patient/family/caregiver demonstrates understanding of disease process, treatment plan, medications, and discharge instructions  Description: Complete learning assessment and assess knowledge base    Interventions:  - Provide teaching at level of understanding  - Provide teaching via preferred learning methods  Outcome: Progressing Problem: Prexisting or High Potential for Compromised Skin Integrity  Goal: Skin integrity is maintained or improved  Description: INTERVENTIONS:  - Identify patients at risk for skin breakdown  - Assess and monitor skin integrity  - Assess and monitor nutrition and hydration status  - Monitor labs   - Assess for incontinence   - Turn and reposition patient  - Assist with mobility/ambulation  - Relieve pressure over bony prominences  - Avoid friction and shearing  - Provide appropriate hygiene as needed including keeping skin clean and dry  - Evaluate need for skin moisturizer/barrier cream  - Collaborate with interdisciplinary team   - Patient/family teaching  - Consider wound care consult   Outcome: Progressing     Problem: Potential for Falls  Goal: Patient will remain free of falls  Description: INTERVENTIONS:  - Educate patient/family on patient safety including physical limitations  - Instruct patient to call for assistance with activity   - Consult OT/PT to assist with strengthening/mobility   - Keep Call bell within reach  - Keep bed low and locked with side rails adjusted as appropriate  - Keep care items and personal belongings within reach  - Initiate and maintain comfort rounds  - Make Fall Risk Sign visible to staff  - Apply yellow socks and bracelet for high fall risk patients  - Consider moving patient to room near nurses station  Outcome: Progressing

## 2022-10-18 NOTE — ASSESSMENT & PLAN NOTE
Wt Readings from Last 3 Encounters:   10/17/22 80 7 kg (178 lb)   04/27/22 80 7 kg (178 lb)   07/23/21 83 kg (183 lb)     · Continue torsemide  Not on BB at baseline  · Last echo showed EF 60% with flail mitral valve leaflet with severe MR, referred to CTS for mitraclip however does not appear he followed up  Last seen in 2020 by Dr Yazmin Brown     · I/O, daily weights  · Monitor volume status

## 2022-10-18 NOTE — PLAN OF CARE
Problem: OCCUPATIONAL THERAPY ADULT  Goal: Performs self-care activities at highest level of function for planned discharge setting  See evaluation for individualized goals  Description: Treatment Interventions: Functional transfer training, ADL retraining, UE strengthening/ROM, Endurance training, Cognitive reorientation, Patient/family training, Equipment evaluation/education, Compensatory technique education, Energy conservation, Activityengagement          See flowsheet documentation for full assessment, interventions and recommendations  Note: Limitation: Decreased ADL status, Decreased UE ROM, Decreased UE strength, Decreased Safe judgement during ADL, Decreased cognition, Decreased endurance, Decreased high-level ADLs, Decreased self-care trans  Prognosis: Fair  Assessment: Pt is a 56 yo Male who presented to [facility] on 10/16/2022 with yellowing of skin  Pt with diagnosis of jaundice, obstructive, intrahepatic  Pt  has a past medical history of SUNDEEP (acute kidney injury) ([facility] [address] ), CHF (congestive heart failure) ([facility] [address] ), Cholelithiasis, Chronic obstructive lung disease ([facility] [address] ), COPD (chronic obstructive pulmonary disease) ([facility] [address] ), Depression, Diabetes mellitus ([facility] [address] ), Diabetic neuropathy ([address] ), Difficulty attaining erection, Former tobacco use, Gall stone pancreatitis, Hiatal hernia, History of concussion, History of MRSA infection, History of varicella infection, Hypertension, Non-healing open wound of right groin, PAD (peripheral artery disease) ([address] ), Seasonal allergies, Severe mitral regurgitation, Spinal cord injury, C1-C7 ([address] ), Traumatic injury to musculoskeletal system, and Umbilical hernia without obstruction and without gangrene  Pt greeted bedside for OT evaluation on 10/18/2022  Pt lives with wife in an apartment with 3STE  PTA, Pt reports being I with grooming, toileting, and eating  Pt reports being mod I with bed mobility, functional transfers, and functional mobility (short distances)   Pt's wife assist with all other ADLs including LB dressing, showering, occasionally with toileting, and all IADL tasks  Pt with supportive wife who provides transportation  Pt reports being alone while his wife is at work  Pt demonstrating the following occupational deficits: max A with UB/LB ADLs, mod AX1 with bed mobility, min A with functional transfers, and min AX1 with functional mobility with RW  Limitations that impact functional performance include decreased ADL status, decreased UE ROM, decreased UE strength, decreased safe judgement during ADLs, decreased cognition, decreased endurance, decreased self care transfers, decreased high level ADLs and pain  Occupational performance areas to address ADL retraining, functional transfer training, UE strengthening/ROM, endurance training, cognitive reorientation, Pt/caregiver education, equipment evaluation/education, compensatory technique education, energy conservation and activity engagement   Pt would benefit from continued skilled OT services while in hospital to maximize independence with ADLs  Will continue to follow Pt's progress  Pt would benefit from post acute rehabilitation services upon DC to maximize safety and independence with ADLs and functional tasks of choice       OT Discharge Recommendation: Post acute rehabilitation services

## 2022-10-18 NOTE — ASSESSMENT & PLAN NOTE
· Last drink reportedly 10/11  No clinical concerns for withdrawal, will discontinue CIWA protocol  · Continue thiamine and folate with multivitamins daily

## 2022-10-18 NOTE — UTILIZATION REVIEW
Continued Stay Review    Date: 10/18/22              Current Patient Class: Inpatient Current Level of Care: Med Surg    Vital Signs:       Date/Time Temp Pulse Resp BP MAP (mmHg) SpO2 O2 Device   10/18/22 08:06:48 97 3 °F (36 3 °C) Abnormal  87 16 111/77 88 93 % --   10/17/22 22:36:38 97 5 °F (36 4 °C) 86 16 108/76 87 95 % --   10/17/22 20:49:06 97 7 °F (36 5 °C) 88 16 107/58 74 93 % --   10/17/22 1950 -- -- -- -- -- -- None (Room air)   10/17/22 1927 -- -- -- -- -- 95 % --   10/17/22 15:24:03 97 5 °F (36 4 °C) 81 14 109/58 75 95 % None (Room air)   10/17/22 1100 98 3 °F (36 8 °C) 80 18 148/88 -- 96 % None (Room air)   10/17/22 0840 98 6 °F (37 °C) 85 18 133/98 -- 95 % None (Room air)   10/17/22 0715 -- -- -- -- -- -- None (Room air)   10/17/22 0700 -- 85 -- 133/98 -- -- --   10/17/22 0600 -- 88 -- -- -- 95 % None (Room air)   10/17/22 0300 -- 94 16 122/77 93 94 % None (Room air)   10/17/22 0100 -- 92 18 115/64 83 -- None (Room air)   10/16/22 2330 -- 100 20 118/79 94 94 % None (Room air)   10/16/22 2200 -- 100 20 98/50 67 94 % None (Room air)   10/16/22 2030 -- 100 20 114/63 -- 93 % None (Room air)   10/16/22 1857 -- 96 20 115/70 86 96 % --   10/16/22 1603 98 6 °F (37 °C) 94 20 97/64 -- 96 % None (Room air)         Pertinent Labs/Diagnostic Results:       10/17 MRI abdomen with MRCP:      FINDINGS:     Motion degrades 3-D MRCP images      LOWER CHEST:   Within normal limits      LIVER: Evidence of fatty deposition, most severe in the right lobe  Morphologic changes suggest cirrhosis      BILE DUCTS:    Central intrahepatic and proximal extrahepatic bile duct dilation  Central intrahepatic ducts measure up to 1 2 cm and dilated extrahepatic duct up to 1 7 cm      Abrupt termination of the dilated extrahepatic duct in the region of the pancreatic neck  Please see description of pancreas below      Normal caliber of the distal 2 0 cm extrahepatic duct, measures 0 4 cm      No ductal filling defects     GALLBLADDER:  Cholecystectomy     SPLEEN:  Within normal limits      PANCREAS:      Similar mild atrophy       Dilation of the mid to distal pancreatic duct, approximately 0 6 cm  Small dilated side branches compatible with prior pancreatitis      Abrupt cut off of the dilated duct in close proximity to the dilated bile duct with approximately 1 8 cm duct occlusion in the in the pancreatic neck and 1 6 cm in length patent, normal caliber duct in the head at the ampulla        Mild, diffuse decreased signal intensity of the pancreas on T1-weighted images  No secondary signs of acute pancreatitis  No mass identified  Limited evaluation without IV contrast   Mild restricted diffusion in the pancreatic neck      ADRENAL GLANDS:  Within normal limits      KIDNEYS/PROXIMAL URETERS:  Within normal limits      BOWEL:   Within normal limits      PERITONEUM/RETROPERITONEUM:  Trace perihepatic ascites      LYMPH NODES:  No abdominal lymphadenopathy      VASCULAR STRUCTURES:  Limited evaluation without IV contrast   No aortic aneurysm  Small splenic vein      OSSEOUS STRUCTURES:  No suspicious osseous lesion      ABDOMINAL WALL:  Within normal limits      The study was marked in EPIC for immediate notification         IMPRESSION:     Obstruction of the extrahepatic bile duct and pancreatic duct in the region of the pancreatic neck as described above  The differential diagnosis includes obstruction from fibrosis of chronic pancreatitis versus mass  No obvious mass lesion identified  Limited evaluation without IV contrast   Endoscopic ultrasound may be helpful for further characterization      Fatty liver    Probable cirrhosis              Results from last 7 days   Lab Units 10/18/22  0522 10/17/22  0421 10/16/22  1755   WBC Thousand/uL 10 46* 9 98 10 19*   HEMOGLOBIN g/dL 12 9 13 5 14 7   HEMATOCRIT % 36 8 37 3 40 6   PLATELETS Thousands/uL 177 171 182   NEUTROS ABS Thousands/µL 8 53* 7 84* 8 22*         Results from last 7 days   Lab Units 10/18/22  0522 10/17/22  0421 10/16/22  1755   SODIUM mmol/L 132* 129* 132*   POTASSIUM mmol/L 3 7 3 6 4 1   CHLORIDE mmol/L 99 97 92*   CO2 mmol/L 22 27 30   ANION GAP mmol/L 11 5 10   BUN mg/dL 19 18 20   CREATININE mg/dL 1 05 0 98 1 19   EGFR ml/min/1 73sq m 74 81 64   CALCIUM mg/dL 8 4 8 7 8 7   MAGNESIUM mg/dL  --  1 8  --      Results from last 7 days   Lab Units 10/18/22  0522 10/17/22  0421 10/16/22  1755   AST U/L 272* 348* 375*   ALT U/L 243* 265* 282*   ALK PHOS U/L 635* 616* 670*   TOTAL PROTEIN g/dL 6 6 6 7 7 0   ALBUMIN g/dL 2 3* 2 6* 2 8*   TOTAL BILIRUBIN mg/dL 13 55* 11 49* 11 78*   AMMONIA umol/L  --   --  20     Results from last 7 days   Lab Units 10/18/22  0753 10/17/22  2047 10/17/22  1621 10/17/22  1145 10/17/22  0805 10/17/22  0015   POC GLUCOSE mg/dl 273* 126 139 205* 248* 262*     Results from last 7 days   Lab Units 10/18/22  0522 10/17/22  0421 10/16/22  1755   GLUCOSE RANDOM mg/dL 319* 281* 377*         Results from last 7 days   Lab Units 10/16/22  1755   HEMOGLOBIN A1C % 10 1*   EAG mg/dl 243               Results from last 7 days   Lab Units 10/17/22  0421 10/16/22  1755   PROTIME seconds 13 1 12 3   INR  0 97 0 90   PTT seconds 34 33             Results from last 7 days   Lab Units 10/16/22  1755   HEP B S AG  Non-reactive   HEP C AB  Non-reactive   HEP B C IGM  Non-reactive     Results from last 7 days   Lab Units 10/16/22  1755   LIPASE u/L 188                 Results from last 7 days   Lab Units 10/16/22  1953 10/16/22  1933   CLARITY UA  Clear Clear   COLOR UA  Dark Yellow Yellow   SPEC GRAV UA  1 022 1 015   PH UA  7 5 7 0   GLUCOSE UA mg/dl >=1000 (1%)* >=1000 (1%)*   KETONES UA mg/dl Negative Negative   BLOOD UA  Negative Negative   PROTEIN UA mg/dl 30 (1+)* 100 (2+)*   NITRITE UA  Negative Negative   BILIRUBIN UA  Moderate* Large*   UROBILINOGEN UA E U /dl  --  1 0   UROBILINOGEN UA (BE) mg/dl 2 0*  --    LEUKOCYTES UA  Elevated glucose may cause decreased leukocyte values  See urine microscopic for [facility] result/* Elevated glucose may cause decreased leukocyte values   See urine microscopic for [facility] result/*   WBC UA /hpf None Seen  --    RBC UA /hpf 1-2  --    BACTERIA UA /hpf None Seen  --    EPITHELIAL CELLS WET PREP /hpf Occasional  --                  Results from last 7 days   Lab Units 10/16/22  1755   ETHANOL LVL mg/dL <3   ACETAMINOPHEN LVL ug/mL <2*   SALICYLATE LVL mg/dL <3*         Medications:   Scheduled Medications:  baclofen, 20 mg, Oral, TID  calcium carbonate-vitamin D, 1 tablet, Oral, Daily With Breakfast  cefTRIAXone, 1,000 mg, Intravenous, Q24H  citalopram, 20 mg, Oral, Daily  enoxaparin, 40 mg, Subcutaneous, Daily  ergocalciferol, 50,000 Units, Oral, Weekly  folic acid, 1 mg, Oral, Daily  insulin glargine, 30 Units, Subcutaneous, QAM  insulin lispro, 1-5 Units, Subcutaneous, HS  insulin lispro, 1-6 Units, Subcutaneous, TID AC  insulin lispro, 24 Units, Subcutaneous, TID With Meals  levalbuterol, 1 25 mg, Nebulization, BID  melatonin, 3 mg, Oral, HS  metroNIDAZOLE, 500 mg, Oral, Q8H [address]  ELMO ANTIFUNGAL, 1 application, Topical, TID  multivitamin stress formula, 1 tablet, Oral, Daily  nicotine, 1 patch, Transdermal, Daily  nystatin, , Topical, BID  potassium chloride, 20 mEq, Oral, BID  pregabalin, 100 mg, Oral, TID  salmeterol, 1 puff, Inhalation, Q12H MARY ELLEN  sodium chloride, 3 mL, Nebulization, BID  tamsulosin, 0 4 mg, Oral, Daily With Dinner  thiamine, 100 mg, Oral, Daily  torsemide, 20 mg, Oral, Daily  umeclidinium bromide, 1 puff, Inhalation, Daily      Continuous IV Infusions:     PRN Meds:  albuterol, 2 5 mg, Nebulization, Q4H PRN  aluminum-magnesium hydroxide-simethicone, 30 mL, Oral, Q6H PRN  docusate sodium, 100 mg, Oral, BID PRN  ondansetron, 4 mg, Intravenous, Q6H PRN          Medications 10/16 10/17 10/18   albuterol inhalation solution 2 5 mg  Dose: 2 5 mg  Freq: Every 4 hours PRN Route: NEBULIZATION  PRN Reason: wheezing  Start: 10/17/22 0758         aluminum-magnesium hydroxide-simethicone (MYLANTA) oral suspension 30 mL  Dose: 30 mL  Freq: Every 6 hours PRN Route: PO  PRN Reasons: indigestion,heartburn  Start: 10/16/22 2136   Admin Instructions:   Should be taken between meals  Shake well before use  docusate sodium (COLACE) capsule 100 mg  Dose: 100 mg  Freq: 2 times daily PRN Route: PO  PRN Reason: constipation  Indications of Use: CONSTIPATION  Start: 10/16/22 2136         iohexol (OMNIPAQUE) 350 MG/ML injection (SINGLE-DOSE) 95 mL  Dose: 95 mL  Freq: Once in imaging Route: IV  PRN Reason: contrast  Start: 10/16/22 2017 End: 10/16/22 2021    2021          ondansetron (ZOFRAN) injection 4 mg  Dose: 4 mg  Freq: Every 6 hours PRN Route: IV  PRN Reasons: nausea,vomiting  Start: 10/16/22 2136   Admin Instructions:   Push over 2 minutes  Medications 10/16 10/17 10/18             Discharge Plan: D        Network Utilization Review Department  ATTENTION: Please call with any questions or concerns to [phone] and carefully listen to the prompts so that you are directed to the right person  All voicemails are confidential   Mika [place] all requests for admission clinical reviews, approved or denied determinations and any other requests to dedicated fax number below belonging to the campus where the patient is receiving treatment   List of dedicated fax numbers for the Facilities:  [address] DENIALS (Administrative/Medical Necessity) [phone]   [address] [place] [address] (Maternity/NICU/Pediatrics) [phone]   [address] [facility] [address] [facility] [place] [address] [place] [address] [id number] [place], [place] [id number] [facility]  [address]57 [facility] [phone]

## 2022-10-18 NOTE — PROGRESS NOTES
Pastoral Care Progress Note    10/18/2022  Patient: Laly Pichardo : 1960  Admission Date & Time: 10/16/2022 1628  MRN: [id number] CSN: [id number]       intro'd self to Pt, who was very willing to talk/share  Pt talked of several years of health challenges and surgeries  Lazaro Clark he is hopeful to keep getting well, yet also has had his moments with God when he has said, God, take me  He recognizes that he is not well, and said "I have made my peace with God "    Expressed concern for the stress his wife deals with in her job, caring for him, caring for wife's ailing mother   listened, affirmed his caring and conscientious attitude towards his wife, encouraged him to do what he can to get well, listened and validated, and prayed to continue to have a good attitude with whatever challenges come his way                 Chaplaincy Interventions Utilized:   Empowerment: Clarified, confirmed, or reviewed information from treatment team     Exploration: Explored hope, Explored relational needs & resources, and Explored spiritual needs & resources    Collaboration: Advocated for patient/family    Relationship Building: Cultivated a relationship of care and support and Listened empathically    Ritual: Provided prayer    Chaplaincy Outcomes Achieved:  Expressed peace, Expressed ultimate hope, and Identified priorities    Spiritual Coping Strategies Utilized:   Spiritual comfort       10/18/22 1100   Clinical Encounter Type   Visited With Patient   Routine Visit Introduction   Patient Spiritual Encounters   Dignified Life Closure 5

## 2022-10-18 NOTE — ASSESSMENT & PLAN NOTE
Patient presents with jaundice, no other symptoms  Noted 10/13, had just stopped drinking ETOH 10/11  · CT A/P showed pancreas is atrophic and there's mild prominence of main pancreatic duct 5mm 2/2 chronic pancreatitis sequelae  · Acute hepatitis panel negative  · GI following, MRCP showed Obstruction of the extrahepatic bile duct and pancreatic duct in the region of the pancreatic neck  Differential diagnosis includes obstruction from fibrosis of chronic pancreatitis versus mass however no obvious mass or lesion identified  · Plan for ERCP/EUS, awaiting timing from GI  Holding Plavix in preparation for this    · Trend CMP, bilirubin continues to worsen

## 2022-10-18 NOTE — PLAN OF CARE
Problem: MOBILITY - ADULT  Goal: Maintain or return to baseline ADL function  Description: INTERVENTIONS:  -  Assess patient's ability to carry out ADLs; assess patient's baseline for ADL function and identify physical deficits which impact ability to perform ADLs (bathing, care of mouth/teeth, toileting, grooming, dressing, etc )  - Assess/evaluate cause of self-care deficits   - Assess range of motion  - Assess patient's mobility; develop plan if impaired  - Assess patient's need for assistive devices and provide as appropriate  - Encourage maximum independence but intervene and supervise when necessary  - Involve family in performance of ADLs  - Assess for home care needs following discharge   - Consider OT consult to assist with ADL evaluation and planning for discharge  - Provide patient education as appropriate  Outcome: Progressing  Goal: Maintains/Returns to pre admission functional level  Description: INTERVENTIONS:  - Perform BMAT or MOVE assessment daily    - Set and communicate daily mobility goal to care team and patient/family/caregiver     - Collaborate with rehabilitation services on mobility goals if consulted  - Out of bed for toileting  - Record patient progress and toleration of activity level   Outcome: Progressing     Problem: PAIN - ADULT  Goal: Verbalizes/displays adequate comfort level or baseline comfort level  Description: Interventions:  - Encourage patient to monitor pain and request assistance  - Assess pain using appropriate pain scale  - Administer analgesics based on type and severity of pain and evaluate response  - Implement non-pharmacological measures as appropriate and evaluate response  - Consider cultural and social influences on pain and pain management  - Notify physician/advanced practitioner if interventions unsuccessful or patient reports new pain  Outcome: Progressing     Problem: INFECTION - ADULT  Goal: Absence or prevention of progression during hospitalization  Description: INTERVENTIONS:  - Assess and monitor for signs and symptoms of infection  - Monitor lab/diagnostic results  - Monitor all insertion sites, i e  indwelling lines, tubes, and drains  - Monitor endotracheal if appropriate and nasal secretions for changes in amount and color  - [place] appropriate cooling/warming therapies per order  - Administer medications as ordered  - Instruct and encourage patient and family to use good hand hygiene technique  - Identify and instruct in appropriate isolation precautions for identified infection/condition  Outcome: Progressing  Goal: Absence of fever/infection during neutropenic period  Description: INTERVENTIONS:  - Monitor WBC    Outcome: Progressing     Problem: SAFETY ADULT  Goal: Maintain or return to baseline ADL function  Description: INTERVENTIONS:  -  Assess patient's ability to carry out ADLs; assess patient's baseline for ADL function and identify physical deficits which impact ability to perform ADLs (bathing, care of mouth/teeth, toileting, grooming, dressing, etc )  - Assess/evaluate cause of self-care deficits   - Assess range of motion  - Assess patient's mobility; develop plan if impaired  - Assess patient's need for assistive devices and provide as appropriate  - Encourage maximum independence but intervene and supervise when necessary  - Involve family in performance of ADLs  - Assess for home care needs following discharge   - Consider OT consult to assist with ADL evaluation and planning for discharge  - Provide patient education as appropriate  Outcome: Progressing  Goal: Maintains/Returns to pre admission functional level  Description: INTERVENTIONS:  - Perform BMAT or MOVE assessment daily    - Set and communicate daily mobility goal to care team and patient/family/caregiver     - Collaborate with rehabilitation services on mobility goals if consulted  - Out of bed for toileting  - Record patient progress and toleration of activity level   Outcome: Progressing  Goal: Patient will remain free of falls  Description: INTERVENTIONS:  - Educate patient/family on patient safety including physical limitations  - Instruct patient to call for assistance with activity   - Consult OT/PT to assist with strengthening/mobility   - Keep Call bell within reach  - Keep bed low and locked with side rails adjusted as appropriate  - Keep care items and personal belongings within reach  - Initiate and maintain comfort rounds  - Make Fall Risk Sign visible to staff  - Apply yellow socks and bracelet for high fall risk patients  - Consider moving patient to room near nurses station  Outcome: Progressing     Problem: Prexisting or High Potential for Compromised Skin Integrity  Goal: Skin integrity is maintained or improved  Description: INTERVENTIONS:  - Identify patients at risk for skin breakdown  - Assess and monitor skin integrity  - Assess and monitor nutrition and hydration status  - Monitor labs   - Assess for incontinence   - Turn and reposition patient  - Assist with mobility/ambulation  - Relieve pressure over bony prominences  - Avoid friction and shearing  - Provide appropriate hygiene as needed including keeping skin clean and dry  - Evaluate need for skin moisturizer/barrier cream  - Collaborate with interdisciplinary team   - Patient/family teaching  - Consider wound care consult   Outcome: Progressing

## 2022-10-18 NOTE — OCCUPATIONAL THERAPY NOTE
Occupational Therapy Evaluation     Patient Name: Uyen EMERY Date: 10/18/2022  Problem List  Principal Problem:    Jaundice, obstructive, intrahepatic  Active Problems:    Tobacco abuse    Ambulatory dysfunction    Diabetes mellitus type 2 - Diabetic neuropathy    Alcohol abuse    Peripheral arterial disease (HCC)    Essential hypertension    Chronic combined systolic and diastolic congestive heart failure (HCC)    Pseudohyponatremia    Severe mitral regurgitation    History of cervical spinal cord injury    Stage 3a chronic kidney disease ([address] )    Dysuria    Past Medical History  Past Medical History:   Diagnosis Date    SUNDEEP (acute kidney injury) ([address] )     CHF (congestive heart failure) ([facility])     Cholelithiasis     Chronic obstructive lung disease ([address] )     RESOLVED: 8/17/17    COPD (chronic obstructive pulmonary disease) ([facility] )     Depression     Diabetes mellitus ([address] )     type 2, non-insulin dependent    Diabetic neuropathy ([address] )     Difficulty attaining erection     LAST ASSESSEDl: 8/17/17    Former tobacco use     Gall stone pancreatitis     RESOLVED: 3/8/17    Hiatal hernia     History of concussion     History of MRSA infection     History of varicella infection     Hypertension     LAST ASSESSED: 8/17/17    Non-healing open wound of right groin     s/p VAC placement & post-op fungal dermatitis    PAD (peripheral artery disease) ([facility])     s/p RLE fem-pop    Seasonal allergies     Severe mitral regurgitation     Spinal cord injury, C1-C7 ([address] )     s/p fusion    Traumatic injury to musculoskeletal system     1-5 FLIGHT FALL DOWN THE STEPS - CERVICAL NECK INJURY - JAN 2, 2012; LAST ASSESSED: 8/12/04    Umbilical hernia without obstruction and without gangrene     RESOLVED: 3/8/17     Past Surgical History  Past Surgical History:   Procedure Laterality Date    ANGIOPLASTY      ARTERIOGRAM Right 7/31/2018    Procedure: ARTERIOGRAM Completion [place];  Surgeon: Aly Mccormick MD;  Location: [facility] OR;  Service: Vascular    BACK SURGERY      BYPASS FEMORAL-POPLITEAL Right 7/31/2018    Procedure: BYPASS FEMORAL-POPLITEAL R femoral- BK popliteal bypass;  Surgeon: Warden Keila MD;  Location: [facility];  Service: Vascular    CERVICAL FUSION      VERTEBRAL; C3-C4 FUSION    CHOLECYSTECTOMY LAPAROSCOPIC N/A 2/24/2017    Procedure: CHOLECYSTECTOMY LAPAROSCOPIC;  Surgeon: Otf Castellanos MD;  Location: [facility];  Service:     EPIGASTRIC HERNIA REPAIR      FEMORAL BYPASS Right     Leg    INCISION AND DRAINAGE OF WOUND      Groin area    TN DEBRIDEMENT, SKIN, SUB-Q TISSUE,=<20 SQ CM Right 8/28/2018    Procedure: RIGHT GROIN AND THIGH DEBRIDEMENT ([address]) [place];  Surgeon: Ashly Hudson MD;  Location: [facility];  Service: Vascular    TN THROMBOENDARTECTMY FEMORAL COMMON Right 7/31/2018    Procedure: ENDARTERECTOMY ARTERIAL FEMORAL R femoral endarterectomy w/ patch angioplasty ;  Surgeon: Warden Keila MD;  [facility]: [facility];  Service: Vascular    SPINE SURGERY      UMBILICAL HERNIA REPAIR N/A 2/24/2017    Procedure: REPAIR HERNIA UMBILICAL;  Surgeon: Otf Castellanos MD;  [facility]: [facility];  Service:     WISDOM TOOTH EXTRACTION               10/18/22 1037   OT Last Visit   OT Visit Date 10/18/22   Note Type   Note type Evaluation   Pain Assessment   Pain Assessment Tool 0-10   Pain Score No Pain   Restrictions/Precautions   Weight Bearing Precautions Per Order No   Other Precautions Fall Risk;Pain; Chair Alarm; Bed Alarm   Home Living   Type of Home Apartment   Home Layout One level   Bathroom Shower/Tub Tub/shower unit   Bathroom Toilet Standard   Bathroom Equipment Grab bars in shower; Shower chair;Commode   Home Equipment Walker;Cane   Additional Comments Pt lives with wife in an apartment with 3STE  Prior Function   Level of [place] Needs assistance with ADLs; Needs assistance with functional mobility   Lives With Spouse  (Wife works FT)   [address] Help From Family   IADLs Family/Friend/Other provides transportation; Family/Friend/Other provides meals; Family/Friend/Other provides medication management   Falls in the last 6 months 1 to 4  (x3)   Vocational On disability   Comments PTA, Pt reports being I with grooming, toileting, and eating  Pt reports being mod I with bed mobility, functional transfers, and functional mobility (short distances)  Pt's wife assist with all other ADLs including LB dressing, showering, occasionally with toileting, and all IADL tasks  Pt with supportive wife who provides transportation  Pt reports being alone while his wife is at work  Lifestyle   Autonomy I with grooming, toileting, and eating  Pt reports being mod I with bed mobility, functional transfers, and functional mobility (short distances)  Reciprocal Relationships Supportive wife   Service to Others SSD   Intrinsic Gratification Enjoys being as active as possible   ADL   Where Assessed Edge of bed   Eating Assistance 5  Supervision/Setup   Grooming Assistance 4  Minimal Assistance   UB Bathing Assistance 2  Maximal Assistance   LB Bathing Assistance 2  Maximal Assistance   UB Dressing Assistance 2  Maximal [address] 2  Maximal Assistance   LB Dressing Deficit Setup;Don/doff R sock; Don/doff L sock   Toileting Assistance  3  Moderate Assistance   Toileting Deficit Setup;Use of bedpan/urinal setup;Steadying;Supervison/safety; Increased time to complete   Functional Assistance 4  Minimal Assistance   Functional Deficit Increased time to complete;Supervision/safety   Bed Mobility   Supine to Sit 3  Moderate assistance   Additional items Assist x 1   Sit to Supine Unable to assess   Additional Comments Pt greeted supine in bed  Transfers   Sit to Stand 4  Minimal assistance   Additional items Assist x 1; Increased time required;Verbal cues   Stand to Sit 4  Minimal assistance   Additional items Assist x 1; Increased time required;Verbal cues   Additional Comments with RW Functional Mobility   Functional Mobility 4  Minimal assistance   Additional Comments Min Ax1 with RW- few steps from EOB - recliner chair  Additional items Rolling walker   Balance   Static Sitting Fair   Dynamic Sitting Fair -   Static Standing Poor +   Dynamic Standing Poor +   Ambulatory Poor   Activity Tolerance   Activity Tolerance Patient limited by fatigue   Medical Staff Made Aware CO-eval with TENISHA Velasquez 2* to Pt's medical complexity and decreased endurance  Nurse Made Aware RN cleared/updated  RUE Assessment   RUE Assessment X  (Pt with Hx of cervical spinal cord inj and Pt with contracted/flexed position of B/L UE- Pt able to complete functional grooming/eating tasks at home )   LUE Assessment   LUE Assessment X   Hand Function   Gross Motor Coordination Impaired   Fine Motor Coordination Impaired   Sensation   Light Touch No apparent deficits   Cognition   Overall Cognitive Status WFL   Arousal/Participation Alert; Cooperative   Attention Attends with cues to redirect   Orientation Level Oriented X4   Memory Within functional limits   Following Commands Follows one step commands with increased time or repetition   Comments Pt pleasant and cooperative during OT session  Pt requires increased time for processing/response and with decreased safety awareness  Pt requires VCs/TCS for safety  Assessment   Limitation Decreased ADL status; Decreased UE ROM; Decreased UE strength;Decreased Safe judgement during ADL;Decreased cognition;Decreased endurance;Decreased high-level ADLs; Decreased self-care trans   Prognosis Fair   Assessment Pt is a 58 yo Male who presented to [facility] on [facility]2022 with yellowing of skin  Pt with diagnosis of jaundice, obstructive, intrahepatic   Pt  has a past medical history of SUNDEEP (acute kidney injury) ([address] ), CHF (congestive heart failure) ([address] ), Cholelithiasis, Chronic obstructive lung disease ([address] ), COPD (chronic obstructive pulmonary disease) ([address] ), Depression, Diabetes mellitus ([facility] ), Diabetic neuropathy ([facility] ), Difficulty attaining erection, Former tobacco use, Gall stone pancreatitis, Hiatal hernia, History of concussion, History of MRSA infection, History of varicella infection, Hypertension, Non-healing open wound of right groin, PAD (peripheral artery disease) ([facility] ), Seasonal allergies, Severe mitral regurgitation, Spinal cord injury, C1-C7 ([facility] [facility] ), Traumatic injury to musculoskeletal system, and Umbilical hernia without obstruction and without gangrene  Pt greeted bedside for OT evaluation on 10/18/2022  Pt lives with wife in an apartment with 3STE  PTA, Pt reports being I with grooming, toileting, and eating  Pt reports being mod I with bed mobility, functional transfers, and functional mobility (short distances)  Pt's wife assist with all other ADLs including LB dressing, showering, occasionally with toileting, and all IADL tasks  Pt with supportive wife who provides transportation  Pt reports being alone while his wife is at work  Pt demonstrating the following occupational deficits: max A with UB/LB ADLs, mod AX1 with bed mobility, min A with functional transfers, and min AX1 with functional mobility with RW  Limitations that impact functional performance include decreased ADL status, decreased UE ROM, decreased UE strength, decreased safe judgement during ADLs, decreased cognition, decreased endurance, decreased self care transfers, decreased high level ADLs and pain  Occupational performance areas to address ADL retraining, functional transfer training, UE strengthening/ROM, endurance training, cognitive reorientation, Pt/caregiver education, equipment evaluation/education, compensatory technique education, energy conservation and activity engagement   Pt would benefit from continued skilled OT services while in hospital to maximize independence with ADLs  Will continue to follow Pt's progress   Pt would benefit from post acute rehabilitation services upon DC to maximize safety and independence with ADLs and functional tasks of choice  Goals   Patient Goals To sit up  LTG Time Frame 10-14   Long Term Goal #1 See goals listed below  Plan   Treatment Interventions Functional transfer training;ADL retraining;UE strengthening/ROM; Endurance training;Cognitive reorientation;Patient/family training;Equipment evaluation/education; Compensatory technique education; Energy conservation; Activityengagement   Goal Expiration Date 11/01/22   OT Frequency 3-5x/wk   Recommendation   OT Discharge Recommendation Post acute rehabilitation services   Additional Comments  The patient's raw score on the AM-PAC Daily Activity inpatient short form is 14, standardized score is 33 39, less than 39 4  Patients at this level are likely to benefit from discharge to post-acute rehabilitation services  Please refer to the recommendation of the Occupational Therapist for safe discharge planning  AM-PAC Daily Activity Inpatient   Lower Body Dressing 2   Bathing 2   Toileting 2   Upper Body Dressing 2   Grooming 3   Eating 3   Daily Activity Raw Score 14   Daily Activity Standardized Score (Calc for Raw Score >=11) 33 39   AM-[facility] Applied Cognition Inpatient   Following a Speech/Presentation 3   Understanding Ordinary Conversation 4   Taking Medications 3   Remembering Where Things Are Placed or Put Away 3   Remembering List of 4-5 Errands 3   Taking Care of Complicated Tasks 3   Applied Cognition Raw Score 19   Applied Cognition Standardized Score 39 77   End of Consult   Education Provided Yes   Patient Position at End of Consult Bed/Chair alarm activated; All needs within reach; Bedside chair   Nurse Communication Nurse aware of consult   Goals:  Pt will complete UB ADLs with min A in order to maximize participation with ADLs  Pt will complete LB ADLs with mod A in order to maximize safety with ADLs     Pt will complete toileting routine (transfer, hygiene, and clothing management) with I in order to return to prior level of function  Pt will complete bed mobility with S in order to maximize participation with ADLs  Pt will complete functional transfers at S level in order to increase participation with ADLs  Pt will increase dynamic standing balance to F in order to increase safety with ADLs  Pt will increase standing tolerance x10 min in order to increase participation with ADLs  Pt will complete functional mobility with AD PRN for item retrieval task at S level in order to increase participation with ADLs  Pt will demonstrate G energy conservation techniques with ADLs/IADLs in order to reduce the risk of falls  Pt will be attentive 100% of the time for ongoing functional/formal cognitive assessment to assist with safe dc planning prn      Monie [place], MS, OTR/L

## 2022-10-18 NOTE — PLAN OF CARE
Problem: PHYSICAL THERAPY ADULT  Goal: Performs mobility at highest level of function for planned discharge setting  See evaluation for individualized goals  Description: Treatment/Interventions: Functional transfer training, LE strengthening/ROM, Therapeutic exercise, Endurance training, Bed mobility, Gait training, Equipment eval/education, OT          See flowsheet documentation for full assessment, interventions and recommendations  Note: Prognosis: Good  Problem List: Decreased strength, Decreased endurance, Impaired balance, Decreased mobility  Assessment: Pt is a 58 yo male admitted to [place] [id number] on 10/16/2022 s/p diarrhea  DX: jaundice, HF, CKD, HTN, ambulatory dysfunction, PAD, alcohol abuse, DM, pseudohyponatremia, hx of cervical spinal cord injury  Two patient identifiers were used to confirm  Pt lives with his spouse in a apartment with 3 ANA  Pt required A With ADL's and IADL's prior  Pt used a RW and ambulates 30ft at a time  Pt is home alone due to his spouse working  Pt's neighbor checks on him throughout the day  Pt's impairments include reduced mobility, gait abnormalities, poor endurance, high risk of falling, reduced BLE Strength and ROM  These impairments limit the ability of the patient to perform mobility without increased assistance, return to PLOF and participate in everyday life activities  Pt would benefit from continued skilled therapy while in the hospital to improve overall mobility and work towards safe d/c  Recommend discharge to rehab  At the end of the session the patient was left in seated position with call bell and phone within reach  The patient's AM-PAC Basic Mobility Inpatient Short Form Raw Score is 14  A Raw score of less than or equal to 16 suggests the patient may benefit from discharge to post-acute rehabilitation services  Please also refer to the recommendation of the Physical Therapist for safe discharge planning    Barriers to Discharge: Inaccessible home environment, Decreased caregiver support     PT Discharge Recommendation: Post acute rehabilitation services    See flowsheet documentation for full assessment

## 2022-10-19 ENCOUNTER — HOME HEALTH ADMISSION (OUTPATIENT)
Dept: HOME HEALTH SERVICES | Facility: HOME HEALTHCARE | Age: 62
End: 2022-10-19

## 2022-10-19 LAB
ALBUMIN SERPL BCP-MCNC: 2.2 G/DL (ref 3.5–5)
ALP SERPL-CCNC: 609 U/L (ref 46–116)
ALT SERPL W P-5'-P-CCNC: 214 U/L (ref 12–78)
ANION GAP SERPL CALCULATED.3IONS-SCNC: 11 MMOL/L (ref 4–13)
AST SERPL W P-5'-P-CCNC: 199 U/L (ref 5–45)
BASOPHILS # BLD AUTO: 0.04 THOUSANDS/ÂΜL (ref 0–0.1)
BASOPHILS NFR BLD AUTO: 0 % (ref 0–1)
BILIRUB SERPL-MCNC: 15.71 MG/DL (ref 0.2–1)
BUN SERPL-MCNC: 22 MG/DL (ref 5–25)
CALCIUM ALBUM COR SERPL-MCNC: 9.9 MG/DL (ref 8.3–10.1)
CALCIUM SERPL-MCNC: 8.5 MG/DL (ref 8.3–10.1)
CHLORIDE SERPL-SCNC: 97 MMOL/L (ref 96–108)
CO2 SERPL-SCNC: 23 MMOL/L (ref 21–32)
CREAT SERPL-MCNC: 1.28 MG/DL (ref 0.6–1.3)
EOSINOPHIL # BLD AUTO: 0.02 THOUSAND/ÂΜL (ref 0–0.61)
EOSINOPHIL NFR BLD AUTO: 0 % (ref 0–6)
ERYTHROCYTE [DISTWIDTH] IN BLOOD BY AUTOMATED COUNT: 15.6 % (ref 11.6–15.1)
GFR SERPL CREATININE-BSD FRML MDRD: 58 ML/MIN/1.73SQ M
GLUCOSE SERPL-MCNC: 266 MG/DL (ref 65–140)
GLUCOSE SERPL-MCNC: 270 MG/DL (ref 65–140)
GLUCOSE SERPL-MCNC: 311 MG/DL (ref 65–140)
GLUCOSE SERPL-MCNC: 414 MG/DL (ref 65–140)
GLUCOSE SERPL-MCNC: 447 MG/DL (ref 65–140)
GLUCOSE SERPL-MCNC: 72 MG/DL (ref 65–140)
HCT VFR BLD AUTO: 34.7 % (ref 36.5–49.3)
HGB BLD-MCNC: 12.8 G/DL (ref 12–17)
IMM GRANULOCYTES # BLD AUTO: 0.06 THOUSAND/UL (ref 0–0.2)
IMM GRANULOCYTES NFR BLD AUTO: 1 % (ref 0–2)
INR PPP: 0.99 (ref 0.84–1.19)
LYMPHOCYTES # BLD AUTO: 0.86 THOUSANDS/ÂΜL (ref 0.6–4.47)
LYMPHOCYTES NFR BLD AUTO: 9 % (ref 14–44)
MCH RBC QN AUTO: 36.9 PG (ref 26.8–34.3)
MCHC RBC AUTO-ENTMCNC: 36.9 G/DL (ref 31.4–37.4)
MCV RBC AUTO: 100 FL (ref 82–98)
MONOCYTES # BLD AUTO: 1.04 THOUSAND/ÂΜL (ref 0.17–1.22)
MONOCYTES NFR BLD AUTO: 10 % (ref 4–12)
NEUTROPHILS # BLD AUTO: 8.01 THOUSANDS/ÂΜL (ref 1.85–7.62)
NEUTS SEG NFR BLD AUTO: 80 % (ref 43–75)
NRBC BLD AUTO-RTO: 0 /100 WBCS
PLATELET # BLD AUTO: 174 THOUSANDS/UL (ref 149–390)
PMV BLD AUTO: 13.3 FL (ref 8.9–12.7)
POTASSIUM SERPL-SCNC: 3.6 MMOL/L (ref 3.5–5.3)
PROT SERPL-MCNC: 6.5 G/DL (ref 6.4–8.4)
PROTHROMBIN TIME: 13.3 SECONDS (ref 11.6–14.5)
RBC # BLD AUTO: 3.47 MILLION/UL (ref 3.88–5.62)
SODIUM SERPL-SCNC: 131 MMOL/L (ref 135–147)
WBC # BLD AUTO: 10.03 THOUSAND/UL (ref 4.31–10.16)

## 2022-10-19 PROCEDURE — 82948 REAGENT STRIP/BLOOD GLUCOSE: CPT

## 2022-10-19 PROCEDURE — 99222 1ST HOSP IP/OBS MODERATE 55: CPT | Performed by: INTERNAL MEDICINE

## 2022-10-19 PROCEDURE — 85610 PROTHROMBIN TIME: CPT | Performed by: STUDENT IN AN ORGANIZED HEALTH CARE EDUCATION/TRAINING PROGRAM

## 2022-10-19 PROCEDURE — 85025 COMPLETE CBC W/AUTO DIFF WBC: CPT | Performed by: STUDENT IN AN ORGANIZED HEALTH CARE EDUCATION/TRAINING PROGRAM

## 2022-10-19 PROCEDURE — 80053 COMPREHEN METABOLIC PANEL: CPT | Performed by: STUDENT IN AN ORGANIZED HEALTH CARE EDUCATION/TRAINING PROGRAM

## 2022-10-19 PROCEDURE — 99232 SBSQ HOSP IP/OBS MODERATE 35: CPT | Performed by: NURSE PRACTITIONER

## 2022-10-19 PROCEDURE — 94760 N-INVAS EAR/PLS OXIMETRY 1: CPT

## 2022-10-19 PROCEDURE — 94640 AIRWAY INHALATION TREATMENT: CPT

## 2022-10-19 PROCEDURE — 99232 SBSQ HOSP IP/OBS MODERATE 35: CPT | Performed by: INTERNAL MEDICINE

## 2022-10-19 RX ADMIN — BACLOFEN 20 MG: 20 TABLET ORAL at 08:26

## 2022-10-19 RX ADMIN — Medication 3 MG: at 21:07

## 2022-10-19 RX ADMIN — B-COMPLEX W/ C & FOLIC ACID TAB 1 TABLET: TAB at 08:26

## 2022-10-19 RX ADMIN — INSULIN GLARGINE 30 UNITS: 100 INJECTION, SOLUTION SUBCUTANEOUS at 08:10

## 2022-10-19 RX ADMIN — UMECLIDINIUM 1 PUFF: 62.5 AEROSOL, POWDER ORAL at 08:25

## 2022-10-19 RX ADMIN — ISODIUM CHLORIDE 3 ML: 0.03 SOLUTION RESPIRATORY (INHALATION) at 20:34

## 2022-10-19 RX ADMIN — TORSEMIDE 20 MG: 20 TABLET ORAL at 08:26

## 2022-10-19 RX ADMIN — MICONAZOLE NITRATE 1 APPLICATION: 20 CREAM TOPICAL at 16:16

## 2022-10-19 RX ADMIN — FOLIC ACID 1 MG: 1 TABLET ORAL at 08:26

## 2022-10-19 RX ADMIN — INSULIN LISPRO 3 UNITS: 100 INJECTION, SOLUTION INTRAVENOUS; SUBCUTANEOUS at 21:07

## 2022-10-19 RX ADMIN — NYSTATIN: 100000 POWDER TOPICAL at 08:25

## 2022-10-19 RX ADMIN — INSULIN LISPRO 24 UNITS: 100 INJECTION, SOLUTION INTRAVENOUS; SUBCUTANEOUS at 08:10

## 2022-10-19 RX ADMIN — PREGABALIN 100 MG: 100 CAPSULE ORAL at 08:26

## 2022-10-19 RX ADMIN — ISODIUM CHLORIDE 3 ML: 0.03 SOLUTION RESPIRATORY (INHALATION) at 07:54

## 2022-10-19 RX ADMIN — NICOTINE 1 PATCH: 21 PATCH, EXTENDED RELEASE TRANSDERMAL at 08:27

## 2022-10-19 RX ADMIN — LEVALBUTEROL HYDROCHLORIDE 1.25 MG: 1.25 SOLUTION, CONCENTRATE RESPIRATORY (INHALATION) at 20:32

## 2022-10-19 RX ADMIN — ENOXAPARIN SODIUM 40 MG: 40 INJECTION SUBCUTANEOUS at 08:26

## 2022-10-19 RX ADMIN — MICONAZOLE NITRATE 1 APPLICATION: 20 CREAM TOPICAL at 09:00

## 2022-10-19 RX ADMIN — TAMSULOSIN HYDROCHLORIDE 0.4 MG: 0.4 CAPSULE ORAL at 16:14

## 2022-10-19 RX ADMIN — POTASSIUM CHLORIDE 20 MEQ: 1500 TABLET, EXTENDED RELEASE ORAL at 08:26

## 2022-10-19 RX ADMIN — BACLOFEN 20 MG: 20 TABLET ORAL at 16:14

## 2022-10-19 RX ADMIN — SALMETEROL XINAFOATE 1 PUFF: 50 POWDER, METERED ORAL; RESPIRATORY (INHALATION) at 21:08

## 2022-10-19 RX ADMIN — LEVALBUTEROL HYDROCHLORIDE 1.25 MG: 1.25 SOLUTION, CONCENTRATE RESPIRATORY (INHALATION) at 07:54

## 2022-10-19 RX ADMIN — PREGABALIN 100 MG: 100 CAPSULE ORAL at 21:09

## 2022-10-19 RX ADMIN — INSULIN LISPRO 6 UNITS: 100 INJECTION, SOLUTION INTRAVENOUS; SUBCUTANEOUS at 08:09

## 2022-10-19 RX ADMIN — PREGABALIN 100 MG: 100 CAPSULE ORAL at 16:14

## 2022-10-19 RX ADMIN — INSULIN LISPRO 24 UNITS: 100 INJECTION, SOLUTION INTRAVENOUS; SUBCUTANEOUS at 11:36

## 2022-10-19 RX ADMIN — BACLOFEN 20 MG: 20 TABLET ORAL at 21:09

## 2022-10-19 RX ADMIN — CEFTRIAXONE 1000 MG: 1 INJECTION, POWDER, FOR SOLUTION INTRAMUSCULAR; INTRAVENOUS at 11:35

## 2022-10-19 RX ADMIN — SALMETEROL XINAFOATE 1 PUFF: 50 POWDER, METERED ORAL; RESPIRATORY (INHALATION) at 08:25

## 2022-10-19 RX ADMIN — METRONIDAZOLE 500 MG: 500 TABLET ORAL at 21:07

## 2022-10-19 RX ADMIN — Medication 1 TABLET: at 08:26

## 2022-10-19 RX ADMIN — METRONIDAZOLE 500 MG: 500 TABLET ORAL at 13:50

## 2022-10-19 RX ADMIN — CITALOPRAM HYDROBROMIDE 20 MG: 20 TABLET ORAL at 08:26

## 2022-10-19 RX ADMIN — POTASSIUM CHLORIDE 20 MEQ: 1500 TABLET, EXTENDED RELEASE ORAL at 16:14

## 2022-10-19 RX ADMIN — MICONAZOLE NITRATE 1 APPLICATION: 20 CREAM TOPICAL at 21:08

## 2022-10-19 RX ADMIN — THIAMINE HCL TAB 100 MG 100 MG: 100 TAB at 08:26

## 2022-10-19 RX ADMIN — METRONIDAZOLE 500 MG: 500 TABLET ORAL at 05:16

## 2022-10-19 RX ADMIN — INSULIN LISPRO 3 UNITS: 100 INJECTION, SOLUTION INTRAVENOUS; SUBCUTANEOUS at 11:35

## 2022-10-19 NOTE — CONSULTS
Consultation - Rey Campbell 57 y o  male MRN: [id number]    Unit/Bed#: [facility] 810-01 Encounter: [id number]      Assessment/Plan     Assessment: This is a 57y o -year-old male with Type 2 DM on long-term insulin with PAD, CKD and diabetic neuropathy, chronic pancreatitis admitted with obstructive jaundice  Plan:  # Type 2 diabetes with uncontrolled hyperglycemia  # Hypoglycemia    A1c 10 1 (10/2022)  Home regimen:  Insulin Lantus ? 24-30 units at bedtime, Humalog 24 units t i d  With meals    Recommendations:  Given recent hypoglycemia episode, decrease Humalog to 20 units t i d  With meals and continue with current dose of insulin Lantus with correctional insulin  CC: Diabetes Consult    History of Present Illness     HPI: Rey Campbell is a 57y o  year old male with type 2 diabetes with uncontrolled hyperglycemia complicated by PAD, CKD and diabetic neuropathy, chronic pancreatitis admitted with obstructive jaundice  Recalls being diagnosed with diabetes many years ago  Was started on insulin 3-4 years ago  Reports his wife helps with all his medications including insulin at home  Reports compliance with his home insulin and home glucose monitoring however I A1c checked on this admission elevated to 10 1  He was restarted on his home insulin regimen on admission  Had an episode of hypoglycemia last night with glucose in the 60s  Reported symptoms of lightheadedness and tremulousness for which he received juice, ice cream and crackers with resultant a m glucose in the 400s  Reports ongoing symptoms of loose stool- usually after meals  Denies polyuria, polydipsia or blurry vision  Inpatient consult to Endocrinology  Consult performed by: Zulema Bernardo MD  Consult ordered by: CHERELLE Proctor  Constitutional: Negative for appetite change  Negative for activity change, fatigue and unexpected weight change     Respiratory: Negative for shortness of breath, wheezing, cough  Cardiovascular: Negative for chest pain and palpitations  Gastrointestinal: Negative for abdominal pain, nausea and vomiting  Musculoskeletal: Negative for arthralgias  Neurological: Negative for dizziness, light-headedness and headaches     All other ROS reviewed and negative  Historical Information   Past Medical History:   Diagnosis Date   • SUNDEEP (acute kidney injury) ([place] [address] )    • CHF (congestive heart failure) ([facility])    • Cholelithiasis    • Chronic obstructive lung disease ([place] [address] )     RESOLVED: 8/17/17   • COPD (chronic obstructive pulmonary disease) ([place] [address] )    • Depression    • Diabetes mellitus ([place] [address] )     type 2, non-insulin dependent   • Diabetic neuropathy ([place] [address] )    • Difficulty attaining erection     LAST ASSESSEDl: 8/17/17   • Former tobacco use    • Gall stone pancreatitis     RESOLVED: 3/8/17   • Hiatal hernia    • History of concussion    • History of MRSA infection    • History of varicella infection    • Hypertension     LAST ASSESSED: 8/17/17   • Non-healing open wound of right groin     s/p VAC placement & post-op fungal dermatitis   • PAD (peripheral artery disease) ([facility])     s/p RLE fem-pop   • Seasonal allergies    • Severe mitral regurgitation    • Spinal cord injury, C1-C7 ([place] [address] )     s/p fusion   • Traumatic injury to musculoskeletal system     1-5 FLIGHT FALL DOWN THE STEPS - CERVICAL NECK INJURY - JAN 2, 2012; LAST ASSESSED: 9/66/03   • Umbilical hernia without obstruction and without gangrene     RESOLVED: 3/8/17     Past Surgical History:   Procedure Laterality Date   • ANGIOPLASTY     • ARTERIOGRAM Right 7/31/2018    Procedure: ARTERIOGRAM Completion Agram;  Surgeon: Rockland Aase, MD;  [facility]: [facility];  Service: Vascular   • BACK SURGERY     • BYPASS FEMORAL-POPLITEAL Right 7/31/2018    Procedure: BYPASS FEMORAL-POPLITEAL R femoral- BK popliteal bypass;  Surgeon: Rockland Aase, MD;  [facility];  Service: Vascular   • CERVICAL FUSION      VERTEBRAL; C3-C4 FUSION • CHOLECYSTECTOMY LAPAROSCOPIC N/A 2/24/2017    Procedure: Emiliana Gomez;  Surgeon: Otf Castellanos MD;  Location[facility];  Service:    • EPIGASTRIC HERNIA REPAIR     • FEMORAL BYPASS Right     Leg   • INCISION AND DRAINAGE OF WOUND      Groin area   • TX DEBRIDEMENT, SKIN, SUB-Q TISSUE,=<20 SQ CM Right 8/28/2018    Procedure: RIGHT GROIN AND THIGH DEBRIDEMENT ([address]) [place];  Surgeon: Ashly Hudson MD;  [facility];  Service: Vascular   • TX THROMBOENDARTECTMY FEMORAL COMMON Right 7/31/2018    Procedure: ENDARTERECTOMY ARTERIAL FEMORAL R femoral endarterectomy w/ patch angioplasty ;  Surgeon: Warden Keila MD;  [facility];  Service: Vascular   • SPINE SURGERY     • UMBILICAL HERNIA REPAIR N/A 2/24/2017    Procedure: REPAIR HERNIA UMBILICAL;  Surgeon: Otf Castellanos MD;  Location[facility];  Service:    • WISDOM TOOTH EXTRACTION       Social History   Social History     Substance and Sexual Activity   Alcohol Use Not Currently    Comment: pt  reports he has not drank since February 2019     Social History     Substance and Sexual Activity   Drug Use No     Social History     Tobacco Use   Smoking Status Current Every Day Smoker   • Packs/day: 0 25   • Years: 40 00   • Pack years: 10 00   • Types: Cigarettes   Smokeless Tobacco Never Used     Family History:   Family History   Problem Relation Age of Onset   • Hypertension Mother         BENIGN   • Alzheimer's disease Father    • Heart disease Father         CARDIAC DISORDER   • Stroke Father    • Heart attack Father    • No Known Problems Brother        Meds/Allergies   Current Facility-Administered Medications   Medication Dose Route Frequency Provider Last Rate Last Admin   • albuterol inhalation solution 2 5 mg  2 5 mg Nebulization Q4H PRN Tanisha Corrales MD       • aluminum-magnesium hydroxide-simethicone (MYLANTA) oral suspension 30 mL  30 mL Oral Q6H PRN Tanisha Corrales MD       • baclofen tablet 20 mg  20 mg Oral TID Gerber Velarde MD   20 mg at 10/19/22 3346   • calcium carbonate-vitamin D (OSCAL-D) 500 mg-200 units per tablet 1 tablet  1 tablet Oral Daily With Breakfast Gerber Velarde MD   1 tablet at 10/19/22 0826   • cefTRIAXone (ROCEPHIN) 1,000 mg in dextrose 5 % 50 mL IVPB  1,000 mg Intravenous Q24H Salena Doherty  mL/hr at 10/19/22 1135 1,000 mg at 10/19/22 1135   • citalopram (CeleXA) tablet 20 mg  20 mg Oral Daily Gerber Velarde MD   20 mg at 10/19/22 6905   • docusate sodium (COLACE) capsule 100 mg  100 mg Oral BID PRN Gerber Velarde MD       • enoxaparin (LOVENOX) subcutaneous injection 40 mg  40 mg Subcutaneous Daily Gerber Velarde MD   40 mg at 10/19/22 5271   • ergocalciferol (VITAMIN D2) capsule 50,000 Units  50,000 Units Oral Weekly Gerber Velarde MD   50,000 Units at 77/51/51 1014   • folic acid (FOLVITE) tablet 1 mg  1 mg Oral Daily Gerber Velarde MD   1 mg at 10/19/22 0826   • insulin glargine (LANTUS) subcutaneous injection 30 Units 0 3 mL  30 Units Subcutaneous QAM Gerber Velarde MD   30 Units at 10/19/22 0810   • insulin lispro (HumaLOG) 100 units/mL subcutaneous injection 1-5 Units  1-5 Units Subcutaneous HS Gerber Velarde MD   2 Units at 10/17/22 0144   • insulin lispro (HumaLOG) 100 units/mL subcutaneous injection 1-6 Units  1-6 Units Subcutaneous TID AC Gerber Velarde MD   3 Units at 10/19/22 1135   • insulin lispro (HumaLOG) 100 units/mL subcutaneous injection 24 Units  24 Units Subcutaneous TID With Meals Gerber Velarde MD   24 Units at 10/19/22 1136   • levalbuterol (Rosary Marshall) inhalation solution 1 25 mg  1 25 mg Nebulization BID Gerber Velarde MD   1 25 mg at 10/19/22 0754   • melatonin tablet 3 mg  3 mg Oral HS Nisa Perez PA-C   3 mg at 10/18/22 2100   • metroNIDAZOLE (FLAGYL) tablet 500 mg  500 mg Oral Q8H Rom Zaldivar MD   500 mg at 10/19/22 0569   • moisture barrier miconazole 2% cream (aka ELMO MOISTURE BARRIER ANTIFUNGAL CREAM)  1 application Topical TID General Julien MD   1 application at 41/40/77 0900   • multivitamin stress formula tablet 1 tablet  1 tablet Oral Daily General Julien MD   1 tablet at 10/19/22 0826   • nicotine (NICODERM CQ) 21 mg/24 hr TD 24 hr patch 1 patch  1 patch Transdermal Daily General Julien MD   1 patch at 10/19/22 0827   • nystatin (MYCOSTATIN) powder   Topical BID General Julien MD   Given at 10/19/22 0825   • ondansetron (ZOFRAN) injection 4 mg  4 mg Intravenous Q6H PRN General Julien MD       • potassium chloride (K-DUR,KLOR-CON) CR tablet 20 mEq  20 mEq Oral BID General Julien MD   20 mEq at 10/19/22 0826   • pregabalin (LYRICA) capsule 100 mg  100 mg Oral TID General Julien MD   100 mg at 10/19/22 0826   • salmeterol (Serevent Diskus) 50 mcg/dose inhaler 1 puff  1 puff Inhalation Q12H [address] Khushbu Bryant MD   1 puff at 10/19/22 0825   • sodium chloride 0 9 % inhalation solution 3 mL  3 mL Nebulization BID General Julien MD   3 mL at 10/19/22 0754   • tamsulosin (FLOMAX) capsule 0 4 mg  0 4 mg Oral Daily With Khadijah Amaro MD   0 4 mg at 10/18/22 1710   • thiamine tablet 100 mg  100 mg Oral Daily General Julien MD   100 mg at 10/19/22 0826   • torsemide (DEMADEX) tablet 20 mg  20 mg Oral Daily General Julien MD   20 mg at 10/19/22 1706   • umeclidinium bromide (INCRUSE ELLIPTA) 62 5 mcg/inh inhaler AEPB 1 puff  1 puff Inhalation Daily General Julien MD   1 puff at 10/19/22 0825     Allergies   Allergen Reactions   • Seasonal Ic  [Cholestatin]        Objective   Vitals: Blood pressure 95/52, pulse 83, temperature 98 2 °F (36 8 °C), resp  rate 16, height 5' 8" (1 727 m), weight 80 7 kg (178 lb), SpO2 92 %      Intake/Output Summary (Last 24 hours) at 10/19/2022 1248  Last data filed at 10/19/2022 1000  Gross per 24 hour   Intake 210 ml   Output 2425 ml   Net -2215 ml     Invasive Devices  Report    Peripheral Intravenous Line  Duration           Peripheral IV 10/18/22 Distal;Right;Ventral (anterior) Forearm 1 day                Physical Exam   GEN:  Awake and alert, not in acute distress   HEENT: MM moist  Icteric++  CV: RRR, nml S1/S2  Lungs: CTA bilaterally  Abd: Soft, NT, ND  Skin: Chronic skin changes in bilateral lower extremities and elbows  Psych: Normal mood and affect      The history was obtained from the review of the chart, patient  Lab Results:   Results from last 7 days   Lab Units 10/16/22  1755   HEMOGLOBIN A1C % 10 1*     Lab Results   Component Value Date    WBC 10 03 10/19/2022    HGB 12 8 10/19/2022    HCT 34 7 (L) 10/19/2022     (H) 10/19/2022     10/19/2022     Lab Results   Component Value Date/Time    BUN 22 10/19/2022 03:45 AM    K 3 6 10/19/2022 03:45 AM    CL 97 10/19/2022 03:45 AM    CO2 23 10/19/2022 03:45 AM    CO2 29 03/18/2020 11:31 PM    CREATININE 1 28 10/19/2022 03:45 AM     (H) 10/19/2022 03:45 AM     (H) 10/19/2022 03:45 AM    ALB 2 2 (L) 10/19/2022 03:45 AM     No results for input(s): CHOL, HDL, LDL, TRIG, VLDL in the last 72 hours  No results found for: Chestine Macadamia  POC Glucose (mg/dl)   Date Value   10/19/2022 266 (H)   10/19/2022 414 (H)   10/18/2022 103   10/18/2022 62 (L)   10/18/2022 135   10/18/2022 388 (H)   10/18/2022 273 (H)   10/17/2022 126   10/17/2022 139   10/17/2022 205 (H)       Imaging Studies: I have personally reviewed pertinent reports  Portions of the record may have been created with voice recognition software

## 2022-10-19 NOTE — ASSESSMENT & PLAN NOTE
Lab Results   Component Value Date    EGFR 58 10/19/2022    EGFR 74 10/18/2022    EGFR 81 10/17/2022    CREATININE 1 28 10/19/2022    CREATININE 1 05 10/18/2022    CREATININE 0 98 10/17/2022   · Renal function overall stable  · Continue to monitor as needed

## 2022-10-19 NOTE — ASSESSMENT & PLAN NOTE
· Last drink reportedly 10/11  No clinical concerns for withdrawal, now off CIWA protocol  · Continue thiamine and folate with multivitamins daily

## 2022-10-19 NOTE — ASSESSMENT & PLAN NOTE
Wt Readings from Last 3 Encounters:   10/17/22 80 7 kg (178 lb)   04/27/22 80 7 kg (178 lb)   07/23/21 83 kg (183 lb)     · Continue torsemide  Not on BB at baseline  · Last echo showed EF 60% with flail mitral valve leaflet with severe MR, referred to CTS for mitraclip however does not appear he followed up  Last seen in 2020 by Dr Louisa Horvath     · I/O, daily weights  · Monitor volume status

## 2022-10-19 NOTE — PROGRESS NOTES
[facility]  Progress Note - Sheron Fang 1960, 57 y o  male MRN: [id number]  Unit/Bed#: [facility] 810-01 Encounter: [id number]  Primary Care Provider: Maria Isabel Hooper MD   Date and time admitted to hospital: 10/16/2022  4:28 PM    * Jaundice, obstructive, intrahepatic  Assessment & Plan  Patient presents with jaundice, no other symptoms  Noted 10/13, had just stopped drinking ETOH 10/11  · CT A/P showed pancreas is atrophic and there's mild prominence of main pancreatic duct 5mm 2/2 chronic pancreatitis sequelae  · Acute hepatitis panel negative  · GI following, MRCP showed Obstruction of the extrahepatic bile duct and pancreatic duct in the region of the pancreatic neck  Differential diagnosis includes obstruction from fibrosis of chronic pancreatitis versus mass however no obvious mass or lesion identified  · On abx for possible cholangitis   · Plan for ERCP/EUS, Friday versus Monday  Holding Plavix in preparation for this  · Trend CMP, bilirubin continues to worsen    Chronic combined systolic and diastolic congestive heart failure [facility])  Assessment & Plan  Wt Readings from Last 3 Encounters:   10/17/22 80 7 kg (178 lb)   04/27/22 80 7 kg (178 lb)   07/23/21 83 kg (183 lb)     · Continue torsemide  Not on BB at baseline  · Last echo showed EF 60% with flail mitral valve leaflet with severe MR, referred to CTS for mitraclip however does not appear he followed up  Last seen in 2020 by Dr Kit Graham     · I/O, daily weights  · Monitor volume status         Stage 3a chronic kidney disease [facility])  Assessment & Plan  Lab Results   Component Value Date    EGFR 58 10/19/2022    EGFR 74 10/18/2022    EGFR 81 10/17/2022    CREATININE 1 28 10/19/2022    CREATININE 1 05 10/18/2022    CREATININE 0 98 10/17/2022   · Renal function overall stable  · Continue to monitor as needed    Severe mitral regurgitation  Assessment & Plan  · Was told to see CTS for consideration of mitraclip in 2019 however did not follow up    [address]  · Within normal range when corrected  · Monitor     Essential hypertension  Assessment & Plan  · BP acceptable  · Monitor     Diabetes mellitus type 2 - Diabetic neuropathy  Assessment & Plan  Lab Results   Component Value Date    HGBA1C 10 1 (H) 10/16/2022       Poorly controlled  · Continue home regimen, Lantus 30 units QHS and Humalog 24 units TID with meals + SSI  · BG has been labile, will consult endocrinology  · Diabetic diet  · Monitor accuchecks and adjust regimen as needed       Ambulatory dysfunction  Assessment & Plan  · Patient walks with a walker, has history of spinal cord injury  · PT/OT recommending rehab, case management is following  Patient is refusing however wife is on board with rehab  Encouraged wife to speak with patient  Peripheral arterial disease (HCC)  Assessment & Plan  · On plavix and statin at baseline, plavix on hold for EUS/ERCP  Alcohol abuse  Assessment & Plan  · Last drink reportedly 10/11  No clinical concerns for withdrawal, now off CIWA protocol  · Continue thiamine and folate with multivitamins daily  Tobacco abuse  Assessment & Plan  · Nicotine patch    History of cervical spinal cord injury  Assessment & Plan  In setting cervical spinal cord injury  Walks with walker  · PT/OT recommending rehab      VTE Pharmacologic Prophylaxis:   Moderate Risk (Score 3-4) - Pharmacological DVT Prophylaxis Ordered: enoxaparin (Lovenox)  Patient Centered Rounds: I performed bedside rounds with nursing staff today  Discussions with Specialists or Other Care Team Provider: nursing, cm  (preston nicholas)    Education and Discussions with Family / Patient: Updated  (wife) via phone  Time Spent for Care: 30 minutes  More than 50% of total time spent on counseling and coordination of care as described above      Current Length of Stay: 3 day(s)  Current Patient Status: Inpatient Certification Statement: The patient will continue to require additional inpatient hospital stay due to Pending ERCP, additional GI intervention  Discharge Plan: Anticipate discharge in >72 hrs to rehab facility  Code Status: Level 3 - DNAR and DNI    Subjective:   Patient offers no acute complaints  He is apprehensive about rehab, declining at this time  Feels as though he can manage at home  No fevers, chills  No abdominal pain  Objective:     Vitals:   Temp (24hrs), Av °F (36 7 °C), Min:97 7 °F (36 5 °C), Max:98 2 °F (36 8 °C)    Temp:  [97 7 °F (36 5 °C)-98 2 °F (36 8 °C)] 98 2 °F (36 8 °C)  HR:  [83-88] 83  Resp:  [16] 16  BP: ()/(52-71) 95/52  SpO2:  [91 %-96 %] 92 %  Body mass index is 27 06 kg/m²  Input and Output Summary (last 24 hours): Intake/Output Summary (Last 24 hours) at 10/19/2022 1234  Last data filed at 10/19/2022 1000  Gross per 24 hour   Intake 210 ml   Output 2425 ml   Net -2215 ml       Physical Exam:   Physical Exam  Vitals and nursing note reviewed  Constitutional:       Appearance: He is obese  HENT:      Mouth/Throat:      Comments: Poor dentition   Eyes:      General: Scleral icterus present  Cardiovascular:      Rate and Rhythm: Normal rate  Pulmonary:      Breath sounds: Normal breath sounds  Abdominal:      Tenderness: There is no abdominal tenderness  Musculoskeletal:         General: No swelling  Skin:     Coloration: Skin is jaundiced  Neurological:      Mental Status: He is alert and oriented to person, place, and time  Mental status is at baseline     Psychiatric:         Mood and Affect: Mood normal           Additional Data:     Labs:  Results from last 7 days   Lab Units 10/19/22  0345   WBC Thousand/uL 10 03   HEMOGLOBIN g/dL 12 8   HEMATOCRIT % 34 7*   PLATELETS Thousands/uL 174   NEUTROS PCT % 80*   LYMPHS PCT % 9*   MONOS PCT % 10   EOS PCT % 0     Results from last 7 days   Lab Units 10/19/22  0345   SODIUM mmol/L 131* POTASSIUM mmol/L 3 6   CHLORIDE mmol/L 97   CO2 mmol/L 23   BUN mg/dL 22   CREATININE mg/dL 1 28   ANION GAP mmol/L 11   CALCIUM mg/dL 8 5   ALBUMIN g/dL 2 2*   TOTAL BILIRUBIN mg/dL 15 71*   ALK PHOS U/L 609*   ALT U/L 214*   AST U/L 199*   GLUCOSE RANDOM mg/dL 447*     Results from last 7 days   Lab Units 10/17/22  0421   INR  0 97     Results from last 7 days   Lab Units 10/19/22  1130 10/19/22  0728 10/18/22  2101 10/18/22  2039 10/18/22  1623 10/18/22  1103 10/18/22  0753 10/17/22  2047 10/17/22  1621 10/17/22  1145 10/17/22  0805 10/17/22  0015   POC GLUCOSE mg/dl 266* 414* 103 62* 135 388* 273* 126 139 205* 248* 262*     Results from last 7 days   Lab Units 10/16/22  1755   HEMOGLOBIN A1C % 10 1*           Lines/Drains:  Invasive Devices  Report    Peripheral Intravenous Line  Duration           Peripheral IV 10/18/22 Distal;Right;Ventral (anterior) Forearm 1 day                      Imaging: No pertinent imaging reviewed      Recent Cultures (last 7 days):         Last 24 Hours Medication List:   Current Facility-Administered Medications   Medication Dose Route Frequency Provider Last Rate   • albuterol  2 5 mg Nebulization Q4H PRN Yomaira Odom MD     • aluminum-magnesium hydroxide-simethicone  30 mL Oral Q6H PRN Yomaira Odom MD     • baclofen  20 mg Oral TID Yomaira Odom MD     • calcium carbonate-vitamin D  1 tablet Oral Daily With Breakfast Yomaira Odom MD     • cefTRIAXone  1,000 mg Intravenous Q24H Ramon Gonzalez MD 1,000 mg (10/19/22 1135)   • citalopram  20 mg Oral Daily Yomaira Odom MD     • docusate sodium  100 mg Oral BID PRN Yomiara Odom MD     • enoxaparin  40 mg Subcutaneous Daily Yomaira Odom MD     • ergocalciferol  50,000 Units Oral Weekly Yomaira Odom MD     • folic acid  1 mg Oral Daily Yomaira Odom MD     • insulin glargine  30 Units Subcutaneous QAM Yomaira Starring, MD     • insulin lispro  1-5 Units Subcutaneous HS Faye López MD     • insulin lispro  1-6 Units Subcutaneous TID AC Faye López MD     • insulin lispro  24 Units Subcutaneous TID With Meals Faye López MD     • levalbuterol  1 25 mg Nebulization BID Faye López MD     • melatonin  3 mg Oral HS Fausto Hannon PA-C     • metroNIDAZOLE  500 mg Oral Q8H Porsha Bell MD     • ELMO ANTIFUNGAL  1 application Topical TID Faye López MD     • multivitamin stress formula  1 tablet Oral Daily Faye López MD     • nicotine  1 patch Transdermal Daily Faye López MD     • nystatin   Topical BID Faye López MD     • ondansetron  4 mg Intravenous Q6H PRN Faye López MD     • potassium chloride  20 mEq Oral BID Faye López MD     • pregabalin  100 mg Oral TID Faye López MD     • salmeterol  1 puff Inhalation Q12H [address] Hortensia Sotomayor MD     • sodium chloride  3 mL Nebulization BID Faye López MD     • tamsulosin  0 4 mg Oral Daily With Bernie Hernandez MD     • thiamine  100 mg Oral Daily Faye López MD     • torsemide  20 mg Oral Daily Faye López MD     • umeclidinium bromide  1 puff Inhalation Daily Faye López MD          Today, Patient Was Seen By: Mir Vargas    **Please Note: This note may have been constructed using a voice recognition system  **

## 2022-10-19 NOTE — PROGRESS NOTES
Progress Note- Craige Coffee 57 y o  male MRN: [id number]    Unit/Bed#: [facility] 810-01 Encounter: [id number]    Assessment and Plan:    Patient is a 61M with PMH of cholecystectomy 2/2 gallstone pancreatitis in 2017, alcohol use, PAD on plavix, CKD3a, HTN, DM2, combined diastolic-systolic CHF, severe MR who presents with painless jaundice and ongoing total bilirubin elevation to 15  7       1  Obstructive jaundice (w/ CBD and pancreatic duct obstruction)  Patient presents with painless jaundice without weight loss, fever with T bili elevated to 11 7  Patient has elevated , ,   R factor <2  INR is normal 0 97, Plt 171, and albumin slightly decreased 2 8 -- does not appear to have significantly decreased synthetic liver function  Maddrey discriminant function of 16 2  MELD score of 23      CT Abdomen 10/16: Surgically absent gallbladder, hepatic steatosis, with atrophic pancreas and mild prominence of main pancreatic duct 5 mm 2/2 chronic pancreatitis sequelae    MRI/MRCP 10/16: Obstruction of extrahepatic bile duct and pancreatic duct at pancreatic neck  Fibrosis of chronic pancreatitis vs mass, but no obvious mass seen  MRI with probable cirrhosis and fatty liver      Patient's acute hepatitis panel was negative     Appears to be extrahepatic cholestatic source with obstruction at the confluence of bile and pancreatic duct  Etiology includes cholestatic disease such as biliary stone, stricture, pancreatic/biliary neoplastic mass  There may also be underlying alcoholic hepatitis in setting of MRI with fatty liver with probable cirrhosis      · Worsening Tbili from 11 7 to 13 6 to 15 7; ALP stable 635 and AST and ALT improving  · Placed on antibiotics CTX and flagyl for leukocytosis WBC 10, potential for cholangitis  · EUS potentially on Friday, if not on Monday  · Hold plavix for 5 days for potential procedure;  Now day 4/5 (last plavix on Saturday 10/15) -- potential EUS on Fri  · No indication for prednisolone at this time, jonah discriminant factor of 16 2  · Trend CMP, INR     2  History of gallstone pancreatitis s/p cholecystectomy   Gallstone pancreatitis and cholecystectomy in 2017 without recurrence in pancreatitis  On CT of abdomen 10/16, pancreas is atrophic and there's mild prominence of main pancreatic duct 5mm 2/2 chronic pancreatitis sequelae  T bili, ALP, AST, ALT all elevated  Possible obstructive gallstone vs pancreatic cyst/duct compression into bile duct in setting of prior surgery  See above  · Follow up on MRCP results  · Continue to trend CMP     3  Peripheral artery disease  Patient has a history of PAD on statin and plavix  No history of stents  · Hold plavix for 5 days, for potential procedure      ______________________________________________________________________    Subjective:     No abdominal pain this morning  He endorses 2 paste-like brown bowel movements this morning  Patient does endorse some tremors  Denies nausea, vomiting, agitation, fever, chills         Medication Administration - last 24 hours from 10/18/2022 0853 to 10/19/2022 4273       Date/Time Order Dose Route Action Action by     10/19/2022 0825 nystatin (MYCOSTATIN) powder   Topical Given Jovan Buchanan RN     10/18/2022 1710 nystatin (MYCOSTATIN) powder   Topical Given Bharathi Prasad RN     10/19/2022 0405 baclofen tablet 20 mg 20 mg Oral Given Jovan Buchanan RN     10/18/2022 2056 baclofen tablet 20 mg 20 mg Oral Given Bharathi Prasad RN     10/18/2022 1710 baclofen tablet 20 mg 20 mg Oral Given Bharathi Prasad RN     10/19/2022 0826 calcium carbonate-vitamin D (OSCAL-D) 500 mg-200 units per tablet 1 tablet 1 tablet Oral Given Jovan Buchanan RN     10/19/2022 0826 citalopram (CeleXA) tablet 20 mg 20 mg Oral Given Jovan Buchanan RN     10/19/2022 0810 insulin lispro (HumaLOG) 100 units/mL subcutaneous injection 24 Units 24 Units Subcutaneous Given Jovan Buchanan RN     10/18/2022 1298 insulin lispro (HumaLOG) 100 units/mL subcutaneous injection 24 Units 24 Units Subcutaneous Given [facility], RN     10/18/2022 1134 insulin lispro (HumaLOG) 100 units/mL subcutaneous injection 24 Units 24 Units Subcutaneous Given [facility], RN     10/19/2022 2987 multivitamin stress formula tablet 1 tablet 1 tablet Oral Given [facility], RN     10/19/2022 0826 potassium chloride (K-DUR,KLOR-CON) CR tablet 20 mEq 20 mEq Oral Given [facility], RN     10/18/2022 1710 potassium chloride (K-DUR,KLOR-CON) CR tablet 20 mEq 20 mEq Oral Given [facility], RN     10/19/2022 0826 pregabalin (LYRICA) capsule 100 mg 100 mg Oral Given [facility], RN     10/18/2022 2056 pregabalin (LYRICA) capsule 100 mg 100 mg Oral Given [facility], RN     10/18/2022 1710 pregabalin (LYRICA) capsule 100 mg 100 mg Oral Given [facility], RN     10/19/2022 0810 insulin glargine (LANTUS) subcutaneous injection 30 Units 0 3 mL 30 Units Subcutaneous Given [facility], RN     10/18/2022 1710 tamsulosin (FLOMAX) capsule 0 4 mg 0 4 mg Oral Given [facility], RN     10/19/2022 0825 salmeterol (Serevent Diskus) 50 mcg/dose inhaler 1 puff 1 puff Inhalation Given [facility], RN     10/18/2022 2056 salmeterol (Serevent Diskus) 50 mcg/dose inhaler 1 puff 1 puff Inhalation Given [facility], RN     10/19/2022 0826 torsemide (DEMADEX) tablet 20 mg 20 mg Oral Given [facility], RN     10/19/2022 0826 enoxaparin (LOVENOX) subcutaneous injection 40 mg 40 mg Subcutaneous Given [facility], RN     10/19/2022 0809 insulin lispro (HumaLOG) 100 units/mL subcutaneous injection 1-6 Units 6 Units Subcutaneous Given [facility], RN     10/18/2022 1634 insulin lispro (HumaLOG) 100 units/mL subcutaneous injection 1-6 Units 1 Units Subcutaneous Not Given Kaity Zarate RN     10/18/2022 1134 insulin lispro (HumaLOG) 100 units/mL subcutaneous injection 1-6 Units 6 Units Subcutaneous Given Kaity Zarate RN     10/18/2022 2100 insulin lispro (HumaLOG) 100 units/mL subcutaneous injection 1-5 Units 1 Units Subcutaneous Not Given María Chaparro, FRANKLIN     10/19/2022 5272 thiamine tablet 100 mg 100 mg Oral Given Chelsi Sharpkari, RN     52/88/9410 1095 folic acid (FOLVITE) tablet 1 mg 1 mg Oral Given Hcelsi Heidi, RN     10/19/2022 0827 nicotine (NICODERM CQ) 21 mg/24 hr TD 24 hr patch 1 patch 1 patch Transdermal Medication Applied Chelsi Sharpakri, RN     10/19/2022 0825 nicotine (NICODERM CQ) 21 mg/24 hr TD 24 hr patch 1 patch 1 patch Transdermal Patch Removed Chelsi Gordon, RN     10/19/2022 0825 umeclidinium bromide (INCRUSE ELLIPTA) 62 5 mcg/inh inhaler AEPB 1 puff 1 puff Inhalation Given Chelsievita Gordon, RN     10/19/2022 0754 levalbuterol (XOPENEX) inhalation solution 1 25 mg 1 25 mg Nebulization Given Monika Uribe, RT     10/18/2022 1921 levalbuterol (XOPENEX) inhalation solution 1 25 mg 1 25 mg Nebulization Given Nery Bernstein [facility], RT     10/19/2022 0754 sodium chloride 0 9 % inhalation solution 3 mL 3 mL Nebulization Given Monika Uribe, RT     10/18/2022 1920 sodium chloride 0 9 % inhalation solution 3 mL 3 mL Nebulization Given Sarahi Colunga, RT     10/18/2022 2056 moisture barrier miconazole 2% cream (aka ELMO MOISTURE BARRIER ANTIFUNGAL CREAM) 1 application Topical Given María Chaparro RN     10/18/2022 1711 moisture barrier miconazole 2% cream (aka ELMO MOISTURE BARRIER ANTIFUNGAL CREAM) 1 application Topical Given María Chaparro RN     10/18/2022 2100 melatonin tablet 3 mg 3 mg Oral Given María Chaparro RN     10/18/2022 1048 cefTRIAXone (ROCEPHIN) 1,000 mg in dextrose 5 % 50 mL IVPB 1,000 mg Intravenous [address] María Chaparro RN     10/19/2022 0516 metroNIDAZOLE (FLAGYL) tablet 500 mg 500 mg Oral Given Wetzel Ormond, RN     10/18/2022 2100 metroNIDAZOLE (FLAGYL) tablet 500 mg 500 mg Oral Given María Chaparro RN     10/18/2022 1332 metroNIDAZOLE (FLAGYL) tablet 500 mg 500 mg Oral Given María Chaparro RN     10/18/2022 2104 metroNIDAZOLE (FLAGYL) tablet 500 mg 500 mg Oral Given Adria Barreto RN          Objective:     Vitals: Blood pressure 95/52, pulse 83, temperature 98 2 °F (36 8 °C), resp  rate 16, height 5' 8" (1 727 m), weight 80 7 kg (178 lb), SpO2 92 %  ,Body mass index is 27 06 kg/m²  Intake/Output Summary (Last 24 hours) at 10/19/2022 0853  Last data filed at 10/19/2022 0385  Gross per 24 hour   Intake --   Output 2525 ml   Net -2525 ml       Physical Exam:   General Appearance: Awake and alert, in no acute distress; diffuse jaundice  Abdomen: Soft, non-tender, non-distended; bowel sounds normal; no masses or no organomegaly    Invasive Devices  Report    Peripheral Intravenous Line  Duration           Peripheral IV 10/18/22 Distal;Right;Ventral (anterior) Forearm 1 day                Lab Results:  No results displayed because visit has over 200 results  Imaging Studies: I have personally reviewed pertinent imaging studies

## 2022-10-19 NOTE — ASSESSMENT & PLAN NOTE
· Patient walks with a walker, has history of spinal cord injury  · PT/OT recommending rehab, case management is following  Patient is refusing however wife is on board with rehab  Encouraged wife to speak with patient

## 2022-10-19 NOTE — ASSESSMENT & PLAN NOTE
Patient presents with jaundice, no other symptoms  Noted 10/13, had just stopped drinking ETOH 10/11  · CT A/P showed pancreas is atrophic and there's mild prominence of main pancreatic duct 5mm 2/2 chronic pancreatitis sequelae  · Acute hepatitis panel negative  · GI following, MRCP showed Obstruction of the extrahepatic bile duct and pancreatic duct in the region of the pancreatic neck  Differential diagnosis includes obstruction from fibrosis of chronic pancreatitis versus mass however no obvious mass or lesion identified  · On abx for possible cholangitis   · Plan for ERCP/EUS, Friday versus Monday  Holding Plavix in preparation for this    · Trend CMP, bilirubin continues to worsen

## 2022-10-19 NOTE — ASSESSMENT & PLAN NOTE
Lab Results   Component Value Date    HGBA1C 10 1 (H) 10/16/2022       Poorly controlled  · Continue home regimen, Lantus 30 units QHS and Humalog 24 units TID with meals + SSI  · BG has been labile, will consult endocrinology     · Diabetic diet  · Monitor accuchecks and adjust regimen as needed

## 2022-10-20 LAB
ALBUMIN SERPL BCP-MCNC: 2.2 G/DL (ref 3.5–5)
ALP SERPL-CCNC: 559 U/L (ref 46–116)
ALT SERPL W P-5'-P-CCNC: 181 U/L (ref 12–78)
ANION GAP SERPL CALCULATED.3IONS-SCNC: 8 MMOL/L (ref 4–13)
AST SERPL W P-5'-P-CCNC: 157 U/L (ref 5–45)
BASOPHILS # BLD AUTO: 0.06 THOUSANDS/ÂΜL (ref 0–0.1)
BASOPHILS NFR BLD AUTO: 1 % (ref 0–1)
BILIRUB SERPL-MCNC: 20.35 MG/DL (ref 0.2–1)
BUN SERPL-MCNC: 22 MG/DL (ref 5–25)
CALCIUM ALBUM COR SERPL-MCNC: 10.2 MG/DL (ref 8.3–10.1)
CALCIUM SERPL-MCNC: 8.8 MG/DL (ref 8.3–10.1)
CHLORIDE SERPL-SCNC: 98 MMOL/L (ref 96–108)
CO2 SERPL-SCNC: 26 MMOL/L (ref 21–32)
CREAT SERPL-MCNC: 1.06 MG/DL (ref 0.6–1.3)
EOSINOPHIL # BLD AUTO: 0.07 THOUSAND/ÂΜL (ref 0–0.61)
EOSINOPHIL NFR BLD AUTO: 1 % (ref 0–6)
ERYTHROCYTE [DISTWIDTH] IN BLOOD BY AUTOMATED COUNT: 16 % (ref 11.6–15.1)
GFR SERPL CREATININE-BSD FRML MDRD: 73 ML/MIN/1.73SQ M
GLUCOSE SERPL-MCNC: 222 MG/DL (ref 65–140)
GLUCOSE SERPL-MCNC: 243 MG/DL (ref 65–140)
GLUCOSE SERPL-MCNC: 247 MG/DL (ref 65–140)
GLUCOSE SERPL-MCNC: 267 MG/DL (ref 65–140)
GLUCOSE SERPL-MCNC: 72 MG/DL (ref 65–140)
HCT VFR BLD AUTO: 35.9 % (ref 36.5–49.3)
HGB BLD-MCNC: 13 G/DL (ref 12–17)
IMM GRANULOCYTES # BLD AUTO: 0.09 THOUSAND/UL (ref 0–0.2)
IMM GRANULOCYTES NFR BLD AUTO: 1 % (ref 0–2)
LYMPHOCYTES # BLD AUTO: 0.76 THOUSANDS/ÂΜL (ref 0.6–4.47)
LYMPHOCYTES NFR BLD AUTO: 7 % (ref 14–44)
MCH RBC QN AUTO: 36.9 PG (ref 26.8–34.3)
MCHC RBC AUTO-ENTMCNC: 36.2 G/DL (ref 31.4–37.4)
MCV RBC AUTO: 102 FL (ref 82–98)
MONOCYTES # BLD AUTO: 1.35 THOUSAND/ÂΜL (ref 0.17–1.22)
MONOCYTES NFR BLD AUTO: 12 % (ref 4–12)
NEUTROPHILS # BLD AUTO: 8.78 THOUSANDS/ÂΜL (ref 1.85–7.62)
NEUTS SEG NFR BLD AUTO: 78 % (ref 43–75)
NRBC BLD AUTO-RTO: 0 /100 WBCS
PLATELET # BLD AUTO: 181 THOUSANDS/UL (ref 149–390)
PMV BLD AUTO: 13.3 FL (ref 8.9–12.7)
POTASSIUM SERPL-SCNC: 3.2 MMOL/L (ref 3.5–5.3)
PROT SERPL-MCNC: 6.2 G/DL (ref 6.4–8.4)
RBC # BLD AUTO: 3.52 MILLION/UL (ref 3.88–5.62)
SODIUM SERPL-SCNC: 132 MMOL/L (ref 135–147)
WBC # BLD AUTO: 11.11 THOUSAND/UL (ref 4.31–10.16)

## 2022-10-20 PROCEDURE — 82948 REAGENT STRIP/BLOOD GLUCOSE: CPT

## 2022-10-20 PROCEDURE — 99232 SBSQ HOSP IP/OBS MODERATE 35: CPT | Performed by: STUDENT IN AN ORGANIZED HEALTH CARE EDUCATION/TRAINING PROGRAM

## 2022-10-20 PROCEDURE — 94760 N-INVAS EAR/PLS OXIMETRY 1: CPT

## 2022-10-20 PROCEDURE — 85025 COMPLETE CBC W/AUTO DIFF WBC: CPT | Performed by: NURSE PRACTITIONER

## 2022-10-20 PROCEDURE — 94640 AIRWAY INHALATION TREATMENT: CPT

## 2022-10-20 PROCEDURE — 80053 COMPREHEN METABOLIC PANEL: CPT | Performed by: NURSE PRACTITIONER

## 2022-10-20 PROCEDURE — 94640 AIRWAY INHALATION TREATMENT: CPT | Performed by: SOCIAL WORKER

## 2022-10-20 RX ADMIN — MICONAZOLE NITRATE 1 APPLICATION: 20 CREAM TOPICAL at 08:39

## 2022-10-20 RX ADMIN — LEVALBUTEROL HYDROCHLORIDE 1.25 MG: 1.25 SOLUTION, CONCENTRATE RESPIRATORY (INHALATION) at 19:16

## 2022-10-20 RX ADMIN — ISODIUM CHLORIDE 3 ML: 0.03 SOLUTION RESPIRATORY (INHALATION) at 07:16

## 2022-10-20 RX ADMIN — BACLOFEN 20 MG: 20 TABLET ORAL at 20:02

## 2022-10-20 RX ADMIN — Medication 3 MG: at 21:23

## 2022-10-20 RX ADMIN — SALMETEROL XINAFOATE 1 PUFF: 50 POWDER, METERED ORAL; RESPIRATORY (INHALATION) at 08:09

## 2022-10-20 RX ADMIN — NYSTATIN: 100000 POWDER TOPICAL at 08:39

## 2022-10-20 RX ADMIN — B-COMPLEX W/ C & FOLIC ACID TAB 1 TABLET: TAB at 08:10

## 2022-10-20 RX ADMIN — TORSEMIDE 20 MG: 20 TABLET ORAL at 08:10

## 2022-10-20 RX ADMIN — TAMSULOSIN HYDROCHLORIDE 0.4 MG: 0.4 CAPSULE ORAL at 17:22

## 2022-10-20 RX ADMIN — INSULIN LISPRO 3 UNITS: 100 INJECTION, SOLUTION INTRAVENOUS; SUBCUTANEOUS at 08:10

## 2022-10-20 RX ADMIN — METRONIDAZOLE 500 MG: 500 TABLET ORAL at 05:27

## 2022-10-20 RX ADMIN — PREGABALIN 100 MG: 100 CAPSULE ORAL at 17:23

## 2022-10-20 RX ADMIN — INSULIN LISPRO 24 UNITS: 100 INJECTION, SOLUTION INTRAVENOUS; SUBCUTANEOUS at 08:11

## 2022-10-20 RX ADMIN — FOLIC ACID 1 MG: 1 TABLET ORAL at 08:10

## 2022-10-20 RX ADMIN — BACLOFEN 20 MG: 20 TABLET ORAL at 17:22

## 2022-10-20 RX ADMIN — NICOTINE 1 PATCH: 21 PATCH, EXTENDED RELEASE TRANSDERMAL at 08:11

## 2022-10-20 RX ADMIN — NYSTATIN: 100000 POWDER TOPICAL at 17:25

## 2022-10-20 RX ADMIN — ISODIUM CHLORIDE 3 ML: 0.03 SOLUTION RESPIRATORY (INHALATION) at 19:16

## 2022-10-20 RX ADMIN — POTASSIUM CHLORIDE 20 MEQ: 1500 TABLET, EXTENDED RELEASE ORAL at 17:22

## 2022-10-20 RX ADMIN — INSULIN GLARGINE 30 UNITS: 100 INJECTION, SOLUTION SUBCUTANEOUS at 08:28

## 2022-10-20 RX ADMIN — MICONAZOLE NITRATE 1 APPLICATION: 20 CREAM TOPICAL at 17:26

## 2022-10-20 RX ADMIN — CITALOPRAM HYDROBROMIDE 20 MG: 20 TABLET ORAL at 08:10

## 2022-10-20 RX ADMIN — POTASSIUM CHLORIDE 20 MEQ: 1500 TABLET, EXTENDED RELEASE ORAL at 08:10

## 2022-10-20 RX ADMIN — UMECLIDINIUM 1 PUFF: 62.5 AEROSOL, POWDER ORAL at 08:09

## 2022-10-20 RX ADMIN — THIAMINE HCL TAB 100 MG 100 MG: 100 TAB at 08:10

## 2022-10-20 RX ADMIN — Medication 1 TABLET: at 08:10

## 2022-10-20 RX ADMIN — INSULIN LISPRO 3 UNITS: 100 INJECTION, SOLUTION INTRAVENOUS; SUBCUTANEOUS at 12:30

## 2022-10-20 RX ADMIN — INSULIN LISPRO 24 UNITS: 100 INJECTION, SOLUTION INTRAVENOUS; SUBCUTANEOUS at 12:30

## 2022-10-20 RX ADMIN — INSULIN LISPRO 1 UNITS: 100 INJECTION, SOLUTION INTRAVENOUS; SUBCUTANEOUS at 21:22

## 2022-10-20 RX ADMIN — METRONIDAZOLE 500 MG: 500 TABLET ORAL at 12:30

## 2022-10-20 RX ADMIN — PREGABALIN 100 MG: 100 CAPSULE ORAL at 08:10

## 2022-10-20 RX ADMIN — LEVALBUTEROL HYDROCHLORIDE 1.25 MG: 1.25 SOLUTION, CONCENTRATE RESPIRATORY (INHALATION) at 07:16

## 2022-10-20 RX ADMIN — PREGABALIN 100 MG: 100 CAPSULE ORAL at 20:02

## 2022-10-20 RX ADMIN — ENOXAPARIN SODIUM 40 MG: 40 INJECTION SUBCUTANEOUS at 08:10

## 2022-10-20 RX ADMIN — BACLOFEN 20 MG: 20 TABLET ORAL at 08:10

## 2022-10-20 RX ADMIN — CEFTRIAXONE 1000 MG: 1 INJECTION, POWDER, FOR SOLUTION INTRAMUSCULAR; INTRAVENOUS at 08:28

## 2022-10-20 RX ADMIN — METRONIDAZOLE 500 MG: 500 TABLET ORAL at 21:23

## 2022-10-20 RX ADMIN — SALMETEROL XINAFOATE 1 PUFF: 50 POWDER, METERED ORAL; RESPIRATORY (INHALATION) at 20:02

## 2022-10-20 RX ADMIN — MICONAZOLE NITRATE 1 APPLICATION: 20 CREAM TOPICAL at 21:24

## 2022-10-20 NOTE — ASSESSMENT & PLAN NOTE
Lab Results   Component Value Date    EGFR 73 10/20/2022    EGFR 58 10/19/2022    EGFR 74 10/18/2022    CREATININE 1 06 10/20/2022    CREATININE 1 28 10/19/2022    CREATININE 1 05 10/18/2022   · Renal function overall stable  · Continue to monitor as needed

## 2022-10-20 NOTE — QUICK NOTE
Chart reviewed  Glucose trend noted  Being planned for GI procedure tomorrow, patient NPO at midnight  No further hypoglycemia episodes, recommend increasing his Humalog back to 24 units t i d  With meals and continue on Lantus at current dose of 30 units at bedtime

## 2022-10-20 NOTE — ASSESSMENT & PLAN NOTE
· Patient walks with a walker, has history of spinal cord injury  · PT/OT recommending rehab, case management is following  Patient is refusing however wife is on board with rehab

## 2022-10-20 NOTE — PROGRESS NOTES
[facility]  Progress Note - Judd Job 1960, 57 y o  male MRN: [id number]  Unit/Bed#: [facility] 810-01 Encounter: [id number]  Primary Care Provider: Batsheva Rdorigues MD   Date and time admitted to hospital: 10/16/2022  4:28 PM    Stage 3a chronic kidney disease [facility])  Assessment & Plan  Lab Results   Component Value Date    EGFR 73 10/20/2022    EGFR 58 10/19/2022    EGFR 74 10/18/2022    CREATININE 1 06 10/20/2022    CREATININE 1 28 10/19/2022    CREATININE 1 05 10/18/2022   · Renal function overall stable  · Continue to monitor as needed    History of cervical spinal cord injury  Assessment & Plan  In setting cervical spinal cord injury  Walks with walker  · PT/OT recommending rehab    Pseudohyponatremia  Assessment & Plan  · Within normal range when corrected  · Monitor     Chronic combined systolic and diastolic congestive heart failure [facility])  Assessment & Plan  Wt Readings from Last 3 Encounters:   10/17/22 80 7 kg (178 lb)   04/27/22 80 7 kg (178 lb)   07/23/21 83 kg (183 lb)     · Continue torsemide  Not on BB at baseline  · Last echo showed EF 60% with flail mitral valve leaflet with severe MR, referred to CTS for mitraclip however does not appear he followed up  Last seen in 2020 by Dr Chris Valerio  · I/O, daily weights  · Monitor volume status         Essential hypertension  Assessment & Plan  · BP on softer side  · Monitor     Peripheral arterial disease (HCC)  Assessment & Plan  · On plavix and statin at baseline, plavix on hold for EUS/ERCP  Alcohol abuse  Assessment & Plan  · Last drink reportedly 10/11  No clinical concerns for withdrawal, now off CIWA protocol  · Continue thiamine and folate with multivitamins daily      Diabetes mellitus type 2 - Diabetic neuropathy  Assessment & Plan  Lab Results   Component Value Date    HGBA1C 10 1 (H) 10/16/2022       Poorly controlled  · Blood sugar remained labile during hospitalization has endocrinology was consulted, input appreciated  · Recommend continuing Lantus 30units q h s , pre meal decreased from 24 units to 20 units t i d  Continue SSI  · Given pancreatitis, endo recs to stop Soliqua as it contains lixisenatide (a GLP1 receptor agonist)  · Diabetic diet  · Monitor accuchecks and adjust regimen as needed       Ambulatory dysfunction  Assessment & Plan  · Patient walks with a walker, has history of spinal cord injury  · PT/OT recommending rehab, case management is following  Patient is refusing however wife is on board with rehab  Tobacco abuse  Assessment & Plan  · Nicotine patch    * Jaundice, obstructive, intrahepatic  Assessment & Plan  Patient presents with jaundice, no other symptoms  Noted 10/13, had just stopped drinking ETOH 10/11  · CT A/P showed pancreas is atrophic and there's mild prominence of main pancreatic duct 5mm 2/2 chronic pancreatitis sequelae  · Acute hepatitis panel negative  · GI following, MRCP showed Obstruction of the extrahepatic bile duct and pancreatic duct in the region of the pancreatic neck  Differential diagnosis includes obstruction from fibrosis of chronic pancreatitis versus mass however no obvious mass or lesion identified  · On abx for possible cholangitis   · Plan for ERCP/EUS, Friday versus Monday  Holding Plavix in preparation for this  · Trend CMP, bilirubin continues to worsen          VTE Pharmacologic Prophylaxis:   Moderate Risk (Score 3-4) - Pharmacological DVT Prophylaxis Ordered: enoxaparin (Lovenox)  Patient Centered Rounds: I performed bedside rounds with nursing staff today  Discussions with Specialists or Other Care Team Provider: GI    Education and Discussions with Family / Patient: Updated  (wife) via phone  Time Spent for Care: 30 minutes  More than 50% of total time spent on counseling and coordination of care as described above      Current Length of Stay: 4 day(s)  Current Patient Status: Inpatient   Certification Statement: The patient will continue to require additional inpatient hospital stay due to Pending ERCP/EUS  Discharge Plan: Anticipate discharge in 48-72 hrs to rehab facility  Code Status: Level 3 - DNAR and DNI    Subjective:   Patient examined at bedside, reports diarrhea  Denies any changes in appetite  Objective:     Vitals:   Temp (24hrs), Av 6 °F (36 4 °C), Min:97 5 °F (36 4 °C), Max:97 7 °F (36 5 °C)    Temp:  [97 5 °F (36 4 °C)-97 7 °F (36 5 °C)] 97 5 °F (36 4 °C)  HR:  [73-87] 87  Resp:  [17-20] 20  BP: (92-97)/(52-61) 92/61  SpO2:  [96 %-98 %] 96 %  Body mass index is 27 06 kg/m²  Input and Output Summary (last 24 hours): Intake/Output Summary (Last 24 hours) at 10/20/2022 1144  Last data filed at 10/20/2022 0751  Gross per 24 hour   Intake 300 ml   Output 1075 ml   Net -775 ml       Physical Exam:   Physical Exam  Constitutional:       Appearance: Normal appearance  HENT:      Head: Normocephalic and atraumatic  Mouth/Throat:      Mouth: Mucous membranes are dry  Pharynx: Oropharynx is clear  Eyes:      General: Scleral icterus present  Extraocular Movements: Extraocular movements intact  Cardiovascular:      Rate and Rhythm: Normal rate and regular rhythm  Pulses: Normal pulses  Heart sounds: Murmur (Systolic and diastolic murmur heard at 2nd and 3rd intercostal spaces) heard  Pulmonary:      Effort: Pulmonary effort is normal    Abdominal:      General: Bowel sounds are normal       Palpations: Abdomen is soft  Musculoskeletal:         General: Normal range of motion  Cervical back: Normal range of motion and neck supple  Skin:     General: Skin is warm and dry  Coloration: Skin is jaundiced  Neurological:      General: No focal deficit present  Mental Status: He is alert and oriented to person, place, and time     Psychiatric:         Mood and Affect: Mood normal          Behavior: Behavior normal           Additional Data: Labs:  Results from last 7 days   Lab Units 10/20/22  0517   WBC Thousand/uL 11 11*   HEMOGLOBIN g/dL 13 0   HEMATOCRIT % 35 9*   PLATELETS Thousands/uL 181   NEUTROS PCT % 78*   LYMPHS PCT % 7*   MONOS PCT % 12   EOS PCT % 1     Results from last 7 days   Lab Units 10/20/22  0517   SODIUM mmol/L 132*   POTASSIUM mmol/L 3 2*   CHLORIDE mmol/L 98   CO2 mmol/L 26   BUN mg/dL 22   CREATININE mg/dL 1 06   ANION GAP mmol/L 8   CALCIUM mg/dL 8 8   ALBUMIN g/dL 2 2*   TOTAL BILIRUBIN mg/dL 20 35*   ALK PHOS U/L 559*   ALT U/L 181*   AST U/L 157*   GLUCOSE RANDOM mg/dL 243*     Results from last 7 days   Lab Units 10/19/22  1220   INR  0 99     Results from last 7 days   Lab Units 10/20/22  1119 10/20/22  0750 10/19/22  2210 10/19/22  2046 10/19/22  1612 10/19/22  1130 10/19/22  0728 10/18/22  2101 10/18/22  2039 10/18/22  1623 10/18/22  1103 10/18/22  0753   POC GLUCOSE mg/dl 247* 267* 270* 311* 72 266* 414* 103 62* 135 388* 273*     Results from last 7 days   Lab Units 10/16/22  1755   HEMOGLOBIN A1C % 10 1*           Lines/Drains:  Invasive Devices  Report    Peripheral Intravenous Line  Duration           Peripheral IV 10/18/22 Distal;Right;Ventral (anterior) Forearm 2 days                      Imaging: No pertinent imaging reviewed      Recent Cultures (last 7 days):         Last 24 Hours Medication List:   Current Facility-Administered Medications   Medication Dose Route Frequency Provider Last Rate   • albuterol  2 5 mg Nebulization Q4H PRN Maria D Sierra MD     • aluminum-magnesium hydroxide-simethicone  30 mL Oral Q6H PRN Maria D Sierra MD     • baclofen  20 mg Oral TID Maria D Sierra MD     • calcium carbonate-vitamin D  1 tablet Oral Daily With Breakfast Maria D Sierra MD     • cefTRIAXone  1,000 mg Intravenous Q24H Zay Greenberg MD 1,000 mg (10/20/22 2059)   • citalopram  20 mg Oral Daily Maria D Sierra MD     • docusate sodium  100 mg Oral BID PRN Maria D Sierra MD • enoxaparin  40 mg Subcutaneous Daily Saravanan Hurt MD     • ergocalciferol  50,000 Units Oral Weekly Saravanan Hurt MD     • folic acid  1 mg Oral Daily Saravanan Hurt MD     • insulin glargine  30 Units Subcutaneous QAM Saravanan Hurt MD     • insulin lispro  1-5 Units Subcutaneous HS Saravanan Hurt MD     • insulin lispro  1-6 Units Subcutaneous TID AC Saravanan Hurt MD     • insulin lispro  24 Units Subcutaneous TID With Meals Saravanan Hurt MD     • levalbuterol  1 25 mg Nebulization BID Saravanan Hurt MD     • melatonin  3 mg Oral HS Lila Henning PA-C     • metroNIDAZOLE  500 mg Oral Q8H Flash Walls MD     • ELMO ANTIFUNGAL  1 application Topical TID Saravanan Hurt MD     • multivitamin stress formula  1 tablet Oral Daily Saravanan Hurt MD     • nicotine  1 patch Transdermal Daily Saravanan Hurt MD     • nystatin   Topical BID Saravanan Hurt MD     • ondansetron  4 mg Intravenous Q6H PRN Saravanan Hurt MD     • potassium chloride  20 mEq Oral BID Saravanan Hurt MD     • pregabalin  100 mg Oral TID Saravanan Hurt MD     • salmeterol  1 puff Inhalation Q12H [facility] Saravanan Hurt MD     • sodium chloride  3 mL Nebulization BID Saravanan Hurt MD     • tamsulosin  0 4 mg Oral Daily With Myranda Torres MD     • thiamine  100 mg Oral Daily Saravanan Hurt MD     • torsemide  20 mg Oral Daily Saravanan Hurt MD     • umeclidinium bromide  1 puff Inhalation Daily Saravanan Hurt MD          Today, Patient Was Seen By: Estefani Ballesteros    **Please Note: This note may have been constructed using a voice recognition system  **

## 2022-10-20 NOTE — ASSESSMENT & PLAN NOTE
Lab Results   Component Value Date    HGBA1C 10 1 (H) 10/16/2022       Poorly controlled  · Blood sugar remained labile during hospitalization has endocrinology was consulted, input appreciated  · Recommend continuing Lantus 30units q h s , pre meal decreased from 24 units to 20 units t i d  Continue SSI    · Given pancreatitis, endo recs to stop Soliqua as it contains lixisenatide (a GLP1 receptor agonist)  · Diabetic diet  · Monitor accuchecks and adjust regimen as needed

## 2022-10-21 ENCOUNTER — APPOINTMENT (INPATIENT)
Dept: RADIOLOGY | Facility: HOSPITAL | Age: 62
DRG: 445 | End: 2022-10-21
Payer: COMMERCIAL

## 2022-10-21 ENCOUNTER — ANESTHESIA EVENT (INPATIENT)
Dept: GASTROENTEROLOGY | Facility: HOSPITAL | Age: 62
DRG: 445 | End: 2022-10-21
Payer: COMMERCIAL

## 2022-10-21 ENCOUNTER — APPOINTMENT (OUTPATIENT)
Dept: GASTROENTEROLOGY | Facility: HOSPITAL | Age: 62
DRG: 445 | End: 2022-10-21
Payer: COMMERCIAL

## 2022-10-21 ENCOUNTER — ANESTHESIA (INPATIENT)
Dept: GASTROENTEROLOGY | Facility: HOSPITAL | Age: 62
DRG: 445 | End: 2022-10-21
Payer: COMMERCIAL

## 2022-10-21 ENCOUNTER — APPOINTMENT (INPATIENT)
Dept: NON INVASIVE DIAGNOSTICS | Facility: HOSPITAL | Age: 62
DRG: 445 | End: 2022-10-21
Payer: COMMERCIAL

## 2022-10-21 PROBLEM — E87.1 HYPONATREMIA: Status: ACTIVE | Noted: 2019-02-07

## 2022-10-21 LAB
ALBUMIN SERPL BCP-MCNC: 2 G/DL (ref 3.5–5)
ALP SERPL-CCNC: 515 U/L (ref 46–116)
ALT SERPL W P-5'-P-CCNC: 150 U/L (ref 12–78)
ANION GAP SERPL CALCULATED.3IONS-SCNC: 9 MMOL/L (ref 4–13)
AORTIC ROOT: 4.2 CM
AORTIC VALVE MEAN VELOCITY: 9.2 M/S
APICAL FOUR CHAMBER EJECTION FRACTION: 69 %
ASCENDING AORTA: 3.6 CM
AST SERPL W P-5'-P-CCNC: 136 U/L (ref 5–45)
AV LVOT MEAN GRADIENT: 2 MMHG
AV LVOT PEAK GRADIENT: 5 MMHG
AV MEAN GRADIENT: 4 MMHG
AV PEAK GRADIENT: 7 MMHG
AV VELOCITY RATIO: 0.82
BILIRUB SERPL-MCNC: 22.68 MG/DL (ref 0.2–1)
BUN SERPL-MCNC: 25 MG/DL (ref 5–25)
CALCIUM ALBUM COR SERPL-MCNC: 10.4 MG/DL (ref 8.3–10.1)
CALCIUM SERPL-MCNC: 8.8 MG/DL (ref 8.3–10.1)
CANCER AG19-9 SERPL-ACNC: 1782 U/ML (ref 0–35)
CHLORIDE SERPL-SCNC: 100 MMOL/L (ref 96–108)
CO2 SERPL-SCNC: 24 MMOL/L (ref 21–32)
CREAT SERPL-MCNC: 1.09 MG/DL (ref 0.6–1.3)
DOP CALC AO PEAK VEL: 1.31 M/S
DOP CALC AO VTI: 21.47 CM
DOP CALC LVOT PEAK VEL VTI: 19.44 CM
DOP CALC LVOT PEAK VEL: 1.07 M/S
DOP CALC MV VTI: 48.7 CM
E WAVE DECELERATION TIME: 194 MS
ERYTHROCYTE [DISTWIDTH] IN BLOOD BY AUTOMATED COUNT: 15.9 % (ref 11.6–15.1)
FRACTIONAL SHORTENING: 33 (ref 28–44)
GFR SERPL CREATININE-BSD FRML MDRD: 71 ML/MIN/1.73SQ M
GLUCOSE SERPL-MCNC: 134 MG/DL (ref 65–140)
GLUCOSE SERPL-MCNC: 160 MG/DL (ref 65–140)
GLUCOSE SERPL-MCNC: 218 MG/DL (ref 65–140)
GLUCOSE SERPL-MCNC: 242 MG/DL (ref 65–140)
GLUCOSE SERPL-MCNC: 266 MG/DL (ref 65–140)
GLUCOSE SERPL-MCNC: 278 MG/DL (ref 65–140)
HCT VFR BLD AUTO: 35.4 % (ref 36.5–49.3)
HGB BLD-MCNC: 12.4 G/DL (ref 12–17)
INR PPP: 1.05 (ref 0.84–1.19)
INTERVENTRICULAR SEPTUM IN DIASTOLE (PARASTERNAL SHORT AXIS VIEW): 0.9 CM
INTERVENTRICULAR SEPTUM: 0.9 CM (ref 0.6–1.1)
LAAS-AP2: 28.9 CM2
LAAS-AP4: 21.2 CM2
LEFT ATRIUM SIZE: 4.6 CM
LEFT INTERNAL DIMENSION IN SYSTOLE: 3.8 CM (ref 2.1–4)
LEFT VENTRICULAR INTERNAL DIMENSION IN DIASTOLE: 5.7 CM (ref 3.5–6)
LEFT VENTRICULAR POSTERIOR WALL IN END DIASTOLE: 0.9 CM
LEFT VENTRICULAR STROKE VOLUME: 97 ML
LVSV (TEICH): 97 ML
MCH RBC QN AUTO: 36.7 PG (ref 26.8–34.3)
MCHC RBC AUTO-ENTMCNC: 35 G/DL (ref 31.4–37.4)
MCV RBC AUTO: 105 FL (ref 82–98)
MV E'TISSUE VEL-SEP: 6 CM/S
MV MEAN GRADIENT: 4 MMHG
MV PEAK A VEL: 1.02 M/S
MV PEAK E VEL: 181 CM/S
MV PEAK GRADIENT: 13 MMHG
MV STENOSIS PRESSURE HALF TIME: 56 MS
MV VALVE AREA P 1/2 METHOD: 3.93
PLATELET # BLD AUTO: 209 THOUSANDS/UL (ref 149–390)
PMV BLD AUTO: 13.5 FL (ref 8.9–12.7)
POTASSIUM SERPL-SCNC: 3.5 MMOL/L (ref 3.5–5.3)
PROT SERPL-MCNC: 6.4 G/DL (ref 6.4–8.4)
PROTHROMBIN TIME: 13.9 SECONDS (ref 11.6–14.5)
RBC # BLD AUTO: 3.38 MILLION/UL (ref 3.88–5.62)
RIGHT VENTRICLE ID DIMENSION: 4.2 CM
SL CV LEFT ATRIUM LENGTH A2C: 5.9 CM
SL CV LV EF: 55
SL CV PED ECHO LEFT VENTRICLE DIASTOLIC VOLUME (MOD BIPLANE) 2D: 161 ML
SL CV PED ECHO LEFT VENTRICLE SYSTOLIC VOLUME (MOD BIPLANE) 2D: 64 ML
SODIUM SERPL-SCNC: 133 MMOL/L (ref 135–147)
WBC # BLD AUTO: 11.73 THOUSAND/UL (ref 4.31–10.16)

## 2022-10-21 PROCEDURE — C1889 IMPLANT/INSERT DEVICE, NOC: HCPCS

## 2022-10-21 PROCEDURE — 93306 TTE W/DOPPLER COMPLETE: CPT | Performed by: INTERNAL MEDICINE

## 2022-10-21 PROCEDURE — 88342 IMHCHEM/IMCYTCHM 1ST ANTB: CPT | Performed by: STUDENT IN AN ORGANIZED HEALTH CARE EDUCATION/TRAINING PROGRAM

## 2022-10-21 PROCEDURE — 0F798DZ DILATION OF COMMON BILE DUCT WITH INTRALUMINAL DEVICE, VIA NATURAL OR ARTIFICIAL OPENING ENDOSCOPIC: ICD-10-PCS | Performed by: INTERNAL MEDICINE

## 2022-10-21 PROCEDURE — 80053 COMPREHEN METABOLIC PANEL: CPT | Performed by: STUDENT IN AN ORGANIZED HEALTH CARE EDUCATION/TRAINING PROGRAM

## 2022-10-21 PROCEDURE — C1769 GUIDE WIRE: HCPCS

## 2022-10-21 PROCEDURE — 94760 N-INVAS EAR/PLS OXIMETRY 1: CPT

## 2022-10-21 PROCEDURE — 97535 SELF CARE MNGMENT TRAINING: CPT

## 2022-10-21 PROCEDURE — 74328 X-RAY BILE DUCT ENDOSCOPY: CPT

## 2022-10-21 PROCEDURE — 0F9G8ZX DRAINAGE OF PANCREAS, VIA NATURAL OR ARTIFICIAL OPENING ENDOSCOPIC, DIAGNOSTIC: ICD-10-PCS | Performed by: INTERNAL MEDICINE

## 2022-10-21 PROCEDURE — 97530 THERAPEUTIC ACTIVITIES: CPT

## 2022-10-21 PROCEDURE — 88341 IMHCHEM/IMCYTCHM EA ADD ANTB: CPT | Performed by: STUDENT IN AN ORGANIZED HEALTH CARE EDUCATION/TRAINING PROGRAM

## 2022-10-21 PROCEDURE — 88172 CYTP DX EVAL FNA 1ST EA SITE: CPT | Performed by: STUDENT IN AN ORGANIZED HEALTH CARE EDUCATION/TRAINING PROGRAM

## 2022-10-21 PROCEDURE — 85610 PROTHROMBIN TIME: CPT

## 2022-10-21 PROCEDURE — 82948 REAGENT STRIP/BLOOD GLUCOSE: CPT

## 2022-10-21 PROCEDURE — 93306 TTE W/DOPPLER COMPLETE: CPT

## 2022-10-21 PROCEDURE — 94640 AIRWAY INHALATION TREATMENT: CPT

## 2022-10-21 PROCEDURE — 88173 CYTOPATH EVAL FNA REPORT: CPT | Performed by: STUDENT IN AN ORGANIZED HEALTH CARE EDUCATION/TRAINING PROGRAM

## 2022-10-21 PROCEDURE — 85027 COMPLETE CBC AUTOMATED: CPT | Performed by: STUDENT IN AN ORGANIZED HEALTH CARE EDUCATION/TRAINING PROGRAM

## 2022-10-21 PROCEDURE — 99232 SBSQ HOSP IP/OBS MODERATE 35: CPT | Performed by: STUDENT IN AN ORGANIZED HEALTH CARE EDUCATION/TRAINING PROGRAM

## 2022-10-21 PROCEDURE — 0DJ08ZZ INSPECTION OF UPPER INTESTINAL TRACT, VIA NATURAL OR ARTIFICIAL OPENING ENDOSCOPIC: ICD-10-PCS | Performed by: INTERNAL MEDICINE

## 2022-10-21 PROCEDURE — C2617 STENT, NON-COR, TEM W/O DEL: HCPCS

## 2022-10-21 PROCEDURE — 88305 TISSUE EXAM BY PATHOLOGIST: CPT | Performed by: STUDENT IN AN ORGANIZED HEALTH CARE EDUCATION/TRAINING PROGRAM

## 2022-10-21 PROCEDURE — 88112 CYTOPATH CELL ENHANCE TECH: CPT | Performed by: STUDENT IN AN ORGANIZED HEALTH CARE EDUCATION/TRAINING PROGRAM

## 2022-10-21 RX ORDER — LIDOCAINE HYDROCHLORIDE 10 MG/ML
INJECTION, SOLUTION EPIDURAL; INFILTRATION; INTRACAUDAL; PERINEURAL AS NEEDED
Status: DISCONTINUED | OUTPATIENT
Start: 2022-10-21 | End: 2022-10-21

## 2022-10-21 RX ORDER — ALBUMIN (HUMAN) 12.5 G/50ML
12.5 SOLUTION INTRAVENOUS ONCE
Status: COMPLETED | OUTPATIENT
Start: 2022-10-21 | End: 2022-10-21

## 2022-10-21 RX ORDER — PROPOFOL 10 MG/ML
INJECTION, EMULSION INTRAVENOUS AS NEEDED
Status: DISCONTINUED | OUTPATIENT
Start: 2022-10-21 | End: 2022-10-21

## 2022-10-21 RX ORDER — FENTANYL CITRATE 50 UG/ML
INJECTION, SOLUTION INTRAMUSCULAR; INTRAVENOUS AS NEEDED
Status: DISCONTINUED | OUTPATIENT
Start: 2022-10-21 | End: 2022-10-21

## 2022-10-21 RX ORDER — SODIUM CHLORIDE, SODIUM LACTATE, POTASSIUM CHLORIDE, CALCIUM CHLORIDE 600; 310; 30; 20 MG/100ML; MG/100ML; MG/100ML; MG/100ML
INJECTION, SOLUTION INTRAVENOUS CONTINUOUS PRN
Status: DISCONTINUED | OUTPATIENT
Start: 2022-10-21 | End: 2022-10-21

## 2022-10-21 RX ORDER — SUCCINYLCHOLINE/SOD CL,ISO/PF 100 MG/5ML
SYRINGE (ML) INTRAVENOUS AS NEEDED
Status: DISCONTINUED | OUTPATIENT
Start: 2022-10-21 | End: 2022-10-21

## 2022-10-21 RX ADMIN — INSULIN LISPRO 24 UNITS: 100 INJECTION, SOLUTION INTRAVENOUS; SUBCUTANEOUS at 19:50

## 2022-10-21 RX ADMIN — FENTANYL CITRATE 25 MCG: 50 INJECTION INTRAMUSCULAR; INTRAVENOUS at 16:25

## 2022-10-21 RX ADMIN — Medication 100 MG: at 15:30

## 2022-10-21 RX ADMIN — FENTANYL CITRATE 25 MCG: 50 INJECTION INTRAMUSCULAR; INTRAVENOUS at 16:32

## 2022-10-21 RX ADMIN — LIDOCAINE HYDROCHLORIDE 50 MG: 10 INJECTION, SOLUTION EPIDURAL; INFILTRATION; INTRACAUDAL; PERINEURAL at 15:27

## 2022-10-21 RX ADMIN — METRONIDAZOLE 500 MG: 500 TABLET ORAL at 06:19

## 2022-10-21 RX ADMIN — SALMETEROL XINAFOATE 1 PUFF: 50 POWDER, METERED ORAL; RESPIRATORY (INHALATION) at 22:35

## 2022-10-21 RX ADMIN — MICONAZOLE NITRATE 1 APPLICATION: 20 CREAM TOPICAL at 22:22

## 2022-10-21 RX ADMIN — PREGABALIN 100 MG: 100 CAPSULE ORAL at 18:14

## 2022-10-21 RX ADMIN — IOHEXOL 15 ML: 240 INJECTION, SOLUTION INTRATHECAL; INTRAVASCULAR; INTRAVENOUS; ORAL at 16:30

## 2022-10-21 RX ADMIN — TAMSULOSIN HYDROCHLORIDE 0.4 MG: 0.4 CAPSULE ORAL at 18:14

## 2022-10-21 RX ADMIN — THIAMINE HCL TAB 100 MG 100 MG: 100 TAB at 09:12

## 2022-10-21 RX ADMIN — METRONIDAZOLE 500 MG: 500 TABLET ORAL at 13:11

## 2022-10-21 RX ADMIN — FOLIC ACID 1 MG: 1 TABLET ORAL at 09:12

## 2022-10-21 RX ADMIN — Medication 3 MG: at 22:34

## 2022-10-21 RX ADMIN — UMECLIDINIUM 1 PUFF: 62.5 AEROSOL, POWDER ORAL at 09:17

## 2022-10-21 RX ADMIN — CEFTRIAXONE 1000 MG: 1 INJECTION, POWDER, FOR SOLUTION INTRAMUSCULAR; INTRAVENOUS at 09:24

## 2022-10-21 RX ADMIN — ALBUMIN (HUMAN) 12.5 G: 0.25 INJECTION, SOLUTION INTRAVENOUS at 02:07

## 2022-10-21 RX ADMIN — MICONAZOLE NITRATE 1 APPLICATION: 20 CREAM TOPICAL at 09:17

## 2022-10-21 RX ADMIN — NICOTINE 1 PATCH: 21 PATCH, EXTENDED RELEASE TRANSDERMAL at 09:12

## 2022-10-21 RX ADMIN — PREGABALIN 100 MG: 100 CAPSULE ORAL at 09:07

## 2022-10-21 RX ADMIN — MICONAZOLE NITRATE 1 APPLICATION: 20 CREAM TOPICAL at 19:37

## 2022-10-21 RX ADMIN — BACLOFEN 20 MG: 20 TABLET ORAL at 18:14

## 2022-10-21 RX ADMIN — PROPOFOL 50 MG: 10 INJECTION, EMULSION INTRAVENOUS at 15:30

## 2022-10-21 RX ADMIN — BACLOFEN 20 MG: 20 TABLET ORAL at 09:11

## 2022-10-21 RX ADMIN — INSULIN LISPRO 3 UNITS: 100 INJECTION, SOLUTION INTRAVENOUS; SUBCUTANEOUS at 13:05

## 2022-10-21 RX ADMIN — FENTANYL CITRATE 25 MCG: 50 INJECTION INTRAMUSCULAR; INTRAVENOUS at 16:22

## 2022-10-21 RX ADMIN — LEVALBUTEROL HYDROCHLORIDE 1.25 MG: 1.25 SOLUTION, CONCENTRATE RESPIRATORY (INHALATION) at 19:18

## 2022-10-21 RX ADMIN — POTASSIUM CHLORIDE 20 MEQ: 1500 TABLET, EXTENDED RELEASE ORAL at 18:14

## 2022-10-21 RX ADMIN — BACLOFEN 20 MG: 20 TABLET ORAL at 22:34

## 2022-10-21 RX ADMIN — METRONIDAZOLE 500 MG: 500 TABLET ORAL at 22:34

## 2022-10-21 RX ADMIN — POTASSIUM CHLORIDE 20 MEQ: 1500 TABLET, EXTENDED RELEASE ORAL at 09:13

## 2022-10-21 RX ADMIN — SALMETEROL XINAFOATE 1 PUFF: 50 POWDER, METERED ORAL; RESPIRATORY (INHALATION) at 09:17

## 2022-10-21 RX ADMIN — NYSTATIN: 100000 POWDER TOPICAL at 09:17

## 2022-10-21 RX ADMIN — INSULIN LISPRO 1 UNITS: 100 INJECTION, SOLUTION INTRAVENOUS; SUBCUTANEOUS at 22:34

## 2022-10-21 RX ADMIN — FENTANYL CITRATE 25 MCG: 50 INJECTION INTRAMUSCULAR; INTRAVENOUS at 15:49

## 2022-10-21 RX ADMIN — PREGABALIN 100 MG: 100 CAPSULE ORAL at 22:34

## 2022-10-21 RX ADMIN — PROPOFOL 150 MG: 10 INJECTION, EMULSION INTRAVENOUS at 15:28

## 2022-10-21 RX ADMIN — INSULIN GLARGINE 30 UNITS: 100 INJECTION, SOLUTION SUBCUTANEOUS at 09:24

## 2022-10-21 RX ADMIN — ISODIUM CHLORIDE 3 ML: 0.03 SOLUTION RESPIRATORY (INHALATION) at 19:18

## 2022-10-21 RX ADMIN — ONDANSETRON 4 MG: 2 INJECTION INTRAMUSCULAR; INTRAVENOUS at 15:49

## 2022-10-21 RX ADMIN — INSULIN LISPRO 2 UNITS: 100 INJECTION, SOLUTION INTRAVENOUS; SUBCUTANEOUS at 09:01

## 2022-10-21 RX ADMIN — NYSTATIN: 100000 POWDER TOPICAL at 18:14

## 2022-10-21 RX ADMIN — CITALOPRAM HYDROBROMIDE 20 MG: 20 TABLET ORAL at 09:11

## 2022-10-21 RX ADMIN — SODIUM CHLORIDE, SODIUM LACTATE, POTASSIUM CHLORIDE, AND CALCIUM CHLORIDE: .6; .31; .03; .02 INJECTION, SOLUTION INTRAVENOUS at 15:15

## 2022-10-21 RX ADMIN — ENOXAPARIN SODIUM 40 MG: 40 INJECTION SUBCUTANEOUS at 09:11

## 2022-10-21 NOTE — ASSESSMENT & PLAN NOTE
Lab Results   Component Value Date    EGFR 71 10/21/2022    EGFR 73 10/20/2022    EGFR 58 10/19/2022    CREATININE 1 09 10/21/2022    CREATININE 1 06 10/20/2022    CREATININE 1 28 10/19/2022   · Renal function overall stable  · Continue to monitor as needed

## 2022-10-21 NOTE — ANESTHESIA PREPROCEDURE EVALUATION
Procedure:  ERCP  ENDOSCOPIC ULTRASOUND (UPPER)    Relevant Problems   CARDIO   (+) Aortic root dilation (HCC)   (+) Chronic combined systolic and diastolic congestive heart failure (HCC)   (+) Essential hypertension   (+) Non-rheumatic mitral regurgitation   (+) Severe mitral regurgitation      ENDO   (+) Diabetes mellitus type 2 - Diabetic neuropathy      /RENAL   (+) BPH (benign prostatic hyperplasia)   (+) Enlarged prostate with lower urinary tract symptoms (LUTS)   (+) Stage 3a chronic kidney disease (HCC)      HEMATOLOGY   (+) Acute blood loss anemia      NEURO/PSYCH   (+) Cervical myelopathy (HCC)   (+) Chronic pain   (+) Depression   (+) Diabetic neuropathy (HCC)   (+) History of cervical spinal cord injury   (+) Injury of cervical spinal cord ([facility] [address] )   Echo 10/21/22:  •  Left Ventricle: Left ventricular cavity size is mildly dilated  Wall thickness is normal  The left ventricular ejection fraction is 55%  Systolic function is normal  Wall motion is normal  Diastolic function is moderately abnormal, consistent with grade II (pseudonormal) relaxation  •  Right Ventricle: Right ventricular cavity size is mildly dilated  •  Left Atrium: The atrium is mildly dilated  •  Aortic Valve: The aortic valve is trileaflet  The leaflets are not thickened  The leaflets are moderately calcified  There is mildly reduced mobility  •  Mitral Valve: There is moderate calcification  There is severe annular calcification  There is severe regurgitation with an eccentrically directed jet  •  Tricuspid Valve: There is mild regurgitation  •  Aorta: The aortic root is moderately dilated           Physical Exam    Airway    Mallampati score: II  TM Distance: >3 FB  Neck ROM: full     Dental   No notable dental hx     Cardiovascular  Cardiovascular exam normal    Pulmonary  Pulmonary exam normal     Other Findings        Anesthesia Plan  ASA Score- 3     Anesthesia Type- IV sedation with anesthesia with ASA Monitors  Additional Monitors:   Airway Plan:           Plan Factors-Exercise tolerance (METS): >4 METS  Induction- intravenous  Postoperative Plan- Plan for postoperative opioid use  Informed Consent- Anesthetic plan and risks discussed with patient  I personally reviewed this patient with the CRNA  Discussed and agreed on the Anesthesia Plan with the CRNA  David Wade

## 2022-10-21 NOTE — PLAN OF CARE
Problem: OCCUPATIONAL THERAPY ADULT  Goal: Performs self-care activities at highest level of function for planned discharge setting  See evaluation for individualized goals  Description: Treatment Interventions: Functional transfer training, ADL retraining, UE strengthening/ROM, Endurance training, Cognitive reorientation, Patient/family training, Equipment evaluation/education, Compensatory technique education, Energy conservation, Activityengagement          See flowsheet documentation for full assessment, interventions and recommendations  Outcome: Progressing  Note: Limitation: Decreased ADL status, Decreased UE ROM, Decreased UE strength, Decreased Safe judgement during ADL, Decreased cognition, Decreased endurance, Decreased high-level ADLs, Decreased self-care trans  Prognosis: Fair  Assessment: Pt greeted bedside for OT treatment on 10/21/2022 focusing on maximizing independence with ADLs  Pt mod Ax1 with bed mobility and able to sit on EOB at S level  Pt S with eating, min A with grooming, and max A with LB dressing  Pt min A with functional transfers and min Ax1 with functional mobility with RW  Pt also educated on HEP in order to increase AROM of B/L shoulders in order to maximizing independence with UB ADLs  Limitations that impact functional performance include decreased ADL status, decreased UE ROM, decreased UE strength, decreased safe judgement during ADLs, decreased cognition, decreased endurance, decreased self care transfers, decreased high level ADLs and pain  Occupational performance areas to address ADL retraining, functional transfer training, UE strengthening/ROM, endurance training, cognitive reorientation, Pt/caregiver education, equipment evaluation/education, compensatory technique education, energy conservation and activity engagement   Pt would benefit from continued skilled OT services while in hospital to maximize independence with ADLs   Will continue to follow Pt's goals and progress  Pt would benefit from post acute rehabilitation services upon DC to maximize safety and independence with ADLs and functional tasks of choice       OT Discharge Recommendation: Post acute rehabilitation services

## 2022-10-21 NOTE — PROGRESS NOTES
[address] [facility]  Progress Note - Sedrick Tijerina 1960, 57 y o  male MRN: [id number]  Unit/Bed#: [facility] 810-01 Encounter: [id number]  Primary Care Provider: Do Whalen MD   Date and time admitted to hospital: 10/16/2022  4:28 PM    Stage 3a chronic kidney disease [facility])  Assessment & Plan  Lab Results   Component Value Date    EGFR 71 10/21/2022    EGFR 73 10/20/2022    EGFR 58 10/19/2022    CREATININE 1 09 10/21/2022    CREATININE 1 06 10/20/2022    CREATININE 1 28 10/19/2022   · Renal function overall stable  · Continue to monitor as needed    History of cervical spinal cord injury  Assessment & Plan  In setting cervical spinal cord injury  Walks with walker  · PT/OT recommending rehab    Severe mitral regurgitation  Assessment & Plan  · Was told to see CTS for consideration of mitraclip in 2019 however did not follow up    Hyponatremia  Assessment & Plan  · Initially was found to be in should do hyponatremia however blood glucose this morning is 278, sodium 133  · Likely in setting of dehydration due to diarrhea  · Monitor BMP in a m  Chronic combined systolic and diastolic congestive heart failure [facility])  Assessment & Plan  Wt Readings from Last 3 Encounters:   10/21/22 80 7 kg (178 lb)   04/27/22 80 7 kg (178 lb)   07/23/21 83 kg (183 lb)     · Continue torsemide  Not on BB at baseline  · Last echo showed EF 60% with flail mitral valve leaflet with severe MR, referred to CTS for mitraclip however does not appear he followed up  Last seen in 2020 by Dr Kathryn Mendoza  · I/O, daily weights  · Monitor volume status         Essential hypertension  Assessment & Plan  · BP on softer side  · Monitor     Peripheral arterial disease (HCC)  Assessment & Plan  · On plavix and statin at baseline, plavix on hold for EUS/ERCP  Alcohol abuse  Assessment & Plan  · Last drink reportedly 10/11  No clinical concerns for withdrawal, now off CIWA protocol    · Continue thiamine and folate with multivitamins daily  Diabetes mellitus type 2 - Diabetic neuropathy  Assessment & Plan  Lab Results   Component Value Date    HGBA1C 10 1 (H) 10/16/2022       Poorly controlled  · Blood sugar remained labile during hospitalization has endocrinology was consulted, input appreciated  · Recommend continuing Lantus 30units q h s , pre meal 24 units t i d  Continue SSI  · Given pancreatitis, endo recs to stop Soliqua as it contains lixisenatide (a GLP1 receptor agonist)  · Diabetic diet  · Monitor accuchecks and adjust regimen as needed       Ambulatory dysfunction  Assessment & Plan  · Patient walks with a walker, has history of spinal cord injury  · PT/OT recommending rehab, case management is following  Tobacco abuse  Assessment & Plan  · Nicotine patch    * Jaundice, obstructive, intrahepatic  Assessment & Plan  Patient presents with jaundice, no other symptoms  Noted 10/13, had just stopped drinking ETOH 10/11  · CT A/P showed pancreas is atrophic and there's mild prominence of main pancreatic duct 5mm 2/2 chronic pancreatitis sequelae  · Acute hepatitis panel negative  · GI following, MRCP showed Obstruction of the extrahepatic bile duct and pancreatic duct in the region of the pancreatic neck  Differential diagnosis includes obstruction from fibrosis of chronic pancreatitis versus mass however no obvious mass or lesion identified  · On abx for possible cholangitis   · Plan for ERCP/EUS today  Holding Plavix in preparation for this  · Trend CMP, bilirubin continues to worsen          VTE Pharmacologic Prophylaxis:   Moderate Risk (Score 3-4) - Pharmacological DVT Prophylaxis Contraindicated  Sequential Compression Devices Ordered  Patient Centered Rounds: I performed bedside rounds with nursing staff today  Discussions with Specialists or Other Care Team Provider: GI    Education and Discussions with Family / Patient: Updated patient at bedside  Time Spent for Care: 30 minutes  More than 50% of total time spent on counseling and coordination of care as described above  Current Length of Stay: 5 day(s)  Current Patient Status: Inpatient   Certification Statement: The patient will continue to require additional inpatient hospital stay due to Pending ERCP, continued hyperbilirubinemia  Discharge Plan: Anticipate discharge in 48-72 hrs to rehab facility  Code Status: Level 3 - DNAR and DNI    Subjective:   Patient examined at bedside, continues with diarrhea  Objective:     Vitals:   Temp (24hrs), Av 7 °F (36 5 °C), Min:96 8 °F (36 °C), Max:98 1 °F (36 7 °C)    Temp:  [96 8 °F (36 °C)-98 1 °F (36 7 °C)] 98 °F (36 7 °C)  HR:  [82-93] 93  Resp:  [18-20] 20  BP: ()/(53-81) 120/77  SpO2:  [92 %-97 %] 94 %  Body mass index is 27 06 kg/m²  Input and Output Summary (last 24 hours): Intake/Output Summary (Last 24 hours) at 10/21/2022 1710  Last data filed at 10/21/2022 0651  Gross per 24 hour   Intake --   Output 1654 ml   Net -1654 ml       Physical Exam:   Physical Exam  HENT:      Head: Normocephalic and atraumatic  Mouth/Throat:      Mouth: Mucous membranes are dry  Pharynx: Oropharynx is clear  Eyes:      General: Scleral icterus present  Cardiovascular:      Rate and Rhythm: Normal rate and regular rhythm  Pulses: Normal pulses  Heart sounds: Murmur heard  Pulmonary:      Effort: Pulmonary effort is normal       Breath sounds: Normal breath sounds  Abdominal:      General: Bowel sounds are normal       Palpations: Abdomen is soft  Musculoskeletal:         General: Normal range of motion  Right lower leg: Edema present  Skin:     Coloration: Skin is jaundiced and pale  Neurological:      Mental Status: He is oriented to person, place, and time  Mental status is at baseline            Additional Data:     Labs:  Results from last 7 days   Lab Units 10/21/22  0457 10/20/22  0517   WBC Thousand/uL 11 73* 11 11*   HEMOGLOBIN g/dL 12 4 13 0   HEMATOCRIT % 35 4* 35 9*   PLATELETS Thousands/uL 209 181   NEUTROS PCT %  --  78*   LYMPHS PCT %  --  7*   MONOS PCT %  --  12   EOS PCT %  --  1     Results from last 7 days   Lab Units 10/21/22  0852   SODIUM mmol/L 133*   POTASSIUM mmol/L 3 5   CHLORIDE mmol/L 100   CO2 mmol/L 24   BUN mg/dL 25   CREATININE mg/dL 1 09   ANION GAP mmol/L 9   CALCIUM mg/dL 8 8   ALBUMIN g/dL 2 0*   TOTAL BILIRUBIN mg/dL 22 68*   ALK PHOS U/L 515*   ALT U/L 150*   AST U/L 136*   GLUCOSE RANDOM mg/dL 278*     Results from last 7 days   Lab Units 10/21/22  0457   INR  1 05     Results from last 7 days   Lab Units 10/21/22  1112 10/21/22  0858 10/21/22  0043 10/20/22  2100 10/20/22  1652 10/20/22  1119 10/20/22  0750 10/19/22  2210 10/19/22  2046 10/19/22  1612 10/19/22  1130 10/19/22  0728   POC GLUCOSE mg/dl 266* 242* 218* 222* 72 247* 267* 270* 311* 72 266* 414*     Results from last 7 days   Lab Units 10/16/22  1755   HEMOGLOBIN A1C % 10 1*           Lines/Drains:  Invasive Devices  Report    Peripheral Intravenous Line  Duration           Peripheral IV 10/18/22 Distal;Right;Ventral (anterior) Forearm 3 days                      Imaging: No pertinent imaging reviewed      Recent Cultures (last 7 days):         Last 24 Hours Medication List:   Current Facility-Administered Medications   Medication Dose Route Frequency Provider Last Rate   • albuterol  2 5 mg Nebulization Q4H PRN Venus Means MD     • aluminum-magnesium hydroxide-simethicone  30 mL Oral Q6H PRN Venus Means MD     • baclofen  20 mg Oral TID Venus Means MD     • calcium carbonate-vitamin D  1 tablet Oral Daily With Breakfast Venus Means MD     • cefTRIAXone  1,000 mg Intravenous Q24H Jeffrey Perez MD 1,000 mg (10/21/22 0078)   • citalopram  20 mg Oral Daily Venus Means MD     • docusate sodium  100 mg Oral BID PRN Venus Means MD     • enoxaparin  40 mg Subcutaneous Daily Venus Means MD     • ergocalciferol  50,000 Units Oral Weekly Richard Acevedo MD     • folic acid  1 mg Oral Daily Richard Acevedo MD     • insulin glargine  30 Units Subcutaneous QAM Richard Acevedo MD     • insulin lispro  1-5 Units Subcutaneous HS Richard Acevedo MD     • insulin lispro  1-6 Units Subcutaneous TID AC Richard Acevedo MD     • insulin lispro  24 Units Subcutaneous TID With Meals Richard Acevedo MD     • levalbuterol  1 25 mg Nebulization BID Richard Acevedo MD     • melatonin  3 mg Oral HS [employer], PAKrystenC     • metroNIDAZOLE  500 mg Oral Q8H Ashtyn Dailey MD     • ELMO ANTIFUNGAL  1 application Topical TID Richard Acevedo MD     • multivitamin stress formula  1 tablet Oral Daily Richard Acevedo MD     • nicotine  1 patch Transdermal Daily Richard Acevedo MD     • nystatin   Topical BID Richard Acevedo MD     • ondansetron  4 mg Intravenous Q6H PRN Richard Acevedo MD     • potassium chloride  20 mEq Oral BID Richard Acevedo MD     • pregabalin  100 mg Oral TID Richard Acevedo MD     • salmeterol  1 puff Inhalation Q12H [address] Richard Acevedo MD     • sodium chloride  3 mL Nebulization BID Richard Acevedo MD     • tamsulosin  0 4 mg Oral Daily With Jorge Luis Car MD     • thiamine  100 mg Oral Daily Richard Acevedo MD     • torsemide  20 mg Oral Daily Richard Acevedo MD     • umeclidinium bromide  1 puff Inhalation Daily Richard Acevedo MD          Today, Patient Was Seen By: Emmanuel Menjivar    **Please Note: This note may have been constructed using a voice recognition system  **

## 2022-10-21 NOTE — ASSESSMENT & PLAN NOTE
· Initially was found to be in should do hyponatremia however blood glucose this morning is 278, sodium 133  · Likely in setting of dehydration due to diarrhea  · Monitor BMP in a m

## 2022-10-21 NOTE — ASSESSMENT & PLAN NOTE
· Patient walks with a walker, has history of spinal cord injury  · PT/OT recommending rehab, case management is following

## 2022-10-21 NOTE — NURSING NOTE
Upon hourly rounding, patient was found to be soiled with incontinence  As the patient was being cleaned and having his linen changed, the patient complained of being very lightheaded and dizzy  BP sitting was 85/53  Once lying down the patient's BP came back up to 108/63 and no longer lightheaded  With patient's cardiac h/x slim was notified and was ordered orthostatics q shift, albumin 25% 12 5g once, and JUHI stockings  Patient was willing and cooperative with everything besides the JUHI stockings  Education was given on the importance and reasoning but patinet still refused saying that they hurt him too much   Slim was notified of refusal

## 2022-10-21 NOTE — PLAN OF CARE
Problem: MOBILITY - ADULT  Goal: Maintain or return to baseline ADL function  Description: INTERVENTIONS:  -  Assess patient's ability to carry out ADLs; assess patient's baseline for ADL function and identify physical deficits which impact ability to perform ADLs (bathing, care of mouth/teeth, toileting, grooming, dressing, etc )  - Assess/evaluate cause of self-care deficits   - Assess range of motion  - Assess patient's mobility; develop plan if impaired  - Assess patient's need for assistive devices and provide as appropriate  - Encourage maximum independence but intervene and supervise when necessary  - Involve family in performance of ADLs  - Assess for home care needs following discharge   - Consider OT consult to assist with ADL evaluation and planning for discharge  - Provide patient education as appropriate  Outcome: Progressing  Goal: Maintains/Returns to pre admission functional level  Description: INTERVENTIONS:  - Perform BMAT or MOVE assessment daily    - Set and communicate daily mobility goal to care team and patient/family/caregiver  - Collaborate with rehabilitation services on mobility goals if consulted  - Perform Range of Motion 6 times a day  - Reposition patient every 2 hours    - Dangle patient 6 times a day  - Stand patient 6 times a day  - Ambulate patient 6 times a day  - Out of bed to chair 6 times a day   - Out of bed for meals 6 times a day  - Out of bed for toileting  - Record patient progress and toleration of activity level   Outcome: Progressing     Problem: PAIN - ADULT  Goal: Verbalizes/displays adequate comfort level or baseline comfort level  Description: Interventions:  - Encourage patient to monitor pain and request assistance  - Assess pain using appropriate pain scale  - Administer analgesics based on type and severity of pain and evaluate response  - Implement non-pharmacological measures as appropriate and evaluate response  - Consider cultural and social influences on pain and pain management  - Notify physician/advanced practitioner if interventions unsuccessful or patient reports new pain  Outcome: Progressing     Problem: INFECTION - ADULT  Goal: Absence or prevention of progression during hospitalization  Description: INTERVENTIONS:  - Assess and monitor for signs and symptoms of infection  - Monitor lab/diagnostic results  - Monitor all insertion sites, i e  indwelling lines, tubes, and drains  - Monitor endotracheal if appropriate and nasal secretions for changes in amount and color  - [place] appropriate cooling/warming therapies per order  - Administer medications as ordered  - Instruct and encourage patient and family to use good hand hygiene technique  - Identify and instruct in appropriate isolation precautions for identified infection/condition  Outcome: Progressing  Goal: Absence of fever/infection during neutropenic period  Description: INTERVENTIONS:  - Monitor WBC    Outcome: Progressing     Problem: SAFETY ADULT  Goal: Maintain or return to baseline ADL function  Description: INTERVENTIONS:  -  Assess patient's ability to carry out ADLs; assess patient's baseline for ADL function and identify physical deficits which impact ability to perform ADLs (bathing, care of mouth/teeth, toileting, grooming, dressing, etc )  - Assess/evaluate cause of self-care deficits   - Assess range of motion  - Assess patient's mobility; develop plan if impaired  - Assess patient's need for assistive devices and provide as appropriate  - Encourage maximum independence but intervene and supervise when necessary  - Involve family in performance of ADLs  - Assess for home care needs following discharge   - Consider OT consult to assist with ADL evaluation and planning for discharge  - Provide patient education as appropriate  Outcome: Progressing  Goal: Maintains/Returns to pre admission functional level  Description: INTERVENTIONS:  - Perform BMAT or MOVE assessment daily    - Set and communicate daily mobility goal to care team and patient/family/caregiver  - Collaborate with rehabilitation services on mobility goals if consulted  - Perform Range of Motion 6 times a day  - Reposition patient every 2 hours    - Dangle patient 6 times a day  - Stand patient 6 times a day  - Ambulate patient 6 times a day  - Out of bed to chair 6 times a day   - Out of bed for meals 6 times a day  - Out of bed for toileting  - Record patient progress and toleration of activity level   Outcome: Progressing  Goal: Patient will remain free of falls  Description: INTERVENTIONS:  - Educate patient/family on patient safety including physical limitations  - Instruct patient to call for assistance with activity   - Consult OT/PT to assist with strengthening/mobility   - Keep Call bell within reach  - Keep bed low and locked with side rails adjusted as appropriate  - Keep care items and personal belongings within reach  - Initiate and maintain comfort rounds  - Make Fall Risk Sign visible to staff  - Offer Toileting every 2 Hours, in advance of need  - Initiate/Maintain bed alarm  - Obtain necessary fall risk management equipment: bed/chair alarm  - Apply yellow socks and bracelet for high fall risk patients  - Consider moving patient to room near nurses station  Outcome: Progressing     Problem: DISCHARGE PLANNING  Goal: Discharge to home or other facility with appropriate resources  Description: INTERVENTIONS:  - Identify barriers to discharge w/patient and caregiver  - Arrange for needed discharge resources and transportation as appropriate  - Identify discharge learning needs (meds, wound care, etc )  - Arrange for interpretive services to assist at discharge as needed  - Refer to Case Management Department for coordinating discharge planning if the patient needs post-hospital services based on physician/advanced practitioner order or complex needs related to functional status, cognitive ability, or social support system  Outcome: Progressing     Problem: Knowledge Deficit  Goal: Patient/family/caregiver demonstrates understanding of disease process, treatment plan, medications, and discharge instructions  Description: Complete learning assessment and assess knowledge base    Interventions:  - Provide teaching at level of understanding  - Provide teaching via preferred learning methods  Outcome: Progressing     Problem: Prexisting or High Potential for Compromised Skin Integrity  Goal: Skin integrity is maintained or improved  Description: INTERVENTIONS:  - Identify patients at risk for skin breakdown  - Assess and monitor skin integrity  - Assess and monitor nutrition and hydration status  - Monitor labs   - Assess for incontinence   - Turn and reposition patient  - Assist with mobility/ambulation  - Relieve pressure over bony prominences  - Avoid friction and shearing  - Provide appropriate hygiene as needed including keeping skin clean and dry  - Evaluate need for skin moisturizer/barrier cream  - Collaborate with interdisciplinary team   - Patient/family teaching  - Consider wound care consult   Outcome: Progressing     Problem: Potential for Falls  Goal: Patient will remain free of falls  Description: INTERVENTIONS:  - Educate patient/family on patient safety including physical limitations  - Instruct patient to call for assistance with activity   - Consult OT/PT to assist with strengthening/mobility   - Keep Call bell within reach  - Keep bed low and locked with side rails adjusted as appropriate  - Keep care items and personal belongings within reach  - Initiate and maintain comfort rounds  - Make Fall Risk Sign visible to staff  - Offer Toileting every 2 Hours, in advance of need  - Initiate/Maintain bed alarm  - Obtain necessary fall risk management equipment: bed/chair alarm  - Apply yellow socks and bracelet for high fall risk patients  - Consider moving patient to room near nurses station  Outcome: Progressing Problem: Nutrition/Hydration-ADULT  Goal: Nutrient/Hydration intake appropriate for improving, restoring or maintaining nutritional needs  Description: Monitor and assess patient's nutrition/hydration status for malnutrition  Collaborate with interdisciplinary team and initiate plan and interventions as ordered  Monitor patient's weight and dietary intake as ordered or per policy  Utilize nutrition screening tool and intervene as necessary  Determine patient's food preferences and provide high-protein, high-caloric foods as appropriate       INTERVENTIONS:  - Monitor oral intake, urinary output, labs, and treatment plans  - Assess nutrition and hydration status and recommend course of action  - Evaluate amount of meals eaten  - Assist patient with eating if necessary   - Allow adequate time for meals  - Recommend/ encourage appropriate diets, oral nutritional supplements, and vitamin/mineral supplements  - Order, calculate, and assess calorie counts as needed  - Recommend, monitor, and adjust tube feedings and TPN/PPN based on assessed needs  - Assess need for intravenous fluids  - Provide specific nutrition/hydration education as appropriate  - Include patient/family/caregiver in decisions related to nutrition  Outcome: Progressing

## 2022-10-21 NOTE — OCCUPATIONAL THERAPY NOTE
Occupational Therapy Progress Note     Patient Name: Cate Camejo  PCMYB'K Date: 10/21/2022  Problem List  Principal Problem:    Jaundice, obstructive, intrahepatic  Active Problems:    Tobacco abuse    Ambulatory dysfunction    Diabetes mellitus type 2 - Diabetic neuropathy    Alcohol abuse    Peripheral arterial disease (HCC)    Essential hypertension    Chronic combined systolic and diastolic congestive heart failure (HCC)    Pseudohyponatremia    Severe mitral regurgitation    History of cervical spinal cord injury    Stage 3a chronic kidney disease ([facility] [address] )    Dysuria            10/21/22 0926   OT Last Visit   OT Visit Date 10/21/22   Note Type   Note Type Treatment   Pain Assessment   Pain Assessment Tool 0-10   Pain Score No Pain   Restrictions/Precautions   Weight Bearing Precautions Per Order No   Other Precautions Fall Risk;Pain; Chair Alarm; Bed Alarm   Lifestyle   Autonomy I with grooming, toileting, and eating  Pt reports being mod I with bed mobility, functional transfers, and functional mobility (short distances)  Reciprocal Relationships Supportive wife   Service to Others SSD   Intrinsic Gratification Enjoys being as active as possible   ADL   Where Assessed Edge of bed   Eating Assistance 5  Supervision/Setup   Eating Deficit Beverage management  (Performed small sips with meds- RN present)   Grooming Assistance 4  Minimal Assistance   Grooming Deficit Other (Comment)  (applying lip balm)   UB Dressing Comments Pt declined further ADLs despite education/encouragement  LB Dressing Assistance 2  Maximal Assistance   LB Dressing Deficit Setup;Don/doff L sock; Don/doff R sock   LB Dressing Comments Supine in bed  Bed Mobility   Supine to Sit 3  Moderate assistance   Additional items Assist x 1; Increased time required;Verbal cues;LE management   Additional Comments Pt greeted supine in bed  Transfers   Sit to Stand 4  Minimal assistance   Additional items Assist x 1; Increased time required;Verbal cues   Stand to Sit 4  Minimal assistance   Additional items Assist x 1; Increased time required;Verbal cues   Additional Comments with RW- x3 trials during OT session  Pt requires VCS for safety/technique  Functional Mobility   Functional Mobility 4  Minimal assistance   Additional Comments Min Ax1 with RW- within room distances   Additional items Rolling walker   Therapeutic Exercise - ROM   UE-ROM Yes   ROM- Right Upper Extremities   R Shoulder AROM; Flexion   R Position Seated   R Weight/Reps/Sets 1x10   RUE ROM Comment Pt provided education/demonstration for HEP focusing on increased AROM of B/L shoulders to maximizing independence with Ub ADLs  Therapist provided demonstration  ROM - Left Upper Extremities    L Shoulder AROM; Flexion   L Position Seated   L Weight/Reps/Sets 1x10   Cognition   Overall Cognitive Status WFL   Arousal/Participation Alert; Cooperative   Attention Attends with cues to redirect   Orientation Level Oriented X4   Memory Within functional limits   Following Commands Follows one step commands with increased time or repetition   Comments Pt pleasant and cooperative during OT session  Pt stated he has not had any sleep last night, but willing to participate as he will sleep after he procedure later  Activity Tolerance   Activity Tolerance Patient tolerated treatment well   Medical Staff Made Aware RN cleared/updated  Assessment   Assessment Pt greeted bedside for OT treatment on 10/21/2022 focusing on maximizing independence with ADLs  Pt mod Ax1 with bed mobility and able to sit on EOB at S level  Pt S with eating, min A with grooming, and max A with LB dressing  Pt min A with functional transfers and min Ax1 with functional mobility with RW  Pt also educated on HEP in order to increase AROM of B/L shoulders in order to maximizing independence with UB ADLs   Limitations that impact functional performance include decreased ADL status, decreased UE ROM, decreased UE strength, decreased safe judgement during ADLs, decreased cognition, decreased endurance, decreased self care transfers, decreased high level ADLs and pain  Occupational performance areas to address ADL retraining, functional transfer training, UE strengthening/ROM, endurance training, cognitive reorientation, Pt/caregiver education, equipment evaluation/education, compensatory technique education, energy conservation and activity engagement   Pt would benefit from continued skilled OT services while in hospital to maximize independence with ADLs  Will continue to follow Pt's goals and progress  Pt would benefit from post acute rehabilitation services upon DC to maximize safety and independence with ADLs and functional tasks of choice  Plan   Treatment Interventions ADL retraining;Functional transfer training;UE strengthening/ROM; Endurance training;Cognitive reorientation;Patient/family training;Equipment evaluation/education; Compensatory technique education; Activityengagement; Energy conservation   Goal Expiration Date 11/01/22   OT Treatment Day 1   OT Frequency 3-5x/wk   Recommendation   OT Discharge Recommendation Post acute rehabilitation services   Additional Comments  The patient's raw score on the AM-PAC Daily Activity inpatient short form is 14, standardized score is 33 39, less than 39 4  Patients at this level are likely to benefit from discharge to post-acute rehabilitation services  Please refer to the recommendation of the Occupational Therapist for safe discharge planning     AM-PAC Daily Activity Inpatient   Lower Body Dressing 2   Bathing 2   Toileting 2   Upper Body Dressing 2   Grooming 3   Eating 3   Daily Activity Raw Score 14   Daily Activity Standardized Score (Calc for Raw Score >=11) 33 39   AM-[facility] Applied Cognition Inpatient   Following a Speech/Presentation 3   Understanding Ordinary Conversation 4   Taking Medications 3   Remembering Where Things Are Placed or Put Away 3   Remembering List of 4-5 Errands 3 Taking Care of Complicated Tasks 3   Applied Cognition Raw Score 19   Applied Cognition Standardized Score 39 77   End of Consult   Education Provided Yes   Patient Position at End of Consult Bed/Chair alarm activated; All needs within reach; Bedside chair   Nurse Communication Nurse aware of consult       Lizy Ayala MS, OTR/L

## 2022-10-21 NOTE — QUICK NOTE
Pt noted to have symptomatic orthostatic hypotension from lying 108/63 to sitting 85/53 with lightheadedness/dizziness   Last echo was 2019 showing normal EF 60% and flail mitral valve leaflet with severe MR     -orthostatic bp qshift  -albumin 25% 12 5g once   -salome stockings   -check updated echo   -consider initiation of midodrine

## 2022-10-21 NOTE — ASSESSMENT & PLAN NOTE
Wt Readings from Last 3 Encounters:   10/21/22 80 7 kg (178 lb)   04/27/22 80 7 kg (178 lb)   07/23/21 83 kg (183 lb)     · Continue torsemide  Not on BB at baseline  · Last echo showed EF 60% with flail mitral valve leaflet with severe MR, referred to CTS for mitraclip however does not appear he followed up  Last seen in 2020 by Dr Marcelo Boyce     · I/O, daily weights  · Monitor volume status

## 2022-10-21 NOTE — ANESTHESIA POSTPROCEDURE EVALUATION
Post-Op Assessment Note    CV Status:  Stable  Pain Score: 0    Pain management: adequate     Mental Status:  Awake and sleepy   Hydration Status:  Euvolemic   PONV Controlled:  Controlled   Airway Patency:  Patent      Post Op Vitals Reviewed: Yes      Staff: CRNA, Anesthesiologist         No complications documented      /77 (10/21/22 1652)    Temp 98 °F (36 7 °C) (10/21/22 1652)    Pulse 84 (10/21/22 1652)   Resp 18 (10/21/22 1652)    SpO2 97 % (10/21/22 1652)

## 2022-10-21 NOTE — ASSESSMENT & PLAN NOTE
Patient presents with jaundice, no other symptoms  Noted 10/13, had just stopped drinking ETOH 10/11  · CT A/P showed pancreas is atrophic and there's mild prominence of main pancreatic duct 5mm 2/2 chronic pancreatitis sequelae  · Acute hepatitis panel negative  · GI following, MRCP showed Obstruction of the extrahepatic bile duct and pancreatic duct in the region of the pancreatic neck  Differential diagnosis includes obstruction from fibrosis of chronic pancreatitis versus mass however no obvious mass or lesion identified  · On abx for possible cholangitis   · Plan for ERCP/EUS today  Holding Plavix in preparation for this    · Trend CMP, bilirubin continues to worsen

## 2022-10-21 NOTE — ASSESSMENT & PLAN NOTE
Lab Results   Component Value Date    HGBA1C 10 1 (H) 10/16/2022       Poorly controlled  · Blood sugar remained labile during hospitalization has endocrinology was consulted, input appreciated  · Recommend continuing Lantus 30units q h s , pre meal 24 units t i d  Continue SSI    · Given pancreatitis, endo recs to stop Soliqua as it contains lixisenatide (a GLP1 receptor agonist)  · Diabetic diet  · Monitor accuchecks and adjust regimen as needed

## 2022-10-22 LAB
ALBUMIN SERPL BCP-MCNC: 1.9 G/DL (ref 3.5–5)
ALP SERPL-CCNC: 480 U/L (ref 46–116)
ALT SERPL W P-5'-P-CCNC: 126 U/L (ref 12–78)
ANION GAP SERPL CALCULATED.3IONS-SCNC: 6 MMOL/L (ref 4–13)
AST SERPL W P-5'-P-CCNC: 115 U/L (ref 5–45)
BILIRUB SERPL-MCNC: 13.21 MG/DL (ref 0.2–1)
BUN SERPL-MCNC: 25 MG/DL (ref 5–25)
CALCIUM ALBUM COR SERPL-MCNC: 10.2 MG/DL (ref 8.3–10.1)
CALCIUM SERPL-MCNC: 8.5 MG/DL (ref 8.3–10.1)
CHLORIDE SERPL-SCNC: 101 MMOL/L (ref 96–108)
CO2 SERPL-SCNC: 25 MMOL/L (ref 21–32)
CREAT SERPL-MCNC: 0.96 MG/DL (ref 0.6–1.3)
ERYTHROCYTE [DISTWIDTH] IN BLOOD BY AUTOMATED COUNT: 16.8 % (ref 11.6–15.1)
GFR SERPL CREATININE-BSD FRML MDRD: 83 ML/MIN/1.73SQ M
GLUCOSE SERPL-MCNC: 119 MG/DL (ref 65–140)
GLUCOSE SERPL-MCNC: 145 MG/DL (ref 65–140)
GLUCOSE SERPL-MCNC: 152 MG/DL (ref 65–140)
GLUCOSE SERPL-MCNC: 285 MG/DL (ref 65–140)
GLUCOSE SERPL-MCNC: 405 MG/DL (ref 65–140)
HCT VFR BLD AUTO: 31.1 % (ref 36.5–49.3)
HGB BLD-MCNC: 11.3 G/DL (ref 12–17)
MCH RBC QN AUTO: 37 PG (ref 26.8–34.3)
MCHC RBC AUTO-ENTMCNC: 36.3 G/DL (ref 31.4–37.4)
MCV RBC AUTO: 102 FL (ref 82–98)
PLATELET # BLD AUTO: 227 THOUSANDS/UL (ref 149–390)
PMV BLD AUTO: 13.5 FL (ref 8.9–12.7)
POTASSIUM SERPL-SCNC: 4.9 MMOL/L (ref 3.5–5.3)
PROT SERPL-MCNC: 6.7 G/DL (ref 6.4–8.4)
RBC # BLD AUTO: 3.05 MILLION/UL (ref 3.88–5.62)
SODIUM SERPL-SCNC: 132 MMOL/L (ref 135–147)
WBC # BLD AUTO: 11.87 THOUSAND/UL (ref 4.31–10.16)

## 2022-10-22 PROCEDURE — 99232 SBSQ HOSP IP/OBS MODERATE 35: CPT | Performed by: INTERNAL MEDICINE

## 2022-10-22 PROCEDURE — 94640 AIRWAY INHALATION TREATMENT: CPT

## 2022-10-22 PROCEDURE — 82948 REAGENT STRIP/BLOOD GLUCOSE: CPT

## 2022-10-22 PROCEDURE — 99232 SBSQ HOSP IP/OBS MODERATE 35: CPT | Performed by: STUDENT IN AN ORGANIZED HEALTH CARE EDUCATION/TRAINING PROGRAM

## 2022-10-22 PROCEDURE — 94664 DEMO&/EVAL PT USE INHALER: CPT

## 2022-10-22 PROCEDURE — 87493 C DIFF AMPLIFIED PROBE: CPT | Performed by: PHYSICIAN ASSISTANT

## 2022-10-22 PROCEDURE — 85027 COMPLETE CBC AUTOMATED: CPT | Performed by: STUDENT IN AN ORGANIZED HEALTH CARE EDUCATION/TRAINING PROGRAM

## 2022-10-22 PROCEDURE — 80053 COMPREHEN METABOLIC PANEL: CPT | Performed by: STUDENT IN AN ORGANIZED HEALTH CARE EDUCATION/TRAINING PROGRAM

## 2022-10-22 PROCEDURE — 94760 N-INVAS EAR/PLS OXIMETRY 1: CPT

## 2022-10-22 RX ORDER — SODIUM CHLORIDE 9 MG/ML
50 INJECTION, SOLUTION INTRAVENOUS CONTINUOUS
Status: DISPENSED | OUTPATIENT
Start: 2022-10-22 | End: 2022-10-23

## 2022-10-22 RX ADMIN — BACLOFEN 20 MG: 20 TABLET ORAL at 14:51

## 2022-10-22 RX ADMIN — BACLOFEN 20 MG: 20 TABLET ORAL at 10:23

## 2022-10-22 RX ADMIN — METRONIDAZOLE 500 MG: 500 TABLET ORAL at 05:13

## 2022-10-22 RX ADMIN — PREGABALIN 100 MG: 100 CAPSULE ORAL at 23:04

## 2022-10-22 RX ADMIN — NYSTATIN: 100000 POWDER TOPICAL at 18:24

## 2022-10-22 RX ADMIN — MICONAZOLE NITRATE 1 APPLICATION: 20 CREAM TOPICAL at 23:06

## 2022-10-22 RX ADMIN — PREGABALIN 100 MG: 100 CAPSULE ORAL at 14:50

## 2022-10-22 RX ADMIN — INSULIN GLARGINE 30 UNITS: 100 INJECTION, SOLUTION SUBCUTANEOUS at 10:32

## 2022-10-22 RX ADMIN — BACLOFEN 20 MG: 20 TABLET ORAL at 23:04

## 2022-10-22 RX ADMIN — TAMSULOSIN HYDROCHLORIDE 0.4 MG: 0.4 CAPSULE ORAL at 18:21

## 2022-10-22 RX ADMIN — PREGABALIN 100 MG: 100 CAPSULE ORAL at 10:23

## 2022-10-22 RX ADMIN — INSULIN LISPRO 24 UNITS: 100 INJECTION, SOLUTION INTRAVENOUS; SUBCUTANEOUS at 14:52

## 2022-10-22 RX ADMIN — POTASSIUM CHLORIDE 20 MEQ: 1500 TABLET, EXTENDED RELEASE ORAL at 10:23

## 2022-10-22 RX ADMIN — Medication 3 MG: at 23:04

## 2022-10-22 RX ADMIN — SALMETEROL XINAFOATE 1 PUFF: 50 POWDER, METERED ORAL; RESPIRATORY (INHALATION) at 23:04

## 2022-10-22 RX ADMIN — B-COMPLEX W/ C & FOLIC ACID TAB 1 TABLET: TAB at 10:23

## 2022-10-22 RX ADMIN — THIAMINE HCL TAB 100 MG 100 MG: 100 TAB at 10:33

## 2022-10-22 RX ADMIN — ENOXAPARIN SODIUM 40 MG: 40 INJECTION SUBCUTANEOUS at 10:23

## 2022-10-22 RX ADMIN — MICONAZOLE NITRATE 1 APPLICATION: 20 CREAM TOPICAL at 18:25

## 2022-10-22 RX ADMIN — POTASSIUM CHLORIDE 20 MEQ: 1500 TABLET, EXTENDED RELEASE ORAL at 18:21

## 2022-10-22 RX ADMIN — METRONIDAZOLE 500 MG: 500 TABLET ORAL at 23:04

## 2022-10-22 RX ADMIN — FOLIC ACID 1 MG: 1 TABLET ORAL at 10:24

## 2022-10-22 RX ADMIN — Medication 1 TABLET: at 10:24

## 2022-10-22 RX ADMIN — UMECLIDINIUM 1 PUFF: 62.5 AEROSOL, POWDER ORAL at 10:33

## 2022-10-22 RX ADMIN — TORSEMIDE 20 MG: 20 TABLET ORAL at 10:24

## 2022-10-22 RX ADMIN — SALMETEROL XINAFOATE 1 PUFF: 50 POWDER, METERED ORAL; RESPIRATORY (INHALATION) at 10:33

## 2022-10-22 RX ADMIN — NYSTATIN: 100000 POWDER TOPICAL at 10:34

## 2022-10-22 RX ADMIN — METRONIDAZOLE 500 MG: 500 TABLET ORAL at 14:51

## 2022-10-22 RX ADMIN — INSULIN LISPRO 10 UNITS: 100 INJECTION, SOLUTION INTRAVENOUS; SUBCUTANEOUS at 18:20

## 2022-10-22 RX ADMIN — CEFTRIAXONE 1000 MG: 1 INJECTION, POWDER, FOR SOLUTION INTRAMUSCULAR; INTRAVENOUS at 10:24

## 2022-10-22 RX ADMIN — INSULIN LISPRO 24 UNITS: 100 INJECTION, SOLUTION INTRAVENOUS; SUBCUTANEOUS at 10:39

## 2022-10-22 RX ADMIN — ISODIUM CHLORIDE 3 ML: 0.03 SOLUTION RESPIRATORY (INHALATION) at 08:11

## 2022-10-22 RX ADMIN — LEVALBUTEROL HYDROCHLORIDE 1.25 MG: 1.25 SOLUTION, CONCENTRATE RESPIRATORY (INHALATION) at 08:11

## 2022-10-22 RX ADMIN — NICOTINE 1 PATCH: 21 PATCH, EXTENDED RELEASE TRANSDERMAL at 10:24

## 2022-10-22 RX ADMIN — CITALOPRAM HYDROBROMIDE 20 MG: 20 TABLET ORAL at 10:24

## 2022-10-22 NOTE — RESPIRATORY THERAPY NOTE
RT Protocol Note  Uyen Butler 57 y o  male MRN: [id number]  Unit/Bed#: [facility] 810-01 Encounter: [id number]    Assessment    Principal Problem:    Jaundice, obstructive, intrahepatic  Active Problems:    Tobacco abuse    Ambulatory dysfunction    Diabetes mellitus type 2 - Diabetic neuropathy    Alcohol abuse    Peripheral arterial disease ([facility] [address] )    Essential hypertension    Chronic combined systolic and diastolic congestive heart failure ([facility])    Hyponatremia    Severe mitral regurgitation    History of cervical spinal cord injury    Stage 3a chronic kidney disease ([facility])    Dysuria      Home Pulmonary Medications:  Albuterol prn       Past Medical History:   Diagnosis Date    SUNDEEP (acute kidney injury) ([facility] [address] )     CHF (congestive heart failure) ([facility])     Cholelithiasis     [facility] )     RESOLVED: 8/17/17    COPD (chronic obstructive pulmonary disease) ([facility] [address] )     Depression     Diabetes mellitus ([facility] [address] )     type 2, non-insulin dependent    Diabetic neuropathy ([facility] [address] )     Difficulty attaining erection     LAST ASSESSEDl: 8/17/17    Former tobacco use     Gall stone pancreatitis     RESOLVED: 3/8/17    Hiatal hernia     History of concussion     History of MRSA infection     History of varicella infection     Hypertension     LAST ASSESSED: 8/17/17    Non-healing open wound of right groin     s/p VAC placement & post-op fungal dermatitis    PAD (peripheral artery disease) ([facility])     s/p RLE fem-pop    Seasonal allergies     Severe mitral regurgitation     Spinal cord injury, C1-C7 ([facility] [address] )     s/p fusion    Traumatic injury to musculoskeletal system     1-5 FLIGHT FALL DOWN THE STEPS - CERVICAL NECK INJURY - JAN 2, 2012; LAST ASSESSED: 4/90/90    Umbilical hernia without obstruction and without gangrene     RESOLVED: 3/8/17     Social History     Socioeconomic History    Marital status: /Civil Union     Spouse name: None    Number of children: None    Years of education: 12    Highest education level: None   Occupational History    Occupation: RETIRED   Tobacco Use    Smoking status: Current Every Day Smoker     Packs/day: 0 25     Years: 40 00     Pack years: 10 00     Types: Cigarettes    Smokeless tobacco: Never Used   Vaping Use    Vaping Use: Never used   Substance and Sexual Activity    Alcohol use: Not Currently     Comment: pt  reports he has not drank since 2019    Drug use: No    Sexual activity: Never     Partners: Female   Other Topics Concern    None   Social History Narrative    DAILY  [employer]    EXERCISES RARELY    LIVES WITH SIGNIFICANT OTHER    LIVES WITH WIFE    NO LIVING WILL            Most recent tobacco use screenin2020    Do you currently or have you served in the [employer]: No    Were you activated, into active duty, as a member of the [employer] or as a Reservist: No    Occupation: disabled    Education: 15    Marital status:     Sexual orientation: Heterosexual    Exercise level: Occasional    Diet: Diabetic    General stress level: Low    Has smoked since age: 10    Alcohol intake: Moderate    Caffeine intake: Occasional    Chewing tobacco: none    Illicit drugs: none    Guns present in home: No    Seat belts used routinely: No    Sunscreen used routinely: Yes    Smoke alarm in home: Yes    Advance directive: No    Salt Intake: moderate    Is the patient interested in a colorectal cancer screening: No    Has patient visited an area known to be high risk for 2019 n-CoV: No     Social Determinants of Health     Financial Resource Strain: Not on file   Food Insecurity: No Food Insecurity    Worried About Running Out of Food in the Last Year: Never true    Ran Out of Food in the Last Year: Never true   Transportation Needs: No Transportation Needs    Lack of Transportation (Medical): No    Lack of Transportation (Non-Medical):  No   Physical Activity: Not on file   Stress: Not on file   Social Connections: Not on file   Intimate Partner Violence: Not on file   Housing Stability: Low Risk     Unable to Pay for Housing in the Last Year: No    Number of Places Lived in the Last Year: 1    Unstable Housing in the Last Year: No       Subjective         Objective    Physical Exam:   Assessment Type: During-treatment  General Appearance: Alert, Awake  Respiratory Pattern: Normal  Chest Assessment: Chest expansion symmetrical  Bilateral Breath Sounds: Clear    Vitals:  Blood pressure 109/68, pulse 80, temperature 97 5 °F (36 4 °C), resp  rate 17, height 5' 8" (1 727 m), weight 80 7 kg (178 lb), SpO2 93 %  Imaging and other studies: I have personally reviewed pertinent reports  Plan    Respiratory Plan: Discontinue Protocol        Resp Comments: per pt, takes nebs only prn at home  BS clear/diminished  pt offers no respiratory complaints  will d/c bid txs and continue to follow via protocol

## 2022-10-22 NOTE — CASE MANAGEMENT
Case Management Discharge Planning Note    Patient name Trey Haji  Location [facility] 810/[facility] 993-22 MRN [id number]  : 1960 Date 10/22/2022       Current Admission Date: 10/16/2022  Current Admission Diagnosis:Jaundice, obstructive, intrahepatic   Patient Active Problem List    Diagnosis Date Noted   • Dysuria 10/18/2022   • Jaundice, obstructive, intrahepatic 10/16/2022   • Stage 3a chronic [facility] ([facility] ) 2021   • Scalp laceration, initial encounter 2020   • Aortic root dilation ([facility] ) 2019   • BPH (benign prostatic hyperplasia) 2019   • History of cervical spinal cord injury 2019   • Chronic combined systolic and diastolic congestive heart failure ([place] [address] ) 2019   • Hyponatremia 2019   • Obstructive uropathy 2019   • Non-rheumatic mitral regurgitation 2019   • Severe mitral regurgitation 2019   • Esophageal thickening 10/12/2018   • Sacral wound, initial encounter 10/10/2018   • Postoperative state 2018   • Non-healing surgical wound of right groin 2018   • Wound dehiscence, surgical, initial encounter 2018   • Acute blood loss anemia 2018   • Constipation 2018   • Essential hypertension 2018   • Peripheral arterial disease ([facility] ) 2018   • Ischemic foot pain at rest 2018   • Cellulitis of leg, right 2018   • PAD 2018   • Hypercalcemia 2018   • Alcohol abuse 2018   • Ambulatory dysfunction 2018   • Lumbar radiculopathy 2018   • Diabetes mellitus type 2 - Diabetic neuropathy 2018   • Basal cell carcinoma in situ of skin 03/15/2018   • Chronic pain 2017   • Hernia, epigastric 2017   • Cervical myelopathy ([facility] ) 2016   • Depression 2016   • Diabetic neuropathy ([facility] ) 2016   • Injury of cervical spinal cord ([facility] )    • Umbilical hernia    • Tobacco abuse 2013   • Quadriplegia and quadriparesis ([facility] ) 2013 • Enlarged prostate with lower urinary tract symptoms (LUTS) 07/03/2012      LOS (days): 6  Geometric Mean LOS (GMLOS) (days): 3 00  Days to GMLOS:-2 6     OBJECTIVE:  Risk of Unplanned Readmission Score: 16 72         Current admission status: Inpatient   Preferred Pharmacy:   [facility]  ([address] [facility]  [address] [place] [address]  Phone: [phone] Fax: [address] [facility],[address], [address] [facility] [address]  Phone: [phone] Fax: [phone]    [facility] ([facility]) - [address]  Phone: [phone] Fax: [phone]    Primary Care Provider: Kiara Lovett MD    Primary Insurance: [facility]  Secondary Insurance:     DISCHARGE DETAILS:          Comments - Freedom of Choice: Pt and spouse agreeable to STR  Pt agreable to blanket referrals being sent                                           Treatment Team Recommendation: Short Term Rehab  Discharge Destination Plan[de-identified] Short Term Rehab

## 2022-10-22 NOTE — ASSESSMENT & PLAN NOTE
Wt Readings from Last 3 Encounters:   10/21/22 80 7 kg (178 lb)   04/27/22 80 7 kg (178 lb)   07/23/21 83 kg (183 lb)     · Continue torsemide  Not on BB at baseline  · Last echo showed EF 60% with flail mitral valve leaflet with severe MR, referred to CTS for mitraclip however does not appear he followed up  Last seen in 2020 by Dr Maegan Nath     · I/O, daily weights  · Monitor volume status

## 2022-10-22 NOTE — ASSESSMENT & PLAN NOTE
Patient presents with jaundice, no other symptoms  Noted 10/13, had just stopped drinking ETOH 10/11  · CT A/P showed pancreas is atrophic and there's mild prominence of main pancreatic duct 5mm 2/2 chronic pancreatitis sequelae  · Acute hepatitis panel negative  · GI following, MRCP showed Obstruction of the extrahepatic bile duct and pancreatic duct in the region of the pancreatic neck  Differential diagnosis includes obstruction from fibrosis of chronic pancreatitis versus mass however no obvious mass or lesion identified  · On abx for possible cholangitis   · S/P ERCP/EUS on 10/21  Found to have chronic fibrotic changes likely from chronic pancreatitis, status post stent placement  Pending final biopsy  · Trend CMP

## 2022-10-22 NOTE — ASSESSMENT & PLAN NOTE
· Initially was found to be in should do hyponatremia however blood glucose this morning is 278, sodium improving  · Likely in setting of dehydration due to diarrhea  · Monitor BMP in a m

## 2022-10-22 NOTE — PROGRESS NOTES
[address] [facility]  Progress Note - Judd Job 1960, 57 y o  male MRN: [id number]  Unit/Bed#: [facility] 810-01 Encounter: [id number]  Primary Care Provider: Batsheva Rodrigues MD   Date and time admitted to hospital: 10/16/2022  4:28 PM    Stage 3a chronic kidney disease [facility])  Assessment & Plan  Lab Results   Component Value Date    EGFR 83 10/22/2022    EGFR 71 10/21/2022    EGFR 73 10/20/2022    CREATININE 0 96 10/22/2022    CREATININE 1 09 10/21/2022    CREATININE 1 06 10/20/2022   · Renal function overall stable  · Continue to monitor as needed    History of cervical spinal cord injury  Assessment & Plan  In setting cervical spinal cord injury  Walks with walker  · PT/OT recommending rehab    Severe mitral regurgitation  Assessment & Plan  · Was told to see CTS for consideration of mitraclip in 2019 however did not follow up    Hyponatremia  Assessment & Plan  · Initially was found to be in should do hyponatremia however blood glucose this morning is 278, sodium improving  · Likely in setting of dehydration due to diarrhea  · Monitor BMP in a m  Chronic combined systolic and diastolic congestive heart failure [facility])  Assessment & Plan  Wt Readings from Last 3 Encounters:   10/21/22 80 7 kg (178 lb)   04/27/22 80 7 kg (178 lb)   07/23/21 83 kg (183 lb)     · Continue torsemide  Not on BB at baseline  · Last echo showed EF 60% with flail mitral valve leaflet with severe MR, referred to CTS for mitraclip however does not appear he followed up  Last seen in 2020 by Dr Chris Valerio  · I/O, daily weights  · Monitor volume status         Essential hypertension  Assessment & Plan  · BP on softer side  · Monitor     Peripheral arterial disease (HCC)  Assessment & Plan  · On plavix and statin at baseline, plavix on hold  · Can resume Plavix after 48 hours despite GI recommendation  Alcohol abuse  Assessment & Plan  · Last drink reportedly 10/11    No clinical concerns for withdrawal, now off CIWA protocol  · Continue thiamine and folate with multivitamins daily  Diabetes mellitus type 2 - Diabetic neuropathy  Assessment & Plan  Lab Results   Component Value Date    HGBA1C 10 1 (H) 10/16/2022       Poorly controlled  · Blood sugar remained labile during hospitalization has endocrinology was consulted, input appreciated  · Recommend continuing Lantus 30units q h s , pre meal 24 units t i d  Continue SSI  · Given pancreatitis, endo recs to stop Soliqua as it contains lixisenatide (a GLP1 receptor agonist)  · Diabetic diet  · Monitor accuchecks and adjust regimen as needed       Ambulatory dysfunction  Assessment & Plan  · Patient walks with a walker, has history of spinal cord injury  · PT/OT recommending rehab, case management is following  Tobacco abuse  Assessment & Plan  · Nicotine patch    * Jaundice, obstructive, intrahepatic  Assessment & Plan  Patient presents with jaundice, no other symptoms  Noted 10/13, had just stopped drinking ETOH 10/11  · CT A/P showed pancreas is atrophic and there's mild prominence of main pancreatic duct 5mm 2/2 chronic pancreatitis sequelae  · Acute hepatitis panel negative  · GI following, MRCP showed Obstruction of the extrahepatic bile duct and pancreatic duct in the region of the pancreatic neck  Differential diagnosis includes obstruction from fibrosis of chronic pancreatitis versus mass however no obvious mass or lesion identified  · On abx for possible cholangitis   · S/P ERCP/EUS on 10/21  Found to have chronic fibrotic changes likely from chronic pancreatitis, status post stent placement  Pending final biopsy  · Trend CMP  VTE Pharmacologic Prophylaxis:   Moderate Risk (Score 3-4) - Pharmacological DVT Prophylaxis Ordered: enoxaparin (Lovenox)  Patient Centered Rounds: I performed bedside rounds with nursing staff today    Discussions with Specialists or Other Care Team Provider: GI    Education and Discussions with Family / Patient: Updated patient at bedside, he will update his wife  Time Spent for Care: 30 minutes  More than 50% of total time spent on counseling and coordination of care as described above  Current Length of Stay: 6 day(s)  Current Patient Status: Inpatient   Certification Statement: The patient will continue to require additional inpatient hospital stay due to Ongoing management of obstructive jaundice  Discharge Plan: Anticipate discharge in 48-72 hrs to rehab facility  Code Status: Level 3 - DNAR and DNI    Subjective:   Patient examined at bedside, continues with diarrhea  Objective:     Vitals:   Temp (24hrs), Av 6 °F (36 4 °C), Min:97 3 °F (36 3 °C), Max:98 2 °F (36 8 °C)    Temp:  [97 3 °F (36 3 °C)-98 2 °F (36 8 °C)] 97 3 °F (36 3 °C)  HR:  [80-89] 86  Resp:  [17-19] 17  BP: (102-119)/(52-74) 119/74  SpO2:  [92 %-95 %] 95 %  Body mass index is 27 06 kg/m²  Input and Output Summary (last 24 hours): Intake/Output Summary (Last 24 hours) at 10/22/2022 1729  Last data filed at 10/22/2022 1300  Gross per 24 hour   Intake --   Output 1500 ml   Net -1500 ml       Physical Exam:   Physical Exam  Constitutional:       Appearance: Normal appearance  HENT:      Head: Normocephalic and atraumatic  Mouth/Throat:      Mouth: Mucous membranes are dry  Pharynx: Oropharynx is clear  Eyes:      General: Scleral icterus present  Extraocular Movements: Extraocular movements intact  Conjunctiva/sclera: Conjunctivae normal    Cardiovascular:      Rate and Rhythm: Normal rate  Pulses: Normal pulses  Heart sounds: Murmur heard  Pulmonary:      Effort: Pulmonary effort is normal       Breath sounds: Normal breath sounds  Abdominal:      General: Bowel sounds are normal  There is no distension  Palpations: Abdomen is soft  Tenderness: There is no abdominal tenderness  Musculoskeletal:         General: Normal range of motion     Skin:     General: Skin is warm and dry  Neurological:      Mental Status: He is alert and oriented to person, place, and time  Mental status is at baseline  Psychiatric:         Mood and Affect: Mood normal          Behavior: Behavior normal           Additional Data:     Labs:  Results from last 7 days   Lab Units 10/22/22  0456 10/21/22  0457 10/20/22  0517   WBC Thousand/uL 11 87*   < > 11 11*   HEMOGLOBIN g/dL 11 3*   < > 13 0   HEMATOCRIT % 31 1*   < > 35 9*   PLATELETS Thousands/uL 227   < > 181   NEUTROS PCT %  --   --  78*   LYMPHS PCT %  --   --  7*   MONOS PCT %  --   --  12   EOS PCT %  --   --  1    < > = values in this interval not displayed  Results from last 7 days   Lab Units 10/22/22  0456   SODIUM mmol/L 132*   POTASSIUM mmol/L 4 9   CHLORIDE mmol/L 101   CO2 mmol/L 25   BUN mg/dL 25   CREATININE mg/dL 0 96   ANION GAP mmol/L 6   CALCIUM mg/dL 8 5   ALBUMIN g/dL 1 9*   TOTAL BILIRUBIN mg/dL 13 21*   ALK PHOS U/L 480*   ALT U/L 126*   AST U/L 115*   GLUCOSE RANDOM mg/dL 152*     Results from last 7 days   Lab Units 10/21/22  0457   INR  1 05     Results from last 7 days   Lab Units 10/22/22  1703 10/22/22  1555 10/22/22  0746 10/21/22  2230 10/21/22  1725 10/21/22  1112 10/21/22  0858 10/21/22  0043 10/20/22  2100 10/20/22  1652 10/20/22  1119 10/20/22  0750   POC GLUCOSE mg/dl 285* 405* 145* 160* 134 266* 242* 218* 222* 72 247* 267*     Results from last 7 days   Lab Units 10/16/22  1755   HEMOGLOBIN A1C % 10 1*           Lines/Drains:  Invasive Devices  Report    Peripheral Intravenous Line  Duration           Peripheral IV 10/22/22 Right;Ventral (anterior) Forearm <1 day                      Imaging: No pertinent imaging reviewed      Recent Cultures (last 7 days):         Last 24 Hours Medication List:   Current Facility-Administered Medications   Medication Dose Route Frequency Provider Last Rate   • albuterol  2 5 mg Nebulization Q4H PRN Earl Coe MD     • aluminum-magnesium hydroxide-simethicone  30 mL Oral Q6H PRN Burt Haji MD     • baclofen  20 mg Oral TID Burt Haji MD     • calcium carbonate-vitamin D  1 tablet Oral Daily With Breakfast Burt Haji MD     • cefTRIAXone  1,000 mg Intravenous Q24H Nel Yap MD 1,000 mg (10/22/22 1024)   • citalopram  20 mg Oral Daily Burt Haji MD     • docusate sodium  100 mg Oral BID PRN Burt Haji MD     • enoxaparin  40 mg Subcutaneous Daily Burt Haji MD     • ergocalciferol  50,000 Units Oral Weekly Burt Haji MD     • folic acid  1 mg Oral Daily Burt Haji MD     • insulin glargine  30 Units Subcutaneous QAM Burt Haji MD     • insulin lispro  1-5 Units Subcutaneous HS Burt Haji MD     • insulin lispro  1-6 Units Subcutaneous TID AC Burt Haji MD     • insulin lispro  24 Units Subcutaneous TID With Meals Burt Haji MD     • melatonin  3 mg Oral HS Nisa Feliz PA-C     • metroNIDAZOLE  500 mg Oral Q8H Rain Wheat MD     • ELMO ANTIFUNGAL  1 application Topical TID Burt Haji MD     • multivitamin stress formula  1 tablet Oral Daily Burt Haji MD     • nicotine  1 patch Transdermal Daily Burt Haji MD     • nystatin   Topical BID Burt Haji MD     • ondansetron  4 mg Intravenous Q6H PRN Burt Haji MD     • potassium chloride  20 mEq Oral BID Burt Haji MD     • pregabalin  100 mg Oral TID Burt Haji MD     • salmeterol  1 puff Inhalation Q12H [address] Burt Haji MD     • tamsulosin  0 4 mg Oral Daily With Maddie Russo MD     • thiamine  100 mg Oral Daily Burt Haji MD     • torsemide  20 mg Oral Daily Burt Haji MD     • umeclidinium bromide  1 puff Inhalation Daily Burt Haji MD          Today, Patient Was Seen By: Eliceo Nielsen    **Please Note: This note may have been constructed using a voice recognition system  **

## 2022-10-22 NOTE — ASSESSMENT & PLAN NOTE
Lab Results   Component Value Date    EGFR 83 10/22/2022    EGFR 71 10/21/2022    EGFR 73 10/20/2022    CREATININE 0 96 10/22/2022    CREATININE 1 09 10/21/2022    CREATININE 1 06 10/20/2022   · Renal function overall stable  · Continue to monitor as needed

## 2022-10-22 NOTE — ASSESSMENT & PLAN NOTE
· On plavix and statin at baseline, plavix on hold  · Can resume Plavix after 48 hours despite GI recommendation

## 2022-10-22 NOTE — PLAN OF CARE
Problem: MOBILITY - ADULT  Goal: Maintain or return to baseline ADL function  Description: INTERVENTIONS:  -  Assess patient's ability to carry out ADLs; assess patient's baseline for ADL function and identify physical deficits which impact ability to perform ADLs (bathing, care of mouth/teeth, toileting, grooming, dressing, etc )  - Assess/evaluate cause of self-care deficits   - Assess range of motion  - Assess patient's mobility; develop plan if impaired  - Assess patient's need for assistive devices and provide as appropriate  - Encourage maximum independence but intervene and supervise when necessary  - Involve family in performance of ADLs  - Assess for home care needs following discharge   - Consider OT consult to assist with ADL evaluation and planning for discharge  - Provide patient education as appropriate  Outcome: Progressing  Goal: Maintains/Returns to pre admission functional level  Description: INTERVENTIONS:  - Perform BMAT or MOVE assessment daily    - Set and communicate daily mobility goal to care team and patient/family/caregiver     - Collaborate with rehabilitation services on mobility goals if consulted  - Out of bed for toileting  - Record patient progress and toleration of activity level   Outcome: Progressing     Problem: PAIN - ADULT  Goal: Verbalizes/displays adequate comfort level or baseline comfort level  Description: Interventions:  - Encourage patient to monitor pain and request assistance  - Assess pain using appropriate pain scale  - Administer analgesics based on type and severity of pain and evaluate response  - Implement non-pharmacological measures as appropriate and evaluate response  - Consider cultural and social influences on pain and pain management  - Notify physician/advanced practitioner if interventions unsuccessful or patient reports new pain  Outcome: Progressing     Problem: INFECTION - ADULT  Goal: Absence or prevention of progression during hospitalization  Description: INTERVENTIONS:  - Assess and monitor for signs and symptoms of infection  - Monitor lab/diagnostic results  - Monitor all insertion sites, i e  indwelling lines, tubes, and drains  - Monitor endotracheal if appropriate and nasal secretions for changes in amount and color  - [place] appropriate cooling/warming therapies per order  - Administer medications as ordered  - Instruct and encourage patient and family to use good hand hygiene technique  - Identify and instruct in appropriate isolation precautions for identified infection/condition  Outcome: Progressing  Goal: Absence of fever/infection during neutropenic period  Description: INTERVENTIONS:  - Monitor WBC    Outcome: Progressing     Problem: SAFETY ADULT  Goal: Maintain or return to baseline ADL function  Description: INTERVENTIONS:  -  Assess patient's ability to carry out ADLs; assess patient's baseline for ADL function and identify physical deficits which impact ability to perform ADLs (bathing, care of mouth/teeth, toileting, grooming, dressing, etc )  - Assess/evaluate cause of self-care deficits   - Assess range of motion  - Assess patient's mobility; develop plan if impaired  - Assess patient's need for assistive devices and provide as appropriate  - Encourage maximum independence but intervene and supervise when necessary  - Involve family in performance of ADLs  - Assess for home care needs following discharge   - Consider OT consult to assist with ADL evaluation and planning for discharge  - Provide patient education as appropriate  Outcome: Progressing  Goal: Maintains/Returns to pre admission functional level  Description: INTERVENTIONS:  - Perform BMAT or MOVE assessment daily    - Set and communicate daily mobility goal to care team and patient/family/caregiver     - Collaborate with rehabilitation services on mobility goals if consulted  - Out of bed for toileting  - Record patient progress and toleration of activity level   Outcome: Progressing  Goal: Patient will remain free of falls  Description: INTERVENTIONS:  - Educate patient/family on patient safety including physical limitations  - Instruct patient to call for assistance with activity   - Consult OT/PT to assist with strengthening/mobility   - Keep Call bell within reach  - Keep bed low and locked with side rails adjusted as appropriate  - Keep care items and personal belongings within reach  - Initiate and maintain comfort rounds  - Make Fall Risk Sign visible to staff  - Apply yellow socks and bracelet for high fall risk patients  - Consider moving patient to room near nurses station  Outcome: Progressing     Problem: DISCHARGE PLANNING  Goal: Discharge to home or other facility with appropriate resources  Description: INTERVENTIONS:  - Identify barriers to discharge w/patient and caregiver  - Arrange for needed discharge resources and transportation as appropriate  - Identify discharge learning needs (meds, wound care, etc )  - Arrange for interpretive services to assist at discharge as needed  - Refer to Case Management Department for coordinating discharge planning if the patient needs post-hospital services based on physician/advanced practitioner order or complex needs related to functional status, cognitive ability, or social support system  Outcome: Progressing     Problem: Knowledge Deficit  Goal: Patient/family/caregiver demonstrates understanding of disease process, treatment plan, medications, and discharge instructions  Description: Complete learning assessment and assess knowledge base    Interventions:  - Provide teaching at level of understanding  - Provide teaching via preferred learning methods  Outcome: Progressing     Problem: Prexisting or High Potential for Compromised Skin Integrity  Goal: Skin integrity is maintained or improved  Description: INTERVENTIONS:  - Identify patients at risk for skin breakdown  - Assess and monitor skin integrity  - Assess and monitor nutrition and hydration status  - Monitor labs   - Assess for incontinence   - Turn and reposition patient  - Assist with mobility/ambulation  - Relieve pressure over bony prominences  - Avoid friction and shearing  - Provide appropriate hygiene as needed including keeping skin clean and dry  - Evaluate need for skin moisturizer/barrier cream  - Collaborate with interdisciplinary team   - Patient/family teaching  - Consider wound care consult   Outcome: Progressing     Problem: Potential for Falls  Goal: Patient will remain free of falls  Description: INTERVENTIONS:  - Educate patient/family on patient safety including physical limitations  - Instruct patient to call for assistance with activity   - Consult OT/PT to assist with strengthening/mobility   - Keep Call bell within reach  - Keep bed low and locked with side rails adjusted as appropriate  - Keep care items and personal belongings within reach  - Initiate and maintain comfort rounds  - Make Fall Risk Sign visible to staff  - Apply yellow socks and bracelet for high fall risk patients  - Consider moving patient to room near nurses station  Outcome: Progressing     Problem: Nutrition/Hydration-ADULT  Goal: Nutrient/Hydration intake appropriate for improving, restoring or maintaining nutritional needs  Description: Monitor and assess patient's nutrition/hydration status for malnutrition  Collaborate with interdisciplinary team and initiate plan and interventions as ordered  Monitor patient's weight and dietary intake as ordered or per policy  Utilize nutrition screening tool and intervene as necessary  Determine patient's food preferences and provide high-protein, high-caloric foods as appropriate       INTERVENTIONS:  - Monitor oral intake, urinary output, labs, and treatment plans  - Assess nutrition and hydration status and recommend course of action  - Evaluate amount of meals eaten  - Assist patient with eating if necessary - Allow adequate time for meals  - Recommend/ encourage appropriate diets, oral nutritional supplements, and vitamin/mineral supplements  - Order, calculate, and assess calorie counts as needed  - Recommend, monitor, and adjust tube feedings and TPN/PPN based on assessed needs  - Assess need for intravenous fluids  - Provide specific nutrition/hydration education as appropriate  - Include patient/family/caregiver in decisions related to nutrition  Outcome: Progressing

## 2022-10-22 NOTE — PROGRESS NOTES
Progress Note -  [facility]  Cate Camejo 57 y o  male MRN: [id number]  Unit/Bed#: [facility] 810-01 Encounter: [id number]      ASSESSMENT AND PLAN:      29-year-old male with past medical history of prior cholecystectomy secondary to gallstone pancreatitis, alcohol use, peripheral vascular disease on Plavix, CKD 3A, hypertension, diabetes mellitus type 2, systolic/diastolic heart failure who was admitted for painless obstructive jaundice  Gastroenterology was consulted for further management  1  Obstructive jaundice  MRI showed evidence of dilation of CBD and pancreatic duct  Underwent EUS which was suspicious for mass, though none was visualized  Biopsies were taken  Common bile duct and pancreatic duct stents were placed  Bilirubin has vastly improved from 22-13  Patient is currently asymptomatic  • Stable from GI perspective for discharge  Will follow up cytology  • Will require CMP in 1 week, ordered  • Will require repeat x-ray in 3-4 weeks to ensure that stents have been expelled, ordered  If not will require repeat EGD for removal   • Will follow-up with [facility] in 2 weeks  • Gastroenterology to sign off at this time  Please contact with any questions  2  Peripheral vascular disease  Currently on Plavix  · Recommend holding Plavix for 2 additional days and resume on 10/25/2022  Rest of care per primary team     ______________________________________________________________________    Subjective:  Seen examined  Denies any pain, nausea, vomiting  Jaundice has improved  Tolerating p o  diet  Rest of ROS was negative  REVIEW OF SYSTEMS:    Review of Systems   Constitutional: Negative for chills and fever  HENT: Negative for congestion and sinus pressure  Respiratory: Negative for cough and shortness of breath  Cardiovascular: Negative for chest pain, palpitations and leg swelling     Gastrointestinal: Negative for abdominal pain, diarrhea, nausea and vomiting  Genitourinary: Negative for dysuria and hematuria  Musculoskeletal: Negative for arthralgias and back pain  Skin: Negative for color change and rash  Neurological: Negative for dizziness and headaches  Psychiatric/Behavioral: Negative for agitation and confusion  All other systems reviewed and are negative           Historical Information   Past Medical History:   Diagnosis Date   • SUNDEEP (acute kidney injury) ([place] [address] )    • CHF (congestive heart failure) ([facility])    • Cholelithiasis    • Chronic obstructive lung disease ([place] [address] )     RESOLVED: 8/17/17   • COPD (chronic obstructive pulmonary disease) ([place] [address] )    • Depression    • Diabetes mellitus ([place] [address] )     type 2, non-insulin dependent   • Diabetic neuropathy ([place] [address] )    • Difficulty attaining erection     LAST ASSESSEDl: 8/17/17   • Former tobacco use    • Gall stone pancreatitis     RESOLVED: 3/8/17   • Hiatal hernia    • History of concussion    • History of MRSA infection    • History of varicella infection    • Hypertension     LAST ASSESSED: 8/17/17   • Non-healing open wound of right groin     s/p VAC placement & post-op fungal dermatitis   • PAD (peripheral artery disease) ([facility])     s/p RLE fem-pop   • Seasonal allergies    • Severe mitral regurgitation    • Spinal cord injury, C1-C7 ([place] [address] )     s/p fusion   • Traumatic injury to musculoskeletal system     1-5 FLIGHT FALL DOWN THE STEPS - CERVICAL NECK INJURY - JAN 2, 2012; LAST ASSESSED: 9/50/88   • Umbilical hernia without obstruction and without gangrene     RESOLVED: 3/8/17     Past Surgical History:   Procedure Laterality Date   • ANGIOPLASTY     • ARTERIOGRAM Right 7/31/2018    Procedure: ARTERIOGRAM Completion Agram;  Surgeon: Rosa Garcias MD;  [facility];  Service: Vascular   • BACK SURGERY     • BYPASS FEMORAL-POPLITEAL Right 7/31/2018    Procedure: BYPASS FEMORAL-POPLITEAL R femoral- BK popliteal bypass;  Surgeon: Rosa Garcias MD;  [facility];  Service: Vascular   • CERVICAL FUSION      VERTEBRAL; C3-C4 FUSION   • CHOLECYSTECTOMY LAPAROSCOPIC N/A 2/24/2017    Procedure: CHOLECYSTECTOMY LAPAROSCOPIC;  Surgeon: Adam Jaimes MD;  [facility];  Service:    • EPIGASTRIC HERNIA REPAIR     • FEMORAL BYPASS Right     Leg   • INCISION AND DRAINAGE OF WOUND      Groin area   • OR DEBRIDEMENT, SKIN, SUB-Q TISSUE,=<20 SQ CM Right 8/28/2018    Procedure: RIGHT GROIN AND THIGH DEBRIDEMENT ([address]) [address];  Surgeon: Roxana Hudson MD;  [facility];  Service: Vascular   • OR THROMBOENDARTECTMY FEMORAL COMMON Right 7/31/2018    Procedure: ENDARTERECTOMY ARTERIAL FEMORAL R femoral endarterectomy w/ patch angioplasty ;  Surgeon: Tanvir Gandhi MD;  [facility];  Service: Vascular   • SPINE SURGERY     • UMBILICAL HERNIA REPAIR N/A 2/24/2017    Procedure: REPAIR HERNIA UMBILICAL;  Surgeon: Adam Jaimes MD;  [facility];  Service:    • WISDOM TOOTH EXTRACTION       Social History   Social History     Substance and Sexual Activity   Alcohol Use Not Currently    Comment: pt  reports he has not drank since February 2019     Social History     Substance and Sexual Activity   Drug Use No     Social History     Tobacco Use   Smoking Status Current Every Day Smoker   • Packs/day: 0 25   • Years: 40 00   • Pack years: 10 00   • Types: Cigarettes   Smokeless Tobacco Never Used     Family History   Problem Relation Age of Onset   • Hypertension Mother         BENIGN   • Alzheimer's disease Father    • Heart disease Father         CARDIAC DISORDER   • Stroke Father    • Heart attack Father    • No Known Problems Brother        Meds/Allergies     Medications Prior to Admission   Medication   • acetaminophen (TYLENOL) 325 mg tablet   • albuterol (2 5 mg/3 mL) 0 083 % nebulizer solution   • Alcohol Swabs (ALCOHOL PREP) PADS   • baclofen 20 mg tablet   • CALCIUM CITRATE-VITAMIN D PO   • citalopram (CeleXA) 20 mg tablet   • clopidogrel (PLAVIX) 75 mg tablet   • Continuous Blood Gluc  (FreeStyle Christel 14 Day [place]) HARDIK   • Continuous Blood Gluc Sensor (FreeStyle Christel 14 Day Sensor) MISC   • ergocalciferol (VITAMIN D2) 50,000 units   • glucose blood test strip   • glucose monitoring kit (FREESTYLE) monitoring kit   • insulin lispro (HumaLOG KwikPen) 100 units/mL injection pen   • Insulin Pen Needle (CareOne Unifine Pentips Plus) 32G X 4 MM MISC   • Multiple Vitamin (MULTI-VITAMIN DAILY PO)   • potassium chloride (K-DUR,KLOR-CON) 20 mEq tablet   • pregabalin (LYRICA) 100 mg capsule   • rosuvastatin (CRESTOR) 20 MG tablet   • Soliqua 100-33 UNT-MCG/ML injection pen   • tamsulosin (FLOMAX) 0 4 mg   • tiotropium-olodaterol (Stiolto Respimat) 2 5-2 5 MCG/ACT inhaler   • torsemide (DEMADEX) 20 mg tablet     Current Facility-Administered Medications   Medication Dose Route Frequency   • albuterol inhalation solution 2 5 mg  2 5 mg Nebulization Q4H PRN   • aluminum-magnesium hydroxide-simethicone (MYLANTA) oral suspension 30 mL  30 mL Oral Q6H PRN   • baclofen tablet 20 mg  20 mg Oral TID   • calcium carbonate-vitamin D (OSCAL-D) 500 mg-200 units per tablet 1 tablet  1 tablet Oral Daily With Breakfast   • cefTRIAXone (ROCEPHIN) 1,000 mg in dextrose 5 % 50 mL IVPB  1,000 mg Intravenous Q24H   • citalopram (CeleXA) tablet 20 mg  20 mg Oral Daily   • docusate sodium (COLACE) capsule 100 mg  100 mg Oral BID PRN   • enoxaparin (LOVENOX) subcutaneous injection 40 mg  40 mg Subcutaneous Daily   • ergocalciferol (VITAMIN D2) capsule 50,000 Units  50,000 Units Oral Weekly   • folic acid (FOLVITE) tablet 1 mg  1 mg Oral Daily   • insulin glargine (LANTUS) subcutaneous injection 30 Units 0 3 mL  30 Units Subcutaneous QAM   • insulin lispro (HumaLOG) 100 units/mL subcutaneous injection 1-5 Units  1-5 Units Subcutaneous HS   • insulin lispro (HumaLOG) 100 units/mL subcutaneous injection 1-6 Units  1-6 Units Subcutaneous TID AC   • insulin lispro (HumaLOG) 100 units/mL subcutaneous injection 24 Units  24 Units Subcutaneous TID With Meals   • levalbuterol (XOPENEX) inhalation solution 1 25 mg  1 25 mg Nebulization BID   • melatonin tablet 3 mg  3 mg Oral HS   • metroNIDAZOLE (FLAGYL) tablet 500 mg  500 mg Oral Q8H [facility]   • moisture barrier miconazole 2% cream (aka ELMO MOISTURE BARRIER ANTIFUNGAL CREAM)  1 application Topical TID   • multivitamin stress formula tablet 1 tablet  1 tablet Oral Daily   • nicotine (NICODERM CQ) 21 mg/24 hr TD 24 hr patch 1 patch  1 patch Transdermal Daily   • nystatin (MYCOSTATIN) powder   Topical BID   • ondansetron (ZOFRAN) injection 4 mg  4 mg Intravenous Q6H PRN   • potassium chloride (K-DUR,KLOR-CON) CR tablet 20 mEq  20 mEq Oral BID   • pregabalin (LYRICA) capsule 100 mg  100 mg Oral TID   • salmeterol (Serevent Diskus) 50 mcg/dose inhaler 1 puff  1 puff Inhalation Q12H [facility]   • sodium chloride 0 9 % inhalation solution 3 mL  3 mL Nebulization BID   • tamsulosin (FLOMAX) capsule 0 4 mg  0 4 mg Oral Daily With Dinner   • thiamine tablet 100 mg  100 mg Oral Daily   • torsemide (DEMADEX) tablet 20 mg  20 mg Oral Daily   • umeclidinium bromide (INCRUSE ELLIPTA) 62 5 mcg/inh inhaler AEPB 1 puff  1 puff Inhalation Daily       Allergies   Allergen Reactions   • Seasonal Ic  [Cholestatin]            Objective     Blood pressure 106/66, pulse 82, temperature 98 2 °F (36 8 °C), resp  rate 17, height 5' 8" (1 727 m), weight 80 7 kg (178 lb), SpO2 94 %  Body mass index is 27 06 kg/m²  Intake/Output Summary (Last 24 hours) at 10/22/2022 0729  Last data filed at 10/22/2022 0007  Gross per 24 hour   Intake --   Output 500 ml   Net -500 ml         PHYSICAL EXAM:      Physical Exam  Vitals and nursing note reviewed  Constitutional:       General: He is not in acute distress  Appearance: Normal appearance  He is well-developed  He is not ill-appearing  HENT:      Head: Normocephalic and atraumatic        Mouth/Throat:      Mouth: Mucous membranes are moist    Eyes: General: Scleral icterus present  Extraocular Movements: Extraocular movements intact  Conjunctiva/sclera: Conjunctivae normal       Pupils: Pupils are equal, round, and reactive to light  Cardiovascular:      Rate and Rhythm: Normal rate and regular rhythm  Pulses: Normal pulses  Heart sounds: Normal heart sounds  No murmur heard  No friction rub  No gallop  Pulmonary:      Effort: Pulmonary effort is normal  No respiratory distress  Breath sounds: Normal breath sounds  No wheezing or rales  Abdominal:      General: Abdomen is flat  Bowel sounds are normal  There is no distension  Palpations: Abdomen is soft  Tenderness: There is no abdominal tenderness  There is no guarding  Musculoskeletal:      Cervical back: Neck supple  Right lower leg: No edema  Left lower leg: No edema  Skin:     General: Skin is warm and dry  Coloration: Skin is jaundiced  Neurological:      General: No focal deficit present  Mental Status: He is alert and oriented to person, place, and time  Psychiatric:         Mood and Affect: Mood normal          Behavior: Behavior normal           Lab Results:   No results displayed because visit has over 200 results  Imaging Studies: I have personally reviewed pertinent imaging studies  [address] [facility]    Gastroenterology Fellow  PGY-[id number]  Available via [employer]  10/22/2022 7:29 AM

## 2022-10-23 LAB
ALBUMIN SERPL BCP-MCNC: 2 G/DL (ref 3.5–5)
ALP SERPL-CCNC: 392 U/L (ref 46–116)
ALT SERPL W P-5'-P-CCNC: 85 U/L (ref 12–78)
ANION GAP SERPL CALCULATED.3IONS-SCNC: 7 MMOL/L (ref 4–13)
AST SERPL W P-5'-P-CCNC: 50 U/L (ref 5–45)
BILIRUB SERPL-MCNC: 6.1 MG/DL (ref 0.2–1)
BUN SERPL-MCNC: 26 MG/DL (ref 5–25)
C DIFF TOX GENS STL QL NAA+PROBE: NEGATIVE
CALCIUM ALBUM COR SERPL-MCNC: 10 MG/DL (ref 8.3–10.1)
CALCIUM SERPL-MCNC: 8.4 MG/DL (ref 8.3–10.1)
CHLORIDE SERPL-SCNC: 103 MMOL/L (ref 96–108)
CO2 SERPL-SCNC: 23 MMOL/L (ref 21–32)
CREAT SERPL-MCNC: 0.8 MG/DL (ref 0.6–1.3)
ERYTHROCYTE [DISTWIDTH] IN BLOOD BY AUTOMATED COUNT: 16.5 % (ref 11.6–15.1)
GFR SERPL CREATININE-BSD FRML MDRD: 94 ML/MIN/1.73SQ M
GLUCOSE SERPL-MCNC: 107 MG/DL (ref 65–140)
GLUCOSE SERPL-MCNC: 115 MG/DL (ref 65–140)
GLUCOSE SERPL-MCNC: 211 MG/DL (ref 65–140)
GLUCOSE SERPL-MCNC: 227 MG/DL (ref 65–140)
GLUCOSE SERPL-MCNC: 248 MG/DL (ref 65–140)
HCT VFR BLD AUTO: 31.9 % (ref 36.5–49.3)
HGB BLD-MCNC: 11.3 G/DL (ref 12–17)
MCH RBC QN AUTO: 36.1 PG (ref 26.8–34.3)
MCHC RBC AUTO-ENTMCNC: 35.4 G/DL (ref 31.4–37.4)
MCV RBC AUTO: 102 FL (ref 82–98)
PLATELET # BLD AUTO: 235 THOUSANDS/UL (ref 149–390)
PMV BLD AUTO: 13.2 FL (ref 8.9–12.7)
POTASSIUM SERPL-SCNC: 3 MMOL/L (ref 3.5–5.3)
PROT SERPL-MCNC: 6.7 G/DL (ref 6.4–8.4)
RBC # BLD AUTO: 3.13 MILLION/UL (ref 3.88–5.62)
SODIUM SERPL-SCNC: 133 MMOL/L (ref 135–147)
WBC # BLD AUTO: 12.07 THOUSAND/UL (ref 4.31–10.16)

## 2022-10-23 PROCEDURE — 99232 SBSQ HOSP IP/OBS MODERATE 35: CPT | Performed by: STUDENT IN AN ORGANIZED HEALTH CARE EDUCATION/TRAINING PROGRAM

## 2022-10-23 PROCEDURE — 85027 COMPLETE CBC AUTOMATED: CPT | Performed by: STUDENT IN AN ORGANIZED HEALTH CARE EDUCATION/TRAINING PROGRAM

## 2022-10-23 PROCEDURE — 82948 REAGENT STRIP/BLOOD GLUCOSE: CPT

## 2022-10-23 PROCEDURE — 80053 COMPREHEN METABOLIC PANEL: CPT | Performed by: STUDENT IN AN ORGANIZED HEALTH CARE EDUCATION/TRAINING PROGRAM

## 2022-10-23 PROCEDURE — 99232 SBSQ HOSP IP/OBS MODERATE 35: CPT | Performed by: INTERNAL MEDICINE

## 2022-10-23 RX ORDER — CLOPIDOGREL BISULFATE 75 MG/1
75 TABLET ORAL DAILY
Status: DISCONTINUED | OUTPATIENT
Start: 2022-10-24 | End: 2022-10-27 | Stop reason: HOSPADM

## 2022-10-23 RX ORDER — CLOPIDOGREL BISULFATE 75 MG/1
75 TABLET ORAL DAILY
Status: DISCONTINUED | OUTPATIENT
Start: 2022-10-23 | End: 2022-10-23

## 2022-10-23 RX ORDER — INSULIN LISPRO 100 [IU]/ML
15 INJECTION, SOLUTION INTRAVENOUS; SUBCUTANEOUS
Status: DISCONTINUED | OUTPATIENT
Start: 2022-10-23 | End: 2022-10-24

## 2022-10-23 RX ORDER — POTASSIUM CHLORIDE 20 MEQ/1
40 TABLET, EXTENDED RELEASE ORAL
Status: COMPLETED | OUTPATIENT
Start: 2022-10-23 | End: 2022-10-23

## 2022-10-23 RX ADMIN — MICONAZOLE NITRATE 1 APPLICATION: 20 CREAM TOPICAL at 22:18

## 2022-10-23 RX ADMIN — PREGABALIN 100 MG: 100 CAPSULE ORAL at 08:38

## 2022-10-23 RX ADMIN — ENOXAPARIN SODIUM 40 MG: 40 INJECTION SUBCUTANEOUS at 08:33

## 2022-10-23 RX ADMIN — SODIUM CHLORIDE 500 ML: 0.9 INJECTION, SOLUTION INTRAVENOUS at 09:54

## 2022-10-23 RX ADMIN — MICONAZOLE NITRATE 1 APPLICATION: 20 CREAM TOPICAL at 16:08

## 2022-10-23 RX ADMIN — POTASSIUM CHLORIDE 20 MEQ: 1500 TABLET, EXTENDED RELEASE ORAL at 08:46

## 2022-10-23 RX ADMIN — INSULIN LISPRO 3 UNITS: 100 INJECTION, SOLUTION INTRAVENOUS; SUBCUTANEOUS at 16:24

## 2022-10-23 RX ADMIN — NYSTATIN: 100000 POWDER TOPICAL at 08:38

## 2022-10-23 RX ADMIN — INSULIN LISPRO 2 UNITS: 100 INJECTION, SOLUTION INTRAVENOUS; SUBCUTANEOUS at 13:28

## 2022-10-23 RX ADMIN — NYSTATIN: 100000 POWDER TOPICAL at 18:12

## 2022-10-23 RX ADMIN — MICONAZOLE NITRATE 1 APPLICATION: 20 CREAM TOPICAL at 08:52

## 2022-10-23 RX ADMIN — INSULIN LISPRO 15 UNITS: 100 INJECTION, SOLUTION INTRAVENOUS; SUBCUTANEOUS at 13:27

## 2022-10-23 RX ADMIN — THIAMINE HCL TAB 100 MG 100 MG: 100 TAB at 08:34

## 2022-10-23 RX ADMIN — METRONIDAZOLE 500 MG: 500 TABLET ORAL at 15:06

## 2022-10-23 RX ADMIN — BACLOFEN 20 MG: 20 TABLET ORAL at 16:07

## 2022-10-23 RX ADMIN — INSULIN GLARGINE 30 UNITS: 100 INJECTION, SOLUTION SUBCUTANEOUS at 09:02

## 2022-10-23 RX ADMIN — SODIUM CHLORIDE 50 ML/HR: 0.9 INJECTION, SOLUTION INTRAVENOUS at 00:03

## 2022-10-23 RX ADMIN — Medication 3 MG: at 22:18

## 2022-10-23 RX ADMIN — METRONIDAZOLE 500 MG: 500 TABLET ORAL at 22:18

## 2022-10-23 RX ADMIN — B-COMPLEX W/ C & FOLIC ACID TAB 1 TABLET: TAB at 08:33

## 2022-10-23 RX ADMIN — NICOTINE 1 PATCH: 21 PATCH, EXTENDED RELEASE TRANSDERMAL at 08:37

## 2022-10-23 RX ADMIN — METRONIDAZOLE 500 MG: 500 TABLET ORAL at 06:07

## 2022-10-23 RX ADMIN — PREGABALIN 100 MG: 100 CAPSULE ORAL at 16:07

## 2022-10-23 RX ADMIN — Medication 1 TABLET: at 08:33

## 2022-10-23 RX ADMIN — POTASSIUM CHLORIDE 20 MEQ: 1500 TABLET, EXTENDED RELEASE ORAL at 18:12

## 2022-10-23 RX ADMIN — POTASSIUM CHLORIDE 40 MEQ: 1500 TABLET, EXTENDED RELEASE ORAL at 11:13

## 2022-10-23 RX ADMIN — CITALOPRAM HYDROBROMIDE 20 MG: 20 TABLET ORAL at 08:33

## 2022-10-23 RX ADMIN — TAMSULOSIN HYDROCHLORIDE 0.4 MG: 0.4 CAPSULE ORAL at 16:07

## 2022-10-23 RX ADMIN — FOLIC ACID 1 MG: 1 TABLET ORAL at 08:33

## 2022-10-23 RX ADMIN — ERGOCALCIFEROL 50000 UNITS: 1.25 CAPSULE ORAL at 08:39

## 2022-10-23 RX ADMIN — BACLOFEN 20 MG: 20 TABLET ORAL at 08:33

## 2022-10-23 RX ADMIN — UMECLIDINIUM 1 PUFF: 62.5 AEROSOL, POWDER ORAL at 08:38

## 2022-10-23 RX ADMIN — CEFTRIAXONE 1000 MG: 1 INJECTION, POWDER, FOR SOLUTION INTRAMUSCULAR; INTRAVENOUS at 08:46

## 2022-10-23 RX ADMIN — INSULIN LISPRO 1 UNITS: 100 INJECTION, SOLUTION INTRAVENOUS; SUBCUTANEOUS at 22:21

## 2022-10-23 RX ADMIN — PREGABALIN 100 MG: 100 CAPSULE ORAL at 22:18

## 2022-10-23 RX ADMIN — BACLOFEN 20 MG: 20 TABLET ORAL at 22:18

## 2022-10-23 RX ADMIN — INSULIN LISPRO 15 UNITS: 100 INJECTION, SOLUTION INTRAVENOUS; SUBCUTANEOUS at 16:31

## 2022-10-23 RX ADMIN — SALMETEROL XINAFOATE 1 PUFF: 50 POWDER, METERED ORAL; RESPIRATORY (INHALATION) at 22:18

## 2022-10-23 RX ADMIN — POTASSIUM CHLORIDE 40 MEQ: 1500 TABLET, EXTENDED RELEASE ORAL at 13:25

## 2022-10-23 RX ADMIN — SALMETEROL XINAFOATE 1 PUFF: 50 POWDER, METERED ORAL; RESPIRATORY (INHALATION) at 08:38

## 2022-10-23 NOTE — ASSESSMENT & PLAN NOTE
Wt Readings from Last 3 Encounters:   10/21/22 80 7 kg (178 lb)   04/27/22 80 7 kg (178 lb)   07/23/21 83 kg (183 lb)     · Continue torsemide  Not on BB at baseline  · Last echo showed EF 60% with flail mitral valve leaflet with severe MR, referred to CTS for mitraclip however does not appear he followed up  Last seen in 2020 by Dr Kerry Rojo     · I/O, daily weights  · Monitor volume status

## 2022-10-23 NOTE — QUICK NOTE
Pt with >3 episodes of loose stools since 3pm today, also with soft BP and c/o generalized weakness; pt has not received stool softeners or laxatives  Is on Ceftriaxone + flagyl   Will test for c diff and give gentle IVF 50cc/hr for 8 hours (diastolic HF and severe MR noted)

## 2022-10-23 NOTE — PROGRESS NOTES
[facility]  Progress Note - Bailey Marti 1960, 57 y o  male MRN: [id number]  [facility] Encounter: [id number]  Primary Care Provider: Edmund Panda MD   Date and time admitted to hospital: 10/16/2022  4:28 PM    Stage 3a chronic kidney disease [facility])  Assessment & Plan  Lab Results   Component Value Date    EGFR 94 10/23/2022    EGFR 83 10/22/2022    EGFR 71 10/21/2022    CREATININE 0 80 10/23/2022    CREATININE 0 96 10/22/2022    CREATININE 1 09 10/21/2022   · Renal function overall stable  · Continue to monitor as needed    History of cervical spinal cord injury  Assessment & Plan  In setting cervical spinal cord injury  Walks with walker  · PT/OT recommending rehab    Severe mitral regurgitation  Assessment & Plan  · Was told to see CTS for consideration of mitraclip in 2019 however did not follow up    Hyponatremia  Assessment & Plan  · Initially was found to be in  pseudohyponatremia however blood glucose within normal range  · Likely in setting of dehydration due to diarrhea  Will order 500 cc of normal saline bolus, in background of hyponatremia and hypotension  · Monitor BMP in a m  Chronic combined systolic and diastolic congestive heart failure [facility])  Assessment & Plan  Wt Readings from Last 3 Encounters:   10/21/22 80 7 kg (178 lb)   04/27/22 80 7 kg (178 lb)   07/23/21 83 kg (183 lb)     · Continue torsemide  Not on BB at baseline  · Last echo showed EF 60% with flail mitral valve leaflet with severe MR, referred to CTS for mitraclip however does not appear he followed up  Last seen in 2020 by Dr Melia Joseph  · I/O, daily weights  · Monitor volume status         Essential hypertension  Assessment & Plan  · BP on softer side  · Monitor     Peripheral arterial disease (HCC)  Assessment & Plan  · On plavix and statin at baseline, plavix on hold  · Will resume Plavix in 48 hours post procedure per GI recommendations      Alcohol abuse  Assessment & Plan  · Last drink reportedly 10/11  No clinical concerns for withdrawal, now off CIWA protocol  · Continue thiamine and folate with multivitamins daily  Diabetes mellitus type 2 - Diabetic neuropathy  Assessment & Plan  Lab Results   Component Value Date    HGBA1C 10 1 (H) 10/16/2022       Poorly controlled  · Blood sugar remained labile during hospitalization has endocrinology was consulted, input appreciated  · Recommend continuing Lantus 30units q h s , pre meal 24 units t i d  Continue SSI  · Given pancreatitis, endo recs to stop Soliqua as it contains lixisenatide (a GLP1 receptor agonist)  · Diabetic diet  · Monitor accuchecks and adjust regimen as needed       Ambulatory dysfunction  Assessment & Plan  · Patient walks with a walker, has history of spinal cord injury  · PT/OT recommending rehab, case management is following  Hypokalemia  Assessment & Plan  Potassium 3 0 this morning, will replete with 40 mEq x 2  Monitor BMP in a m  Likely in setting of diarrhea, C diff pending  Tobacco abuse  Assessment & Plan  · Nicotine patch    * Jaundice, obstructive, intrahepatic  Assessment & Plan  Patient presents with jaundice, no other symptoms  Noted 10/13, had just stopped drinking ETOH 10/11  · CT A/P showed pancreas is atrophic and there's mild prominence of main pancreatic duct 5mm 2/2 chronic pancreatitis sequelae  · Acute hepatitis panel negative  · GI following, MRCP showed Obstruction of the extrahepatic bile duct and pancreatic duct in the region of the pancreatic neck  Differential diagnosis includes obstruction from fibrosis of chronic pancreatitis versus mass however no obvious mass or lesion identified  · On abx for possible cholangitis   · S/P ERCP/EUS on 10/21  Found to have chronic fibrotic changes likely from chronic pancreatitis, status post stent placement  Pending final biopsy  · Trend CMP    The bilirubin significantly improved this morning  VTE Pharmacologic Prophylaxis:   Moderate Risk (Score 3-4) - Pharmacological DVT Prophylaxis Ordered: enoxaparin (Lovenox)  Patient Centered Rounds: I performed bedside rounds with nursing staff today  Discussions with Specialists or Other Care Team Provider:     Education and Discussions with Family / Patient: Updated  (wife) at bedside  Time Spent for Care: 30 minutes  More than 50% of total time spent on counseling and coordination of care as described above  Current Length of Stay: 7 day(s)  Current Patient Status: Inpatient   Certification Statement: The patient will continue to require additional inpatient hospital stay due to GI  Discharge Plan: Anticipate discharge in 48-72 hrs to rehab facility  Code Status: Level 3 - DNAR and DNI    Subjective:   Patient seen and examined at bedside  Continues with diarrhea  Objective:     Vitals:   Temp (24hrs), Av 4 °F (36 3 °C), Min:97 3 °F (36 3 °C), Max:97 7 °F (36 5 °C)    Temp:  [97 3 °F (36 3 °C)-97 7 °F (36 5 °C)] 97 3 °F (36 3 °C)  HR:  [77-89] 77  Resp:  [16] 16  BP: ()/(50-74) 99/59  SpO2:  [91 %-97 %] 97 %  Body mass index is 27 06 kg/m²  Input and Output Summary (last 24 hours): Intake/Output Summary (Last 24 hours) at 10/23/2022 1005  Last data filed at 10/23/2022 0842  Gross per 24 hour   Intake 672 5 ml   Output 500 ml   Net 172 5 ml       Physical Exam:   Physical Exam  Constitutional:       Appearance: Normal appearance  He is normal weight  HENT:      Head: Normocephalic and atraumatic  Mouth/Throat:      Mouth: Mucous membranes are dry  Pharynx: Oropharynx is clear  Eyes:      General: Scleral icterus (Improving icterus) present  Extraocular Movements: Extraocular movements intact  Conjunctiva/sclera: Conjunctivae normal    Cardiovascular:      Rate and Rhythm: Normal rate and regular rhythm  Pulses: Normal pulses  Heart sounds: Murmur heard  Pulmonary:      Effort: Pulmonary effort is normal       Breath sounds: Normal breath sounds  Abdominal:      General: Bowel sounds are normal       Palpations: Abdomen is soft  Musculoskeletal:         General: Normal range of motion  Cervical back: Normal range of motion  Skin:     General: Skin is warm and dry  Coloration: Skin is jaundiced (Improving )  Neurological:      Mental Status: He is oriented to person, place, and time  Mental status is at baseline  Psychiatric:         Mood and Affect: Mood normal          Behavior: Behavior normal          Additional Data:     Labs:  Results from last 7 days   Lab Units 10/23/22  0501 10/21/22  0457 10/20/22  0517   WBC Thousand/uL 12 07*   < > 11 11*   HEMOGLOBIN g/dL 11 3*   < > 13 0   HEMATOCRIT % 31 9*   < > 35 9*   PLATELETS Thousands/uL 235   < > 181   NEUTROS PCT %  --   --  78*   LYMPHS PCT %  --   --  7*   MONOS PCT %  --   --  12   EOS PCT %  --   --  1    < > = values in this interval not displayed       Results from last 7 days   Lab Units 10/23/22  0501   SODIUM mmol/L 133*   POTASSIUM mmol/L 3 0*   CHLORIDE mmol/L 103   CO2 mmol/L 23   BUN mg/dL 26*   CREATININE mg/dL 0 80   ANION GAP mmol/L 7   CALCIUM mg/dL 8 4   ALBUMIN g/dL 2 0*   TOTAL BILIRUBIN mg/dL 6 10*   ALK PHOS U/L 392*   ALT U/L 85*   AST U/L 50*   GLUCOSE RANDOM mg/dL 107     Results from last 7 days   Lab Units 10/21/22  0457   INR  1 05     Results from last 7 days   Lab Units 10/23/22  0819 10/22/22  2053 10/22/22  1703 10/22/22  1555 10/22/22  0746 10/21/22  2230 10/21/22  1725 10/21/22  1112 10/21/22  0858 10/21/22  0043 10/20/22  2100 10/20/22  1652   POC GLUCOSE mg/dl 115 119 285* 405* 145* 160* 134 266* 242* 218* 222* 72     Results from last 7 days   Lab Units 10/16/22  1755   HEMOGLOBIN A1C % 10 1*           Lines/Drains:  Invasive Devices  Report    Peripheral Intravenous Line  Duration           Peripheral IV 10/22/22 Right;Ventral (anterior) Forearm 1 day Imaging: No pertinent imaging reviewed      Recent Cultures (last 7 days):         Last 24 Hours Medication List:   Current Facility-Administered Medications   Medication Dose Route Frequency Provider Last Rate   • albuterol  2 5 mg Nebulization Q4H PRN General Julien MD     • aluminum-magnesium hydroxide-simethicone  30 mL Oral Q6H PRN General Julien MD     • baclofen  20 mg Oral TID General Julien MD     • calcium carbonate-vitamin D  1 tablet Oral Daily With Breakfast General Julien MD     • cefTRIAXone  1,000 mg Intravenous Q24H Ania Holloway MD 1,000 mg (10/23/22 0846)   • citalopram  20 mg Oral Daily General Julien MD     • [START ON 10/24/2022] clopidogrel  75 mg Oral Daily Yolanda Caruso MD     • enoxaparin  40 mg Subcutaneous Daily General Julien MD     • ergocalciferol  50,000 Units Oral Weekly General Julien MD     • folic acid  1 mg Oral Daily General Julien MD     • insulin glargine  30 Units Subcutaneous QAM General Julien MD     • insulin lispro  1-5 Units Subcutaneous HS General Julien MD     • insulin lispro  1-6 Units Subcutaneous TID AC General Julien MD     • insulin lispro  24 Units Subcutaneous TID With Meals General Julien MD     • melatonin  3 mg Oral HS Nisa Contreras PA-C     • metroNIDAZOLE  500 mg Oral Q8H Thierno Zee MD     • ELMO ANTIFUNGAL  1 application Topical TID General Julien MD     • multivitamin stress formula  1 tablet Oral Daily General Julien MD     • nicotine  1 patch Transdermal Daily General Julien MD     • nystatin   Topical BID General Julien MD     • ondansetron  4 mg Intravenous Q6H PRN General Julien MD     • potassium chloride  20 mEq Oral BID General Julien MD     • potassium chloride  40 mEq Oral Q2H Yolanda Caruso MD     • pregabalin  100 mg Oral TID General Julien MD     • salmeterol  1 puff Inhalation Q12H [address] Richard Acevedo MD     • sodium chloride  500 mL Intravenous Once Emmanuel Menjivar  mL (10/23/22 6944)   • tamsulosin  0 4 mg Oral Daily With Jorge Luis Car MD     • thiamine  100 mg Oral Daily Richard Acevedo MD     • torsemide  20 mg Oral Daily Richard Acevedo MD     • umeclidinium bromide  1 puff Inhalation Daily Richard Acevedo MD          Today, Patient Was Seen By: Emmanuel Menjivar    **Please Note: This note may have been constructed using a voice recognition system  **

## 2022-10-23 NOTE — ASSESSMENT & PLAN NOTE
Potassium 3 0 this morning, will replete with 40 mEq x 2  Monitor BMP in a m  Likely in setting of diarrhea, C diff pending

## 2022-10-23 NOTE — ASSESSMENT & PLAN NOTE
Lab Results   Component Value Date    EGFR 94 10/23/2022    EGFR 83 10/22/2022    EGFR 71 10/21/2022    CREATININE 0 80 10/23/2022    CREATININE 0 96 10/22/2022    CREATININE 1 09 10/21/2022   · Renal function overall stable  · Continue to monitor as needed

## 2022-10-23 NOTE — ASSESSMENT & PLAN NOTE
Patient presents with jaundice, no other symptoms  Noted 10/13, had just stopped drinking ETOH 10/11  · CT A/P showed pancreas is atrophic and there's mild prominence of main pancreatic duct 5mm 2/2 chronic pancreatitis sequelae  · Acute hepatitis panel negative  · GI following, MRCP showed Obstruction of the extrahepatic bile duct and pancreatic duct in the region of the pancreatic neck  Differential diagnosis includes obstruction from fibrosis of chronic pancreatitis versus mass however no obvious mass or lesion identified  · On abx for possible cholangitis   · S/P ERCP/EUS on 10/21  Found to have chronic fibrotic changes likely from chronic pancreatitis, status post stent placement  Pending final biopsy  · Trend CMP  The bilirubin significantly improved this morning

## 2022-10-23 NOTE — ASSESSMENT & PLAN NOTE
· On plavix and statin at baseline, plavix on hold  · Will resume Plavix in 48 hours post procedure per GI recommendations

## 2022-10-23 NOTE — ASSESSMENT & PLAN NOTE
· Initially was found to be in  pseudohyponatremia however blood glucose within normal range  · Likely in setting of dehydration due to diarrhea  Will order 500 cc of normal saline bolus, in background of hyponatremia and hypotension  · Monitor BMP in a m

## 2022-10-23 NOTE — PROGRESS NOTES
Progress Note - Derek Godoy 57 y o  male MRN: [id number]    Unit/Bed#: [facility]PHP 810-01 Encounter: [id number]      CC: diabetes f/u    Subjective: This is a 57y o -year-old male with Type 2 DM on long-term insulin with PAD, CKD and diabetic neuropathy, chronic pancreatitis admitted with obstructive jaundice  Objective:     Vitals: Blood pressure 99/59, pulse 77, temperature (!) 97 3 °F (36 3 °C), resp  rate 16, height 5' 8" (1 727 m), weight 80 7 kg (178 lb), SpO2 97 %  ,Body mass index is 27 06 kg/m²  Intake/Output Summary (Last 24 hours) at 10/23/2022 1229  Last data filed at 10/23/2022 1203  Gross per 24 hour   Intake 1262 5 ml   Output 200 ml   Net 1062 5 ml       Physical Exam:  General Appearance: awake, appears stated age and cooperative  Head: Normocephalic, without obvious abnormality, atraumatic  Extremities: moves all extremities  Skin: Skin color and temperature normal    Pulm: no labored breathing        POC Glucose (mg/dl)   Date Value   10/23/2022 227 (H)   10/23/2022 115   10/22/2022 119   10/22/2022 285 (H)   10/22/2022 405 (H)   10/22/2022 145 (H)   10/21/2022 160 (H)   10/21/2022 134   10/21/2022 266 (H)   10/21/2022 242 (H)       Assessment: This is a 57y o -year-old male with Type 2 DM on long-term insulin with PAD, CKD and diabetic neuropathy, chronic pancreatitis admitted with obstructive jaundice       Plan:  # Type 2 diabetes with uncontrolled hyperglycemia  # Hypoglycemia     A1c 10 1 (10/2022)  Home regimen:  Insulin Lantus ? 24-30 units at bedtime, Humalog 24 units t i d  With meals   Current regimen :Lantus 30 units AM and lispro 24 units TID  Pt has not been getting lispro 24 units tid due to episode of hypoglycemia, recommend to decrease lispro to 15 units tid  Continue with accu checks

## 2022-10-23 NOTE — PLAN OF CARE
Problem: PAIN - ADULT  Goal: Verbalizes/displays adequate comfort level or baseline comfort level  Description: Interventions:  - Encourage patient to monitor pain and request assistance  - Assess pain using appropriate pain scale  - Administer analgesics based on type and severity of pain and evaluate response  - Implement non-pharmacological measures as appropriate and evaluate response  - Consider cultural and social influences on pain and pain management  - Notify physician/advanced practitioner if interventions unsuccessful or patient reports new pain  Outcome: Progressing     Problem: INFECTION - ADULT  Goal: Absence or prevention of progression during hospitalization  Description: INTERVENTIONS:  - Assess and monitor for signs and symptoms of infection  - Monitor lab/diagnostic results  - Monitor all insertion sites, i e  indwelling lines, tubes, and drains  - Monitor endotracheal if appropriate and nasal secretions for changes in amount and color  - [place] appropriate cooling/warming therapies per order  - Administer medications as ordered  - Instruct and encourage patient and family to use good hand hygiene technique  - Identify and instruct in appropriate isolation precautions for identified infection/condition  Outcome: Progressing

## 2022-10-24 ENCOUNTER — TELEPHONE (OUTPATIENT)
Dept: GASTROENTEROLOGY | Facility: CLINIC | Age: 62
End: 2022-10-24

## 2022-10-24 LAB
ALBUMIN SERPL BCP-MCNC: 2 G/DL (ref 3.5–5)
ALP SERPL-CCNC: 359 U/L (ref 46–116)
ALT SERPL W P-5'-P-CCNC: 68 U/L (ref 12–78)
ANION GAP SERPL CALCULATED.3IONS-SCNC: 5 MMOL/L (ref 4–13)
AST SERPL W P-5'-P-CCNC: 41 U/L (ref 5–45)
BILIRUB SERPL-MCNC: 4.8 MG/DL (ref 0.2–1)
BUN SERPL-MCNC: 19 MG/DL (ref 5–25)
CALCIUM ALBUM COR SERPL-MCNC: 10.1 MG/DL (ref 8.3–10.1)
CALCIUM SERPL-MCNC: 8.5 MG/DL (ref 8.3–10.1)
CHLORIDE SERPL-SCNC: 106 MMOL/L (ref 96–108)
CO2 SERPL-SCNC: 23 MMOL/L (ref 21–32)
CREAT SERPL-MCNC: 0.69 MG/DL (ref 0.6–1.3)
ERYTHROCYTE [DISTWIDTH] IN BLOOD BY AUTOMATED COUNT: 16.8 % (ref 11.6–15.1)
GFR SERPL CREATININE-BSD FRML MDRD: 100 ML/MIN/1.73SQ M
GLUCOSE SERPL-MCNC: 127 MG/DL (ref 65–140)
GLUCOSE SERPL-MCNC: 160 MG/DL (ref 65–140)
GLUCOSE SERPL-MCNC: 173 MG/DL (ref 65–140)
GLUCOSE SERPL-MCNC: 300 MG/DL (ref 65–140)
GLUCOSE SERPL-MCNC: 300 MG/DL (ref 65–140)
HCT VFR BLD AUTO: 31.8 % (ref 36.5–49.3)
HGB BLD-MCNC: 11.4 G/DL (ref 12–17)
MCH RBC QN AUTO: 37.1 PG (ref 26.8–34.3)
MCHC RBC AUTO-ENTMCNC: 35.8 G/DL (ref 31.4–37.4)
MCV RBC AUTO: 104 FL (ref 82–98)
PLATELET # BLD AUTO: 252 THOUSANDS/UL (ref 149–390)
PMV BLD AUTO: 12.7 FL (ref 8.9–12.7)
POTASSIUM SERPL-SCNC: 3.8 MMOL/L (ref 3.5–5.3)
PROT SERPL-MCNC: 6.3 G/DL (ref 6.4–8.4)
RBC # BLD AUTO: 3.07 MILLION/UL (ref 3.88–5.62)
SODIUM SERPL-SCNC: 134 MMOL/L (ref 135–147)
WBC # BLD AUTO: 12.62 THOUSAND/UL (ref 4.31–10.16)

## 2022-10-24 PROCEDURE — 99232 SBSQ HOSP IP/OBS MODERATE 35: CPT | Performed by: STUDENT IN AN ORGANIZED HEALTH CARE EDUCATION/TRAINING PROGRAM

## 2022-10-24 PROCEDURE — 80053 COMPREHEN METABOLIC PANEL: CPT | Performed by: STUDENT IN AN ORGANIZED HEALTH CARE EDUCATION/TRAINING PROGRAM

## 2022-10-24 PROCEDURE — 82948 REAGENT STRIP/BLOOD GLUCOSE: CPT

## 2022-10-24 PROCEDURE — 85027 COMPLETE CBC AUTOMATED: CPT | Performed by: STUDENT IN AN ORGANIZED HEALTH CARE EDUCATION/TRAINING PROGRAM

## 2022-10-24 RX ORDER — INSULIN LISPRO 100 [IU]/ML
18 INJECTION, SOLUTION INTRAVENOUS; SUBCUTANEOUS
Status: DISCONTINUED | OUTPATIENT
Start: 2022-10-25 | End: 2022-10-27 | Stop reason: HOSPADM

## 2022-10-24 RX ORDER — LOPERAMIDE HYDROCHLORIDE 2 MG/1
2 CAPSULE ORAL 3 TIMES DAILY PRN
Status: DISCONTINUED | OUTPATIENT
Start: 2022-10-24 | End: 2022-10-27 | Stop reason: HOSPADM

## 2022-10-24 RX ADMIN — MICONAZOLE NITRATE 1 APPLICATION: 20 CREAM TOPICAL at 17:35

## 2022-10-24 RX ADMIN — METRONIDAZOLE 500 MG: 500 TABLET ORAL at 21:29

## 2022-10-24 RX ADMIN — METRONIDAZOLE 500 MG: 500 TABLET ORAL at 04:49

## 2022-10-24 RX ADMIN — NYSTATIN: 100000 POWDER TOPICAL at 17:35

## 2022-10-24 RX ADMIN — Medication 3 MG: at 21:29

## 2022-10-24 RX ADMIN — LOPERAMIDE HYDROCHLORIDE 2 MG: 2 CAPSULE ORAL at 16:00

## 2022-10-24 RX ADMIN — INSULIN GLARGINE 30 UNITS: 100 INJECTION, SOLUTION SUBCUTANEOUS at 09:21

## 2022-10-24 RX ADMIN — UMECLIDINIUM 1 PUFF: 62.5 AEROSOL, POWDER ORAL at 09:14

## 2022-10-24 RX ADMIN — SALMETEROL XINAFOATE 1 PUFF: 50 POWDER, METERED ORAL; RESPIRATORY (INHALATION) at 09:33

## 2022-10-24 RX ADMIN — PREGABALIN 100 MG: 100 CAPSULE ORAL at 16:00

## 2022-10-24 RX ADMIN — TORSEMIDE 20 MG: 20 TABLET ORAL at 09:35

## 2022-10-24 RX ADMIN — INSULIN LISPRO 15 UNITS: 100 INJECTION, SOLUTION INTRAVENOUS; SUBCUTANEOUS at 09:31

## 2022-10-24 RX ADMIN — TAMSULOSIN HYDROCHLORIDE 0.4 MG: 0.4 CAPSULE ORAL at 16:00

## 2022-10-24 RX ADMIN — NYSTATIN: 100000 POWDER TOPICAL at 09:15

## 2022-10-24 RX ADMIN — PREGABALIN 100 MG: 100 CAPSULE ORAL at 09:14

## 2022-10-24 RX ADMIN — SALMETEROL XINAFOATE 1 PUFF: 50 POWDER, METERED ORAL; RESPIRATORY (INHALATION) at 21:30

## 2022-10-24 RX ADMIN — BACLOFEN 20 MG: 20 TABLET ORAL at 16:00

## 2022-10-24 RX ADMIN — BACLOFEN 20 MG: 20 TABLET ORAL at 21:30

## 2022-10-24 RX ADMIN — POTASSIUM CHLORIDE 20 MEQ: 1500 TABLET, EXTENDED RELEASE ORAL at 17:34

## 2022-10-24 RX ADMIN — METRONIDAZOLE 500 MG: 500 TABLET ORAL at 16:00

## 2022-10-24 RX ADMIN — POTASSIUM CHLORIDE 20 MEQ: 1500 TABLET, EXTENDED RELEASE ORAL at 09:14

## 2022-10-24 RX ADMIN — INSULIN LISPRO 15 UNITS: 100 INJECTION, SOLUTION INTRAVENOUS; SUBCUTANEOUS at 12:33

## 2022-10-24 RX ADMIN — PREGABALIN 100 MG: 100 CAPSULE ORAL at 21:30

## 2022-10-24 RX ADMIN — MICONAZOLE NITRATE 1 APPLICATION: 20 CREAM TOPICAL at 21:32

## 2022-10-24 RX ADMIN — BACLOFEN 20 MG: 20 TABLET ORAL at 09:14

## 2022-10-24 RX ADMIN — B-COMPLEX W/ C & FOLIC ACID TAB 1 TABLET: TAB at 09:14

## 2022-10-24 RX ADMIN — FOLIC ACID 1 MG: 1 TABLET ORAL at 09:14

## 2022-10-24 RX ADMIN — INSULIN LISPRO 1 UNITS: 100 INJECTION, SOLUTION INTRAVENOUS; SUBCUTANEOUS at 09:32

## 2022-10-24 RX ADMIN — CITALOPRAM HYDROBROMIDE 20 MG: 20 TABLET ORAL at 09:14

## 2022-10-24 RX ADMIN — Medication 1 TABLET: at 09:31

## 2022-10-24 RX ADMIN — NICOTINE 1 PATCH: 21 PATCH, EXTENDED RELEASE TRANSDERMAL at 09:15

## 2022-10-24 RX ADMIN — ENOXAPARIN SODIUM 40 MG: 40 INJECTION SUBCUTANEOUS at 09:14

## 2022-10-24 RX ADMIN — CEFTRIAXONE 1000 MG: 1 INJECTION, POWDER, FOR SOLUTION INTRAMUSCULAR; INTRAVENOUS at 09:22

## 2022-10-24 RX ADMIN — THIAMINE HCL TAB 100 MG 100 MG: 100 TAB at 09:14

## 2022-10-24 RX ADMIN — INSULIN LISPRO 4 UNITS: 100 INJECTION, SOLUTION INTRAVENOUS; SUBCUTANEOUS at 12:33

## 2022-10-24 RX ADMIN — INSULIN LISPRO 3 UNITS: 100 INJECTION, SOLUTION INTRAVENOUS; SUBCUTANEOUS at 21:35

## 2022-10-24 RX ADMIN — CLOPIDOGREL BISULFATE 75 MG: 75 TABLET ORAL at 09:14

## 2022-10-24 RX ADMIN — MICONAZOLE NITRATE 1 APPLICATION: 20 CREAM TOPICAL at 09:15

## 2022-10-24 NOTE — PROGRESS NOTES
This is a 57y o -year-old male with Type 2 DM on long-term insulin with PAD, CKD, diabetic neuropathy, chronic pancreatitis admitted with obstructive jaundice s/p pancreatic duct stent placement    - Chart and blood glucose trends reviewed  - Increase lispro to 18U from 15U with meals and cont with Lantus 30U QAM and correctional scale algo 3 with meals and algo 1 at bedtime  - will cont to monitor BG and make adjustments as needed

## 2022-10-24 NOTE — TELEPHONE ENCOUNTER
----- Message from [facility] sent at 10/24/2022  7:32 AM EDT -----  Regarding: FW: Follow up    ----- Message -----  From: Jose Benton DO  Sent: 10/22/2022  12:55 PM EDT  To: Jerica Monzon MD, [facility]  Subject: Follow up                                        Please schedule patient for outpatient follow up in 2 weeks with Dr Narciso Chung

## 2022-10-24 NOTE — TELEPHONE ENCOUNTER
Called and left voicemail in regards to scheduling a follow up appointment  I advsied pt to call back to schedule f/u w/ Dr Arias

## 2022-10-24 NOTE — PLAN OF CARE
Problem: MOBILITY - ADULT  Goal: Maintain or return to baseline ADL function  Description: INTERVENTIONS:  -  Assess patient's ability to carry out ADLs; assess patient's baseline for ADL function and identify physical deficits which impact ability to perform ADLs (bathing, care of mouth/teeth, toileting, grooming, dressing, etc )  - Assess/evaluate cause of self-care deficits   - Assess range of motion  - Assess patient's mobility; develop plan if impaired  - Assess patient's need for assistive devices and provide as appropriate  - Encourage maximum independence but intervene and supervise when necessary  - Involve family in performance of ADLs  - Assess for home care needs following discharge   - Consider OT consult to assist with ADL evaluation and planning for discharge  - Provide patient education as appropriate  Outcome: Progressing  Goal: Maintains/Returns to pre admission functional level  Description: INTERVENTIONS:  - Perform BMAT or MOVE assessment daily    - Set and communicate daily mobility goal to care team and patient/family/caregiver  - Collaborate with rehabilitation services on mobility goals if consulted  - Perform Range of Motion 6 times a day  - Reposition patient every 2 hours    - Dangle patient 6 times a day  - Stand patient 6 times a day  - Ambulate patient 6 times a day  - Out of bed to chair 6 times a day   - Out of bed for meals 6 times a day  - Out of bed for toileting  - Record patient progress and toleration of activity level   Outcome: Progressing     Problem: PAIN - ADULT  Goal: Verbalizes/displays adequate comfort level or baseline comfort level  Description: Interventions:  - Encourage patient to monitor pain and request assistance  - Assess pain using appropriate pain scale  - Administer analgesics based on type and severity of pain and evaluate response  - Implement non-pharmacological measures as appropriate and evaluate response  - Consider cultural and social influences on pain and pain management  - Notify physician/advanced practitioner if interventions unsuccessful or patient reports new pain  Outcome: Progressing     Problem: INFECTION - ADULT  Goal: Absence or prevention of progression during hospitalization  Description: INTERVENTIONS:  - Assess and monitor for signs and symptoms of infection  - Monitor lab/diagnostic results  - Monitor all insertion sites, i e  indwelling lines, tubes, and drains  - Monitor endotracheal if appropriate and nasal secretions for changes in amount and color  - [place] appropriate cooling/warming therapies per order  - Administer medications as ordered  - Instruct and encourage patient and family to use good hand hygiene technique  - Identify and instruct in appropriate isolation precautions for identified infection/condition  Outcome: Progressing  Goal: Absence of fever/infection during neutropenic period  Description: INTERVENTIONS:  - Monitor WBC    Outcome: Progressing     Problem: SAFETY ADULT  Goal: Maintain or return to baseline ADL function  Description: INTERVENTIONS:  -  Assess patient's ability to carry out ADLs; assess patient's baseline for ADL function and identify physical deficits which impact ability to perform ADLs (bathing, care of mouth/teeth, toileting, grooming, dressing, etc )  - Assess/evaluate cause of self-care deficits   - Assess range of motion  - Assess patient's mobility; develop plan if impaired  - Assess patient's need for assistive devices and provide as appropriate  - Encourage maximum independence but intervene and supervise when necessary  - Involve family in performance of ADLs  - Assess for home care needs following discharge   - Consider OT consult to assist with ADL evaluation and planning for discharge  - Provide patient education as appropriate  Outcome: Progressing  Goal: Maintains/Returns to pre admission functional level  Description: INTERVENTIONS:  - Perform BMAT or MOVE assessment daily    - Set and communicate daily mobility goal to care team and patient/family/caregiver  - Collaborate with rehabilitation services on mobility goals if consulted  - Perform Range of Motion 6 times a day  - Reposition patient every 2 hours    - Dangle patient 6 times a day  - Stand patient 6 times a day  - Ambulate patient 6 times a day  - Out of bed to chair 6 times a day   - Out of bed for meals 6 times a day  - Out of bed for toileting  - Record patient progress and toleration of activity level   Outcome: Progressing  Goal: Patient will remain free of falls  Description: INTERVENTIONS:  - Educate patient/family on patient safety including physical limitations  - Instruct patient to call for assistance with activity   - Consult OT/PT to assist with strengthening/mobility   - Keep Call bell within reach  - Keep bed low and locked with side rails adjusted as appropriate  - Keep care items and personal belongings within reach  - Initiate and maintain comfort rounds  - Make Fall Risk Sign visible to staff  - Offer Toileting every 2 Hours, in advance of need  - Initiate/Maintain bed alarm  - Obtain necessary fall risk management equipment: bed/chair alarm  - Apply yellow socks and bracelet for high fall risk patients  - Consider moving patient to room near nurses station  Outcome: Progressing     Problem: DISCHARGE PLANNING  Goal: Discharge to home or other facility with appropriate resources  Description: INTERVENTIONS:  - Identify barriers to discharge w/patient and caregiver  - Arrange for needed discharge resources and transportation as appropriate  - Identify discharge learning needs (meds, wound care, etc )  - Arrange for interpretive services to assist at discharge as needed  - Refer to Case Management Department for coordinating discharge planning if the patient needs post-hospital services based on physician/advanced practitioner order or complex needs related to functional status, cognitive ability, or social support system  Outcome: Progressing     Problem: Prexisting or High Potential for Compromised Skin Integrity  Goal: Skin integrity is maintained or improved  Description: INTERVENTIONS:  - Identify patients at risk for skin breakdown  - Assess and monitor skin integrity  - Assess and monitor nutrition and hydration status  - Monitor labs   - Assess for incontinence   - Turn and reposition patient  - Assist with mobility/ambulation  - Relieve pressure over bony prominences  - Avoid friction and shearing  - Provide appropriate hygiene as needed including keeping skin clean and dry  - Evaluate need for skin moisturizer/barrier cream  - Collaborate with interdisciplinary team   - Patient/family teaching  - Consider wound care consult   Outcome: Progressing     Problem: Potential for Falls  Goal: Patient will remain free of falls  Description: INTERVENTIONS:  - Educate patient/family on patient safety including physical limitations  - Instruct patient to call for assistance with activity   - Consult OT/PT to assist with strengthening/mobility   - Keep Call bell within reach  - Keep bed low and locked with side rails adjusted as appropriate  - Keep care items and personal belongings within reach  - Initiate and maintain comfort rounds  - Make Fall Risk Sign visible to staff  - Offer Toileting every 2 Hours, in advance of need  - Initiate/Maintain bed alarm  - Obtain necessary fall risk management equipment: bed/chair alarm  - Apply yellow socks and bracelet for high fall risk patients  - Consider moving patient to room near nurses station  Outcome: Progressing     Problem: Knowledge Deficit  Goal: Patient/family/caregiver demonstrates understanding of disease process, treatment plan, medications, and discharge instructions  Description: Complete learning assessment and assess knowledge base    Interventions:  - Provide teaching at level of understanding  - Provide teaching via preferred learning methods  Outcome: Progressing Problem: Nutrition/Hydration-ADULT  Goal: Nutrient/Hydration intake appropriate for improving, restoring or maintaining nutritional needs  Description: Monitor and assess patient's nutrition/hydration status for malnutrition  Collaborate with interdisciplinary team and initiate plan and interventions as ordered  Monitor patient's weight and dietary intake as ordered or per policy  Utilize nutrition screening tool and intervene as necessary  Determine patient's food preferences and provide high-protein, high-caloric foods as appropriate       INTERVENTIONS:  - Monitor oral intake, urinary output, labs, and treatment plans  - Assess nutrition and hydration status and recommend course of action  - Evaluate amount of meals eaten  - Assist patient with eating if necessary   - Allow adequate time for meals  - Recommend/ encourage appropriate diets, oral nutritional supplements, and vitamin/mineral supplements  - Order, calculate, and assess calorie counts as needed  - Recommend, monitor, and adjust tube feedings and TPN/PPN based on assessed needs  - Assess need for intravenous fluids  - Provide specific nutrition/hydration education as appropriate  - Include patient/family/caregiver in decisions related to nutrition  Outcome: Progressing

## 2022-10-24 NOTE — CASE MANAGEMENT
Case Management Discharge Planning Note    Patient name Karlo Delatorre PPHP 810/PPHP [id number] MRN [id number]  : 1960 Date 10/24/2022       Current Admission Date: 10/16/2022  Current Admission Diagnosis:Jaundice, obstructive, intrahepatic   Patient Active Problem List    Diagnosis Date Noted   • Dysuria 10/18/2022   • Jaundice, obstructive, intrahepatic 10/16/2022   • Stage 3a chronic kidney disease ([facility] [address] ) 2021   • Scalp laceration, initial encounter 2020   • Aortic root dilation ([facility] ) 2019   • BPH (benign prostatic hyperplasia) 2019   • History of cervical spinal cord injury 2019   • Chronic combined systolic and diastolic congestive heart failure ([place] [address] ) 2019   • Hyponatremia 2019   • Obstructive uropathy 2019   • Non-rheumatic mitral regurgitation 2019   • Severe mitral regurgitation 2019   • Esophageal thickening 10/12/2018   • Sacral wound, initial encounter 10/10/2018   • Postoperative state 2018   • Non-healing surgical wound of right groin 2018   • Wound dehiscence, surgical, initial encounter 2018   • Acute blood loss anemia 2018   • Constipation 2018   • Essential hypertension 2018   • Peripheral arterial disease ([facility] ) 2018   • Ischemic foot pain at rest 2018   • Cellulitis of leg, right 2018   • PAD 2018   • Hypercalcemia 2018   • Alcohol abuse 2018   • Ambulatory dysfunction 2018   • Lumbar radiculopathy 2018   • Diabetes mellitus type 2 - Diabetic neuropathy 2018   • Basal cell carcinoma in situ of skin 03/15/2018   • Hypokalemia 03/15/2018   • Chronic pain 2017   • Hernia, epigastric 2017   • Cervical myelopathy ([facility] ) 2016   • Depression 2016   • Diabetic neuropathy ([facility] ) 2016   • Injury of cervical spinal cord ([facility] )    • Umbilical hernia 2951   • Tobacco abuse 2013   • Quadriplegia and quadriparesis ([facility] [address] ) 08/23/2013   • Enlarged prostate with lower urinary tract symptoms (LUTS) 07/03/2012      LOS (days): 8  Geometric Mean LOS (GMLOS) (days): 3 00  Days to GMLOS:-4 8     OBJECTIVE:  Risk of Unplanned Readmission Score: 17 08         Current admission status: Inpatient   Preferred Pharmacy:   [facility]  ([address], [address]  [address] [address]  Phone: [phone] Fax: [address] [facility] [address], [address] [facility] [address]  Phone: [phone] Fax: [phone]    [facility] ([facility]) - [address],[facility]  [address]  Phone: [phone] Fax: [phone]    Primary Care Provider: Edmund Panda MD    Primary Insurance: [facility]  Secondary Insurance:     DISCHARGE DETAILS:                                          Other Referral/Resources/Interventions Provided:  Referral Comments: Pt is medically stable per SLIM report  CM reviewed accepting facilities with pt  who deferred the decision to his wife  Pt called pt's spouse, who decided on [facility]  CM reserved facility and will submit for auth when new therapy notes are available

## 2022-10-24 NOTE — ASSESSMENT & PLAN NOTE
Lab Results   Component Value Date    HGBA1C 10 1 (H) 10/16/2022       Poorly controlled  · Blood sugar remained labile during hospitalization has endocrinology was consulted, input appreciated  · Recommend continuing Lantus 30units q h s , pre meal 15 units t i d  Continue SSI    · Given pancreatitis, endo recs to stop Soliqua as it contains lixisenatide (a GLP1 receptor agonist)  · Diabetic diet  · Monitor accuchecks and adjust regimen as needed

## 2022-10-24 NOTE — ASSESSMENT & PLAN NOTE
· Initially was found to be in  pseudohyponatremia however blood glucose within normal range  · Likely in setting of dehydration due to diarrhea  S status post 500 cc bolus of normal saline on 10/23  Will avoid further IV resuscitation in setting of severe mitral regurg  · C diff negative, will place loperamide as needed for diarrhea  · Monitor BMP in a m

## 2022-10-24 NOTE — ASSESSMENT & PLAN NOTE
Wt Readings from Last 3 Encounters:   10/21/22 80 7 kg (178 lb)   04/27/22 80 7 kg (178 lb)   07/23/21 83 kg (183 lb)     · Continue torsemide  Not on BB at baseline  · Last echo showed EF 60% with flail mitral valve leaflet with severe MR, referred to CTS for mitraclip however does not appear he followed up  Last seen in 2020 by Dr Zina Amador     · I/O, daily weights  · Monitor volume status

## 2022-10-24 NOTE — WOUND OSTOMY CARE
Progress Note - Wound   Stevo Dire 57 y o  male MRN: [id number]  Unit/Bed#: [facility] 810-01 Encounter: [id number]        Assessment:   Patient seen today for wound care follow up visit  Patient is min assist with turning from side to side  Patient is continent of bowel and bladder  Patient is independent with meals         1  MASD groin/buttocks- excoriation and hyperpigmentation from contact with moisture  ELMO applied       2  B/L elbows callus- patient states he leans on his elbows when he rolling his cigarettes and they are now callused       3  Diabetic ulcer left lateral foot- 100% moist, yellow slough tissue, moderate amount of drainage       4  Diabetic ulcer left heel- intact, yellow scab, fragile, no drainage  5  Left leg wound- scab has fallen off and revealed partial thickness wound with pink tissue, scant drainage       No induration, fluctuance, odor, warmth/temperature differences, redness, or purulence noted to the above noted wounds and skin areas assessed  New dressings applied per orders listed below  Patient tolerated well- no s/s of non-verbal pain or discomfort observed during the encounter  Bedside nurse aware of plan of care  See flow sheets for more detailed assessment findings  Wound care will continue to follow  Skin care plans:   1-Apply ELMO to sacrum/buttocks/groin TID and PRN   2-Cleanse left lateral foot/leg and heel with soap and water, apply silver alginate to left lateral foot and cover foot and heel with small clover allevyn foam  Change every other day and PRN soilage/displacement  3-Elevate heels to offload pressure   4-Ehob cushion when out of bed  5-Turn/repoisiton q2h or when medically stable for pressure re-distribution on skin     6-Moisturize skin daily with skin nourishing cream      Wound 10/17/22 Groin (Active)   Wound Image   10/24/22 0934   Wound Description Epithelialization 10/24/22 0934   Lucy-wound Assessment Excoriated 10/24/22 0934   Wound Length (cm) 0 cm 10/24/22 0934   Wound Width (cm) 0 cm 10/24/22 0934   Wound Depth (cm) 0 cm 10/24/22 0934   Wound Surface Area (cm^2) 0 cm^2 10/24/22 0934   Wound Volume (cm^3) 0 cm^3 10/24/22 0934   Calculated Wound Volume (cm^3) 0 cm^3 10/24/22 0934   Tunneling 0 cm 10/24/22 0934   Tunneling in depth located at 0 10/24/22 0934   Undermining 0 10/24/22 0934   Undermining is depth extending from 0 10/24/22 0934   Wound Site Closure GURU 10/24/22 0934   Drainage Amount None 10/24/22 0934   Non-staged Wound Description Not applicable 68/59/02 8344   Treatments Cleansed 10/24/22 0934   Dressing Moisture barrier 10/24/22 0934   Wound packed? No 10/24/22 0934   Packing- # removed 0 10/24/22 0934   Packing- # inserted 0 10/24/22 0775   Dressing Changed New 10/24/22 0934   Patient Tolerance Tolerated well 10/24/22 0934   Dressing Status Clean;Dry; Intact 10/24/22 0934       Wound 10/17/22 Elbow Anterior; Left (Active)   Wound Image   10/17/22 1205   Wound Description Dry 10/23/22 1112   Lucy-wound Assessment Callus 10/23/22 1112   Wound Length (cm) 0 cm 10/17/22 1205   Wound Width (cm) 0 cm 10/17/22 1205   Wound Depth (cm) 0 cm 10/17/22 1205   Wound Surface Area (cm^2) 0 cm^2 10/17/22 1205   Wound Volume (cm^3) 0 cm^3 10/17/22 1205   Calculated Wound Volume (cm^3) 0 cm^3 10/17/22 1205   Tunneling 0 cm 10/17/22 1205   Tunneling in depth located at 0 10/17/22 1205   Undermining 0 10/17/22 1205   Undermining is depth extending from 0 10/17/22 1205   Wound Site Closure GURU 10/17/22 1205   Drainage Amount None 10/17/22 1205   Non-staged Wound Description Not applicable 62/23/42 7391   Treatments Cleansed 10/18/22 0806   Dressing Open to air 10/22/22 1630   Wound packed? No 10/17/22 1205   Packing- # removed 0 10/17/22 1205   Packing- # inserted 0 10/17/22 1205   Dressing Changed New 10/17/22 1205   Patient Tolerance Tolerated well 10/17/22 1205   Dressing Status Clean;Dry; Intact 10/21/22 2100       Wound 10/17/22 Diabetic Ulcer Foot Anterior; Left (Active)   Wound Image   10/24/22 0931   Wound Description [facility] 10/24/22 0931   Lucy-wound Assessment Clean;Dry; Intact 10/24/22 0931   Wound Length (cm) 0 8 cm 10/24/22 0931   Wound Width (cm) 0 6 cm 10/24/22 0931   Wound Depth (cm) 0 1 cm 10/24/22 0931   Wound Surface Area (cm^2) 0 48 cm^2 10/24/22 0931   Wound Volume (cm^3) 0 048 cm^3 10/24/22 0931   Calculated Wound Volume (cm^3) 0 05 cm^3 10/24/22 0931   Change in Wound Size % 0 10/24/22 0931   Tunneling 0 cm 10/24/22 0931   Tunneling in depth located at 0 10/24/22 0931   Undermining 0 10/24/22 0931   Undermining is depth extending from 0 10/24/22 0931   Wound Site Closure GURU 10/24/22 0931   Drainage Amount Small 10/24/22 0931   Drainage Description Serosanguineous 10/24/22 0931   Non-staged Wound Description Full thickness 10/24/22 0931   Treatments Cleansed 10/24/22 0931   Dressing Calcium Alginate; Foam, Silicon (eg  Allevyn, etc) 10/24/22 0931   Wound packed? No 10/24/22 0931   Packing- # removed 0 10/24/22 0931   Packing- # inserted 0 10/24/22 0931   Dressing Changed New 10/24/22 0931   Patient Tolerance Tolerated well 10/24/22 0931   Dressing Status Clean;Dry; Intact 10/24/22 0931       Wound 10/17/22 Diabetic Ulcer Heel Left (Active)   Wound Image   10/24/22 0931   Wound Description [facility] 10/24/22 0931   Lucy-wound Assessment Clean;Dry; Intact 10/24/22 0931   Wound Length (cm) 1 cm 10/24/22 0931   Wound Width (cm) 1 cm 10/24/22 0931   Wound Depth (cm) 0 cm 10/24/22 0931   Wound Surface Area (cm^2) 1 cm^2 10/24/22 0931   Wound Volume (cm^3) 0 cm^3 10/24/22 0931   Calculated Wound Volume (cm^3) 0 cm^3 10/24/22 0931   Tunneling 0 cm 10/24/22 0931   Tunneling in depth located at 0 10/24/22 0931   Undermining 0 10/24/22 0931   Undermining is depth extending from 0 10/24/22 0931   Wound Site Closure GURU 10/24/22 0931   Drainage Amount None 10/24/22 0931   Non-staged Wound Description Not applicable 63/59/39 9611   Treatments Cleansed 10/24/22 7532   Dressing Foam, Silicon (eg  Allevyn, etc) 10/24/22 0931   Wound packed? No 10/24/22 0931   Packing- # removed 0 10/24/22 0931   Packing- # inserted 0 10/24/22 0931   Dressing Changed New 10/24/22 0931   Patient Tolerance Tolerated well 10/24/22 0931   Dressing Status Clean;Dry; Intact 10/24/22 0931       Wound 10/19/22 Pretibial Distal;Left (Active)   Wound Image   10/24/22 0930   Wound Description Pink 10/24/22 0930   Lucy-wound Assessment Scaly 10/24/22 0930   Wound Length (cm) 1 cm 10/24/22 0930   Wound Width (cm) 1 cm 10/24/22 0930   Wound Depth (cm) 0 1 cm 10/24/22 0930   Wound Surface Area (cm^2) 1 cm^2 10/24/22 0930   Wound Volume (cm^3) 0 1 cm^3 10/24/22 0930   Calculated Wound Volume (cm^3) 0 1 cm^3 10/24/22 0930   Tunneling 0 cm 10/24/22 0930   Tunneling in depth located at 0 10/24/22 0930   Undermining 0 10/24/22 0930   Undermining is depth extending from 0 10/24/22 0930   Wound Site Closure GURU 10/24/22 0930   Drainage Amount Scant 10/24/22 0930   Drainage Description Serosanguineous 10/24/22 0930   Non-staged Wound Description Partial thickness 10/24/22 0930   Treatments Cleansed;Site care 10/21/22 0200   Dressing Foam, Silicon (eg  Allevyn, etc) 10/24/22 0930   Wound packed? No 10/24/22 0930   Packing- # removed 0 10/24/22 0930   Packing- # inserted 0 10/24/22 0930   Dressing Changed New 10/24/22 0930   Patient Tolerance Tolerated well 10/24/22 0930   Dressing Status Clean;Dry; Intact 10/24/22 0930         Kely Monroe BSN, RN, [facility]

## 2022-10-24 NOTE — ASSESSMENT & PLAN NOTE
· On plavix and statin at baseline, plavix on hold  · Ok to resume Plavix in 48 hours post procedure per GI recommendations  · Will resume Plavix today

## 2022-10-24 NOTE — PROGRESS NOTES
[address] [facility]  Progress Note - Jamie Correia 1960, 57 y o  male MRN: [id number]  Unit/Bed#: [facility] 810-01 Encounter: [id number]  Primary Care Provider: Zahida Horvath MD   Date and time admitted to hospital: 10/16/2022  4:28 PM    Stage 3a chronic kidney disease [facility])  Assessment & Plan  Lab Results   Component Value Date    EGFR 100 10/24/2022    EGFR 94 10/23/2022    EGFR 83 10/22/2022    CREATININE 0 69 10/24/2022    CREATININE 0 80 10/23/2022    CREATININE 0 96 10/22/2022   · Renal function overall stable  · Continue to monitor as needed    History of cervical spinal cord injury  Assessment & Plan  In setting cervical spinal cord injury  Walks with walker  · PT/OT recommending rehab    Severe mitral regurgitation  Assessment & Plan  · Was told to see CTS for consideration of mitraclip in 2019 however did not follow up    Hyponatremia  Assessment & Plan  · Initially was found to be in  pseudohyponatremia however blood glucose within normal range  · Likely in setting of dehydration due to diarrhea  S status post 500 cc bolus of normal saline on 10/23  Will avoid further IV resuscitation in setting of severe mitral regurg  · C diff negative, will place loperamide as needed for diarrhea  · Monitor BMP in a m  Chronic combined systolic and diastolic congestive heart failure [facility])  Assessment & Plan  Wt Readings from Last 3 Encounters:   10/21/22 80 7 kg (178 lb)   04/27/22 80 7 kg (178 lb)   07/23/21 83 kg (183 lb)     · Continue torsemide  Not on BB at baseline  · Last echo showed EF 60% with flail mitral valve leaflet with severe MR, referred to CTS for mitraclip however does not appear he followed up  Last seen in 2020 by Dr Ana Wolfe  · I/O, daily weights  · Monitor volume status         Essential hypertension  Assessment & Plan  · BP on softer side     · Monitor     Peripheral arterial disease (HCC)  Assessment & Plan  · On plavix and statin at baseline, plavix on hold   · Ok to resume Plavix in 48 hours post procedure per GI recommendations  · Will resume Plavix today  Alcohol abuse  Assessment & Plan  · Last drink reportedly 10/11  No clinical concerns for withdrawal, now off CIWA protocol  · Continue thiamine and folate with multivitamins daily  Diabetes mellitus type 2 - Diabetic neuropathy  Assessment & Plan  Lab Results   Component Value Date    HGBA1C 10 1 (H) 10/16/2022       Poorly controlled  · Blood sugar remained labile during hospitalization has endocrinology was consulted, input appreciated  · Recommend continuing Lantus 30units q h s , pre meal 15 units t i d  Continue SSI  · Given pancreatitis, endo recs to stop Soliqua as it contains lixisenatide (a GLP1 receptor agonist)  · Diabetic diet  · Monitor accuchecks and adjust regimen as needed       Ambulatory dysfunction  Assessment & Plan  · Patient walks with a walker, has history of spinal cord injury  · PT/OT recommending rehab, case management is following  Hypokalemia  Assessment & Plan  Resolved with potassium repletion  Monitor BMP in a m     Tobacco abuse  Assessment & Plan  · Nicotine patch    * Jaundice, obstructive, intrahepatic  Assessment & Plan  Patient presents with jaundice, no other symptoms  Noted 10/13, had just stopped drinking ETOH 10/11  · CT A/P showed pancreas is atrophic and there's mild prominence of main pancreatic duct 5mm 2/2 chronic pancreatitis sequelae  · Acute hepatitis panel negative  · GI following, MRCP showed Obstruction of the extrahepatic bile duct and pancreatic duct in the region of the pancreatic neck  Differential diagnosis includes obstruction from fibrosis of chronic pancreatitis versus mass however no obvious mass or lesion identified  · On abx for possible cholangitis day 7/7  · S/P ERCP/EUS on 10/21  Found to have chronic fibrotic changes likely from chronic pancreatitis, status post stent placement  Pending final biopsy  · Trend CMP  The bilirubin continues to trend down  VTE Pharmacologic Prophylaxis: VTE Score: 6 Moderate Risk (Score 3-4) - Pharmacological DVT Prophylaxis Ordered: enoxaparin (Lovenox)  Patient Centered Rounds: I performed bedside rounds with nursing staff today  Discussions with Specialists or Other Care Team Provider:     Education and Discussions with Family / Patient: Updated  (wife) via phone  Time Spent for Care: 30 minutes  More than 50% of total time spent on counseling and coordination of care as described above  Current Length of Stay: 8 day(s)  Current Patient Status: Inpatient   Certification Statement: The patient will continue to require additional inpatient hospital stay due to Pending rehab placement  Discharge Plan: Anticipate discharge in 48-72 hrs to rehab facility  Code Status: Level 3 - DNAR and DNI    Subjective:   Patient seen and examined at bedside, continues with diarrhea  Objective:     Vitals:   Temp (24hrs), Av 8 °F (36 6 °C), Min:97 3 °F (36 3 °C), Max:98 1 °F (36 7 °C)    Temp:  [97 3 °F (36 3 °C)-98 1 °F (36 7 °C)] 97 3 °F (36 3 °C)  HR:  [76-82] 79  Resp:  [14-16] 16  BP: ()/(60-75) 113/72  SpO2:  [93 %-96 %] 94 %  Body mass index is 27 06 kg/m²  Input and Output Summary (last 24 hours): Intake/Output Summary (Last 24 hours) at 10/24/2022 1207  Last data filed at 10/24/2022 1119  Gross per 24 hour   Intake 300 ml   Output 1675 ml   Net -1375 ml       Physical Exam:   Physical Exam  Constitutional:       Appearance: Normal appearance  HENT:      Head: Normocephalic and atraumatic  Mouth/Throat:      Mouth: Mucous membranes are dry  Pharynx: Oropharynx is clear  Eyes:      Extraocular Movements: Extraocular movements intact  Conjunctiva/sclera: Conjunctivae normal    Cardiovascular:      Rate and Rhythm: Normal rate  Pulses: Normal pulses  Heart sounds: Murmur heard     Pulmonary:      Effort: Pulmonary effort is normal       Breath sounds: Normal breath sounds  Abdominal:      General: Bowel sounds are normal       Palpations: Abdomen is soft  Musculoskeletal:         General: Normal range of motion  Skin:     General: Skin is warm and dry  Coloration: Skin is jaundiced  Neurological:      General: No focal deficit present  Mental Status: He is alert and oriented to person, place, and time  Psychiatric:         Mood and Affect: Mood normal          Behavior: Behavior normal           Additional Data:     Labs:  Results from last 7 days   Lab Units 10/24/22  0448 10/21/22  0457 10/20/22  0517   WBC Thousand/uL 12 62*   < > 11 11*   HEMOGLOBIN g/dL 11 4*   < > 13 0   HEMATOCRIT % 31 8*   < > 35 9*   PLATELETS Thousands/uL 252   < > 181   NEUTROS PCT %  --   --  78*   LYMPHS PCT %  --   --  7*   MONOS PCT %  --   --  12   EOS PCT %  --   --  1    < > = values in this interval not displayed  Results from last 7 days   Lab Units 10/24/22  0448   SODIUM mmol/L 134*   POTASSIUM mmol/L 3 8   CHLORIDE mmol/L 106   CO2 mmol/L 23   BUN mg/dL 19   CREATININE mg/dL 0 69   ANION GAP mmol/L 5   CALCIUM mg/dL 8 5   ALBUMIN g/dL 2 0*   TOTAL BILIRUBIN mg/dL 4 80*   ALK PHOS U/L 359*   ALT U/L 68   AST U/L 41   GLUCOSE RANDOM mg/dL 173*     Results from last 7 days   Lab Units 10/21/22  0457   INR  1 05     Results from last 7 days   Lab Units 10/24/22  1109 10/24/22  0730 10/23/22  2220 10/23/22  1623 10/23/22  1128 10/23/22  0819 10/22/22  2053 10/22/22  1703 10/22/22  1555 10/22/22  0746 10/21/22  2230 10/21/22  1725   POC GLUCOSE mg/dl 300* 160* 211* 248* 227* 115 119 285* 405* 145* 160* 134               Lines/Drains:  Invasive Devices  Report    Peripheral Intravenous Line  Duration           Peripheral IV 10/22/22 Right;Ventral (anterior) Forearm 2 days                      Imaging: No pertinent imaging reviewed      Recent Cultures (last 7 days):   Results from last 7 days   Lab Units 10/22/22  2304   C DIFF TOXIN B BY PCR  Negative       Last 24 Hours Medication List:   Current Facility-Administered Medications   Medication Dose Route Frequency Provider Last Rate   • albuterol  2 5 mg Nebulization Q4H PRN Gebrer Velarde MD     • aluminum-magnesium hydroxide-simethicone  30 mL Oral Q6H PRN Gerber Velarde MD     • baclofen  20 mg Oral TID Gerber Velarde MD     • calcium carbonate-vitamin D  1 tablet Oral Daily With Breakfast Gerber Velarde MD     • cefTRIAXone  1,000 mg Intravenous Q24H Salena Doherty MD 1,000 mg (10/24/22 0599)   • citalopram  20 mg Oral Daily Gerber Velarde MD     • clopidogrel  75 mg Oral Daily Meagan Fletcher MD     • enoxaparin  40 mg Subcutaneous Daily Gerber Velarde MD     • ergocalciferol  50,000 Units Oral Weekly Gerber Velarde MD     • folic acid  1 mg Oral Daily Gerber Velarde MD     • insulin glargine  30 Units Subcutaneous QAM Gerber Velarde MD     • insulin lispro  1-5 Units Subcutaneous HS Gerber Velarde MD     • insulin lispro  1-6 Units Subcutaneous TID AC Gerber Velarde MD     • insulin lispro  15 Units Subcutaneous TID With Meals Edmund Locke MD     • loperamide  2 mg Oral TID PRN Meagan Fletcher MD     • melatonin  3 mg Oral HS Nisa Arellano PA-C     • metroNIDAZOLE  500 mg Oral [facility] Layla Hagan MD     • ELMO ANTIFUNGAL  1 application Topical TID Gerber Velarde MD     • multivitamin stress formula  1 tablet Oral Daily Gerber Velarde MD     • nicotine  1 patch Transdermal Daily Gerber Velarde MD     • nystatin   Topical BID Gerber Velarde MD     • ondansetron  4 mg Intravenous Q6H PRN Gerber Velarde MD     • potassium chloride  20 mEq Oral BID Gerber Velarde MD     • pregabalin  100 mg Oral TID Gerber Velarde MD     • salmeterol  1 puff Inhalation Q12H Avenstephany Barron MD     • tamsulosin  0 4 mg Oral Daily With Antwon Joseph MD     • thiamine 100 mg Oral Daily Chelsey Lester MD     • torsemide  20 mg Oral Daily Chelsey Lester MD     • umeclidinium bromide  1 puff Inhalation Daily Chelsey Lester MD          Today, Patient Was Seen By: Nevaeh Hutchison    **Please Note: This note may have been constructed using a voice recognition system  **

## 2022-10-24 NOTE — ASSESSMENT & PLAN NOTE
Patient presents with jaundice, no other symptoms  Noted 10/13, had just stopped drinking ETOH 10/11  · CT A/P showed pancreas is atrophic and there's mild prominence of main pancreatic duct 5mm 2/2 chronic pancreatitis sequelae  · Acute hepatitis panel negative  · GI following, MRCP showed Obstruction of the extrahepatic bile duct and pancreatic duct in the region of the pancreatic neck  Differential diagnosis includes obstruction from fibrosis of chronic pancreatitis versus mass however no obvious mass or lesion identified  · On abx for possible cholangitis day 7/7  · S/P ERCP/EUS on 10/21  Found to have chronic fibrotic changes likely from chronic pancreatitis, status post stent placement  Pending final biopsy  · Trend CMP  The bilirubin continues to trend down

## 2022-10-25 LAB
ALBUMIN SERPL BCP-MCNC: 1.9 G/DL (ref 3.5–5)
ALP SERPL-CCNC: 326 U/L (ref 46–116)
ALT SERPL W P-5'-P-CCNC: 59 U/L (ref 12–78)
ANION GAP SERPL CALCULATED.3IONS-SCNC: 6 MMOL/L (ref 4–13)
AST SERPL W P-5'-P-CCNC: 51 U/L (ref 5–45)
BILIRUB SERPL-MCNC: 4.05 MG/DL (ref 0.2–1)
BUN SERPL-MCNC: 20 MG/DL (ref 5–25)
CALCIUM ALBUM COR SERPL-MCNC: 10.2 MG/DL (ref 8.3–10.1)
CALCIUM SERPL-MCNC: 8.5 MG/DL (ref 8.3–10.1)
CHLORIDE SERPL-SCNC: 104 MMOL/L (ref 96–108)
CO2 SERPL-SCNC: 25 MMOL/L (ref 21–32)
CREAT SERPL-MCNC: 0.78 MG/DL (ref 0.6–1.3)
ERYTHROCYTE [DISTWIDTH] IN BLOOD BY AUTOMATED COUNT: 16.8 % (ref 11.6–15.1)
GFR SERPL CREATININE-BSD FRML MDRD: 95 ML/MIN/1.73SQ M
GLUCOSE SERPL-MCNC: 122 MG/DL (ref 65–140)
GLUCOSE SERPL-MCNC: 167 MG/DL (ref 65–140)
GLUCOSE SERPL-MCNC: 190 MG/DL (ref 65–140)
GLUCOSE SERPL-MCNC: 205 MG/DL (ref 65–140)
GLUCOSE SERPL-MCNC: 287 MG/DL (ref 65–140)
GLUCOSE SERPL-MCNC: 289 MG/DL (ref 65–140)
HCT VFR BLD AUTO: 32 % (ref 36.5–49.3)
HGB BLD-MCNC: 11 G/DL (ref 12–17)
MCH RBC QN AUTO: 35.8 PG (ref 26.8–34.3)
MCHC RBC AUTO-ENTMCNC: 34.4 G/DL (ref 31.4–37.4)
MCV RBC AUTO: 104 FL (ref 82–98)
PLATELET # BLD AUTO: 272 THOUSANDS/UL (ref 149–390)
PMV BLD AUTO: 13.2 FL (ref 8.9–12.7)
POTASSIUM SERPL-SCNC: 3.4 MMOL/L (ref 3.5–5.3)
PROT SERPL-MCNC: 6.8 G/DL (ref 6.4–8.4)
RBC # BLD AUTO: 3.07 MILLION/UL (ref 3.88–5.62)
SODIUM SERPL-SCNC: 135 MMOL/L (ref 135–147)
WBC # BLD AUTO: 12.92 THOUSAND/UL (ref 4.31–10.16)

## 2022-10-25 PROCEDURE — 97535 SELF CARE MNGMENT TRAINING: CPT

## 2022-10-25 PROCEDURE — 99232 SBSQ HOSP IP/OBS MODERATE 35: CPT | Performed by: INTERNAL MEDICINE

## 2022-10-25 PROCEDURE — 85027 COMPLETE CBC AUTOMATED: CPT | Performed by: STUDENT IN AN ORGANIZED HEALTH CARE EDUCATION/TRAINING PROGRAM

## 2022-10-25 PROCEDURE — 97110 THERAPEUTIC EXERCISES: CPT

## 2022-10-25 PROCEDURE — 82948 REAGENT STRIP/BLOOD GLUCOSE: CPT

## 2022-10-25 PROCEDURE — 97116 GAIT TRAINING THERAPY: CPT

## 2022-10-25 PROCEDURE — 80053 COMPREHEN METABOLIC PANEL: CPT | Performed by: STUDENT IN AN ORGANIZED HEALTH CARE EDUCATION/TRAINING PROGRAM

## 2022-10-25 PROCEDURE — 97530 THERAPEUTIC ACTIVITIES: CPT

## 2022-10-25 RX ORDER — HYDROMORPHONE HCL/PF 1 MG/ML
0.5 SYRINGE (ML) INJECTION ONCE
Status: COMPLETED | OUTPATIENT
Start: 2022-10-25 | End: 2022-10-25

## 2022-10-25 RX ORDER — INSULIN GLARGINE 100 [IU]/ML
40 INJECTION, SOLUTION SUBCUTANEOUS EVERY MORNING
Status: DISCONTINUED | OUTPATIENT
Start: 2022-10-26 | End: 2022-10-27 | Stop reason: HOSPADM

## 2022-10-25 RX ADMIN — POTASSIUM CHLORIDE 20 MEQ: 1500 TABLET, EXTENDED RELEASE ORAL at 08:18

## 2022-10-25 RX ADMIN — CLOPIDOGREL BISULFATE 75 MG: 75 TABLET ORAL at 08:18

## 2022-10-25 RX ADMIN — B-COMPLEX W/ C & FOLIC ACID TAB 1 TABLET: TAB at 08:19

## 2022-10-25 RX ADMIN — TORSEMIDE 20 MG: 20 TABLET ORAL at 08:19

## 2022-10-25 RX ADMIN — INSULIN LISPRO 4 UNITS: 100 INJECTION, SOLUTION INTRAVENOUS; SUBCUTANEOUS at 11:58

## 2022-10-25 RX ADMIN — BACLOFEN 20 MG: 20 TABLET ORAL at 08:18

## 2022-10-25 RX ADMIN — MICONAZOLE NITRATE 1 APPLICATION: 20 CREAM TOPICAL at 08:28

## 2022-10-25 RX ADMIN — INSULIN LISPRO 2 UNITS: 100 INJECTION, SOLUTION INTRAVENOUS; SUBCUTANEOUS at 08:20

## 2022-10-25 RX ADMIN — TAMSULOSIN HYDROCHLORIDE 0.4 MG: 0.4 CAPSULE ORAL at 18:07

## 2022-10-25 RX ADMIN — INSULIN LISPRO 9 UNITS: 100 INJECTION, SOLUTION INTRAVENOUS; SUBCUTANEOUS at 18:28

## 2022-10-25 RX ADMIN — FOLIC ACID 1 MG: 1 TABLET ORAL at 08:19

## 2022-10-25 RX ADMIN — PREGABALIN 100 MG: 100 CAPSULE ORAL at 22:21

## 2022-10-25 RX ADMIN — Medication 3 MG: at 22:22

## 2022-10-25 RX ADMIN — MICONAZOLE NITRATE 1 APPLICATION: 20 CREAM TOPICAL at 18:12

## 2022-10-25 RX ADMIN — INSULIN GLARGINE 30 UNITS: 100 INJECTION, SOLUTION SUBCUTANEOUS at 08:18

## 2022-10-25 RX ADMIN — HYDROMORPHONE HYDROCHLORIDE 0.5 MG: 1 INJECTION, SOLUTION INTRAMUSCULAR; INTRAVENOUS; SUBCUTANEOUS at 18:27

## 2022-10-25 RX ADMIN — PREGABALIN 100 MG: 100 CAPSULE ORAL at 08:19

## 2022-10-25 RX ADMIN — BACLOFEN 20 MG: 20 TABLET ORAL at 22:21

## 2022-10-25 RX ADMIN — INSULIN LISPRO 18 UNITS: 100 INJECTION, SOLUTION INTRAVENOUS; SUBCUTANEOUS at 08:21

## 2022-10-25 RX ADMIN — POTASSIUM CHLORIDE 20 MEQ: 1500 TABLET, EXTENDED RELEASE ORAL at 18:07

## 2022-10-25 RX ADMIN — Medication 1 TABLET: at 08:19

## 2022-10-25 RX ADMIN — MICONAZOLE NITRATE 1 APPLICATION: 20 CREAM TOPICAL at 22:25

## 2022-10-25 RX ADMIN — NYSTATIN: 100000 POWDER TOPICAL at 08:28

## 2022-10-25 RX ADMIN — NICOTINE 1 PATCH: 21 PATCH, EXTENDED RELEASE TRANSDERMAL at 08:20

## 2022-10-25 RX ADMIN — UMECLIDINIUM 1 PUFF: 62.5 AEROSOL, POWDER ORAL at 08:20

## 2022-10-25 RX ADMIN — INSULIN LISPRO 18 UNITS: 100 INJECTION, SOLUTION INTRAVENOUS; SUBCUTANEOUS at 11:58

## 2022-10-25 RX ADMIN — THIAMINE HCL TAB 100 MG 100 MG: 100 TAB at 08:19

## 2022-10-25 RX ADMIN — BACLOFEN 20 MG: 20 TABLET ORAL at 18:07

## 2022-10-25 RX ADMIN — ENOXAPARIN SODIUM 40 MG: 40 INJECTION SUBCUTANEOUS at 08:19

## 2022-10-25 RX ADMIN — CITALOPRAM HYDROBROMIDE 20 MG: 20 TABLET ORAL at 08:20

## 2022-10-25 RX ADMIN — SALMETEROL XINAFOATE 1 PUFF: 50 POWDER, METERED ORAL; RESPIRATORY (INHALATION) at 22:21

## 2022-10-25 RX ADMIN — METRONIDAZOLE 500 MG: 500 TABLET ORAL at 05:00

## 2022-10-25 RX ADMIN — SALMETEROL XINAFOATE 1 PUFF: 50 POWDER, METERED ORAL; RESPIRATORY (INHALATION) at 08:20

## 2022-10-25 RX ADMIN — NYSTATIN: 100000 POWDER TOPICAL at 18:12

## 2022-10-25 RX ADMIN — PREGABALIN 100 MG: 100 CAPSULE ORAL at 18:07

## 2022-10-25 RX ADMIN — INSULIN LISPRO 1 UNITS: 100 INJECTION, SOLUTION INTRAVENOUS; SUBCUTANEOUS at 22:21

## 2022-10-25 NOTE — PLAN OF CARE
Problem: MOBILITY - ADULT  Goal: Maintain or return to baseline ADL function  Description: INTERVENTIONS:  -  Assess patient's ability to carry out ADLs; assess patient's baseline for ADL function and identify physical deficits which impact ability to perform ADLs (bathing, care of mouth/teeth, toileting, grooming, dressing, etc )  - Assess/evaluate cause of self-care deficits   - Assess range of motion  - Assess patient's mobility; develop plan if impaired  - Assess patient's need for assistive devices and provide as appropriate  - Encourage maximum independence but intervene and supervise when necessary  - Involve family in performance of ADLs  - Assess for home care needs following discharge   - Consider OT consult to assist with ADL evaluation and planning for discharge  - Provide patient education as appropriate  Outcome: Progressing  Goal: Maintains/Returns to pre admission functional level  Description: INTERVENTIONS:  - Perform BMAT or MOVE assessment daily    - Set and communicate daily mobility goal to care team and patient/family/caregiver  - Collaborate with rehabilitation services on mobility goals if consulted  - Perform Range of Motion 3 times a day  - Reposition patient every 2 hours    - Dangle patient 3 times a day  - Stand patient 3 times a day  - Ambulate patient 3 times a day  - Out of bed to chair 3 times a day   - Out of bed for meals 3  Problem: INFECTION - ADULT  Goal: Absence or prevention of progression during hospitalization  Description: INTERVENTIONS:  - Assess and monitor for signs and symptoms of infection  - Monitor lab/diagnostic results  - Monitor all insertion sites, i e  indwelling lines, tubes, and drains  - Monitor endotracheal if appropriate and nasal secretions for changes in amount and color  - [place] appropriate cooling/warming therapies per order  - Administer medications as ordered  - Instruct and encourage patient and family to use good hand hygiene technique  - Identify and instruct in appropriate isolation precautions for identified infection/condition  Outcome: Progressing  Goal: Absence of fever/infection during neutropenic period  Description: INTERVENTIONS:  - Monitor WBC    Outcome: Progressing     Problem: DISCHARGE PLANNING  Goal: Discharge to home or other facility with appropriate resources  Description: INTERVENTIONS:  - Identify barriers to discharge w/patient and caregiver  - Arrange for needed discharge resources and transportation as appropriate  - Identify discharge learning needs (meds, wound care, etc )  - Arrange for interpretive services to assist at discharge as needed  - Refer to Case Management Department for coordinating discharge planning if the patient needs post-hospital services based on physician/advanced practitioner order or complex needs related to functional status, cognitive ability, or social support system  Outcome: Progressing     Problem: Knowledge Deficit  Goal: Patient/family/caregiver demonstrates understanding of disease process, treatment plan, medications, and discharge instructions  Description: Complete learning assessment and assess knowledge base    Interventions:  - Provide teaching at level of understanding  - Provide teaching via preferred learning methods  Outcome: Progressing    times a day  - Out of bed for toileting  - Record patient progress and toleration of activity level   Outcome: Progressing

## 2022-10-25 NOTE — ASSESSMENT & PLAN NOTE
Wt Readings from Last 3 Encounters:   10/21/22 80 7 kg (178 lb)   04/27/22 80 7 kg (178 lb)   07/23/21 83 kg (183 lb)     · Continue torsemide  Not on BB at baseline  · Last echo showed EF 60% with flail mitral valve leaflet with severe MR, referred to CTS for mitraclip however does not appear he followed up  Last seen in 2020 by Dr Rob Bumpers     · I/O, daily weights  · Monitor volume status

## 2022-10-25 NOTE — PHYSICAL THERAPY NOTE
Physical Therapy treatment note     Patient Name: Karlo Ayers    MZCXM'O Date: 10/25/2022     Problem List  Principal Problem:    Jaundice, obstructive, intrahepatic  Active Problems:    Tobacco abuse    Hypokalemia    Ambulatory dysfunction    Diabetes mellitus type 2 - Diabetic neuropathy    Alcohol abuse    Peripheral arterial disease ([facility])    Essential hypertension    Chronic combined systolic and diastolic congestive heart failure ([facility])    Hyponatremia    Severe mitral regurgitation    History of cervical spinal cord injury    Stage 3a chronic kidney disease ([facility] )    Dysuria       Past Medical History  Past Medical History:   Diagnosis Date   • SUNDEEP (acute kidney injury) ([facility] )    • CHF (congestive heart failure) ([facility])    • Cholelithiasis    • Chronic obstructive lung disease ([facility] )     RESOLVED: 8/17/17   • COPD (chronic obstructive pulmonary disease) ([facility] )    • Depression    • Diabetes mellitus ([place] [address] )     type 2, non-insulin dependent   • Diabetic neuropathy ([facility] )    • Difficulty attaining erection     LAST ASSESSEDl: 8/17/17   • Former tobacco use    • Gall stone pancreatitis     RESOLVED: 3/8/17   • Hiatal hernia    • History of concussion    • History of MRSA infection    • History of varicella infection    • Hypertension     LAST ASSESSED: 8/17/17   • Non-healing open wound of right groin     s/p VAC placement & post-op fungal dermatitis   • PAD (peripheral artery disease) ([facility])     s/p RLE fem-pop   • Seasonal allergies    • Severe mitral regurgitation    • Spinal cord injury, C1-C7 ([facility] )     s/p fusion   • Traumatic injury to musculoskeletal system     1-5 FLIGHT FALL DOWN THE STEPS - CERVICAL NECK INJURY - JAN 2, 2012; LAST ASSESSED: 4/64/83   • Umbilical hernia without obstruction and without gangrene     RESOLVED: 3/8/17        Past Surgical History  Past Surgical History:   Procedure Laterality Date   • ANGIOPLASTY     • ARTERIOGRAM Right 7/31/2018    Procedure: ARTERIOGRAM Completion Agram;  Surgeon: Aly Mccormick MD;  [facility];  Service: Vascular   • BACK SURGERY     • BYPASS FEMORAL-POPLITEAL Right 7/31/2018    Procedure: BYPASS FEMORAL-POPLITEAL R femoral- BK popliteal bypass;  Surgeon: Aly Mccormick MD;  [facility];  Service: Vascular   • CERVICAL FUSION      VERTEBRAL; C3-C4 FUSION   • CHOLECYSTECTOMY LAPAROSCOPIC N/A 2/24/2017    Procedure: CHOLECYSTECTOMY LAPAROSCOPIC;  Surgeon: Leigh Smalls MD;  [facility];  Service:    • EPIGASTRIC HERNIA REPAIR     • FEMORAL BYPASS Right     Leg   • INCISION AND DRAINAGE OF WOUND      Groin area   • WA DEBRIDEMENT, SKIN, SUB-Q TISSUE,=<20 SQ CM Right 8/28/2018    Procedure: RIGHT GROIN AND THIGH DEBRIDEMENT ([address]) Bjorn Coffer;  Surgeon: Na Hudson MD;  [facility];  Service: Vascular   • WA THROMBOENDARTECTMY FEMORAL COMMON Right 7/31/2018    Procedure: ENDARTERECTOMY ARTERIAL FEMORAL R femoral endarterectomy w/ patch angioplasty ;  Surgeon: Aly Mccormick MD;  [facility];  Service: Vascular   • SPINE SURGERY     • UMBILICAL HERNIA REPAIR N/A 2/24/2017    Procedure: REPAIR HERNIA UMBILICAL;  Surgeon: Leigh Smalls MD;  [facility];  Service:    • WISDOM TOOTH EXTRACTION        10/25/22 1046   PT Last Visit   PT Visit Date 10/25/22   Note Type   Note Type Treatment for insurance authorization   Pain Assessment   Pain Assessment Tool 0-10   Pain Score No Pain   General   Chart Reviewed Yes   Family/Caregiver Present No   Cognition   Overall Cognitive Status WFL   Arousal/Participation Alert; Cooperative   Attention Attends with cues to redirect   Orientation Level Oriented X4   Bed Mobility   Supine to Sit 3  Moderate assistance   Additional items Assist x 1   Sit to Supine 3  Moderate assistance   Additional items Assist x 1   Additional Comments sitting EOB S level   Transfers   Sit to Stand 4  Minimal assistance   Additional items Assist x 1   Stand to Sit 4  Minimal assistance   Additional items Assist x 1   Ambulation/Elevation   Gait pattern Excessively slow; Shuffling; Forward Flexion   Gait Assistance 4  Minimal assist   Additional items Assist x 1  (chair follow)   Assistive Device Rolling walker   Distance 25ftx2, 3ftx1   Balance   Static Sitting Fair   Dynamic Sitting Fair -   Static Standing Poor +   Dynamic Standing Poor +   Ambulatory Poor   Endurance Deficit   Endurance Deficit Yes   Activity Tolerance   Activity Tolerance Patient limited by fatigue   Medical Staff Made Aware OT   Nurse Made Aware nurse approved therapy session   Exercises   Balance training  sitting eOB 10-12 minutes at a S level while performing dynamic and sdtatic balance activties, standing 3-4 minutes at a min A level   Assessment   Prognosis Good   Problem List Decreased strength;Decreased endurance; Impaired balance;Decreased mobility; Decreased safety awareness   Assessment Pt presents to therapy today with reduced mobility, poor endurance, high risk of falling, poor standing balance, risk of falling, gait abnormalities  These impairments limit the patient by requiring increased assistance for mobility and places him at risk of falling  Pt would benefit from continued skilled therapy while in the hospital to improve overall mobility and work towards a safe d/c  Recommend rehab  At end of session patient was left supine with call bell within reach  The patient's AM-PAC Basic Mobility Inpatient Short Form Raw Score is 14  A Raw score of less than or equal to 16 suggests the patient may benefit from discharge to post-acute rehabilitation services  Please also refer to the recommendation of the Physical Therapist for safe discharge planning  Goals   STG Expiration Date 11/01/22   PT Treatment Day 1   Plan   Treatment/Interventions Functional transfer training;LE strengthening/ROM; Therapeutic exercise; Endurance training;Equipment eval/education; Bed mobility;Gait training;OT   PT Frequency 2-3x/wk   Recommendation   PT Discharge Recommendation Post acute rehabilitation services   AM-PAC Basic Mobility Inpatient   Turning in Bed Without Bedrails 2   Lying on Back to Sitting on Edge of Flat Bed 2   Moving Bed to Chair 3   Standing Up From Chair 3   Walk in Room 3   Climb 3-5 Stairs 1   Basic Mobility Inpatient Raw Score 14   Basic Mobility Standardized Score 35 55   Highest Level Of Mobility   JH-HLM Goal 4: Move to chair/commode   JH-HLM Achieved 7: Walk 25 feet or more   Loraine Spear, PT, DPT

## 2022-10-25 NOTE — PROGRESS NOTES
Progress Note - Bailey Marti 57 y o  male MRN: [id number]    Unit/Bed#: PPHP 810-01 Encounter: [id number]      CC: diabetes f/u    Subjective: This is a 57y o -year-old male with Type 2 DM on long-term insulin with PAD, CKD, diabetic neuropathy, chronic pancreatitis admitted with obstructive jaundice s/p pancreatic duct stent placement  Feels well  No complaints  No hypoglycemia  Objective:     Vitals: Blood pressure 113/70, pulse 85, temperature 98 6 °F (37 °C), resp  rate 16, height 5' 8" (1 727 m), weight 80 7 kg (178 lb), SpO2 96 %  ,Body mass index is 27 06 kg/m²  Intake/Output Summary (Last 24 hours) at 10/25/2022 1554  Last data filed at 10/25/2022 1453  Gross per 24 hour   Intake 240 ml   Output 2225 ml   Net -1985 ml       Physical Exam:  General Appearance: awake, appears stated age and cooperative  Head: Normocephalic, without obvious abnormality, atraumatic  Extremities: moves all extremities  Skin: Skin color and temperature normal    Pulm: no labored breathing    Lab, Imaging and other studies: I have personally reviewed pertinent reports  POC Glucose (mg/dl)   Date Value   10/25/2022 289 (H)   10/25/2022 287 (H)   10/25/2022 205 (H)   10/24/2022 300 (H)   10/24/2022 127   10/24/2022 300 (H)   10/24/2022 160 (H)   10/23/2022 211 (H)   10/23/2022 248 (H)   10/23/2022 227 (H)       Assessment and plan:      Type 2 diabetes mellitus  HbA1c 10 1 October 2022  Home regimen:  Lantus 24-30 units at bedtime, Humalog 24 units with  Inpatient regimen:  Lantus 30 units every morning, lispro 18 units with meals, correctional scale algorithm 3 with meals and algorithm 1 at bedtime    Recommendations:  Would recommend continuing the same regimen of Lantus 30 units every morning and lispro 18 units t i d   With meals along with correctional scale algorithm 3 with meals and algorithm 1 at bedtime  Patient is noted to be hyperglycemic as he is not receiving mealtime insulin, did not receive mealtime lispro at dinner time yesterday  Would recommend not holding meal-time insulin as the patient tends to gets hyperglycemic in 300s postprandial without short-acting insulin on board  Continue to monitor blood glucose and make adjustments as needed    Portions of the record may have been created with voice recognition software

## 2022-10-25 NOTE — ASSESSMENT & PLAN NOTE
Lab Results   Component Value Date    EGFR 95 10/25/2022    EGFR 100 10/24/2022    EGFR 94 10/23/2022    CREATININE 0 78 10/25/2022    CREATININE 0 69 10/24/2022    CREATININE 0 80 10/23/2022   · Renal function overall stable  · Continue to monitor as needed  · Creatinine 0 78 this morning

## 2022-10-25 NOTE — ASSESSMENT & PLAN NOTE
Patient presents with jaundice, no other symptoms  Noted 10/13, had just stopped drinking ETOH 10/11  · CT A/P showed pancreas is atrophic and there's mild prominence of main pancreatic duct 5mm 2/2 chronic pancreatitis sequelae  · Acute hepatitis panel negative  · GI following, MRCP showed Obstruction of the extrahepatic bile duct and pancreatic duct in the region of the pancreatic neck  Differential diagnosis includes obstruction from fibrosis of chronic pancreatitis versus mass however no obvious mass or lesion identified  · On abx for possible cholangitis day 7/7  · S/P ERCP/EUS on 10/21  Found to have chronic fibrotic changes likely from chronic pancreatitis, status post stent placement  Pending final biopsy    · Bilirubin and alk-phos continue to trend down today on 10/25

## 2022-10-25 NOTE — ASSESSMENT & PLAN NOTE
· On plavix and statin at baseline, Plavix was held pending GI procedures is now resumed  · Continue Plavix

## 2022-10-25 NOTE — OCCUPATIONAL THERAPY NOTE
Occupational Therapy Progress Note     Patient Name: Vida Cadet  WYQDW'F Date: 10/25/2022  Problem List  Principal Problem:    Jaundice, obstructive, intrahepatic  Active Problems:    Tobacco abuse    Hypokalemia    Ambulatory dysfunction    Diabetes mellitus type 2 - Diabetic neuropathy    Alcohol abuse    [facility] )    Essential hypertension    Chronic combined systolic and diastolic congestive heart failure ([facility])    Hyponatremia    Severe mitral regurgitation    History of cervical spinal cord injury    Stage 3a chronic kidney disease ([facility] )    Dysuria              10/25/22 1015   OT Last Visit   OT Visit Date 10/25/22   Note Type   Note Type Treatment for insurance authorization   Pain Assessment   Pain Assessment Tool 0-10   Pain Score No Pain   Restrictions/Precautions   Weight Bearing Precautions Per Order No   Other Precautions Chair Alarm;Pain; Fall Risk   Lifestyle   Autonomy I with grooming, toileting, and eating  Pt reports being mod I with bed mobility, functional transfers, and functional mobility (short distances)  Reciprocal Relationships Supportive wife   Service to Others SSD   Intrinsic Gratification Enjoys being as active as possible   ADL   Where Assessed Edge of bed   Grooming Assistance 5  Supervision/Setup   Grooming Deficit Setup; Wash/dry face   UB Bathing Assistance 3  Moderate Assistance   UB Bathing Deficit Setup; Increased time to complete;Supervision/safety   LB Bathing Assistance 2  Maximal Assistance   LB Bathing Deficit Setup; Increased time to complete;Supervision/safety; Buttocks; Perineal area   LB Bathing Comments Seated/standing with RW   UB Dressing Assistance 3  Moderate Assistance   UB Dressing Deficit Setup; Increased time to complete;Supervision/safety; Thread LUE; Thread RUE; Fasteners  (Pt with limited B/L shoulder/elbow/hand ROM decreasing independence with UB ADLs)   LB Dressing Assistance 2  Maximal Assistance   LB Dressing Deficit Setup;Don/doff L sock;Don/doff R sock   Bed Mobility   Supine to Sit 3  Moderate assistance   Additional items Assist x 1; Increased time required;Verbal cues;LE management   Sit to Supine 4  Minimal assistance   Additional items Assist x 1; Increased time required;Verbal cues   Additional Comments Pt greeted supine in bed  Transfers   Sit to Stand 4  Minimal assistance   Additional items Assist x 1; Increased time required;Verbal cues   Stand to Sit 4  Minimal assistance   Additional items Assist x 1; Increased time required;Verbal cues   Additional Comments with RW   Functional Mobility   Functional Mobility 4  Minimal assistance   Additional Comments Min AX1 with RW- short household distances with SBAx1 for chair follow  Pt requires x1 rest break  Additional items Rolling walker   Cognition   Overall Cognitive Status WFL   Arousal/Participation Alert; Cooperative   Attention Attends with cues to redirect   Orientation Level Oriented X4   Memory Within functional limits   Following Commands Follows one step commands without difficulty   Comments Pt pleasant and cooperative during OT session  Pt states he did not sleep well, however, agreeable to therapy this AM    Activity Tolerance   Activity Tolerance Patient tolerated treatment well   Medical Staff Made Aware Portions of Tx completed with Tuyet Stewart DPT 2* to Pt's medical complexity, dispo planning and decreased endurance  OT focus on maximizing independence with ADLs  Assessment   Assessment Pt greeted bedside for OT treatment on 10/25/2022 focusing on maximizing independence with ADLs  Pt completes bed mobility with mod AX1 and able to sit on EOB to engage in ADL routine  Pt S with grooming, mod A with UB bathing/dressing, and max A with LB bathing/dressing  Pt completes functional transfers with min Ax1 and functional mobility with min Ax1 with RW (SBAx1 for chair follow)   Limitations that impact functional performance include decreased ADL status, decreased UE ROM, decreased UE strength, decreased safe judgement during ADLs, decreased cognition, decreased endurance, decreased self care transfers, decreased high level ADLs and pain  Occupational performance areas to address ADL retraining, functional transfer training, UE strengthening/ROM, endurance training, cognitive reorientation, Pt/caregiver education, equipment evaluation/education, compensatory technique education, energy conservation and activity engagement   Pt would benefit from continued skilled OT services while in hospital to maximize independence with ADLs  Will continue to follow Pt's goals and progress  Pt would benefit from post acute rehabilitation services upon DC to maximize safety and independence with ADLs and functional tasks of choice  Plan   Treatment Interventions UE strengthening/ROM; Functional transfer training;ADL retraining; Endurance training;Cognitive reorientation;Equipment evaluation/education;Patient/family training; Compensatory technique education; Energy conservation; Activityengagement   Goal Expiration Date 11/01/22   OT Treatment Day 1   OT Frequency 3-5x/wk   Recommendation   OT Discharge Recommendation Post acute rehabilitation services   Additional Comments  The patient's raw score on the AM-PAC Daily Activity inpatient short form is 14, standardized score is 33 39, less than 39 4  Patients at this level are likely to benefit from discharge to post-acute rehabilitation services  Please refer to the recommendation of the Occupational Therapist for safe discharge planning     AM-PAC Daily Activity Inpatient   Lower Body Dressing 2   Bathing 2   Toileting 2   Upper Body Dressing 2   Grooming 3   Eating 3   Daily Activity Raw Score 14   Daily Activity Standardized Score (Calc for Raw Score >=11) 33 39   -[facility] Applied Cognition Inpatient   Following a Speech/Presentation 4   Understanding Ordinary Conversation 4   Taking Medications 3   Remembering Where Things Are Placed or Put Away 3 Remembering List of 4-5 Errands 3   Taking Care of Complicated Tasks 3   Applied Cognition Raw Score 20   Applied Cognition Standardized Score 41 76   End of Consult   Education Provided Yes   Patient Position at End of Consult All needs within reach; Supine   Nurse Communication Nurse aware of consult       Abelardo Carolina MS, OTR/L

## 2022-10-25 NOTE — PLAN OF CARE
Problem: OCCUPATIONAL THERAPY ADULT  Goal: Performs self-care activities at highest level of function for planned discharge setting  See evaluation for individualized goals  Description: Treatment Interventions: Functional transfer training, ADL retraining, UE strengthening/ROM, Endurance training, Cognitive reorientation, Patient/family training, Equipment evaluation/education, Compensatory technique education, Energy conservation, Activityengagement          See flowsheet documentation for full assessment, interventions and recommendations  Outcome: Progressing  Note: Limitation: Decreased ADL status, Decreased UE ROM, Decreased UE strength, Decreased Safe judgement during ADL, Decreased cognition, Decreased endurance, Decreased high-level ADLs, Decreased self-care trans  Prognosis: Fair  Assessment: Pt greeted bedside for OT treatment on 10/25/2022 focusing on maximizing independence with ADLs  Pt completes bed mobility with mod AX1 and able to sit on EOB to engage in ADL routine  Pt S with grooming, mod A with UB bathing/dressing, and max A with LB bathing/dressing  Pt completes functional transfers with min Ax1 and functional mobility with min Ax1 with RW (SBAx1 for chair follow)  Limitations that impact functional performance include decreased ADL status, decreased UE ROM, decreased UE strength, decreased safe judgement during ADLs, decreased cognition, decreased endurance, decreased self care transfers, decreased high level ADLs and pain  Occupational performance areas to address ADL retraining, functional transfer training, UE strengthening/ROM, endurance training, cognitive reorientation, Pt/caregiver education, equipment evaluation/education, compensatory technique education, energy conservation and activity engagement   Pt would benefit from continued skilled OT services while in hospital to maximize independence with ADLs  Will continue to follow Pt's goals and progress   Pt would benefit from post acute rehabilitation services upon DC to maximize safety and independence with ADLs and functional tasks of choice       OT Discharge Recommendation: Post acute rehabilitation services

## 2022-10-25 NOTE — CASE MANAGEMENT
Support Center received request for authorization from Care Manager    Authorization request for: SNF  Facility Name: [facility]  NPI: [id number]  Facility MD:  Dr Freya Bello   NPI:  [id number]  Authorization initiated by contacting: Gabriela [phone]  Pending Reference #: [id number]  Clinicals submitted via: [phone]

## 2022-10-25 NOTE — PROGRESS NOTES
[address] [facility]  Progress Note - Sedrick Tijerina 1960, 57 y o  male MRN: [id number]  Unit/Bed#: [facility] 810-01 Encounter: [id number]  Primary Care Provider: Do Whalen MD   Date and time admitted to hospital: 10/16/2022  4:28 PM    Stage 3a chronic kidney disease [facility])  Assessment & Plan  Lab Results   Component Value Date    EGFR 95 10/25/2022    EGFR 100 10/24/2022    EGFR 94 10/23/2022    CREATININE 0 78 10/25/2022    CREATININE 0 69 10/24/2022    CREATININE 0 80 10/23/2022   · Renal function overall stable  · Continue to monitor as needed  · Creatinine 0 78 this morning    History of cervical spinal cord injury  Assessment & Plan  In setting cervical spinal cord injury  Walks with walker  · PT/OT recommending rehab    Severe mitral regurgitation  Assessment & Plan  · Was told to see CTS for consideration of mitraclip in 2019 however did not follow up    Hyponatremia  Assessment & Plan  · Resolved, sodium 135 this morning    Chronic combined systolic and diastolic congestive heart failure (HCC)  Assessment & Plan  Wt Readings from Last 3 Encounters:   10/21/22 80 7 kg (178 lb)   04/27/22 80 7 kg (178 lb)   07/23/21 83 kg (183 lb)     · Continue torsemide  Not on BB at baseline  · Last echo showed EF 60% with flail mitral valve leaflet with severe MR, referred to CTS for mitraclip however does not appear he followed up  Last seen in 2020 by Dr Kathryn Mendoza  · I/O, daily weights  · Monitor volume status         Essential hypertension  Assessment & Plan  · Blood pressure stable on current regiment  · Continue torsemide 20 mg daily    Peripheral arterial disease (HCC)  Assessment & Plan  · On plavix and statin at baseline, Plavix was held pending GI procedures is now resumed  · Continue Plavix    Alcohol abuse  Assessment & Plan  · Last drink reportedly 10/11  No clinical concerns for withdrawal, now off CIWA protocol    · Continue thiamine and folate with multivitamins daily     Diabetes mellitus type 2 - Diabetic neuropathy  Assessment & Plan  Lab Results   Component Value Date    HGBA1C 10 1 (H) 10/16/2022       Poorly controlled  · Blood sugar remained labile during hospitalization has endocrinology was consulted, input appreciated  · Recommend continuing Lantus 30units q h s , pre meal 15 units t i d  Continue SSI  · Given pancreatitis, endo recs to stop Soliqua as it contains lixisenatide (a GLP1 receptor agonist)  · Diabetic diet  · Monitor accuchecks and adjust regimen as needed       Ambulatory dysfunction  Assessment & Plan  · Patient walks with a walker, has history of spinal cord injury  · PT/OT recommending rehab, case management is following  Hypokalemia  Assessment & Plan  Resolved with potassium repletion  Monitor BMP in a m     Tobacco abuse  Assessment & Plan  · Nicotine patch    * Jaundice, obstructive, intrahepatic  Assessment & Plan  Patient presents with jaundice, no other symptoms  Noted 10/13, had just stopped drinking ETOH 10/11  · CT A/P showed pancreas is atrophic and there's mild prominence of main pancreatic duct 5mm 2/2 chronic pancreatitis sequelae  · Acute hepatitis panel negative  · GI following, MRCP showed Obstruction of the extrahepatic bile duct and pancreatic duct in the region of the pancreatic neck  Differential diagnosis includes obstruction from fibrosis of chronic pancreatitis versus mass however no obvious mass or lesion identified  · On abx for possible cholangitis day 7/7  · S/P ERCP/EUS on 10/21  Found to have chronic fibrotic changes likely from chronic pancreatitis, status post stent placement  Pending final biopsy  · Bilirubin and alk-phos continue to trend down today on 10/25          VTE Pharmacologic Prophylaxis: VTE Score: 6 High Risk (Score >/= 5) - Pharmacological DVT Prophylaxis Ordered: enoxaparin (Lovenox)  Sequential Compression Devices Ordered      Patient Centered Rounds: I performed bedside rounds with nursing staff today  Discussions with Specialists or Other Care Team Provider: n/a    Education and Discussions with Family / Patient: Updated  (wife) via phone  Time Spent for Care: 30 minutes  More than 50% of total time spent on counseling and coordination of care as described above  Current Length of Stay: 9 day(s)  Current Patient Status: Inpatient   Certification Statement: The patient will continue to require additional inpatient hospital stay due to Pending placement to rehab  Discharge Plan: Anticipate discharge in 24-48 hrs to rehab facility  Code Status: Level 3 - DNAR and DNI    Subjective:   Patient denies any acute complaints today during my evaluation    Objective:     Vitals:   Temp (24hrs), Av 4 °F (36 3 °C), Min:97 3 °F (36 3 °C), Max:97 5 °F (36 4 °C)    Temp:  [97 3 °F (36 3 °C)-97 5 °F (36 4 °C)] 97 3 °F (36 3 °C)  HR:  [80-84] 80  Resp:  [16-18] 18  BP: ()/(68-72) 117/72  SpO2:  [93 %-94 %] 93 %  Body mass index is 27 06 kg/m²  Input and Output Summary (last 24 hours): Intake/Output Summary (Last 24 hours) at 10/25/2022 1346  Last data filed at 10/25/2022 1201  Gross per 24 hour   Intake 240 ml   Output 1875 ml   Net -1635 ml       Physical Exam:   Physical Exam  Vitals and nursing note reviewed  Constitutional:       General: He is not in acute distress  Appearance: He is well-developed  He is not ill-appearing, toxic-appearing or diaphoretic  HENT:      Head: Normocephalic and atraumatic  Eyes:      General: Scleral icterus present  Conjunctiva/sclera: Conjunctivae normal    Cardiovascular:      Rate and Rhythm: Normal rate and regular rhythm  Heart sounds: Murmur heard  No friction rub  No gallop  Pulmonary:      Effort: Pulmonary effort is normal  No respiratory distress  Breath sounds: Normal breath sounds  No stridor  No wheezing, rhonchi or rales  Chest:      Chest wall: No tenderness     Abdominal: General: There is no distension  Palpations: Abdomen is soft  There is no mass  Tenderness: There is no abdominal tenderness  There is no guarding or rebound  Hernia: No hernia is present  Musculoskeletal:      Cervical back: Neck supple  Skin:     General: Skin is warm and dry  Coloration: Skin is jaundiced  Neurological:      Mental Status: He is alert  Additional Data:     Labs:  Results from last 7 days   Lab Units 10/25/22  0450 10/21/22  0457 10/20/22  0517   WBC Thousand/uL 12 92*   < > 11 11*   HEMOGLOBIN g/dL 11 0*   < > 13 0   HEMATOCRIT % 32 0*   < > 35 9*   PLATELETS Thousands/uL 272   < > 181   NEUTROS PCT %  --   --  78*   LYMPHS PCT %  --   --  7*   MONOS PCT %  --   --  12   EOS PCT %  --   --  1    < > = values in this interval not displayed  Results from last 7 days   Lab Units 10/25/22  0450   SODIUM mmol/L 135   POTASSIUM mmol/L 3 4*   CHLORIDE mmol/L 104   CO2 mmol/L 25   BUN mg/dL 20   CREATININE mg/dL 0 78   ANION GAP mmol/L 6   CALCIUM mg/dL 8 5   ALBUMIN g/dL 1 9*   TOTAL BILIRUBIN mg/dL 4 05*   ALK PHOS U/L 326*   ALT U/L 59   AST U/L 51*   GLUCOSE RANDOM mg/dL 190*     Results from last 7 days   Lab Units 10/21/22  0457   INR  1 05     Results from last 7 days   Lab Units 10/25/22  1053 10/25/22  1052 10/25/22  0722 10/24/22  2128 10/24/22  1614 10/24/22  1109 10/24/22  0730 10/23/22  2220 10/23/22  1623 10/23/22  1128 10/23/22  0819 10/22/22  2053   POC GLUCOSE mg/dl 289* 287* 205* 300* 127 300* 160* 211* 248* 227* 115 119               Lines/Drains:  Invasive Devices  Report    Peripheral Intravenous Line  Duration           Peripheral IV 10/22/22 Right;Ventral (anterior) Forearm 3 days                      Imaging: No pertinent imaging reviewed      Recent Cultures (last 7 days):   Results from last 7 days   Lab Units 10/22/22  2304   C DIFF TOXIN B BY PCR  Negative       Last 24 Hours Medication List:   Current Facility-Administered Medications Medication Dose Route Frequency Provider Last Rate   • albuterol  2 5 mg Nebulization Q4H PRN Venus Means MD     • aluminum-magnesium hydroxide-simethicone  30 mL Oral Q6H PRN Venus Means MD     • baclofen  20 mg Oral TID Venus Means MD     • calcium carbonate-vitamin D  1 tablet Oral Daily With Breakfast Venus Means MD     • citalopram  20 mg Oral Daily Venus Means MD     • clopidogrel  75 mg Oral Daily Modesto Cruz MD     • enoxaparin  40 mg Subcutaneous Daily Venus Means MD     • ergocalciferol  50,000 Units Oral Weekly Venus Means MD     • folic acid  1 mg Oral Daily Venus Means MD     • insulin glargine  30 Units Subcutaneous QAM Venus Means MD     • insulin lispro  1-5 Units Subcutaneous HS Venus Means MD     • insulin lispro  1-6 Units Subcutaneous TID AC Venus Means MD     • insulin lispro  18 Units Subcutaneous TID With Meals Jorge Alberto Goss MD     • loperamide  2 mg Oral TID PRN Modesto Cruz MD     • melatonin  3 mg Oral HS Judd Lechuga PA-C     • ELMO ANTIFUNGAL  1 application Topical TID Venus Means MD     • multivitamin stress formula  1 tablet Oral Daily Venus Means MD     • nicotine  1 patch Transdermal Daily Venus Means MD     • nystatin   Topical BID Venus Means MD     • ondansetron  4 mg Intravenous Q6H PRN Venus Means MD     • potassium chloride  20 mEq Oral BID Venus Means MD     • pregabalin  100 mg Oral TID Venus Means MD     • salmeterol  1 puff Inhalation Q12H [address] Venus Means MD     • tamsulosin  0 4 mg Oral Daily With Kaylah Lewis MD     • thiamine  100 mg Oral Daily Venus Means MD     • torsemide  20 mg Oral Daily Venus Means MD     • umeclidinium bromide  1 puff Inhalation Daily Venus Means MD          Today, Patient Was Seen By: Sedrick Tijerina    **Please Note: This note may have been constructed using a voice recognition system  **

## 2022-10-25 NOTE — PLAN OF CARE
Problem: PHYSICAL THERAPY ADULT  Goal: Performs mobility at highest level of function for planned discharge setting  See evaluation for individualized goals  Description: Treatment/Interventions: Functional transfer training, LE strengthening/ROM, Therapeutic exercise, Endurance training, Bed mobility, Gait training, Equipment eval/education, OT          See flowsheet documentation for full assessment, interventions and recommendations  Note: Prognosis: Good  Problem List: Decreased strength, Decreased endurance, Impaired balance, Decreased mobility, Decreased safety awareness  Assessment: Pt presents to therapy today with reduced mobility, poor endurance, high risk of falling, poor standing balance, risk of falling, gait abnormalities  These impairments limit the patient by requiring increased assistance for mobility and places him at risk of falling  Pt would benefit from continued skilled therapy while in the hospital to improve overall mobility and work towards a safe d/c  Recommend rehab  At end of session patient was left supine with call bell within reach  The patient's AM-PAC Basic Mobility Inpatient Short Form Raw Score is 14  A Raw score of less than or equal to 16 suggests the patient may benefit from discharge to post-acute rehabilitation services  Please also refer to the recommendation of the Physical Therapist for safe discharge planning  Barriers to Discharge: Inaccessible home environment, Decreased caregiver support     PT Discharge Recommendation: Post acute rehabilitation services    See flowsheet documentation for full assessment

## 2022-10-26 LAB
ALBUMIN SERPL BCP-MCNC: 1.9 G/DL (ref 3.5–5)
ALP SERPL-CCNC: 303 U/L (ref 46–116)
ALT SERPL W P-5'-P-CCNC: 67 U/L (ref 12–78)
ANION GAP SERPL CALCULATED.3IONS-SCNC: 6 MMOL/L (ref 4–13)
AST SERPL W P-5'-P-CCNC: 89 U/L (ref 5–45)
BASOPHILS # BLD AUTO: 0.08 THOUSANDS/ÂΜL (ref 0–0.1)
BASOPHILS NFR BLD AUTO: 1 % (ref 0–1)
BILIRUB SERPL-MCNC: 4.3 MG/DL (ref 0.2–1)
BUN SERPL-MCNC: 21 MG/DL (ref 5–25)
CALCIUM ALBUM COR SERPL-MCNC: 10.4 MG/DL (ref 8.3–10.1)
CALCIUM SERPL-MCNC: 8.7 MG/DL (ref 8.3–10.1)
CHLORIDE SERPL-SCNC: 101 MMOL/L (ref 96–108)
CO2 SERPL-SCNC: 26 MMOL/L (ref 21–32)
CREAT SERPL-MCNC: 0.86 MG/DL (ref 0.6–1.3)
EOSINOPHIL # BLD AUTO: 0.15 THOUSAND/ÂΜL (ref 0–0.61)
EOSINOPHIL NFR BLD AUTO: 1 % (ref 0–6)
ERYTHROCYTE [DISTWIDTH] IN BLOOD BY AUTOMATED COUNT: 16.4 % (ref 11.6–15.1)
GFR SERPL CREATININE-BSD FRML MDRD: 91 ML/MIN/1.73SQ M
GLUCOSE SERPL-MCNC: 114 MG/DL (ref 65–140)
GLUCOSE SERPL-MCNC: 146 MG/DL (ref 65–140)
GLUCOSE SERPL-MCNC: 192 MG/DL (ref 65–140)
GLUCOSE SERPL-MCNC: 198 MG/DL (ref 65–140)
GLUCOSE SERPL-MCNC: 213 MG/DL (ref 65–140)
GLUCOSE SERPL-MCNC: 256 MG/DL (ref 65–140)
HCT VFR BLD AUTO: 32.9 % (ref 36.5–49.3)
HGB BLD-MCNC: 11.5 G/DL (ref 12–17)
IMM GRANULOCYTES # BLD AUTO: 0.2 THOUSAND/UL (ref 0–0.2)
IMM GRANULOCYTES NFR BLD AUTO: 2 % (ref 0–2)
LYMPHOCYTES # BLD AUTO: 1.4 THOUSANDS/ÂΜL (ref 0.6–4.47)
LYMPHOCYTES NFR BLD AUTO: 11 % (ref 14–44)
MCH RBC QN AUTO: 36.2 PG (ref 26.8–34.3)
MCHC RBC AUTO-ENTMCNC: 35 G/DL (ref 31.4–37.4)
MCV RBC AUTO: 104 FL (ref 82–98)
MONOCYTES # BLD AUTO: 1.22 THOUSAND/ÂΜL (ref 0.17–1.22)
MONOCYTES NFR BLD AUTO: 10 % (ref 4–12)
NEUTROPHILS # BLD AUTO: 9.4 THOUSANDS/ÂΜL (ref 1.85–7.62)
NEUTS SEG NFR BLD AUTO: 75 % (ref 43–75)
NRBC BLD AUTO-RTO: 0 /100 WBCS
PLATELET # BLD AUTO: 286 THOUSANDS/UL (ref 149–390)
PMV BLD AUTO: 12.9 FL (ref 8.9–12.7)
POTASSIUM SERPL-SCNC: 4.9 MMOL/L (ref 3.5–5.3)
PROT SERPL-MCNC: 7.3 G/DL (ref 6.4–8.4)
RBC # BLD AUTO: 3.18 MILLION/UL (ref 3.88–5.62)
SODIUM SERPL-SCNC: 133 MMOL/L (ref 135–147)
WBC # BLD AUTO: 12.45 THOUSAND/UL (ref 4.31–10.16)

## 2022-10-26 PROCEDURE — 80053 COMPREHEN METABOLIC PANEL: CPT | Performed by: INTERNAL MEDICINE

## 2022-10-26 PROCEDURE — 99232 SBSQ HOSP IP/OBS MODERATE 35: CPT | Performed by: INTERNAL MEDICINE

## 2022-10-26 PROCEDURE — 85025 COMPLETE CBC W/AUTO DIFF WBC: CPT | Performed by: INTERNAL MEDICINE

## 2022-10-26 PROCEDURE — 82948 REAGENT STRIP/BLOOD GLUCOSE: CPT

## 2022-10-26 RX ORDER — ACETAMINOPHEN 325 MG/1
975 TABLET ORAL ONCE
Status: COMPLETED | OUTPATIENT
Start: 2022-10-26 | End: 2022-10-26

## 2022-10-26 RX ADMIN — MICONAZOLE NITRATE 1 APPLICATION: 20 CREAM TOPICAL at 08:17

## 2022-10-26 RX ADMIN — PREGABALIN 100 MG: 100 CAPSULE ORAL at 21:10

## 2022-10-26 RX ADMIN — SALMETEROL XINAFOATE 1 PUFF: 50 POWDER, METERED ORAL; RESPIRATORY (INHALATION) at 21:11

## 2022-10-26 RX ADMIN — CLOPIDOGREL BISULFATE 75 MG: 75 TABLET ORAL at 08:08

## 2022-10-26 RX ADMIN — ACETAMINOPHEN 975 MG: 325 TABLET ORAL at 21:40

## 2022-10-26 RX ADMIN — NYSTATIN: 100000 POWDER TOPICAL at 08:17

## 2022-10-26 RX ADMIN — SALMETEROL XINAFOATE 1 PUFF: 50 POWDER, METERED ORAL; RESPIRATORY (INHALATION) at 08:08

## 2022-10-26 RX ADMIN — B-COMPLEX W/ C & FOLIC ACID TAB 1 TABLET: TAB at 08:08

## 2022-10-26 RX ADMIN — INSULIN LISPRO 2 UNITS: 100 INJECTION, SOLUTION INTRAVENOUS; SUBCUTANEOUS at 17:19

## 2022-10-26 RX ADMIN — ENOXAPARIN SODIUM 40 MG: 40 INJECTION SUBCUTANEOUS at 08:08

## 2022-10-26 RX ADMIN — THIAMINE HCL TAB 100 MG 100 MG: 100 TAB at 08:09

## 2022-10-26 RX ADMIN — UMECLIDINIUM 1 PUFF: 62.5 AEROSOL, POWDER ORAL at 08:08

## 2022-10-26 RX ADMIN — INSULIN LISPRO 2 UNITS: 100 INJECTION, SOLUTION INTRAVENOUS; SUBCUTANEOUS at 11:58

## 2022-10-26 RX ADMIN — CITALOPRAM HYDROBROMIDE 20 MG: 20 TABLET ORAL at 08:08

## 2022-10-26 RX ADMIN — TAMSULOSIN HYDROCHLORIDE 0.4 MG: 0.4 CAPSULE ORAL at 17:18

## 2022-10-26 RX ADMIN — INSULIN LISPRO 3 UNITS: 100 INJECTION, SOLUTION INTRAVENOUS; SUBCUTANEOUS at 08:10

## 2022-10-26 RX ADMIN — INSULIN LISPRO 18 UNITS: 100 INJECTION, SOLUTION INTRAVENOUS; SUBCUTANEOUS at 17:20

## 2022-10-26 RX ADMIN — BACLOFEN 20 MG: 20 TABLET ORAL at 21:11

## 2022-10-26 RX ADMIN — BACLOFEN 20 MG: 20 TABLET ORAL at 08:08

## 2022-10-26 RX ADMIN — PREGABALIN 100 MG: 100 CAPSULE ORAL at 17:18

## 2022-10-26 RX ADMIN — TORSEMIDE 20 MG: 20 TABLET ORAL at 08:08

## 2022-10-26 RX ADMIN — NICOTINE 1 PATCH: 21 PATCH, EXTENDED RELEASE TRANSDERMAL at 08:10

## 2022-10-26 RX ADMIN — Medication 3 MG: at 21:10

## 2022-10-26 RX ADMIN — Medication 1 TABLET: at 08:08

## 2022-10-26 RX ADMIN — PREGABALIN 100 MG: 100 CAPSULE ORAL at 08:08

## 2022-10-26 RX ADMIN — FOLIC ACID 1 MG: 1 TABLET ORAL at 08:08

## 2022-10-26 RX ADMIN — MICONAZOLE NITRATE 1 APPLICATION: 20 CREAM TOPICAL at 21:52

## 2022-10-26 RX ADMIN — INSULIN LISPRO 18 UNITS: 100 INJECTION, SOLUTION INTRAVENOUS; SUBCUTANEOUS at 11:58

## 2022-10-26 RX ADMIN — INSULIN GLARGINE 40 UNITS: 100 INJECTION, SOLUTION SUBCUTANEOUS at 08:08

## 2022-10-26 RX ADMIN — POTASSIUM CHLORIDE 20 MEQ: 1500 TABLET, EXTENDED RELEASE ORAL at 17:18

## 2022-10-26 RX ADMIN — MICONAZOLE NITRATE 1 APPLICATION: 20 CREAM TOPICAL at 17:22

## 2022-10-26 RX ADMIN — PANCRELIPASE 6000 UNITS: 30000; 6000; 19000 CAPSULE, DELAYED RELEASE PELLETS ORAL at 17:18

## 2022-10-26 RX ADMIN — BACLOFEN 20 MG: 20 TABLET ORAL at 17:18

## 2022-10-26 RX ADMIN — INSULIN LISPRO 18 UNITS: 100 INJECTION, SOLUTION INTRAVENOUS; SUBCUTANEOUS at 08:10

## 2022-10-26 RX ADMIN — POTASSIUM CHLORIDE 20 MEQ: 1500 TABLET, EXTENDED RELEASE ORAL at 08:08

## 2022-10-26 RX ADMIN — NYSTATIN: 100000 POWDER TOPICAL at 17:22

## 2022-10-26 NOTE — CASE MANAGEMENT
Hoang Nava 48 has received approved authorization from:  Kavon Isals in by Rep: Gustabo Coley received for: [facility]  Facility: [address] [place] [address] Road #: [id number] [facility] ref# [id number])  Start of Care: 10/25  Next Review Date: 10/27  Care Coordinator: Gita COONEY#: [phone]  Submit next review to: [phone]   Care Manager notified: Alo Oro

## 2022-10-26 NOTE — ASSESSMENT & PLAN NOTE
Lab Results   Component Value Date    EGFR 91 10/26/2022    EGFR 95 10/25/2022    EGFR 100 10/24/2022    CREATININE 0 86 10/26/2022    CREATININE 0 78 10/25/2022    CREATININE 0 69 10/24/2022   · Renal function overall stable  · Continue to monitor as needed  · Creatinine 0 86 this morning

## 2022-10-26 NOTE — ASSESSMENT & PLAN NOTE
Patient presents with jaundice, no other symptoms  Noted 10/13, had just stopped drinking ETOH 10/11  · CT A/P showed pancreas is atrophic and there's mild prominence of main pancreatic duct 5mm 2/2 chronic pancreatitis sequelae  · Acute hepatitis panel negative  · GI following, MRCP showed Obstruction of the extrahepatic bile duct and pancreatic duct in the region of the pancreatic neck  Differential diagnosis includes obstruction from fibrosis of chronic pancreatitis versus mass however no obvious mass or lesion identified  · On abx for possible cholangitis day 7/7  · S/P ERCP/EUS on 10/21  Found to have chronic fibrotic changes likely from chronic pancreatitis, status post stent placement  Pending final biopsy    · Bilirubin and alk-phos continue to trend down today on 10/26

## 2022-10-26 NOTE — ASSESSMENT & PLAN NOTE
Lab Results   Component Value Date    HGBA1C 10 1 (H) 10/16/2022       Poorly controlled  · Blood sugar remained labile during hospitalization has endocrinology was consulted, input appreciated  · Recommend continuing Lantus 40units q h s , pre meal 18 units t i d  Continue SSI    · Given pancreatitis, endo recs to stop Soliqua as it contains lixisenatide (a GLP1 receptor agonist)  · Diabetic diet  · Monitor accuchecks and adjust regimen as needed

## 2022-10-26 NOTE — PLAN OF CARE
Problem: MOBILITY - ADULT  Goal: Maintain or return to baseline ADL function  Description: INTERVENTIONS:  -  Assess patient's ability to carry out ADLs; assess patient's baseline for ADL function and identify physical deficits which impact ability to perform ADLs (bathing, care of mouth/teeth, toileting, grooming, dressing, etc )  - Assess/evaluate cause of self-care deficits   - Assess range of motion  - Assess patient's mobility; develop plan if impaired  - Assess patient's need for assistive devices and provide as appropriate  - Encourage maximum independence but intervene and supervise when necessary  - Involve family in performance of ADLs  - Assess for home care needs following discharge   - Consider OT consult to assist with ADL evaluation and planning for discharge  - Provide patient education as appropriate  Outcome: Progressing  Goal: Maintains/Returns to pre admission functional level  Description: INTERVENTIONS:  - Perform BMAT or MOVE assessment daily    - Set and communicate daily mobility goal to care team and patient/family/caregiver  - Collaborate with rehabilitation services on mobility goals if consulted  - Perform Range of Motion 3 times a day  - Reposition patient every 2 hours    - Dangle patient 3 times a day  - Stand patient 3 times a day  - Ambulate patient 3 times a day  - Out of bed to chair 3 times a day   - Out of bed for meals 3  Problem: PAIN - ADULT  Goal: Verbalizes/displays adequate comfort level or baseline comfort level  Description: Interventions:  - Encourage patient to monitor pain and request assistance  - Assess pain using appropriate pain scale  - Administer analgesics based on type and severity of pain and evaluate response  - Implement non-pharmacological measures as appropriate and evaluate response  - Consider cultural and social influences on pain and pain management  - Notify physician/advanced practitioner if interventions unsuccessful or patient reports new pain  Outcome: Progressing     Problem: INFECTION - ADULT  Goal: Absence or prevention of progression during hospitalization  Description: INTERVENTIONS:  - Assess and monitor for signs and symptoms of infection  - Monitor lab/diagnostic results  - Monitor all insertion sites, i e  indwelling lines, tubes, and drains  - Monitor endotracheal if appropriate and nasal secretions for changes in amount and color  - [place] appropriate cooling/warming therapies per order  - Administer medications as ordered  - Instruct and encourage patient and family to use good hand hygiene technique  - Identify and instruct in appropriate isolation precautions for identified infection/condition  Outcome: Progressing  Goal: Absence of fever/infection during neutropenic period  Description: INTERVENTIONS:  - Monitor WBC    Outcome: Progressing     Problem: DISCHARGE PLANNING  Goal: Discharge to home or other facility with appropriate resources  Description: INTERVENTIONS:  - Identify barriers to discharge w/patient and caregiver  - Arrange for needed discharge resources and transportation as appropriate  - Identify discharge learning needs (meds, wound care, etc )  - Arrange for interpretive services to assist at discharge as needed  - Refer to Case Management Department for coordinating discharge planning if the patient needs post-hospital services based on physician/advanced practitioner order or complex needs related to functional status, cognitive ability, or social support system  Outcome: Progressing     Problem: Knowledge Deficit  Goal: Patient/family/caregiver demonstrates understanding of disease process, treatment plan, medications, and discharge instructions  Description: Complete learning assessment and assess knowledge base    Interventions:  - Provide teaching at level of understanding  - Provide teaching via preferred learning methods  Outcome: Progressing    times a day  - Out of bed for toileting  - Record patient progress and toleration of activity level   Outcome: Progressing

## 2022-10-26 NOTE — PROGRESS NOTES
[address] [facility]  Progress Note - Tamanna Zavaleta 1960, 57 y o  male MRN: [id number]  Unit/Bed#: [facility] 810-01 Encounter: [id number]  Primary Care Provider: Mikey Garcia MD   Date and time admitted to hospital: 10/16/2022  4:28 PM    Stage 3a chronic kidney disease [facility])  Assessment & Plan  Lab Results   Component Value Date    EGFR 91 10/26/2022    EGFR 95 10/25/2022    EGFR 100 10/24/2022    CREATININE 0 86 10/26/2022    CREATININE 0 78 10/25/2022    CREATININE 0 69 10/24/2022   · Renal function overall stable  · Continue to monitor as needed  · Creatinine 0 86 this morning    History of cervical spinal cord injury  Assessment & Plan  In setting cervical spinal cord injury  Walks with walker  · PT/OT recommending rehab currently pending placement    Severe mitral regurgitation  Assessment & Plan  · Was told to see CTS for consideration of mitraclip in 2019 however did not follow up    Hyponatremia  Assessment & Plan  · Resolved    Chronic combined systolic and diastolic congestive heart failure [facility])  Assessment & Plan  Wt Readings from Last 3 Encounters:   10/21/22 80 7 kg (178 lb)   04/27/22 80 7 kg (178 lb)   07/23/21 83 kg (183 lb)     · Continue torsemide  Not on BB at baseline  · Last echo showed EF 60% with flail mitral valve leaflet with severe MR, referred to CTS for mitraclip however does not appear he followed up  Last seen in 2020 by Dr Marcelo Boyce  · I/O, daily weights  · Monitor volume status         Essential hypertension  Assessment & Plan  · Blood pressure stable on current regiment  · Continue torsemide 20 mg daily    Peripheral arterial disease (HCC)  Assessment & Plan  · On plavix and statin at baseline, Plavix was held pending GI procedures is now resumed  · Continue Plavix    Alcohol abuse  Assessment & Plan  · Last drink reportedly 10/11  No clinical concerns for withdrawal, now off CIWA protocol    · Continue thiamine and folate with multivitamins daily     Diabetes mellitus type 2 - Diabetic neuropathy  Assessment & Plan  Lab Results   Component Value Date    HGBA1C 10 1 (H) 10/16/2022       Poorly controlled  · Blood sugar remained labile during hospitalization has endocrinology was consulted, input appreciated  · Recommend continuing Lantus 40units q h s , pre meal 18 units t i d  Continue SSI  · Given pancreatitis, endo recs to stop Soliqua as it contains lixisenatide (a GLP1 receptor agonist)  · Diabetic diet  · Monitor accuchecks and adjust regimen as needed       Ambulatory dysfunction  Assessment & Plan  · Patient walks with a walker, has history of spinal cord injury  · PT/OT recommending rehab, case management is following  Hypokalemia  Assessment & Plan  Resolved with potassium repletion  Monitor BMP in a m     Tobacco abuse  Assessment & Plan  · Nicotine patch    * Jaundice, obstructive, intrahepatic  Assessment & Plan  Patient presents with jaundice, no other symptoms  Noted 10/13, had just stopped drinking ETOH 10/11  · CT A/P showed pancreas is atrophic and there's mild prominence of main pancreatic duct 5mm 2/2 chronic pancreatitis sequelae  · Acute hepatitis panel negative  · GI following, MRCP showed Obstruction of the extrahepatic bile duct and pancreatic duct in the region of the pancreatic neck  Differential diagnosis includes obstruction from fibrosis of chronic pancreatitis versus mass however no obvious mass or lesion identified  · On abx for possible cholangitis day 7/7  · S/P ERCP/EUS on 10/21  Found to have chronic fibrotic changes likely from chronic pancreatitis, status post stent placement  Pending final biopsy  · Bilirubin and alk-phos continue to trend down today on 10/26          VTE Pharmacologic Prophylaxis: VTE Score: 6 High Risk (Score >/= 5) - Pharmacological DVT Prophylaxis Ordered: enoxaparin (Lovenox)  Sequential Compression Devices Ordered      Patient Centered Rounds: I performed bedside rounds with nursing staff today  Discussions with Specialists or Other Care Team Provider: n/a    Education and Discussions with Family / Patient: Updated  (daughter) via phone  Time Spent for Care: 30 minutes  More than 50% of total time spent on counseling and coordination of care as described above  Current Length of Stay: 10 day(s)  Current Patient Status: Inpatient   Certification Statement: The patient will continue to require additional inpatient hospital stay due to Pending rehab placement  Discharge Plan: Anticipate discharge tomorrow to rehab facility  Code Status: Level 3 - DNAR and DNI    Subjective:   Patient denies any acute complaints  Objective:     Vitals:   Temp (24hrs), Av 4 °F (36 9 °C), Min:98 1 °F (36 7 °C), Max:98 6 °F (37 °C)    Temp:  [98 1 °F (36 7 °C)-98 6 °F (37 °C)] 98 1 °F (36 7 °C)  HR:  [73-85] 73  Resp:  [16-20] 20  BP: (110-113)/(68-70) 110/68  SpO2:  [93 %-96 %] 93 %  Body mass index is 27 06 kg/m²  Input and Output Summary (last 24 hours): Intake/Output Summary (Last 24 hours) at 10/26/2022 1429  Last data filed at 10/26/2022 1329  Gross per 24 hour   Intake --   Output 2075 ml   Net -2075 ml       Physical Exam:   Physical Exam  Vitals and nursing note reviewed  Constitutional:       General: He is not in acute distress  Appearance: He is well-developed  He is not ill-appearing, toxic-appearing or diaphoretic  HENT:      Head: Normocephalic and atraumatic  Eyes:      General: Scleral icterus present  Conjunctiva/sclera: Conjunctivae normal    Cardiovascular:      Rate and Rhythm: Normal rate and regular rhythm  Heart sounds: Murmur heard  No friction rub  No gallop  Pulmonary:      Effort: Pulmonary effort is normal  No respiratory distress  Breath sounds: Normal breath sounds  No stridor  No wheezing, rhonchi or rales  Chest:      Chest wall: No tenderness  Abdominal:      General: There is no distension  Palpations: Abdomen is soft  There is no mass  Tenderness: There is no abdominal tenderness  There is no guarding or rebound  Hernia: No hernia is present  Musculoskeletal:      Cervical back: Neck supple  Skin:     General: Skin is warm and dry  Coloration: Skin is jaundiced  Neurological:      Mental Status: He is alert  Additional Data:     Labs:  Results from last 7 days   Lab Units 10/26/22  0446   WBC Thousand/uL 12 45*   HEMOGLOBIN g/dL 11 5*   HEMATOCRIT % 32 9*   PLATELETS Thousands/uL 286   NEUTROS PCT % 75   LYMPHS PCT % 11*   MONOS PCT % 10   EOS PCT % 1     Results from last 7 days   Lab Units 10/26/22  0446   SODIUM mmol/L 133*   POTASSIUM mmol/L 4 9   CHLORIDE mmol/L 101   CO2 mmol/L 26   BUN mg/dL 21   CREATININE mg/dL 0 86   ANION GAP mmol/L 6   CALCIUM mg/dL 8 7   ALBUMIN g/dL 1 9*   TOTAL BILIRUBIN mg/dL 4 30*   ALK PHOS U/L 303*   ALT U/L 67   AST U/L 89*   GLUCOSE RANDOM mg/dL 192*     Results from last 7 days   Lab Units 10/21/22  0457   INR  1 05     Results from last 7 days   Lab Units 10/26/22  1048 10/26/22  0730 10/25/22  2053 10/25/22  1614 10/25/22  1053 10/25/22  1052 10/25/22  0722 10/24/22  2128 10/24/22  1614 10/24/22  1109 10/24/22  0730 10/23/22  2220   POC GLUCOSE mg/dl 213* 256* 167* 122 289* 287* 205* 300* 127 300* 160* 211*               Lines/Drains:  Invasive Devices  Report    Peripheral Intravenous Line  Duration           Peripheral IV 10/26/22 Left Forearm <1 day                      Imaging: No pertinent imaging reviewed      Recent Cultures (last 7 days):   Results from last 7 days   Lab Units 10/22/22  2304   C DIFF TOXIN B BY PCR  Negative       Last 24 Hours Medication List:   Current Facility-Administered Medications   Medication Dose Route Frequency Provider Last Rate   • albuterol  2 5 mg Nebulization Q4H PRN Gerber Velarde MD     • aluminum-magnesium hydroxide-simethicone  30 mL Oral Q6H PRN Gerber Velarde MD     • baclofen  20 mg Oral TID Phil Dawkins MD     • calcium carbonate-vitamin D  1 tablet Oral Daily With Breakfast Phil Dawkins MD     • citalopram  20 mg Oral Daily Phil Dawkins MD     • clopidogrel  75 mg Oral Daily Florecita Carlson MD     • enoxaparin  40 mg Subcutaneous Daily Phil Dawkins MD     • ergocalciferol  50,000 Units Oral Weekly Phil Dawkins MD     • folic acid  1 mg Oral Daily Phil Dawkisn MD     • insulin glargine  40 Units Subcutaneous QAM Andres Carty, DO     • insulin lispro  1-5 Units Subcutaneous HS Phil Dawkins MD     • insulin lispro  1-6 Units Subcutaneous TID AC Phil Dawkins MD     • insulin lispro  18 Units Subcutaneous TID With Meals Giles Lomeli MD     • loperamide  2 mg Oral TID PRN Florecita Carlson MD     • melatonin  3 mg Oral HS Christal Pedraza PA-C     • ELMO ANTIFUNGAL  1 application Topical TID Phil Dawkins MD     • multivitamin stress formula  1 tablet Oral Daily Phil Dawkins MD     • nicotine  1 patch Transdermal Daily Phil Dawkins MD     • nystatin   Topical BID Phil Dawkins MD     • ondansetron  4 mg Intravenous Q6H PRN Phil Dawkins MD     • pancrelipase (Lip-Prot-Amyl)  6,000 Units Oral TID With Meals Andres Carty DO     • potassium chloride  20 mEq Oral BID Phil Dawkins MD     • pregabalin  100 mg Oral TID Phil Dawkins MD     • salmeterol  1 puff Inhalation Q12H [facility] Phil Dawkins MD     • tamsulosin  0 4 mg Oral Daily With Luc Samuel MD     • thiamine  100 mg Oral Daily Phil Dawkins MD     • torsemide  20 mg Oral Daily Phil Dawkins MD     • umeclidinium bromide  1 puff Inhalation Daily Phil Dawkins MD          Today, Patient Was Seen By: Uyen Butler    **Please Note: This note may have been constructed using a voice recognition system  **

## 2022-10-26 NOTE — ASSESSMENT & PLAN NOTE
Wt Readings from Last 3 Encounters:   10/21/22 80 7 kg (178 lb)   04/27/22 80 7 kg (178 lb)   07/23/21 83 kg (183 lb)     · Continue torsemide  Not on BB at baseline  · Last echo showed EF 60% with flail mitral valve leaflet with severe MR, referred to CTS for mitraclip however does not appear he followed up  Last seen in 2020 by Dr Dung Gtz     · I/O, daily weights  · Monitor volume status

## 2022-10-26 NOTE — ASSESSMENT & PLAN NOTE
In setting cervical spinal cord injury  Walks with walker    · PT/OT recommending rehab currently pending placement

## 2022-10-27 VITALS
BODY MASS INDEX: 26.98 KG/M2 | SYSTOLIC BLOOD PRESSURE: 114 MMHG | HEART RATE: 83 BPM | HEIGHT: 68 IN | WEIGHT: 178 LBS | OXYGEN SATURATION: 96 % | TEMPERATURE: 97.2 F | DIASTOLIC BLOOD PRESSURE: 68 MMHG | RESPIRATION RATE: 16 BRPM

## 2022-10-27 LAB — GLUCOSE SERPL-MCNC: 210 MG/DL (ref 65–140)

## 2022-10-27 PROCEDURE — 82948 REAGENT STRIP/BLOOD GLUCOSE: CPT

## 2022-10-27 PROCEDURE — 99239 HOSP IP/OBS DSCHRG MGMT >30: CPT | Performed by: INTERNAL MEDICINE

## 2022-10-27 RX ORDER — INSULIN GLARGINE 100 [IU]/ML
30 INJECTION, SOLUTION SUBCUTANEOUS EVERY MORNING
Qty: 10 ML | Refills: 0 | Status: SHIPPED | OUTPATIENT
Start: 2022-10-28

## 2022-10-27 RX ORDER — NYSTATIN 100000 [USP'U]/G
POWDER TOPICAL 2 TIMES DAILY
Qty: 15 G | Refills: 0 | Status: SHIPPED | OUTPATIENT
Start: 2022-10-27

## 2022-10-27 RX ORDER — INSULIN LISPRO 100 [IU]/ML
18 INJECTION, SOLUTION INTRAVENOUS; SUBCUTANEOUS
Qty: 66 ML | Refills: 0 | Status: SHIPPED | OUTPATIENT
Start: 2022-10-27 | End: 2023-01-25

## 2022-10-27 RX ORDER — LANOLIN ALCOHOL/MO/W.PET/CERES
3 CREAM (GRAM) TOPICAL
Qty: 30 TABLET | Refills: 0 | Status: SHIPPED | OUTPATIENT
Start: 2022-10-27

## 2022-10-27 RX ADMIN — FOLIC ACID 1 MG: 1 TABLET ORAL at 08:43

## 2022-10-27 RX ADMIN — ENOXAPARIN SODIUM 40 MG: 40 INJECTION SUBCUTANEOUS at 08:53

## 2022-10-27 RX ADMIN — SALMETEROL XINAFOATE 1 PUFF: 50 POWDER, METERED ORAL; RESPIRATORY (INHALATION) at 08:44

## 2022-10-27 RX ADMIN — NICOTINE 1 PATCH: 21 PATCH, EXTENDED RELEASE TRANSDERMAL at 08:43

## 2022-10-27 RX ADMIN — CLOPIDOGREL BISULFATE 75 MG: 75 TABLET ORAL at 08:43

## 2022-10-27 RX ADMIN — MICONAZOLE NITRATE 1 APPLICATION: 20 CREAM TOPICAL at 08:49

## 2022-10-27 RX ADMIN — B-COMPLEX W/ C & FOLIC ACID TAB 1 TABLET: TAB at 08:42

## 2022-10-27 RX ADMIN — PANCRELIPASE 6000 UNITS: 30000; 6000; 19000 CAPSULE, DELAYED RELEASE PELLETS ORAL at 08:42

## 2022-10-27 RX ADMIN — CITALOPRAM HYDROBROMIDE 20 MG: 20 TABLET ORAL at 08:43

## 2022-10-27 RX ADMIN — INSULIN LISPRO 2 UNITS: 100 INJECTION, SOLUTION INTRAVENOUS; SUBCUTANEOUS at 08:48

## 2022-10-27 RX ADMIN — INSULIN GLARGINE 40 UNITS: 100 INJECTION, SOLUTION SUBCUTANEOUS at 08:52

## 2022-10-27 RX ADMIN — PREGABALIN 100 MG: 100 CAPSULE ORAL at 08:42

## 2022-10-27 RX ADMIN — THIAMINE HCL TAB 100 MG 100 MG: 100 TAB at 08:43

## 2022-10-27 RX ADMIN — TORSEMIDE 20 MG: 20 TABLET ORAL at 08:42

## 2022-10-27 RX ADMIN — INSULIN LISPRO 18 UNITS: 100 INJECTION, SOLUTION INTRAVENOUS; SUBCUTANEOUS at 08:47

## 2022-10-27 RX ADMIN — NYSTATIN: 100000 POWDER TOPICAL at 08:54

## 2022-10-27 RX ADMIN — Medication 1 TABLET: at 08:52

## 2022-10-27 RX ADMIN — UMECLIDINIUM 1 PUFF: 62.5 AEROSOL, POWDER ORAL at 08:42

## 2022-10-27 RX ADMIN — POTASSIUM CHLORIDE 20 MEQ: 1500 TABLET, EXTENDED RELEASE ORAL at 08:43

## 2022-10-27 RX ADMIN — BACLOFEN 20 MG: 20 TABLET ORAL at 08:41

## 2022-10-27 NOTE — DISCHARGE SUMMARY
[address] [facility]  Discharge- Laly Phi 1960, 57 y o  male MRN: [id number]  Unit/Bed#: [facility] 810-01 Encounter: [id number]  Primary Care Provider: Tyesha Rice MD   Date and time admitted to hospital: 10/16/2022  4:28 PM    Stage 3a chronic kidney disease [facility])  Assessment & Plan  Lab Results   Component Value Date    EGFR 91 10/26/2022    EGFR 95 10/25/2022    EGFR 100 10/24/2022    CREATININE 0 86 10/26/2022    CREATININE 0 78 10/25/2022    CREATININE 0 69 10/24/2022   · Renal function overall stable  · Continue to monitor as needed  ·     History of cervical spinal cord injury  Assessment & Plan  In setting cervical spinal cord injury  Walks with walker  · PT/OT recommending rehab currently pending placement-patient pending discharge today to rehab    Severe mitral regurgitation  Assessment & Plan  · Was told to see CTS for consideration of mitraclip in 2019 however did not follow up    Hyponatremia  Assessment & Plan  · Resolved    Chronic combined systolic and diastolic congestive heart failure [facility])  Assessment & Plan  Wt Readings from Last 3 Encounters:   10/21/22 80 7 kg (178 lb)   04/27/22 80 7 kg (178 lb)   07/23/21 83 kg (183 lb)     · Continue torsemide  Not on BB at baseline  · Last echo showed EF 60% with flail mitral valve leaflet with severe MR, referred to CTS for mitraclip however does not appear he followed up  Last seen in 2020 by Dr Carmel Felder  · I/O, daily weights  · Monitor volume status         Essential hypertension  Assessment & Plan  · Blood pressure stable on current regiment  · Continue torsemide 20 mg daily    Peripheral arterial disease (HCC)  Assessment & Plan  · On plavix and statin at baseline, Plavix was held pending GI procedures is now resumed  · Continue Plavix    Alcohol abuse  Assessment & Plan  · Last drink reportedly 10/11  No clinical concerns for withdrawal, now off CIWA protocol    · Continue thiamine and folate with multivitamins daily  Diabetes mellitus type 2 - Diabetic neuropathy  Assessment & Plan  Lab Results   Component Value Date    HGBA1C 10 1 (H) 10/16/2022       Poorly controlled  · Blood sugar remained labile during hospitalization has endocrinology was consulted, input appreciated  · Recommend continuing Lantus 40units q h s , pre meal 18 units t i d  Continue SSI  · Given pancreatitis, endo recs to stop Soliqua as it contains lixisenatide (a GLP1 receptor agonist)  · Diabetic diet  · Monitor accuchecks and adjust regimen as needed       Ambulatory dysfunction  Assessment & Plan  · Patient walks with a walker, has history of spinal cord injury  · PT/OT recommending rehab, case management is following  Hypokalemia  Assessment & Plan  Resolved with potassium repletion  Tobacco abuse  Assessment & Plan  · Nicotine patch    * Jaundice, obstructive, intrahepatic  Assessment & Plan  Patient presents with jaundice, no other symptoms  Noted 10/13, had just stopped drinking ETOH 10/11  · CT A/P showed pancreas is atrophic and there's mild prominence of main pancreatic duct 5mm 2/2 chronic pancreatitis sequelae  · Acute hepatitis panel negative  · GI following, MRCP showed Obstruction of the extrahepatic bile duct and pancreatic duct in the region of the pancreatic neck  Differential diagnosis includes obstruction from fibrosis of chronic pancreatitis versus mass however no obvious mass or lesion identified  · On abx for possible cholangitis day 7/7  · S/P ERCP/EUS on 10/21  Found to have chronic fibrotic changes likely from chronic pancreatitis, status post stent placement  Pending final biopsy    · Bilirubin and alk-phos continue to trend down today on 10/26  · Patient cleared for discharge with outpatient lab work in several weeks      Medical Problems             Resolved Problems  Date Reviewed: 10/27/2022   None               Discharging Physician / Practitioner: Melissa Vanegas  PCP: Jodi Godwin MD  Admission Date:   Admission Orders (From admission, onward)     Ordered        10/16/22 2136  Inpatient Admission  Once                      Discharge Date: 10/27/22    Consultations During Hospital Stay:  · GI  · Endocrine    Procedures Performed:   · ERCP EUS with chronic fibrotic changes from chronic pancreatitis status post stent placement    Significant Findings / Test Results:   FL ERCP biliary only   Final Result by Ines Isaacs MD (10/21 1825)      Stricture common hepatic/ common bile duct status post sphincterotomy, brush biopsy, and stenting as well as placement of pancreatic duct stent  Workstation performed: [id number]         MRI abdomen wo contrast and mrcp   Final Result by Juma Castellano MD (10/17 1641)      Obstruction of the extrahepatic bile duct and pancreatic duct in the region of the pancreatic neck as described above  The differential diagnosis includes obstruction from fibrosis of chronic pancreatitis versus mass  No obvious mass lesion identified  Limited evaluation without IV contrast   Endoscopic ultrasound may be helpful for further characterization  Fatty liver  Probable cirrhosis  Workstation performed: [id number]         CT chest abdomen pelvis w contrast   Final Result by Petar Hatfield MD (10/16 2101)      1  Mildly displaced fracture of the right posterior 12th rib  2   Hepatic steatosis  3   Atrophic pancreas with prominent main pancreatic duct likely sequela of chronic pancreatitis  4   Small hiatal hernia  Workstation performed: [id number]         CT head without contrast   Final Result by Petar Hatfield MD (10/16 2101)      No acute intracranial hemorrhage, midline shift, or mass effect  Workstation performed: [id number]         XR abdomen 1 view kub    (Results Pending)   ·     Incidental Findings:   · As under imaging     Test Results Pending at Discharge (will require follow up):    · Finalized biopsy from ERCP     Outpatient Tests Requested:  ·     Complications:  none    Reason for Admission:  Jaundice, pale stools    Hospital Course:   Genia Landaverde is a 57 y o  male patient who originally presented to the hospital on 10/16/2022 due to jaundice and change in stool color  Patient has a history of alcohol use recently stopping cold turkey several days prior to admission  Imaging was concerning for chronic pancreatitis, acute hepatitis panel was negative, MRCP showed obstruction of the extrahepatic bile duct and pancreatic duct in the region of the pancreatic neck,  Patient underwent ERCP EUS on 10/21 found to have chronic fibrotic changes likely from chronic pancreatitis he underwent stent placement  Biopsies were obtained which are currently pending finalization  He was discharged on 10/27 to rehab for physical therapy  Please see above list of diagnoses and related plan for additional information  Condition at Discharge: stable    Discharge Day Visit / Exam:   Subjective:  Patient denies any acute complaints on day of discharge  Vitals: Blood Pressure: 114/68 (10/27/22 0731)  Pulse: 83 (10/27/22 0731)  Temperature: (!) 97 2 °F (36 2 °C) (10/27/22 0731)  Temp Source: Tympanic (10/21/22 1652)  Respirations: 16 (10/27/22 0731)  Height: 5' 8" (172 7 cm) (10/21/22 0830)  Weight - Scale: 80 7 kg (178 lb) (10/21/22 0830)  SpO2: 96 % (10/27/22 0731)  Exam:   Physical Exam  Vitals and nursing note reviewed  Constitutional:       General: He is not in acute distress  Appearance: He is well-developed  He is not ill-appearing, toxic-appearing or diaphoretic  HENT:      Head: Normocephalic and atraumatic  Eyes:      General: Scleral icterus present  Conjunctiva/sclera: Conjunctivae normal    Cardiovascular:      Rate and Rhythm: Normal rate and regular rhythm  Heart sounds: No murmur heard  No friction rub  No gallop     Pulmonary:      Effort: Pulmonary effort is normal  No respiratory distress  Breath sounds: Normal breath sounds  No stridor  No wheezing, rhonchi or rales  Chest:      Chest wall: No tenderness  Abdominal:      General: There is no distension  Palpations: Abdomen is soft  There is no mass  Tenderness: There is no abdominal tenderness  There is no guarding  Hernia: No hernia is present  Musculoskeletal:         General: No swelling or tenderness  Cervical back: Neck supple  Skin:     General: Skin is warm and dry  Coloration: Skin is jaundiced  Neurological:      Mental Status: He is alert and oriented to person, place, and time  Discussion with Family: Updated  (significant other) via phone  Discharge instructions/Information to patient and family:   See after visit summary for information provided to patient and family  Provisions for Follow-Up Care:  See after visit summary for information related to follow-up care and any pertinent home health orders  Disposition:   Other: Rehab    Planned Readmission: no     Discharge Statement:  I spent 35 minutes discharging the patient  This time was spent on the day of discharge  I had direct contact with the patient on the day of discharge  Greater than 50% of the total time was spent examining patient, answering all patient questions, arranging and discussing plan of care with patient as well as directly providing post-discharge instructions  Additional time then spent on discharge activities  Discharge Medications:  See after visit summary for reconciled discharge medications provided to patient and/or family        **Please Note: This note may have been constructed using a voice recognition system**

## 2022-10-27 NOTE — ASSESSMENT & PLAN NOTE
Patient presents with jaundice, no other symptoms  Noted 10/13, had just stopped drinking ETOH 10/11  · CT A/P showed pancreas is atrophic and there's mild prominence of main pancreatic duct 5mm 2/2 chronic pancreatitis sequelae  · Acute hepatitis panel negative  · GI following, MRCP showed Obstruction of the extrahepatic bile duct and pancreatic duct in the region of the pancreatic neck  Differential diagnosis includes obstruction from fibrosis of chronic pancreatitis versus mass however no obvious mass or lesion identified  · On abx for possible cholangitis day 7/7  · S/P ERCP/EUS on 10/21  Found to have chronic fibrotic changes likely from chronic pancreatitis, status post stent placement  Pending final biopsy    · Bilirubin and alk-phos continue to trend down today on 10/26  · Patient cleared for discharge with outpatient lab work in several weeks

## 2022-10-27 NOTE — ASSESSMENT & PLAN NOTE
In setting cervical spinal cord injury  Walks with walker    · PT/OT recommending rehab currently pending placement-patient pending discharge today to rehab

## 2022-10-27 NOTE — ASSESSMENT & PLAN NOTE
Lab Results   Component Value Date    EGFR 91 10/26/2022    EGFR 95 10/25/2022    EGFR 100 10/24/2022    CREATININE 0 86 10/26/2022    CREATININE 0 78 10/25/2022    CREATININE 0 69 10/24/2022   · Renal function overall stable  · Continue to monitor as needed  ·

## 2022-10-27 NOTE — PLAN OF CARE
Problem: MOBILITY - ADULT  Goal: Maintain or return to baseline ADL function  Description: INTERVENTIONS:  -  Assess patient's ability to carry out ADLs; assess patient's baseline for ADL function and identify physical deficits which impact ability to perform ADLs (bathing, care of mouth/teeth, toileting, grooming, dressing, etc )  - Assess/evaluate cause of self-care deficits   - Assess range of motion  - Assess patient's mobility; develop plan if impaired  - Assess patient's need for assistive devices and provide as appropriate  - Encourage maximum independence but intervene and supervise when necessary  - Involve family in performance of ADLs  - Assess for home care needs following discharge   - Consider OT consult to assist with ADL evaluation and planning for discharge  - Provide patient education as appropriate  Outcome: Progressing  Goal: Maintains/Returns to pre admission functional level  Description: INTERVENTIONS:  - Perform BMAT or MOVE assessment daily    - Set and communicate daily mobility goal to care team and patient/family/caregiver     - Collaborate with rehabilitation services on mobility goals if consulted    - Out of bed for toileting  - Record patient progress and toleration of activity level   Outcome: Progressing     Problem: PAIN - ADULT  Goal: Verbalizes/displays adequate comfort level or baseline comfort level  Description: Interventions:  - Encourage patient to monitor pain and request assistance  - Assess pain using appropriate pain scale  - Administer analgesics based on type and severity of pain and evaluate response  - Implement non-pharmacological measures as appropriate and evaluate response  - Consider cultural and social influences on pain and pain management  - Notify physician/advanced practitioner if interventions unsuccessful or patient reports new pain  Outcome: Progressing     Problem: INFECTION - ADULT  Goal: Absence or prevention of progression during hospitalization  Description: INTERVENTIONS:  - Assess and monitor for signs and symptoms of infection  - Monitor lab/diagnostic results  - Monitor all insertion sites, i e  indwelling lines, tubes, and drains  - Monitor endotracheal if appropriate and nasal secretions for changes in amount and color  - [place] appropriate cooling/warming therapies per order  - Administer medications as ordered  - Instruct and encourage patient and family to use good hand hygiene technique  - Identify and instruct in appropriate isolation precautions for identified infection/condition  Outcome: Progressing  Goal: Absence of fever/infection during neutropenic period  Description: INTERVENTIONS:  - Monitor WBC    Outcome: Progressing     Problem: SAFETY ADULT  Goal: Maintain or return to baseline ADL function  Description: INTERVENTIONS:  -  Assess patient's ability to carry out ADLs; assess patient's baseline for ADL function and identify physical deficits which impact ability to perform ADLs (bathing, care of mouth/teeth, toileting, grooming, dressing, etc )  - Assess/evaluate cause of self-care deficits   - Assess range of motion  - Assess patient's mobility; develop plan if impaired  - Assess patient's need for assistive devices and provide as appropriate  - Encourage maximum independence but intervene and supervise when necessary  - Involve family in performance of ADLs  - Assess for home care needs following discharge   - Consider OT consult to assist with ADL evaluation and planning for discharge  - Provide patient education as appropriate  Outcome: Progressing  Goal: Maintains/Returns to pre admission functional level  Description: INTERVENTIONS:  - Perform BMAT or MOVE assessment daily    - Set and communicate daily mobility goal to care team and patient/family/caregiver     - Collaborate with rehabilitation services on mobility goals if consulted    - Out of bed for toileting  - Record patient progress and toleration of activity level   Outcome: Progressing  Goal: Patient will remain free of falls  Description: INTERVENTIONS:  - Educate patient/family on patient safety including physical limitations  - Instruct patient to call for assistance with activity   - Consult OT/PT to assist with strengthening/mobility   - Keep Call bell within reach  - Keep bed low and locked with side rails adjusted as appropriate  - Keep care items and personal belongings within reach  - Initiate and maintain comfort rounds  - Make Fall Risk Sign visible to staff    - Apply yellow socks and bracelet for high fall risk patients  - Consider moving patient to room near nurses station  Outcome: Progressing     Problem: DISCHARGE PLANNING  Goal: Discharge to home or other facility with appropriate resources  Description: INTERVENTIONS:  - Identify barriers to discharge w/patient and caregiver  - Arrange for needed discharge resources and transportation as appropriate  - Identify discharge learning needs (meds, wound care, etc )  - Arrange for interpretive services to assist at discharge as needed  - Refer to Case Management Department for coordinating discharge planning if the patient needs post-hospital services based on physician/advanced practitioner order or complex needs related to functional status, cognitive ability, or social support system  Outcome: Progressing     Problem: Knowledge Deficit  Goal: Patient/family/caregiver demonstrates understanding of disease process, treatment plan, medications, and discharge instructions  Description: Complete learning assessment and assess knowledge base    Interventions:  - Provide teaching at level of understanding  - Provide teaching via preferred learning methods  Outcome: Progressing     Problem: Prexisting or High Potential for Compromised Skin Integrity  Goal: Skin integrity is maintained or improved  Description: INTERVENTIONS:  - Identify patients at risk for skin breakdown  - Assess and monitor skin integrity  - Assess and monitor nutrition and hydration status  - Monitor labs   - Assess for incontinence   - Turn and reposition patient  - Assist with mobility/ambulation  - Relieve pressure over bony prominences  - Avoid friction and shearing  - Provide appropriate hygiene as needed including keeping skin clean and dry  - Evaluate need for skin moisturizer/barrier cream  - Collaborate with interdisciplinary team   - Patient/family teaching  - Consider wound care consult   Outcome: Progressing     Problem: Potential for Falls  Goal: Patient will remain free of falls  Description: INTERVENTIONS:  - Educate patient/family on patient safety including physical limitations  - Instruct patient to call for assistance with activity   - Consult OT/PT to assist with strengthening/mobility   - Keep Call bell within reach  - Keep bed low and locked with side rails adjusted as appropriate  - Keep care items and personal belongings within reach  - Initiate and maintain comfort rounds  - Make Fall Risk Sign visible to staff    - Apply yellow socks and bracelet for high fall risk patients  - Consider moving patient to room near nurses station  Outcome: Progressing     Problem: Nutrition/Hydration-ADULT  Goal: Nutrient/Hydration intake appropriate for improving, restoring or maintaining nutritional needs  Description: Monitor and assess patient's nutrition/hydration status for malnutrition  Collaborate with interdisciplinary team and initiate plan and interventions as ordered  Monitor patient's weight and dietary intake as ordered or per policy  Utilize nutrition screening tool and intervene as necessary  Determine patient's food preferences and provide high-protein, high-caloric foods as appropriate       INTERVENTIONS:  - Monitor oral intake, urinary output, labs, and treatment plans  - Assess nutrition and hydration status and recommend course of action  - Evaluate amount of meals eaten  - Assist patient with eating if necessary   - Allow adequate time for meals  - Recommend/ encourage appropriate diets, oral nutritional supplements, and vitamin/mineral supplements  - Order, calculate, and assess calorie counts as needed  - Recommend, monitor, and adjust tube feedings and TPN/PPN based on assessed needs  - Assess need for intravenous fluids  - Provide specific nutrition/hydration education as appropriate  - Include patient/family/caregiver in decisions related to nutrition  Outcome: Progressing

## 2022-10-27 NOTE — CASE MANAGEMENT
Case Management Discharge Planning Note    Patient name Judd Job  Location [facility] 810/[facility] 896-40 MRN [id number]  : 1960 Date 10/27/2022       Current Admission Date: 10/16/2022  Current Admission Diagnosis:Jaundice, obstructive, intrahepatic   Patient Active Problem List    Diagnosis Date Noted   • Dysuria 10/18/2022   • Jaundice, obstructive, intrahepatic 10/16/2022   • Stage 3a chronic kidney disease ([facility] ) 2021   • Scalp laceration, initial encounter 2020   • Aortic root dilation ([facility] ) 2019   • BPH (benign prostatic hyperplasia) 2019   • History of cervical spinal cord injury 2019   • Chronic combined systolic and diastolic congestive heart failure ([place] [address] ) 2019   • Hyponatremia 2019   • Obstructive uropathy 2019   • Non-rheumatic mitral regurgitation 2019   • Severe mitral regurgitation 2019   • Esophageal thickening 10/12/2018   • Sacral wound, initial encounter 10/10/2018   • Postoperative state 2018   • Non-healing surgical wound of right groin 2018   • Wound dehiscence, surgical, initial encounter 2018   • Acute blood loss anemia 2018   • Constipation 2018   • Essential hypertension 2018   • Peripheral arterial disease ([facility] ) 2018   • Ischemic foot pain at rest 2018   • Cellulitis of leg, right 2018   • PAD 2018   • Hypercalcemia 2018   • Alcohol abuse 2018   • Ambulatory dysfunction 2018   • Lumbar radiculopathy 2018   • Diabetes mellitus type 2 - Diabetic neuropathy 2018   • Basal cell carcinoma in situ of skin 03/15/2018   • Hypokalemia 03/15/2018   • Chronic pain 2017   • Hernia, epigastric 2017   • Cervical myelopathy ([facility] ) 2016   • Depression 2016   • Diabetic neuropathy ([facility] ) 2016   • Injury of cervical spinal cord ([facility] ) 15/03/3212   • Umbilical hernia    • Tobacco abuse 2013   • Quadriplegia and quadriparesis ([facility] ) 08/23/2013   • Enlarged prostate with lower urinary tract symptoms (LUTS) 07/03/2012      LOS (days): 11  Geometric Mean LOS (GMLOS) (days): 3 00  Days to GMLOS:-7 5     OBJECTIVE:  Risk of Unplanned Readmission Score: 17 61         Current admission status: Inpatient   Preferred Pharmacy:   [facility]  ([address], [address]  [address] [address]  Phone: [phone] Fax: [address] [facility],[address], [address] [facility] [address]  Phone: [phone] Fax: [phone]    [facility] ([facility]) - Naet Kumar [address],[facility]  [address]  Phone: [phone] Fax: [phone]    Primary Care Provider: Abram Wells MD    Primary Insurance: [facility]  Secondary Insurance:     DISCHARGE DETAILS:          IMM Given (Date):: 10/27/22  IMM Given to[de-identified] Patient  Family notified[de-identified] Yun Norris (spouse)     IMM reviewed with patient, patient agrees with discharge determination             Accepting Facility Name, [facility] : [address] [place] [address]  Receiving Facility/Agency Phone Number: [phone]  Facility/Agency Fax Number: [phone]

## 2022-10-28 ENCOUNTER — TELEPHONE (OUTPATIENT)
Dept: GASTROENTEROLOGY | Facility: CLINIC | Age: 62
End: 2022-10-28

## 2022-10-28 DIAGNOSIS — K86.89 PANCREATIC MASS: Primary | ICD-10-CM

## 2022-10-28 PROCEDURE — 88342 IMHCHEM/IMCYTCHM 1ST ANTB: CPT | Performed by: STUDENT IN AN ORGANIZED HEALTH CARE EDUCATION/TRAINING PROGRAM

## 2022-10-28 PROCEDURE — 88172 CYTP DX EVAL FNA 1ST EA SITE: CPT | Performed by: STUDENT IN AN ORGANIZED HEALTH CARE EDUCATION/TRAINING PROGRAM

## 2022-10-28 PROCEDURE — 88173 CYTOPATH EVAL FNA REPORT: CPT | Performed by: STUDENT IN AN ORGANIZED HEALTH CARE EDUCATION/TRAINING PROGRAM

## 2022-10-28 PROCEDURE — 88112 CYTOPATH CELL ENHANCE TECH: CPT | Performed by: STUDENT IN AN ORGANIZED HEALTH CARE EDUCATION/TRAINING PROGRAM

## 2022-10-28 PROCEDURE — 88341 IMHCHEM/IMCYTCHM EA ADD ANTB: CPT | Performed by: STUDENT IN AN ORGANIZED HEALTH CARE EDUCATION/TRAINING PROGRAM

## 2022-10-28 PROCEDURE — 88305 TISSUE EXAM BY PATHOLOGIST: CPT | Performed by: STUDENT IN AN ORGANIZED HEALTH CARE EDUCATION/TRAINING PROGRAM

## 2022-10-28 NOTE — UTILIZATION REVIEW
NOTIFICATION OF ADMISSION DISCHARGE   This is a Notification of Discharge from [facility]  Please be advised that this patient has been discharge from our facility  Below you will find the admission and discharge date and time including the patient’s disposition  UTILIZATION REVIEW CONTACT:  Marvin Herrera  Utilization   Network Utilization Review Department  Phone: [phone] x carefully listen to the prompts  All voicemails are confidential   Email: [email or web address]     ADMISSION INFORMATION  PRESENTATION DATE: 10/16/2022  4:28 PM  OBERVATION ADMISSION DATE:   INPATIENT ADMISSION DATE: 10/16/22  9:36 PM   DISCHARGE DATE: 10/27/2022 10:33 AM  DISPOSITION: Non [facility] SN[facility]/TCU/SNU Non [facility] SNF/TC[facility]/SNU      IMPORTANT INFORMATION:  Send all requests for admission clinical reviews, approved or denied determinations and any other requests to dedicated fax number below belonging to the campus where the patient is receiving treatment   List of dedicated fax numbers:  [address] DENIALS (Administrative/Medical Necessity) [phone]   [address] (Maternity/NICU/Pediatrics) [phone]   [facility] [phone]   [facility] 87 [phone]   [address] [phone]   220 [facility] [phone]   90 [facility] [phone]   [address] [place] [address] [phone]   [facility]  [phone]   [facility] [phone]   412 [facility] [address] [facility] [phone]

## 2022-10-28 NOTE — TELEPHONE ENCOUNTER
Pancreatic mass pathology c/f adenocarcinoma  Oncology nurse navigators notified  Placing orders to onc and surg onc

## 2022-10-31 ENCOUNTER — HOME CARE VISIT (OUTPATIENT)
Dept: HOME HEALTH SERVICES | Facility: HOME HEALTHCARE | Age: 62
End: 2022-10-31

## 2022-10-31 ENCOUNTER — TELEPHONE (OUTPATIENT)
Dept: OTHER | Facility: OTHER | Age: 62
End: 2022-10-31

## 2022-10-31 ENCOUNTER — TELEPHONE (OUTPATIENT)
Dept: HEMATOLOGY ONCOLOGY | Facility: CLINIC | Age: 62
End: 2022-10-31

## 2022-10-31 DIAGNOSIS — C25.9 PANCREATIC ADENOCARCINOMA (HCC): Primary | ICD-10-CM

## 2022-10-31 NOTE — TELEPHONE ENCOUNTER
Made attempt to schedule a new patient appointment for Hematology and Surgical oncology, spoke with the patient's wife Daniella who states the patient will be referred to Hospice  Referral Closed

## 2022-10-31 NOTE — TELEPHONE ENCOUNTER
Patient's daughter is requesting a referral for hospice  She originally requested it from the patient's PCP but they let her know it would have to come from Dr Dorothy Aggarwal

## 2022-11-01 ENCOUNTER — DOCUMENTATION (OUTPATIENT)
Dept: HEMATOLOGY ONCOLOGY | Facility: CLINIC | Age: 62
End: 2022-11-01

## 2022-11-02 NOTE — TELEPHONE ENCOUNTER
Pt's wife called and stated pt will be released from the nursing home on 11/14 and wanted to know how bad the cancer is before she puts him on hospice  Pt's wife also stated he does not have an Oncology Specialist  Could Dr Paula Mills please advise

## 2022-11-09 ENCOUNTER — VBI (OUTPATIENT)
Dept: ADMINISTRATIVE | Facility: OTHER | Age: 62
End: 2022-11-09

## 2022-11-14 ENCOUNTER — HOME CARE VISIT (OUTPATIENT)
Dept: HOME HEALTH SERVICES | Facility: HOME HEALTHCARE | Age: 62
End: 2022-11-14

## 2022-11-14 VITALS — RESPIRATION RATE: 16 BRPM | SYSTOLIC BLOOD PRESSURE: 108 MMHG | HEART RATE: 88 BPM | DIASTOLIC BLOOD PRESSURE: 70 MMHG

## 2022-11-16 ENCOUNTER — HOME CARE VISIT (OUTPATIENT)
Dept: HOME HOSPICE | Facility: HOSPICE | Age: 62
End: 2022-11-16

## 2022-11-16 ENCOUNTER — HOME CARE VISIT (OUTPATIENT)
Dept: HOME HEALTH SERVICES | Facility: HOME HEALTHCARE | Age: 62
End: 2022-11-16

## 2022-11-17 ENCOUNTER — HOME CARE VISIT (OUTPATIENT)
Dept: HOME HOSPICE | Facility: HOSPICE | Age: 62
End: 2022-11-17

## 2022-11-17 ENCOUNTER — VBI (OUTPATIENT)
Dept: ADMINISTRATIVE | Facility: OTHER | Age: 62
End: 2022-11-17

## 2022-11-17 ENCOUNTER — HOME CARE VISIT (OUTPATIENT)
Dept: HOME HEALTH SERVICES | Facility: HOME HEALTHCARE | Age: 62
End: 2022-11-17

## 2022-11-17 NOTE — CASE COMMUNICATION
Ship to    Home  Branch: [employer] [id number]   1  Dry Dressings   Gauze 4x4 N/S 200 4x4s per unit  [id number]   3  Gauze Andressa stretch roll 4inch n/s 12 rolls per unit  [id number]   2  Telfa pad 3x4 9973   20  Tape  Transpore white 1 in [id number]   2  Gauze oil emulsion [id number]   10  Allevyn gentle border lite 4x4  [id number]   20

## 2022-11-18 ENCOUNTER — HOME CARE VISIT (OUTPATIENT)
Dept: HOME HOSPICE | Facility: HOSPICE | Age: 62
End: 2022-11-18

## 2022-11-21 ENCOUNTER — HOME CARE VISIT (OUTPATIENT)
Dept: HOME HOSPICE | Facility: HOSPICE | Age: 62
End: 2022-11-21

## 2022-11-22 ENCOUNTER — HOME CARE VISIT (OUTPATIENT)
Dept: HOME HEALTH SERVICES | Facility: HOME HEALTHCARE | Age: 62
End: 2022-11-22

## 2022-11-22 VITALS
SYSTOLIC BLOOD PRESSURE: 98 MMHG | HEART RATE: 90 BPM | TEMPERATURE: 98 F | RESPIRATION RATE: 16 BRPM | DIASTOLIC BLOOD PRESSURE: 70 MMHG

## 2022-11-25 ENCOUNTER — HOME CARE VISIT (OUTPATIENT)
Dept: HOME HOSPICE | Facility: HOSPICE | Age: 62
End: 2022-11-25

## 2022-12-04 NOTE — PROGRESS NOTES
12/4/2022 10:30 AM  [facility] home patient requests emergency fill due to change in patient's condition  Filled electronically via Epic as per [place] State Law  Requested Prescriptions     Signed Prescriptions Disp Refills   • Morphine Sulfate, Concentrate, 20 mg/mL concentrated solution 30 mL 0     Sig: Take 1 mL (20 mg total) by mouth every 6 (six) hours  May also take 1 mL (20 mg total) every 2 (two) hours as needed (pain/dyspnea)  Max Daily Amount: 320 mg  Myriam Jodran, [address] [place] [address] Visiting Nurse [facility]  Hospice Answering Service: [phone]  You can find me on [employer]!

## 2022-12-04 NOTE — HOSPICE
Medication name: oxycodone tablets 10mg Medication count: 48  Medication name: lorazepam tablets 0 5mg   Medication count: 43  Medication name: morphine concentrate   Medication count: 45  Medication name: haldol   Medication count: 40      disposed in [address] [place] [address]    Name of witness: Jorge Real    Name and relationship of [address] spouse and [facility]

## 2022-12-13 ENCOUNTER — HOME CARE VISIT (OUTPATIENT)
Dept: HOME HOSPICE | Facility: HOSPICE | Age: 62
End: 2022-12-13

## 2022-12-15 ENCOUNTER — HOME CARE VISIT (OUTPATIENT)
Dept: HOME HOSPICE | Facility: HOSPICE | Age: 62
End: 2022-12-15

## 2024-05-07 NOTE — ASSESSMENT & PLAN NOTE
Let patient/wife because patient does have memory loss know that his blood work showed that his blood count was normal without any sign of infection or anemia.  His thyroid blood test was normal and his medication dose does not need to be changed.  If he starts having night sweats frequently we would want to know about that.  Otherwise I would not make any changes in his medications at this time   · Last drink reportedly 10/11  No clinical concerns for withdrawal, now off CIWA protocol  · Continue thiamine and folate with multivitamins daily  I have reviewed and confirmed nurses' notes...

## 2025-03-05 NOTE — SOCIAL WORK
MCG Guide Used for Initial Round: Back Pain RRG  ICD10-D M54 16 Radiculopathy, lumbar region       Optimal GLOS: 1  Hospital Day: 1 day  DC Readiness:   Discharge Readiness  Return to top of Back Pain RRG - ISC  · Discharge readiness is indicated by patient meeting Recovery Milestones, including ALL of the following:  ? Acute etiologies requiring continued inpatient care absent  ? Pain absent or managed  ? Ambulation as tolerated[J]  ? Oral hydration, medications, and diet  ?  Discharge plans and education understood    Identified Barriers: MRI of spine to be completed  Discussion Date (Time): 04/18/18 with Dr Carolyne Rangel [[email or web address]] : REST NO SCREEN TIME OK FOR SCHOOL STOP WORKING OUT NO SPORTS TYLENOL OK  IF WORSENING HEADACHE, TO ER HAS APPT 1 WEEK WITH DR PAPA

## (undated) DEVICE — REM POLYHESIVE ADULT PATIENT RETURN ELECTRODE: Brand: VALLEYLAB

## (undated) DEVICE — Device: Brand: OMNICLOSE TROCAR SITE CLOSURE DEVICE

## (undated) DEVICE — VESSEL LOOPS X-RAY DETECTABLE: Brand: DEROYAL

## (undated) DEVICE — SUT PROLENE 5-0 C-1 36 IN M8720

## (undated) DEVICE — DRESSING MEPILEX BORDER 4 X 12 IN

## (undated) DEVICE — 1200CC GUARDIAN II: Brand: GUARDIAN

## (undated) DEVICE — 3M™ IOBAN™ 2 ANTIMICROBIAL INCISE DRAPE 6640EZ: Brand: IOBAN™ 2

## (undated) DEVICE — SPONGE STICK WITH PVP-I: Brand: KENDALL

## (undated) DEVICE — INTENDED FOR TISSUE SEPARATION, AND OTHER PROCEDURES THAT REQUIRE A SHARP SURGICAL BLADE TO PUNCTURE OR CUT.: Brand: BARD-PARKER SAFETY BLADES SIZE 15, STERILE

## (undated) DEVICE — BETHLEHEM UNIVERSAL MINOR GEN: Brand: CARDINAL HEALTH

## (undated) DEVICE — GLOVE SRG BIOGEL 7.5

## (undated) DEVICE — AEM CORD

## (undated) DEVICE — 3M™ V.A.C.® GRANUFOAM™ DRESSING KIT, M8275052, MEDIUM: Brand: 3M™ V.A.C.® GRANUFOAM™

## (undated) DEVICE — SUT MONOCRYL 3-0 SH 27 IN Y416H

## (undated) DEVICE — ULTRACLEAN ACCESSORY ELECTRODE 1" (2.54 CM) COATED BLADE: Brand: ULTRACLEAN

## (undated) DEVICE — NEEDLE 25G X 1 1/2

## (undated) DEVICE — DRESSING MEPILEX AG BORDER 4 X 4 IN

## (undated) DEVICE — ENDOPATH XCEL BLADELESS TROCARS WITH STABILITY SLEEVES: Brand: ENDOPATH XCEL

## (undated) DEVICE — SUT VICRYL 0 UR-6 27 IN J603H

## (undated) DEVICE — PROXIMATE PLUS MD MULTI-DIRECTIONAL RELEASE SKIN STAPLERS CONTAINS 35 STAINLESS STEEL STAPLES APPROXIMATE CLOSED DIMENSIONS: 6.9MM X 3.9MM WIDE: Brand: PROXIMATE

## (undated) DEVICE — SUT PROLENE 7-0 BV175-6 24 IN M8737

## (undated) DEVICE — LIGAMAX 5 MM ENDOSCOPIC MULTIPLE CLIP APPLIER: Brand: LIGAMAX

## (undated) DEVICE — BAG DECANTER

## (undated) DEVICE — 3000CC GUARDIAN II: Brand: GUARDIAN

## (undated) DEVICE — TRAY FOLEY 16FR URIMETER SURESTEP

## (undated) DEVICE — SYRINGE 10ML LL

## (undated) DEVICE — DRESSING MEPILEX BORDER 4 X 8 IN

## (undated) DEVICE — SUT SILK 2-0 18 IN A185H

## (undated) DEVICE — INTENDED FOR TISSUE SEPARATION, AND OTHER PROCEDURES THAT REQUIRE A SHARP SURGICAL BLADE TO PUNCTURE OR CUT.: Brand: BARD-PARKER ® CARBON RIB-BACK BLADES

## (undated) DEVICE — VIOLET BRAIDED (POLYGLACTIN 910), SYNTHETIC ABSORBABLE SUTURE: Brand: COATED VICRYL

## (undated) DEVICE — TIBURON SPLIT SHEET: Brand: CONVERTORS

## (undated) DEVICE — CHLORAPREP HI-LITE 26ML ORANGE

## (undated) DEVICE — ENDOPOUCH RETRIEVER SPECIMEN RETRIEVAL BAGS: Brand: ENDOPOUCH RETRIEVER

## (undated) DEVICE — DRAPE SHEET THREE QUARTER

## (undated) DEVICE — SUT PROLENE 6-0 BV130 30 IN 8709H

## (undated) DEVICE — MEDI-VAC YANKAUER SUCTION HANDLE W/STRAIGHT TIP & CONTROL VENT: Brand: CARDINAL HEALTH

## (undated) DEVICE — GLOVE INDICATOR PI UNDERGLOVE SZ 6.5 BLUE

## (undated) DEVICE — SUT PROLENE 5-0 C-1/C-1 36 IN 8720H

## (undated) DEVICE — PLEDGET CARDIO PTFE 9.5 X 4.8 SOFT LF (6EA/PK)

## (undated) DEVICE — LIGACLIP MCA MULTIPLE CLIP APPLIERS, 20 SMALL CLIPS: Brand: LIGACLIP

## (undated) DEVICE — HANDPIECE SET WITH HIGH FLOW TIP AND SUCTION TUBE: Brand: INTERPULSE

## (undated) DEVICE — GLOVE SRG BIOGEL ECLIPSE 6

## (undated) DEVICE — SUT SILK 4-0 18 IN A183H

## (undated) DEVICE — VAC DRESSING SENSATRAC SMALL

## (undated) DEVICE — VALVULOTOME EZ

## (undated) DEVICE — SUT MONOCRYL 2-0 CT-1 27 IN Y339H

## (undated) DEVICE — FLOSEAL MATRIX IS INDICATED IN SURGICAL PROCEDURES (OTHER THAN IN OPHTHALMIC) AS AN ADJUNCT TO HEMOSTASIS WHEN CONTROL OF BLEEDING BY LIGATURE OR CONVENTIONAL PROCEDURES IS INEFFECTIVE OR IMPRACTICAL.: Brand: FLOSEAL HEMOSTATIC MATRIX

## (undated) DEVICE — IV CATH INTROCAN 18G X 1 1/4 SAFETY

## (undated) DEVICE — ENDOPATH PNEUMONEEDLE INSUFFLATION NEEDLES WITH LUER LOCK CONNECTORS 120MM: Brand: ENDOPATH

## (undated) DEVICE — SCD SEQUENTIAL COMPRESSION COMFORT SLEEVE MEDIUM KNEE LENGTH: Brand: KENDALL SCD

## (undated) DEVICE — SYRINGE 3ML LL

## (undated) DEVICE — MICROPUNCTURE 401

## (undated) DEVICE — SUT PROLENE 6-0 C-1/C-1 30 IN 8706H

## (undated) DEVICE — DRAPE SURGIKIT SADDLE BAG

## (undated) DEVICE — SUT MONOCRYL 4-0 PS-2 27 IN Y426H

## (undated) DEVICE — NEEDLE 25GA X 1 IN SAFETY GLIDE

## (undated) DEVICE — STOPCOCK 3-WAY

## (undated) DEVICE — SURGICEL FIBRILLAR 1 X 2

## (undated) DEVICE — IV BUTTERFLY 21G SAFETY

## (undated) DEVICE — STERILE FEM POP PACK: Brand: CARDINAL HEALTH

## (undated) DEVICE — ADHESIVE SKN CLSR HISTOACRYL FLEX 0.5ML LF

## (undated) DEVICE — 1/2 FORCE SURGICAL SPRING CLIP: Brand: STEALTH® SPRING CLIP

## (undated) DEVICE — PACK PBDS LAP CHOLE RF

## (undated) DEVICE — BUTTON SWITCH PENCIL HOLSTER: Brand: VALLEYLAB

## (undated) DEVICE — ENDOPATH XCEL UNIVERSAL TROCAR STABLILITY SLEEVES: Brand: ENDOPATH XCEL